# Patient Record
Sex: FEMALE | Race: WHITE
[De-identification: names, ages, dates, MRNs, and addresses within clinical notes are randomized per-mention and may not be internally consistent; named-entity substitution may affect disease eponyms.]

---

## 2019-12-11 ENCOUNTER — HOSPITAL ENCOUNTER (OUTPATIENT)
Age: 74
End: 2019-12-11
Payer: MEDICARE

## 2019-12-11 DIAGNOSIS — R22.42: Primary | ICD-10-CM

## 2019-12-11 PROCEDURE — 76882 US LMTD JT/FCL EVL NVASC XTR: CPT

## 2023-10-31 ENCOUNTER — OFFICE VISIT (OUTPATIENT)
Dept: PRIMARY CARE | Facility: CLINIC | Age: 78
End: 2023-10-31
Payer: MEDICARE

## 2023-10-31 VITALS
WEIGHT: 157.4 LBS | DIASTOLIC BLOOD PRESSURE: 74 MMHG | HEART RATE: 68 BPM | BODY MASS INDEX: 27.89 KG/M2 | SYSTOLIC BLOOD PRESSURE: 140 MMHG | HEIGHT: 63 IN

## 2023-10-31 DIAGNOSIS — E11.9 CONTROLLED TYPE 2 DIABETES MELLITUS WITHOUT COMPLICATION, WITHOUT LONG-TERM CURRENT USE OF INSULIN (MULTI): ICD-10-CM

## 2023-10-31 DIAGNOSIS — C50.911 INFILTRATING DUCTAL CARCINOMA OF RIGHT FEMALE BREAST (MULTI): Primary | ICD-10-CM

## 2023-10-31 DIAGNOSIS — I15.9 SECONDARY HYPERTENSION: ICD-10-CM

## 2023-10-31 DIAGNOSIS — E78.2 MIXED HYPERLIPIDEMIA: ICD-10-CM

## 2023-10-31 PROBLEM — Z90.721 S/P HYSTERECTOMY WITH OOPHORECTOMY: Status: RESOLVED | Noted: 2017-12-29 | Resolved: 2023-10-31

## 2023-10-31 PROBLEM — C50.919 INVASIVE DUCTAL CARCINOMA OF BREAST, FEMALE (MULTI): Status: RESOLVED | Noted: 2017-04-26 | Resolved: 2023-10-31

## 2023-10-31 PROBLEM — Z90.710 S/P HYSTERECTOMY WITH OOPHORECTOMY: Status: RESOLVED | Noted: 2017-12-29 | Resolved: 2023-10-31

## 2023-10-31 PROCEDURE — 1159F MED LIST DOCD IN RCRD: CPT | Performed by: INTERNAL MEDICINE

## 2023-10-31 PROCEDURE — 3077F SYST BP >= 140 MM HG: CPT | Performed by: INTERNAL MEDICINE

## 2023-10-31 PROCEDURE — 3078F DIAST BP <80 MM HG: CPT | Performed by: INTERNAL MEDICINE

## 2023-10-31 PROCEDURE — 99204 OFFICE O/P NEW MOD 45 MIN: CPT | Performed by: INTERNAL MEDICINE

## 2023-10-31 PROCEDURE — 1036F TOBACCO NON-USER: CPT | Performed by: INTERNAL MEDICINE

## 2023-10-31 RX ORDER — LOSARTAN POTASSIUM AND HYDROCHLOROTHIAZIDE 12.5; 1 MG/1; MG/1
1 TABLET ORAL DAILY
COMMUNITY
Start: 2023-08-20 | End: 2024-01-08 | Stop reason: SDUPTHER

## 2023-10-31 RX ORDER — METFORMIN HYDROCHLORIDE 500 MG/1
1000 TABLET, EXTENDED RELEASE ORAL
COMMUNITY
Start: 2023-08-20 | End: 2024-01-08 | Stop reason: SDUPTHER

## 2023-10-31 RX ORDER — OMEPRAZOLE 20 MG/1
20 CAPSULE, DELAYED RELEASE ORAL
COMMUNITY
End: 2024-05-16 | Stop reason: SDUPTHER

## 2023-10-31 RX ORDER — POTASSIUM CHLORIDE 750 MG/1
10 TABLET, EXTENDED RELEASE ORAL DAILY
COMMUNITY
End: 2024-01-08 | Stop reason: SDUPTHER

## 2023-10-31 RX ORDER — AMLODIPINE BESYLATE 5 MG/1
5 TABLET ORAL DAILY
Qty: 30 TABLET | Refills: 5 | Status: SHIPPED | OUTPATIENT
Start: 2023-10-31 | End: 2023-11-14 | Stop reason: ALTCHOICE

## 2023-10-31 RX ORDER — VIT C/E/ZN/COPPR/LUTEIN/ZEAXAN 250MG-90MG
1000 CAPSULE ORAL DAILY
COMMUNITY

## 2023-10-31 RX ORDER — SIMVASTATIN 20 MG/1
20 TABLET, FILM COATED ORAL
COMMUNITY
End: 2024-01-08 | Stop reason: SDUPTHER

## 2023-10-31 ASSESSMENT — ENCOUNTER SYMPTOMS
BACK PAIN: 0
FATIGUE: 0
NAUSEA: 0
BLOOD IN STOOL: 0
ARTHRALGIAS: 0
VOMITING: 0
DIARRHEA: 0
PALPITATIONS: 0
SHORTNESS OF BREATH: 0
COUGH: 1
ABDOMINAL PAIN: 0
WHEEZING: 0

## 2023-10-31 NOTE — ASSESSMENT & PLAN NOTE
-Was diagnosed many years ago and successfully treated and disease-free at this time  -She goes for her screening mammogram this coming Monday

## 2023-10-31 NOTE — PATIENT INSTRUCTIONS
As we discussed I have ordered several labs to be done at your earliest convenience and we would like for you to be fasting.  You can have plenty of water but just nothing with calories for approximately 8 to 12 hours  When you come back we will go over the results  I also added a medication to your medical regimen called amlodipine 5 mg to be taken once daily with your other blood pressure medication.  Please try to check her blood pressure at home with your personal monitor and please sit down and relax for 10 to 20 minutes prior to checking your blood pressure.  Please also bring your monitor with you to your follow-up appointment  When you return we will be completing your annual Medicare wellness visit

## 2023-10-31 NOTE — PROGRESS NOTES
Subjective   Patient ID: Chanel Bradshaw is a 78 y.o. female who presents for [facility].  HPI  She is here today to get established.  Her previous physician of 25 years is moving out of state and she is naturally sad about it.  Otherwise we are glad to meet her today and we did review her past medical history.  She has had a history of both breast cancer and endometrial cancer.  She had surgery and has done remarkably well all this time.  She also had a history of a severe eye infection requiring hospitalization with IV antibiotics about 6 years ago.  Today she is looking great and exudes energy.  She states she is preparing to leave for [place] for the winter months and will leave at the end of December.  She is very physically active and plays [employer].  She does have hearing aids and hears well today.  She is also a type II diabetic and we did review her medications today.  We determined that she is due for laboratory checkup and has agreed to go soon.  We will see her back to go over the results we will also complete her annual Medicare wellness visit.  She also brought in a copy of her living will and power of  for healthcare.  Review of Systems   Constitutional:  Negative for fatigue.   Respiratory:  Positive for cough. Negative for shortness of breath and wheezing.    Cardiovascular:  Negative for chest pain, palpitations and leg swelling.   Gastrointestinal:  Negative for abdominal pain, blood in stool, diarrhea, nausea and vomiting.   Musculoskeletal:  Negative for arthralgias and back pain.     Objective   Physical Exam  Vitals and nursing note reviewed.   Constitutional:       General: She is not in acute distress.     Appearance: Normal appearance.   HENT:      Head: Normocephalic and atraumatic.   Eyes:      Conjunctiva/sclera: Conjunctivae normal.   Cardiovascular:      Rate and Rhythm: Normal rate and regular rhythm.      Heart sounds: Normal heart sounds.   Pulmonary:      Effort: No  respiratory distress.      Breath sounds: No wheezing.   Abdominal:      Palpations: Abdomen is soft.      Tenderness: There is no abdominal tenderness. There is no guarding.   Musculoskeletal:         General: No swelling. Normal range of motion.   Skin:     General: Skin is warm and dry.   Neurological:      General: No focal deficit present.      Mental Status: She is alert and oriented to person, place, and time.   Psychiatric:         Behavior: Behavior normal.       Assessment/Plan   Problem List Items Addressed This Visit             ICD-10-CM    HTN (hypertension) I10     -Her blood pressure was elevated today and she states while checking it at home she typically gets systolic readings in the 140s or higher  -We are adding amlodipine 5 mg daily to her regimen  -She will continue with losartan-hydrochlorothiazide 100-12.51 tablet daily  -She will continue with potassium chloride 10 mEq daily  -She will check her blood pressures at home and bring her monitor with her to her follow-up visit         Relevant Medications    amLODIPine (Norvasc) 5 mg tablet    Infiltrating ductal carcinoma of right female breast (CMS/HCC) - Primary C50.911     -Was diagnosed many years ago and successfully treated and disease-free at this time  -She goes for her screening mammogram this coming Monday         Controlled type 2 diabetes mellitus without complication, without long-term current use of insulin (CMS/HCC) E11.9     -We will be checking a hemoglobin A1c  -She is on metformin 500 mg 2 tablets each evening         Relevant Orders    Comprehensive Metabolic Panel    Hemoglobin A1C    Albumin , Urine Random     Other Visit Diagnoses         Codes    Mixed hyperlipidemia     E78.2    Relevant Orders    Lipid panel               Sarah Haddad DO

## 2023-10-31 NOTE — ASSESSMENT & PLAN NOTE
-Her blood pressure was elevated today and she states while checking it at home she typically gets systolic readings in the 140s or higher  -We are adding amlodipine 5 mg daily to her regimen  -She will continue with losartan-hydrochlorothiazide 100-12.51 tablet daily  -She will continue with potassium chloride 10 mEq daily  -She will check her blood pressures at home and bring her monitor with her to her follow-up visit

## 2023-11-07 ENCOUNTER — LAB (OUTPATIENT)
Dept: LAB | Facility: LAB | Age: 78
End: 2023-11-07
Payer: MEDICARE

## 2023-11-07 DIAGNOSIS — E11.9 CONTROLLED TYPE 2 DIABETES MELLITUS WITHOUT COMPLICATION, WITHOUT LONG-TERM CURRENT USE OF INSULIN (MULTI): ICD-10-CM

## 2023-11-07 DIAGNOSIS — E78.2 MIXED HYPERLIPIDEMIA: ICD-10-CM

## 2023-11-07 LAB
ALBUMIN SERPL BCP-MCNC: 4.4 G/DL (ref 3.4–5)
ALP SERPL-CCNC: 82 U/L (ref 33–136)
ALT SERPL W P-5'-P-CCNC: 13 U/L (ref 7–45)
ANION GAP SERPL CALC-SCNC: 11 MMOL/L (ref 10–20)
AST SERPL W P-5'-P-CCNC: 13 U/L (ref 9–39)
BILIRUB SERPL-MCNC: 0.6 MG/DL (ref 0–1.2)
BUN SERPL-MCNC: 12 MG/DL (ref 6–23)
CALCIUM SERPL-MCNC: 9.6 MG/DL (ref 8.6–10.3)
CHLORIDE SERPL-SCNC: 96 MMOL/L (ref 98–107)
CHOLEST SERPL-MCNC: 182 MG/DL (ref 0–199)
CHOLESTEROL/HDL RATIO: 3
CO2 SERPL-SCNC: 31 MMOL/L (ref 21–32)
CREAT SERPL-MCNC: 0.64 MG/DL (ref 0.5–1.05)
EST. AVERAGE GLUCOSE BLD GHB EST-MCNC: 148 MG/DL
GFR SERPL CREATININE-BSD FRML MDRD: >90 ML/MIN/1.73M*2
GLUCOSE SERPL-MCNC: 193 MG/DL (ref 74–99)
HBA1C MFR BLD: 6.8 %
HDLC SERPL-MCNC: 60 MG/DL
LDLC SERPL CALC-MCNC: 97 MG/DL
NON HDL CHOLESTEROL: 122 MG/DL (ref 0–149)
POTASSIUM SERPL-SCNC: 4.2 MMOL/L (ref 3.5–5.3)
PROT SERPL-MCNC: 6.6 G/DL (ref 6.4–8.2)
SODIUM SERPL-SCNC: 134 MMOL/L (ref 136–145)
TRIGL SERPL-MCNC: 126 MG/DL (ref 0–149)
VLDL: 25 MG/DL (ref 0–40)

## 2023-11-07 PROCEDURE — 80053 COMPREHEN METABOLIC PANEL: CPT

## 2023-11-07 PROCEDURE — 80061 LIPID PANEL: CPT

## 2023-11-07 PROCEDURE — 83036 HEMOGLOBIN GLYCOSYLATED A1C: CPT

## 2023-11-07 PROCEDURE — 36415 COLL VENOUS BLD VENIPUNCTURE: CPT

## 2023-11-14 ENCOUNTER — OFFICE VISIT (OUTPATIENT)
Dept: PRIMARY CARE | Facility: CLINIC | Age: 78
End: 2023-11-14
Payer: MEDICARE

## 2023-11-14 VITALS
BODY MASS INDEX: 27.55 KG/M2 | SYSTOLIC BLOOD PRESSURE: 136 MMHG | HEIGHT: 63 IN | DIASTOLIC BLOOD PRESSURE: 79 MMHG | HEART RATE: 76 BPM | WEIGHT: 155.5 LBS

## 2023-11-14 DIAGNOSIS — Z00.00 MEDICARE ANNUAL WELLNESS VISIT, SUBSEQUENT: ICD-10-CM

## 2023-11-14 DIAGNOSIS — E11.9 CONTROLLED TYPE 2 DIABETES MELLITUS WITHOUT COMPLICATION, WITHOUT LONG-TERM CURRENT USE OF INSULIN (MULTI): ICD-10-CM

## 2023-11-14 DIAGNOSIS — E78.2 MIXED HYPERLIPIDEMIA: ICD-10-CM

## 2023-11-14 DIAGNOSIS — C50.911 INFILTRATING DUCTAL CARCINOMA OF RIGHT FEMALE BREAST (MULTI): ICD-10-CM

## 2023-11-14 DIAGNOSIS — I15.9 SECONDARY HYPERTENSION: Primary | ICD-10-CM

## 2023-11-14 PROCEDURE — 1036F TOBACCO NON-USER: CPT | Performed by: INTERNAL MEDICINE

## 2023-11-14 PROCEDURE — 1159F MED LIST DOCD IN RCRD: CPT | Performed by: INTERNAL MEDICINE

## 2023-11-14 PROCEDURE — 99213 OFFICE O/P EST LOW 20 MIN: CPT | Performed by: INTERNAL MEDICINE

## 2023-11-14 PROCEDURE — 1160F RVW MEDS BY RX/DR IN RCRD: CPT | Performed by: INTERNAL MEDICINE

## 2023-11-14 PROCEDURE — G0439 PPPS, SUBSEQ VISIT: HCPCS | Performed by: INTERNAL MEDICINE

## 2023-11-14 PROCEDURE — 3075F SYST BP GE 130 - 139MM HG: CPT | Performed by: INTERNAL MEDICINE

## 2023-11-14 PROCEDURE — 1170F FXNL STATUS ASSESSED: CPT | Performed by: INTERNAL MEDICINE

## 2023-11-14 PROCEDURE — 3078F DIAST BP <80 MM HG: CPT | Performed by: INTERNAL MEDICINE

## 2023-11-14 RX ORDER — AMLODIPINE BESYLATE 10 MG/1
10 TABLET ORAL DAILY
Qty: 30 TABLET | Refills: 5 | Status: SHIPPED | OUTPATIENT
Start: 2023-11-14 | End: 2023-12-21 | Stop reason: SDUPTHER

## 2023-11-14 ASSESSMENT — ACTIVITIES OF DAILY LIVING (ADL)
BATHING: INDEPENDENT
MANAGING_FINANCES: INDEPENDENT
DOING_HOUSEWORK: INDEPENDENT
GROCERY_SHOPPING: INDEPENDENT
DRESSING: INDEPENDENT
TAKING_MEDICATION: INDEPENDENT

## 2023-11-14 ASSESSMENT — PATIENT HEALTH QUESTIONNAIRE - PHQ9
2. FEELING DOWN, DEPRESSED OR HOPELESS: NOT AT ALL
1. LITTLE INTEREST OR PLEASURE IN DOING THINGS: NOT AT ALL
SUM OF ALL RESPONSES TO PHQ9 QUESTIONS 1 AND 2: 0

## 2023-11-14 NOTE — PATIENT INSTRUCTIONS
As we discussed we completed your annual Medicare wellness visit today  Please remember to try to eat more vegetables and please remember the things we discussed about fall prevention  I was very pleased with your checkup today and your lab work looks wonderful  We will see you back in approximately 6 months for a follow-up and please remember to go to the lab fasting prior to your visit

## 2023-11-14 NOTE — PROGRESS NOTES
Subjective   Reason for Visit: Chanel Bradshaw is an 78 y.o. female here for a Medicare Wellness visit.     Past Medical, Surgical, and Family History reviewed and updated in chart.    Reviewed all medications by prescribing practitioner or clinical pharmacist (such as prescriptions, OTCs, herbal therapies and supplements) and documented in the medical record.    HPI  She is here today for follow-up from her previous visit and we also discovered that she was due for her annual Medicare wellness visit.  We did conduct a full review of systems and we went through the Medicare questionnaire.  She brought in her personal blood pressure monitor which was actually sent to her household by [employer].  She has been checking her blood pressure regularly and although her numbers improved there is still a little bit higher than desirable.  So far she has tolerated the amlodipine well and have decided to increase it to 10 mg.  She will give us an update again on how she is doing and we certainly are happy to adjust medicine as necessary.  She is getting ready to leave [place] for the winter and will be returning in May.  We also reviewed her most recent laboratory test results and for the most part I am very pleased with her numbers.  Her kidney function looks great and her liver enzymes are normal.  Her hemoglobin A1c was just fine at 6.8 although her fasting glucose that morning was a little bit on the generous side.  She does continue to monitor closely and will call if she is trending upward.  Her cholesterol was fantastic and she knows we will check that once a year.  She denies any symptoms of depression and she has had no falls in the last year.  We talked about fall prevention and I have specifically recommended she put grab bars in the bathroom.  Her memory appears to be good and she does wear hearing aids.  Her diet she states could be a little bit better as far as vegetables.  We also discussed the importance of routine  "exercise and she does enjoy playing [employer] ball.  She also has advanced directives and has provided a copy for her chart here.  We decided that we can see her back in approximately 6 months for follow-up.  Patient Care Team:  Sarah Haddad DO as PCP - General (Internal Medicine)     Review of Systems    Objective   Vitals:  /79   Pulse 76   Ht 1.6 m (5' 3\")   Wt 70.5 kg (155 lb 8 oz)   BMI 27.55 kg/m²       Physical Exam  Vitals and nursing note reviewed.   Constitutional:       General: She is not in acute distress.     Appearance: Normal appearance.   HENT:      Head: Normocephalic and atraumatic.   Eyes:      Conjunctiva/sclera: Conjunctivae normal.   Cardiovascular:      Rate and Rhythm: Normal rate and regular rhythm.      Heart sounds: Normal heart sounds.   Pulmonary:      Effort: No respiratory distress.      Breath sounds: No wheezing.   Abdominal:      Palpations: Abdomen is soft.      Tenderness: There is no abdominal tenderness. There is no guarding.   Musculoskeletal:         General: No swelling. Normal range of motion.   Skin:     General: Skin is warm and dry.   Neurological:      General: No focal deficit present.      Mental Status: She is alert and oriented to person, place, and time.   Psychiatric:         Behavior: Behavior normal.       Recent Results (from the past 672 hour(s))   Comprehensive Metabolic Panel    Collection Time: 11/07/23  8:28 AM   Result Value Ref Range    Glucose 193 (H) 74 - 99 mg/dL    Sodium 134 (L) 136 - 145 mmol/L    Potassium 4.2 3.5 - 5.3 mmol/L    Chloride 96 (L) 98 - 107 mmol/L    Bicarbonate 31 21 - 32 mmol/L    Anion Gap 11 10 - 20 mmol/L    Urea Nitrogen 12 6 - 23 mg/dL    Creatinine 0.64 0.50 - 1.05 mg/dL    eGFR >90 >60 mL/min/1.73m*2    Calcium 9.6 8.6 - 10.3 mg/dL    Albumin 4.4 3.4 - 5.0 g/dL    Alkaline Phosphatase 82 33 - 136 U/L    Total Protein 6.6 6.4 - 8.2 g/dL    AST 13 9 - 39 U/L    Bilirubin, Total 0.6 0.0 - 1.2 mg/dL    ALT 13 7 - 45 " U/L   Hemoglobin A1C    Collection Time: 11/07/23  8:28 AM   Result Value Ref Range    Hemoglobin A1C 6.8 (H) see below %    Estimated Average Glucose 148 Not Established mg/dL   Lipid panel    Collection Time: 11/07/23  8:28 AM   Result Value Ref Range    Cholesterol 182 0 - 199 mg/dL    HDL-Cholesterol 60.0 mg/dL    Cholesterol/HDL Ratio 3.0     LDL Calculated 97 <=99 mg/dL    VLDL 25 0 - 40 mg/dL    Triglycerides 126 0 - 149 mg/dL    Non HDL Cholesterol 122 0 - 149 mg/dL       Assessment/Plan   Problem List Items Addressed This Visit       HTN (hypertension) - Primary     -Blood pressure has improved on amlodipine 5 mg daily but because her trend is higher than desirable we will try a 10 mg dose of amlodipine and she will continue to monitor and give me an update on how she is doing  -She will continue with losartan-hydrochlorothiazide 100-12.51 tablet daily  -Potassium chloride CR 10 mEq daily         Relevant Medications    amLODIPine (Norvasc) 10 mg tablet    Other Relevant Orders    Basic Metabolic Panel    Infiltrating ductal carcinoma of right female breast (CMS/HCC)     -She completed her mammogram several weeks ago and the great news is there are no signs of cancer         Controlled type 2 diabetes mellitus without complication, without long-term current use of insulin (CMS/HCC)     -Her hemoglobin A1c is fine at 6.8-she will continue with metformin  mg 2 tablets with her evening meal         Relevant Orders    Hemoglobin A1C    Medicare annual wellness visit, subsequent     -We talked about fall prevention and I have specifically recommended she put grab bars in the bathroom  -We talked about eating more fruits and vegetables and we talked about exercise         Mixed hyperlipidemia     -Her cholesterol profile was excellent this time and we will check it once a year per Medicare protocol  -She will remain on simvastatin 20 mg at bedtime

## 2023-11-14 NOTE — ASSESSMENT & PLAN NOTE
-Blood pressure has improved on amlodipine 5 mg daily but because her trend is higher than desirable we will try a 10 mg dose of amlodipine and she will continue to monitor and give me an update on how she is doing  -She will continue with losartan-hydrochlorothiazide 100-12.51 tablet daily  -Potassium chloride CR 10 mEq daily

## 2023-11-14 NOTE — ASSESSMENT & PLAN NOTE
-Her hemoglobin A1c is fine at 6.8-she will continue with metformin  mg 2 tablets with her evening meal

## 2023-11-14 NOTE — ASSESSMENT & PLAN NOTE
-Her cholesterol profile was excellent this time and we will check it once a year per Medicare protocol  -She will remain on simvastatin 20 mg at bedtime

## 2023-11-14 NOTE — ASSESSMENT & PLAN NOTE
-We talked about fall prevention and I have specifically recommended she put grab bars in the bathroom  -We talked about eating more fruits and vegetables and we talked about exercise

## 2023-12-21 DIAGNOSIS — I15.9 SECONDARY HYPERTENSION: ICD-10-CM

## 2023-12-21 RX ORDER — AMLODIPINE BESYLATE 10 MG/1
10 TABLET ORAL DAILY
Qty: 120 TABLET | Refills: 0 | Status: SHIPPED | OUTPATIENT
Start: 2023-12-21 | End: 2024-05-14 | Stop reason: SDUPTHER

## 2024-01-08 DIAGNOSIS — E78.2 MIXED HYPERLIPIDEMIA: ICD-10-CM

## 2024-01-08 DIAGNOSIS — I15.9 SECONDARY HYPERTENSION: ICD-10-CM

## 2024-01-08 DIAGNOSIS — E11.9 CONTROLLED TYPE 2 DIABETES MELLITUS WITHOUT COMPLICATION, WITHOUT LONG-TERM CURRENT USE OF INSULIN (MULTI): ICD-10-CM

## 2024-01-08 RX ORDER — METFORMIN HYDROCHLORIDE 500 MG/1
TABLET, EXTENDED RELEASE ORAL
Qty: 60 TABLET | Refills: 5 | Status: SHIPPED | OUTPATIENT
Start: 2024-01-08 | End: 2024-05-14 | Stop reason: SDUPTHER

## 2024-01-08 RX ORDER — POTASSIUM CHLORIDE 750 MG/1
10 TABLET, EXTENDED RELEASE ORAL DAILY
Qty: 30 TABLET | Refills: 5 | Status: SHIPPED | OUTPATIENT
Start: 2024-01-08 | End: 2024-05-14 | Stop reason: SDUPTHER

## 2024-01-08 RX ORDER — SIMVASTATIN 20 MG/1
20 TABLET, FILM COATED ORAL
Qty: 30 TABLET | Refills: 5 | Status: SHIPPED | OUTPATIENT
Start: 2024-01-08 | End: 2024-05-14 | Stop reason: SDUPTHER

## 2024-01-08 RX ORDER — LOSARTAN POTASSIUM AND HYDROCHLOROTHIAZIDE 12.5; 1 MG/1; MG/1
1 TABLET ORAL DAILY
Qty: 30 TABLET | Refills: 5 | Status: SHIPPED | OUTPATIENT
Start: 2024-01-08 | End: 2024-05-14 | Stop reason: SDUPTHER

## 2024-05-03 ENCOUNTER — HOSPITAL ENCOUNTER (OUTPATIENT)
Dept: CARDIOLOGY | Facility: HOSPITAL | Age: 79
Discharge: HOME | End: 2024-05-03
Payer: MEDICARE

## 2024-05-03 ENCOUNTER — APPOINTMENT (OUTPATIENT)
Dept: RADIOLOGY | Facility: HOSPITAL | Age: 79
End: 2024-05-03
Payer: MEDICARE

## 2024-05-03 ENCOUNTER — HOSPITAL ENCOUNTER (EMERGENCY)
Facility: HOSPITAL | Age: 79
Discharge: HOME | End: 2024-05-03
Attending: EMERGENCY MEDICINE
Payer: MEDICARE

## 2024-05-03 VITALS
SYSTOLIC BLOOD PRESSURE: 178 MMHG | HEART RATE: 76 BPM | TEMPERATURE: 98.2 F | OXYGEN SATURATION: 94 % | WEIGHT: 154 LBS | RESPIRATION RATE: 18 BRPM | HEIGHT: 64 IN | DIASTOLIC BLOOD PRESSURE: 81 MMHG | BODY MASS INDEX: 26.29 KG/M2

## 2024-05-03 DIAGNOSIS — A49.9 UTI (URINARY TRACT INFECTION), BACTERIAL: Primary | ICD-10-CM

## 2024-05-03 DIAGNOSIS — N39.0 UTI (URINARY TRACT INFECTION), BACTERIAL: Primary | ICD-10-CM

## 2024-05-03 DIAGNOSIS — R41.0 CONFUSION: ICD-10-CM

## 2024-05-03 LAB
ALBUMIN SERPL BCP-MCNC: 4.2 G/DL (ref 3.4–5)
ALP SERPL-CCNC: 79 U/L (ref 33–136)
ALT SERPL W P-5'-P-CCNC: 15 U/L (ref 7–45)
ANION GAP SERPL CALC-SCNC: 11 MMOL/L (ref 10–20)
APPEARANCE UR: ABNORMAL
AST SERPL W P-5'-P-CCNC: 13 U/L (ref 9–39)
BILIRUB SERPL-MCNC: 1 MG/DL (ref 0–1.2)
BILIRUB UR STRIP.AUTO-MCNC: NEGATIVE MG/DL
BUN SERPL-MCNC: 10 MG/DL (ref 6–23)
CALCIUM SERPL-MCNC: 9.9 MG/DL (ref 8.6–10.3)
CARDIAC TROPONIN I PNL SERPL HS: 8 NG/L (ref 0–13)
CHLORIDE SERPL-SCNC: 98 MMOL/L (ref 98–107)
CO2 SERPL-SCNC: 32 MMOL/L (ref 21–32)
COLOR UR: YELLOW
CREAT SERPL-MCNC: 0.6 MG/DL (ref 0.5–1.05)
EGFRCR SERPLBLD CKD-EPI 2021: >90 ML/MIN/1.73M*2
ERYTHROCYTE [DISTWIDTH] IN BLOOD BY AUTOMATED COUNT: 12.4 % (ref 11.5–14.5)
GLUCOSE SERPL-MCNC: 217 MG/DL (ref 74–99)
GLUCOSE UR STRIP.AUTO-MCNC: ABNORMAL MG/DL
HCT VFR BLD AUTO: 43 % (ref 36–46)
HGB BLD-MCNC: 14.4 G/DL (ref 12–16)
HOLD SPECIMEN: NORMAL
KETONES UR STRIP.AUTO-MCNC: NEGATIVE MG/DL
LEUKOCYTE ESTERASE UR QL STRIP.AUTO: ABNORMAL
MCH RBC QN AUTO: 31.1 PG (ref 26–34)
MCHC RBC AUTO-ENTMCNC: 33.5 G/DL (ref 32–36)
MCV RBC AUTO: 93 FL (ref 80–100)
MUCOUS THREADS #/AREA URNS AUTO: ABNORMAL /LPF
NITRITE UR QL STRIP.AUTO: NEGATIVE
NRBC BLD-RTO: 0 /100 WBCS (ref 0–0)
PH UR STRIP.AUTO: 6 [PH]
PLATELET # BLD AUTO: 310 X10*3/UL (ref 150–450)
POTASSIUM SERPL-SCNC: 3.4 MMOL/L (ref 3.5–5.3)
PROT SERPL-MCNC: 7 G/DL (ref 6.4–8.2)
PROT UR STRIP.AUTO-MCNC: ABNORMAL MG/DL
RBC # BLD AUTO: 4.63 X10*6/UL (ref 4–5.2)
RBC # UR STRIP.AUTO: NEGATIVE /UL
RBC #/AREA URNS AUTO: ABNORMAL /HPF
SODIUM SERPL-SCNC: 138 MMOL/L (ref 136–145)
SP GR UR STRIP.AUTO: 1.02
TRANS CELLS #/AREA UR COMP ASSIST: ABNORMAL /HPF
UROBILINOGEN UR STRIP.AUTO-MCNC: <2 MG/DL
WBC # BLD AUTO: 7.2 X10*3/UL (ref 4.4–11.3)
WBC #/AREA URNS AUTO: ABNORMAL /HPF

## 2024-05-03 PROCEDURE — 70450 CT HEAD/BRAIN W/O DYE: CPT | Performed by: RADIOLOGY

## 2024-05-03 PROCEDURE — 93005 ELECTROCARDIOGRAM TRACING: CPT

## 2024-05-03 PROCEDURE — 81001 URINALYSIS AUTO W/SCOPE: CPT | Performed by: PHYSICIAN ASSISTANT

## 2024-05-03 PROCEDURE — 96360 HYDRATION IV INFUSION INIT: CPT

## 2024-05-03 PROCEDURE — 2500000004 HC RX 250 GENERAL PHARMACY W/ HCPCS (ALT 636 FOR OP/ED): Performed by: PHYSICIAN ASSISTANT

## 2024-05-03 PROCEDURE — 71045 X-RAY EXAM CHEST 1 VIEW: CPT | Mod: FOREIGN READ | Performed by: RADIOLOGY

## 2024-05-03 PROCEDURE — 71045 X-RAY EXAM CHEST 1 VIEW: CPT

## 2024-05-03 PROCEDURE — 85027 COMPLETE CBC AUTOMATED: CPT | Performed by: PHYSICIAN ASSISTANT

## 2024-05-03 PROCEDURE — 80053 COMPREHEN METABOLIC PANEL: CPT | Performed by: PHYSICIAN ASSISTANT

## 2024-05-03 PROCEDURE — 84484 ASSAY OF TROPONIN QUANT: CPT | Performed by: PHYSICIAN ASSISTANT

## 2024-05-03 PROCEDURE — 99285 EMERGENCY DEPT VISIT HI MDM: CPT | Mod: 25

## 2024-05-03 PROCEDURE — 87086 URINE CULTURE/COLONY COUNT: CPT | Mod: SAMLAB | Performed by: PHYSICIAN ASSISTANT

## 2024-05-03 PROCEDURE — 36415 COLL VENOUS BLD VENIPUNCTURE: CPT | Performed by: PHYSICIAN ASSISTANT

## 2024-05-03 PROCEDURE — 70450 CT HEAD/BRAIN W/O DYE: CPT

## 2024-05-03 RX ORDER — SODIUM CHLORIDE 9 MG/ML
125 INJECTION, SOLUTION INTRAVENOUS CONTINUOUS
Status: DISCONTINUED | OUTPATIENT
Start: 2024-05-03 | End: 2024-05-03 | Stop reason: HOSPADM

## 2024-05-03 RX ORDER — BISMUTH SUBSALICYLATE 262 MG
1 TABLET,CHEWABLE ORAL DAILY
COMMUNITY

## 2024-05-03 RX ORDER — CIPROFLOXACIN 500 MG/1
500 TABLET ORAL 2 TIMES DAILY
Qty: 14 TABLET | Refills: 0 | Status: SHIPPED | OUTPATIENT
Start: 2024-05-03 | End: 2024-05-14 | Stop reason: ALTCHOICE

## 2024-05-03 RX ADMIN — SODIUM CHLORIDE 125 ML/HR: 9 INJECTION, SOLUTION INTRAVENOUS at 11:46

## 2024-05-03 ASSESSMENT — COLUMBIA-SUICIDE SEVERITY RATING SCALE - C-SSRS
1. IN THE PAST MONTH, HAVE YOU WISHED YOU WERE DEAD OR WISHED YOU COULD GO TO SLEEP AND NOT WAKE UP?: NO
2. HAVE YOU ACTUALLY HAD ANY THOUGHTS OF KILLING YOURSELF?: NO
6. HAVE YOU EVER DONE ANYTHING, STARTED TO DO ANYTHING, OR PREPARED TO DO ANYTHING TO END YOUR LIFE?: NO

## 2024-05-03 ASSESSMENT — PAIN - FUNCTIONAL ASSESSMENT: PAIN_FUNCTIONAL_ASSESSMENT: 0-10

## 2024-05-03 ASSESSMENT — PAIN SCALES - GENERAL: PAINLEVEL_OUTOF10: 0 - NO PAIN

## 2024-05-03 NOTE — ED PROVIDER NOTES
HPI   Chief Complaint   Patient presents with    Altered Mental Status     Pt  reports patient confusion since driving home from [place] on Tuesday. Pt denies complaints.        Patient presents with acute confusion.  For about 4 days now the  has noticed confusion.  States they just traveled back from spending the winter in [place].   states the patient was unusually quiet during transport.  Then when she answered the phone today she told the person on the other line that she was still in [place].  Here in the emergency department she believes it is the month of September but understands its 2024.  She knows where she is at and her birthdate.   states that this is not usual for her.  He has not noticed any concern for hallucinating.  She has not had any falls or injury.  There is no concern for alcohol or drug use.  The  has no safety concerns for her at home.  Patient's appetite remains intact.      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 Assessment: Endometrium cancer ([facility]) [C54.1] PLAN: ROBOTIC LAPAROSCOPIC TOTAL HYSTERECTOMY W/ BSO UTERUS=<250G [13233]            ROBOTIC LAPAROSCOPY SURGICAL W/ RETROPERITONEAL LYMPH NODE SAMPLING SINGLE OR MULTIPLE [60938]            TRANSFUSION BLOOD [5719]    Diabetes mellitus (Multi)     Endometrial cancer (Multi) 2013    Hypermetropia 04/29/2015    Hypertension     Invasive ductal carcinoma of breast, female (Multi) 04/26/2017    Malignant neoplasm of female breast (Multi) 08/18/2010    Formatting of this note might be different from the original. Survivorship Care plan in abstract dated 9/28/15    Orbital cellulitis on right  01/21/2015    Last Assessment & Plan: Formatting of this note might be different from the original. Improving Ct scan unchanged Steroid started by ophthalmology Continue vanco and   iv rocephin and po metronidazole Dr Warren refer no need of iv antibiotics On the id part the patient leukocytosis and symptoms improve while antibiotics still highly suspicious of orbital cellulitis This has been discuss with the pat    Personal history of malignant neoplasm of breast 04/04/2012    Presbyopia 04/29/2015    Regular astigmatism 04/29/2015    S/P hysterectomy with oophorectomy 12/29/2017    Transient cerebral ischemia 01/15/2008    Vulvar cancer (Multi) 1993     Past Surgical History:   Procedure Laterality Date    BREAST LUMPECTOMY Right     She went to the [place] [facility] in [place] in 2013    HAND SURGERY Right 2012    By Dr. Clayton    HYSTERECTOMY  2013    For endometrial cancer     No family history on file.  Social History     Tobacco Use    Smoking status: Never    Smokeless tobacco: Never   Substance Use Topics    Alcohol use: Yes     Comment: social    Drug use: Never       Physical Exam   ED Triage Vitals   Temperature Heart Rate Respirations BP   05/03/24 1121 05/03/24 1121 05/03/24 1121 05/03/24 1121   36.8 °C (98.2 °F) 87 16 178/81      Pulse Ox Temp Source Heart Rate Source Patient Position   05/03/24 1121 05/03/24 1121 05/03/24 1231 --   97 % Temporal Monitor       BP Location FiO2 (%)     -- --             Physical Exam  Vitals and nursing note reviewed.   Constitutional:       General: She is not in acute distress.     Appearance: Normal appearance. She is well-developed, well-groomed and normal weight. She is not ill-appearing, toxic-appearing or diaphoretic.   HENT:      Head: Normocephalic.      Nose: Nose normal.      Mouth/Throat:      Lips: Pink. No lesions.      Mouth: Mucous membranes are moist.   Eyes:      General: No scleral icterus.     Conjunctiva/sclera: Conjunctivae normal.      Pupils:  Pupils are equal.   Cardiovascular:      Rate and Rhythm: Normal rate and regular rhythm.      Pulses:           Dorsalis pedis pulses are 2+ on the right side and 2+ on the left side.        Posterior tibial pulses are 2+ on the right side and 2+ on the left side.      Heart sounds: Normal heart sounds.   Pulmonary:      Effort: Pulmonary effort is normal.      Breath sounds: Normal breath sounds and air entry.   Abdominal:      General: Bowel sounds are normal.      Palpations: Abdomen is soft.      Tenderness: There is no abdominal tenderness. There is no right CVA tenderness, left CVA tenderness or guarding. Negative signs include Martínez's sign and McBurney's sign.   Musculoskeletal:      Right lower leg: No edema.      Left lower leg: No edema.   Skin:     General: Skin is warm.      Capillary Refill: Capillary refill takes less than 2 seconds.   Neurological:      Mental Status: She is alert and oriented to person, place, and time.      Cranial Nerves: No cranial nerve deficit, dysarthria or facial asymmetry.      Motor: No pronator drift.      Coordination: Finger-Nose-Finger Test normal.      Gait: Gait normal.   Psychiatric:         Attention and Perception: Attention and perception normal.         Mood and Affect: Mood and affect normal.         Speech: Speech normal.         Behavior: Behavior normal. Behavior is cooperative.         Thought Content: Thought content normal.         Cognition and Memory: Cognition and memory normal.         Judgment: Judgment normal.         ED Course & MDM   Diagnoses as of 05/03/24 1339   UTI (urinary tract infection), bacterial   Confusion       Medical Decision Making  Patient presents with acute confusion.  For about 4 days now the  has noticed confusion.  States they just traveled back from spending the winter in [place].   states the patient was unusually quiet during transport.  Then when she answered the phone today she told the person on the other  line that she was still in [place].  Here in the emergency department she believes it is the month of September but understands its 2024.  She knows where she is at and her birthdate.   states that this is not usual for her.  He has not noticed any concern for hallucinating.  She has not had any falls or injury.  There is no concern for alcohol or drug use.  The  has no safety concerns for her at home.  Patient's appetite remains intact.    Ddx: CVA, metabolic, medication, early dementia, other    Will obtain basic workup including CT of the brain  No acute findings were noted  There is possibly an early UTI with a small amount of leukocyte Estrace and minimal whites.  Will treat  Attending consulted with patient's PCP for continued outpatient treatment and evaluation.  Patient will be discharged home for continued outpatient evaluation by primary care.  Patient discharged home in improved and stable condition    Amount and/or Complexity of Data Reviewed  Labs: ordered. Decision-making details documented in ED Course.  Radiology: ordered and independent interpretation performed. Decision-making details documented in ED Course.  ECG/medicine tests: ordered and independent interpretation performed. Decision-making details documented in ED Course.     Details: Read by myself showing sinus rhythm at a ventricular rate of 73 bpm.  Normal axis.  No ST segment elevation.  VA interval of 184 with a QT of 392        Procedure  Procedures     Brooklyn Baxter PA-C  05/03/24 2972

## 2024-05-04 LAB
ATRIAL RATE: 73 BPM
BACTERIA UR CULT: NORMAL
P AXIS: 28 DEGREES
P OFFSET: 181 MS
P ONSET: 122 MS
PR INTERVAL: 184 MS
Q ONSET: 214 MS
QRS COUNT: 12 BEATS
QRS DURATION: 92 MS
QT INTERVAL: 392 MS
QTC CALCULATION(BAZETT): 431 MS
QTC FREDERICIA: 418 MS
R AXIS: 3 DEGREES
T AXIS: 50 DEGREES
T OFFSET: 410 MS
VENTRICULAR RATE: 73 BPM

## 2024-05-14 ENCOUNTER — OFFICE VISIT (OUTPATIENT)
Dept: PRIMARY CARE | Facility: CLINIC | Age: 79
End: 2024-05-14
Payer: MEDICARE

## 2024-05-14 ENCOUNTER — LAB (OUTPATIENT)
Dept: LAB | Facility: LAB | Age: 79
End: 2024-05-14
Payer: MEDICARE

## 2024-05-14 VITALS
DIASTOLIC BLOOD PRESSURE: 62 MMHG | HEIGHT: 64 IN | HEART RATE: 58 BPM | OXYGEN SATURATION: 96 % | WEIGHT: 149 LBS | BODY MASS INDEX: 25.44 KG/M2 | SYSTOLIC BLOOD PRESSURE: 124 MMHG

## 2024-05-14 DIAGNOSIS — R41.3 MEMORY LOSS: ICD-10-CM

## 2024-05-14 DIAGNOSIS — E11.9 CONTROLLED TYPE 2 DIABETES MELLITUS WITHOUT COMPLICATION, WITHOUT LONG-TERM CURRENT USE OF INSULIN (MULTI): ICD-10-CM

## 2024-05-14 DIAGNOSIS — I15.9 SECONDARY HYPERTENSION: ICD-10-CM

## 2024-05-14 DIAGNOSIS — F03.A0 MILD DEMENTIA WITHOUT BEHAVIORAL DISTURBANCE, PSYCHOTIC DISTURBANCE, MOOD DISTURBANCE, OR ANXIETY, UNSPECIFIED DEMENTIA TYPE (MULTI): ICD-10-CM

## 2024-05-14 DIAGNOSIS — C50.911 INFILTRATING DUCTAL CARCINOMA OF RIGHT FEMALE BREAST (MULTI): ICD-10-CM

## 2024-05-14 DIAGNOSIS — E78.2 MIXED HYPERLIPIDEMIA: ICD-10-CM

## 2024-05-14 DIAGNOSIS — R41.3 MEMORY LOSS: Primary | ICD-10-CM

## 2024-05-14 PROBLEM — Z00.00 MEDICARE ANNUAL WELLNESS VISIT, SUBSEQUENT: Status: RESOLVED | Noted: 2023-11-14 | Resolved: 2024-05-14

## 2024-05-14 LAB
ANION GAP SERPL CALC-SCNC: 12 MMOL/L (ref 10–20)
BUN SERPL-MCNC: 16 MG/DL (ref 6–23)
CALCIUM SERPL-MCNC: 9.5 MG/DL (ref 8.6–10.3)
CHLORIDE SERPL-SCNC: 99 MMOL/L (ref 98–107)
CO2 SERPL-SCNC: 31 MMOL/L (ref 21–32)
CREAT SERPL-MCNC: 0.78 MG/DL (ref 0.5–1.05)
EGFRCR SERPLBLD CKD-EPI 2021: 78 ML/MIN/1.73M*2
EST. AVERAGE GLUCOSE BLD GHB EST-MCNC: 189 MG/DL
GLUCOSE SERPL-MCNC: 226 MG/DL (ref 74–99)
HBA1C MFR BLD: 8.2 %
POTASSIUM SERPL-SCNC: 3.7 MMOL/L (ref 3.5–5.3)
SODIUM SERPL-SCNC: 138 MMOL/L (ref 136–145)
TSH SERPL-ACNC: 1.32 MIU/L (ref 0.44–3.98)
VIT B12 SERPL-MCNC: 392 PG/ML (ref 211–911)

## 2024-05-14 PROCEDURE — 83036 HEMOGLOBIN GLYCOSYLATED A1C: CPT

## 2024-05-14 PROCEDURE — 36415 COLL VENOUS BLD VENIPUNCTURE: CPT

## 2024-05-14 PROCEDURE — 3078F DIAST BP <80 MM HG: CPT | Performed by: INTERNAL MEDICINE

## 2024-05-14 PROCEDURE — 1159F MED LIST DOCD IN RCRD: CPT | Performed by: INTERNAL MEDICINE

## 2024-05-14 PROCEDURE — 80048 BASIC METABOLIC PNL TOTAL CA: CPT

## 2024-05-14 PROCEDURE — 3074F SYST BP LT 130 MM HG: CPT | Performed by: INTERNAL MEDICINE

## 2024-05-14 PROCEDURE — 82607 VITAMIN B-12: CPT

## 2024-05-14 PROCEDURE — 84443 ASSAY THYROID STIM HORMONE: CPT

## 2024-05-14 PROCEDURE — 1157F ADVNC CARE PLAN IN RCRD: CPT | Performed by: INTERNAL MEDICINE

## 2024-05-14 PROCEDURE — 1036F TOBACCO NON-USER: CPT | Performed by: INTERNAL MEDICINE

## 2024-05-14 PROCEDURE — 99214 OFFICE O/P EST MOD 30 MIN: CPT | Performed by: INTERNAL MEDICINE

## 2024-05-14 RX ORDER — SIMVASTATIN 20 MG/1
20 TABLET, FILM COATED ORAL
Qty: 90 TABLET | Refills: 1 | Status: SHIPPED | OUTPATIENT
Start: 2024-05-14

## 2024-05-14 RX ORDER — DONEPEZIL HYDROCHLORIDE 5 MG/1
5 TABLET, FILM COATED ORAL NIGHTLY
Qty: 30 TABLET | Refills: 5 | Status: SHIPPED | OUTPATIENT
Start: 2024-05-14 | End: 2024-06-05

## 2024-05-14 RX ORDER — POTASSIUM CHLORIDE 750 MG/1
10 TABLET, EXTENDED RELEASE ORAL DAILY
Qty: 90 TABLET | Refills: 1 | Status: SHIPPED | OUTPATIENT
Start: 2024-05-14

## 2024-05-14 RX ORDER — AMLODIPINE BESYLATE 10 MG/1
10 TABLET ORAL DAILY
Qty: 90 TABLET | Refills: 1 | Status: SHIPPED | OUTPATIENT
Start: 2024-05-14 | End: 2024-11-10

## 2024-05-14 RX ORDER — LOSARTAN POTASSIUM AND HYDROCHLOROTHIAZIDE 12.5; 1 MG/1; MG/1
1 TABLET ORAL DAILY
Qty: 90 TABLET | Refills: 1 | Status: SHIPPED | OUTPATIENT
Start: 2024-05-14

## 2024-05-14 RX ORDER — METFORMIN HYDROCHLORIDE 500 MG/1
TABLET, EXTENDED RELEASE ORAL
Qty: 180 TABLET | Refills: 1 | Status: SHIPPED | OUTPATIENT
Start: 2024-05-14

## 2024-05-14 ASSESSMENT — PATIENT HEALTH QUESTIONNAIRE - PHQ9
SUM OF ALL RESPONSES TO PHQ9 QUESTIONS 1 AND 2: 0
1. LITTLE INTEREST OR PLEASURE IN DOING THINGS: NOT AT ALL
2. FEELING DOWN, DEPRESSED OR HOPELESS: NOT AT ALL

## 2024-05-14 NOTE — ASSESSMENT & PLAN NOTE
-She has been in remission for very long time and has no signs of recurrence at this time.  We will continue to monitor

## 2024-05-14 NOTE — ASSESSMENT & PLAN NOTE
-We will have her get a hemoglobin A1c soon and she will be returning to go over the results  -Continue with her current glucose lowering medication and we will continue to monitor closely

## 2024-05-14 NOTE — PROGRESS NOTES
Subjective   Patient ID: Chanel Bradshaw is a 78 y.o. female who presents for Follow-up (6 MO FUV).  HPI  She is here today for evaluation as she was recently seen at [facility] emergency department on the third.  Her  ended up taking her there because she noticed that she seemed to be unusually confused.  He accompanies her today.  He explains that they were returning from [place] and she does seem to be disoriented.  When she went to the emergency department they did several tests including a CAT scan of the brain as well as lab work and urine test.  It looks like she might have had a urinary tract infection so they prescribed Cipro which she did complete recently.  Her urine culture came back showing no signs of a bacterial infection.  They both state that she seems about the same.  We talked about the memory and her  does explain that he has noticed some issues with memory but it just seemed to get worse suddenly.  She states that she is not really concerned about her memory and she seems to be pretty happy and taking things in stride.  I did form a Folstein Mini-Mental status examination and she did have some problems with short-term recall.  She scored a 25 out of 30.  Her  states that sometimes she has the same questions over and over again.  I do suspect that there is some memory issue and I do want to check a vitamin B12 and a thyroid blood test.  I will decided to start her on Aricept or donepezil 5 mg daily and we will see her back at about the third week of taking the medicine.  We can then decide how she is doing and make adjustments from there.  They understand that sometimes 1 can have extensive neuropsychiatric testing but we are limited with what we can do here in [place].  I did explain that sometimes we do offer to see a neurologist for more extensive evaluation but at this time it appears that this is a clear case of some mild dementia.  We are providing refills on all of her  medications today and will also be checking a hemoglobin A1c.  Objective   Physical Exam  Vitals and nursing note reviewed.   Constitutional:       General: She is not in acute distress.     Appearance: Normal appearance.   HENT:      Head: Normocephalic and atraumatic.   Eyes:      Conjunctiva/sclera: Conjunctivae normal.   Cardiovascular:      Rate and Rhythm: Normal rate and regular rhythm.      Heart sounds: Normal heart sounds.   Pulmonary:      Effort: No respiratory distress.      Breath sounds: No wheezing.   Abdominal:      Palpations: Abdomen is soft.      Tenderness: There is no abdominal tenderness. There is no guarding.   Musculoskeletal:         General: No swelling. Normal range of motion.   Skin:     General: Skin is warm and dry.   Neurological:      General: No focal deficit present.      Mental Status: She is alert. She is disoriented.   Psychiatric:         Behavior: Behavior normal.       Recent Results (from the past 672 hour(s))   ECG 12 lead    Collection Time: 05/03/24 11:30 AM   Result Value Ref Range    Ventricular Rate 73 BPM    Atrial Rate 73 BPM    KY Interval 184 ms    QRS Duration 92 ms    QT Interval 392 ms    QTC Calculation(Bazett) 431 ms    P Axis 28 degrees    R Axis 3 degrees    T Axis 50 degrees    QRS Count 12 beats    Q Onset 214 ms    P Onset 122 ms    P Offset 181 ms    T Offset 410 ms    QTC Fredericia 418 ms   Comprehensive metabolic panel    Collection Time: 05/03/24 11:43 AM   Result Value Ref Range    Glucose 217 (H) 74 - 99 mg/dL    Sodium 138 136 - 145 mmol/L    Potassium 3.4 (L) 3.5 - 5.3 mmol/L    Chloride 98 98 - 107 mmol/L    Bicarbonate 32 21 - 32 mmol/L    Anion Gap 11 10 - 20 mmol/L    Urea Nitrogen 10 6 - 23 mg/dL    Creatinine 0.60 0.50 - 1.05 mg/dL    eGFR >90 >60 mL/min/1.73m*2    Calcium 9.9 8.6 - 10.3 mg/dL    Albumin 4.2 3.4 - 5.0 g/dL    Alkaline Phosphatase 79 33 - 136 U/L    Total Protein 7.0 6.4 - 8.2 g/dL    AST 13 9 - 39 U/L    Bilirubin, Total  1.0 0.0 - 1.2 mg/dL    ALT 15 7 - 45 U/L   Troponin I, High Sensitivity    Collection Time: 05/03/24 11:43 AM   Result Value Ref Range    Troponin I, High Sensitivity 8 0 - 13 ng/L   CBC    Collection Time: 05/03/24 11:43 AM   Result Value Ref Range    WBC 7.2 4.4 - 11.3 x10*3/uL    nRBC 0.0 0.0 - 0.0 /100 WBCs    RBC 4.63 4.00 - 5.20 x10*6/uL    Hemoglobin 14.4 12.0 - 16.0 g/dL    Hematocrit 43.0 36.0 - 46.0 %    MCV 93 80 - 100 fL    MCH 31.1 26.0 - 34.0 pg    MCHC 33.5 32.0 - 36.0 g/dL    RDW 12.4 11.5 - 14.5 %    Platelets 310 150 - 450 x10*3/uL   Urinalysis with Reflex Culture and Microscopic    Collection Time: 05/03/24 11:44 AM   Result Value Ref Range    Color, Urine Yellow Straw, Yellow    Appearance, Urine Hazy (N) Clear    Specific Gravity, Urine 1.018 1.005 - 1.035    pH, Urine 6.0 5.0, 5.5, 6.0, 6.5, 7.0, 7.5, 8.0    Protein, Urine 30 (1+) (N) NEGATIVE mg/dL    Glucose, Urine 50 (1+) (A) NEGATIVE mg/dL    Blood, Urine NEGATIVE NEGATIVE    Ketones, Urine NEGATIVE NEGATIVE mg/dL    Bilirubin, Urine NEGATIVE NEGATIVE    Urobilinogen, Urine <2.0 <2.0 mg/dL    Nitrite, Urine NEGATIVE NEGATIVE    Leukocyte Esterase, Urine MODERATE (2+) (A) NEGATIVE   Extra Urine Gray Tube    Collection Time: 05/03/24 11:44 AM   Result Value Ref Range    Extra Tube Hold for add-ons.    Microscopic Only, Urine    Collection Time: 05/03/24 11:44 AM   Result Value Ref Range    WBC, Urine 6-10 (A) 1-5, NONE /HPF    RBC, Urine NONE NONE, 1-2, 3-5 /HPF    Transitional Epithelial Cells, Urine 1-2 (FEW) Reference range not established. /HPF    Mucus, Urine 3+ Reference range not established. /LPF   Urine Culture    Collection Time: 05/03/24 11:44 AM    Specimen: Clean Catch/Voided; Urine   Result Value Ref Range    Urine Culture No significant growth        Assessment/Plan   Problem List Items Addressed This Visit             ICD-10-CM    HTN (hypertension) I10     -Currently well told so we will continue with her current  antihypertensive regimen         Relevant Medications    amLODIPine (Norvasc) 10 mg tablet    losartan-hydrochlorothiazide (Hyzaar) 100-12.5 mg tablet    potassium chloride CR 10 mEq ER tablet    Infiltrating ductal carcinoma of right female breast (Multi) C50.911     -She has been in remission for very long time and has no signs of recurrence at this time.  We will continue to monitor         Controlled type 2 diabetes mellitus without complication, without long-term current use of insulin (Multi) E11.9     -We will have her get a hemoglobin A1c soon and she will be returning to go over the results  -Continue with her current glucose lowering medication and we will continue to monitor closely         Relevant Medications    metFORMIN  mg 24 hr tablet    Other Relevant Orders    Hemoglobin A1C    Mixed hyperlipidemia E78.2    Relevant Medications    simvastatin (Zocor) 20 mg tablet    Memory loss - Primary R41.3     -We will be checking a vitamin B12 level and TSH  -We did perform a Folstein Mini-Mental status examination and she did score 25 out of 30  -Her family has noticed some issues with memory loss         Relevant Medications    donepezil (Aricept) 5 mg tablet    Other Relevant Orders    TSH    Vitamin B12    Mild dementia without behavioral disturbance, psychotic disturbance, mood disturbance, or anxiety, unspecified dementia type (Multi) F03.A0          Sarah Haddad, DO

## 2024-05-14 NOTE — PATIENT INSTRUCTIONS
As we discussed I would like to get some additional lab work which would include checking the sugar, a thyroid blood test, and a vitamin B12 level.  These labs do not require fasting so you can go at any time at your earliest convenience to get them done and we will go over the results when you come back  We did do a memory test called the Folstein Mini-Mental status examination and you scored a 25 out of 30 which means that it does look like you are struggling with your memory somewhat  I sent a prescription to your pharmacy for donepezil which is also known as Aricept.  You are taking a low-dose of just 5 mg daily and sometimes this drug can help with memory.  I would like for you to take this every day unless you are having problems and I will see you back in about 3 weeks to see how you are doing  Please bring all your pills and medications in a bag for medication reconciliation and yes you do need to go back on your potassium because your potassium was low in the emergency room.  I sent a new prescription to your pharmacy

## 2024-05-14 NOTE — ASSESSMENT & PLAN NOTE
-We will be checking a vitamin B12 level and TSH  -We did perform a Folstein Mini-Mental status examination and she did score 25 out of 30  -Her family has noticed some issues with memory loss

## 2024-05-16 DIAGNOSIS — K21.9 GASTROESOPHAGEAL REFLUX DISEASE WITHOUT ESOPHAGITIS: Primary | ICD-10-CM

## 2024-05-16 RX ORDER — OMEPRAZOLE 20 MG/1
20 CAPSULE, DELAYED RELEASE ORAL
Qty: 90 CAPSULE | Refills: 1 | Status: SHIPPED | OUTPATIENT
Start: 2024-05-16 | End: 2024-06-04

## 2024-05-24 ENCOUNTER — APPOINTMENT (OUTPATIENT)
Dept: RADIOLOGY | Facility: HOSPITAL | Age: 79
End: 2024-05-24
Payer: MEDICARE

## 2024-05-24 ENCOUNTER — TELEPHONE (OUTPATIENT)
Dept: PRIMARY CARE | Facility: CLINIC | Age: 79
End: 2024-05-24

## 2024-05-24 ENCOUNTER — HOSPITAL ENCOUNTER (EMERGENCY)
Facility: HOSPITAL | Age: 79
Discharge: HOME | End: 2024-05-24
Attending: EMERGENCY MEDICINE
Payer: MEDICARE

## 2024-05-24 ENCOUNTER — HOSPITAL ENCOUNTER (OUTPATIENT)
Dept: CARDIOLOGY | Facility: HOSPITAL | Age: 79
Discharge: HOME | End: 2024-05-24
Payer: MEDICARE

## 2024-05-24 VITALS
RESPIRATION RATE: 18 BRPM | HEIGHT: 63 IN | SYSTOLIC BLOOD PRESSURE: 132 MMHG | HEART RATE: 77 BPM | TEMPERATURE: 97 F | BODY MASS INDEX: 27.29 KG/M2 | DIASTOLIC BLOOD PRESSURE: 54 MMHG | OXYGEN SATURATION: 94 % | WEIGHT: 154 LBS

## 2024-05-24 DIAGNOSIS — R42 INTERMITTENT LIGHTHEADEDNESS: ICD-10-CM

## 2024-05-24 DIAGNOSIS — R11.2 NAUSEA AND VOMITING, UNSPECIFIED VOMITING TYPE: Primary | ICD-10-CM

## 2024-05-24 LAB
ALBUMIN SERPL BCP-MCNC: 4 G/DL (ref 3.4–5)
ALP SERPL-CCNC: 65 U/L (ref 33–136)
ALT SERPL W P-5'-P-CCNC: 14 U/L (ref 7–45)
ANION GAP SERPL CALC-SCNC: 11 MMOL/L (ref 10–20)
APPEARANCE UR: ABNORMAL
AST SERPL W P-5'-P-CCNC: 23 U/L (ref 9–39)
BACTERIA #/AREA URNS AUTO: ABNORMAL /HPF
BASOPHILS # BLD AUTO: 0.03 X10*3/UL (ref 0–0.1)
BASOPHILS NFR BLD AUTO: 0.2 %
BILIRUB SERPL-MCNC: 0.7 MG/DL (ref 0–1.2)
BILIRUB UR STRIP.AUTO-MCNC: NEGATIVE MG/DL
BUN SERPL-MCNC: 23 MG/DL (ref 6–23)
CALCIUM SERPL-MCNC: 9.1 MG/DL (ref 8.6–10.3)
CARDIAC TROPONIN I PNL SERPL HS: 7 NG/L (ref 0–13)
CHLORIDE SERPL-SCNC: 100 MMOL/L (ref 98–107)
CO2 SERPL-SCNC: 31 MMOL/L (ref 21–32)
COLOR UR: YELLOW
CREAT SERPL-MCNC: 0.84 MG/DL (ref 0.5–1.05)
EGFRCR SERPLBLD CKD-EPI 2021: 72 ML/MIN/1.73M*2
EOSINOPHIL # BLD AUTO: 0.09 X10*3/UL (ref 0–0.4)
EOSINOPHIL NFR BLD AUTO: 0.7 %
ERYTHROCYTE [DISTWIDTH] IN BLOOD BY AUTOMATED COUNT: 12.2 % (ref 11.5–14.5)
GLUCOSE SERPL-MCNC: 288 MG/DL (ref 74–99)
GLUCOSE UR STRIP.AUTO-MCNC: ABNORMAL MG/DL
HCT VFR BLD AUTO: 42.4 % (ref 36–46)
HGB BLD-MCNC: 14.3 G/DL (ref 12–16)
IMM GRANULOCYTES # BLD AUTO: 0.03 X10*3/UL (ref 0–0.5)
IMM GRANULOCYTES NFR BLD AUTO: 0.2 % (ref 0–0.9)
KETONES UR STRIP.AUTO-MCNC: ABNORMAL MG/DL
LEUKOCYTE ESTERASE UR QL STRIP.AUTO: ABNORMAL
LYMPHOCYTES # BLD AUTO: 2.48 X10*3/UL (ref 0.8–3)
LYMPHOCYTES NFR BLD AUTO: 20.2 %
MAGNESIUM SERPL-MCNC: 1.74 MG/DL (ref 1.6–2.4)
MCH RBC QN AUTO: 31.2 PG (ref 26–34)
MCHC RBC AUTO-ENTMCNC: 33.7 G/DL (ref 32–36)
MCV RBC AUTO: 93 FL (ref 80–100)
MONOCYTES # BLD AUTO: 0.64 X10*3/UL (ref 0.05–0.8)
MONOCYTES NFR BLD AUTO: 5.2 %
NEUTROPHILS # BLD AUTO: 8.99 X10*3/UL (ref 1.6–5.5)
NEUTROPHILS NFR BLD AUTO: 73.5 %
NITRITE UR QL STRIP.AUTO: NEGATIVE
NRBC BLD-RTO: 0 /100 WBCS (ref 0–0)
PH UR STRIP.AUTO: 6 [PH]
PLATELET # BLD AUTO: 299 X10*3/UL (ref 150–450)
POTASSIUM SERPL-SCNC: 3.9 MMOL/L (ref 3.5–5.3)
PROT SERPL-MCNC: 6.7 G/DL (ref 6.4–8.2)
PROT UR STRIP.AUTO-MCNC: ABNORMAL MG/DL
RBC # BLD AUTO: 4.58 X10*6/UL (ref 4–5.2)
RBC # UR STRIP.AUTO: NEGATIVE /UL
RBC #/AREA URNS AUTO: ABNORMAL /HPF
SODIUM SERPL-SCNC: 138 MMOL/L (ref 136–145)
SP GR UR STRIP.AUTO: 1.03
SQUAMOUS #/AREA URNS AUTO: ABNORMAL /HPF
UROBILINOGEN UR STRIP.AUTO-MCNC: 0.2 MG/DL
WBC # BLD AUTO: 12.3 X10*3/UL (ref 4.4–11.3)
WBC #/AREA URNS AUTO: ABNORMAL /HPF

## 2024-05-24 PROCEDURE — 84075 ASSAY ALKALINE PHOSPHATASE: CPT | Performed by: NURSE PRACTITIONER

## 2024-05-24 PROCEDURE — 96374 THER/PROPH/DIAG INJ IV PUSH: CPT

## 2024-05-24 PROCEDURE — 2500000004 HC RX 250 GENERAL PHARMACY W/ HCPCS (ALT 636 FOR OP/ED): Performed by: NURSE PRACTITIONER

## 2024-05-24 PROCEDURE — 71046 X-RAY EXAM CHEST 2 VIEWS: CPT | Performed by: RADIOLOGY

## 2024-05-24 PROCEDURE — 36415 COLL VENOUS BLD VENIPUNCTURE: CPT | Performed by: NURSE PRACTITIONER

## 2024-05-24 PROCEDURE — 84484 ASSAY OF TROPONIN QUANT: CPT | Performed by: NURSE PRACTITIONER

## 2024-05-24 PROCEDURE — 81001 URINALYSIS AUTO W/SCOPE: CPT | Performed by: NURSE PRACTITIONER

## 2024-05-24 PROCEDURE — 70450 CT HEAD/BRAIN W/O DYE: CPT | Performed by: RADIOLOGY

## 2024-05-24 PROCEDURE — 99285 EMERGENCY DEPT VISIT HI MDM: CPT | Mod: 25

## 2024-05-24 PROCEDURE — 93005 ELECTROCARDIOGRAM TRACING: CPT

## 2024-05-24 PROCEDURE — 83735 ASSAY OF MAGNESIUM: CPT | Performed by: NURSE PRACTITIONER

## 2024-05-24 PROCEDURE — 71046 X-RAY EXAM CHEST 2 VIEWS: CPT

## 2024-05-24 PROCEDURE — 85025 COMPLETE CBC W/AUTO DIFF WBC: CPT | Performed by: NURSE PRACTITIONER

## 2024-05-24 PROCEDURE — 70450 CT HEAD/BRAIN W/O DYE: CPT

## 2024-05-24 RX ORDER — ONDANSETRON HYDROCHLORIDE 2 MG/ML
4 INJECTION, SOLUTION INTRAVENOUS ONCE
Status: COMPLETED | OUTPATIENT
Start: 2024-05-24 | End: 2024-05-24

## 2024-05-24 RX ADMIN — ONDANSETRON 4 MG: 2 INJECTION INTRAMUSCULAR; INTRAVENOUS at 13:13

## 2024-05-24 RX ADMIN — SODIUM CHLORIDE 1000 ML: 9 INJECTION, SOLUTION INTRAVENOUS at 13:13

## 2024-05-24 ASSESSMENT — COLUMBIA-SUICIDE SEVERITY RATING SCALE - C-SSRS
6. HAVE YOU EVER DONE ANYTHING, STARTED TO DO ANYTHING, OR PREPARED TO DO ANYTHING TO END YOUR LIFE?: NO
1. IN THE PAST MONTH, HAVE YOU WISHED YOU WERE DEAD OR WISHED YOU COULD GO TO SLEEP AND NOT WAKE UP?: NO
2. HAVE YOU ACTUALLY HAD ANY THOUGHTS OF KILLING YOURSELF?: NO

## 2024-05-24 ASSESSMENT — PAIN - FUNCTIONAL ASSESSMENT: PAIN_FUNCTIONAL_ASSESSMENT: 0-10

## 2024-05-24 ASSESSMENT — PAIN SCALES - GENERAL: PAINLEVEL_OUTOF10: 0 - NO PAIN

## 2024-05-24 NOTE — ED PROVIDER NOTES
HPI   Chief Complaint   Patient presents with    Vomiting     Nausea and vomiting since Sunday. Pt denies abdominal pain, pt was syncopal Sunday at [employer], but was not evaluated.  states she was seen recently for confusion and started on Aricept about a week ago. Confusion has continued.       Patient is a healthy nontoxic-appearing 78-year-old female with past medical history of hypertension, infiltrating ductal carcinoma of the right female breast, hyperlipidemia, memory loss, dementia, presents the emergency today for complaint of intermittent nausea and vomiting, syncopal event, and intermittent lightheadedness.  Patient family state 6 days ago patient passed out while in [employer].  Family states patient was standing and then helped to the ground without any fall or injury.  Family states patient woke and has not had any further syncopal events since then.  Patient does complain of intermittent lightheadedness with nausea, generalized weakness and fatigue.  Family states patient was recently getting over a bladder infection and it was suspected this was the cause of intermittent confusion.  [facility] primary care provider disagreed with this and started patient on Aricept 10 days ago.  Patient denies any current lightheadedness, dizziness, headache pain with visual disturbances, numbness or tingling, chest pain, palpitations, shortness of breath difficulty breathing, abdominal pain with nausea, vomit, diarrhea or constipation, fever, shaking, or chills.                          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 Assessment: Endometrium cancer (HCC) [C54.1] PLAN: ROBOTIC LAPAROSCOPIC TOTAL HYSTERECTOMY W/ BSO UTERUS=<250G [09306]            ROBOTIC LAPAROSCOPY  SURGICAL W/ RETROPERITONEAL LYMPH NODE SAMPLING SINGLE OR MULTIPLE [95214]            TRANSFUSION BLOOD [5719]    Diabetes mellitus (Multi)     Endometrial cancer (Multi) 2013    Hypermetropia 04/29/2015    Hypertension     Invasive ductal carcinoma of breast, female (Multi) 04/26/2017    Malignant neoplasm of female breast (Multi) 08/18/2010    Formatting of this note might be different from the original. Survivorship Care plan in abstract dated 9/28/15    Orbital cellulitis on right 01/21/2015    Last Assessment & Plan: Formatting of this note might be different from the original. Improving Ct scan unchanged Steroid started by ophthalmology Continue vanco and   iv rocephin and po metronidazole Dr Warren refer no need of iv antibiotics On the id part the patient leukocytosis and symptoms improve while antibiotics still highly suspicious of orbital cellulitis This has been discuss with the pat    Personal history of malignant neoplasm of breast 04/04/2012    Presbyopia 04/29/2015    Regular astigmatism 04/29/2015    S/P hysterectomy with oophorectomy 12/29/2017    Transient cerebral ischemia 01/15/2008    Vulvar cancer (Multi) 1993     Past Surgical History:   Procedure Laterality Date    BREAST LUMPECTOMY Right     She went to the [facility] in [place] in 2013    HAND SURGERY Right 2012    By Dr. Clayton    HYSTERECTOMY  2013    For endometrial cancer     No family history on file.  Social History     Tobacco Use    Smoking status: Never    Smokeless tobacco: Never   Vaping Use    Vaping status: Never Used   Substance Use Topics    Alcohol use: Yes     Comment: rarely    Drug use: Never       Physical Exam   ED Triage Vitals   Temperature Heart Rate Respirations BP   05/24/24 1256 05/24/24 1256 05/24/24 1256 05/24/24 1256   36.1 °C (97 °F) 87 16 135/57      Pulse Ox Temp Source Heart Rate Source Patient Position   05/24/24 1256 05/24/24 1256 05/24/24 1400 05/24/24 1400   (!) 93 % Oral Monitor Lying      BP  Location FiO2 (%)     05/24/24 1400 --     Left arm        Physical Exam  Vitals and nursing note reviewed. Exam conducted with a chaperone present.   Constitutional:       General: She is not in acute distress.     Appearance: Normal appearance. She is not ill-appearing, toxic-appearing or diaphoretic.      Interventions: She is not intubated.  HENT:      Head: Normocephalic.      Right Ear: Tympanic membrane, ear canal and external ear normal.      Left Ear: Tympanic membrane, ear canal and external ear normal.      Nose: Nose normal.      Mouth/Throat:      Mouth: Mucous membranes are moist.      Pharynx: No oropharyngeal exudate or posterior oropharyngeal erythema.   Eyes:      General:         Right eye: No discharge.         Left eye: No discharge.      Extraocular Movements: Extraocular movements intact.      Pupils: Pupils are equal, round, and reactive to light.   Neck:      Vascular: No carotid bruit.   Cardiovascular:      Rate and Rhythm: Normal rate and regular rhythm.      Pulses: Normal pulses. No decreased pulses.      Heart sounds: Normal heart sounds. Heart sounds not distant. No murmur heard.     No friction rub. No gallop.   Pulmonary:      Effort: Pulmonary effort is normal. No tachypnea, bradypnea, accessory muscle usage, prolonged expiration, respiratory distress or retractions. She is not intubated.      Breath sounds: Normal breath sounds. No stridor, decreased air movement or transmitted upper airway sounds. No decreased breath sounds, wheezing, rhonchi or rales.   Chest:      Chest wall: No tenderness.   Abdominal:      General: Abdomen is flat. Bowel sounds are normal.      Palpations: Abdomen is soft.   Musculoskeletal:         General: Normal range of motion.      Cervical back: Normal range of motion and neck supple. No rigidity or tenderness.   Lymphadenopathy:      Cervical: No cervical adenopathy.   Skin:     General: Skin is warm and dry.      Capillary Refill: Capillary refill  takes less than 2 seconds.   Neurological:      General: No focal deficit present.      Mental Status: She is alert and oriented to person, place, and time.      Cranial Nerves: No cranial nerve deficit.      Sensory: No sensory deficit.      Motor: No weakness.      Coordination: Coordination normal.      Gait: Gait normal.      Deep Tendon Reflexes: Reflexes normal.         ED Course & MDM   ED Course as of 05/24/24 1541   Fri May 24, 2024   1405 CT of the head reveals  IMPRESSION:  No acute intracranial pathologic findings are identified.  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  There is no interval change when compared to the previous examination.   [EC]   1405 Chest x-ray reveals  IMPRESSION:  No acute pathologic findings are identified on this exam.  There is no interval change when compared to the previous examination.   [EC]   1446 EKG interpreted by myself reveals sinus rhythm with PVCs at a rate of 80 bpm with no ST elevation or T wave inversion and is similar in comparison to previous EKG. [EC]      ED Course User Index  [EC] Dusty Pacheco, APRN-CNP         Diagnoses as of 05/24/24 1541   Nausea and vomiting, unspecified vomiting type   Intermittent lightheadedness       Medical Decision Making  Given patient's complaint presentation a thorough exam was performed.  Patient remains neurologically intact with no focal deficits, so no rigidity, remains hemodynamically stable during emergency evaluation, is afebrile, NIH of 0, GCS of 15, pupils equal active round bilaterally, EOMs intact bilaterally no nystagmus diplopia, no ataxia or drift, face is symmetrical, tongue is midline, able to shrug shoulders, no weakness present bilateral upper or lower extremities, no adventitious lung sounds auscultated, speaking complete sentences no respiratory distress, denies any chest pain, denies any current lightheadedness or dizziness, I have a low suspicion for acute intracranial process, meningitis,  mastoiditis, ACS, pulmonary embolism, aortic abdominal aneurysm.  Lab work was ordered including IV fluid, Zofran, EKG, chest x-ray and CT of the head.  Chest x-ray as interpreted by radiologist reveals no acute cardiopulmonary process, CT of the head as interpreted by radiologist reveals no acute intracranial process.  Lab work revealed several irregularities without any critical lab values.  Reevaluation patient reveals no worsening or return of lightheadedness, no further syncopal events, I do not believe any further imaging or lab work is warranted at this time.  I encouraged monitoring symptoms, if they become worse return to emergency room for further evaluation, otherwise follow-up with primary care provider as previously arranged.  Family and patient were agreeable this plan patient was discharged home in stable condition.    RAMONA Jacobo     Portions of this note were generated using digital voice recognition software, and may contain grammatical errors      Procedure  Procedures     RAMONA Jacobo  05/24/24 2752

## 2024-05-24 NOTE — TELEPHONE ENCOUNTER
Patients  stated the patient has had 3 episodes of emesis this week. Sunday she passed out in [facility] and threw up after she came to. He is wondering if there is anything he should be doing for her?

## 2024-05-25 LAB
ATRIAL RATE: 80 BPM
P AXIS: 66 DEGREES
P OFFSET: 197 MS
P ONSET: 130 MS
PR INTERVAL: 198 MS
Q ONSET: 229 MS
QRS COUNT: 13 BEATS
QRS DURATION: 90 MS
QT INTERVAL: 380 MS
QTC CALCULATION(BAZETT): 438 MS
QTC FREDERICIA: 418 MS
R AXIS: 49 DEGREES
T AXIS: 8 DEGREES
T OFFSET: 419 MS
VENTRICULAR RATE: 80 BPM

## 2024-06-04 ENCOUNTER — OFFICE VISIT (OUTPATIENT)
Dept: PRIMARY CARE | Facility: CLINIC | Age: 79
End: 2024-06-04
Payer: MEDICARE

## 2024-06-04 VITALS
HEART RATE: 78 BPM | SYSTOLIC BLOOD PRESSURE: 124 MMHG | BODY MASS INDEX: 26.05 KG/M2 | HEIGHT: 63 IN | DIASTOLIC BLOOD PRESSURE: 82 MMHG | WEIGHT: 147 LBS | OXYGEN SATURATION: 95 %

## 2024-06-04 DIAGNOSIS — R06.6 HICCUP: ICD-10-CM

## 2024-06-04 DIAGNOSIS — F03.A0 MILD DEMENTIA WITHOUT BEHAVIORAL DISTURBANCE, PSYCHOTIC DISTURBANCE, MOOD DISTURBANCE, OR ANXIETY, UNSPECIFIED DEMENTIA TYPE (MULTI): ICD-10-CM

## 2024-06-04 DIAGNOSIS — E11.9 CONTROLLED TYPE 2 DIABETES MELLITUS WITHOUT COMPLICATION, WITHOUT LONG-TERM CURRENT USE OF INSULIN (MULTI): Primary | ICD-10-CM

## 2024-06-04 DIAGNOSIS — K21.9 GASTROESOPHAGEAL REFLUX DISEASE WITHOUT ESOPHAGITIS: ICD-10-CM

## 2024-06-04 PROBLEM — R41.3 MEMORY LOSS: Status: RESOLVED | Noted: 2024-05-14 | Resolved: 2024-06-04

## 2024-06-04 PROCEDURE — 1157F ADVNC CARE PLAN IN RCRD: CPT | Performed by: INTERNAL MEDICINE

## 2024-06-04 PROCEDURE — 1159F MED LIST DOCD IN RCRD: CPT | Performed by: INTERNAL MEDICINE

## 2024-06-04 PROCEDURE — 1160F RVW MEDS BY RX/DR IN RCRD: CPT | Performed by: INTERNAL MEDICINE

## 2024-06-04 PROCEDURE — 3079F DIAST BP 80-89 MM HG: CPT | Performed by: INTERNAL MEDICINE

## 2024-06-04 PROCEDURE — 1036F TOBACCO NON-USER: CPT | Performed by: INTERNAL MEDICINE

## 2024-06-04 PROCEDURE — 99214 OFFICE O/P EST MOD 30 MIN: CPT | Performed by: INTERNAL MEDICINE

## 2024-06-04 PROCEDURE — 3074F SYST BP LT 130 MM HG: CPT | Performed by: INTERNAL MEDICINE

## 2024-06-04 RX ORDER — OMEPRAZOLE 40 MG/1
40 CAPSULE, DELAYED RELEASE ORAL
Qty: 90 CAPSULE | Refills: 1 | Status: SHIPPED | OUTPATIENT
Start: 2024-06-04 | End: 2024-07-04

## 2024-06-04 ASSESSMENT — ENCOUNTER SYMPTOMS
VOMITING: 0
PALPITATIONS: 0
BLOOD IN STOOL: 0
ARTHRALGIAS: 0
DIARRHEA: 0
ABDOMINAL PAIN: 0
SHORTNESS OF BREATH: 0
WHEEZING: 0
BACK PAIN: 0
NAUSEA: 0
FATIGUE: 0
COUGH: 0

## 2024-06-04 NOTE — ASSESSMENT & PLAN NOTE
-We are increasing her omeprazole from 20 mg daily up to 40 mg daily  -They will let me know if nausea and vomiting becomes a recurrent issue

## 2024-06-04 NOTE — PATIENT INSTRUCTIONS
As you are well aware we filled out the form for the [facility] but I am also sending a copy of today's progress note so that they have a complete history with complete information about your health  Please let us know if there are any issues  I am also increasing the omeprazole from 20 mg daily up to 40 mg daily to see if this will eradicate the hiccups.  Please let me know in a couple of weeks how things are going  I did not feel we needed to do additional evaluation for the nausea or vomiting or even when she passed out but please be sure she eats regularly and if she ever passes out again she will clearly need further evaluation  I would like to see you back in approximately 3 months and please remember to get lab work done prior to that visit

## 2024-06-04 NOTE — ASSESSMENT & PLAN NOTE
-I am increasing the omeprazole to treat what I think is acid reflux causing her hiccups  -Her  will let me know if the hiccups do not resolve

## 2024-06-04 NOTE — ASSESSMENT & PLAN NOTE
-Her most recent hemoglobin A1c was a little generous at 8.2  -We will continue with her current medical regimen and I invited her to return in approximately 3 months for reevaluation with hemoglobin A1c

## 2024-06-04 NOTE — ASSESSMENT & PLAN NOTE
-She continues to have a gradual decline and now requires halfway care  -I have filled out paperwork so that she is permitted to go to the [facility] for  especially when her  has to be away  -He has also made arrangements with a young lady who comes to sit with her during the summer months  -We will continue to monitor and provide supportive care  -We did go over the DNR CC arrest form which was explained and he has chosen that category

## 2024-06-05 ENCOUNTER — TELEPHONE (OUTPATIENT)
Dept: PRIMARY CARE | Facility: CLINIC | Age: 79
End: 2024-06-05
Payer: MEDICARE

## 2024-06-05 DIAGNOSIS — F03.A0 MILD DEMENTIA WITHOUT BEHAVIORAL DISTURBANCE, PSYCHOTIC DISTURBANCE, MOOD DISTURBANCE, OR ANXIETY, UNSPECIFIED DEMENTIA TYPE (MULTI): Primary | ICD-10-CM

## 2024-06-05 RX ORDER — DONEPEZIL HYDROCHLORIDE 10 MG/1
10 TABLET, FILM COATED ORAL NIGHTLY
Qty: 30 TABLET | Refills: 5 | Status: SHIPPED | OUTPATIENT
Start: 2024-06-05 | End: 2024-12-02

## 2024-06-05 NOTE — TELEPHONE ENCOUNTER
Patients  called and left . He stated you guys covered a lot at her appt but he did forget to ask what the next step would be in regards to her confusion/memory.

## 2024-06-05 NOTE — TELEPHONE ENCOUNTER
"Please advise that I went ahead and sent a new prescription in for her donepezil also known as Aricept.  She has been on the 5 mg so I am increasing it to 10.  She can use up her 5 mg tablets by taking 2 at the same time.  Please tell him that sometimes this does help but unfortunately in the year 2024 we still do not have great medicines for treating this dreaded disease.  I see that she will be coming back in just a few months and we can discuss her response to the medication.  Please also tell him that if he sees her having a problem with this increase to give me a call right away.  I also would recommend that he purchase a book called \"the 36-hour day \".  It is a great guide for families who are caring for loved ones with dementia.  Thank you  "

## 2024-07-10 ENCOUNTER — TELEPHONE (OUTPATIENT)
Dept: PRIMARY CARE | Facility: CLINIC | Age: 79
End: 2024-07-10
Payer: MEDICARE

## 2024-07-10 NOTE — TELEPHONE ENCOUNTER
Patients  called and left . He said he can't see waiting 2 mos to follow up. He feels she is getting worse. She is not eating, no appetite, losing weight and having incontinent episodes that he has had to clean up several times. He doesn't know where to go next in regards to this.

## 2024-07-10 NOTE — TELEPHONE ENCOUNTER
I tried to call Gaurav at 2:00 this afternoon but unfortunately got a voicemail.  I left a message that I wanted to talk to him about the situation.  When he calls back tell him that we would be happy to see her sooner but if she has had an acute and sudden change in her condition and she is really sick then he needs to take her to the emergency room.  Thank you

## 2024-07-11 NOTE — TELEPHONE ENCOUNTER
I called the house first and Chanel answered.  I asked if he was there and she told me he was out golfing.  I then called his cell phone.  We talked about his concerns.  He does have somebody that sometimes stays with her when he goes away.  He states that her appetite is decreased and she is losing weight.  I told him that probably the best thing to do would be to have a visit here so we can go over things and I think that this visit will take a long time so I would like to have 2 appointment slots for her when she comes in.  I told him it might take a couple of weeks to get RN because of the schedule and he expressed understanding.  I told him that if she got suddenly worse she should go to the emergency room.

## 2024-07-23 DIAGNOSIS — I15.9 SECONDARY HYPERTENSION: ICD-10-CM

## 2024-07-23 RX ORDER — AMLODIPINE BESYLATE 10 MG/1
10 TABLET ORAL DAILY
Qty: 30 TABLET | Refills: 5 | Status: SHIPPED | OUTPATIENT
Start: 2024-07-23 | End: 2025-01-19

## 2024-07-25 ENCOUNTER — APPOINTMENT (OUTPATIENT)
Dept: PRIMARY CARE | Facility: CLINIC | Age: 79
End: 2024-07-25
Payer: MEDICARE

## 2024-07-25 ENCOUNTER — LAB (OUTPATIENT)
Dept: LAB | Facility: LAB | Age: 79
End: 2024-07-25
Payer: MEDICARE

## 2024-07-25 VITALS
WEIGHT: 139.8 LBS | DIASTOLIC BLOOD PRESSURE: 70 MMHG | BODY MASS INDEX: 24.76 KG/M2 | OXYGEN SATURATION: 93 % | SYSTOLIC BLOOD PRESSURE: 112 MMHG | HEART RATE: 75 BPM

## 2024-07-25 DIAGNOSIS — C50.911 INFILTRATING DUCTAL CARCINOMA OF RIGHT FEMALE BREAST (MULTI): ICD-10-CM

## 2024-07-25 DIAGNOSIS — R63.4 WEIGHT LOSS, UNINTENTIONAL: ICD-10-CM

## 2024-07-25 DIAGNOSIS — K21.9 GASTROESOPHAGEAL REFLUX DISEASE WITHOUT ESOPHAGITIS: ICD-10-CM

## 2024-07-25 DIAGNOSIS — R94.31 ABNORMAL EKG: Primary | ICD-10-CM

## 2024-07-25 DIAGNOSIS — R06.6 HICCUP: ICD-10-CM

## 2024-07-25 LAB
ALBUMIN SERPL BCP-MCNC: 4.3 G/DL (ref 3.4–5)
ALP SERPL-CCNC: 66 U/L (ref 33–136)
ALT SERPL W P-5'-P-CCNC: 22 U/L (ref 7–45)
ANION GAP SERPL CALC-SCNC: 14 MMOL/L (ref 10–20)
AST SERPL W P-5'-P-CCNC: 19 U/L (ref 9–39)
BASOPHILS # BLD AUTO: 0.02 X10*3/UL (ref 0–0.1)
BASOPHILS NFR BLD AUTO: 0.2 %
BILIRUB SERPL-MCNC: 0.6 MG/DL (ref 0–1.2)
BUN SERPL-MCNC: 14 MG/DL (ref 6–23)
CALCIUM SERPL-MCNC: 9.8 MG/DL (ref 8.6–10.3)
CHLORIDE SERPL-SCNC: 98 MMOL/L (ref 98–107)
CO2 SERPL-SCNC: 30 MMOL/L (ref 21–32)
CREAT SERPL-MCNC: 0.81 MG/DL (ref 0.5–1.05)
EGFRCR SERPLBLD CKD-EPI 2021: 75 ML/MIN/1.73M*2
EOSINOPHIL # BLD AUTO: 0.14 X10*3/UL (ref 0–0.4)
EOSINOPHIL NFR BLD AUTO: 1.5 %
ERYTHROCYTE [DISTWIDTH] IN BLOOD BY AUTOMATED COUNT: 12.5 % (ref 11.5–14.5)
GLUCOSE SERPL-MCNC: 237 MG/DL (ref 74–99)
HCT VFR BLD AUTO: 44.6 % (ref 36–46)
HGB BLD-MCNC: 14.6 G/DL (ref 12–16)
IMM GRANULOCYTES # BLD AUTO: 0.03 X10*3/UL (ref 0–0.5)
IMM GRANULOCYTES NFR BLD AUTO: 0.3 % (ref 0–0.9)
LIPASE SERPL-CCNC: 19 U/L (ref 9–82)
LYMPHOCYTES # BLD AUTO: 3.61 X10*3/UL (ref 0.8–3)
LYMPHOCYTES NFR BLD AUTO: 38.8 %
MCH RBC QN AUTO: 30.4 PG (ref 26–34)
MCHC RBC AUTO-ENTMCNC: 32.7 G/DL (ref 32–36)
MCV RBC AUTO: 93 FL (ref 80–100)
MONOCYTES # BLD AUTO: 0.55 X10*3/UL (ref 0.05–0.8)
MONOCYTES NFR BLD AUTO: 5.9 %
NEUTROPHILS # BLD AUTO: 4.95 X10*3/UL (ref 1.6–5.5)
NEUTROPHILS NFR BLD AUTO: 53.3 %
NRBC BLD-RTO: 0 /100 WBCS (ref 0–0)
PLATELET # BLD AUTO: 362 X10*3/UL (ref 150–450)
POTASSIUM SERPL-SCNC: 3.4 MMOL/L (ref 3.5–5.3)
PROT SERPL-MCNC: 6.6 G/DL (ref 6.4–8.2)
RBC # BLD AUTO: 4.8 X10*6/UL (ref 4–5.2)
SODIUM SERPL-SCNC: 139 MMOL/L (ref 136–145)
WBC # BLD AUTO: 9.3 X10*3/UL (ref 4.4–11.3)

## 2024-07-25 PROCEDURE — 36415 COLL VENOUS BLD VENIPUNCTURE: CPT

## 2024-07-25 PROCEDURE — 83690 ASSAY OF LIPASE: CPT

## 2024-07-25 PROCEDURE — 3074F SYST BP LT 130 MM HG: CPT | Performed by: INTERNAL MEDICINE

## 2024-07-25 PROCEDURE — 1160F RVW MEDS BY RX/DR IN RCRD: CPT | Performed by: INTERNAL MEDICINE

## 2024-07-25 PROCEDURE — 99214 OFFICE O/P EST MOD 30 MIN: CPT | Performed by: INTERNAL MEDICINE

## 2024-07-25 PROCEDURE — 85025 COMPLETE CBC W/AUTO DIFF WBC: CPT

## 2024-07-25 PROCEDURE — 3078F DIAST BP <80 MM HG: CPT | Performed by: INTERNAL MEDICINE

## 2024-07-25 PROCEDURE — 1157F ADVNC CARE PLAN IN RCRD: CPT | Performed by: INTERNAL MEDICINE

## 2024-07-25 PROCEDURE — 1159F MED LIST DOCD IN RCRD: CPT | Performed by: INTERNAL MEDICINE

## 2024-07-25 PROCEDURE — 80053 COMPREHEN METABOLIC PANEL: CPT

## 2024-07-25 PROCEDURE — 1036F TOBACCO NON-USER: CPT | Performed by: INTERNAL MEDICINE

## 2024-07-25 ASSESSMENT — ENCOUNTER SYMPTOMS
DIARRHEA: 0
SHORTNESS OF BREATH: 0
BACK PAIN: 0
WHEEZING: 0
BLOOD IN STOOL: 0
NAUSEA: 0
COUGH: 0
ABDOMINAL PAIN: 0
PALPITATIONS: 0
UNEXPECTED WEIGHT CHANGE: 1
ARTHRALGIAS: 0
VOMITING: 0
FATIGUE: 0
CHEST TIGHTNESS: 0

## 2024-07-25 NOTE — PATIENT INSTRUCTIONS
As we discussed she had an EKG in the emergency department just a couple of months ago and upon review we see that there appeared to be a possible abnormality.  Because of that I will be ordering an echocardiogram and the staff will be calling to get that scheduled.  Please let Dr. Katz's office know that we are in the process of evaluating her heart and will need to delay surgery to some point in the future  Please encourage eating regular meals and even snacking to see if we can improve the weight  Please watch for any difficulties with swallowing foods or liquids such as choking or complaints of discomfort while swallowing.  If we see this happening then I will refer her to a gastroenterologist to evaluate the esophagus  Because of the weight loss I decided to do some additional lab work and I am also ordering a CT scan of the abdomen and pelvis.  Please keep in mind that we have to get approval from Medicare for the CT scan and once that is accomplished the staff will call to get this scheduled  Please start checking sugars twice a day every day before breakfast and before supper.  Please give me an update and please bring all your sugar readings with you to the next follow-up appointment which will be arranged for 3 weeks from today  We are moving her office location to [facility] so please remember you will need to show up there

## 2024-07-25 NOTE — ASSESSMENT & PLAN NOTE
-She has had a significant amount of weight loss in the recent past  -She appears to have had improvement in her symptoms with the increase of her omeprazole  -Because of the bouts of nausea and vomiting etc. I have decided to order a CT scan of her abdomen and pelvis and of course we will need to get it cleared through her insurance plan.  Hopefully we can do this soon and get it done.  I am also ordering lab work and I will see her back in follow-up

## 2024-07-25 NOTE — ASSESSMENT & PLAN NOTE
-She had an EKG a couple of months ago while in the emergency department and the cardiologist indicated that there was an abnormality suggestive of a possible prior heart attack  -Since she has had no history of cardiac issues I will start by ordering an echocardiogram to assess for wall motion abnormality etc.  We will get this done as soon as possible and I will see them back to go over results

## 2024-07-25 NOTE — PROGRESS NOTES
Subjective   Patient ID: Chanel Bradshaw is a 78 y.o. female who presents for Follow-up.  HPI  She is here today for follow-up and accompanied by her .  When we saw her last time a little over a month ago we had decided to double the dose of her omeprazole because of some gastrointestinal symptoms.  She is here today for follow-up and they both agree that she is no longer experiencing hiccups and she had 1 bout of nausea and vomiting but otherwise that symptom has improved as well.  Her  explains that she normally gets up at 10 AM but 1 morning he had to take her somewhere and woke her up early.  He states they were in a rush and when they got in the car and started driving she threw up.  He has not observed any other vomiting.  Unfortunately we do see a significant amount of weight loss even since her last visit.  He states that for a while she was not eating well although he feels like she is eating more recently.  I did conduct a review of systems and for the most part Chanel denies any symptoms right now.  She is very pleasant and smiles frequently but we are limited somewhat with memory.  Because of this significant weight loss I have decided to repeat some laboratory tests.  I also decided to order a CT scan of her abdomen and pelvis to cover the bases.  We talked about encouraging increased caloric intake and a course when she comes back next time we will reevaluate her progress and weight gain.  She also had an EKG in the emergency room just a few months ago and it did show an abnormality.  I have decided to order an echocardiogram as a follow-up and we will see her back to go over those results as well.  Her  indicates that she would need to have his cataract surgery and I told him we will need to evaluate her heart thoroughly which might take a little bit of time.  He will let Dr. Katz's office know  Review of Systems   Constitutional:  Positive for unexpected weight change. Negative  for fatigue.   Respiratory:  Negative for cough, chest tightness, shortness of breath and wheezing.    Cardiovascular:  Negative for chest pain, palpitations and leg swelling.   Gastrointestinal:  Negative for abdominal pain, blood in stool, diarrhea, nausea and vomiting.   Musculoskeletal:  Negative for arthralgias and back pain.     Objective   Physical Exam  Vitals and nursing note reviewed.   Constitutional:       General: She is not in acute distress.     Appearance: Normal appearance.   HENT:      Head: Normocephalic and atraumatic.   Eyes:      Conjunctiva/sclera: Conjunctivae normal.   Cardiovascular:      Rate and Rhythm: Normal rate and regular rhythm.      Heart sounds: Normal heart sounds.   Pulmonary:      Effort: No respiratory distress.      Breath sounds: No wheezing.   Abdominal:      Palpations: Abdomen is soft.      Tenderness: There is no abdominal tenderness. There is no guarding.   Musculoskeletal:         General: No swelling. Normal range of motion.   Skin:     General: Skin is warm and dry.   Neurological:      General: No focal deficit present.      Mental Status: She is alert and oriented to person, place, and time.   Psychiatric:         Behavior: Behavior normal.         Assessment/Plan   Problem List Items Addressed This Visit             ICD-10-CM    Infiltrating ductal carcinoma of right female breast (Multi) C50.911     -Her last mammogram was in November and she is scheduled to have 1 this year  -She continues to follow with Dr. Jaquelin barber for regular checkups         Hiccup R06.6     -Hiccups seem to have resolved with increasing the omeprazole to 40 mg         Gastroesophageal reflux disease without esophagitis K21.9     -She is doing so much better on her omeprazole 40 mg and her hiccups have resolved  -When it is all set and done she might require additional evaluation of her esophagus and stomach.  Her  states she never has difficulties swallowing foods or liquids but  sometimes will have issues with pills         Abnormal EKG - Primary R94.31     -She had an EKG a couple of months ago while in the emergency department and the cardiologist indicated that there was an abnormality suggestive of a possible prior heart attack  -Since she has had no history of cardiac issues I will start by ordering an echocardiogram to assess for wall motion abnormality etc.  We will get this done as soon as possible and I will see them back to go over results         Relevant Orders    Transthoracic Echo (TTE) Complete    Weight loss, unintentional R63.4     -She has had a significant amount of weight loss in the recent past  -She appears to have had improvement in her symptoms with the increase of her omeprazole  -Because of the bouts of nausea and vomiting etc. I have decided to order a CT scan of her abdomen and pelvis and of course we will need to get it cleared through her insurance plan.  Hopefully we can do this soon and get it done.  I am also ordering lab work and I will see her back in follow-up         Relevant Orders    CBC and Auto Differential    Lipase    Comprehensive Metabolic Panel    CT abdomen pelvis w IV contrast          Sarah Haddad, DO

## 2024-07-25 NOTE — ASSESSMENT & PLAN NOTE
-She is doing so much better on her omeprazole 40 mg and her hiccups have resolved  -When it is all set and done she might require additional evaluation of her esophagus and stomach.  Her  states she never has difficulties swallowing foods or liquids but sometimes will have issues with pills

## 2024-08-13 ENCOUNTER — HOSPITAL ENCOUNTER (OUTPATIENT)
Dept: RADIOLOGY | Facility: HOSPITAL | Age: 79
Discharge: HOME | End: 2024-08-13
Payer: MEDICARE

## 2024-08-13 ENCOUNTER — HOSPITAL ENCOUNTER (OUTPATIENT)
Dept: CARDIOLOGY | Facility: HOSPITAL | Age: 79
Discharge: HOME | End: 2024-08-13
Payer: MEDICARE

## 2024-08-13 VITALS
HEIGHT: 63 IN | RESPIRATION RATE: 18 BRPM | HEART RATE: 99 BPM | DIASTOLIC BLOOD PRESSURE: 84 MMHG | BODY MASS INDEX: 24.63 KG/M2 | WEIGHT: 139 LBS | SYSTOLIC BLOOD PRESSURE: 157 MMHG

## 2024-08-13 DIAGNOSIS — R63.4 WEIGHT LOSS, UNINTENTIONAL: ICD-10-CM

## 2024-08-13 DIAGNOSIS — R94.31 ABNORMAL EKG: ICD-10-CM

## 2024-08-13 LAB
EJECTION FRACTION APICAL 4 CHAMBER: 53.2
EJECTION FRACTION: 58 %
LEFT ATRIUM VOLUME AREA LENGTH INDEX BSA: 15.6 ML/M2
LEFT VENTRICLE INTERNAL DIMENSION DIASTOLE: 4.6 CM (ref 3.5–6)
LEFT VENTRICULAR OUTFLOW TRACT DIAMETER: 1.8 CM
LV EJECTION FRACTION BIPLANE: 51 %
MITRAL VALVE E/A RATIO: 0.62
RIGHT VENTRICLE FREE WALL PEAK S': 17.1 CM/S
TRICUSPID ANNULAR PLANE SYSTOLIC EXCURSION: 1.7 CM

## 2024-08-13 PROCEDURE — A9698 NON-RAD CONTRAST MATERIALNOC: HCPCS | Performed by: INTERNAL MEDICINE

## 2024-08-13 PROCEDURE — 2550000001 HC RX 255 CONTRASTS: Performed by: INTERNAL MEDICINE

## 2024-08-13 PROCEDURE — 74177 CT ABD & PELVIS W/CONTRAST: CPT | Performed by: RADIOLOGY

## 2024-08-13 PROCEDURE — 74177 CT ABD & PELVIS W/CONTRAST: CPT

## 2024-08-13 PROCEDURE — 93306 TTE W/DOPPLER COMPLETE: CPT

## 2024-08-13 PROCEDURE — 93306 TTE W/DOPPLER COMPLETE: CPT | Performed by: STUDENT IN AN ORGANIZED HEALTH CARE EDUCATION/TRAINING PROGRAM

## 2024-08-20 ENCOUNTER — APPOINTMENT (OUTPATIENT)
Age: 79
End: 2024-08-20
Payer: MEDICARE

## 2024-08-20 ENCOUNTER — TELEPHONE (OUTPATIENT)
Age: 79
End: 2024-08-20

## 2024-08-20 VITALS
SYSTOLIC BLOOD PRESSURE: 112 MMHG | DIASTOLIC BLOOD PRESSURE: 64 MMHG | HEIGHT: 63 IN | WEIGHT: 139 LBS | OXYGEN SATURATION: 95 % | HEART RATE: 88 BPM | BODY MASS INDEX: 24.63 KG/M2

## 2024-08-20 DIAGNOSIS — R94.31 ABNORMAL EKG: ICD-10-CM

## 2024-08-20 DIAGNOSIS — E87.6 HYPOKALEMIA: ICD-10-CM

## 2024-08-20 DIAGNOSIS — R63.4 WEIGHT LOSS, UNINTENTIONAL: ICD-10-CM

## 2024-08-20 DIAGNOSIS — K76.9 LIVER LESION: Primary | ICD-10-CM

## 2024-08-20 DIAGNOSIS — E11.9 CONTROLLED TYPE 2 DIABETES MELLITUS WITHOUT COMPLICATION, WITHOUT LONG-TERM CURRENT USE OF INSULIN (MULTI): Primary | ICD-10-CM

## 2024-08-20 DIAGNOSIS — I15.9 SECONDARY HYPERTENSION: ICD-10-CM

## 2024-08-20 PROCEDURE — 3074F SYST BP LT 130 MM HG: CPT | Performed by: INTERNAL MEDICINE

## 2024-08-20 PROCEDURE — 99214 OFFICE O/P EST MOD 30 MIN: CPT | Performed by: INTERNAL MEDICINE

## 2024-08-20 PROCEDURE — 1159F MED LIST DOCD IN RCRD: CPT | Performed by: INTERNAL MEDICINE

## 2024-08-20 PROCEDURE — 1157F ADVNC CARE PLAN IN RCRD: CPT | Performed by: INTERNAL MEDICINE

## 2024-08-20 PROCEDURE — 1036F TOBACCO NON-USER: CPT | Performed by: INTERNAL MEDICINE

## 2024-08-20 PROCEDURE — 3078F DIAST BP <80 MM HG: CPT | Performed by: INTERNAL MEDICINE

## 2024-08-20 RX ORDER — IBUPROFEN 200 MG
100 CAPSULE ORAL 2 TIMES DAILY
Qty: 100 STRIP | Refills: 11 | Status: SHIPPED | OUTPATIENT
Start: 2024-08-20

## 2024-08-20 RX ORDER — POTASSIUM CHLORIDE 750 MG/1
10 TABLET, EXTENDED RELEASE ORAL 2 TIMES DAILY
Qty: 180 TABLET | Refills: 1 | Status: SHIPPED | OUTPATIENT
Start: 2024-08-20

## 2024-08-20 RX ORDER — POTASSIUM CHLORIDE 750 MG/1
10 TABLET, EXTENDED RELEASE ORAL DAILY
Qty: 90 TABLET | Refills: 1 | Status: SHIPPED | OUTPATIENT
Start: 2024-08-20 | End: 2024-08-20

## 2024-08-20 ASSESSMENT — ENCOUNTER SYMPTOMS
CHEST TIGHTNESS: 0
PALPITATIONS: 0
VOMITING: 0
ARTHRALGIAS: 0
BLOOD IN STOOL: 0
BACK PAIN: 0
WHEEZING: 0
ABDOMINAL PAIN: 0
NAUSEA: 0
SHORTNESS OF BREATH: 0
FATIGUE: 0
COUGH: 0
DIARRHEA: 0
UNEXPECTED WEIGHT CHANGE: 0

## 2024-08-20 NOTE — TELEPHONE ENCOUNTER
Patient's  stated I was to send you a message that she is down to her last 40 test strips and they  at the end of this month.

## 2024-08-20 NOTE — ASSESSMENT & PLAN NOTE
-She will increase her potassium intake from 10 mill equivalents once daily to twice daily  -She will have a repeat potassium done just prior to her next follow-up visit in September

## 2024-08-20 NOTE — ASSESSMENT & PLAN NOTE
"-EKG obtained in May showed \"anterior infarct \"  -Echocardiogram is normal  -Has been indicated to Dr. Katz once clearance from cardiology so we will help facilitate a referral  "

## 2024-08-20 NOTE — PROGRESS NOTES
"Subjective   Patient ID: Chanel Bradshaw is a 78 y.o. female who presents for Follow-up.  HPI  She is here today for follow-up and accompanied by her .  When asked how she was feeling she indicated she was feeling fine.  We also see that her weight has stabilized and her  remarks that she is actually eating better in recent times.  When we saw her last time there was a big concern about \"unexplained weight loss \".  So we did some testing including CT scan of the abdomen and pelvis.  I was pleased to inform her that the CT scan did not show any alarming findings although she did have a few lesions in the liver which we will follow-up with an ultrasound.  We discussed continuing with her better eating patterns and monitoring closely.  She also is trying to get cleared for an eye procedure with Dr. Katz and she had an EKG a couple months ago that showed \"anterior infarct \".  We did a follow-up echocardiogram which came back looking relatively normal.  Her  indicates that the office of Dr. Katz now wants a clearance from cardiology so we will help facilitate a referral.  We did conduct a review of systems and she appears to be doing okay at this time.  We will do no additional testing but I will be seeing her back in September for her regularly scheduled appointment.  She also had a low potassium on her recent lab test results so I am increasing her potassium from 10 mill equivalents once a daily to twice daily and we will recheck it again at her next follow-up visit.  Review of Systems   Constitutional:  Negative for fatigue and unexpected weight change.   Respiratory:  Negative for cough, chest tightness, shortness of breath and wheezing.    Cardiovascular:  Negative for chest pain, palpitations and leg swelling.   Gastrointestinal:  Negative for abdominal pain, blood in stool, diarrhea, nausea and vomiting.   Musculoskeletal:  Negative for arthralgias and back pain.     Objective   Physical " Exam  Vitals and nursing note reviewed.   Constitutional:       General: She is not in acute distress.     Appearance: Normal appearance.   HENT:      Head: Normocephalic and atraumatic.   Eyes:      Conjunctiva/sclera: Conjunctivae normal.   Cardiovascular:      Rate and Rhythm: Normal rate and regular rhythm.      Heart sounds: Normal heart sounds.   Pulmonary:      Effort: No respiratory distress.      Breath sounds: No wheezing.   Abdominal:      Palpations: Abdomen is soft.      Tenderness: There is no abdominal tenderness. There is no guarding.   Musculoskeletal:         General: No swelling. Normal range of motion.   Skin:     General: Skin is warm and dry.   Neurological:      General: No focal deficit present.      Mental Status: She is alert and oriented to person, place, and time.   Psychiatric:         Behavior: Behavior normal.       Recent Results (from the past 672 hour(s))   CBC and Auto Differential    Collection Time: 07/25/24 11:28 AM   Result Value Ref Range    WBC 9.3 4.4 - 11.3 x10*3/uL    nRBC 0.0 0.0 - 0.0 /100 WBCs    RBC 4.80 4.00 - 5.20 x10*6/uL    Hemoglobin 14.6 12.0 - 16.0 g/dL    Hematocrit 44.6 36.0 - 46.0 %    MCV 93 80 - 100 fL    MCH 30.4 26.0 - 34.0 pg    MCHC 32.7 32.0 - 36.0 g/dL    RDW 12.5 11.5 - 14.5 %    Platelets 362 150 - 450 x10*3/uL    Neutrophils % 53.3 40.0 - 80.0 %    Immature Granulocytes %, Automated 0.3 0.0 - 0.9 %    Lymphocytes % 38.8 13.0 - 44.0 %    Monocytes % 5.9 2.0 - 10.0 %    Eosinophils % 1.5 0.0 - 6.0 %    Basophils % 0.2 0.0 - 2.0 %    Neutrophils Absolute 4.95 1.60 - 5.50 x10*3/uL    Immature Granulocytes Absolute, Automated 0.03 0.00 - 0.50 x10*3/uL    Lymphocytes Absolute 3.61 (H) 0.80 - 3.00 x10*3/uL    Monocytes Absolute 0.55 0.05 - 0.80 x10*3/uL    Eosinophils Absolute 0.14 0.00 - 0.40 x10*3/uL    Basophils Absolute 0.02 0.00 - 0.10 x10*3/uL   Lipase    Collection Time: 07/25/24 11:28 AM   Result Value Ref Range    Lipase 19 9 - 82 U/L  "  Comprehensive Metabolic Panel    Collection Time: 07/25/24 11:28 AM   Result Value Ref Range    Glucose 237 (H) 74 - 99 mg/dL    Sodium 139 136 - 145 mmol/L    Potassium 3.4 (L) 3.5 - 5.3 mmol/L    Chloride 98 98 - 107 mmol/L    Bicarbonate 30 21 - 32 mmol/L    Anion Gap 14 10 - 20 mmol/L    Urea Nitrogen 14 6 - 23 mg/dL    Creatinine 0.81 0.50 - 1.05 mg/dL    eGFR 75 >60 mL/min/1.73m*2    Calcium 9.8 8.6 - 10.3 mg/dL    Albumin 4.3 3.4 - 5.0 g/dL    Alkaline Phosphatase 66 33 - 136 U/L    Total Protein 6.6 6.4 - 8.2 g/dL    AST 19 9 - 39 U/L    Bilirubin, Total 0.6 0.0 - 1.2 mg/dL    ALT 22 7 - 45 U/L   Transthoracic Echo (TTE) Complete    Collection Time: 08/13/24 11:45 AM   Result Value Ref Range    LVOT diam 1.80 cm    MV E/A ratio 0.62     LV Biplane EF 51 %    Tricuspid annular plane systolic excursion 1.7 cm    LA vol index A/L 15.6 ml/m2    LV EF 58 %    RV free wall pk S' 17.10 cm/s    LVIDd 4.60 cm    LV A4C EF 53.2        Assessment/Plan   Problem List Items Addressed This Visit             ICD-10-CM    HTN (hypertension) I10     -Blood pressures currently well-controlled         Relevant Medications    potassium chloride CR 10 mEq ER tablet    Abnormal EKG R94.31     -EKG obtained in May showed \"anterior infarct \"  -Echocardiogram is normal  -Has been indicated to Dr. Katz once clearance from cardiology so we will help facilitate a referral         Relevant Orders    Referral to Cardiology    Weight loss, unintentional R63.4     -Weight has stabilized and she will focus on keeping her caloric intake at a satisfactory level  -CT scan of the abdomen did not reveal any contributing factors although we are ordering an ultrasound of the liver as a follow-up to her liver lesions and I will call them with the results         Liver lesion - Primary K76.9    Relevant Orders    US abdomen limited liver    Hypokalemia E87.6     -She will increase her potassium intake from 10 mill equivalents once daily to twice " daily  -She will have a repeat potassium done just prior to her next follow-up visit in September               Sarah Haddad, DO

## 2024-08-20 NOTE — ASSESSMENT & PLAN NOTE
-Weight has stabilized and she will focus on keeping her caloric intake at a satisfactory level  -CT scan of the abdomen did not reveal any contributing factors although we are ordering an ultrasound of the liver as a follow-up to her liver lesions and I will call them with the results

## 2024-08-20 NOTE — PATIENT INSTRUCTIONS
As we discussed we are having her increase her potassium intake from 1 tablet daily up to twice daily and when you come back in September please remember to get fasting lab work done just prior to that visit  The staff will be calling you to get an appointment with cardiology for your cardiac clearance  The staff will be helping to schedule an ultrasound of the liver and when the results come back I will call you  We will see you back in September as planned

## 2024-08-26 NOTE — PROGRESS NOTES
CHIEF COMPLAINT  Cardiac risk stratification    HISTORY OF PRESENT ILLNESS  Procedure: cataract removal   Provider: Dr. Katz  Date: TBD    Patient presents for cardiac risk stratification prior to cataract removal. Patient denies any previous or current chest pain/pressure/discomfort, SOB, palpitations, orthopnea/PND, dizziness, or lower leg edema. When reviewing her previous EKGs she has had nonspecific T wave changes as well as reporting prior infarct.       Cardiovascular testing:  Echo (August, 2024)-LVSF is normal with a 55-60% EF; grade I diastolic dysfunction; mild prolapse of the posterior leaflet of mitral valve; mild mitral valve regurgitation        Past Medical, Surgical, and Family History reviewed and updated in chart.     Reviewed all medications by prescribing practitioner or clinical pharmacist (such as prescriptions, OTCs, herbal therapies and supplements) and documented in the medical record.    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 Assessment: Endometrium cancer (HCC) [C54.1] PLAN: ROBOTIC LAPAROSCOPIC TOTAL HYSTERECTOMY W/ BSO UTERUS=<250G [07933]            ROBOTIC LAPAROSCOPY SURGICAL W/ RETROPERITONEAL LYMPH NODE SAMPLING SINGLE OR MULTIPLE [90087]            TRANSFUSION BLOOD [5719]    Diabetes mellitus (Multi)     Endometrial cancer (Multi) 2013    Hypermetropia 04/29/2015    Hypertension     Invasive ductal carcinoma of breast, female (Multi) 04/26/2017    Malignant neoplasm of female breast (Multi) 08/18/2010    Formatting of this note might be different from the original. Survivorship Care plan in abstract dated 9/28/15    Orbital cellulitis on right 01/21/2015    Last Assessment & Plan: Formatting of this note might be different from the original. Improving Ct scan unchanged  Steroid started by ophthalmology Continue vanco and   iv rocephin and po metronidazole Dr Warren refer no need of iv antibiotics On the id part the patient leukocytosis and symptoms improve while antibiotics still highly suspicious of orbital cellulitis This has been discuss with the pat    Personal history of malignant neoplasm of breast 04/04/2012    Presbyopia 04/29/2015    Regular astigmatism 04/29/2015    S/P hysterectomy with oophorectomy 12/29/2017    Transient cerebral ischemia 01/15/2008    Vulvar cancer (Multi) 1993       Social History  Social History     Tobacco Use    Smoking status: Never    Smokeless tobacco: Never   Vaping Use    Vaping status: Never Used   Substance Use Topics    Alcohol use: Yes     Comment: rarely    Drug use: Never       Family History   No family history on file.     Allergies:  Allergies   Allergen Reactions    Zolpidem Hives    Onion Nausea/vomiting    Adhesive Rash     Redness    Neomycin-Bacitracin-Polymyxin Rash    Oxycodone-Acetaminophen Nausea And Vomiting    Sulfamethoxazole-Trimethoprim Nausea And Vomiting    Zolpidem Tartrate Rash        Outpatient Medications:  Current Outpatient Medications   Medication Instructions    amLODIPine (NORVASC) 10 mg, oral, Daily    blood sugar diagnostic (Blood Glucose Test) strip 100 strips, miscellaneous, 2 times daily    cholecalciferol (VITAMIN D-3) 1,000 Units, oral, Daily    donepezil (ARICEPT) 10 mg, oral, Nightly    losartan-hydrochlorothiazide (Hyzaar) 100-12.5 mg tablet 1 tablet, oral, Daily    metFORMIN  mg 24 hr tablet 2 tablets (1,000 mg) once daily in the evening. Take with meals.    multivitamin tablet 1 tablet, oral, Daily    omeprazole (PRILOSEC) 40 mg, oral, Daily before breakfast, Do not crush or chew.    potassium chloride CR 10 mEq ER tablet 10 mEq, oral, 2 times daily    simvastatin (ZOCOR) 20 mg, oral, Daily RT          Labs:   CMP:  Recent Labs     07/25/24  1128 05/24/24  1311 05/14/24  1156 05/03/24  1143  "11/07/23  0828    138 138 138 134*   K 3.4* 3.9 3.7 3.4* 4.2   CL 98 100 99 98 96*   CO2 30 31 31 32 31   ANIONGAP 14 11 12 11 11   BUN 14 23 16 10 12   CREATININE 0.81 0.84 0.78 0.60 0.64   EGFR 75 72 78 >90 >90   MG  --  1.74  --   --   --      Recent Labs     07/25/24  1128 05/24/24  1311 05/03/24  1143 11/07/23  0828   ALBUMIN 4.3 4.0 4.2 4.4   ALKPHOS 66 65 79 82   ALT 22 14 15 13   AST 19 23 13 13   BILITOT 0.6 0.7 1.0 0.6   LIPASE 19  --   --   --      CBC:  Recent Labs     07/25/24  1128 05/24/24  1311 05/03/24  1143   WBC 9.3 12.3* 7.2   HGB 14.6 14.3 14.4   HCT 44.6 42.4 43.0    299 310   MCV 93 93 93     COAG: No results for input(s): \"PTT\", \"INR\", \"HAUF\", \"DDIMERVTE\", \"HAPTOGLOBIN\", \"FIBRINOGEN\" in the last 73098 hours.  ABO: No results for input(s): \"ABO\" in the last 32975 hours.  HEME/ENDO:  Recent Labs     05/14/24  1156 11/07/23  0828   TSH 1.32  --    HGBA1C 8.2* 6.8*      CARDIAC:   Recent Labs     05/24/24  1311 05/03/24  1143   TROPHS 7 8     Recent Labs     11/07/23  0828   CHOL 182   HDL 60.0   TRIG 126     MICRO: No results for input(s): \"ESR\", \"CRP\", \"PROCAL\" in the last 96424 hours.  No results found for the last 90 days.    Notable Studies: imaging personally reviewed   EKG:  Encounter Date: 05/24/24   ECG 12 Lead   Result Value    Ventricular Rate 80    Atrial Rate 80    TX Interval 198    QRS Duration 90    QT Interval 380    QTC Calculation(Bazett) 438    P Axis 66    R Axis 49    T Axis 8    QRS Count 13    Q Onset 229    P Onset 130    P Offset 197    T Offset 419    QTC Fredericia 418    Narrative    Sinus rhythm with frequent Premature ventricular complexes  Cannot rule out Anterior infarct (cited on or before 03-MAY-2024)  Abnormal ECG  When compared with ECG of 03-MAY-2024 11:25,  Criteria for Inferior infarct are no longer Present  Inverted T waves have replaced nonspecific T wave abnormality in Inferior leads  See ED provider note for full interpretation and clinical " correlation  Confirmed by Taylor Ceron ([id number]) on 5/25/2024 5:27:35 PM     Echocardiogram: No results found for this or any previous visit from the past 1825 days.    Stress Testing: No results found for this or any previous visit from the past 1825 days.    Cardiac Catheterization: No results found for this or any previous visit from the past 1825 days.  No results found for this or any previous visit from the past 3650 days.         REVIEW OF SYSTEMS  A 10-point system review was completed and was negative except as noted in the HPI.      VITALS  Vitals:    08/28/24 1335   BP: 104/70   Pulse: 79   SpO2: 96%       PHYSICAL EXAM  General: awake, alert and oriented. No acute distress.   Skin: Skin is warm, dry and intact without rashes or lesions.  HEENT: normocephalic, atraumatic; conjunctivae are clear without exudates or hemorrhage. Sclera is non-icteric. Eyelids are normal in appearance without swelling or lesions. Hearing intact. Nares are patent bilaterally. Moist mucous membranes.   Cardiovascular: heart rate and rhythm are normal. No murmurs, gallops, or rubs are auscultated. S1 and S2 are heard and are of normal intensity. No JVD, no carotid bruits  Respiratory: bilateral lung sounds clear to auscultations without rales, rhonchi, or wheezes. No accessory muscle use or stridor  Musculoskeletal: ROM intact, no deformities  Extremities: pulses palpable bilaterally; no swelling or erythema  Neurological: no focal deficits; gait steady  Psychiatric: appropriate mood and affect; good judgment and insight          ASSESSMENT AND PLAN  Assessment/Plan   Diagnoses and all orders for this visit:  Encounter for pre-operative cardiovascular clearance  Abnormal EKG  Mitral valve prolapse determined by imaging  -Reviewed recent echo results; will monitor for now, no prior echo for comparison  -Patient is asymptomatic, however, I do not like the changes I have seen on the current and previous EKG, therefore, I will  order a nuclear stress test to rule out ischemia. Patient and spouse agreeable and informed, if abnormal a LHC will be recommended      RTC: after testing       Thank you for allowing me to participate in the care of this patient. Please reach me out if you have any questions or if you need any clarifications regarding the patient's care.    [place]ia Tellez DNP, APRN, FNP-C  Division of Cardiovascular Medicine  [facility]  [facility]

## 2024-08-27 ENCOUNTER — HOSPITAL ENCOUNTER (OUTPATIENT)
Dept: RADIOLOGY | Facility: HOSPITAL | Age: 79
Discharge: HOME | End: 2024-08-27
Payer: MEDICARE

## 2024-08-27 DIAGNOSIS — K76.9 LIVER LESION: ICD-10-CM

## 2024-08-27 PROCEDURE — 76705 ECHO EXAM OF ABDOMEN: CPT | Performed by: RADIOLOGY

## 2024-08-27 PROCEDURE — 76705 ECHO EXAM OF ABDOMEN: CPT

## 2024-08-28 ENCOUNTER — APPOINTMENT (OUTPATIENT)
Dept: CARDIOLOGY | Facility: CLINIC | Age: 79
End: 2024-08-28
Payer: MEDICARE

## 2024-08-28 VITALS
DIASTOLIC BLOOD PRESSURE: 70 MMHG | WEIGHT: 139.6 LBS | BODY MASS INDEX: 24.73 KG/M2 | HEIGHT: 63 IN | SYSTOLIC BLOOD PRESSURE: 104 MMHG | HEART RATE: 79 BPM | OXYGEN SATURATION: 96 %

## 2024-08-28 DIAGNOSIS — Z01.810 ENCOUNTER FOR PRE-OPERATIVE CARDIOVASCULAR CLEARANCE: Primary | ICD-10-CM

## 2024-08-28 DIAGNOSIS — R94.31 ABNORMAL EKG: ICD-10-CM

## 2024-08-28 DIAGNOSIS — I34.1 MITRAL VALVE PROLAPSE DETERMINED BY IMAGING: ICD-10-CM

## 2024-08-28 PROCEDURE — 1160F RVW MEDS BY RX/DR IN RCRD: CPT

## 2024-08-28 PROCEDURE — 93000 ELECTROCARDIOGRAM COMPLETE: CPT

## 2024-08-28 PROCEDURE — 99204 OFFICE O/P NEW MOD 45 MIN: CPT

## 2024-08-28 PROCEDURE — 1159F MED LIST DOCD IN RCRD: CPT

## 2024-08-28 PROCEDURE — 1036F TOBACCO NON-USER: CPT

## 2024-08-28 PROCEDURE — 3074F SYST BP LT 130 MM HG: CPT

## 2024-08-28 PROCEDURE — 1157F ADVNC CARE PLAN IN RCRD: CPT

## 2024-08-28 PROCEDURE — 3078F DIAST BP <80 MM HG: CPT

## 2024-08-29 NOTE — RESULT ENCOUNTER NOTE
Please call Chanel and let her know that the ultrasound of her liver shows benign cysts.  This means that it in no way poses a threat and no follow-up is needed.  If she has any questions I would be very happy to speak to her directly and otherwise please wish her a good day.  Thank you!

## 2024-08-30 ENCOUNTER — TELEPHONE (OUTPATIENT)
Age: 79
End: 2024-08-30
Payer: MEDICARE

## 2024-08-30 DIAGNOSIS — E11.9 CONTROLLED TYPE 2 DIABETES MELLITUS WITHOUT COMPLICATION, WITHOUT LONG-TERM CURRENT USE OF INSULIN (MULTI): Primary | ICD-10-CM

## 2024-08-30 RX ORDER — GLIPIZIDE 2.5 MG/1
2.5 TABLET, EXTENDED RELEASE ORAL DAILY
Qty: 30 TABLET | Refills: 5 | Status: SHIPPED | OUTPATIENT
Start: 2024-08-30 | End: 2025-08-30

## 2024-08-30 NOTE — TELEPHONE ENCOUNTER
----- Message from Sarah Haddad sent at 8/30/2024 12:17 PM EDT -----  Regarding: Blood sugars  This is just an FYI   I called the house and spoke to her  about her elevated blood sugars.  He dropped off several readings and they are higher than desirable range.  We are going to add glipizide XL 2.5 mg daily in the morning right before breakfast.  He will give us an update after couple of weeks.

## 2024-09-09 ENCOUNTER — OFFICE VISIT (OUTPATIENT)
Dept: URGENT CARE | Facility: CLINIC | Age: 79
End: 2024-09-09
Payer: MEDICARE

## 2024-09-09 VITALS
TEMPERATURE: 97.3 F | HEIGHT: 64 IN | HEART RATE: 84 BPM | DIASTOLIC BLOOD PRESSURE: 80 MMHG | RESPIRATION RATE: 16 BRPM | BODY MASS INDEX: 23.73 KG/M2 | SYSTOLIC BLOOD PRESSURE: 130 MMHG | WEIGHT: 139 LBS | OXYGEN SATURATION: 100 %

## 2024-09-09 DIAGNOSIS — J06.9 ACUTE UPPER RESPIRATORY INFECTION: ICD-10-CM

## 2024-09-09 DIAGNOSIS — U07.1 COVID: Primary | ICD-10-CM

## 2024-09-09 LAB
POC CORONAVIRUS 2019 BY PCR (COV19): DETECTED
POC FLU A RESULT: NOT DETECTED
POC FLU B RESULT: NOT DETECTED
POC RSV PCR: NOT DETECTED

## 2024-09-09 PROCEDURE — 99213 OFFICE O/P EST LOW 20 MIN: CPT | Performed by: NURSE PRACTITIONER

## 2024-09-09 RX ORDER — METHYLPREDNISOLONE 4 MG/1
TABLET ORAL
Qty: 21 TABLET | Refills: 0 | Status: SHIPPED | OUTPATIENT
Start: 2024-09-09

## 2024-09-09 RX ORDER — ALBUTEROL SULFATE 90 UG/1
2 INHALANT RESPIRATORY (INHALATION) EVERY 6 HOURS PRN
Qty: 18 G | Refills: 0 | Status: SHIPPED | OUTPATIENT
Start: 2024-09-09 | End: 2025-09-09

## 2024-09-09 NOTE — PROGRESS NOTES
78 y.o. female presents with  for evaluation of URI.  Symptoms including cough, congestion, body aches, malaise, and headache have been present for 2 days and refractory to OTC meds.  No fever, chills, nausea, vomiting, abdominal pain, CP, orthopnea or SOB. Recently had ECHO which was normal. No exacerbating factors. No known COVID 19/flu exposure.      Vitals:    09/09/24 1002   BP: 130/80   Pulse: 84   Resp: 16   Temp: 36.3 °C (97.3 °F)   SpO2: 100%       Allergies   Allergen Reactions    Zolpidem Hives    Onion Nausea/vomiting    Adhesive Rash     Redness    Neomycin-Bacitracin-Polymyxin Rash    Oxycodone-Acetaminophen Nausea And Vomiting    Sulfamethoxazole-Trimethoprim Nausea And Vomiting    Zolpidem Tartrate Rash       Medication Documentation Review Audit       Reviewed by Bere Stephen MA (Medical Assistant) on 09/09/24 at 1001      Medication Order Taking? Sig Documenting Provider Last Dose Status   amLODIPine (Norvasc) 10 mg tablet [id number] Yes TAKE 1 TABLET (10 MG) BY MOUTH ONCE DAILY. Sarah Haddad, DO Taking Active   blood sugar diagnostic (Blood Glucose Test) strip [id number] Yes 100 strips 2 times a day. Sarah Haddad, DO Taking Active   cholecalciferol (Vitamin D-3) 25 MCG (1000 UT) capsule [id number] Yes Take 1 capsule (25 mcg) by mouth once daily. Historical Provider, MD Taking Active   donepezil (Aricept) 10 mg tablet [id number] Yes Take 1 tablet (10 mg) by mouth once daily at bedtime. Sarah Haddad, DO Taking Active   glipiZIDE XL (Glucotrol XL) 2.5 mg 24 hr tablet [id number] Yes Take 1 tablet (2.5 mg) by mouth once daily. Do not crush, chew, or split. Sarah Haddad, DO Taking Active   losartan-hydrochlorothiazide (Hyzaar) 100-12.5 mg tablet [id number] Yes Take 1 tablet by mouth once daily. Sarah Haddad, DO Taking Active   metFORMIN  mg 24 hr tablet [id number] Yes 2 tablets (1,000 mg) once daily in the evening. Take with meals. Sarah Haddad, DO Taking Active    multivitamin tablet  Yes Take 1 tablet by mouth once daily. Historical Provider, MD Taking Active   omeprazole (PriLOSEC) 40 mg DR capsule [id number]  Take 1 capsule (40 mg) by mouth once daily in the morning. Take before meals. Do not crush or chew. Sarah Haddad DO   24 2359   potassium chloride CR 10 mEq ER tablet [id number] Yes Take 1 tablet (10 mEq) by mouth 2 times a day. Sarah Haddad DO Taking Active   simvastatin (Zocor) 20 mg tablet  Yes Take 1 tablet (20 mg) by mouth once daily. 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 Assessment: Endometrium cancer (HCC) [C54.1] PLAN: ROBOTIC LAPAROSCOPIC TOTAL HYSTERECTOMY W/ BSO UTERUS=<250G [58158]            ROBOTIC LAPAROSCOPY SURGICAL W/ RETROPERITONEAL LYMPH NODE SAMPLING SINGLE OR MULTIPLE [65591]            TRANSFUSION BLOOD [5719]    Diabetes mellitus (Multi)     Endometrial cancer (Multi)     Hypermetropia 2015    Hypertension     Invasive ductal carcinoma of breast, female (Multi) 2017    Malignant neoplasm of female breast (Multi) 2010    Formatting of this note might be different from the original. Survivorship Care plan in abstract dated 9/28/15    Orbital cellulitis on right 2015    Last Assessment & Plan: Formatting of this note might be different from the original. Improving Ct scan unchanged Steroid started by ophthalmology Continue vanco and   iv rocephin and po metronidazole Dr Warren refer no need of iv antibiotics On the id part the patient leukocytosis and symptoms improve while antibiotics still highly suspicious of orbital cellulitis This has been discuss with the pat    Personal history of malignant neoplasm of breast 2012    Presbyopia  04/29/2015    Regular astigmatism 04/29/2015    S/P hysterectomy with oophorectomy 12/29/2017    Transient cerebral ischemia 01/15/2008    Vulvar cancer (Multi) 1993       Past Surgical History:   Procedure Laterality Date    BREAST LUMPECTOMY Right     She went to the [facility] in [place] in 2013    HAND SURGERY Right 2012    By Dr. Clayton    HYSTERECTOMY  2013    For endometrial cancer       ROS  See HPI    Physical Exam  Vitals and nursing note reviewed.   Constitutional:       General: She is not in acute distress.     Appearance: Normal appearance. She is not ill-appearing or toxic-appearing.   HENT:      Head: Normocephalic and atraumatic.      Right Ear: Tympanic membrane and ear canal normal.      Left Ear: Tympanic membrane and ear canal normal.      Nose: Congestion present.      Mouth/Throat:      Mouth: Mucous membranes are moist.      Pharynx: Oropharynx is clear. Posterior oropharyngeal erythema (mild) present.   Eyes:      Extraocular Movements: Extraocular movements intact.      Conjunctiva/sclera: Conjunctivae normal.      Pupils: Pupils are equal, round, and reactive to light.   Cardiovascular:      Rate and Rhythm: Normal rate and regular rhythm.   Pulmonary:      Effort: Pulmonary effort is normal.      Breath sounds: Normal breath sounds.      Comments: Dry cough  Lymphadenopathy:      Cervical: Cervical adenopathy present.   Skin:     General: Skin is warm and dry.      Capillary Refill: Capillary refill takes less than 2 seconds.   Neurological:      General: No focal deficit present.      Mental Status: She is alert and oriented to person, place, and time.   Psychiatric:         Mood and Affect: Mood normal.         Behavior: Behavior normal.       Recent Results (from the past 1 hour(s))   POCT SARS-COV-2/FLU/RSV PCR SYMPTOMATIC manually resulted    Collection Time: 09/09/24 10:43 AM   Result Value Ref Range    POC Coronavirus 2019, PCR Detected (A) Not Detected    POC Flu A Result  Not Detected Not Detected    POC Flu B Result Not Detected Not Detected    POC RSV PCR Not Detected Not Detected       Assessment/Plan/MDM  Chanel was seen today for cough.  Diagnoses and all orders for this visit:  COVID (Primary)  -     methylPREDNISolone (Medrol Dospak) 4 mg tablets; Take as directed on package.  -     albuterol 90 mcg/actuation inhaler; Inhale 2 puffs every 6 hours if needed for wheezing.  Acute upper respiratory infection  -     POCT SARS-COV-2/FLU/RSV PCR SYMPTOMATIC manually resulted    Discussed quarantine restrictions with COVID-19.  Encouraged pt to continue otc cold remedies PRN, push PO fluids and rest. Patient's clinical presentation is otherwise unremarkable at this time. Patient is discharged with instructions to follow-up with primary care or seek emergency medical attention for worsening symptoms or any new concerns.      I did personally review Chanel's past medical history, surgical history, social history, as well as family history (when relevant).  In this case, I also oversaw the her drug management by reviewing her medication list, allergy list, as well as the medications that I prescribed during the UC course and/or recommended as an out-patient (including possible OTC medications such as acetaminophen, NSAIDs , etc).    After reviewing the items above, I did look at previous medical documentation, such as recent hospitalizations, office visits, and/or recent consultations with PCP/specialist.                          SDOH:   Another factor that I considered in Chanel's care was her Social Determinants of Health (SDOH). During this UC encounter, she did not have social determinants of health. Those SDOH influencing Chanel's care are: none      John Umaña CNP  [facility]  [phone]

## 2024-09-17 ENCOUNTER — APPOINTMENT (OUTPATIENT)
Dept: RADIOLOGY | Facility: HOSPITAL | Age: 79
End: 2024-09-17
Payer: MEDICARE

## 2024-09-17 ENCOUNTER — APPOINTMENT (OUTPATIENT)
Dept: CARDIOLOGY | Facility: HOSPITAL | Age: 79
End: 2024-09-17
Payer: MEDICARE

## 2024-09-18 ENCOUNTER — APPOINTMENT (OUTPATIENT)
Dept: CARDIOLOGY | Facility: CLINIC | Age: 79
End: 2024-09-18
Payer: MEDICARE

## 2024-09-20 ENCOUNTER — LAB (OUTPATIENT)
Facility: LAB | Age: 79
End: 2024-09-20
Payer: MEDICARE

## 2024-09-20 DIAGNOSIS — E11.9 CONTROLLED TYPE 2 DIABETES MELLITUS WITHOUT COMPLICATION, WITHOUT LONG-TERM CURRENT USE OF INSULIN (MULTI): ICD-10-CM

## 2024-09-20 LAB
ANION GAP SERPL CALC-SCNC: 15 MMOL/L (ref 10–20)
BUN SERPL-MCNC: 14 MG/DL (ref 6–23)
CALCIUM SERPL-MCNC: 9.8 MG/DL (ref 8.6–10.3)
CHLORIDE SERPL-SCNC: 99 MMOL/L (ref 98–107)
CO2 SERPL-SCNC: 28 MMOL/L (ref 21–32)
CREAT SERPL-MCNC: 0.81 MG/DL (ref 0.5–1.05)
EGFRCR SERPLBLD CKD-EPI 2021: 75 ML/MIN/1.73M*2
EST. AVERAGE GLUCOSE BLD GHB EST-MCNC: 189 MG/DL
GLUCOSE SERPL-MCNC: 138 MG/DL (ref 74–99)
HBA1C MFR BLD: 8.2 %
POTASSIUM SERPL-SCNC: 4.2 MMOL/L (ref 3.5–5.3)
SODIUM SERPL-SCNC: 138 MMOL/L (ref 136–145)

## 2024-09-20 PROCEDURE — 36415 COLL VENOUS BLD VENIPUNCTURE: CPT

## 2024-09-20 PROCEDURE — 83036 HEMOGLOBIN GLYCOSYLATED A1C: CPT

## 2024-09-20 PROCEDURE — 80048 BASIC METABOLIC PNL TOTAL CA: CPT

## 2024-09-24 ENCOUNTER — APPOINTMENT (OUTPATIENT)
Dept: PRIMARY CARE | Facility: CLINIC | Age: 79
End: 2024-09-24
Payer: MEDICARE

## 2024-09-24 VITALS
WEIGHT: 136 LBS | HEIGHT: 64 IN | OXYGEN SATURATION: 98 % | BODY MASS INDEX: 23.22 KG/M2 | DIASTOLIC BLOOD PRESSURE: 82 MMHG | SYSTOLIC BLOOD PRESSURE: 128 MMHG | HEART RATE: 83 BPM

## 2024-09-24 DIAGNOSIS — E11.9 CONTROLLED TYPE 2 DIABETES MELLITUS WITHOUT COMPLICATION, WITHOUT LONG-TERM CURRENT USE OF INSULIN (MULTI): ICD-10-CM

## 2024-09-24 DIAGNOSIS — K21.9 GASTROESOPHAGEAL REFLUX DISEASE WITHOUT ESOPHAGITIS: ICD-10-CM

## 2024-09-24 DIAGNOSIS — E87.6 HYPOKALEMIA: ICD-10-CM

## 2024-09-24 DIAGNOSIS — F03.A0 MILD DEMENTIA WITHOUT BEHAVIORAL DISTURBANCE, PSYCHOTIC DISTURBANCE, MOOD DISTURBANCE, OR ANXIETY, UNSPECIFIED DEMENTIA TYPE: ICD-10-CM

## 2024-09-24 DIAGNOSIS — I15.9 SECONDARY HYPERTENSION: ICD-10-CM

## 2024-09-24 DIAGNOSIS — R94.31 ABNORMAL EKG: Primary | ICD-10-CM

## 2024-09-24 DIAGNOSIS — K76.89 BENIGN LIVER CYST: ICD-10-CM

## 2024-09-24 DIAGNOSIS — R63.4 WEIGHT LOSS, UNINTENTIONAL: ICD-10-CM

## 2024-09-24 DIAGNOSIS — E78.2 MIXED HYPERLIPIDEMIA: ICD-10-CM

## 2024-09-24 PROBLEM — K76.9 LIVER LESION: Status: RESOLVED | Noted: 2024-08-20 | Resolved: 2024-09-24

## 2024-09-24 PROBLEM — R06.6 HICCUP: Status: RESOLVED | Noted: 2024-06-04 | Resolved: 2024-09-24

## 2024-09-24 PROCEDURE — 1036F TOBACCO NON-USER: CPT | Performed by: INTERNAL MEDICINE

## 2024-09-24 PROCEDURE — 3074F SYST BP LT 130 MM HG: CPT | Performed by: INTERNAL MEDICINE

## 2024-09-24 PROCEDURE — 99214 OFFICE O/P EST MOD 30 MIN: CPT | Performed by: INTERNAL MEDICINE

## 2024-09-24 PROCEDURE — 3079F DIAST BP 80-89 MM HG: CPT | Performed by: INTERNAL MEDICINE

## 2024-09-24 PROCEDURE — 1157F ADVNC CARE PLAN IN RCRD: CPT | Performed by: INTERNAL MEDICINE

## 2024-09-24 PROCEDURE — 1160F RVW MEDS BY RX/DR IN RCRD: CPT | Performed by: INTERNAL MEDICINE

## 2024-09-24 PROCEDURE — 1159F MED LIST DOCD IN RCRD: CPT | Performed by: INTERNAL MEDICINE

## 2024-09-24 RX ORDER — SIMVASTATIN 20 MG/1
20 TABLET, FILM COATED ORAL
Qty: 90 TABLET | Refills: 1 | Status: SHIPPED | OUTPATIENT
Start: 2024-09-24

## 2024-09-24 RX ORDER — LOSARTAN POTASSIUM AND HYDROCHLOROTHIAZIDE 12.5; 1 MG/1; MG/1
1 TABLET ORAL DAILY
Qty: 90 TABLET | Refills: 1 | Status: SHIPPED | OUTPATIENT
Start: 2024-09-24

## 2024-09-24 RX ORDER — GLIPIZIDE 2.5 MG/1
2.5 TABLET, EXTENDED RELEASE ORAL DAILY
Qty: 90 TABLET | Refills: 1 | Status: SHIPPED | OUTPATIENT
Start: 2024-09-24 | End: 2025-09-24

## 2024-09-24 RX ORDER — POTASSIUM CHLORIDE 750 MG/1
10 TABLET, FILM COATED, EXTENDED RELEASE ORAL 2 TIMES DAILY
Qty: 180 TABLET | Refills: 1 | Status: SHIPPED | OUTPATIENT
Start: 2024-09-24

## 2024-09-24 RX ORDER — DONEPEZIL HYDROCHLORIDE 10 MG/1
10 TABLET, FILM COATED ORAL NIGHTLY
Qty: 90 TABLET | Refills: 1 | Status: SHIPPED | OUTPATIENT
Start: 2024-09-24 | End: 2025-03-23

## 2024-09-24 RX ORDER — AMLODIPINE BESYLATE 10 MG/1
10 TABLET ORAL DAILY
Qty: 90 TABLET | Refills: 1 | Status: SHIPPED | OUTPATIENT
Start: 2024-09-24 | End: 2025-03-23

## 2024-09-24 RX ORDER — OMEPRAZOLE 40 MG/1
40 CAPSULE, DELAYED RELEASE ORAL
Qty: 90 CAPSULE | Refills: 1 | Status: SHIPPED | OUTPATIENT
Start: 2024-09-24 | End: 2024-10-24

## 2024-09-24 NOTE — ASSESSMENT & PLAN NOTE
-Her blood sugars fluctuate widely and therefore we will keep her medicine the same and not increase the dose  -Her hemoglobin A1c is 8.2 and we are satisfied at that level-we will check it again when she comes back in 6 months

## 2024-09-24 NOTE — ASSESSMENT & PLAN NOTE
-Her weight was doing very well until recently when she contracted COVID-19 infection  -I do believe that the recent weight change was because of her infection as she was not feeling well  -Her  reports she is eating much better now and he will weigh her at home.  He knows to call if her weight is going down

## 2024-09-24 NOTE — ASSESSMENT & PLAN NOTE
-She is currently being assessed by cardiology because of an abnormal EKG and needing clearance for her eye procedure  -She will have a stress test this coming Thursday and we talked about holding her diabetic medications the morning of

## 2024-09-24 NOTE — PROGRESS NOTES
Subjective   Patient ID: Chanel Bradshaw is a 78 y.o. female who presents for Follow-up.  HPI  She is here today for follow-up and accompanied by her .  Unfortunately she contracted COVID-19 infection and had a pretty severe cough.  She was seen at urgent care and given medications for symptomatic treatment.  Her  states it worked well and she is finally getting over the virus.  We also had sent her for an ultrasound of her liver as a follow-up to an abnormal finding on CT scan.  The good news is it did not show any significant abnormalities and the radiologist reporting that is a cyst.  We also reviewed her most recent hemoglobin A1c which came back at 8.2.  Unfortunately her blood glucose levels fluctuate quite dramatically and therefore we will keep her medicine the same for now.  I explained that as long as we can keep her around 8 that is acceptable for a woman her age.  Also her potassium level is perfect this time and we will have her take 2 of the potassium tablets daily  She also saw the cardiology team for her abnormal EKG.  She was scheduled to have a stress test but had to be canceled because of her COVID infection and now she is scheduled for this coming Thursday.  Her  had questions about the medications and I did explain that she should hold her diabetic medicines until after the test is complete.  He will call the cardiology team regarding her blood pressure medicines just to be sure that they want her to have them in the morning.  We decided that she can safely return in 6 months and sooner if any problems.  Objective   Physical Exam  Vitals and nursing note reviewed.   Constitutional:       General: She is not in acute distress.     Appearance: Normal appearance.   HENT:      Head: Normocephalic and atraumatic.   Eyes:      Conjunctiva/sclera: Conjunctivae normal.   Cardiovascular:      Rate and Rhythm: Normal rate and regular rhythm.      Heart sounds: Normal heart sounds.    Pulmonary:      Effort: No respiratory distress.      Breath sounds: No wheezing.   Abdominal:      Palpations: Abdomen is soft.      Tenderness: There is no abdominal tenderness. There is no guarding.   Musculoskeletal:         General: No swelling. Normal range of motion.   Skin:     General: Skin is warm and dry.   Neurological:      General: No focal deficit present.      Mental Status: She is alert and oriented to person, place, and time.   Psychiatric:         Behavior: Behavior normal.       Recent Results (from the past 672 hour(s))   POCT SARS-COV-2/FLU/RSV PCR SYMPTOMATIC manually resulted    Collection Time: 09/09/24 10:43 AM   Result Value Ref Range    POC Coronavirus 2019, PCR Detected (A) Not Detected    POC Flu A Result Not Detected Not Detected    POC Flu B Result Not Detected Not Detected    POC RSV PCR Not Detected Not Detected   Hemoglobin A1C    Collection Time: 09/20/24  1:06 PM   Result Value Ref Range    Hemoglobin A1C 8.2 (H) See comment %    Estimated Average Glucose 189 Not Established mg/dL   Basic Metabolic Panel    Collection Time: 09/20/24  1:06 PM   Result Value Ref Range    Glucose 138 (H) 74 - 99 mg/dL    Sodium 138 136 - 145 mmol/L    Potassium 4.2 3.5 - 5.3 mmol/L    Chloride 99 98 - 107 mmol/L    Bicarbonate 28 21 - 32 mmol/L    Anion Gap 15 10 - 20 mmol/L    Urea Nitrogen 14 6 - 23 mg/dL    Creatinine 0.81 0.50 - 1.05 mg/dL    eGFR 75 >60 mL/min/1.73m*2    Calcium 9.8 8.6 - 10.3 mg/dL       Assessment/Plan   Problem List Items Addressed This Visit             ICD-10-CM    HTN (hypertension) I10     -Blood pressures currently well-controlled so we will continue with her current antihypertensive regimen         Relevant Medications    amLODIPine (Norvasc) 10 mg tablet    losartan-hydrochlorothiazide (Hyzaar) 100-12.5 mg tablet    potassium chloride CR 10 mEq ER tablet    Controlled type 2 diabetes mellitus without complication, without long-term current use of insulin (Multi)  E11.9     -Her blood sugars fluctuate widely and therefore we will keep her medicine the same and not increase the dose  -Her hemoglobin A1c is 8.2 and we are satisfied at that level-we will check it again when she comes back in 6 months           Relevant Medications    glipiZIDE XL (Glucotrol XL) 2.5 mg 24 hr tablet    Other Relevant Orders    Basic Metabolic Panel    Hemoglobin A1C    Mixed hyperlipidemia E78.2    Relevant Medications    simvastatin (Zocor) 20 mg tablet    Mild dementia without behavioral disturbance, psychotic disturbance, mood disturbance, or anxiety, unspecified dementia type (Multi) F03.A0    Relevant Medications    donepezil (Aricept) 10 mg tablet    Gastroesophageal reflux disease without esophagitis K21.9    Relevant Medications    omeprazole (PriLOSEC) 40 mg DR capsule    Abnormal EKG - Primary R94.31     -She is currently being assessed by cardiology because of an abnormal EKG and needing clearance for her eye procedure  -She will have a stress test this coming Thursday and we talked about holding her diabetic medications the morning of         Weight loss, unintentional R63.4     -Her weight was doing very well until recently when she contracted COVID-19 infection  -I do believe that the recent weight change was because of her infection as she was not feeling well  -Her  reports she is eating much better now and he will weigh her at home.  He knows to call if her weight is going down         Hypokalemia E87.6     -Potassium is corrected after doubling her potassium dose         Benign liver cyst K76.89     -Follow-up ultrasound to the abnormality identified on CT scan shows a simple liver cyst        PT INSTRUCTIONS  As we discussed she will not take her diabetic medications the morning of her stress test since she will not be eating and exercising.  Specifically hold the metformin and the glipizide until after her test and she is eating normally  Please clarify with the  cardiology team about her other medications prior to her exam  Please also weigh once a week and if the weight is still going down please contact me  We have decided that  you can safely return in 6 months.  Please keep checking the sugars periodically and if they get high here than what they are now please call as we would then make changes in the diabetic medications  Please remember to get your blood work done just prior to your next follow-up visit       Sarah Haddad, DO

## 2024-09-24 NOTE — PATIENT INSTRUCTIONS
As we discussed she will not take her diabetic medications the morning of her stress test since she will not be eating and exercising.  Specifically hold the metformin and the glipizide until after her test and she is eating normally  Please clarify with the cardiology team about her other medications prior to her exam  Please also weigh once a week and if the weight is still going down please contact me  We have decided that  you can safely return in 6 months.  Please keep checking the sugars periodically and if they get high here than what they are now please call as we would then make changes in the diabetic medications  Please remember to get your blood work done just prior to your next follow-up visit

## 2024-09-24 NOTE — ASSESSMENT & PLAN NOTE
-Blood pressures currently well-controlled so we will continue with her current antihypertensive regimen

## 2024-09-26 ENCOUNTER — HOSPITAL ENCOUNTER (OUTPATIENT)
Dept: CARDIOLOGY | Facility: HOSPITAL | Age: 79
Discharge: HOME | End: 2024-09-26
Payer: MEDICARE

## 2024-09-26 ENCOUNTER — HOSPITAL ENCOUNTER (OUTPATIENT)
Dept: RADIOLOGY | Facility: HOSPITAL | Age: 79
Discharge: HOME | End: 2024-09-26
Payer: MEDICARE

## 2024-09-26 VITALS — DIASTOLIC BLOOD PRESSURE: 88 MMHG | HEART RATE: 93 BPM | SYSTOLIC BLOOD PRESSURE: 130 MMHG

## 2024-09-26 DIAGNOSIS — R94.31 ABNORMAL EKG: ICD-10-CM

## 2024-09-26 DIAGNOSIS — Z01.818 ENCOUNTER FOR OTHER PREPROCEDURAL EXAMINATION: ICD-10-CM

## 2024-09-26 PROCEDURE — 93018 CV STRESS TEST I&R ONLY: CPT | Performed by: STUDENT IN AN ORGANIZED HEALTH CARE EDUCATION/TRAINING PROGRAM

## 2024-09-26 PROCEDURE — A9502 TC99M TETROFOSMIN: HCPCS

## 2024-09-26 PROCEDURE — 93017 CV STRESS TEST TRACING ONLY: CPT

## 2024-09-26 PROCEDURE — 3430000001 HC RX 343 DIAGNOSTIC RADIOPHARMACEUTICALS

## 2024-09-26 PROCEDURE — 93016 CV STRESS TEST SUPVJ ONLY: CPT | Performed by: STUDENT IN AN ORGANIZED HEALTH CARE EDUCATION/TRAINING PROGRAM

## 2024-09-26 PROCEDURE — 78452 HT MUSCLE IMAGE SPECT MULT: CPT

## 2024-09-26 PROCEDURE — 78452 HT MUSCLE IMAGE SPECT MULT: CPT | Performed by: RADIOLOGY

## 2024-09-26 NOTE — PROGRESS NOTES
CHIEF COMPLAINT  Follow up testing     HISTORY OF PRESENT ILLNESS    Patient previously presented for cardiac risk stratification for cataract removal by Dr. Katz. Patient's previous EKGs showing nonspecific T wave changes as well as reporting prior infarct (patient unaware). A nuclear stress test was ordered for further ischemic evaluation which was negative for any ischemia.     Patient denies any current chest pain/pressure/discomfort or shortness of breath. Patient is planning for cataract removal by Dr. Katz but awaits cardiac risk stratification.    Cardiovascular testin.Echo ()-LVSF is normal with a 55-60% EF; grade I diastolic dysfunction; mild prolapse of the posterior leaflet of mitral valve; mild mitral valve regurgitation         Past Medical, Surgical, and Family History reviewed and updated in chart.     Reviewed all medications by prescribing practitioner or clinical pharmacist (such as prescriptions, OTCs, herbal therapies and supplements) and documented in the medical record.    Past Medical History  Past Medical History:   Diagnosis Date    Breast cancer in female (Multi)     Right side    Carcinoma in situ of vulva 10/15/2009    Last Assessment & Plan: Formatting of this note might be different from the original. Assessment: Endometrium cancer (HCC) [C54.1] PLAN: ROBOTIC LAPAROSCOPIC TOTAL HYSTERECTOMY W/ BSO UTERUS=<250G [73195]            ROBOTIC LAPAROSCOPY SURGICAL W/ RETROPERITONEAL LYMPH NODE SAMPLING SINGLE OR MULTIPLE [13649]            TRANSFUSION BLOOD [5719]    Diabetes mellitus (Multi)     Endometrial cancer (Multi)     Hypermetropia 2015    Hypertension     Invasive ductal carcinoma of breast, female (Multi) 2017    Malignant neoplasm of female breast (Multi) 2010    Formatting of this note might be different from the original. Survivorship Care plan in abstract dated 9/28/15    Orbital cellulitis on right 2015    Last  Assessment & Plan: Formatting of this note might be different from the original. Improving Ct scan unchanged Steroid started by ophthalmology Continue vanco and   iv rocephin and po metronidazole Dr Warren refer no need of iv antibiotics On the id part the patient leukocytosis and symptoms improve while antibiotics still highly suspicious of orbital cellulitis This has been discuss with the pat    Personal history of malignant neoplasm of breast 04/04/2012    Presbyopia 04/29/2015    Regular astigmatism 04/29/2015    S/P hysterectomy with oophorectomy 12/29/2017    Transient cerebral ischemia 01/15/2008    Vulvar cancer (Multi) 1993       Social History  Social History     Tobacco Use    Smoking status: Never    Smokeless tobacco: Never   Vaping Use    Vaping status: Never Used   Substance Use Topics    Alcohol use: Yes     Comment: rarely    Drug use: Never       Family History   No family history on file.     Allergies:  Allergies   Allergen Reactions    Zolpidem Hives    Onion Nausea/vomiting    Adhesive Rash     Redness    Neomycin-Bacitracin-Polymyxin Rash    Oxycodone-Acetaminophen Nausea And Vomiting    Sulfamethoxazole-Trimethoprim Nausea And Vomiting    Zolpidem Tartrate Rash        Outpatient Medications:  Current Outpatient Medications   Medication Instructions    albuterol 90 mcg/actuation inhaler 2 puffs, inhalation, Every 6 hours PRN    amLODIPine (NORVASC) 10 mg, oral, Daily    blood sugar diagnostic (Blood Glucose Test) strip 100 strips, miscellaneous, 2 times daily    cholecalciferol (VITAMIN D-3) 1,000 Units, oral, Daily    donepezil (ARICEPT) 10 mg, oral, Nightly    glipiZIDE XL (GLUCOTROL XL) 2.5 mg, oral, Daily, Do not crush, chew, or split.    lancets (OneTouch Delica Plus Lancet) 30 gauge misc miscellaneous    losartan-hydrochlorothiazide (Hyzaar) 100-12.5 mg tablet 1 tablet, oral, Daily    metFORMIN  mg 24 hr tablet 2 tablets (1,000 mg) once daily in the evening. Take with meals.     "multivitamin tablet 1 tablet, oral, Daily    omeprazole (PRILOSEC) 40 mg, oral, Daily before breakfast, Do not crush or chew.    potassium chloride CR 10 mEq ER tablet 10 mEq, oral, 2 times daily    simvastatin (ZOCOR) 20 mg, oral, Daily RT          Labs:   CMP:  Recent Labs     09/20/24  1306 07/25/24  1128 05/24/24  1311 05/14/24  1156 05/03/24  1143    139 138 138 138   K 4.2 3.4* 3.9 3.7 3.4*   CL 99 98 100 99 98   CO2 28 30 31 31 32   ANIONGAP 15 14 11 12 11   BUN 14 14 23 16 10   CREATININE 0.81 0.81 0.84 0.78 0.60   EGFR 75 75 72 78 >90   MG  --   --  1.74  --   --      Recent Labs     07/25/24  1128 05/24/24  1311 05/03/24  1143 11/07/23  0828   ALBUMIN 4.3 4.0 4.2 4.4   ALKPHOS 66 65 79 82   ALT 22 14 15 13   AST 19 23 13 13   BILITOT 0.6 0.7 1.0 0.6   LIPASE 19  --   --   --      CBC:  Recent Labs     07/25/24  1128 05/24/24  1311 05/03/24  1143   WBC 9.3 12.3* 7.2   HGB 14.6 14.3 14.4   HCT 44.6 42.4 43.0    299 310   MCV 93 93 93     COAG: No results for input(s): \"PTT\", \"INR\", \"HAUF\", \"DDIMERVTE\", \"HAPTOGLOBIN\", \"FIBRINOGEN\" in the last 68624 hours.  ABO: No results for input(s): \"ABO\" in the last 87717 hours.  HEME/ENDO:  Recent Labs     09/20/24  1306 05/14/24  1156 11/07/23  0828   TSH  --  1.32  --    HGBA1C 8.2* 8.2* 6.8*      CARDIAC:   Recent Labs     05/24/24  1311 05/03/24  1143   TROPHS 7 8     Recent Labs     11/07/23  0828   CHOL 182   HDL 60.0   TRIG 126     MICRO: No results for input(s): \"ESR\", \"CRP\", \"PROCAL\" in the last 55285 hours.  No results found for the last 90 days.    Notable Studies: imaging personally reviewed   EKG:  Encounter Date: 05/24/24   ECG 12 Lead   Result Value    Ventricular Rate 80    Atrial Rate 80    ID Interval 198    QRS Duration 90    QT Interval 380    QTC Calculation(Bazett) 438    P Axis 66    R Axis 49    T Axis 8    QRS Count 13    Q Onset 229    P Onset 130    P Offset 197    T Offset 419    QTC Fredericia 418    Narrative    Sinus rhythm " with frequent Premature ventricular complexes  Cannot rule out Anterior infarct (cited on or before 03-MAY-2024)  Abnormal ECG  When compared with ECG of 03-MAY-2024 11:25,  Criteria for Inferior infarct are no longer Present  Inverted T waves have replaced nonspecific T wave abnormality in Inferior leads  See ED provider note for full interpretation and clinical correlation  Confirmed by Taylor Ceron ([id number]) on 5/25/2024 5:27:35 PM     Echocardiogram: No results found for this or any previous visit from the past 1825 days.    Stress Testing: No results found for this or any previous visit from the past 1825 days.    Cardiac Catheterization: No results found for this or any previous visit from the past 1825 days.  No results found for this or any previous visit from the past 3650 days.         REVIEW OF SYSTEMS  A 10-point system review was completed and was negative except as noted in the HPI.      VITALS  Vitals:    09/30/24 1431   BP: 108/64   Pulse: 75   SpO2: 98%       PHYSICAL EXAM  General: awake, alert and oriented. No acute distress.   Skin: Skin is warm, dry and intact without rashes or lesions.  HEENT: normocephalic, atraumatic; conjunctivae are clear without exudates or hemorrhage. Sclera is non-icteric. Eyelids are normal in appearance without swelling or lesions. Hearing intact. Nares are patent bilaterally. Moist mucous membranes.   Cardiovascular: heart rate and rhythm are normal. No murmurs, gallops, or rubs are auscultated. S1 and S2 are heard and are of normal intensity. No JVD, no carotid bruits  Respiratory: bilateral lung sounds clear to auscultations without rales, rhonchi, or wheezes.   Neurological: no focal deficits; gait steady  Psychiatric: appropriate mood and affect; good judgment and insight          ASSESSMENT AND PLAN  Assessment/Plan   Diagnoses and all orders for this visit:  Encounter for pre-operative cardiovascular clearance    Cardiac risk stratification:   -Patient is  at Class I risk of perioperative cardiac events with upcoming procedure.  Okay to go for proceed without further cardiac testing.       RTC: PRN      Thank you for allowing me to participate in the care of this patient. Please reach me out if you have any questions or if you need any clarifications regarding the patient's care.    [place] Tellez DNP, APRN, FNP-C  Division of Cardiovascular Medicine  [facility]  [facility]

## 2024-09-30 ENCOUNTER — APPOINTMENT (OUTPATIENT)
Dept: CARDIOLOGY | Facility: CLINIC | Age: 79
End: 2024-09-30
Payer: MEDICARE

## 2024-09-30 ENCOUNTER — TELEPHONE (OUTPATIENT)
Dept: CARDIOLOGY | Facility: CLINIC | Age: 79
End: 2024-09-30

## 2024-09-30 ENCOUNTER — TELEPHONE (OUTPATIENT)
Age: 79
End: 2024-09-30

## 2024-09-30 VITALS
BODY MASS INDEX: 23.03 KG/M2 | SYSTOLIC BLOOD PRESSURE: 108 MMHG | OXYGEN SATURATION: 98 % | DIASTOLIC BLOOD PRESSURE: 64 MMHG | WEIGHT: 134.9 LBS | HEIGHT: 64 IN | HEART RATE: 75 BPM

## 2024-09-30 DIAGNOSIS — E11.9 CONTROLLED TYPE 2 DIABETES MELLITUS WITHOUT COMPLICATION, WITHOUT LONG-TERM CURRENT USE OF INSULIN (MULTI): Primary | ICD-10-CM

## 2024-09-30 DIAGNOSIS — Z01.810 ENCOUNTER FOR PRE-OPERATIVE CARDIOVASCULAR CLEARANCE: Primary | ICD-10-CM

## 2024-09-30 PROBLEM — R94.31 ABNORMAL EKG: Status: RESOLVED | Noted: 2024-07-25 | Resolved: 2024-09-30

## 2024-09-30 PROCEDURE — 3074F SYST BP LT 130 MM HG: CPT

## 2024-09-30 PROCEDURE — 99214 OFFICE O/P EST MOD 30 MIN: CPT

## 2024-09-30 PROCEDURE — 1160F RVW MEDS BY RX/DR IN RCRD: CPT

## 2024-09-30 PROCEDURE — 1159F MED LIST DOCD IN RCRD: CPT

## 2024-09-30 PROCEDURE — 1036F TOBACCO NON-USER: CPT

## 2024-09-30 PROCEDURE — 1157F ADVNC CARE PLAN IN RCRD: CPT

## 2024-09-30 PROCEDURE — 3078F DIAST BP <80 MM HG: CPT

## 2024-09-30 RX ORDER — BLOOD-GLUCOSE CONTROL, NORMAL
EACH MISCELLANEOUS
COMMUNITY
End: 2024-09-30 | Stop reason: SDUPTHER

## 2024-09-30 NOTE — TELEPHONE ENCOUNTER
Pt spouse notified. Will keep appt later today to get clearance for surgery.    ----- Message from Eboni MCMULLEN sent at 9/30/2024 10:49 AM EDT -----  LMTCB  ----- Message -----  From: Eboni Mccauley CMA  Sent: 9/26/2024   1:49 PM EDT  To: [facility] Clinical Support Staff    LMTCB  ----- Message -----  From: EVELYN Jang-CNP, DNP  Sent: 9/26/2024  11:56 AM EDT  To: [facility] Clinical Support Staff    Please let Chanel know her stress test was normal. Also, happy `belated birthday :)

## 2024-09-30 NOTE — TELEPHONE ENCOUNTER
Patients  called because they had received a call that the patients stress test was negative and they are meeting with [place] Tellez today to go over that and get their clearance from Dr. Katz. He would like to know if you had gotten the results and if you were okay clearing her as well?

## 2024-10-01 RX ORDER — BLOOD-GLUCOSE CONTROL, NORMAL
1 EACH MISCELLANEOUS AS NEEDED
Qty: 100 EACH | Refills: 5 | Status: SHIPPED | OUTPATIENT
Start: 2024-10-01

## 2024-10-01 NOTE — TELEPHONE ENCOUNTER
Please advise that as long as [place] Tellez clears her for surgery I am also fine with clearing her.  Thank you

## 2024-10-21 DIAGNOSIS — F03.A0 MILD DEMENTIA WITHOUT BEHAVIORAL DISTURBANCE, PSYCHOTIC DISTURBANCE, MOOD DISTURBANCE, OR ANXIETY, UNSPECIFIED DEMENTIA TYPE: ICD-10-CM

## 2024-10-21 RX ORDER — DONEPEZIL HYDROCHLORIDE 10 MG/1
10 TABLET, FILM COATED ORAL NIGHTLY
Qty: 30 TABLET | Refills: 5 | Status: SHIPPED | OUTPATIENT
Start: 2024-10-21 | End: 2025-04-19

## 2024-10-24 ENCOUNTER — PREP FOR PROCEDURE (OUTPATIENT)
Dept: OPERATING ROOM | Facility: HOSPITAL | Age: 79
End: 2024-10-24
Payer: MEDICARE

## 2024-10-24 DIAGNOSIS — H25.812 COMBINED FORMS OF AGE-RELATED CATARACT OF LEFT EYE: Primary | ICD-10-CM

## 2024-10-24 RX ORDER — PREDNISOLONE ACETATE 10 MG/ML
1 SUSPENSION/ DROPS OPHTHALMIC ONCE
OUTPATIENT
Start: 2024-10-24 | End: 2024-10-24

## 2024-10-24 RX ORDER — KETOROLAC TROMETHAMINE 5 MG/ML
1 SOLUTION OPHTHALMIC
OUTPATIENT
Start: 2024-10-24 | End: 2024-10-24

## 2024-10-24 RX ORDER — POVIDONE-IODINE 5 %
SOLUTION, NON-ORAL OPHTHALMIC (EYE) ONCE
OUTPATIENT
Start: 2024-10-24 | End: 2024-10-24

## 2024-10-24 RX ORDER — TETRACAINE HYDROCHLORIDE 5 MG/ML
1 SOLUTION OPHTHALMIC ONCE
OUTPATIENT
Start: 2024-10-24 | End: 2024-10-24

## 2024-10-30 ENCOUNTER — ANESTHESIA EVENT (OUTPATIENT)
Dept: OPERATING ROOM | Facility: HOSPITAL | Age: 79
End: 2024-10-30
Payer: MEDICARE

## 2024-10-31 ENCOUNTER — ANESTHESIA (OUTPATIENT)
Dept: OPERATING ROOM | Facility: HOSPITAL | Age: 79
End: 2024-10-31
Payer: MEDICARE

## 2024-10-31 ENCOUNTER — HOSPITAL ENCOUNTER (OUTPATIENT)
Facility: HOSPITAL | Age: 79
Setting detail: OUTPATIENT SURGERY
Discharge: HOME | End: 2024-10-31
Attending: OPHTHALMOLOGY | Admitting: OPHTHALMOLOGY
Payer: MEDICARE

## 2024-10-31 VITALS
OXYGEN SATURATION: 97 % | SYSTOLIC BLOOD PRESSURE: 139 MMHG | WEIGHT: 138.67 LBS | TEMPERATURE: 98.1 F | HEIGHT: 63 IN | BODY MASS INDEX: 24.57 KG/M2 | RESPIRATION RATE: 16 BRPM | DIASTOLIC BLOOD PRESSURE: 88 MMHG | HEART RATE: 85 BPM

## 2024-10-31 LAB — GLUCOSE BLD MANUAL STRIP-MCNC: 156 MG/DL (ref 74–99)

## 2024-10-31 PROCEDURE — 3700000002 HC GENERAL ANESTHESIA TIME - EACH INCREMENTAL 1 MINUTE: Performed by: OPHTHALMOLOGY

## 2024-10-31 PROCEDURE — 7100000009 HC PHASE TWO TIME - INITIAL BASE CHARGE: Performed by: OPHTHALMOLOGY

## 2024-10-31 PROCEDURE — 7100000010 HC PHASE TWO TIME - EACH INCREMENTAL 1 MINUTE: Performed by: OPHTHALMOLOGY

## 2024-10-31 PROCEDURE — 3600000008 HC OR TIME - EACH INCREMENTAL 1 MINUTE - PROCEDURE LEVEL THREE: Performed by: OPHTHALMOLOGY

## 2024-10-31 PROCEDURE — 2720000007 HC OR 272 NO HCPCS: Performed by: OPHTHALMOLOGY

## 2024-10-31 PROCEDURE — 3700000001 HC GENERAL ANESTHESIA TIME - INITIAL BASE CHARGE: Performed by: OPHTHALMOLOGY

## 2024-10-31 PROCEDURE — 3600000003 HC OR TIME - INITIAL BASE CHARGE - PROCEDURE LEVEL THREE: Performed by: OPHTHALMOLOGY

## 2024-10-31 PROCEDURE — 2500000005 HC RX 250 GENERAL PHARMACY W/O HCPCS: Performed by: OPHTHALMOLOGY

## 2024-10-31 PROCEDURE — 82947 ASSAY GLUCOSE BLOOD QUANT: CPT

## 2024-10-31 PROCEDURE — 2500000004 HC RX 250 GENERAL PHARMACY W/ HCPCS (ALT 636 FOR OP/ED): Performed by: ANESTHESIOLOGY

## 2024-10-31 PROCEDURE — 2500000001 HC RX 250 WO HCPCS SELF ADMINISTERED DRUGS (ALT 637 FOR MEDICARE OP): Performed by: OPHTHALMOLOGY

## 2024-10-31 DEVICE — STERILE UV ABSORBING HYDROPHOBIC ACRYLIC FOLDABLE ASPHERIC POSTERIOR CHAMBER INTRAOCULAR LENS WITH THE AUTONOME™ AUTOMATED PRE-LOADED DELIVERY SYSTEM
Type: IMPLANTABLE DEVICE | Site: EYE | Status: FUNCTIONAL
Brand: CLAREON™

## 2024-10-31 RX ORDER — MIDAZOLAM HYDROCHLORIDE 1 MG/ML
INJECTION INTRAMUSCULAR; INTRAVENOUS AS NEEDED
Status: DISCONTINUED | OUTPATIENT
Start: 2024-10-31 | End: 2024-10-31

## 2024-10-31 RX ORDER — POVIDONE-IODINE 5 %
SOLUTION, NON-ORAL OPHTHALMIC (EYE) ONCE
Status: DISCONTINUED | OUTPATIENT
Start: 2024-10-31 | End: 2024-10-31 | Stop reason: HOSPADM

## 2024-10-31 RX ORDER — TETRACAINE HYDROCHLORIDE 5 MG/ML
1 SOLUTION OPHTHALMIC ONCE
Status: COMPLETED | OUTPATIENT
Start: 2024-10-31 | End: 2024-10-31

## 2024-10-31 RX ORDER — DEXAMETHASONE SODIUM PHOSPHATE 10 MG/ML
INJECTION INTRAMUSCULAR; INTRAVENOUS AS NEEDED
Status: DISCONTINUED | OUTPATIENT
Start: 2024-10-31 | End: 2024-10-31

## 2024-10-31 RX ORDER — ONDANSETRON HYDROCHLORIDE 2 MG/ML
4 INJECTION, SOLUTION INTRAVENOUS ONCE
Status: COMPLETED | OUTPATIENT
Start: 2024-10-31 | End: 2024-10-31

## 2024-10-31 RX ORDER — KETOROLAC TROMETHAMINE 5 MG/ML
1 SOLUTION OPHTHALMIC
Status: COMPLETED | OUTPATIENT
Start: 2024-10-31 | End: 2024-10-31

## 2024-10-31 RX ORDER — ONDANSETRON HYDROCHLORIDE 2 MG/ML
4 INJECTION, SOLUTION INTRAVENOUS ONCE AS NEEDED
Status: DISCONTINUED | OUTPATIENT
Start: 2024-10-31 | End: 2024-10-31 | Stop reason: HOSPADM

## 2024-10-31 RX ORDER — FENTANYL CITRATE 50 UG/ML
INJECTION, SOLUTION INTRAMUSCULAR; INTRAVENOUS AS NEEDED
Status: DISCONTINUED | OUTPATIENT
Start: 2024-10-31 | End: 2024-10-31

## 2024-10-31 RX ORDER — PREDNISOLONE ACETATE 10 MG/ML
1 SUSPENSION/ DROPS OPHTHALMIC ONCE
Status: DISCONTINUED | OUTPATIENT
Start: 2024-10-31 | End: 2024-10-31 | Stop reason: HOSPADM

## 2024-10-31 RX ORDER — MIDAZOLAM HYDROCHLORIDE 1 MG/ML
1 INJECTION INTRAMUSCULAR; INTRAVENOUS ONCE
Status: COMPLETED | OUTPATIENT
Start: 2024-10-31 | End: 2024-10-31

## 2024-10-31 RX ADMIN — PHENYLEPHRINE HYDROCHLORIDE 1 DROP: 100 SOLUTION/ DROPS OPHTHALMIC at 08:35

## 2024-10-31 RX ADMIN — KETOROLAC TROMETHAMINE 1 DROP: 5 SOLUTION/ DROPS OPHTHALMIC at 08:38

## 2024-10-31 RX ADMIN — POLYVINYL ALCOHOL, POVIDONE 1 DROP: 14; 6 SOLUTION/ DROPS OPHTHALMIC at 08:35

## 2024-10-31 RX ADMIN — POLYVINYL ALCOHOL, POVIDONE 1 DROP: 14; 6 SOLUTION/ DROPS OPHTHALMIC at 09:08

## 2024-10-31 RX ADMIN — PHENYLEPHRINE HYDROCHLORIDE 1 DROP: 100 SOLUTION/ DROPS OPHTHALMIC at 08:38

## 2024-10-31 RX ADMIN — PHENYLEPHRINE HYDROCHLORIDE 1 DROP: 100 SOLUTION/ DROPS OPHTHALMIC at 09:08

## 2024-10-31 RX ADMIN — KETOROLAC TROMETHAMINE 1 DROP: 5 SOLUTION/ DROPS OPHTHALMIC at 08:35

## 2024-10-31 RX ADMIN — KETOROLAC TROMETHAMINE 1 DROP: 5 SOLUTION/ DROPS OPHTHALMIC at 09:08

## 2024-10-31 RX ADMIN — ONDANSETRON 4 MG: 2 INJECTION INTRAMUSCULAR; INTRAVENOUS at 09:01

## 2024-10-31 RX ADMIN — MIDAZOLAM HYDROCHLORIDE 1 MG: 1 INJECTION, SOLUTION INTRAMUSCULAR; INTRAVENOUS at 09:05

## 2024-10-31 RX ADMIN — POLYVINYL ALCOHOL, POVIDONE 1 DROP: 14; 6 SOLUTION/ DROPS OPHTHALMIC at 08:38

## 2024-10-31 RX ADMIN — POLYVINYL ALCOHOL, POVIDONE 1 DROP: 14; 6 SOLUTION/ DROPS OPHTHALMIC at 09:04

## 2024-10-31 RX ADMIN — TETRACAINE HYDROCHLORIDE 1 DROP: 5 SOLUTION OPHTHALMIC at 08:38

## 2024-10-31 RX ADMIN — PHENYLEPHRINE HYDROCHLORIDE 1 DROP: 100 SOLUTION/ DROPS OPHTHALMIC at 09:04

## 2024-10-31 RX ADMIN — KETOROLAC TROMETHAMINE 1 DROP: 5 SOLUTION/ DROPS OPHTHALMIC at 08:53

## 2024-10-31 SDOH — HEALTH STABILITY: MENTAL HEALTH: CURRENT SMOKER: 0

## 2024-10-31 ASSESSMENT — PAIN - FUNCTIONAL ASSESSMENT
PAIN_FUNCTIONAL_ASSESSMENT: 0-10

## 2024-10-31 ASSESSMENT — PAIN SCALES - GENERAL
PAINLEVEL_OUTOF10: 0 - NO PAIN
PAIN_LEVEL: 0
PAINLEVEL_OUTOF10: 0 - NO PAIN

## 2024-10-31 ASSESSMENT — COLUMBIA-SUICIDE SEVERITY RATING SCALE - C-SSRS
6. HAVE YOU EVER DONE ANYTHING, STARTED TO DO ANYTHING, OR PREPARED TO DO ANYTHING TO END YOUR LIFE?: NO
2. HAVE YOU ACTUALLY HAD ANY THOUGHTS OF KILLING YOURSELF?: NO
1. IN THE PAST MONTH, HAVE YOU WISHED YOU WERE DEAD OR WISHED YOU COULD GO TO SLEEP AND NOT WAKE UP?: NO

## 2024-11-14 ENCOUNTER — PREP FOR PROCEDURE (OUTPATIENT)
Dept: OPERATING ROOM | Facility: HOSPITAL | Age: 79
End: 2024-11-14
Payer: MEDICARE

## 2024-11-14 DIAGNOSIS — H25.811 COMBINED FORMS OF AGE-RELATED CATARACT OF RIGHT EYE: Primary | ICD-10-CM

## 2024-11-14 RX ORDER — KETOROLAC TROMETHAMINE 5 MG/ML
1 SOLUTION OPHTHALMIC
OUTPATIENT
Start: 2024-11-14 | End: 2024-11-14

## 2024-11-14 RX ORDER — TETRACAINE HYDROCHLORIDE 5 MG/ML
1 SOLUTION OPHTHALMIC ONCE
OUTPATIENT
Start: 2024-11-14 | End: 2024-11-14

## 2024-11-14 RX ORDER — POVIDONE-IODINE 5 %
SOLUTION, NON-ORAL OPHTHALMIC (EYE) ONCE
OUTPATIENT
Start: 2024-11-14 | End: 2024-11-14

## 2024-11-14 RX ORDER — PREDNISOLONE ACETATE 10 MG/ML
1 SUSPENSION/ DROPS OPHTHALMIC ONCE
OUTPATIENT
Start: 2024-11-14 | End: 2024-11-14

## 2024-11-19 NOTE — PREPROCEDURE INSTRUCTIONS
No outpatient medications have been marked as taking for the 11/21/24 encounter (Hospital Encounter).                       NPO Instructions:    Do not eat any food after midnight the night before your surgery/procedure.  You may have clear liquids until TWO hours before surgery/procedure. This includes water, black tea/coffee, (no milk or cream) apple juice and electrolyte drinks (Gatorade).    Additional Instructions:     Will need  home, will receive call day before surgery with arrival time

## 2024-11-21 ENCOUNTER — ANESTHESIA (OUTPATIENT)
Dept: OPERATING ROOM | Facility: HOSPITAL | Age: 79
End: 2024-11-21
Payer: MEDICARE

## 2024-11-21 ENCOUNTER — HOSPITAL ENCOUNTER (OUTPATIENT)
Facility: HOSPITAL | Age: 79
Setting detail: OUTPATIENT SURGERY
Discharge: HOME | End: 2024-11-21
Attending: OPHTHALMOLOGY | Admitting: OPHTHALMOLOGY
Payer: MEDICARE

## 2024-11-21 ENCOUNTER — ANESTHESIA EVENT (OUTPATIENT)
Dept: OPERATING ROOM | Facility: HOSPITAL | Age: 79
End: 2024-11-21
Payer: MEDICARE

## 2024-11-21 VITALS
SYSTOLIC BLOOD PRESSURE: 128 MMHG | OXYGEN SATURATION: 97 % | HEART RATE: 81 BPM | WEIGHT: 136.91 LBS | RESPIRATION RATE: 16 BRPM | TEMPERATURE: 97.6 F | DIASTOLIC BLOOD PRESSURE: 67 MMHG | HEIGHT: 63 IN | BODY MASS INDEX: 24.26 KG/M2

## 2024-11-21 PROBLEM — E66.812 CLASS 2 OBESITY IN ADULT: Status: ACTIVE | Noted: 2024-11-21

## 2024-11-21 PROBLEM — C53.1 MALIGNANT NEOPLASM OF EXOCERVIX (MULTI): Status: ACTIVE | Noted: 2024-11-21

## 2024-11-21 LAB — GLUCOSE BLD MANUAL STRIP-MCNC: 167 MG/DL (ref 74–99)

## 2024-11-21 PROCEDURE — 3600000003 HC OR TIME - INITIAL BASE CHARGE - PROCEDURE LEVEL THREE: Performed by: OPHTHALMOLOGY

## 2024-11-21 PROCEDURE — 3700000001 HC GENERAL ANESTHESIA TIME - INITIAL BASE CHARGE: Performed by: OPHTHALMOLOGY

## 2024-11-21 PROCEDURE — 7100000010 HC PHASE TWO TIME - EACH INCREMENTAL 1 MINUTE: Performed by: OPHTHALMOLOGY

## 2024-11-21 PROCEDURE — 7100000009 HC PHASE TWO TIME - INITIAL BASE CHARGE: Performed by: OPHTHALMOLOGY

## 2024-11-21 PROCEDURE — V2632 POST CHMBR INTRAOCULAR LENS: HCPCS | Performed by: OPHTHALMOLOGY

## 2024-11-21 PROCEDURE — 3600000008 HC OR TIME - EACH INCREMENTAL 1 MINUTE - PROCEDURE LEVEL THREE: Performed by: OPHTHALMOLOGY

## 2024-11-21 PROCEDURE — 2500000001 HC RX 250 WO HCPCS SELF ADMINISTERED DRUGS (ALT 637 FOR MEDICARE OP): Performed by: OPHTHALMOLOGY

## 2024-11-21 PROCEDURE — 82947 ASSAY GLUCOSE BLOOD QUANT: CPT

## 2024-11-21 PROCEDURE — 2500000005 HC RX 250 GENERAL PHARMACY W/O HCPCS: Performed by: OPHTHALMOLOGY

## 2024-11-21 PROCEDURE — 2500000004 HC RX 250 GENERAL PHARMACY W/ HCPCS (ALT 636 FOR OP/ED): Performed by: ANESTHESIOLOGY

## 2024-11-21 PROCEDURE — 2760000001 HC OR 276 NO HCPCS: Performed by: OPHTHALMOLOGY

## 2024-11-21 PROCEDURE — 3700000002 HC GENERAL ANESTHESIA TIME - EACH INCREMENTAL 1 MINUTE: Performed by: OPHTHALMOLOGY

## 2024-11-21 PROCEDURE — 2500000004 HC RX 250 GENERAL PHARMACY W/ HCPCS (ALT 636 FOR OP/ED): Performed by: OPHTHALMOLOGY

## 2024-11-21 PROCEDURE — 2720000007 HC OR 272 NO HCPCS: Performed by: OPHTHALMOLOGY

## 2024-11-21 DEVICE — IMPLANTABLE DEVICE: Type: IMPLANTABLE DEVICE | Site: EYE | Status: FUNCTIONAL

## 2024-11-21 RX ORDER — OXYCODONE HYDROCHLORIDE 5 MG/1
5 TABLET ORAL ONCE
Status: DISCONTINUED | OUTPATIENT
Start: 2024-11-21 | End: 2024-11-21 | Stop reason: HOSPADM

## 2024-11-21 RX ORDER — POVIDONE-IODINE 5 %
SOLUTION, NON-ORAL OPHTHALMIC (EYE) ONCE
Status: COMPLETED | OUTPATIENT
Start: 2024-11-21 | End: 2024-11-21

## 2024-11-21 RX ORDER — LIDOCAINE HYDROCHLORIDE 10 MG/ML
INJECTION, SOLUTION EPIDURAL; INFILTRATION; INTRACAUDAL; PERINEURAL AS NEEDED
Status: DISCONTINUED | OUTPATIENT
Start: 2024-11-21 | End: 2024-11-21 | Stop reason: HOSPADM

## 2024-11-21 RX ORDER — PREDNISOLONE ACETATE 10 MG/ML
1 SUSPENSION/ DROPS OPHTHALMIC ONCE
Status: COMPLETED | OUTPATIENT
Start: 2024-11-21 | End: 2024-11-21

## 2024-11-21 RX ORDER — TETRACAINE HYDROCHLORIDE 5 MG/ML
1 SOLUTION OPHTHALMIC ONCE
Status: COMPLETED | OUTPATIENT
Start: 2024-11-21 | End: 2024-11-21

## 2024-11-21 RX ORDER — FENTANYL CITRATE 50 UG/ML
INJECTION, SOLUTION INTRAMUSCULAR; INTRAVENOUS AS NEEDED
Status: DISCONTINUED | OUTPATIENT
Start: 2024-11-21 | End: 2024-11-21

## 2024-11-21 RX ORDER — KETOROLAC TROMETHAMINE 5 MG/ML
1 SOLUTION OPHTHALMIC
Status: COMPLETED | OUTPATIENT
Start: 2024-11-21 | End: 2024-11-21

## 2024-11-21 RX ORDER — MIDAZOLAM HYDROCHLORIDE 1 MG/ML
INJECTION INTRAMUSCULAR; INTRAVENOUS AS NEEDED
Status: DISCONTINUED | OUTPATIENT
Start: 2024-11-21 | End: 2024-11-21

## 2024-11-21 RX ORDER — ONDANSETRON HYDROCHLORIDE 2 MG/ML
4 INJECTION, SOLUTION INTRAVENOUS
Status: DISCONTINUED | OUTPATIENT
Start: 2024-11-21 | End: 2024-11-21 | Stop reason: HOSPADM

## 2024-11-21 SDOH — HEALTH STABILITY: MENTAL HEALTH: CURRENT SMOKER: 0

## 2024-11-21 ASSESSMENT — COLUMBIA-SUICIDE SEVERITY RATING SCALE - C-SSRS
1. IN THE PAST MONTH, HAVE YOU WISHED YOU WERE DEAD OR WISHED YOU COULD GO TO SLEEP AND NOT WAKE UP?: NO
6. HAVE YOU EVER DONE ANYTHING, STARTED TO DO ANYTHING, OR PREPARED TO DO ANYTHING TO END YOUR LIFE?: NO
2. HAVE YOU ACTUALLY HAD ANY THOUGHTS OF KILLING YOURSELF?: NO

## 2024-11-21 ASSESSMENT — PAIN SCALES - GENERAL
PAINLEVEL_OUTOF10: 0 - NO PAIN

## 2024-11-21 ASSESSMENT — PAIN - FUNCTIONAL ASSESSMENT
PAIN_FUNCTIONAL_ASSESSMENT: 0-10
PAIN_FUNCTIONAL_ASSESSMENT: 0-10

## 2024-11-21 NOTE — H&P
H&P Notes  - documented in this encounter   Kevan Katz MD - 11/12/2024 11:26 AM EST  Formatting of this note is different from the original.  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    The patient has the following:  ACTIVE PROBLEM LIST  Postmenopausal Atrophic Vaginitis  Unspecified Transient Cerebral Ischemia  Carcinoma in Situ of Vulva  Malignant Neoplasm of Female Breast (Hcc)  Personal History of Malignant Neoplasm of Breast  Senile Nuclear Sclerosis  Hypermetropia  Regular Astigmatism  Presbyopia  Invasive Ductal Carcinoma of Breast, Female (Hcc)  Ocular Migraine  Controlled Type 2 Diabetes Mellitus Without Complication, Without Long-Term Current Use of Insulin (Hcc)  Postmenopausal Bleeding  S/P Hysterectomy With Oophorectomy    SUBJECTIVE  CHIEF COMPLAINT: Blurred vision for distance and near Right Eye.    PAST MEDICAL HISTORY  Diagnosis Date  Breast cancer (HCC) 2010  invasive ductal carcinoma, completed radiation 3/7/11  Cancer of endometrium (HCC)  Carcinoma in situ, vulva 2006  Cellulitis  of right eye muscle  Diabetes mellitus without mention of complication  Diabetes mellitus, type II (HCC)  Diaphragmatic hernia without mention of obstruction or gangrene  Hiatal hernia  Dyspareunia  Elevated cholesterol  Esophageal reflux  Insomnia, unspecified  Orbital cellulitis  Other corneal disorder  Rt eye corneal erosion .  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  Unspecified essential hypertension    PAST SURGICAL  HISTORY  Procedure Laterality Date  Bunions bilateral feet removed 10/2002  BX/EXC LYMPH NODE OPEN DEEP AXILLARY NODE 2010  RIGHT BREAST LUMP W/ SLN BX  CHOLECYSTECTOMY 2004  Cholecystectomy  COLONOSCOPY FLX DX W/COLLJ SPEC WHEN PFRMD   Colonoscopy  COLPOSCOPY CERVIX UPPER/ADJACENT VAGINA 2009  Colposcopy of the vulva  COLPOSCOPY, VULVA 10/2009  CORRECT BUNION,SIMPLE  Bunion  DILATION & CURETTAGE DX&/THER NONOBSTETRIC 1970's  SAB  HYSTERECTOMY 2017  LAPAROSCOPY SURG CHOLECYSTECTOMY 2004  Cholecystectomy, lap  MAMMO STEREOTACTIC CORE BIOPSY RT 10/10/2013  MASTECTOMY, PARTIAL 2010  RIGHT BREAST  PAST SURGICAL HISTORY OF 2006  partial vulvectomy/sq. cell ca in situ  PAST SURGICAL HISTORY OF   right eye cornea  PAST SURGICAL HISTORY OF   right eye revision  PAST SURGICAL HISTORY OF  squamous cell carcinoma right arm  REMV CATARACT EXTRACAP,INSERT LENS 10/31/2024  CCA0T0 - +19.5D  SALPINGO-OOPHORECTOMY COMPL/PRTL UNI/BI SPX 2017  Toenail Big Toe Left Foot removed 10/2002  TONSILLECTOMY PRIMARY/SECONDARY <AGE 13 1952  Tonsillectomy  UNLISTED PROCEDURE HANDS/FINGERS 2012  CMC Arthoplasty on right hand    FAMILY HISTORY  Problem Relation Age of Onset  Stroke Mother  Diabetes Mother  GI Father   from complications of gb surgery  Arthritis Sister  Systemic Lupus  Cancer Maternal Uncle  LUNG  Cancer Other  cousin with breast cancer/cousin with bladder ca.  Stroke Sister    SOCIAL HISTORY:  Social History    Tobacco Use  Smoking status: Never  Smokeless tobacco: Never  Vaping Use  Vaping status: Never Used  Substance Use Topics  Alcohol use: Yes  Comment: 1-2 weekly  Drug use: No    MEDICATIONS:  Prior to Admission medications as of 24 1120  Medication Sig Last Dose Taking  glipiZIDE (GLUCOTROL XL) 2.5 mg 24 hr tablet Take 2.5 mg by mouth. Yes  fluorometholone (FML LIQUID FILM) 0.1 % ophthalmic suspension Use 1 Drop in the right eye three times a day. Yes  donepezil  (ARICEPT) 10 mg tablet Take 10 mg by mouth. Yes  cholecalciferol (VITAMIN D3) 50 mcg (2,000 unit) tablet Take 2,000 Units by mouth once daily. Yes  losartan-hydroCHLOROthiazide (HYZAAR) 100-12.5 mg per tablet Take 1 tablet by mouth once daily. Yes  omeprazole (PRILOSEC) 20 mg capsule Take 20 mg by mouth once daily. Yes  potassium chloride (KLOR-CON 10) 10 mEq tablet Take 10 mEq by mouth once daily.  Yes  metFORMIN ER (GLUCOPHAGE XR) 500 mg 24 hr tablet Take two tablets by mouth once daily. Yes  simvastatin(ZOCOR 20 MG TAB) Take one(1) tablet daily. Yes  keTORolac (ACULAR) 0.5 % ophthalmic solution Use 1 Drop in the left eye three times a day.  prednisoLONE acetate (PRED FORTE) 1 % ophthalmic suspension Use 1 Drop in the left eye three times a day.  amLODIPine (NORVASC) 10 mg tablet Take 10 mg by mouth.    No medication comments found.    CURRENT ALLERGIES:  ALLERGIES  Allergen Reactions  Adhesive Rash  Redness  Ambien [Zolpidem Ta* Rash  Codeine Mental Status Change  Neosporin [Neomycin* Rash  Percocet [Oxycodone*  Causes Vomitting    REVIEW OF SYSTEMS:  PAIN ASSESSMENT:  General: No weight loss, malaise or fevers.  Neuro: Dementia  Respiratory: No history of current cough or dyspnea, or pneumonia in the past 6 weeks. No history of respiratory/pulmonary symptoms or problems  Cardiovascular: Positive for: Hypertension, PVC  GI: No history of GI symptoms or problems. No history of esophageal varices, recent ascites, or ETOH greater than 2 drinks per day.  : No history of UTI in past 6 weeks. No history of renal failure. Not currently on or requiring dialysis. No history of  symptoms or problems.  GYN: Negative for abnormal vaginal bleeding, abnormal vaginal discharge.  Pregnancy: Denies  Endocrine: Diabetes Mellitus on oral agent  Hematology: No history of bleeding or clotting disorder. Pt is not taking anti-coagulation or platelet medications. No history of hematological symptoms or problems.  Oncology: No  history of CA metastasis, chemo within 30 days, or radiotherapy within 90 days. Has not lost 10% of body wt in 6 months. No history of oncological symptoms or problems.  Psych: No history of psychiatric symptoms or problems.  Musculoskeletal: Negative for joint pain or swelling, back pain or muscle pain.  Skin: Negative for lesions, rash and itching.    PHYSICAL EXAM:  VITALS:  /80  Pulse 88        General: Alert and oriented  Skin: Normal color, no rash, no lesions.  HEENT: EOM, pupils equal, round and reactive.  Cardiovascular: Normal S1 & S2, no rubs, murmurs or gallops. No JVD. Pulse regular.  Lungs: Normal breath sounds, no wheezes or crackles.  Abdomen: Soft, non-tender, no rigidity.  Extremities: No deformity, no edema or tenderness, no joint swelling or clubbing.  Neurological: Normal cognition and motor skills.  Pulses: Carotid and radial pulses normal +2.    Diagnostic tests reviewed for today's visit:  No new labs or tests    ASSESSMENT  Medication and Non-Pharmacologic VTE Prophylaxis/Anticoagulants      VTE Prophylaxis: VTE prophylaxis appropriate    Impression: There is no known pertinent medical condition which may affect rodo-operative course      [unfilled]  Clinical Risk Factors for Possible Cardiac Complications:  None    Patient is scheduled for a low-risk procedure.    FUNCTIONAL STATUS: Walk indoors, such as around the house (1.75 METs)    Functional Class (NYHA): N/A    [employer]: Not obtained    PLAN  CONSULTS:  Patient does not require consults for optimization at this time.    The Following Tests/Procedures Have Been Initiated:  None    Instructions Given to Patient:  Patient given verbal and written preop instructions and voices comprehension and compliance.    SIGNATURE: Kevan Katz MD PATIENT NAME: Chanel Bradshaw  DATE: November 12, 2024 MRN: [id number]  TIME: 11:26 AM PAGER/CONTACT #:    Electronically signed by Kevan Katz MD at 11/12/2024 11:27 AM EST   Source  Comments  - [facility]   In the event this information is protected by the Federal Confidentiality of Alcohol and Drug Abuse Patient Records regulations: This information has been disclosed to you from records protected by Federal confidentiality rules (42 CFR Part 2). The Federal rules prohibit you from making any further disclosure of this information unless further disclosure is expressly permitted by the written consent of the person to whom it pertains or as otherwise permitted by 42 CFR Part 2. A general authorization for the release of medical or other information is NOT sufficient for this purpose. The Federal rules restrict any use of the information to criminally investigate or prosecute any alcohol or drug abuse patient.  Reason for Visit    Reason for Visit -  Reason Comments   Blurred Vision Right Eye     Difficulty Reading Right Eye     Glare Right Eye     Encounter Details    Encounter Details  Date Type Department Care Team (Latest Contact Info) Description   11/12/2024 11:15 AM EST Office Visit [facility] Ophthalmology   [address] [place] [address]   [phone]  Kevan Katz MD   [address] [place] [address]   [phone] (Work)   [phone] (Fax)  Combined forms of age-related cataract of right eye (Primary Dx);  Status post cataract extraction and insertion of intraocular lens of left eye;  Type 2 diabetes mellitus without retinopathy (HCC);  Essential hypertension;  Hypercholesteremia;  Dementia, unspecified dementia severity, unspecified dementia type, unspecified whether behavioral, psychotic, or mood disturbance or anxiety (HCC)     Social History  - documented as of this encounter   Social History  Tobacco Use Types Packs/Day Years Used Date   Smoking Tobacco: Never           Smokeless Tobacco: Never             Social History  Alcohol Use Standard Drinks/Week Comments   Yes 0 (1 standard drink = 0.6 oz pure alcohol) 1-2 weekly     Social History  PHQ-2 Answer Date  Recorded   PHQ-2 score 0 10/31/2019     Social History  Area Deprivation Index Answer Date Recorded   National Score (1-100), lower number is lower risk 79 06/21/2023   State Score (1-10), lower number is lower risk 7 06/21/2023   Data from: https://www.neighborhoodatlas.medicine.[facility]/. Last address used for calculation [address] 06/21/2023     Social History  Sex and Gender Information Value Date Recorded   Sex Assigned at Birth Female 12/29/2017 6:52 AM EST   Gender Identity Female 12/29/2017 6:52 AM EST   Sexual Orientation Straight 12/29/2017 6:52 AM EST     Last Filed Vital Signs  - documented in this encounter   Last Filed Vital Signs  Vital Sign Reading Time Taken Comments   Blood Pressure 125/80 11/12/2024 11:18 AM EST     Pulse 88 11/12/2024 11:18 AM EST     Temperature - -     Respiratory Rate - -     Oxygen Saturation - -     Inhaled Oxygen Concentration - -     Weight - -     Height - -     Body Mass Index - -       Functional Status  - documented as of this encounter   Functional Status  Functional Status Response Date of Assessment   Are you deaf or do you have serious difficulty hearing? No 12/31/2017   Are you blind or do you have serious difficulty seeing, even when wearing glasses? No 12/31/2017   Do you have serious difficulty walking or climbing stairs? No 12/31/2017   Do you have difficulty dressing or bathing? No 12/31/2017   Because of a physical, mental, or emotional condition, do you have difficulty doing errands alone such as visiting a doctor's office or shopping? No 12/31/2017     Functional Status  Cognitive Status Response Date of Assessment   Because of a physical, mental, or emotional condition, do you have serious difficulty concentrating, remembering, or making decisions? No 12/31/2017     Patient Instructions  - documented in this encounter   Patient Instructions  Kevan Katz MD - 11/12/2024 11:23 AM EST   Formatting of this note is different from the original.  Plan  Cataract Surgery with Monofocal Intraocular Lens Implant Right Eye on 11/21/2024 at [facility].    Current Ophthalmic Meds        fluorometholone (FML LIQUID FILM) 0.1 % ophthalmic suspension Use 1 Drop in the right eye three times a day.    Current Ophthalmic Meds        prednisoLONE acetate (PRED FORTE) 1 % ophthalmic suspension Use 1 Drop in the left eye three times daily.  keTORolac (ACULAR) 0.5 % ophthalmic solution Use 1 Drop in the left eye three times daily.    Continue:  Systane Complete solution instill 1 drop 3 times daily Both Eyes.    If you have any questions please contact our office at [phone].  After office hours or on the weekend, please call Dr. Katz on his cell phone at [phone].    Electronically signed by Kevan Katz MD at 11/12/2024 11:23 AM EST     Ordered Prescriptions  - documented in this encounter  Reconcile with Patient's Chart  Ordered Prescriptions  Prescription Sig Dispensed Refills Start Date End Date   prednisoLONE acetate (PRED FORTE) 1 % ophthalmic suspension  Use 1 Drop in the left eye three times a day. 10 mL  2 11/12/2024     keTORolac (ACULAR) 0.5 % ophthalmic solution  Use 1 Drop in the left eye three times a day. 5 mL  2 11/12/2024       Progress Notes  - documented in this encounter   Kevan Katz MD - 11/12/2024 11:20 AM EST  Formatting of this note is different from the original.  ASSESSMENT/PLAN:  1. Combined forms of age-related cataract of right eye - ICD9: 366.19, ICD10: H25.811 (primary diagnosis)    Cataract Presurgical Documentation    Cataract: Right Eye    Current Visual Acuity  Right Eye Distance CC 20/40  Left Eye Distance SC 20/20      Glare Testing:  Right Eye Medium 20/80    Visual Function: Chanel Ellischaparro states that the decline in vision from the cataract impedes her abilities as listed in the HPI, as well as other activities of daily living.    Chanel MANUEL Bradshaw has confirmed that she is no longer  able to function adequately on a day-to-day basis because of her current visual condition.    Further, it is my medical opinion that the cataract is the primary cause, or at least a significantly contributory cause of her visual dysfunction. With uncomplicated cataract surgery and lens implantation, it is my expectation that her visual function and quality of life will improve, significantly.    The risks, benefits, alternatives, personnel and complications of cataract surgery with lens implantation were discussed with Chanel JACKSON Augustine in detail. She appeared to understand and asked that I proceed with plans for surgery.    Upon eye examination, patient was found to have a visually significant cataract Right Eye. Discussed cataract surgery with patient and different intraocular lens implant options with patient: basic monofocal intraocular lens implant, Toric intraocular lens implant, and presbyopia correction intraocular lens implant. In my medical opinion, based on medical history and ocular examination, cataract surgery with intraocular lens implant will correct patient's vision and improve quality of patient's daily living activities. Patient wishes to have traditional cataract surgery with basic intraocular lens Right Eye. Patient wishes to have cataract surgery with the option stated above. Patient understands that an intraocular lens implant does not necessarily replace the need for glasses. Patient understands that it is impossible for the surgeon to inform him/her of every possible complication that may occur. The surgeon has answered all of the patient's questions. Patient understands that if he/she has a mature or dense cataract, pseudoexfoliation cataract, or history of use of Flomax, he/she may require the use of Maluyugin Ring and/or Vision Blue during surgery. Patient understands the risks, benefits, and alternatives to surgery.    Patient would like Right Eye corrected for near, target -1.50. Patient  and patient's  verbalized understanding that patient will need glasses for best corrected distance, intermediate, and near vision and post-operatively Right Eye patient will be unable to see at distance.    Plan Cataract Surgery with Monofocal Intraocular Lens Implant Right Eye on 11/21/2024 at [facility].    Current Ophthalmic Meds        fluorometholone (FML LIQUID FILM) 0.1 % ophthalmic suspension Use 1 Drop in the right eye three times a day.    Continue:  Systane Complete solution instill 1 drop 3 times daily Both Eyes.    PHYSICAL EXAM:    Vital Signs:  Blood pressure 125/80, pulse 88.    Respiratory:  Normal breath sounds, no wheezing.    CARD:  Normal heart sounds 1 & 2, normal sinus rhythm.    2. Status post cataract extraction and insertion of intraocular lens of left eye - ICD9: V45.61, V43.1, ICD10: Z98.42, Z96.1    Current Ophthalmic Meds        prednisoLONE acetate (PRED FORTE) 1 % ophthalmic suspension Use 1 Drop in the left eye three times daily.  keTORolac (ACULAR) 0.5 % ophthalmic solution Use 1 Drop in the left eye three times daily.    Continue:  Systane Complete solution instill 1 drop 3 times daily Both Eyes.    3. Type 2 diabetes mellitus without retinopathy (HCC) - ICD9: 250.00, ICD10: E11.9    Please keep your blood sugar under good control to minimize risk of ocular complications from diabetes.    Continue to monitor with primary care physician.    4. Essential hypertension - ICD9: 401.9, ICD10: I10    Continue to monitor with primary care physician.    5. Hypercholesteremia - ICD9: 272.0, ICD10: E78.00    Continue to monitor with primary care physician.    6. Dementia, unspecified dementia severity, unspecified dementia type, unspecified whether behavioral, psychotic, or mood disturbance or anxiety (HCC) - ICD9: 294.20, ICD10: F03.90    Continue to monitor with primary care physician.    I have confirmed and edited as necessary the relevant HPI,  ophthalmic history, ROS, and the neuro exam findings as obtained by others. I have seen and examined Chanel Bradshaw.  I have discussed the case and the management of this patient's care with the Resident/Fellow, if applicable. I also have reviewed and agree with the assessment and plan as stated above and agree with all of its relevant components.        Electronically signed by Kevan Katz MD at 11/12/2024 11:27 AM EST   Plan of Treatment  - documented as of this encounter   Plan of Treatment - Upcoming Encounters  Upcoming Encounters  Date Type Department Care Team (Latest Contact Info) Description   11/22/2024 2:15 PM EST Office Visit [facility] Ophthalmology   [address] [place] [address]   [phone]  Kevan Katz MD   [address] [place] [address]   [phone] (Work)   [phone] (Fax)  1 DAY PO 2ND RE BASIC   11/12/2025 12:50 PM EST Appointment Mammogram   [address] [place] [address]   [phone]    Encounter for screening mammogram for breast cancer [Z12.31]   11/14/2025 1:20 PM EST Office Visit OB/Gynecology   [address] [place] [address]   [phone]  Syd Amezcua MD   [address] [place] [address]   [phone] (Work)   [phone] (Fax)  Annual   11/19/2025 11:30 AM EST Visit (SP) Office Hematology/Oncology   [address] [place] [address]   [phone]  Mike Hammer DO   [address] [place] [address]   [phone] (Work)   [phone] (Fax)  1 YR OV/MAMM 11/12*     Plan of Treatment - Scheduled Procedures  Scheduled Procedures  Name Priority Associated Diagnoses Date/Time   SURGERY AT NON[facility] FACILITY   Combined forms of age-related cataract of right eye  11/21/2024 10:10 AM EST     Visit Diagnoses  - documented in this encounter   Visit Diagnoses  Diagnosis   Combined forms of age-related cataract of right eye - Primary   Other and combined forms of senile cataract    Status post cataract extraction and insertion of  intraocular lens of left eye    Type 2 diabetes mellitus without retinopathy (HCC)   Type II or unspecified type diabetes mellitus without mention of complication, not stated as uncontrolled    Essential hypertension   Unspecified essential hypertension    Hypercholesteremia   Pure hypercholesterolemia    Dementia, unspecified dementia severity, unspecified dementia type, unspecified whether behavioral, psychotic, or mood disturbance or anxiety (HCC)      Discontinued Medications  - documented as of this encounter   Discontinued Medications  Medication Sig Discontinue Reason Start Date End Date   prednisoLONE acetate (PRED FORTE) 1 % ophthalmic suspension  Use 1 Drop in the left eye four times daily.   11/01/2024 11/12/2024   keTORolac (ACULAR) 0.5 % ophthalmic solution  Use 1 Drop in the left eye four times daily.   11/01/2024 11/12/2024     Eye Exam    Eye Exam - Visual Acuity (Estuardo Cards)  Visual Acuity (Estuardo Cards)    Right eye Left eye   Dist sc   20/20   Dist cc 20/40     Near cc J6       Eye Exam - Tonometry (Applanation, 11:19 AM)  Tonometry (Applanation, 11:19 AM)    Right eye Left eye   Pressure 14 14     Eye Exam - Pupils  Pupils    Pupils Dark Light Shape APD   Right eye PERRL 4 2 Round None   Left eye PERRL 4 2 Round None     Eye Exam - Visual Fields  Visual Fields    Right eye Left eye     Full Full     Eye Exam - Extraocular Movement  Extraocular Movement    Right eye Left eye     Full Full     Eye Exam - Neuro/Psych  Neuro/Psych  Oriented x3: Yes   Mood/Affect: Normal     Eye Exam - Glare Testing  Glare Testing    Medium   Right eye 20/80   Left eye       Eye Exam - External Exam  External Exam    Right eye Left eye   External Normal including orbits and preauricular lymph nodes Normal including orbits and preauricular lymph nodes     Eye Exam - Slit Lamp Exam  Slit Lamp Exam    Right eye Left eye   Lids/Lashes Normal lids, lashes, lacrimal glands, and lacrimal drainage Normal lids, lashes,  lacrimal glands, and lacrimal drainage   Conjunctiva/Sclera White and quiet White and quiet   Cornea Normal epithelium, stroma, endothelium, and tear film Normal epithelium, stroma, endothelium, and tear film   Anterior Chamber Deep and quiet Deep and quiet   Iris Round and reactive Round and reactive   Lens 2+ Nuclear sclerotic cataract, 2+ Posterior subcapsular cataract Posterior chamber intraocular lens   Anterior Vitreous Normal Normal     Eye Exam - Fundus Exam  Fundus Exam    Right eye Left eye   Disc Normal Normal   C/D Ratio 0.3 0.3   Macula Normal Normal   Vessels Normal Normal   Periphery Normal Normal     Care Teams  - documented as of this encounter   Care Teams  Team Member Relationship Specialty Start Date End Date   Sarah Haddad DO   NPI: [id number]   [address] [place] [address]   [phone] (Work)   [phone] (Fax)  PCP - General Internal Medicine 10/25/23     Amandeep Galvez MD   NPI: [id number]   [address] [facility] [place] [address]   [phone] (Work)   [phone] (Fax)  Physician Radiation Oncology 1/26/18

## 2024-11-21 NOTE — ANESTHESIA POSTPROCEDURE EVALUATION
Patient: Chanel Bradshaw    Procedure Summary       Date: 11/21/24 Room / Location: [facility] OR 05 / [facility] OR    Anesthesia Start: 1118 Anesthesia Stop: 1149    Procedure: Phacoemulsification Cataract with Insertion Intraocular Lens (Right: Eye) Diagnosis:       Combined forms of age-related cataract of right eye      (Combined forms of age-related cataract of right eye [H25.811])    Surgeons: Kevan Katz MD Responsible Provider: Victor Hugo Sabillon MD PhD    Anesthesia Type: MAC ASA Status: 3            Anesthesia Type: MAC    Vitals Value Taken Time   /67 11/21/24 1150   Temp 36.4 °C (97.6 °F) 11/21/24 1148   Pulse 81 11/21/24 1150   Resp 16 11/21/24 1150   SpO2 97 % 11/21/24 1150       Anesthesia Post Evaluation    Patient location during evaluation: PACU  Patient participation: complete - patient participated  Level of consciousness: awake and alert  Pain management: satisfactory to patient  Airway patency: patent  Cardiovascular status: hemodynamically stable  Respiratory status: acceptable and spontaneous ventilation  Hydration status: euvolemic  Postoperative Nausea and Vomiting: none        No notable events documented.

## 2024-11-21 NOTE — ANESTHESIA PREPROCEDURE EVALUATION
Patient: Chanel Bradshaw    Procedure Information       Date/Time: 11/21/24 1050    Procedure: Phacoemulsification Cataract with Insertion Intraocular Lens (Right: Eye)    Location: [facility] OR 05 / Virtual [facility] OR    Surgeons: Kevan Katz MD            Relevant Problems   Cardiac   (+) HTN (hypertension)   (+) Mitral valve prolapse determined by imaging   (+) Mixed hyperlipidemia      Neuro   (+) Mild dementia without behavioral disturbance, psychotic disturbance, mood disturbance, or anxiety, unspecified dementia type      GI   (+) Gastroesophageal reflux disease without esophagitis      Liver   (+) Benign liver cyst      Endocrine   (+) Controlled type 2 diabetes mellitus without complication, without long-term current use of insulin (Multi)       Clinical information reviewed:   Tobacco  Allergies  Meds  Problems  Med Hx  Surg Hx   Fam Hx  Soc   Hx        NPO Detail:  NPO/Void Status  Carbohydrate Drink Given Prior to Surgery? : N  Date of Last Liquid: 11/21/24  Time of Last Liquid: 0800  Date of Last Solid: 11/21/24  Time of Last Solid: 1830  Last Intake Type: Clear fluids  Time of Last Void: 1023         Physical Exam    Airway  Mallampati: I  TM distance: >3 FB     Cardiovascular   Rhythm: regular  Rate: normal     Dental - normal exam     Pulmonary - normal exam     Abdominal   (+) obese             Anesthesia Plan    History of general anesthesia?: yes  History of complications of general anesthesia?: no    ASA 3     MAC     The patient is not a current smoker.  Patient did not smoke on day of procedure.    intravenous induction   Anesthetic plan and risks discussed with patient.

## 2024-11-21 NOTE — OP NOTE
Phacoemulsification Cataract with Insertion Intraocular Lens (R) Operative Note     Date: 2024  OR Location: [facility] OR    Name: Chanel Bradshaw, : 1945, Age: 79 y.o., MRN: [id number], Sex: female    Diagnosis  Pre-op Diagnosis      * Combined forms of age-related cataract of right eye [H25.811] Post-op Diagnosis     * Combined forms of age-related cataract of right eye [H25.811]     Procedures  Phacoemulsification Cataract with Insertion Intraocular Lens  25199 - CO XCAPSL CTRC RMVL INSJ IO LENS PROSTH W/O ECP      Surgeons      * Kevan Katz - Primary    Resident/Fellow/Other Assistant:  Surgeons and Role:  * No surgeons found with a matching role *    Staff:   Circulator: Courtney Paulino Person: Nava    Anesthesia Staff: Anesthesiologist: Victor Hugo Sabillon MD PhD    Procedure Summary  Anesthesia: Monitor Anesthesia Care  ASA: III  Estimated Blood Loss: None  Intra-op Medications:   Administrations occurring from 1050 to 1130 on 24:   Medication Name Total Dose   lidocaine 1%-phenylephrine 1.5% intravitreal injection 2 mL 2 mL   moxifloxacin (Vigamox) 1.5 mg/1 mL (0.15%) injection 1.5 mg 1.5 mg   prednisoLONE acetate (Pred-Forte) 1 % ophthalmic suspension 1 drop 1 drop   lidocaine PF (Xylocaine) 10 mg/mL (1 %) injection 2 mL   fentaNYL (Sublimaze) injection 50 mcg/mL 50 mcg   midazolam PF (Versed) injection 1 mg/mL 1 mg     Anesthesia Record          Intraprocedure I/O Totals       None      Specimen: No specimens collected     Drains and/or Catheters: * None in log *    I have reviewed the images and report from the Ophthalmic Biometry 24 to determine the intraocular lens power calculation for the IOL lens implant.  I have interpreted and agree with the calculation of the IOL as listed below.    Implants:  Implants       Type Name Action Serial No.      Lens CLAREON IOL 22.0D Implanted [id number]      Findings: Combined Form Age Related Cataract Right Eye     Indications: Chanel Bradshaw is  an 79 y.o. female who is having surgery for Combined forms of age-related cataract of right eye [H25.811]. Decreased vision right eye.  Difficulty seeing for near and distance, trouble seeing to read and watch TV.  Vision right eye impedes patients ability to drive.     The patient was seen in the preoperative area. The risks, benefits, complications, treatment options, non-operative alternatives, expected recovery and outcomes were discussed with the patient. The possibilities of reaction to medication, pulmonary aspiration, injury to surrounding structures, bleeding, recurrent infection, the need for additional procedures, failure to diagnose a condition, and creating a complication requiring transfusion or operation were discussed with the patient. The patient concurred with the proposed plan, giving informed consent.  The site of surgery was properly noted/marked if necessary per policy. The patient has been actively warmed in preoperative area. Preoperative antibiotics are not indicated. Venous thrombosis prophylaxis are not indicated.    Procedure Details: The patient was correctly identified and the patient's operative eye was marked with a marking pen and verified with the patient in the pre-operative area.   The operative eye was dilated in the preoperative area.  The patient was then taken to the operating room where timeout was performed before starting the procedure. Combined anesthesia  with intravenous sedation and topical tetracaine eyedrops were instilled into the right eye. The operative eye  was prepped and draped in the standard sterile ophthalmic fashion in  preparation for ophthalmic surgery.  A Claudine wire speculum was then inserted between the eyelids of the right eye and the operating microscope was placed over the right eye.  A paracentesis incision was made approximately 30° away from the planned surgical incision site with the help of MVR blade.  1% lidocaine MPF with Phenylephrine 1.5%  PF was injected into the anterior chamber through the paracentesis incision. A near limbal clear corneal incision was fashioned in the temporal quadrant just outside the vascular arcade and Viscoat was injected into anterior chamber to firm the eye. A bent needle cystotome was used and Utrata forceps were utilized to create a continuous curvilinear capsulorrhexis.  BSS was injected beneath the anterior capsule to hydrodissect the nucleus from adjacent cortex and capsule.  The residual cortex was then aspirated with irrigation/aspiration handpiece. The posterior capsule was then polished with the help of soft irrigation-aspiration tip.  Provisc viscoelastic was then injected into the eye to reform the anterior chamber and to open the capsular bag.  The intraocular lens implant was taken from its sterile wrapping, inspected under the surgical microscope and found to be in good condition. The intraocular lens implant +22.0D was injected into the capsule bag. The Provisc was then aspirated from the anterior chamber and from behind the intraocular lens implant.  The anterior chamber was inflated with the help of BSS to moderate tension.  And the edges of the surgical incision were then hydrated with the help of BSS.  Vigamox was then injected into the anterior chamber and into the capsule bag through the paracentesis incision. The surgical wound was then inspected and found to be watertight.  The wire speculum and drapes were then removed.  Pred Forte eyedrops, Acular eyedrops and Betadine 5% sterile ophthalmic solution were instilled in the conjunctival sac.     The patient tolerated the procedure well and was taken to recovery room in stable condition.    Complications:  None; patient tolerated the procedure well.    Disposition:  Home  Condition: stable     Attending Attestation: I performed the procedure.    Kevan Katz  Phone Number: [phone]

## 2024-11-21 NOTE — DISCHARGE INSTRUCTIONS
Please see enclosed instructions from Dr. Katz regarding eye drop schedule, restrictions and use of eye shield.    Please take bag with eye drops that were given to you today as well as ALL eye drops that you are using at home with you to your appointment tomorrow at Dr. Katz's office.

## 2024-12-12 DIAGNOSIS — E11.9 CONTROLLED TYPE 2 DIABETES MELLITUS WITHOUT COMPLICATION, WITHOUT LONG-TERM CURRENT USE OF INSULIN (MULTI): ICD-10-CM

## 2024-12-12 RX ORDER — BLOOD-GLUCOSE CONTROL, NORMAL
1 EACH MISCELLANEOUS 2 TIMES DAILY
Qty: 200 EACH | Refills: 2 | Status: SHIPPED | OUTPATIENT
Start: 2024-12-12

## 2025-01-16 ENCOUNTER — HOSPITAL ENCOUNTER (OUTPATIENT)
Age: 80
Discharge: HOME | End: 2025-01-16
Payer: MEDICARE

## 2025-01-16 DIAGNOSIS — I10: ICD-10-CM

## 2025-01-16 DIAGNOSIS — E11.65: ICD-10-CM

## 2025-01-16 DIAGNOSIS — Z13.89: ICD-10-CM

## 2025-01-16 DIAGNOSIS — G30.9: Primary | ICD-10-CM

## 2025-01-16 DIAGNOSIS — E55.9: ICD-10-CM

## 2025-01-16 LAB
ALANINE AMINOTRANSFER ALT/SGPT: 18 U/L (ref 13–56)
ALBUMIN SERPL-MCNC: 3.8 G/DL (ref 3.2–5)
ALKALINE PHOSPHATASE: 80 U/L (ref 45–117)
ANION GAP: 9 (ref 5–15)
AST(SGOT): 9 U/L (ref 15–37)
BUN SERPL-MCNC: 13 MG/DL (ref 7–18)
BUN/CREAT RATIO: 15.4 RATIO (ref 10–20)
CALCIUM SERPL-MCNC: 9.1 MG/DL (ref 8.5–10.1)
CARBON DIOXIDE: 29 MMOL/L (ref 21–32)
CHLORIDE: 102 MMOL/L (ref 98–107)
DEPRECATED RDW RBC: 42.3 FL (ref 35.1–43.9)
ERYTHROCYTE [DISTWIDTH] IN BLOOD: 12.4 % (ref 11.6–14.6)
EST GLOM FILT RATE - AFR AMER: 84 ML/MIN (ref 60–?)
FOLATES, (FOLIC ACID): 58.6 NG/ML (ref 3.1–55.4)
GLOBULIN: 3.5 G/DL (ref 2.2–4.2)
GLUCOSE: 177 MG/DL (ref 74–106)
HCT VFR BLD AUTO: 40.9 % (ref 37–47)
HEMOGLOBIN: 13.9 G/DL (ref 12–15)
HGB BLD-MCNC: 13.9 G/DL (ref 12–15)
IMMATURE GRANULOCYTES COUNT: 0.03 X10^3/UL (ref 0–0)
MCV RBC: 93.4 FL (ref 81–99)
MEAN CORP HGB CONC: 34 G/DL (ref 32–36)
MEAN PLATELET VOL.: 10.3 FL (ref 6.2–12)
NRBC FLAGGED BY ANALYZER: 0 % (ref 0–5)
PLATELET # BLD: 361 K/MM3 (ref 150–450)
PLATELET COUNT: 361 K/MM3 (ref 150–450)
POTASSIUM: 3.2 MMOL/L (ref 3.5–5.1)
RBC # BLD AUTO: 4.38 M/MM3 (ref 4.2–5.4)
RBC DISTRIBUTION WIDTH CV: 12.4 % (ref 11.6–14.6)
RBC DISTRIBUTION WIDTH SD: 42.3 FL (ref 35.1–43.9)
VITAMIN B12: 766 PG/ML (ref 211–911)
VITAMIN D,25 HYDROXY: 60.2 NG/ML
WBC # BLD AUTO: 10.2 K/MM3 (ref 4.4–11)
WHITE BLOOD COUNT: 10.2 K/MM3 (ref 4.4–11)

## 2025-01-16 PROCEDURE — 82043 UR ALBUMIN QUANTITATIVE: CPT

## 2025-01-16 PROCEDURE — 84443 ASSAY THYROID STIM HORMONE: CPT

## 2025-01-16 PROCEDURE — 82306 VITAMIN D 25 HYDROXY: CPT

## 2025-01-16 PROCEDURE — 82746 ASSAY OF FOLIC ACID SERUM: CPT

## 2025-01-16 PROCEDURE — 80053 COMPREHEN METABOLIC PANEL: CPT

## 2025-01-16 PROCEDURE — 82607 VITAMIN B-12: CPT

## 2025-01-16 PROCEDURE — 85025 COMPLETE CBC W/AUTO DIFF WBC: CPT

## 2025-01-16 PROCEDURE — 86803 HEPATITIS C AB TEST: CPT

## 2025-01-16 PROCEDURE — 86780 TREPONEMA PALLIDUM: CPT

## 2025-01-16 PROCEDURE — 36415 COLL VENOUS BLD VENIPUNCTURE: CPT

## 2025-01-16 NOTE — XMS RPT_ITS
Comprehensive CCD (C-CDA v2.1)  
  
                          Created on: 2025  
  
  
Rosa M Mrs. Marzena NUNEZ  
External Reference #: CDR,PersonID:[id number]  
: 1945  
Sex: Female  
  
Demographics  
  
  
                                        Address             [address] [place]  [address]  
   
                                        Home Phone          [phone]  
   
                                        Mobile Phone        [phone]  
   
                                        Preferred Language  en  
   
                                        Marital Status        
   
                                        Jew Affiliation Unknown  
   
                                        Race                White  
   
                                        Ethnic Group        Not  or Lati  
no  
  
  
Author  
  
  
                                        Organization        [facility]  
  
  
Care Team Providers  
  
  
                                Care Team Member Name Role            Phone  
   
                                GARICA, KULWINDER   Unavailable     Unavailable  
   
                                GARCIA, KULWINDER   Unavailable     Unavailable  
   
                                GARCIA, KULWINDER   Unavailable     Unavailable  
   
                                GARCIA, KULWINDER   Unavailable     Unavailable  
   
                                NICK, DELMA   Unavailable     Unavailable  
   
                                GARCIA, KULWINDER   Unavailable     Unavailable  
   
                                [facility]            Unavailable     Unavailable  
   
                                GARCIA, KULWINDER   Unavailable     Unavailable  
   
                                [facility]            Unavailable     Unavailable  
   
                                [facility]            Unavailable     Unavailable  
   
                                GARCIA, KULWINDER   Unavailable     Unavailable  
   
                                [facility]            Unavailable     Unavailable  
   
                                [facility]            Unavailable     Unavailable  
   
                                GARCIA, KULWINDER   Unavailable     Unavailable  
   
                                [facility]            Unavailable     Unavailable  
   
                                [facility]            Unavailable     Unavailable  
   
                                GARCIA, KULWINDER   Unavailable     Unavailable  
   
                                [facility]            Unavailable     Unavailable  
   
                                [facility]            Unavailable     Unavailable  
   
                                GARCIA, KULWINDER   Unavailable     Unavailable  
   
                                [facility]            Unavailable     Unavailable  
   
                                [facility]            Unavailable     Unavailable  
   
                                GARCIA, KULWINDER   Unavailable     Unavailable  
   
                                [facility]            Unavailable     Unavailable  
   
                                [facility]            Unavailable     Unavailable  
   
                                Jeannette, Constance  Unavailable     Unavailable  
   
                                Pending Provider Unavailable     Unavailable  
   
                                Dell Busch Primary Care Provider [phone]  
   
                                Gene BEGUM MD, Daesung Unavailable     [phone]  
   
                                Dell Busch Primary Care Provider [phone]  
   
                                Gene BEGUM MD, Williamsung Unavailable     [phone]  
   
                                Richmond DO, Sharifa S Primary Care Provider [phone]  
   
                                Richmond DO, Sharifa S Primary Care Provider [phone]  
   
                                Dell Busch MD Primary Care Provider [phone]  
[phone]  
   
                                Royal DO, Sharifa S Unavailable     [phone]  
   
                                Herbie Mills MD Unavailable     [phone]  
   
                                Richmond DO, Sharifa S Primary Care Provider [phone]  
   
                                ROYAL, SHARIFA S Primary Care    Unavailable  
   
                                ROYAL, SHARIFA S Primary Care    Unavailable  
   
                                ROYAL, SHARIFA S Primary Care    Unavailable  
   
                                ROYAL, SHARIFA S Primary Care    Unavailable  
   
                                ROYAL, SHARIFA S Attending       Unavailable  
   
                                ROYAL, SHARIFA S Primary Care    Unavailable  
   
                                ROYAL, SHARIFA S Attending       Unavailable  
   
                                ROYAL, SHARIFA S Referring       Unavailable  
   
                                ROYAL, SHARIFA S Primary Care    Unavailable  
   
                                ROYAL, SHARIFA S Attending       Unavailable  
   
                                ROYAL, SHARIFA S Referring       Unavailable  
   
                                ROYAL, SHARIFA S Primary Care    Unavailable  
   
                                ROYAL, SHARIFA S Attending       Unavailable  
   
                                ROYAL, SHARIFA S Referring       Unavailable  
   
                                ROYAL, SHARIFA S Primary Care    Unavailable  
   
                                ROYAL, SHARIFA S Attending       Unavailable  
   
                                ROYAL, SHARIFA S Primary Care    Unavailable  
   
                                ROYAL, SHARIFA S Attending       Unavailable  
   
                                ROYAL, SHARIFA S Primary Care    Unavailable  
   
                                ROYAL, SHARIFA S Referring       Unavailable  
   
                                BERNICE VITAL  Attending       Unavailable  
   
                                ROYAL, SHARIFA S Referring       Unavailable  
   
                                ROYAL, SHARIFA S Primary Care    Unavailable  
   
                                ROYAL, SHARIFA S Attending       Unavailable  
   
                                ROYAL, SHARIFA S Referring       Unavailable  
   
                                ROYAL, SHARIFA S Primary Care    Unavailable  
   
                                BERNICE VITAL  Attending       Unavailable  
   
                                ROYAL, SHARIFA S Primary Care    Unavailable  
   
                                Royal DO, Sharifa S Unavailable     [phone]  
[phone]  
   
                                Richmond DO, Sharifa S Primary Care Provider [phone]  
   
                                ROYAL, SHARIFA S Primary Care    Unavailable  
   
                                KELSI MURRAY  Attending       Unavailable  
   
                                GRISEL LEÓN   Referring       Unavailable  
   
                                ROYAL, SHARIFA S Primary Care    Unavailable  
   
                                ROYAL, SHARIFA S Primary Care    Unavailable  
   
                                FREDY WATTERS Attending       Unavailable  
   
                                VINOD WELCH  Referring       Unavailable  
   
                                ROYAL, SHARIFA S Primary Care    Unavailable  
   
                                ROYAL, SHARIFA S Referring       Unavailable  
   
                                ROYAL, SHARIFA S Primary Care    Unavailable  
   
                                ROYAL, SHARIFA S Referring       Unavailable  
   
                                ROYAL, SHARIFA S Primary Care    Unavailable  
   
                                ROYAL, SHARIFA S Referring       Unavailable  
   
                                ROYAL, SHARIFA S Primary Care    Unavailable  
   
                                ROYAL, SHARIFA S Primary Care    Unavailable  
   
                                SETH CEBALLOS Attending       Unavailable  
   
                                BERNICE VITAL  Referring       Unavailable  
   
                                ROYAL, SHARIFA S Primary Care    Unavailable  
   
                                RONEL HAYS Admitting       Unavailable  
   
                                HAYSRONEL NAZARIO Attending       Unavailable  
   
                                ROYAL, SHARIFA S Primary Care    Unavailable  
   
                                HAYSRONEL NAZARIO Admitting       Unavailable  
   
                                HAYSRONEL Attending       Unavailable  
   
                                ROYAL, SHARIFA S Primary Care    Unavailable  
   
                                ROYAL, SHARIFA S Primary Care    Unavailable  
   
                                RONEL HAYS Attending       Unavailable  
   
                                RONEL HAYS Referring       Unavailable  
   
                                ROSA MURDOCK Referring       Unavailable  
   
                                ROYAL, SHARIFA S Primary Care    Unavailable  
   
                                COLTEN ABBOTT   Attending       Unavailable  
   
                                ROYAL, SHARIFA S Primary Care    Unavailable  
   
                                JOSE DELGADO   Attending       Unavailable  
   
                                ROYAL, SHARIFA S Primary Care    Unavailable  
   
                                RONEL HAYS Attending       Unavailable  
   
                                RONEL HAYS Referring       Unavailable  
   
                                ROYAL, SHARIFA S Primary Care    Unavailable  
   
                                COLTEN ABBOTT   Referring       Unavailable  
   
                                ROYAL, SHARIFA S Primary Care    Unavailable  
   
                                RONEL HAYS Referring       Unavailable  
   
                                COOPERRIDPERNELL IIAMAYA Attending       Unavailabl  
e  
   
                                ROYAL, SHARIFA S Primary Care    Unavailable  
   
                                RONEL HAYS Attending       Unavailable  
   
                                RONEL HAYS Referring       Unavailable  
   
                                ROYAL, SHARIFA S Primary Care    Unavailable  
   
                                RONEL HAYS Attending       Unavailable  
   
                                RONEL HAYS Referring       Unavailable  
   
                                ROYAL, SHARIFA S Primary Care    Unavailable  
   
                                RONEL HAYS Attending       Unavailable  
   
                                NUHAER IIAMAYA Referring       Unavailabl  
e  
   
                                ROYAL, SHARIFA S Primary Care    Unavailable  
   
                                COOPERRIDER II, AMAYA PENNY Attending       Unavailabl  
e  
   
                                ROYAL, SHARIFA S Primary Care    Unavailable  
   
                                RONEL HAYS Attending       Unavailable  
   
                                RONEL HAYS Referring       Unavailable  
  
  
  
Allergies  
  
  
                                                    Allergy   
Classification                          Reported   
Allergen(s)               Allergy Type              Date of   
Onset                     Reaction(s)               Facility  
   
                                                    Acetaminophen /   
oxyCODONE  
(1 source)                              Acetaminophen /   
oxyCODONE                 Drug Allergy              20  
12                                      Nausea And   
Vomiting                                [facility]  
   
                                                    bacitracin /   
neomycin /   
polymyxin b  
(1 source)                              bacitracin /   
neomycin /   
polymyxin b               Drug Allergy              20  
05                        [facility]  
Work Phone:   
[phone]  
   
                                                    Onion extract  
(1 source)          Onion extract       Drug Allergy        20  
24                                      Nausea/vomitin  
g                                       [facility]  
   
                                                    Sulfamethoxazole /   
Trimethoprim  
(1 source)                              Sulfamethoxazole /   
Trimethoprim              Drug Allergy              20  
15                                      Nausea And   
Vomiting                                [facility]  
Work Phone:   
[phone]  
   
                                                    zolpidem  
(2 sources)         zolpidem            Drug Allergy        20  
11                        [facility], [facility]  
Work Phone:   
[phone]  
[phone]  
   
                                                      
(20 sources)                            Acetaminophen /   
oxyCODONE;   
Translations:   
[OXYCODONE-ACETAMIN  
OPHEN]                    Drug Allergy              20  
05                                      Nausea And   
Vomiting                                [facility]   
Repository  
   
                                                      
(20 sources)                            Adhesive agent;   
Translations:   
[ADHESIVE]                              Propensity to   
adverse   
reactions   
(disorder)                              10-04-20  
10                        [facility]   
Repository  
   
                                                      
(20 sources)                            Codeine;   
Translations:   
[CODEINE]                 Drug Allergy              20  
06                                      Mental Status   
Change                                  [facility]   
Repository  
   
                                                      
(20 sources)                            zolpidem;   
Translations:   
[ZOLPIDEM TARTRATE]       Drug Allergy              20  
11                        Rash                      [facility]   
Repository  
   
                                                      
(1 source)                              ALCOHOL;   
Translations:   
[ALCOHOL]                               Propensity to   
adverse   
reactions   
(disorder)                                                  [facility]   
Repository  
   
                                                      
(20 sources)                            NEOMYCIN-BACITRACIN  
-POLYMYXIN;   
Translations:   
[NEOMYCIN-BACITRACI  
N-POLYMYXIN]                            Propensity to   
adverse   
reactions   
(disorder)                              20  
05                        Rash                      [facility]   
Repository  
   
                                                      
(20 sources)                            Sulfamethoxazole /   
Trimethoprim;   
Translations:   
[SULFAMETHOXAZOLE-T  
RIMETHOPRIM]              Drug Allergy              20  
15                                      Nausea And   
Vomiting                                [facility]  
Work Phone:   
[phone]  
   
                                                      
(20 sources)                            zolpidem;   
Translations:   
[ZOLPIDEM]                Drug Allergy              20  
15                        Hives                     [facility]  
Work Phone:   
[phone]  
   
                                                      
(20 sources)                            Onion extract;   
Translations:   
[ONION]                   Drug Allergy              20  
24                                      Nausea/vomitin  
g                                       [facility]  
  
  
  
Medications  
Current Medications  
  
  
  
                      Medication Drug Class(es) Dates      Sig (Normalized) Sig   
(Original)  
   
                                                    amLODIPine 10 mg   
oral tablet  
(20 sources)                            Dihydropyridine   
Calcium Channel   
Blocker                                 Start:   
2023  
End:   
2025                              take 1 tablet by   
mouth once daily                        amLODIPine   
(NORVASC) 10 mg   
tablet Take 10   
mg by mouth once   
daily.   
2023   
Active  
  
  
  
                                                    Start: 10-  
End: 2024                         take 1 tablet by mouth once   
daily                                   amLODIPine (Norvasc) 5 mg tablet   
Indications: Secondary hypertension   
Take 1 tablet (5 mg) by mouth once   
daily. 30 tablet 5 10/31/2023   
2023 Discontinued (Therapy   
completed)  
  
  
  
                                                    cephalexin 500 mg   
oral capsule  
(2 sources)                             Cephalosporin   
Antibacterial                           Start:   
10-  
End: 2023                         take 1   
capsule by   
mouth twice   
daily                                   cephALEXin (KEFLEX)   
500 mg capsule Take   
1 capsule by mouth   
two times a day for   
10 days. 20 capsule   
0 10/25/2023   
2023 Active  
   
                                        Comment on above:   Take 1 capsule by mo  
uth two times a day for 10 days.   
   
                                                    cholecalciferol 0.05   
mg oral tablet  
(20 sources)        Vitamin D                               take 1   
tablet by   
mouth once   
daily                                   cholecalciferol   
(VITAMIN D3) 50 mcg   
(2,000 unit) tablet   
Take 2,000 Units by   
mouth once daily.   
Active  
  
  
  
                                                            take 1 capsule by mo  
uth once   
daily                                   cholecalciferol (Vitamin D-3) 25 MCG   
(1000 UT) capsule Take 1 capsule (25   
mcg) by mouth once daily. Suspended  
   
                                                                cholecalciferol   
(Vitamin D3) 10 MCG   
(400 UNIT) tablet Take by mouth once   
daily. 0 Active  
   
                                                      
End: 2022                         take 1 tablet by mouth once   
daily                                   cholecalciferol (VITAMIN D-3) 5,000   
unit tab Take 5,000 Units by mouth   
once daily. 0 2022 Discontinued  
  
  
  
                                        Comment on above:   Take 2,000 Units by   
mouth once daily.   
   
                                                            Take 5,000 Units by   
mouth once daily.   
   
                                                    ciprofloxacin 500 mg   
oral tablet  
(3 sources)                             Quinolone   
Antimicrobial                           Start:   
20  
End:   
20                                      take 1 tablet   
by mouth twice   
daily                                   ciprofloxacin (Cipro)   
500 mg tablet   
Indications: UTI   
(urinary tract   
infection), bacterial   
Take 1 tablet (500   
mg) by mouth 2 times   
a day for 7 days. 14   
tablet 2024   
Discontinued (Therapy   
completed)  
   
                                                    donepezil   
hydrochloride 10 mg   
oral tablet  
(20 sources)                                        Start:   
20  
End:   
20                                      take 1 tablet   
by mouth once   
daily                                   donepezil (ARICEPT)   
10 mg tablet Take 10   
mg by mouth once   
daily. 2024   
Active  
  
  
  
                                                    Start: 2024  
End: 11-                         take 1 tablet by mouth once   
daily at bedtime                        donepezil (Aricept) 5 mg tablet   
Indications: Memory loss Take 1   
tablet (5 mg) by mouth once daily at   
bedtime. 30 tablet 5 2024   
11/10/2024 Active  
  
  
  
                                                    glipiZIDE er 2.5   
mg 24 hr extended   
release oral   
tablet  
(20 sources)              Sulfonylurea              Start: 2024  
End: 2025                         take 1 tablet by   
mouth every   
twenty-four hours                       glipiZIDE   
(GLUCOTROL XL) 2.5   
mg 24 hr tablet   
Take 2.5 mg by   
mouth. 2024 Active  
  
  
  
                                                    Start: 2024  
End: 2025                         take 1 tablet by mouth once   
daily                                   glipiZIDE (GLUCOTROL XL) 2.5 mg 24   
hr tablet Take 2.5 mg by mouth once   
daily. 2024 Active  
   
                                                    Start: 07-  
End: 2022                         take 1 tablet by mouth once   
daily                                   glipiZIDE XL (GLUCOTROL XL) 2.5 mg   
24 hr tablet Take 2.5 mg by mouth   
once daily. 0 07/15/2015 2022   
Discontinued  
  
  
  
                                        Comment on above:   Take 2.5 mg by mouth  
 once daily.   
   
                                                    hydroCHLOROthiazide 12.5 mg   
/ losartan potassium 100 mg   
oral tablet  
(20 sources)                            Thiazide Diuretic,   
Angiotensin 2   
Receptor Blocker                        Start:   
  
023  
End:   
  
024                                     take 1 tablet   
by mouth once   
daily                                   losartan-hydrochlor  
othiazide (Hyzaar)   
100-12.5 mg tablet   
Indications:   
Secondary   
hypertension Take 1   
tablet by mouth   
once daily. 90   
tablet 1 2024   
Active  
  
  
  
                                                    Start: 2022  
End: 2022                                     losartan-hydroCHLOROthiazide  
 (HYZAAR)   
100-12.5 mg per tablet  
   
                                                      
End: 2022                         take 1 tablet by mouth   
once daily                              losartan-hydrochlorothiazide (HYZAAR) 10  
0-25   
mg per tablet Take 1 tablet by mouth once   
daily. 0 2022 Discontinued  
  
  
  
                                        Comment on above:   Take 1 tablet by [facility] once daily.   
   
                                                    0.5 ml HYDROmorphone   
hydrochloride 1 mg/ml   
prefilled syringe  
(1 source)                Opioid Agonist            Start:   
  
4                                                   0.5 mg,   
intravenous, Every   
5 min PRN, pain   
severe (7-10),   
first line,   
Starting on Thu   
24 at 1159,   
Recovery (only)  
   
                                                    ketorolac tromethamine   
5 mg/ml ophthalmic   
solution  
(16 sources)                            Nonsteroidal   
Anti-inflammatory Drug,   
Cyclooxygenase   
Inhibitor                               Start:   
  
4                                       take 1   
drop(s) into   
the eye(s)   
four times   
daily                                   keTORolac (ACULAR)   
0.5 % ophthalmic   
solution Use 1   
Drop in the right   
eye four times   
daily. 5 mL 1   
2024 Active  
  
  
  
                                                    Start: 2024  
End: 2024                                     1 drop, Right Eye, Every 5 m  
in, First   
dose on Thu 24 at 1030, For 4   
doses, Preprocedure  
   
                                        Start: 2024   take 1 drop(s) into   
the eye(s)   
three times daily                       keTORolac (ACULAR) 0.5 % ophthalmic   
solution Use 1 Drop in the left eye   
three times a day. 5 mL 2 2024   
Active  
   
                                                    Start: 2024  
End: 2024                         take 1 drop(s) into the eye(s)   
four times daily                        keTORolac (ACULAR) 0.5 % ophthalmic   
solution Use 1 Drop in the left eye   
four times daily. 5 mL 2 2024 Discontinued  
   
                                                    Start: 10-  
End: 10-                                     1 drop, Left Eye, Every 5 mi  
n, First   
dose on Thu 10/31/24 at 0830, For 4   
doses, Preprocedure  
  
  
  
                                                    24 hr metFORMIN   
hydrochloride 500 mg   
extended release oral tablet  
(20 sources)              Biguanide                 Start: 2024  
End: 2024                                     metFORMIN  mg 24 hr   
tablet Indications:   
Controlled type 2 diabetes   
mellitus without   
complication, without   
long-term current use of   
insulin (Multi) 2 tablets   
(1,000 mg) once daily in   
the evening. Take with   
meals. 180 tablet 1   
2024 Active  
  
  
  
                                Start: 2023                 metFORMIN XR 5  
00 mg 24 hr tablet 2   
tablets (1,000 mg) once daily in   
the evening. Take with meals. 0   
2023 Active  
   
                                        Start: 2015   take 2 tablets by mo  
uth once   
daily                                   metFORMIN ER (GLUCOPHAGE XR) 500 mg   
24 hr tablet Take two tablets by   
mouth once daily. 2015 Active  
  
  
  
                                        Comment on above:   Take two tablets by   
mouth once daily.   
   
                                                    omeprazole 40 mg   
delayed release   
oral capsule  
(20 sources)                            Proton Pump   
Inhibitor                               Start:   
20  
End:   
10-24-20  
24                                      take 1 capsule   
by mouth once   
daily before   
mealtime                                omeprazole (PriLOSEC) 40   
mg DR capsule   
Indications:   
Gastroesophageal reflux   
disease without   
esophagitis Take 1   
capsule (40 mg) by mouth   
once daily in the   
morning. Take before   
meals. Do not crush or   
chew. 90 capsule 1   
2024 10/24/2024   
Active  
  
  
  
                                                    Start: 2024  
End: 2025                         take 1 capsule by mouth once   
daily before mealtime                   omeprazole (PriLOSEC) 20 mg DR   
capsule Indications: Gastroesophageal   
reflux disease without esophagitis   
Take 1 capsule (20 mg) by mouth once   
daily in the morning. Take before   
meals. 90 capsule 1 2024 Discontinued (Ineffective)  
   
                                                            take 2 capsules by m  
outh once   
daily                                   omeprazole (PRILOSEC) 20 mg capsule   
Take two capsules by mouth once   
daily. Active  
   
                                                      
End: 2022                         take 1 capsule by mouth once   
daily                                   Omeprazole 40 mg capsule Take 40 mg   
by mouth once daily. 0 2022   
Discontinued  
  
  
  
                                        Comment on above:   Take 20 mg by mouth   
once daily.   
   
                                                            Take 40 mg by mouth   
once daily.   
   
                                                    2 ml ondansetron 2 mg/ml   
injection  
(4 sources)                             Serotonin-3 Receptor   
Antagonist                              Start:   
2024                                          4 mg, intravenous, Every   
15 min PRN,   
nausea/vomiting, first   
line, Starting on 24 at 1159, For 2   
doses, Recovery (only),   
When administering via IV   
Push, administer over 3-5   
minutes.  
  
  
  
                                                    Start: 10-  
End: 10-                                     4 mg, intravenous, Once as n  
eeded, nausea/vomiting, second   
line,   
Starting on Thu 10/31/24 at 1000, For 1 dose, Recovery (only),   
When administering via IV Push, administer over 3-5 minutes.  
   
                                                    Start: 2024  
End: 2024                                     4 mg, intravenous, Once, On   
24 at 1255, For 1 dose,   
When   
administering via IV Push, administer over 3-5 minutes.  
  
  
  
                                                    oxygen (O2) therapy  
(2 sources)                     Start: 2024                 inhalation, Co  
ntinuous - Inhalation, First   
dose on 24 at 1215, Recovery   
(only), Device: Nasal Cannula, Rate in   
liters per minute: 4 LPM  
  
  
  
                                Start: 10-                 inhalation, Co  
ntinuous PRN - O2/gases, other, Starting on Thu  
   
10/31/24 at 1000, Recovery (only), Device: Nasal Cannula, Rate in   
liters per minute: 2 LPM, Keep O2 Sat Above: 92%  
  
  
  
                                                    phenylephrine   
hydrochloride 25 mg/ml   
ophthalmic solution  
(3 sources)                             alpha-1 Adrenergic   
Agonist                                 Start: 2024  
End: 2024                                     PHENYLephrine 2.5 % 1   
Drop (AK-DILATE,   
CALVIN-SYNEPHRINE)  
  
  
  
                                                    Start: 2023  
End: 2023                                     PHENYLephrine 2.5 % 1 Drop (  
AK-DILATE, CALVIN-SYNEPHRINE)  
  
  
  
                                                    prednisoLONE acetate 10   
mg/ml ophthalmic   
suspension  
(14 sources)    Corticosteroid  Start: 2024                 prednisoLONE a  
cetate   
(PRED FORTE) 1 %   
ophthalmic suspension Use   
1 Drop in the right eye   
four times daily. 5 mL 1   
2024 Active  
  
  
  
                                Start: 2024                 prednisoLONE a  
cetate (PRED FORTE) 1 % ophthalmic suspension   
Use 1   
Drop in the left eye three times a day. 10 mL 2 2024 Active  
   
                                                    Start: 2024  
End: 2024                                     prednisoLONE acetate (PRED F  
ORTE) 1 % ophthalmic suspension Use  
 1   
Drop in the left eye four times daily. 10 mL 2 2024 Discontinued  
  
  
  
                                                    promethazine   
(Phenergan) 6.25 mg in   
sodium chloride 0.9% 50   
mL IV  
(1 source)                      Start: 10-                 6.25 mg, intra  
venous,   
Administer over 15   
Minutes, Once as needed,   
Nausea/vomiting first   
line, Starting on Thu   
10/31/24 at 1000, For 1   
dose, Recovery (only)  
   
                                                    proparacaine   
hydrochloride 5 mg/ml   
ophthalmic solution  
(3 sources)               Local Anesthetic          Start: 2024  
End: 2024                                     proparacaine 0.5 % 1 Drop   
(ALCAINE)  
  
  
  
                                                    Start: 2024  
End: 2024                                     proparacaine 0.5 % 1 Drop (A  
LCAINE)  
  
  
  
                                                    simvastatin 20 mg   
oral tablet  
(20 sources)                            HMG-CoA Reductase   
Inhibitor                               Start: 01-  
End: 2024                                     simvastatin(ZOCOR 20 MG   
TAB) Take one(1) tablet   
daily. 30 0 01/10/2008   
Active  
   
                                        Comment on above:   Take one(1) tablet d  
aily.   
   
                                                    tropicamide 10   
mg/ml ophthalmic   
solution  
(5 sources)               Anticholinergic           Start: 2024  
End: 2024                                     tropicamide 1 % 1 Drop   
(MYDRIACYL)  
  
  
  
                                                    Start: 2024  
End: 2024                                     tropicamide 0.5 % 1 Drop (MY  
DRIACYL)  
   
                                                    Start: 2023  
End: 2023                                     tropicamide 1 % 1 Drop (MYDR  
IACYL)  
  
  
  
Completed/Discontinued Medications  
  
  
  
                      Medication Drug Class(es) Dates      Sig (Normalized) Sig   
(Original)  
   
                                                    acetaminophen 325 mg   
/ HYDROcodone   
bitartrate 5 mg oral   
tablet  
(1 source)                Opioid Agonist            Start:   
2017  
End:   
2022                                          HYDROcodone-acetami  
nophen (NORCO)   
5-325 mg per tablet   
0 2017   
Discontinued  
   
                                                    [id number] 200 actuat   
albuterol 0.09   
mg/actuat metered   
dose inhaler  
(10 sources)                            beta2-Adrenergic   
Agonist                                 Start:   
2024  
End:   
2025                              take 2 puff(s) by   
inhalation every   
six hours for   
wheezing                                albuterol 90   
mcg/actuation   
inhaler   
Indications: COVID   
Inhale 2 puffs   
every 6 hours if   
needed for   
wheezing. 18 g   
2024   
Suspended  
   
                                                    biotin 1 mg oral   
capsule  
(1 source)                                            
End:   
2022                                          biotin 1 mg cap   
Take by mouth. 0   
2022   
Discontinued  
   
                                        Comment on above:   Take by mouth.   
   
                                                    chlorthalidone 25 mg   
oral tablet  
(1 source)                              Thiazide-like   
Diuretic                                  
End:   
2022                              take 0.5 tablet   
by mouth once   
daily                                   chlorthalidone   
(HYGROTON) 25 mg   
tablet Take 1/2   
tablet by mouth   
once daily. 0   
2022   
Discontinued  
   
                                        Comment on above:   Take 1/2 tablet by m  
outh once daily.  
   
   
                                                    citalopram 20 mg   
oral tablet  
(1 source)                              Serotonin Reuptake   
Inhibitor                                 
End:   
2022                              take 1 tablet by   
mouth once daily                        citalopram (CELEXA)   
20 mg tablet Take   
20 mg by mouth once   
daily. 0 2022   
Discontinued  
   
                                        Comment on above:   Take 20 mg by mouth   
once daily.   
   
                                                    diphenhydrAMINE   
hydrochloride 25 mg   
oral tablet  
(1 source)                              Histamine-1   
Receptor Antagonist                       
End:   
2022                              take 1 tablet by   
mouth every   
twenty-four hours   
as needed                               diphenhydrAMINE   
(BENADRYL ALLERGY)   
25 mg tablet Take   
25 mg by mouth at   
bedtime as needed.   
0 2022   
Discontinued  
   
                                        Comment on above:   Take 25 mg by mouth   
at bedtime as needed.  
   
   
                                                    fluorometholone 1   
mg/ml ophthalmic   
suspension  
(15 sources)              Corticosteroid            Start:   
2024  
End:   
2024                                          fluorometholone   
(FML LIQUID FILM)   
0.1 % ophthalmic   
suspension Use 1   
Drop in the right   
eye three times a   
day. 5 mL 1   
2024   
Discontinued   
(Course of therapy   
completed)  
   
                                                    FLUoxetine 10 mg   
oral tablet  
(1 source)                              Serotonin Reuptake   
Inhibitor                                 
End:   
2022                              take 1 tablet by   
mouth once daily                        FLUoxetine 10 mg   
tablet Take 10 mg   
by mouth once   
daily. 0 2022   
Discontinued  
   
                                        Comment on above:   Take 10 mg by mouth   
once daily.   
   
                                                    iohexol (OMNIPaque)   
12 mg iodine/mL oral   
contrast 500 mL  
(1 source)                                          Start:   
2024  
End:   
2024                                          500 mL, oral, Once   
in imaging,   
Starting on 24 at 1045,   
For 1 dose,   
Administer over   
20-60 minutes as   
directed by imaging   
protocol and/or   
imaging provider.   
CONTRAST - for   
procedural imaging   
use only.  
   
                                                    iohexol (OMNIPaque)   
350 mg iodine/mL   
solution 70 mL  
(1 source)                                          Start:   
2024  
End:   
2024                                          70 mL, intravenous,   
Once in imaging,   
Starting on 24 at 1045,   
For 1 dose  
   
                                                    lisinopril 20 mg   
oral tablet  
(1 source)                              Angiotensin   
Converting Enzyme   
Inhibitor                               Start:   
10-  
End:   
2022                              take 1 tablet by   
mouth once daily                        lisinopril   
(ZESTRIL, PRINIVIL)   
20 mg tablet Take   
20 mg by mouth once   
daily. 0 10/18/2018   
2022   
Discontinued  
   
                                        Comment on above:   Take 20 mg by mouth   
once daily.  
   
   
                                                    losartan potassium   
100 mg oral tablet  
(2 sources)                             Angiotensin 2   
Receptor Blocker                        Start:   
2019  
End:   
2022                              take 1 tablet by   
mouth once daily                        losartan (COZAAR)   
100 mg tablet Take   
100 mg by mouth   
once daily. 0   
2019   
Discontinued  
  
  
  
                                                    Start: 10-  
End: 2022                         take 1 tablet by mouth once   
daily                                   losartan (COZAAR) 50 mg tablet Take   
50 mg by mouth once daily. 0   
10/24/2018 2022 Discontinued  
  
  
  
                                        Comment on above:   Take 50 mg by mouth   
once daily.  
   
   
                                                            Take 100 mg by mouth  
 once daily.  
   
   
                                                    meloxicam 15 mg oral tablet  
(1 source)                              Nonsteroidal   
Anti-inflammatory Drug                  Start:   
10-22-20  
18  
End:   
20                                                  meloxicam (MOBIC) 15 mg   
tablet  
   
                                                    methylPREDNISolone  
(4 sources)               Corticosteroid            Start:   
20  
End:   
20                                                  methylPREDNISolone (Medrol   
Dospak) 4 mg tablets   
Indications: COVID Take as   
directed on package. 21   
tablet 2024 Discontinued   
(Med List Cleanup)  
  
  
  
                                Start: 2024                 methylPREDNISo  
lone (Medrol Dospak) 4 mg tablets Indications:   
COVID Take as directed on package. 21 tablet 2024 Active  
  
  
  
                                                    24 hr metoprolol   
succinate 100 mg   
extended release   
oral tablet  
(2 sources)                             beta-Adrenergic   
Blocker                                 Start: 2019  
End: 2022                         take 100 mg   
by mouth   
once daily                              metoprolol   
succinate ER   
(TOPROL XL) 100 mg   
Tb24 Take 100 mg   
by mouth once   
daily. 0   
2019   
Discontinued  
  
  
  
                                                    Start: 2005  
End: 2022                                     TOPROL XL 50 MG 24 HR TAB Ta  
ke one(1) tablet daily. 0   
2005 Discontinued  
  
  
  
                                        Comment on above:   Take one(1) tablet d  
aily.   
   
                                                            Take 100 mg by mouth  
 once daily.  
   
   
                                                    5 ml midazolam 1   
mg/ml injection  
(1 source)                Benzodiazepine            Start:   
10-31-20  
24  
End:   
10-31-20  
24                                                  1 mg, intravenous,   
Once, On Thu   
10/31/24 at 0830,   
For 1 dose,   
Preprocedure  
   
                                                    multivitamin tablet  
(20 sources)                                                take 1 tablet   
by mouth once   
daily                                   multivitamin tablet   
Take 1 tablet by   
mouth once daily.   
Suspended  
  
  
  
                                                take 1 tablet by mouth once bettie  
y multivitamin tablet Take 1 tablet by mouth   
once   
daily. Active  
  
  
  
                                                    multivitamin with   
minerals (ONE-A-DAY 50   
PLUS ORAL)  
(1 source)                                            
End: 2022                                     multivitamin with   
minerals (ONE-A-DAY 50   
PLUS ORAL) Take by   
mouth. 0 2022   
Discontinued  
   
                                        Comment on above:   Take by mouth.   
   
                                                    Phenylephrine /   
Tropicamide  
(2 sources)                             Anticholinergic,   
alpha-1 Adrenergic   
Agonist                                 Start: 2024  
End: 2024                                     1 drop, Right Eye,   
Every 5 min, First   
dose on Thu 24   
at 1030, For 4 doses,   
Preprocedure  
  
  
  
                                                    Start: 10-  
End: 10-                                     1 drop, Left Eye, Every 5 mi  
n, First dose on Thu 10/31/24 at   
0830, For 4 doses, Preprocedure  
  
  
  
                                                    polyvinyl alcohol 0.014 ml/m  
l /   
povidone 6 mg/ml ophthalmic   
solution  
(2 sources)                                         Start: 2024  
End: 2024                                     1 drop, Right Eye, Every 5 m  
in,   
First dose on Thu 24 at   
1030, For 4 doses, Preprocedure  
  
  
  
                                                    Start: 10-  
End: 10-                                     1 drop, Left Eye, Every 5 mi  
n, First dose on Thu 10/31/24 at   
0830, For 4 doses, Preprocedure  
  
  
  
                                                    potassium chloride 10   
meq extended release   
oral tablet  
(20 sources)                            Start: 2024   take 1 tablet by   
mouth twice daily                       potassium chloride CR 10   
mEq ER tablet   
Indications: Secondary   
hypertension Take 1   
tablet (10 mEq) by mouth   
2 times a day. 180   
tablet 1 2024   
Suspended  
  
  
  
                                                    Start: 2024  
End: 2024                         take 1 tablet by mouth twice   
daily                                   potassium chloride CR 10 mEq ER   
tablet Indications: Secondary   
hypertension Take 1 tablet (10 mEq)   
by mouth 2 times a day. 180 tablet 1   
2024 Discontinued   
(Reorder)  
   
                                                    Start: 2024  
End: 2024                         take 1 tablet by mouth once   
daily                                   potassium chloride CR 10 mEq ER   
tablet Indications: Secondary   
hypertension Take 1 tablet (10 mEq)   
by mouth once daily. 90 tablet 1   
2024 Discontinued  
   
                                                                potassium chlori  
de (KLOR-CON 10) 10   
mEq tablet Take 10 mEq by mouth once   
daily. Active  
   
                                                            take 1 tablet by kaylan  
 once   
daily                                   potassium chloride CR 10 mEq ER   
tablet Take 1 tablet (10 mEq) by   
mouth once daily. 0 Active  
  
  
  
                                        Comment on above:   Take 10 mEq by mouth  
 once daily.  
   
   
                                                    povidone-iodine 50   
mg/ml ophthalmic   
solution  
(1 source)                Antiseptic                Start:   
2024  
End:   
2024                              take 1 dose   
into the   
eye(s) once                             Right Eye, Once, On   
Thu 24 at 1030,   
For 1 dose,   
Preprocedure  
  
  
  
                                                    Start: 2024  
End: 2024                         take 1 dose into the eye(s)   
once                                    Right Eye, Once, On Thu 24 at   
1030, For 1 dose, Preprocedure  
  
  
  
                                                    sertraline 50 mg   
oral tablet  
(1 source)                              Serotonin   
Reuptake   
Inhibitor                                 
End: 2022                         take 1 tablet   
by mouth once   
daily                                   sertraline (ZOLOFT)   
50 mg tablet Take   
50 mg by mouth once   
daily. 0 2022   
Discontinued  
   
                                        Comment on above:   Take 50 mg by mouth   
once daily.   
   
                                                    1000 ml sodium   
chloride 9 mg/ml   
injection  
(2 sources)                                         Start: 2024  
End: 2024                                     1,000 mL,   
intravenous, at   
2,000 mL/hr,   
Administer over 30   
Minutes, Once, On   
24 at   
1255, For 1 dose  
  
  
  
                                        Start: 2024   take 125 mL intraven  
ously every   
hour                                    125 mL/hr, intravenous,   
Continuous, Starting on Fri   
5/3/24 at 1125  
  
  
  
                                                    Tc-99m tetrofosmin   
(Myoview) injection   
11 millicurie  
(1 source)                                          Start: 2024  
End: 2024                                     11 millicurie,   
intravenous, Once   
in imaging,   
Starting on 24 at 0804,   
For 1 dose,   
Administer 45 to 90   
minutes prior to   
imaging unless   
otherwise   
indicated.  
   
                                                    Tc-99m tetrofosmin   
(Myoview) injection   
32.9 millicurie  
(1 source)                                          Start: 2024  
End: 2024                                     32.9 millicurie,   
intravenous, Once   
in imaging,   
Starting on 24 at 1010,   
For 1 dose,   
Administer 45 to 90   
minutes prior to   
imaging unless   
otherwise   
indicated.  
   
                                                    tetracaine   
hydrochloride 5 mg/ml   
ophthalmic solution  
(2 sources)                             Gi Local   
Anesthetic                              Start: 2024  
End: 2024                         take 1   
drop(s)   
into the   
eye(s) once                             1 drop, Right Eye,   
Once, On Thu   
24 at 1030,   
For 1 dose,   
Preprocedure  
  
  
  
                                                    Start: 10-  
End: 10-                         take 1 drop(s) into the eye(s)   
once                                    1 drop, Left Eye, Once, On Thu   
10/31/24 at 0830, For 1 dose,   
Preprocedure  
  
  
  
                                                    vitamin e 100   
unt oral capsule  
(1 source)                                            
End: 2022                         take 1 capsule   
by mouth once   
daily                                   Vitamin E, dl,   
acetate, (VITAMIN E)   
100 unit capsule Take   
100 Units by mouth   
once daily. 0   
2022   
Discontinued  
   
                                                    Comment on   
above:                                  Take 100 Units by mouth once daily.   
  
  
  
Problems  
Active Problems  
  
  
                                                    Problem   
Classification  Problem         Date            Documented Date Episodic/Chronic  
   
                                                    Cancer of breast  
(20 sources)                            Malignant neoplasm of   
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   
Translations:   
[Malignant neoplasm of   
unspecified site of   
unspecified female   
breast]                                 Onset:   
2010  
Resolved:   
10-                12-                Chronic  
   
                                                    Cancer of cervix  
(2 sources)                             Malignant neoplasm of   
exocervix;   
Translations:   
[Malignant neoplasm of   
exocervix]                              Onset:   
2024                Chronic  
   
                                                    Cancer of other   
female genital   
organs  
(20 sources)                            Carcinoma in situ of   
vulva; Translations:   
[Carcinoma in situ of   
vulva]                                  Onset:   
10-  
Resolved:   
10-                12-                Chronic  
   
                                                    Cataract  
(20 sources)                            Nuclear senile   
cataract;   
Translations:   
[Age-related nuclear   
cataract, unspecified   
eye]                                    Onset:   
2015                Chronic  
   
                                                    Delirium, dementia,   
and amnestic and   
other cognitive   
disorders  
(20 sources)                            Dementia;   
Translations: [Mild   
dementia without   
behavioral   
disturbance, psychotic   
disturbance, mood   
disturbance, or   
anxiety, unspecified   
dementia type (Multi)]                  Onset:   
2024                Chronic  
   
                                                    Diabetes mellitus   
without complication  
(20 sources)                            Type 2 diabetes   
mellitus without   
complication;   
Translations: [Type 2   
diabetes mellitus   
without complications]                  Onset:   
2017                Chronic  
   
                                                    Disorders of lipid   
metabolism  
(20 sources)                            Mixed hyperlipidemia;   
Translations: [Mixed   
hyperlipidemia]                         Onset:   
11-                10-                Chronic  
   
                                                    Esophageal disorders  
(20 sources)                            Gastroesophageal   
reflux disease without   
esophagitis;   
Translations:   
[Gastro-esophageal   
reflux disease without   
esophagitis]                            Onset:   
2024                Chronic  
   
                                                    Essential   
hypertension  
(20 sources)                            Hypertensive disorder;   
Translations:   
[Essential (primary)   
hypertension]                           Onset:   
01-                10-                Chronic  
   
                                                    Headache; including   
migraine  
(20 sources)                            Ophthalmic migraine;   
Translations:   
[Migraine with aura,   
not intractable,   
without status   
migrainosus]                            Onset:   
2017                Chronic  
   
                                                    Heart valve   
disorders  
(11 sources)                            Mitral valve prolapse;   
Translations:   
[Nonrheumatic mitral   
(valve) prolapse]                       Onset:   
2024                Chronic  
   
                                                    Hypertension with   
complications and   
secondary   
hypertension  
(9 sources)                             Secondary   
hypertension;   
Translations:   
[Secondary   
hypertension,   
unspecified]                            Onset:   
10-                10-                Chronic  
   
                                                    Menopausal disorders  
(20 sources)                            Atrophic vaginitis;   
Translations:   
[Postmenopausal   
atrophic vaginitis]                     Onset:   
2006                Chronic  
   
                                                    Other connective   
tissue disease  
(1 source)                              Foot pain;   
Translations: [Right   
foot pain]                                                  Episodic  
   
                                                    Other eye disorders  
(6 sources)                             H/O: L cataract   
extraction;   
Translations:   
[Cataract extraction   
status, left eye]                       2024          Episodic  
   
                                                    Other eye disorders  
(3 sources)                             H/O: R cataract   
extraction;   
Translations:   
[Cataract extraction   
status, right eye]                      2024          Episodic  
   
                                                    Other liver diseases  
(2 sources)                             Other specified   
diseases of liver;   
Translations: [Other   
specified diseases of   
liver]                                  Onset:   
2024                                          Chronic  
   
                                                    Other liver diseases  
(4 sources)                             Liver disease,   
unspecified;   
Translations: [Liver   
disease, unspecified]                   Onset:   
2024                                          Chronic  
   
                                                    Other liver diseases  
(10 sources)                            Liver cyst;   
Translations: [Other   
specified diseases of   
liver]                                  Onset:   
2024                Chronic  
   
                                                    Other nutritional;   
endocrine; and   
metabolic disorders  
(2 sources)                             Obesity; Translations:   
[Class 2 obesity in   
adult]                                  Onset:   
2024                Chronic  
   
                                                    Other screening for   
suspected conditions   
(not mental   
disorders or   
infectious disease)  
(20 sources)                            Patient encounter   
status; Translations:   
[Encounter for   
screening for   
malignant neoplasm of   
vagina]                                 Onset:   
2024  
Resolved:   
2024                                          Episodic  
   
                                                    Other upper   
respiratory   
infections  
(3 sources)                             Acute upper   
respiratory infection,   
unspecified;   
Translations: [Acute   
upper respiratory   
infection]                              Onset:   
2024                                          Episodic  
   
                                                    Residual codes;   
unclassified  
(1 source)                              Confusional state;   
Translations:   
[Disorientation,   
unspecified]                            2024          Episodic  
   
                                                    Transient cerebral   
ischemia  
(20 sources)                            Cerebral ischemia;   
Translations:   
[Transient cerebral   
ischemic attack,   
unspecified]                            Onset:   
01-  
Resolved:   
10-                01-                Chronic  
   
                                                    Unclassified  
(1 source)                Unknown / UNK(Unknown)    Onset:   
2017                                            
   
                                                    Unclassified  
(2 sources)               1 month f/u               Onset:   
2024                                            
   
                                                    Unclassified  
(1 source)                              Unspecified dementia,   
mild, without   
behavioral   
disturbance, psychotic   
disturbance, mood   
disturbance, and   
anxiety; Translations:   
[Unspecified dementia,   
mild, without   
behavioral   
disturbance, psychotic   
disturbance, mood   
disturbance, and   
anxiety]                                Onset:   
2024                                            
   
                                                    Unclassified  
(1 source)                              Unspecified dementia,   
mild, without   
behavioral   
disturbance, psychotic   
disturbance, mood   
disturbance, and   
anxiety (Multi);   
Translations:   
[Unspecified dementia,   
mild, without   
behavioral   
disturbance, psychotic   
disturbance, mood   
disturbance, and   
anxiety (Multi)]                        Onset:   
2024                                            
   
                                                    Viral infection  
(2 sources)                             COVID-19;   
Translations:   
[COVID-19]                              Onset:   
2024                                            
  
  
Past or Other Problems  
  
  
                                                    Problem   
Classification  Problem         Date            Documented Date Episodic/Chronic  
   
                                                    Bacterial infection;   
unspecified site  
(2 sources)                             Bacterial infection,   
unspecified;   
Translations:   
[Bacterial   
infection,   
unspecified]                            Onset:   
2024                                          Episodic  
   
                                                    Blindness and vision   
defects  
(20 sources)                            Hypermetropia;   
Translations:   
[Hypermetropia,   
unspecified eye]                        Onset:   
2015  
Resolved:   
10-                04-                Episodic  
   
                                                    Cancer of breast  
(20 sources)                            History of malignant   
neoplasm of breast;   
Translations:   
[Personal history of   
malignant neoplasm   
of breast]                              Onset:   
2012  
Resolved:   
10-                04-                Episodic  
   
                                                    Conditions associated   
with dizziness or   
vertigo  
(3 sources)                             Lightheadedness;   
Translations:   
[Dizziness and   
giddiness]                              Onset:   
2024                Episodic  
   
                                                    Fluid and electrolyte   
disorders  
(17 sources)                            Hypokalemia;   
Translations:   
[Hypokalemia]                           Onset:   
2024                                          Episodic  
   
                                                    Inflammation;   
infection of eye   
(except that caused   
by tuberculosis or   
sexually   
transmitteddisease)  
(20 sources)                            Hordeolum externum   
of upper eyelid of   
right eye;   
Translations:   
[Hordeolum externum   
right upper eyelid]                     Onset:   
2015  
Resolved:   
10-                10-                Episodic  
   
                                                    Nausea and vomiting  
(3 sources)                             Nausea and vomiting;   
Translations:   
[Nausea with   
vomiting,   
unspecified]                            Onset:   
2024                Episodic  
   
                                                    Other liver diseases  
(15 sources)                            Lesion of liver;   
Translations: [Liver   
disease,   
unspecified]                            Onset:   
2024  
Resolved:   
2024                Chronic  
   
                                                    Other lower   
respiratory disease  
(17 sources)                            Hiccoughs;   
Translations:   
[Hiccough]                              Onset:   
2024  
Resolved:   
2024                Episodic  
   
                                                    Other lower   
respiratory disease  
(2 sources)                             Hiccough;   
Translations:   
[Hiccough]                              Onset:   
2024                                          Episodic  
   
                                                    Other nutritional;   
endocrine; and   
metabolic disorders  
(6 sources)                             Abnormal weight   
loss; Translations:   
[Abnormal weight   
loss]                                   Onset:   
2024                                          Episodic  
   
                                                    Other nutritional;   
endocrine; and   
metabolic disorders  
(18 sources)                            Unintentional weight   
loss; Translations:   
[Abnormal weight   
loss]                                   Onset:   
2024                Episodic  
   
                                                    Residual codes;   
unclassified  
(20 sources)                            Amnesia;   
Translations: [Other   
amnesia]                                Onset:   
2024  
Resolved:   
2024                Episodic  
   
                                                    Residual codes;   
unclassified  
(4 sources)                             Other amnesia;   
Translations: [Other   
amnesia]                                Onset:   
2024                                          Episodic  
   
                                                    Residual codes;   
unclassified  
(2 sources)                             Disorientation,   
unspecified;   
Translations:   
[Disorientation,   
unspecified]                            Onset:   
2024                                          Episodic  
   
                                                    Unclassified  
(20 sources)                                        Onset:   
10-  
Resolved:   
09-                10-                  
   
                                                    Unclassified  
(15 sources)                            APPOINTMENT   
CANCELLED                               Onset:   
2006  
Resolved:   
2013                  
   
                                                    Unclassified  
(1 source)                              Unspecified   
dementia, mild,   
without behavioral   
disturbance,   
psychotic   
disturbance, mood   
disturbance, and   
anxiety;   
Translations:   
[Unspecified   
dementia, mild,   
without behavioral   
disturbance,   
psychotic   
disturbance, mood   
disturbance, and   
anxiety]                                Onset:   
2024                                            
   
                                                    Unclassified  
(1 source)                              Unspecified   
dementia, mild,   
without behavioral   
disturbance,   
psychotic   
disturbance, mood   
disturbance, and   
anxiety (Multi);   
Translations:   
[Unspecified   
dementia, mild,   
without behavioral   
disturbance,   
psychotic   
disturbance, mood   
disturbance, and   
anxiety (Multi)]                        Onset:   
2024                                            
   
                                                    Urinary tract   
infections  
(3 sources)                             Bacterial urinary   
infection;   
Translations:   
[Urinary tract   
infection, site not   
specified]                              Onset:   
2024                Episodic  
   
                                                    Viral infection  
(1 source)                              Disease caused by   
2019-nCoV;   
Translations:   
[COVID-19]                              2024          Episodic  
   
                                                    NEGATED: Highlighted   
row has not   
occurred!Residual   
codes; unclassified  
(6 sources)     Disease                                         Episodic  
  
  
  
Results  
  
  
                          Test Name    Value        Interpretation Reference   
Range                                   Facility  
   
                                                    Glucose Test strip manual (B  
ld) [Mass/Vol]on 2024   
   
                          Glucose [Mass/Vol] 167 mg/dL    High         74 - 99   
mg/dL                                   [facility]  
   
                                                    Interpretation and   
review of laboratory   
results         Abnormal                                        [facility]  
   
                      Glucose [Mass/Vol] 167 mg/dL  High       74-99      [facility]  
   
                                        Comment on above:   Performed By: #### 2  
[id number] ####  
GUILLAUME SEKOU ([id number])  
[facility] ([facility])  
[address] [place] [address]   
   
                                                    CNOVSPon 2024   
   
                                        CNOVSP              Visit (SP) Office   
(HEMAWS)  
-----------------------  
-----------  
MARZENA MEDEROS   
([id number]) 1945 F  
Date Time Provider   
Department  
24 10:10 AM   
COLTEN ABBOTT  
During your visit   
today, we recorded the   
following information   
about you:  
Temperature Pulse Blood   
pressure Weight  
98.1 degrees 82/minute   
111/68 63.7 kg  
Height  
1.6 m  
Cotlen Abbott DO   
2024 10:24 AM   
Signed  
Diagnoses:  
1) T1c (1.5 cm; grade   
II; no AL invasion) N0   
(0/4 SLN) MX ER/NV   
positive, HER2  
non-amplified invasive   
ductal carcinoma of the   
right breast.  
2) pT2 N0 FIGO 1   
endometrial   
adenocarcinoma with   
squamous   
differentiation s/p  
adjuvant brachytherapy.  
HPI: Patient is a 79 yo   
female who had a right   
breast abnormality on   
screening  
mammogram. Biopsy   
invasive ductal   
carcinoma.  
Underwent partial   
mastectomy 2010.  
Pathology:  
1.5 cm invasive ductal   
carcinoma.  
Grade II.  
ER/NV positive.  
HER2 non-amplified by   
FISH.  
4 of 4 LNs negative.  
Oncotype: Recurrence   
score 25 (16%).   
Intermediate.  
Previous therapy:  
1) TC x4. Completed   
12/15/2010.  
2) Adjuvant radiation   
2011 - 3/7/2011.  
3) Anastrozole--changed   
to tamoxifen 2011 due   
to significant symptoms   
of  
vaginal atrophy.   
Stopped 2017.  
She underwent surgery   
in 2017.  
Pathology:  
FINAL DIAGNOSIS:  
A) LEFT PELVIC SENTINEL   
LYMPH NODE #1 -   
NEGATIVE FOR   
MALIGNANCY.  
B) LEFT PELVIC SENTINEL   
LYMPH NODE #2 -   
NEGATIVE FOR   
MALIGNANCY.  
C) RIGHT PELVIC   
SENTINEL LYMPH NODE #1   
- NEGATIVE FOR   
MALIGNANCY.  
D) RIGHT PELVIC   
SENTINEL LYMPH NODE #2   
- NEGATIVE FOR   
MALIGNANCY.  
E) HYSTERECTOMY WITH   
BILATERAL   
SALPINGO-OOPHORECTOMY -   
FOCUS OF  
ENDOMETRIAL   
ADENOCARCINOMA,   
ENDOMETRIOID TYPE WITH   
SQUAMOUS   
DIFFERENTIATION(  
FIGO GRADE 1) WITH   
EXTENSIVE ASSOCIATED   
OBSCURING CRUSH   
ARTIFACT AND  
DISRUPTION.  
TUMOR INVOLVES THE   
ANTERIOR AND POSTERIOR   
LOWER UTERINE SEGMENT   
AND THE  
UPPER ENDOCERVICAL   
CANAL. (SEE COMMENT).  
3.4 CM ENDOMETRIAL   
POLYP.  
MULTIPLE INTRAMURAL,   
SUBMUCOSAL AND   
SUBSEROSAL LEIOMYOMAS.  
RIGHT AND LEFT OVARIES-   
ATROPHIC CHANGES.   
NEGATIVE FOR   
MALIGNANCY.  
RIGHT AND LEFT   
FALLOPIAN TUBES -   
SEROSAL WALTHARD REST   
CYST. NEGATIVE  
FOR MALIGNANCY.  
COMMENT: Adenocarcinoma   
is not identified in   
association with the   
3.4 cm  
endometrial polyp.   
Endometrioid   
adenocarcinoma   
identified only on  
slides E12 and E13   
representing the   
anterior and posterior   
uterine segment and  
upper endocervical   
canal. These foci of   
adenocarcinoma are  
associated with   
extensive obscuring   
crush artifact and   
disruption.  
Adenocarcinoma is   
identified in the upper   
endocervical canal.   
There is  
possible invasion of   
the stroma; however,   
the degree of   
artifactual  
distortion precludes   
accurate evaluation. No   
intact endometrial  
adenocarcinoma is   
actually identified in   
association with   
underlying  
myometrium. However,   
there are detached   
disrupted fragments of  
adenocarcinoma which   
may have been dislodged   
from some other site in  
the endometrial cavity.   
Slides E12 and E13 were   
also reviewed by Dr. Vinod Corbin. The   
previous outside report   
from [facility]  
representing an   
endometrial biopsy was   
obtained for review.   
The biopsy  
was interpreted as a   
FIGO grade 1   
endometrioid   
adenocarcinoma and  
mismatch repair   
proteins were performed   
on this outside case.   
Mismatch  
repair proteins (MLH1,   
PMS2, MSH2, and MSH6)   
were found to be   
expressed  
in the carcinoma   
nuclei.  
Endometrial Cancer Case   
Summary  
Specimen: Uterine   
corpus, cervix, right   
and left ovaries and   
right and  
left fallopian tubes.  
Procedure: Total   
hysterectomy.  
Lymph node sampling:   
Right and left sentinel   
lymph node biopsies  
performed.  
Specimen integrity:   
Intact hysterectomy   
specimen.  
Tumor size: Cannot be   
accurately determined   
(see comment).  
Histologic type:   
Endometrioid   
adenocarcinoma with   
squamous  
differentiation.  
Histologic grade: FIGO   
grade 1.  
Myometrial invasion:   
Cannot be accurately   
determined (see   
comment).  
Involvement of cervix:   
Cannot be accurately   
determined (see   
comment).  
Other organ   
involvement: Not   
identified.  
Lymph-vascular   
invasion: Not   
identified.  
Pelvic lymph nodes:  
Number examined: 4.  
Number involved: 0.  
Pathologic stage: pT1a   
or pT2(see comment)   
pN0(sn).  
Received brachii   
therapy while she was   
in [place].  
Presents for ongoing   
oncologic management.  
Interim history:  
Had about 20 lb weight   
loss this summer.  
Lost appetite.  
PCP ordered thorough   
work up.  
Appetite now recovered.  
Covid 2024--Paxlovid.  
PMH, medications and   
allergies personally   
reviewed by me today.   
Any changes  
documented in   
appropriate section.  
ROS:  
Constitutional: Denies   
episodes night sweats.  
Neuro: Denies HA,   
vertigo, dizziness and   
imbalance. Denies   
symptoms of  
neuropathy.  
HEENT: No recent change   
in voice or vision.  
Resp: Denies shortness   
of breath at rest.   
Denies PEMBERTON.  
CVS: Denies exertional   
chest pain, PND,   
orthopnea and LE edema.  
GI: Denies dysgeusia.   
Denies symptoms of   
stomatitis. (more   
content not   
included)...        Normal                                  [facility]  
   
                                                    CNOVon 2024   
   
                                        CNOV                Office Visit (OBGYWM  
)  
-----------------------  
-----------  
MARZENA MEDEROS   
([id number]) 1945 F  
Date Time Provider   
Department  
24 3:40 PM JOSE DELGADO OBSHIRLENEWSETH  
During your visit   
today, we recorded the   
following information   
about you:  
Blood pressure Weight   
Height  
110/72 62.1 kg 1.581 m  
Jose Delgado MD   
2024 3:59 PM   
Signed  
Marzena is a 79 year old   
 who presents   
for an annual   
gynecologic exam  
without complaints.  
Postmenopausal: Yes  
HRT use: No.  
Last Pap: 2022   
normal  
HPV: 2014 negative  
Last mammogram:    
normal  
History of abnormal   
mammogram: Yes - h/o   
breast cancer  
OB History  
 T0  L1  
SAB1 IAB0 Ectopic0   
Multiple0 Live Births0  
Comment: Plus two step   
daughters  
Gyn History  
LMP: Hysterectomy  
Age at Menarche:  
Age at First Pregnancy:  
Age at Menopause:  
Gyn History Comments:  
Sexual Activity: Not   
Asked; Male; not asked  
Contraception: No   
contraception data on   
record  
PAST MEDICAL HISTORY  
Diagnosis Date  
Breast cancer (HCC)   
  
invasive ductal   
carcinoma, completed   
radiation 3/7/11  
Cancer of endometrium   
(HCC)  
Carcinoma in situ,   
vulva   
Cellulitis  
of right eye muscle  
Diabetes mellitus   
without mention of   
complication  
Diabetes mellitus, type   
II (HCC)  
Diaphragmatic hernia   
without mention of   
obstruction or gangrene  
Hiatal hernia  
Dyspareunia  
Elevated cholesterol  
Esophageal reflux  
Insomnia, unspecified  
Orbital cellulitis  
Other corneal disorder  
Rt eye corneal erosion   
.  
PMH - PAST MEDICAL   
HISTORY OF  
?VESTIBULITIS  
PMH - PAST MEDICAL   
HISTORY OF  
CLIMACTERIC  
PMH - PAST MEDICAL   
HISTORY OF 2006  
Squamous CIS of the   
Vulva 233.3  
Pure   
hypercholesterolemia  
Rib fracture  
6th Rib  
Senile dementia (HCC)  
Stomatitis and   
mucositis (ulcerative)  
Chronic ulcerative   
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   
biopsy  
Unspecified essential   
hypertension  
PAST SURGICAL HISTORY  
Procedure Laterality   
Date  
Bunions bilateral feet   
removed 10/2002  
BX/EXC LYMPH NODE OPEN   
DEEP AXILLARY NODE   
2010  
RIGHT BREAST LUMP W/   
SLN BX  
CHOLECYSTECTOMY 2004  
Cholecystectomy  
COLONOSCOPY FLX DX   
W/COLLJ SPEC WHEN PFRMD   
  
Colonoscopy  
COLPOSCOPY CERVIX   
UPPER/ADJACENT VAGINA   
2009  
Colposcopy of the vulva  
COLPOSCOPY, VULVA   
10/2009  
CORRECT BUNION,SIMPLE  
Bunion  
DILATION AND CURETTAGE   
DXAND/THER NONOBSTETRIC   
's  
SAB  
HYSTERECTOMY 2017  
LAPAROSCOPY SURG   
CHOLECYSTECTOMY 2004  
Cholecystectomy, lap  
MAMMO STEREOTACTIC CORE   
BIOPSY RT 10/10/2013  
MASTECTOMY, PARTIAL   
2010  
RIGHT BREAST  
PAST SURGICAL HISTORY   
OF 2006  
partial vulvectomy/sq.   
cell ca in situ  
PAST SURGICAL HISTORY   
OF   
right eye cornea  
PAST SURGICAL HISTORY   
OF   
right eye revision  
PAST SURGICAL HISTORY   
OF  
squamous cell carcinoma   
right arm  
REMV CATARACT   
EXTRACAP,INSERT LENS   
10/31/2024  
CCA0T0 - +19.5D  
SALPINGO-OOPHORECTOMY   
COMPL/PRTL UNI/BI SPX   
2017  
Toenail Big Toe Left   
Foot removed 10/2002  
TONSILLECTOMY   
PRIMARY/SECONDARY  
Tonsillectomy  
UNLISTED PROCEDURE   
HANDS/FINGERS   
2012  
[facility] Arthoplasty on   
right hand  
FAMILY HISTORY  
Problem Relation Age of   
Onset  
Stroke Mother  
Diabetes Mother  
GI Father  
 from   
complications of gb   
surgery  
Arthritis Sister  
Systemic Lupus  
Cancer Maternal Uncle  
LUNG  
Cancer Other  
cousin with breast   
cancer/cousin with   
bladder ca.  
Stroke Sister  
SOCIAL HISTORY  
Social History  
Tobacco Use  
Smoking status: Never  
Smokeless tobacco:   
Never  
Vaping Use  
Vaping status: Never   
Used  
Substance Use Topics  
Alcohol use: Yes  
Comment: 1-2 weekly  
Drug use: No  
REVIEW OF SYSTEMS  
Abdomen: No abdominal   
pain, nausea, vomiting,   
diarrhea, or   
constipation. No  
bloating, early   
satiety, indigestion,   
or increased   
flatulence.  
Bladder: No dysuria,   
gross hematuria,   
urinary frequency,   
urinary urgency, or  
incontinence  
Breast: No breast   
lumps, nipple d/c,   
overlying skin changes,   
redness or skin  
retraction  
Allergies and current   
medication updated:Yes  
SENSITIVE EXAM: The   
sensitive examination   
was discussed with the   
Patient or  
Patient's Authorized   
Representative. As   
applicable, any other   
physician,  
advance practice   
provider, medical   
student, or other   
health professional  
student that will be   
observing or involved   
in the sensitive   
examination for  
educational or training   
purposes was discussed   
with the Patient or   
Authorized  
Representative. The   
Patient or Authorized   
Representative has   
agreed to proceed  
with the sensitive   
examination. (Sensitive   
examination includes   
inspection  
and/or palpation of the   
breasts, pelvis,   
prostate and anorectal   
regions).  
EXAM: /72   Ht 5'   
2.25  (1.58m)   Wt 137   
lb (62.1kg)   BMI 24.86  
kg/(m2).  
GENERAL: pleasant,   
 female in no   
apparent distress  
BREAST: soft,   
non-tender, no dominant   
mass, normal   
nipple-areolar complex,   
no  
lymphadenopathy, and no   
nipple discharge  
CHEST: Normal   
inspiratory effort  
ABDOMEN: soft,   
non-tender, and no   
masses  
P (more content not   
included)...        Normal                                  [facility]  
   
                                                    Ann 2024   
   
                                        BRITTNEY                Telephone (OPTMAN)  
-----------------------  
-----------  
MARZENA MEDEROS   
([id number]) 1945 F  
Date Time Provider   
Department  
24 AMAYA REIS II  
During your visit   
today, we recorded the   
following information   
about you:  
Zaida Lewis   
2024 10:05 AM   
Addendum  
Mr. Mederos phoned   
asking that you call   
him as he forgot to ask   
you a question  
about what type of lens   
they should do for his   
wife for her cataract   
surgery.  
He said Dr. Hays is   
awaiting their   
decision.  
11/15/2024  
Called Mr. Mederos.   
Discussed Intraocular   
lens options. Decided   
to retain best  
distance vision in each   
eye and will use   
multifocal glasses.  
Allergies As of Date:   
2024 Noted   
Allergy Reaction  
ADHESIVE 10/04/2010 2 -   
Rash  
Comments: Redness  
AMBIEN (ZOLPIDEM   
TARTRATE) 2011 2   
- Rash  
CODEINE 2006 1 -   
Mental Status Change  
NEOSPORIN   
(NEOMYCIN-BACITRACIN-PO  
*2005 2 - Rash  
PERCOCET   
(OXYCODONE-ACETAMINOPHE  
N)2005  
Comments: Causes   
Vomitting  
Date Reviewed:   
2024  
Reviewed by: Jose Delgado MD - Fully   
Assessed  
Reason for Visit:  
Patient Question [2877]  
Prescriptions as of   
11/15/2024  
- keTORolac (ACULAR)   
0.5 % ophthalmic   
solution  
Use 1 Drop in the left   
eye three times a day.  
- prednisoLONE acetate   
(PRED FORTE) 1 %   
ophthalmic suspension  
Use 1 Drop in the left   
eye three times a day.  
- glipiZIDE (GLUCOTROL   
XL) 2.5 mg 24 hr tablet  
Take 2.5 mg by mouth.  
- fluorometholone (FML   
LIQUID FILM) 0.1 %   
ophthalmic suspension  
Use 1 Drop in the right   
eye three times a day.  
- amLODIPine (NORVASC)   
10 mg tablet  
Take 10 mg by mouth.  
- donepezil (ARICEPT)   
10 mg tablet  
Take 10 mg by mouth.  
- cholecalciferol   
(VITAMIN D3) 50 mcg   
(2,000 unit) tablet  
Take 2,000 Units by   
mouth once daily.  
-   
losartan-hydroCHLOROthi  
azide (HYZAAR) 100-12.5   
mg per tablet  
Take 1 tablet by mouth   
once daily.  
- omeprazole (PRILOSEC)   
20 mg capsule  
Take 20 mg by mouth   
once daily.  
- potassium chloride   
(KLOR-CON 10) 10 mEq   
tablet  
Take 10 mEq by mouth   
once daily.  
- metFORMIN ER   
(GLUCOPHAGE XR) 500 mg   
24 hr tablet  
Take two tablets by   
mouth once daily.  
- simvastatin(ZOCOR 20   
MG TAB)  
Take one(1) tablet   
daily.  
Facility-Administered   
Medications as of   
11/15/2024  
- iv contrast   
(radiology procedure)  
Problem List As Of Date   
2024 Noted   
Resolved  
ATROPHIC VAGINITIS   
[N95.2] 2006  
APPOINTMENT CANCELLED   
[XCCC] 2006  
TRANSIENT CEREBRAL   
ISCHEMIA NOS [G45.9]   
01/15/2008  
Carcinoma in situ of   
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   
10/15/2009  
Malignant neoplasm of   
female breast (HCC)   
[C50.*2010  
Personal history of   
malignant neoplasm of   
breas*2012  
Senile nuclear   
sclerosis [H25.10]   
2015  
Hypermetropia [H52.00]   
2015  
Regular astigmatism   
[H52.229] 2015  
Presbyopia [H52.4]   
2015  
Invasive ductal   
carcinoma of breast,   
female (HC*2017  
Ocular migraine   
[G43.109] 2017  
Controlled type 2   
diabetes mellitus   
without com*2017  
Postmenopausal bleeding   
[N95.0] 2017  
S/P hysterectomy with   
oophorectomy [Z90.710,   
Z9*2017  
Encounter Number:   
[id number]  
Encounter Status:Closed   
by AMAYA REIS II   
on 11/15/24         Normal                                  [facility]  
   
                                                    HISTORY PHYSICALon    
   
                                        HISTORY PHYSICAL    HNO ID: [id number]  
Author: RONEL HAYS MD  
Service: ?  
Author Type: Physician  
Type: H&P  
Filed: 2024 11:27  
Note Text:  
HISTORY AND PHYSICAL   
EXAMINATION  
SERVICE DATE:   
2024  
SERVICE TIME: 11:26 AM  
PRIMARY CARE PHYSICIAN:   
Sharifa S Royal, DO  
REASON FOR VISIT:   
Marzena Mederos is a 79   
year old female who is   
being seen for  
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   
Cataract Right Eye.  
The patient has the   
following:  
ACTIVE PROBLEM LIST  
Postmenopausal Atrophic   
Vaginitis  
Unspecified Transient   
Cerebral Ischemia  
Carcinoma in Situ of   
Vulva  
Malignant Neoplasm of   
Female Breast (Hcc)  
Personal History of   
Malignant Neoplasm of   
Breast  
Senile Nuclear   
Sclerosis  
Hypermetropia  
Regular Astigmatism  
Presbyopia  
Invasive Ductal   
Carcinoma of Breast,   
Female (Hcc)  
Ocular Migraine  
Controlled Type 2   
Diabetes Mellitus   
Without Complication,   
Without Long-Term  
Current Use of Insulin   
(Hcc)  
Postmenopausal Bleeding  
S/P Hysterectomy With   
Oophorectomy  
SUBJECTIVE  
CHIEF COMPLAINT:   
Blurred vision for   
distance and near Right   
Eye.  
PAST MEDICAL HISTORY  
Diagnosis Date  
Breast cancer (HCC)   
  
invasive ductal   
carcinoma, completed   
radiation 3/7/11  
Cancer of endometrium   
(HCC)  
Carcinoma in situ,   
vulva   
Cellulitis  
of right eye muscle  
Diabetes mellitus   
without mention of   
complication  
Diabetes mellitus, type   
II (HCC)  
Diaphragmatic hernia   
without mention of   
obstruction or gangrene  
Hiatal hernia  
Dyspareunia  
Elevated cholesterol  
Esophageal reflux  
Insomnia, unspecified  
Orbital cellulitis  
Other corneal disorder  
Rt eye corneal erosion   
.  
PMH - PAST MEDICAL   
HISTORY OF  
?VESTIBULITIS  
PMH - PAST MEDICAL   
HISTORY OF  
CLIMACTERIC  
PMH - PAST MEDICAL   
HISTORY OF 2006  
Squamous CIS of the   
Vulva 233.3  
Pure   
hypercholesterolemia  
Rib fracture  
6th Rib  
Senile dementia (HCC)  
Stomatitis and   
mucositis (ulcerative)  
Chronic ulcerative   
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   
biopsy  
Unspecified essential   
hypertension  
PAST SURGICAL HISTORY  
Procedure Laterality   
Date  
Bunions bilateral feet   
removed 10/2002  
BX/EXC LYMPH NODE OPEN   
DEEP AXILLARY NODE   
2010  
RIGHT BREAST LUMP W/   
SLN BX  
CHOLECYSTECTOMY 2004  
Cholecystectomy  
COLONOSCOPY FLX DX   
W/COLLJ SPEC WHEN PFRMD   
  
Colonoscopy  
COLPOSCOPY CERVIX   
UPPER/ADJACENT VAGINA   
2009  
Colposcopy of the vulva  
COLPOSCOPY, VULVA   
10/2009  
CORRECT BUNION,SIMPLE  
Bunion  
DILATION AND CURETTAGE   
DXAND/THER NONOBSTETRIC   
1970's  
SAB  
HYSTERECTOMY 2017  
LAPAROSCOPY SURG   
CHOLECYSTECTOMY 2004  
Cholecystectomy, lap  
MAMMO STEREOTACTIC CORE   
BIOPSY RT 10/10/2013  
MASTECTOMY, PARTIAL   
2010  
RIGHT BREAST  
PAST SURGICAL HISTORY   
OF 2006  
partial vulvectomy/sq.   
cell ca in situ  
PAST SURGICAL HISTORY   
OF   
right eye cornea  
PAST SURGICAL HISTORY   
OF   
right eye revision  
PAST SURGICAL HISTORY   
OF  
squamous cell carcinoma   
right arm  
REMV CATARACT   
EXTRACAP,INSERT LENS   
10/31/2024  
CCA0T0 - +19.5D  
SALPINGO-OOPHORECTOMY   
COMPL/PRTL UNI/BI SPX   
2017  
Toenail Big Toe Left   
Foot removed 10/2002  
TONSILLECTOMY   
PRIMARY/SECONDARY  
Tonsillectomy  
UNLISTED PROCEDURE   
HANDS/FINGERS   
2012  
[facility] Arthoplasty on   
right hand  
FAMILY HISTORY  
Problem Relation Age of   
Onset  
Stroke Mother  
Diabetes Mother  
GI Father  
 from   
complications of gb   
surgery  
Arthritis Sister  
Systemic Lupus  
Cancer Maternal Uncle  
LUNG  
Cancer Other  
cousin with breast   
cancer/cousin with   
bladder ca.  
Stroke Sister  
SOCIAL HISTORY:  
Social History  
Tobacco Use  
Smoking status: Never  
Smokeless tobacco:   
Never  
Vaping Use  
Vaping status: Never   
Used  
Substance Use Topics  
Alcohol use: Yes  
Comment: 1-2 weekly  
Drug use: No  
MEDICATIONS:  
Prior to Admission   
medications as of   
24 1120  
Medication Sig Last   
Dose Taking  
glipiZIDE (GLUCOTROL   
XL) 2.5 mg 24 hr tablet   
Take 2.5 mg by mouth.   
Yes  
fluorometholone (FML   
LIQUID FILM) 0.1 %   
ophthalmic suspension   
Use 1 Drop in the  
right eye three times a   
day. Yes  
donepezil (ARICEPT) 10   
mg tablet Take 10 mg by   
mouth. Yes  
cholecalciferol   
(VITAMIN D3) 50 mcg   
(2,000 unit) tablet   
Take 2,000 Units by  
mouth once daily. Yes  
losartan-hydroCHLOROthi  
azide (HYZAAR) 100-12.5   
mg per tablet Take 1   
tablet by  
mouth once daily. Yes  
omeprazole (PRILOSEC)   
20 mg capsule Take 20   
mg by mouth once daily.   
Yes  
potassium chloride   
(KLOR-CON 10) 10 mEq   
tablet Take 10 mEq by   
mouth once daily.  
Yes  
metFORMIN ER   
(GLUCOPHAGE XR) 500 mg   
24 hr tablet Take two   
tablets by mouth once  
daily. Yes  
simvastatin(ZOCOR 20 MG   
TAB) Take one(1) tablet   
daily. Yes  
keTORolac (ACULAR) 0.5   
% ophthalmic solution   
Use 1 Drop in the left   
eye three  
times a day.  
prednisoLONE acetate   
(PRED FORTE) 1 %   
ophthalmic suspension   
Use 1 Drop in the  
left eye three times a   
day.  
amLODIPine (NORVASC) 10   
mg tablet Take 10 mg by   
mouth.  
No medication comments   
found.  
CURRENT ALLERGIES:  
ALLERGIES  
Allergen Reactions  
Adhesive Rash  
Redness  
Ambien [Zolpidem Ta*   
Rash  
Codeine Mental Status   
Change  
Neospo (more content   
not included)...    Normal                                  [facility] SCREENING W Soren    
   
                                        JONY SCREENING W HUGO * * *Final Report*   
* *  
DATE OF EXAM: 2024 10:02AM  
WRW [id number] - JONY   
SCREENING W HUGO /   
ACCESSION # [id number]  
PROCEDURE REASON:   
Encounter for screening   
mammogram for breast   
cancer  
  
* * * * Physician   
Interpretation * * * *  
RESULT: [address] [address] [place] [address]  
Phone: [phone]  
HISTORY:  
Patient is 79 years old   
and is seen for   
screening and is   
asymptomatic in  
both breasts. The   
patient has a history   
of right breast cancer.  
COMPARISON STUDIES:  
The present examination   
has been compared to   
prior imaging studies   
dated  
10/28/2019 (mammogram),   
10/29/2020 (mammogram),   
2021 (mammogram),  
2022 (mammogram)   
and 2023   
(mammogram).  
MAMMOGRAM TECHNIQUE:  
The study was acquired   
using full field   
digital technology and  
interpreted from soft   
copy. Digital Breast   
Tomosynthesis (DBT)   
images  
were obtained and used   
to assist in the   
interpretation of this  
examination.   
Computer-aided   
detection was utilized   
by the radiologist in  
the interpretation of   
this examination.  
MAMMOGRAM FINDINGS:  
There are scattered   
areas of fibroglandular   
density.  
There are stable   
post-operative changes   
in the right breast.   
There are no  
significant changes   
from the prior study.  
No suspicious masses,   
calcifications or other   
abnormalities are seen   
in  
either breast.  
IMPRESSION:  
There is no   
mammographic evidence   
of malignancy.  
Routine screening   
mammogram is   
recommended. Annual   
mammogram will be due  
in 1 year.  
BI-RADS Category 2:   
Benign  
Interpreting   
Radiologist: Laurie Handy M.D.  
Electronically signed   
on: 2024  
:   
RAYSHAWN  
Transcribe Date/Time:   
2024 9:28A  
Dictated by: LAURIE HANDY MD  
This examination was   
interpreted and the   
report reviewed and  
electronically signed   
by:  
LAURIE HANDY MD on   
2024 7:53AM EST  
[id number] Normal                                  [facility]  
   
                                                    ASCAN ONLY - DIAGNOSTIC OD (  
RIGHT EYE)on 2024   
   
                                                                  [facility]  
   
                                                    Radiology Study   
observation   
(narrative)                                                     [facility]  
   
                                                    Glucose Test strip manual (B  
ld) [Mass/Vol]on 10-   
   
                          Glucose [Mass/Vol] 156 mg/dL    High         74 - 99   
mg/dL                                   [facility]  
   
                                                    Interpretation and   
review of laboratory   
results         Abnormal                                        [facility]  
   
                      Glucose [Mass/Vol] 156 mg/dL  High       74-99      [facility]  
   
                                        Comment on above:   Performed By: #### 2  
[id number] ####  
GUILLAUME SEKOU ([id number])  
[facility] ([facility])  
[address] [place] [address]   
   
                                                    HISTORY PHYSICALon 10-  
4   
   
                                        HISTORY PHYSICAL    HNO ID: [id number]  
Author: RONEL HAYS MD  
Service: ?  
Author Type: Physician  
Type: H&P  
Filed: 10/08/2024 13:24  
Note Text:  
HISTORY AND PHYSICAL   
EXAMINATION  
SERVICE DATE: 10/8/2024  
SERVICE TIME:  
PRIMARY CARE PHYSICIAN:   
Sharifa Almaguer DO  
REASON FOR VISIT:  
Marzena Mederos is a 79   
year old female who is   
being seen for combined   
age  
related cataract left   
eye  
The patient has the   
following:  
ACTIVE PROBLEM LIST  
Postmenopausal Atrophic   
Vaginitis  
Unspecified Transient   
Cerebral Ischemia  
Carcinoma in Situ of   
Vulva  
Malignant Neoplasm of   
Female Breast (Hcc)  
Personal History of   
Malignant Neoplasm of   
Breast  
Senile Nuclear   
Sclerosis  
Hypermetropia  
Regular Astigmatism  
Presbyopia  
Invasive Ductal   
Carcinoma of Breast,   
Female (Hcc)  
Ocular Migraine  
Controlled Type 2   
Diabetes Mellitus   
Without Complication,   
Without Long-Term  
Current Use of Insulin   
(Hcc)  
Postmenopausal Bleeding  
S/P Hysterectomy With   
Oophorectomy  
SUBJECTIVE  
CHIEF COMPLAINT:   
combined age related   
cataract left eye  
HPI: blurry vision at   
all ranges of vision,   
difficulty with glare   
both eyes  
PAST MEDICAL HISTORY  
Diagnosis Date  
Breast cancer (HCC)   
  
invasive ductal   
carcinoma, completed   
radiation 3/7/11  
Cancer of endometrium   
(HCC)  
Carcinoma in situ,   
vulva   
Cellulitis  
of right eye muscle  
Diabetes mellitus   
without mention of   
complication  
Diabetes mellitus, type   
II (HCC)  
Diaphragmatic hernia   
without mention of   
obstruction or gangrene  
Hiatal hernia  
Dyspareunia  
Elevated cholesterol  
Esophageal reflux  
Insomnia, unspecified  
Orbital cellulitis  
Other corneal disorder  
Rt eye corneal erosion   
.  
PMH - PAST MEDICAL   
HISTORY OF  
?VESTIBULITIS  
PMH - PAST MEDICAL   
HISTORY OF  
CLIMACTERIC  
PMH - PAST MEDICAL   
HISTORY OF 2006  
Squamous CIS of the   
Vulva 233.3  
Pure   
hypercholesterolemia  
Rib fracture  
6th Rib  
Senile dementia (HCC)  
Stomatitis and   
mucositis (ulcerative)  
Chronic ulcerative   
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   
biopsy  
Unspecified essential   
hypertension  
PAST SURGICAL HISTORY  
Procedure Laterality   
Date  
Bunions bilateral feet   
removed 10/2002  
BX/EXC LYMPH NODE OPEN   
DEEP AXILLARY NODE   
8-24-10  
RIGHT BREAST LUMP W/   
SLN BX  
CHOLECYSTECTOMY 2004  
Cholecystectomy  
COLONOSCOPY FLX DX   
W/COLLJ SPEC WHEN PFRMD   
  
Colonoscopy  
COLPOSCOPY CERVIX   
UPPER/ADJACENT VAGINA   
  
Colposcopy of the vulva  
COLPOSCOPY, VULVA 10/09  
CORRECT BUNION,SIMPLE  
Bunion  
DILATION AND CURETTAGE   
DXAND/THER NONOBSTETRIC   
1970's  
SAB  
HYSTERECTOMY 2017  
LAPAROSCOPY SURG   
CHOLECYSTECTOMY 3/2004  
Cholecystectomy, lap  
MAMMO STEREOTACTIC CORE   
BIOPSY RT 10/10/13  
MASTECTOMY, PARTIAL   
8-24-10  
RIGHT BREAST  
PAST SURGICAL HISTORY   
OF 2006  
partial vulvectomy/sq.   
cell ca in situ  
PAST SURGICAL HISTORY   
OF   
right eye cornea  
PAST SURGICAL HISTORY   
OF   
right eye revision  
PAST SURGICAL HISTORY   
OF  
squamous cell carcinoma   
right arm  
SALPINGO-OOPHORECTOMY   
COMPL/PRTL UNI/BI SPX   
2017  
Toenail Big Toe Left   
Foot removed 10/2002  
TONSILLECTOMY   
PRIMARY/SECONDARY  
Tonsillectomy  
UNLISTED PROCEDURE   
HANDS/FINGERS 12  
[facility] Arthoplasty on   
right hand  
FAMILY HISTORY  
Problem Relation Age of   
Onset  
Stroke Mother  
Diabetes Mother  
GI Father  
 from   
complications of gb   
surgery  
Arthritis Sister  
Systemic Lupus  
Cancer Maternal Uncle  
LUNG  
Cancer Other  
cousin with breast   
cancer/cousin with   
bladder ca.  
Stroke Sister  
SOCIAL HISTORY:  
Social History  
Tobacco Use  
Smoking status: Never  
Smokeless tobacco:   
Never  
Vaping Use  
Vaping status: Never   
Used  
Substance Use Topics  
Alcohol use: Yes  
Comment: 1-2 weekly  
Drug use: No  
MEDICATIONS:  
Prior to Admission   
medications as of   
10/8/24 1011  
Medication Sig Last   
Dose Taking  
fluorometholone (FML   
LIQUID FILM) 0.1 %   
ophthalmic suspension   
Use 1 Drop in both  
eyes three times a day.   
Taking Yes  
amLODIPine (NORVASC) 10   
mg tablet Take 10 mg by   
mouth. Taking Yes  
donepezil (ARICEPT) 10   
mg tablet Take 10 mg by   
mouth. Taking Yes  
cholecalciferol   
(VITAMIN D3) 50 mcg   
(2,000 unit) tablet   
Take 2,000 Units by  
mouth once daily.   
Taking Yes  
losartan-hydroCHLOROthi  
azide (HYZAAR) 100-12.5   
mg per tablet Take 1   
tablet by  
mouth once daily.   
Taking Yes  
omeprazole (PRILOSEC)   
20 mg capsule Take 20   
mg by mouth once daily.   
Taking Yes  
potassium chloride   
(KLOR-CON 10) 10 mEq   
tablet Take 10 mEq by   
mouth once daily.  
Taking Yes  
metFORMIN ER   
(GLUCOPHAGE XR) 500 mg   
24 hr tablet Take two   
tablets by mouth once  
daily. Taking Yes  
simvastatin(ZOCOR 20 MG   
TAB) Take one(1) tablet   
daily. Taking Yes  
No medication comments   
found.  
CURRENT ALLERGIES:  
ALLERGIES  
Allergen Reactions  
Adhesive Rash  
Redness  
Ambien [Zolpidem Ta*   
Rash  
Codeine Mental Status   
Change  
Neosporin [Neomycin*   
Rash  
Percocet [Oxycodone*  
Causes Vomitting  
REVIEW OF SYSTEMS:  
PAIN ASSESSMENT:  
General: No weight   
loss, malaise or   
fevers.  
Neuro: Dementia  
Respiratory: No history   
of current cough or   
dyspnea, or pneumonia   
in the (more content   
not included)...    Normal                                  [facility]  
   
                                                    IOL BIOMETRY W/ IOL CALC OU   
(BOTH EYES)on 10-   
   
                                                                  [facility]  
   
                                                    Radiology Study   
observation   
(narrative)                                                     [facility]  
   
                                                    CARDIOLOGY INTERPRETATION OF  
 NUCLEAR STRESSon 2024   
   
                                                    CARDIOLOGY   
INTERPRETATION OF   
NUCLEAR STR[place]                          [place] [address]  
Phone [phone], Fax   
[phone]  
Nuclear Treadmill   
Stress Test  
Patient Name: MARZENA MEDEROS Ordering   
Provider: [id number] BERNICE VITAL  
Study Date: 2024   
Reading Physician:   
[id number] Josafat Bergman MD  
MRN/PID: [id number]   
Supervising Physician:   
[id number] Josafat Bergman MD  
Accession#:   
[id number] Fellow:  
Date of Birth/Age:   
1945 / 79 years   
Fellow:  
Gender: F Nurse: N/A  
Admit Date: 2024   
Technician: Acosta Encinas RRT,  
CVT  
Admission Status:   
Outpatient Sonographer:   
N/A  
Height: 160.02 cm   
Technologist:  
Weight: 63.50 kg   
Additional Staff:  
BSA: 1.66 m2   
Encounter#: [id number]  
BMI: 24.80 kg/m2   
Patient Location: [facility]   
Stress Lab  
Study Type: CARDIOLOGY   
INTERPRETATION OF   
NUCLEAR STRESS  
Diagnosis/ICD: Abnormal   
electrocardiogram [ECG]   
[EKG]-R94.31; Encounter   
for  
other preprocedural   
examination-Z01.818  
Indication: Abnormal   
EKG and Pre-Op   
Evaluation  
CPT Codes: Stress Test   
Interpretation-31029;   
Stress Test   
Supervision-44074  
Falls Risk: Moderate:   
Patient has moderate   
risk for sustaining a   
fall; a falls   
prevention plan has   
been implemented.  
  
Study Details: Correct   
procedure and correct   
patient verified   
verbally and with  
ID Band checked.  
  
Patient History:   
Hypertension, mitral   
valve prolapse, and   
hyperlipidemia.  
Allergies:   
Neomycin-Bacitracin-David  
ymixin, oxycodone,   
zolpidem, sulfonamides.  
Smoker: Never.  
Diabetes: Yes, managed   
with Oral medicine.  
BMI: Normal 18.5 - 25.  
  
Medications: Losartan,   
hydrochlorothiazide,   
simvastatin,   
amlodipine, glucophage   
and glypizide. The   
patient did not take   
medications as   
prescribed.  
  
Patient Performance:   
The patient exercised   
to stage II on a Bill   
protocol for 3 minutes   
and 45 seconds,   
achieving 5.50 METS.   
Patient received a   
total of 32.9 mCi of   
Myoview at 9:39:21 AM.   
The peak heart rate   
achieved was 133 bpm,   
which was 94 % of the   
age predicted target   
heart rate of 142 bpm.   
The resting blood   
pressure was 147/89   
mmHg with a heart rate   
of 81 bpm. The standing   
blood pressure was   
130/88 mmHg with a   
heart rate of 93 bpm.   
The patient developed   
leg fatigue during the   
stress exam. The   
symptoms resolved with   
rest 4 minutes into   
recovery. The blood   
pressure response was   
normal. The test was   
terminated due to: leg   
fatigue and   
musculoskeletal   
weakness.  
  
Baseline ECG: Resting   
ECG showed normal sinus   
rhythm with normal   
tracing.  
  
Stress ECG: Stress ECG   
showed sinus   
tachycardia, with   
frequent premature   
ventricular   
contractions.  
  
Stress Stage Data:  
+-----------------+---+  
------+-------+  
   HR  Sys BP Alejandra BP   
+-----------------+---+  
------+-------+  
 Baseline Resting  81   
 147  89    
+-----------------+---+  
------+-------+  
 Baseline Standing 93   
 130  88    
+-----------------+---+  
------+-------+  
 Stage I  120 184  88    
+-----------------+---+  
------+-------+  
 Stage II  133 178  89   
   
+-----------------+---+  
------+-------+  
  
Recovery ECG: Recovery   
ECG showed sinus   
tachycardia, with   
frequent premature   
ventricular   
contractions. The heart   
rate recovery was   
normal.  
  
+-----------+---+------  
+-------+  
   HR  Sys BP Alejandra BP   
+-----------+---+------  
+-------+  
 Recovery I  122       
+-----------+---+------  
+-------+  
 Recovery  129   
 85    
+-----------+---+------  
+-------+  
 Recovery  127   
 74    
+-----------+---+------  
+-------+  
  
Summary:  
1. Baseline EKG showing   
normal sinus rhythm   
with no resting ST-T   
segment changes.  
2. With regadenoson   
infusion, there are no   
ST-T segment changes   
suggestive of ischemia.   
No sustained   
ventricular arrhythmias   
are seen.  
3. Frequent PVCs were   
seen.  
4. Regadenoson stress   
EKG is negative for   
ischemia.  
5. Adequate level of   
stress achieved.  
6. Nuclear image   
results are reported   
separately.  
[id number] Josafat Bergman MD  
Electronically signed   
on 2024 at   
10:56:10 AM  
  
  
** Final **         Normal                                  [facility]  
   
                                                    NM Heart Perfusion W stress   
and W radionuclide Humberto 2024   
   
                                                            1. No evidence of   
inducible myocardial   
ischemia or prior   
infarction.  
2. The left ventricle   
is normal in size.  
3. Normal LV wall   
motion with a   
post-stress LV EF   
estimated at  
greater than 65%.  
  
I personally reviewed   
the images/study and I   
agree with the findings  
as stated by Resident   
Rosi Montelongo. This study   
was interpreted at  
[facility], [place], [place].  
  
MACRO:  
None  
  
Signed by: Titus Thao   
2024 11:13 AM  
Dictation workstation:   
[id number]                                                [facility]  
   
                                                            Interpreted By: Titus Gill and Awan Komal  
STUDY:  
NUCLEAR STRESS TEST;   
2024 10:40 am  
  
INDICATION:  
Signs/Symptoms:abnormal   
EKG.  
,R94.31 Abnormal   
electrocardiogram (ECG)   
(EKG)  
  
COMPARISON:  
None.  
  
ACCESSION NUMBER(S):  
[id number]  
  
ORDERING CLINICIAN:  
BERNICE VITAL  
  
TECHNIQUE:  
DIVISION OF NUCLEAR   
MEDICINE  
STRESS MYOCARDIAL   
PERFUSION SCAN, ONE DAY   
PROTOCOL  
  
The patient received an   
intravenous dose of 11   
mCi of Tc-99m  
Myoview and resting   
emission tomographic   
(SPECT) images of the  
myocardium were   
acquired. The patient   
then exercised via   
treadmill  
stress to 94 % of MPHR   
and achieved 5.50 METS.   
At peak stress 32.9  
mCi of Tc-99m Myoview   
were administered and   
stress phase SPECT  
images of the   
myocardium were then   
acquired. These   
included ECG-gated  
images to assess and   
quantify ventricular   
function. A low-dose,  
nondiagnostic regional   
CT was utilized for   
attenuation correction  
purposes.  
  
FINDINGS:  
Both stress and rest   
studies demonstrate   
grossly normal   
perfusion  
throughout the left   
ventricle.  
  
The left ventricle is   
normal in size.  
  
Gated images   
demonstrate normal LV   
wall motion with a   
post-stress LV  
EF estimated at greater   
than 65%.  
  
Attenuation correction   
CT images demonstrate   
no gross anatomic  
abnormality.  
  
Note is made of bowel   
artifact greater on   
rest images as compared   
to  
stress .  
                                                            [facility]  
   
                                                            Titus Thao MD   
-   
2024 Formatting   
of this note might be   
different from the   
original.  
Interpreted By: Titus Thao and Awan Komal  
STUDY:  
NUCLEAR STRESS TEST;   
2024 10:40 am  
  
INDICATION:  
Signs/Symptoms:abnormal   
EKG.  
,R94.31 Abnormal   
electrocardiogram (ECG)   
(EKG)  
  
COMPARISON:  
None.  
  
ACCESSION NUMBER(S):  
[id number]  
  
ORDERING CLINICIAN:  
BERNICE VITAL  
  
TECHNIQUE:  
DIVISION OF NUCLEAR   
MEDICINE  
STRESS MYOCARDIAL   
PERFUSION SCAN, ONE DAY   
PROTOCOL  
  
The patient received an   
intravenous dose of 11   
mCi of Tc-99m  
Myoview and resting   
emission tomographic   
(SPECT) images of the  
myocardium were   
acquired. The patient   
then exercised via   
treadmill  
stress to 94 % of MPHR   
and achieved 5.50 METS.   
At peak stress 32.9  
mCi of Tc-99m Myoview   
were administered and   
stress phase SPECT  
images of the   
myocardium were then   
acquired. These   
included ECG-gated  
images to assess and   
quantify ventricular   
function. A low-dose,  
nondiagnostic regional   
CT was utilized for   
attenuation correction  
purposes.  
  
FINDINGS:  
Both stress and rest   
studies demonstrate   
grossly normal   
perfusion  
throughout the left   
ventricle.  
  
The left ventricle is   
normal in size.  
  
Gated images   
demonstrate normal LV   
wall motion with a   
post-stress LV  
EF estimated at greater   
than 65%.  
  
Attenuation correction   
CT images demonstrate   
no gross anatomic  
abnormality.  
  
Note is made of bowel   
artifact greater on   
rest images as compared   
to  
stress .  
  
IMPRESSION:  
1. No evidence of   
inducible myocardial   
ischemia or prior   
infarction.  
2. The left ventricle   
is normal in size.  
3. Normal LV wall   
motion with a   
post-stress LV EF   
estimated at  
greater than 65%.  
  
I personally reviewed   
the images/study and I   
agree with the findings  
as stated by Resident   
Rosi Montelongo. This study   
was interpreted at  
[place], [place].  
  
MACRO:  
None  
  
Signed by: Titus Thao   
2024 11:13 AM  
Dictation workstation:   
[id number]  
                                                            [facility]  
Work Phone:   
[phone]  
   
                                                    Radiology Study   
observation   
(narrative)                                                     [facility]  
Work Phone:   
[phone]  
   
                                                    NM Heart Perfusion W stress   
and W radionuclide IVOrdered By: Titus Thao on   
2024   
   
                                                                  [facility]  
Work Phone:   
[phone]  
   
                                                    NUCLEAR STRESS TESTon 2024   
   
                                        NUCLEAR STRESS TEST Interpreted By: Titus Gill and Awan Komal  
STUDY:  
NUCLEAR STRESS TEST;   
2024 10:40 am  
  
INDICATION:  
Signs/Symptoms:abnormal   
EKG.  
,R94.31 Abnormal   
electrocardiogram (ECG)   
(EKG)  
  
COMPARISON:  
None.  
  
ACCESSION NUMBER(S):  
[id number]  
  
ORDERING CLINICIAN:  
BERNICE VITAL  
  
TECHNIQUE:  
DIVISION OF NUCLEAR   
MEDICINE  
STRESS MYOCARDIAL   
PERFUSION SCAN, ONE DAY   
PROTOCOL  
  
The patient received an   
intravenous dose of 11   
mCi of Tc-99m  
Myoview and resting   
emission tomographic   
(SPECT) images of the  
myocardium were   
acquired. The patient   
then exercised via   
treadmill  
stress to 94 % of MPHR   
and achieved 5.50 METS.   
At peak stress 32.9  
mCi of Tc-99m Myoview   
were administered and   
stress phase SPECT  
images of the   
myocardium were then   
acquired. These   
included ECG-gated  
images to assess and   
quantify ventricular   
function. A low-dose,  
nondiagnostic regional   
CT was utilized for   
attenuation correction  
purposes.  
  
FINDINGS:  
Both stress and rest   
studies demonstrate   
grossly normal   
perfusion  
throughout the left   
ventricle.  
  
The left ventricle is   
normal in size.  
  
Gated images   
demonstrate normal LV   
wall motion with a   
post-stress LV  
EF estimated at greater   
than 65%.  
  
Attenuation correction   
CT images demonstrate   
no gross anatomic  
abnormality.  
  
Note is made of bowel   
artifact greater on   
rest images as compared   
to  
stress .  
  
IMPRESSION:  
1. No evidence of   
inducible myocardial   
ischemia or prior   
infarction.  
2. The left ventricle   
is normal in size.  
3. Normal LV wall   
motion with a   
post-stress LV EF   
estimated at  
greater than 65%.  
  
I personally reviewed   
the images/study and I   
agree with the findings  
as stated by Resident   
Rosi Montelongo. This study   
was interpreted at  
[place], [place].  
  
MACRO:  
None  
  
Signed by: Titus Thao   
2024 11:13 AM  
Dictation workstation:   
[id number]        Normal                                  [facility]  
   
                                                    No Panel Informationon    
   
                                                              
[place] [address]  
Phone [phone], Fax   
[phone]  
  
Nuclear Treadmill   
Stress Test  
  
Patient Name: MARZENA MEDEROS Ordering   
Provider: [id number] BERNICE VITAL  
Study Date: 2024   
Reading Physician:   
[id number] Josafat Bergman MD  
MRN/PID: [id number]   
Supervising Physician:   
[id number] Josafat Bergman MD  
Accession#:   
[id number] Fellow:  
Date of Birth/Age:   
1945 /  years   
Fellow:  
Gender: F Nurse: N/A  
Admit Date: 2024   
Technician: Acosta Encinas RRT,  
CVT  
Admission Status:   
Outpatient Sonographer:   
N/A  
Height: 160.02 cm   
Technologist:  
Weight:  63.50 kg   
Additional Staff:  
BSA: 1.66 m2   
Encounter#: [id number]  
BMI: 24.80 kg/m2   
Patient Location: [facility]   
Stress Lab  
  
Study Type: CARDIOLOGY   
INTERPRETATION OF   
NUCLEAR STRESS  
Diagnosis/ICD: Abnormal   
electrocardiogram [ECG]   
[EKG]-R94.31; Encounter   
for  
other preprocedural   
examination-Z01.818  
Indication: Abnormal   
EKG and Pre-Op   
Evaluation  
CPT Codes: Stress Test   
Interpretation-70921;   
Stress Test   
Supervision-11091  
  
Falls Risk: Moderate:   
Patient has moderate   
risk for sustaining a   
fall; a falls   
prevention plan has   
been implemented.  
  
Study Details: Correct   
procedure and correct   
patient verified   
verbally and with  
ID Band checked.  
  
  
Patient History:   
Hypertension, mitral   
valve prolapse, and   
hyperlipidemia.  
Allergies:   
Neomycin-Bacitracin-David  
ymixin, oxycodone,   
zolpidem, sulfonamides.  
Smoker: Never.  
Diabetes: Yes, managed   
with Oral medicine.  
BMI: Normal 18.5 - 25.  
  
  
Medications: Losartan,   
hydrochlorothiazide,   
simvastatin,   
amlodipine, glucophage   
and glypizide. The   
patient did not take   
medications as   
prescribed.  
  
Patient Performance:   
The patient exercised   
to stage II on a Bill   
protocol for 3 minutes   
and 45 seconds,   
achieving 5.50 METS.   
Patient received a   
total of 32.9 mCi of   
Myoview at 9:39:21 AM.   
The peak heart rate   
achieved was 133 bpm,   
which was 94 % of the   
age predicted target   
heart rate of 142 bpm.   
The resting blood   
pressure was 147/89   
mmHg with a heart rate   
of 81 bpm. The standing   
blood pressure was   
130/88 mmHg with a   
heart rate of 93 bpm.   
The patient developed   
leg fatigue during the   
stress exam. The   
symptoms resolved with   
rest 4 minutes into   
recovery. The blood   
pressure response was   
normal. The test was   
terminated due to: leg   
fatigue and   
musculoskeletal   
weakness.  
  
Baseline ECG: Resting   
ECG showed normal sinus   
rhythm with normal   
tracing.  
  
Stress ECG: Stress ECG   
showed sinus   
tachycardia, with   
frequent premature   
ventricular   
contractions.  
  
Stress Stage Data:  
+-----------------+---+  
------+-------+  
   HR  Sys BP Alejandra BP   
+-----------------+---+  
------+-------+  
 Baseline Resting  81   
 147  89    
+-----------------+---+  
------+-------+  
 Baseline Standing 93   
 130  88    
+-----------------+---+  
------+-------+  
 Stage I  120 184  88    
+-----------------+---+  
------+-------+  
 Stage II  133 178  89   
   
+-----------------+---+  
------+-------+  
  
  
Recovery ECG: Recovery   
ECG showed sinus   
tachycardia, with   
frequent premature   
ventricular   
contractions. The heart   
rate recovery was   
normal.  
  
+-----------+---+------  
+-------+  
   HR  Sys BP Alejandra BP   
+-----------+---+------  
+-------+  
 Recovery I  122       
+-----------+---+------  
+-------+  
 Recovery  129   
 85    
+-----------+---+------  
+-------+  
 Recovery  127   
 74    
+-----------+---+------  
+-------+  
  
  
Summary:  
1. Baseline EKG showing   
normal sinus rhythm   
with no resting ST-T   
segment changes.  
2. With regadenoson   
infusion, there are no   
ST-T segment changes   
suggestive of ischemia.   
No sustained   
ventricular arrhythmias   
are seen.  
3. Frequent PVCs were   
seen.  
4. Regadenoson stress   
EKG is negative for   
ischemia.  
5. Adequate level of   
stress achieved.  
6. Nuclear image   
results are reported   
separately.  
  
[id number] Josafat Bergman MD  
Electronically signed   
on 2024 at   
10:56:10 AM  
  
  
  
  
  
  
  
  
  
** Final **  
                                                            Josafat López MD -   
2024 Formatting   
of this note might be   
different from the   
original.  
  
[place] [address]  
Phone [phone] [phone], Fax   
[phone]  
  
Nuclear Treadmill   
Stress Test  
  
Patient Name: MARZENA MEDEROS Ordering   
Provider: [id number] BERNICE VITAL  
Study Date: 2024   
Reading Physician:   
[id number] Josafat Bergman MD  
MRN/PID: [id number]   
Supervising Physician:   
[id number] Bergman MD  
Accession#:   
[id number] Fellow:  
Date of Birth/Age:   
1945 years   
Fellow:  
Gender: F Nurse: N/A  
Admit Date: 2024   
Technician: Acosta Encinas RRT,  
CVT  
Admission Status:   
Outpatient Sonographer:   
N/A  
Height: 160.02 cm   
Technologist:  
Weight: 63.50 kg   
Additional Staff:  
BSA: 1.66 m2   
Encounter#: [id number]  
BMI: 24.80 kg/m2   
Patient Location: [facility]   
Stress Lab  
  
Study Type: CARDIOLOGY   
INTERPRETATION OF   
NUCLEAR STRESS  
Diagnosis/ICD: Abnormal   
electrocardiogram [ECG]   
[EKG]-R94.31; Encounter   
for  
other preprocedural   
examination-Z01.818  
Indication: Abnormal   
EKG and Pre-Op   
Evaluation  
CPT Codes: Stress Test   
Interpretation-89692;   
Stress Test   
Supervision-56585  
  
Falls Risk: Moderate:   
Patient has moderate   
risk for sustaining a   
fall; a falls   
prevention plan has   
been implemented.  
  
Study Details: Correct   
procedure and correct   
patient verified   
verbally and with  
ID Band checked.  
  
  
Patient History:   
Hypertension, mitral   
valve prolapse, and   
hyperlipidemia.  
Allergies:   
Neomycin-Bacitracin-David  
ymixin, oxycodone,   
zolpidem, sulfonamides.  
Smoker: Never.  
Diabetes: Yes, managed   
with Oral medicine.  
BMI: Normal 18.5 - 25.  
  
  
Medications: Losartan,   
hydrochlorothiazide,   
simvastatin,   
amlodipine, glucophage   
and glypizide. The   
patient did not take   
medications as   
prescribed.  
  
Patient Performance:   
The patient exercised   
to stage II on a Bill   
protocol for 3 minutes   
and 45 seconds,   
achieving 5.50 METS.   
Patient received a   
total of 32.9 mCi of   
Myoview at 9:39:21 AM.   
The peak heart rate   
achieved was 133 bpm,   
which was 94 % of the   
age predicted target   
heart rate of 142 bpm.   
The resting blood   
pressure was 147/89   
mmHg with a heart rate   
of 81 bpm. The standing   
blood pressure was   
130/88 mmHg with a   
heart rate of 93 bpm.   
The patient developed   
leg fatigue during the   
stress exam. The   
symptoms resolved with   
rest 4 minutes into   
recovery. The blood   
pressure response was   
normal. The test was   
terminated due to: leg   
fatigue and   
musculoskeletal   
weakness.  
  
Baseline ECG: Resting   
ECG showed normal sinus   
rhythm with normal   
tracing.  
  
Stress ECG: Stress ECG   
showed sinus   
tachycardia, with   
frequent premature   
ventricular   
contractions.  
  
Stress Stage Data:  
+-----------------+---+  
------+-------+  
   HR  Sys BP Alejandra BP   
+-----------------+---+  
------+-------+  
 Baseline Resting  81   
 147  89    
+-----------------+---+  
------+-------+  
 Baseline Standing 93   
 130  88    
+-----------------+---+  
------+-------+  
 Stage I  120 184  88    
+-----------------+---+  
------+-------+  
 Stage II  133 178  89   
   
+-----------------+---+  
------+-------+  
  
  
Recovery ECG: Recovery   
ECG showed sinus   
tachycardia, with   
frequent premature   
ventricular   
contractions. The heart   
rate recovery was   
normal.  
  
+-----------+---+------  
+-------+  
   HR  Sys BP Alejandra BP   
+-----------+---+------  
+-------+  
 Recovery I  122       
+-----------+---+------  
+-------+  
 Recovery  129   
 85    
+-----------+---+------  
+-------+  
 Recovery  127   
 74    
+-----------+---+------  
+-------+  
  
  
Summary:  
1. Baseline EKG showing   
normal sinus rhythm   
with no resting ST-T   
segment changes.  
2. With regadenoson   
infusion, there are no   
ST-T segment changes   
suggestive of ischemia.   
No sustained   
ventricular arrhythmias   
are seen.  
3. Frequent PVCs were   
seen.  
4. Regadenoson stress   
EKG is negative for   
ischemia.  
5. Adequate level of   
stress achieved.  
6. Nuclear image   
results are reported   
separately.  
  
[id number] Josafat Bergman MD  
Electronically signed   
on 2024 at   
10:56:10 AM  
  
  
  
  
  
  
  
  
  
** Final **  
  
                                                            [facility]  
Work Phone:   
[phone]  
   
                                                    No Panel InformationOrdered   
By: Josafat Bergman on 2024   
   
                                                                  [facility]  
Work Phone:   
[phone]  
   
                                                    Basic metabolic 2000 panelon  
 2024   
   
                      Anion gap [Moles/Vol] 15 mmol/L  Normal     10-20      [facility]  
   
                                        Comment on above:   Performed By: #### 2  
4331-1 ####  
MARKELL SAPP ([id number])  
[facility] ([facility])  
[address] [place] [address]   
   
                      Calcium [Mass/Vol] 9.8 mg/dL  Normal     8.6-10.3   [facility]  
   
                                        Comment on above:   Performed By: #### 2  
4331-1 ####  
MARKELL SAPP ([id number])  
[facility] ([facility])  
[address] [place] [address]   
   
                      Chloride [Moles/Vol] 99 mmol/L  Normal          [facility]  
   
                                        Comment on above:   Performed By: #### 2  
4331-1 ####  
MARKELL SAPP ([id number])  
[facility] ([facility])  
[facility] [place] [address]   
   
                      CO2 [Moles/Vol] 28 mmol/L  Normal     21-32      [facility]  
   
                                        Comment on above:   Performed By: #### 2  
4331-1 ####  
MARKELL SAPP ([id number])  
[facility] ([facility])  
[facility] [place] [address]   
   
                      Creatinine [Mass/Vol] 0.81 mg/dL Normal     0.50-1.05  [facility]  
   
                                        Comment on above:   Performed By: #### 2  
4331-1 ####  
MARKELL SAPP ([id number])  
[facility] ([facility])  
[facility] [place] [address]   
   
                                                    Glomerular filtration   
rate/1.73 sq   
M.predicted     75 mL/min/1.73m*2 Normal          >60             [facility]  
   
                                        Comment on above:   Result Comment: Calc  
ulations of estimated GFR are performed   
using the  CKD-EPI Study Refit equation without the race   
variable for the IDMS-Traceable creatinine methods.  
https://jasn.asnjournals.org/content/early//ASN.354296  
0988   
   
                                                            Performed By: #### 2  
4331-1 ####  
MARKELL SAPP ([id number])  
[facility] ([facility])  
[address] [place] [address]   
   
                      Glucose [Mass/Vol] 138 mg/dL  High       74-99      [facility]  
   
                                        Comment on above:   Performed By: #### 2  
4331-1 ####  
MARKELL SAPP ([id number])  
[facility] ([facility])  
[address] [place] [address]   
   
                      Potassium [Moles/Vol] 4.2 mmol/L Normal     3.5-5.3    [facility]  
   
                                        Comment on above:   Performed By: #### 2  
4331-1 ####  
MARKELL SAPP ([id number])  
[facility] ([facility])  
[facility] [place] [address]   
   
                      Sodium [Moles/Vol] 138 mmol/L Normal     136-145    [facility]  
   
                                        Comment on above:   Performed By: #### 2  
4331-1 ####  
MARKELL SAPP ([id number])  
[facility] ([facility])  
[address] [place] [address]   
   
                                                    Urea nitrogen   
[Mass/Vol]      14 mg/dL        Normal          6-23            [facility]  
   
                                        Comment on above:   Performed By: #### 2  
4331-1 ####  
MARKELL SAPP ([id number])  
[facility] ([facility])  
[address] [place] [address]   
   
                                                    HbA1c (Bld) [Mass fraction]o  
n 2024   
   
                                                    Average glucose   
Estimated from   
glycated hemoglobin   
(Bld) [Mass/Vol]    189 mg/dL           Normal              Not   
Established                             [facility]  
   
                                        Comment on above:   Order Comment: Diagn  
osis of Diabetes-AdultsNon-Diabetic: < or   
=   
5.6%Increased risk for developing diabetes: 5.7-6.4%Diagnostic   
of diabetes: > or = 6.5%   
   
                                                            Performed By: #### 2  
4331-1 ####  
MARKELL SAPP ([id number])  
[facility] ([facility])  
[address] [place], [place] [address]   
   
                                                    Hemoglobin A1c/Hemoglobin.to  
dayna 2024   
   
                                                    HbA1c (Bld) [Mass   
fraction]       8.2 %           High            See comment     [facility]  
   
                                        Comment on above:   Order Comment: Diagn  
osis of Diabetes-AdultsNon-Diabetic: < or   
=   
5.6%Increased risk for developing diabetes: 5.7-6.4%Diagnostic   
of diabetes: > or = 6.5%   
   
                                                            Performed By: #### 2  
[id number] ####  
GUILLAUME SEKOU ([id number])  
[facility] ([facility])  
[address] [place], [place] [id number]   
   
                                                    POCT SARS-COV-2/FLU/RSV PCR   
SYMPTOMATIC manually resultedOrdered By: Riya Kebede   
on 2024   
   
                                                    FLUAV RNA THANIA+probe   
Ql (Resp)       Not detected                    Not Detected    [facility]  
   
                                                    FLUBV RNA THANIA+probe   
Ql (Resp)       Not detected                    Not Detected    [facility]  
   
                                                    Interpretation and   
review of laboratory   
results         Abnormal                                        [facility]  
   
                                                    RSV RNA THANIA+probe Ql   
(Resp)          Not detected                    Not Detected    [facility]  
   
                                                    SARS-CoV-2 (COVID-19)   
RNA THANIA+probe Ql   
(Resp)          Detected        Abnormal        Not Detected    [facility]  
   
                                                    ECG 12 lead (Clinic Performe  
d)on 2024   
   
                                                    Interpretation and   
review of laboratory   
results         Abnormal                                        [facility]  
Work Phone:   
[phone]  
   
                                                                  [facility]  
Work Phone:   
[phone]  
   
                                                    US ABDOMEN LIMITED LIVERon 0  
2024   
   
                                                    US ABDOMEN LIMITED   
LIVER                                   Interpreted By:   
Candy Salter,  
STUDY:  
US ABDOMEN LIMITED   
LIVER; 2024 12:24   
pm  
  
INDICATION:  
Signs/Symptoms:Follow-u  
p to lesions identified   
on liver on CAT scan.  
  
COMPARISON:  
CT abdomen and pelvis   
dated 2024   
demonstrated hypodense   
masses  
in the liver felt to   
represent cysts.  
  
ACCESSION NUMBER(S):  
[id number]  
  
ORDERING CLINICIAN:  
SHARIFA ALMAGUER  
  
TECHNIQUE:  
Multiple images of the   
abdomen were obtained.  
  
FINDINGS:  
LIVER:  
The echogenicity of the   
liver is within normal   
limits.  
There is a 1.7 cm cyst   
in the left lobe with a   
thin septation, benign.  
There is a 1.0 cm   
simple left hepatic   
cyst, benign.  
The sagittal dimension   
of the right lobe of   
the liver is 13.3 cm,   
not  
enlarged.  
  
GALLBLADDER:  
Status post   
cholecystectomy.  
  
BILE DUCTS:  
There is no   
intrahepatic biliary   
dilatation.  
The common bile duct is   
nondilated measuring   
0.7 cm.  
  
PANCREAS:  
The pancreas is   
unremarkable.  
  
RIGHT KIDNEY:  
The right kidney is   
normal in size   
measuring 9.3 cm in   
length.  
  
The echogenicity of the   
cortex is within normal   
limits.  
There is no renal mass.  
There is no intrarenal   
calculus or   
hydronephrosis.  
  
IMPRESSION:  
1. Benign hepatic   
cysts. No follow-up is   
needed.  
2. Status post   
cholecystectomy.  
  
MACRO:  
None  
  
Signed by: Candy Salter 2024 11:55   
AM  
Dictation workstation:   
[id number]        [facility]  
   
                                                    CT ABDOMEN PELVIS W IV CONTR  
Tami 2024   
   
                                                    CT ABDOMEN PELVIS W   
IV CONTRAST                             Interpreted By:   
Mark lFynn,  
STUDY:  
CT ABDOMEN PELVIS W IV   
CONTRAST; 2024   
10:44 am  
  
INDICATION:  
Signs/Symptoms:Unexplai  
juan r weight loss with   
some nausea and   
vomiting.  
  
COMPARISON:  
None.  
  
ACCESSION NUMBER(S):  
[id number]  
  
ORDERING CLINICIAN:  
SHARIFA ALMAGUER  
  
TECHNIQUE:  
CT of the abdomen and   
pelvis was performed.   
Standard contiguous  
axial images were   
obtained at 3 mm slice   
thickness through the  
abdomen and pelvis.   
Coronal and sagittal   
reconstructions at 3 mm  
slice thickness were   
performed.  
  
70 ml of contrast   
Omnipaque 350 were   
administered   
intravenously  
without immediate   
complication. 500 mL of   
positive oral contrast   
was  
also given.  
  
FINDINGS:  
LOWER CHEST:  
Mild left and minimal   
right lower lung   
subsegmental   
atelectasis  
and/or scarring.   
Circumflex coronary   
artery calcification.  
  
ABDOMEN:  
  
LIVER:  
Up to 1.7 cm superior   
subcapsular left lobe   
cyst. Other at most 1   
cm  
hypoattenuating right   
and left lobe lesions   
are probably cysts, but   
1  
of them in the superior   
subcapsular right lobe   
on image 29 series 3  
and coronal image 44   
has an indeterminate   
attenuation of 26 HU   
and  
might be further   
evaluated with   
ultrasound.  
  
BILE DUCTS:  
The extra pancreatic   
common bile duct   
measures up to 8 mm in  
diameter, normal   
following a   
cholecystectomy. No   
intrahepatic biliary  
dilation or calcified   
stone.  
  
GALLBLADDER:  
Cholecystectomy clips.  
  
PANCREAS:  
Unremarkable aside from   
atrophy that is   
greatest in the head   
and  
uncinate process.  
  
SPLEEN:  
Normal size and   
homogeneous. Up to 11   
mm nearby round soft   
tissue  
densities are   
consistent with   
splenules.  
  
ADRENAL GLANDS:  
Within normal limits.  
  
KIDNEYS AND URETERS:  
Normal size kidneys   
without a mass or   
perinephric stranding.   
3.7 cm  
exophytic posterior   
left lower pole   
cortical cyst. A   
subcentimeter  
cortical lesion   
laterally in the mid   
left kidney is probably   
a cyst,  
but is too small to   
definitively   
characterize. No  
hydroureteronephrosis   
or   
nephroureterolithiasis   
is identified, but  
the lack of unenhanced   
images limits   
evaluation for small  
nonobstructing calculi.  
  
PELVIS:  
  
BLADDER:  
Collapsed. No calcified   
stone.  
  
REPRODUCTIVE ORGANS:  
Status post   
hysterectomy. The   
ovaries can not be   
identified.  
  
BOWEL:  
The stomach is   
unremarkable.   
Nonspecific large and   
small intestinal  
air/fluid levels   
without bowel   
distention. Several   
diverticula at the  
junction of the sigmoid   
and descending colon   
without evidence of  
diverticulitis. A   
normal appendix   
contains contrast   
material but no  
gas.  
  
VESSELS:  
The aorta and IVC   
appear normal. Moderate   
vascular calcification  
includes left renal   
artery origin   
calcification  
  
PERITONEUM/RETROPERITON  
EUM/LYMPH NODES:  
No ascites or free air,   
no fluid collection. No   
abdominopelvic  
lymphadenopathy is   
apparent.  
  
BONES AND ABDOMINAL   
WALL:  
Multilevel spinal   
degenerative changes   
are noted. Diastasis   
recti. A  
minute umbilical hernia   
contains only fat.  
  
IMPRESSION:  
1. Hepatic and left   
renal cysts.  
2. Coronary artery   
calcification.  
3. Diverticulosis.  
  
MACRO:  
None  
  
Signed by: Mark Flynn 2024   
4:57 PM  
Dictation workstation:   
[id number]        [facility]  
   
                                                    TRANSTHORACIC ECHO (TTE) COM  
PLETEon 2024   
   
                                                    TRANSTHORACIC ECHO   
(TTE) [place] [address]  
Phone [phone], Fax   
[phone]  
TRANSTHORACIC   
ECHOCARDIOGRAM REPORT  
Patient Name: MARZENA Kidd   
Physician: [id number]   
Santiago Da Silva MD  
Study Date: 2024   
Ordering Provider:   
[id number] ALMAGUER  
MRN/PID: [id number]   
Fellow:  
Accession#:   
[id number] Nurse:   
Ellyn Delarosa RN  
Date of Birth/Age:   
1945 / 78 years   
Sonographer: Maxwell Salvador RDCS  
Gender: F Additional   
Staff:  
Height: 160.02 cm Admit   
Date:  
Weight: 63.05 kg   
Admission Status:   
Outpatient  
BSA / BMI: 1.66 m2 /   
24.62 Department   
Location: [facility]  
kg/m2  
Blood Pressure: 157 /84   
mmHg  
Study Type:   
TRANSTHORACIC ECHO   
(TTE) COMPLETE  
Diagnosis/ICD: Abnormal   
electrocardiogram [ECG]   
[EKG]-R94.31  
CPT Codes: Echo   
Complete w Full   
[id number]  
Study Detail: The   
following Echo studies   
were performed: 2D,   
M-Mode, Doppler and  
color flow. Agitated   
saline used as a   
contrast agent for  
intraseptal flow   
evaluation.  
  
PHYSICIAN   
INTERPRETATION:  
Left Ventricle: Left   
ventricular ejection   
fraction is normal, by   
visual estimate at   
55-60%. There are no   
regional wall motion   
abnormalities. The left   
ventricular cavity size   
is normal. Spectral   
Doppler shows an   
impaired relaxation   
pattern of left   
ventricular diastolic   
filling.  
Left Atrium: The left   
atrium is mildly   
dilated. A bubble study   
using agitated saline   
was performed. Bubble   
study is negative.  
Right Ventricle: The   
right ventricle is   
normal in size. There   
is normal right   
ventricular global   
systolic function.  
Right Atrium: The right   
atrium is normal in   
size.  
Aortic Valve: The   
aortic valve is   
trileaflet. There is no   
evidence of aortic   
valve regurgitation.  
Mitral Valve: The   
mitral valve is   
abnormal. There is mild   
mitral valve   
regurgitation. Mild   
prolapse of the   
posterior leaflet of   
the mitral valve.  
Tricuspid Valve: The   
tricuspid valve is   
structurally normal.   
There is trace   
tricuspid   
regurgitation.  
Pulmonic Valve: The   
pulmonic valve is   
structurally normal.   
There is trace pulmonic   
valve regurgitation.  
Pericardium: There is   
no pericardial effusion   
noted.  
Aorta: The aortic root   
is normal.  
Systemic Veins: The   
inferior vena cava   
appears to be of normal   
size. There is IVC   
inspiratory collapse   
greater than 50%.  
In comparison to the   
previous   
echocardiogram(s):   
There are no prior   
studies on this patient   
for comparison   
purposes.  
  
CONCLUSIONS:  
1. Left ventricular   
ejection fraction is   
normal, by visual   
estimate at 55-60%.  
2. Spectral Doppler   
shows an impaired   
relaxation pattern of   
left ventricular   
diastolic filling.  
3. There is normal   
right ventricular   
global systolic   
function.  
4. Mild prolapse of the   
posterior leaflet of   
the mitral valve.  
5. Mild mitral valve   
regurgitation.  
QUANTITATIVE DATA   
SUMMARY:  
2D MEASUREMENTS:  
Normal Ranges:  
Ao Root d: 3.20 cm   
(2.0-3.7cm)  
LAs: 3.60 cm   
(2.7-4.0cm)  
IVSd: 1.00 cm   
(0.6-1.1cm)  
LVPWd: 0.76 cm   
(0.6-1.1cm)  
LVIDd: 4.60 cm   
(3.9-5.9cm)  
LVIDs: 2.48 cm  
LV Mass Index: 80.6   
g/m2  
LV % FS 46.1 %  
LA VOLUME:  
Normal Ranges:  
LA Vol A4C: 24.7 ml   
(22+/-6mL/m2)  
LA Vol A2C: 23.8 ml  
LA Vol BP: 25.9 ml  
LA Vol Index A4C:   
14.9ml/m2  
LA Vol Index A2C: 14.4   
ml/m2  
LA Vol Index BP: 15.6   
ml/m2  
LA Area A4C: 10.3 cm2  
LA Area A2C: 10.8 cm2  
LA Major Axis A4C: 3.6   
cm  
LA Major Axis A2C: 4.2   
cm  
LA Volume Index: 14.3   
ml/m2  
LA Vol A4C: 23.7 ml  
LA Vol A2C: 23.8 ml  
LA Vol Index BSA: 14.3   
ml/m2  
LV SYSTOLIC FUNCTION BY   
2D PLANIMETRY (MOD):  
Normal Ranges:  
EF-A4C View: 53 %   
(>=55%)  
EF-A2C View: 50 %  
EF-Biplane: 51 %  
EF-Visual: 58 %  
LV EF Reported: 58 %  
LV DIASTOLIC FUNCTION:  
Normal Ranges:  
MV Peak E: 0.67 m/s   
(0.7-1.2 m/s)  
MV Peak A: 1.08 m/s   
(0.42-0.7 m/s)  
E/A Ratio: 0.62   
(1.0-2.2)  
MV e' 0.054 m/s (>8.0)  
MV lateral e' 0.06 m/s  
MV medial e' 0.05 m/s  
E/e' Ratio: 12.42   
(<8.0)  
MITRAL VALVE:  
Normal Ranges:  
MV DT: 162 msec   
(150-240msec)  
AORTIC VALVE:  
Normal Ranges:  
LVOT Diameter: 1.80 cm   
(1.8-2.4cm)  
  
RIGHT VENTRICLE:  
RV Basal 4.02 cm  
RV Mid 3.13 cm  
RV Major 6.6 cm  
TAPSE: 16.6 mm  
RV s' 0.17 m/s  
  
[id number] Santiago Da Silva MD  
Electronically signed   
on 2024 at   
11:54:54 AM  
  
** Final **         Normal                                  [facility]  
   
                                                    US Heart TransthoracicOrdere  
d By: Santiago Da Silva on 2024   
   
                      LA vol index A/L 15.6 ml/m2                       [facility]  
Work Phone:   
[phone]  
   
                      LV A4C EF  53.2                             [facility]  
Work Phone:   
[phone]  
   
                      LV Biplane EF 51 %                             [facility]  
Work Phone:   
[phone]  
   
                      LV EF      58 %                             [facility]  
Work Phone:   
[phone]  
   
                      LVIDd      4.60 cm                          [facility]  
Work Phone:   
[phone]  
   
                      LVOT diam  1.80 cm                          [facility]  
Work Phone:   
[phone]  
   
                      MV E/A ratio 0.62                             [facility]  
Work Phone:   
[phone]  
   
                      RV free wall pk S' 17.10 cm/s                       [facility]  
Work Phone:   
[phone]  
   
                                                    Tricuspid annular   
plane systolic   
excursion       1.7 cm                                          [facility]  
Work Phone:   
[phone]  
   
                                                                  [facility]  
Work Phone:   
[phone]  
   
                                                     [facility]    
   
                                                              
[place] [address]  
Phone [phone], Fax   
[phone]  
  
TRANSTHORACIC   
ECHOCARDIOGRAM REPORT  
  
Patient Name: MARZENA Kidd   
Physician: [id number]   
Santiago Da Silva MD  
Study Date: 2024   
Ordering Provider:   
[id number] ALMAGUER  
MRN/PID: [id number]   
Fellow:  
Accession#:    
[id number] Nurse:   
Ellyn Delarosa RN  
Date of Birth/Age:   
1945 / 78 years   
Sonographer: Maxwell Salvador RDCS  
Gender: F Additional   
Staff:  
Height: 160.02 cm Admit   
Date:  
Weight: 63.05 kg   
Admission Status:   
Outpatient  
BSA / BMI: 1.66 m2 /   
24.62 Department   
Location: [facility] Echo Lab  
kg/m2  
Blood Pressure: 157 /84   
mmHg  
  
Study Type:   
TRANSTHORACIC ECHO   
(TTE) COMPLETE  
Diagnosis/ICD: Abnormal   
electrocardiogram [ECG]   
[EKG]-R94.31  
CPT Codes: Echo   
Complete w Full   
[id number]  
Study Detail: The   
following Echo studies   
were performed: 2D,   
M-Mode, Doppler and  
color flow. Agitated   
saline used as a   
contrast agent for  
intraseptal flow   
evaluation.  
  
  
PHYSICIAN   
INTERPRETATION:  
Left Ventricle: Left   
ventricular ejection   
fraction is normal, by   
visual estimate at   
55-60%. There are no   
regional wall motion   
abnormalities. The left   
ventricular cavity size   
is normal. Spectral   
Doppler shows an   
impaired relaxation   
pattern of left   
ventricular diastolic   
filling.  
Left Atrium: The left   
atrium is mildly   
dilated. A bubble study   
using agitated saline   
was performed. Bubble   
study is negative.  
Right Ventricle: The   
right ventricle is   
normal in size. There   
is normal right   
ventricular global   
systolic function.  
Right Atrium: The right   
atrium is normal in   
size.  
Aortic Valve: The   
aortic valve is   
trileaflet. There is no   
evidence of aortic   
valve regurgitation.  
Mitral Valve: The   
mitral valve is   
abnormal. There is mild   
mitral valve   
regurgitation. Mild   
prolapse of the   
posterior leaflet of   
the mitral valve.  
Tricuspid Valve: The   
tricuspid valve is   
structurally normal.   
There is trace   
tricuspid   
regurgitation.  
Pulmonic Valve: The   
pulmonic valve is   
structurally normal.   
There is trace pulmonic   
valve regurgitation.  
Pericardium: There is   
no pericardial effusion   
noted.  
Aorta: The aortic root   
is normal.  
Systemic Veins: The   
inferior vena cava   
appears to be of normal   
size. There is IVC   
inspiratory collapse   
greater than 50%.  
In comparison to the   
previous   
echocardiogram(s):   
There are no prior   
studies on this patient   
for comparison   
purposes.  
  
  
CONCLUSIONS:  
1. Left ventricular   
ejection fraction is   
normal, by visual   
estimate at 55-60%.  
2. Spectral Doppler   
shows an impaired   
relaxation pattern of   
left ventricular   
diastolic filling.  
3. There is normal   
right ventricular   
global systolic   
function.  
4. Mild prolapse of the   
posterior leaflet of   
the mitral valve.  
5. Mild mitral valve   
regurgitation.  
  
QUANTITATIVE DATA   
SUMMARY:  
2D MEASUREMENTS:  
Normal Ranges:  
Ao Root d: 3.20 cm   
(2.0-3.7cm)  
LAs: 3.60 cm   
(2.7-4.0cm)  
IVSd: 1.00 cm   
(0.6-1.1cm)  
LVPWd: 0.76 cm   
(0.6-1.1cm)  
LVIDd: 4.60 cm   
(3.9-5.9cm)  
LVIDs: 2.48 cm  
LV Mass Index: 80.6   
g/m2  
LV % FS 46.1 %  
  
LA VOLUME:  
Normal Ranges:  
LA Vol A4C: 24.7 ml   
(22+/-6mL/m2)  
LA Vol A2C: 23.8 ml  
LA Vol BP: 25.9 ml  
LA Vol Index A4C:   
14.9ml/m2  
LA Vol Index A2C: 14.4   
ml/m2  
LA Vol Index BP: 15.6   
ml/m2  
LA Area A4C: 10.3 cm2  
LA Area A2C: 10.8 cm2  
LA Major Axis A4C: 3.6   
cm  
LA Major Axis A2C: 4.2   
cm  
LA Volume Index: 14.3   
ml/m2  
LA Vol A4C: 23.7 ml  
LA Vol A2C: 23.8 ml  
LA Vol Index BSA: 14.3   
ml/m2  
  
LV SYSTOLIC FUNCTION BY   
2D PLANIMETRY (MOD):  
Normal Ranges:  
EF-A4C View: 53 %   
(>=55%)  
EF-A2C View: 50 %  
EF-Biplane: 51 %  
EF-Visual: 58 %  
LV EF Reported: 58 %  
  
LV DIASTOLIC FUNCTION:  
Normal Ranges:  
MV Peak E: 0.67 m/s   
(0.7-1.2 m/s)  
MV Peak A: 1.08 m/s   
(0.42-0.7 m/s)  
E/A Ratio: 0.62   
(1.0-2.2)  
MV e' 0.054 m/s (>8.0)  
MV lateral e' 0.06 m/s  
MV medial e' 0.05 m/s  
E/e' Ratio: 12.42   
(<8.0)  
  
MITRAL VALVE:  
Normal Ranges:  
MV DT: 162 msec   
(150-240msec)  
  
AORTIC VALVE:  
Normal Ranges:  
LVOT Diameter: 1.80 cm   
(1.8-2.4cm)  
  
  
RIGHT VENTRICLE:  
RV Basal 4.02 cm  
RV Mid 3.13 cm  
RV Major 6.6 cm  
TAPSE: 16.6 mm  
RV s' 0.17 m/s  
  
  
[id number] Santiago Da Silva MD  
Electronically signed   
on 2024 at   
11:54:54 AM  
  
  
  
** Final **  
                                                            Santiago Denson MD  
 -   
2024 Formatting   
of this note might be   
different from the   
original.  
  
[place] [address]  
Phone [phone], Fax   
[phone]  
  
TRANSTHORACIC   
ECHOCARDIOGRAM REPORT  
  
Patient Name: MARZENA MEDEROS Reading   
Physician: [id number]   
Santiago Da Silva MD  
Study Date: 2024   
Ordering Provider:   
[id number] SHARIFA S  
  
MRN/PID: [id number]   
Fellow:  
Accession#:   
[id number] Nurse:   
Ellyn Delarosa RN  
Date of Birth/Age:   
1945 / 78 years   
Sonographer: Maxwell Salvador RDCS  
Gender: F Additional   
Staff:  
Height: 160.02 cm Admit   
Date:  
Weight: 63.05 kg   
Admission Status:   
Outpatient  
BSA / BMI: 1.66 m2 /   
24.62 Department   
Location: [facility] Lab  
kg/m2  
Blood Pressure: 157 /84   
mmHg  
  
Study Type:   
TRANSTHORACIC ECHO   
(TTE) COMPLETE  
Diagnosis/ICD: Abnormal   
electrocardiogram [ECG]   
[EKG]-R94.31  
CPT Codes: Echo   
Complete w Full   
[id number]  
Study Detail: The   
following Echo studies   
were performed: 2D,   
M-Mode, Doppler and  
color flow. Agitated   
saline used as a   
contrast agent for  
intraseptal flow   
evaluation.  
  
  
PHYSICIAN   
INTERPRETATION:  
Left Ventricle: Left   
ventricular ejection   
fraction is normal, by   
visual estimate at   
55-60%. There are no   
regional wall motion   
abnormalities. The left   
ventricular cavity size   
is normal. Spectral   
Doppler shows an   
impaired relaxation   
pattern of left   
ventricular diastolic   
filling.  
Left Atrium: The left   
atrium is mildly   
dilated. A bubble study   
using agitated saline   
was performed. Bubble   
study is negative.  
Right Ventricle: The   
right ventricle is   
normal in size. There   
is normal right   
ventricular global   
systolic function.  
Right Atrium: The right   
atrium is normal in   
size.  
Aortic Valve: The   
aortic valve is   
trileaflet. There is no   
evidence of aortic   
valve regurgitation.  
Mitral Valve: The   
mitral valve is   
abnormal. There is mild   
mitral valve   
regurgitation. Mild   
prolapse of the   
posterior leaflet of   
the mitral valve.  
Tricuspid Valve: The   
tricuspid valve is   
structurally normal.   
There is trace   
tricuspid   
regurgitation.  
Pulmonic Valve: The   
pulmonic valve is   
structurally normal.   
There is trace pulmonic   
valve regurgitation.  
Pericardium: There is   
no pericardial effusion   
noted.  
Aorta: The aortic root   
is normal.  
Systemic Veins: The   
inferior vena cava   
appears to be of normal   
size. There is IVC   
inspiratory collapse   
greater than 50%.  
In comparison to the   
previous   
echocardiogram(s):   
There are no prior   
studies on this patient   
for comparison   
purposes.  
  
  
CONCLUSIONS:  
1. Left ventricular   
ejection fraction is   
normal, by visual   
estimate at 55-60%.  
2. Spectral Doppler   
shows an impaired   
relaxation pattern of   
left ventricular   
diastolic filling.  
3. There is normal   
right ventricular   
global systolic   
function.  
4. Mild prolapse of the   
posterior leaflet of   
the mitral valve.  
5. Mild mitral valve   
regurgitation.  
  
QUANTITATIVE DATA   
SUMMARY:  
2D MEASUREMENTS:  
Normal Ranges:  
Ao Root d: 3.20 cm   
(2.0-3.7cm)  
LAs: 3.60 cm   
(2.7-4.0cm)  
IVSd: 1.00 cm   
(0.6-1.1cm)  
LVPWd: 0.76 cm   
(0.6-1.1cm)  
LVIDd: 4.60 cm   
(3.9-5.9cm)  
LVIDs: 2.48 cm  
LV Mass Index: 80.6   
g/m2  
LV % FS 46.1 %  
  
LA VOLUME:  
Normal Ranges:  
LA Vol A4C: 24.7 ml   
(22+/-6mL/m2)  
LA Vol A2C: 23.8 ml  
LA Vol BP: 25.9 ml  
LA Vol Index A4C:   
14.9ml/m2  
LA Vol Index A2C: 14.4   
ml/m2  
LA Vol Index BP: 15.6   
ml/m2  
LA Area A4C: 10.3 cm2  
LA Area A2C: 10.8 cm2  
LA Major Axis A4C: 3.6   
cm  
LA Major Axis A2C: 4.2   
cm  
LA Volume Index: 14.3   
ml/m2  
LA Vol A4C: 23.7 ml  
LA Vol A2C: 23.8 ml  
LA Vol Index BSA: 14.3   
ml/m2  
  
LV SYSTOLIC FUNCTION BY   
2D PLANIMETRY (MOD):  
Normal Ranges:  
EF-A4C View: 53 %   
(>=55%)  
EF-A2C View: 50 %  
EF-Biplane: 51 %  
EF-Visual: 58 %  
LV EF Reported: 58 %  
  
LV DIASTOLIC FUNCTION:  
Normal Ranges:  
MV Peak E: 0.67 m/s   
(0.7-1.2 m/s)  
MV Peak A: 1.08 m/s   
(0.42-0.7 m/s)  
E/A Ratio: 0.62   
(1.0-2.2)  
MV e' 0.054 m/s (>8.0)  
MV lateral e' 0.06 m/s  
MV medial e' 0.05 m/s  
E/e' Ratio: 12.42   
(<8.0)  
  
MITRAL VALVE:  
Normal Ranges:  
MV DT: 162 msec   
(150-240msec)  
  
AORTIC VALVE:  
Normal Ranges:  
LVOT Diameter: 1.80 cm   
(1.8-2.4cm)  
  
  
RIGHT VENTRICLE:  
RV Basal 4.02 cm  
RV Mid 3.13 cm  
RV Major 6.6 cm  
TAPSE: 16.6 mm  
RV s' 0.17 m/s  
  
  
[id number] Santiago Da Silva MD  
Electronically signed   
on 2024 at   
11:54:54 AM  
  
  
  
** Final **  
  
                                                            [facility]  
Work Phone:   
[phone]  
   
                                                    CBC W Auto Differential pane  
l (Bld)on 2024   
   
                                                    Basophils (Bld)   
[#/Vol]         0.02 x10*3/uL   Normal          0.00-0.10       [facility]  
   
                                        Comment on above:   Performed By: #### 5  
7021-8 ####  
MARKELL SAPP ([id number])  
[facility] ([facility])  
[address] [place] [address]   
   
                                                    Basophils/100 WBC   
(Bld)           0.2 %           Normal          0.0-2.0         [facility]  
   
                                        Comment on above:   Performed By: #### 5  
7021-8 ####  
MARKELL SAPP ([id number])  
[facility] ([facility])  
[address] [place] [address]   
   
                                                    Eosinophils (Bld)   
[#/Vol]         0.14 x10*3/uL   Normal          0.00-0.40       [facility]  
   
                                        Comment on above:   Performed By: #### 5  
7021-8 ####  
MARKELL SAPP ([id number])  
[facility] ([facility])  
[address] [place] [address]   
   
                                                    Eosinophils/100 WBC   
(Bld)           1.5 %           Normal          0.0-6.0         [facility]  
   
                                        Comment on above:   Performed By: #### 5  
7021-8 ####  
MARKELL SAPP ([id number])  
[facility] ([facility])  
[address] [place] [address]   
   
                                                    Erythrocyte   
distribution width   
(RBC) [Ratio]   12.5 %          Normal          11.5-14.5       [facility]  
   
                                        Comment on above:   Performed By: #### 5  
7021-8 ####  
MARKELL SAPP ([id number])  
[facility] ([facility])  
[address] [place] [address]   
   
                                                    Hematocrit (Bld)   
[Volume fraction] 44.6 %          Normal          36.0-46.0       [facility]  
   
                                        Comment on above:   Performed By: #### 5  
7021-8 ####  
MAREKLL SAPP ([id number])  
[facility] ([facility])  
[address] [place] [address]   
   
                                                    Hemoglobin (Bld)   
[Mass/Vol]      14.6 g/dL       Normal          12.0-16.0       [facility]  
   
                                        Comment on above:   Performed By: #### 5  
7021-8 ####  
MARKELL SAPP ([id number])  
[facility] ([facility])  
[address] [place] [address]   
   
                                                    Immature granulocytes   
(Bld) [#/Vol]   0.03 x10*3/uL   Normal          0.00-0.50       [facility]  
   
                                        Comment on above:   Performed By: #### 5  
7021-8 ####  
MARKELL SAPP ([id number])  
[facility] ([facility])  
[address] [place] [address]   
   
                                                    Immature   
granulocytes/100 WBC   
(Bld)           0.3 %           Normal          0.0-0.9         [facility]  
   
                                        Comment on above:   Result Comment: Krys  
ture Granulocyte Count (IG) includes   
promyelocytes, myelocytes and metamyelocytes but does not   
include bands. Percent differential counts (%) should be   
interpreted in the context of the absolute cell counts   
(cells/UL).   
   
                                                            Performed By: #### 5  
7021-8 ####  
MARKELL SAPP ([id number])  
[facility] ([facility])  
[address] [place] [address]   
   
                                                    Lymphocytes (Bld)   
[#/Vol]         3.61 x10*3/uL   High            0.80-3.00       [facility]  
   
                                        Comment on above:   Performed By: #### 5  
7021-8 ####  
MARKELL SAPP ([id number])  
[facility] ([facility])  
[address] [place] [address]   
   
                                                    Lymphocytes/100 WBC   
(Bld)           38.8 %          Normal          13.0-44.0       [facility]  
   
                                        Comment on above:   Performed By: #### 5  
7021-8 ####  
MARKELL SAPP ([id number])  
[facility] ([facility])  
[address] [place] [address]   
   
                                                    MCH (RBC) [Entitic   
mass]           30.4 pg         Normal          26.0-34.0       [facility]  
   
                                        Comment on above:   Performed By: #### 5  
7021-8 ####  
MARKELL SAPP ([id number])  
[facility] ([facility])  
[address] [place] [address]   
   
                      MCHC (RBC) [Mass/Vol] 32.7 g/dL  Normal     32.0-36.0  [facility]  
   
                                        Comment on above:   Performed By: #### 5  
7021-8 ####  
MARKELL SAPP ([id number])  
[facility] ([facility])  
[address] [place] [address]   
   
                                                    MCV (RBC) [Entitic   
vol]            93 fL           Normal                    [facility]  
   
                                        Comment on above:   Performed By: #### 5  
7021-8 ####  
MARKELL SAPP ([id number])  
[facility] ([facility])  
[address] [place] [address]   
   
                                                    Monocytes (Bld)   
[#/Vol]         0.55 x10*3/uL   Normal          0.05-0.80       [facility]  
   
                                        Comment on above:   Performed By: #### 5  
7021-8 ####  
MARKELL SAPP ([id number])  
[facility] ([facility])  
[address] [place] [address]   
   
                                                    Monocytes/100 WBC   
(Bld)           5.9 %           Normal          2.0-10.0        [facility]  
   
                                        Comment on above:   Performed By: #### 5  
7021-8 ####  
MARKELL SAPP ([id number])  
[facility] ([facility])  
[address] [place] [address]   
   
                                                    Neutrophils (Bld)   
[#/Vol]         4.95 x10*3/uL   Normal          1.60-5.50       [facility]  
   
                                        Comment on above:   Result Comment: Perc  
ent differential counts (%) should be   
interpreted in the context of the absolute cell counts   
(cells/uL).   
   
                                                            Performed By: #### 5  
7021-8 ####  
MARKELL SAPP ([id number])  
[facility] ([facility])  
[address] [place] [address]   
   
                                                    Neutrophils/100 WBC   
(Bld)           53.3 %          Normal          40.0-80.0       [facility]  
   
                                        Comment on above:   Performed By: #### 5  
7021-8 ####  
MARKELL SAPP ([id number])  
[facility] ([facility])  
[address] [place] [address]   
   
                                                    Nucleated RBC/100 WBC   
(Bld) [Ratio]   0.0 /100 WBCs   Normal          0.0-0.0         [facility]  
   
                                        Comment on above:   Performed By: #### 5  
7021-8 ####  
MARKELL SAPP ([id number])  
[facility] ([facility])  
[address] [place] [address]   
   
                                                    Platelets (Bld)   
[#/Vol]         362 x10*3/uL    Normal          150-450         [facility]  
   
                                        Comment on above:   Performed By: #### 5  
7021-8 ####  
MARKELL SAPP ([id number])  
[facility] ([facility])  
[address] [place] [address]   
   
                      RBC (Bld) [#/Vol] 4.80 x10*6/uL Normal     4.00-5.20  [facility]  
   
                                        Comment on above:   Performed By: #### 5  
7021-8 ####  
MARKELL SAPP ([id number])  
[facility] ([facility])  
[address] [place] [address]   
   
                      WBC (Bld) [#/Vol] 9.3 x10*3/uL Normal     4.4-11.3   [facility]  
   
                                        Comment on above:   Performed By: #### 5  
7021-8 ####  
MARKELL SAPP ([id number])  
[facility] ([facility])  
[address] [place] [address]   
   
                                                    Comprehensive metabolic 2000  
 panelon 2024   
   
                                                    Albumin BCP dye   
[Mass/Vol]      4.3 g/dL        Normal          3.4-5.0         [facility]  
   
                                        Comment on above:   Performed By: #### 2  
4323-8 ####  
MARKELL SAPP ([id number])  
[facility] ([facility])  
[address] [place] [address]   
   
                                                    ALP [Catalytic   
activity/Vol]   66 U/L          Normal                    [facility]  
   
                                        Comment on above:   Performed By: #### 2  
4323-8 ####  
MARKELL SAPP ([id number])  
[facility] ([facility])  
[address] [place] [address]   
   
                                                    ALT With P-5'-P   
[Catalytic   
activity/Vol]   22 U/L          Normal          7-45            [facility]  
   
                                        Comment on above:   Result Comment: Sandra  
ents treated with Sulfasalazine may   
generate   
falsely decreased results for ALT.   
   
                                                            Performed By: #### 2  
4323-8 ####  
MARKELL SAPP ([id number])  
[facility] ([facility])  
[address] [place] [address]   
   
                      Anion gap [Moles/Vol] 14 mmol/L  Normal     10-20      [facility]  
   
                                        Comment on above:   Performed By: #### 2  
4323-8 ####  
MARKELL SAPP ([id number])  
[facility] ([facility])  
[address] [place] [address]   
   
                                                    AST With P-5'-P   
[Catalytic   
activity/Vol]   19 U/L          Normal          9-39            [facility]  
   
                                        Comment on above:   Performed By: #### 2  
4323-8 ####  
MARKELL SAPP ([id number])  
[facility] ([facility])  
[address] [place] [address]   
   
                      Bilirubin [Mass/Vol] 0.6 mg/dL  Normal     0.0-1.2    [facility]  
   
                                        Comment on above:   Performed By: #### 2  
4323-8 ####  
MARKELL SAPP ([id number])  
[facility] ([facility])  
[address] [place] [address]   
   
                      Calcium [Mass/Vol] 9.8 mg/dL  Normal     8.6-10.3   [facility]  
   
                                        Comment on above:   Performed By: #### 2  
4323-8 ####  
MARKELL SAPP ([id number])  
[facility] ([facility])  
[address] [place] [address]   
   
                      Chloride [Moles/Vol] 98 mmol/L  Normal          [facility]  
   
                                        Comment on above:   Performed By: #### 2  
4323-8 ####  
MARKELL SAPP ([id number])  
[facility] ([facility])  
[address] [place] [address]   
   
                      CO2 [Moles/Vol] 30 mmol/L  Normal     21-32      [facility]  
   
                                        Comment on above:   Performed By: #### 2  
4323-8 ####  
MARKELL SAPP ([id number])  
[facility] ([facility])  
[address] [place] [address]   
   
                      Creatinine [Mass/Vol] 0.81 mg/dL Normal     0.50-1.05  [facility]  
   
                                        Comment on above:   Performed By: #### 2  
4323-8 ####  
MARKELL SAPP ([id number])  
[facility] ([facility])  
[address] [place] [address]   
   
                                                    Glomerular filtration   
rate/1.73 sq   
M.predicted     75 mL/min/1.73m*2 Normal          >60             [facility]  
   
                                        Comment on above:   Result Comment: Calc  
ulations of estimated GFR are performed   
using the  CKD-EPI Study Refit equation without the race   
variable for the IDMS-Traceable creatinine methods.  
https://jasn.asnjournals.org/content/early//ASN.806587  
0988   
   
                                                            Performed By: #### 2  
4323-8 ####  
MARKELL SAPP ([id number])  
[facility] ([facility])  
[address] [place] [address]   
   
                      Glucose [Mass/Vol] 237 mg/dL  High       74-99      [facility]  
   
                                        Comment on above:   Performed By: #### 2  
4323-8 ####  
MARKELL SAPP ([id number])  
[facility] ([facility])  
[address] [place] [address]   
   
                      Potassium [Moles/Vol] 3.4 mmol/L Low        3.5-5.3    [facility]  
   
                                        Comment on above:   Performed By: #### 2  
4323-8 ####  
MARKELL SAPP ([id number])  
[facility] ([facility])  
[address] [place] [address]   
   
                      Protein [Mass/Vol] 6.6 g/dL   Normal     6.4-8.2    [facility]  
   
                                        Comment on above:   Performed By: #### 2  
4323-8 ####  
MARKELL SAPP ([id number])  
[facility] ([facility])  
[address] [place] [address]   
   
                      Sodium [Moles/Vol] 139 mmol/L Normal     136-145    [facility]  
   
                                        Comment on above:   Performed By: #### 2  
4323-8 ####  
MARKELL SAPP ([id number])  
[facility] ([facility])  
[address] [place] [address]   
   
                                                    Urea nitrogen   
[Mass/Vol]      14 mg/dL        Normal          6-23            [facility]  
   
                                        Comment on above:   Performed By: #### 2  
4323-8 ####  
MARKELL SAPP ([id number])  
[facility] ([facility])  
[address] [place] [address]   
   
                                                    Triacylglycerol lipaseon    
   
                                                    Lipase [Catalytic   
activity/Vol]   19 U/L          Normal          9-82            [facility]  
   
                                        Comment on above:   Order Comment: Venip  
uncture immediately after or during the   
administration of Metamizole may lead to falsely low results.   
Testing should be performed immediately prior to Metamizole   
dosing.   
   
                                                            Performed By: #### 3  
040-3 ####  
MARKELL SAPP ([id number])  
[facility] ([facility])  
[address] [place] [address]   
   
                                                    IOL BIOMETRY W/ IOL CALC OU   
(BOTH EYES)on 2024   
   
                                                                  [facility]  
   
                                                    Radiology Study   
observation   
(narrative)                                                     [facility]  
   
                                                    OCT MACULA CIRRUS OU (BOTH E  
YES)on 2024   
   
                                                                  [facility]  
   
                                                    Radiology Study   
observation   
(narrative)                                                     [facility]  
   
                                                    OCT MACULA CIRRUS OU (BOTH E  
YES)on 2024   
   
                                                                  [facility]  
   
                                                    Radiology Study   
observation   
(narrative)                                                     [facility]  
   
                                                    CBC W Auto Differential pane  
l (Bld)on 2024   
   
                                                    Basophils (Bld)   
[#/Vol]         0.03 10*3/uL                                    [facility]  
   
                                                    Basophils/100 WBC   
(Bld)           0.2 %                           0.0 - 2.0 %     [facility]  
   
                                                    Eosinophils (Bld)   
[#/Vol]         0.09 10*3/uL                                    [facility]  
   
                                                    Eosinophils/100 WBC   
(Bld)           0.7 %                           0.0 - 6.0 %     [facility]  
   
                                                    Erythrocyte   
distribution width   
(RBC) [Ratio]       12.2 %                                  11.5 - 14.5   
%                                       [facility]  
   
                                                    Hematocrit (Bld)   
[Volume fraction]   42.4 %                                  36.0 - 46.0   
%                                       [facility]  
   
                                                    Hemoglobin (Bld)   
[Mass/Vol]          14.3 g/dL                               12.0 - 16.0   
g/dL                                    [facility]  
   
                                                    Immature granulocytes   
(Bld) [#/Vol]   0.03 10*3/uL                                    [facility]  
   
                                                    Immature   
granulocytes/100 WBC   
(Bld)           0.2 %                           0.0 - 0.9 %     [facility]  
   
                                        Comment on above:   Immature Granulocyte  
 Count (IG) includes promyelocytes,   
myelocytes and metamyelocytes but does not include bands.   
Percent differential counts (%) should be interpreted in the   
context of the absolute cell counts (cells/UL).   
   
                                                    Interpretation and   
review of laboratory   
results         Abnormal                                        [facility]  
   
                                                    Lymphocytes (Bld)   
[#/Vol]         2.48 10*3/uL                                    [facility]  
   
                                                    Lymphocytes/100 WBC   
(Bld)               20.2 %                                  13.0 - 44.0   
%                                       [facility]  
   
                                                    MCH (RBC) [Entitic   
mass]               31.2 pg                                 26.0 - 34.0   
pg                                      [facility]  
   
                          MCHC (RBC) [Mass/Vol] 33.7 g/dL                 32.0 -  
 36.0   
g/dL                                    [facility]  
   
                                                    MCV (RBC) [Entitic   
vol]            93 fL                           80 - 100 fL     [facility]  
   
                                                    Monocytes (Bld)   
[#/Vol]         0.64 10*3/uL                                    [facility]  
   
                                                    Monocytes/100 WBC   
(Bld)           5.2 %                           2.0 - 10.0 %    [facility]  
   
                                                    Neutrophils (Bld)   
[#/Vol]         8.99 10*3/uL    High                            [facility]  
   
                                        Comment on above:   Percent differential  
 counts (%) should be interpreted in the   
context of the absolute cell counts (cells/uL).   
   
                                                    Neutrophils/100 WBC   
(Bld)               73.5 %                                  40.0 - 80.0   
%                                       [facility]  
   
                                                    Nucleated RBC/100 WBC   
(Bld) [Ratio]   0.0 %                                           [facility]  
   
                                                    Platelets (Bld)   
[#/Vol]         299 10*3/uL                                     [facility]  
   
                      RBC (Bld) [#/Vol] 4.58 10*6/uL                       [facility]  
[facility]  
   
                      WBC (Bld) [#/Vol] 12.3 10*3/uL High                  [facility]  
   
                                                    Basophils (Bld)   
[#/Vol]         0.03 x10*3/uL   Normal          0.00-0.10       [facility]  
   
                                        Comment on above:   Performed By: #### 5  
7021-8 ####  
MARKELL SAPP ([id number])  
[facility] ([facility])  
[address] [place] [address]   
   
                                                    Basophils/100 WBC   
(Bld)           0.2 %           Normal          0.0-2.0         [facility]  
   
                                        Comment on above:   Performed By: #### 5  
7021-8 ####  
MARKELL SAPP ([id number])  
[facility] ([facility])  
[address] [place] [address]   
   
                                                    Eosinophils (Bld)   
[#/Vol]         0.09 x10*3/uL   Normal          0.00-0.40       [facility]  
   
                                        Comment on above:   Performed By: #### 5  
7021-8 ####  
MARKELL SAPP ([id number])  
[facility] ([facility])  
[address] [place] [address]   
   
                                                    Eosinophils/100 WBC   
(Bld)           0.7 %           Normal          0.0-6.0         [facility]  
   
                                        Comment on above:   Performed By: #### 5  
7021-8 ####  
MARKELL SAPP ([id number])  
[facility] ([facility])  
[address] [place] [address]   
   
                                                    Erythrocyte   
distribution width   
(RBC) [Ratio]   12.2 %          Normal          11.5-14.5       [facility]  
   
                                        Comment on above:   Performed By: #### 5  
7021-8 ####  
MARKELL SAPP ([id number])  
[facility] ([facility])  
[address] [place] [address]   
   
                                                    Hematocrit (Bld)   
[Volume fraction] 42.4 %          Normal          36.0-46.0       [facility]  
   
                                        Comment on above:   Performed By: #### 5  
7021-8 ####  
MARKELL SAPP ([id number])  
[facility] ([facility])  
[address] [place] [address]   
   
                                                    Hemoglobin (Bld)   
[Mass/Vol]      14.3 g/dL       Normal          12.0-16.0       [facility]  
   
                                        Comment on above:   Performed By: #### 5  
7021-8 ####  
MARKELL SAPP ([id number])  
[facility] ([facility])  
[address] [place] [address]   
   
                                                    Immature granulocytes   
(Bld) [#/Vol]   0.03 x10*3/uL   Normal          0.00-0.50       [facility]  
   
                                        Comment on above:   Performed By: #### 5  
7021-8 ####  
MARKELL SAPP ([id number])  
[facility] ([facility])  
[address] [place] [address]   
   
                                                    Immature   
granulocytes/100 WBC   
(Bld)           0.2 %           Normal          0.0-0.9         [facility]  
   
                                        Comment on above:   Result Comment: Krys  
ture Granulocyte Count (IG) includes   
promyelocytes, myelocytes and metamyelocytes but does not   
include bands. Percent differential counts (%) should be   
interpreted in the context of the absolute cell counts   
(cells/UL).   
   
                                                            Performed By: #### 5  
7021-8 ####  
MARKELL SAPP ([id number])  
[facility] ([facility])  
[address] [place] [address]   
   
                                                    Lymphocytes (Bld)   
[#/Vol]         2.48 x10*3/uL   Normal          0.80-3.00       [facility]  
   
                                        Comment on above:   Performed By: #### 5  
7021-8 ####  
MARKELL SAPP ([id number])  
[facility] ([facility])  
[address] [place] [address]   
   
                                                    Lymphocytes/100 WBC   
(Bld)           20.2 %          Normal          13.0-44.0       [facility]  
   
                                        Comment on above:   Performed By: #### 5  
7021-8 ####  
MARKELL SAPP ([id number])  
[facility] ([facility])  
[address] [place] [address]   
   
                                                    MCH (RBC) [Entitic   
mass]           31.2 pg         Normal          26.0-34.0       [facility]  
   
                                        Comment on above:   Performed By: #### 5  
7021-8 ####  
MARKELL SAPP ([id number])  
[facility] ([facility])  
[address] [place] [address]   
   
                      MCHC (RBC) [Mass/Vol] 33.7 g/dL  Normal     32.0-36.0  [facility]  
   
                                        Comment on above:   Performed By: #### 5  
7021-8 ####  
MARKELL SAPP ([id number])  
[facility] ([facility])  
[address] [place] [address]   
   
                                                    MCV (RBC) [Entitic   
vol]            93 fL           Normal                    [facility]  
   
                                        Comment on above:   Performed By: #### 5  
7021-8 ####  
MARKELL SAPP ([id number])  
[facility] ([facility])  
[address] [place] [address]   
   
                                                    Monocytes (Bld)   
[#/Vol]         0.64 x10*3/uL   Normal          0.05-0.80       [facility]  
   
                                        Comment on above:   Performed By: #### 5  
7021-8 ####  
MARKELL SAPP ([id number])  
[facility] ([facility])  
[address] [place] [address]   
   
                                                    Monocytes/100 WBC   
(Bld)           5.2 %           Normal          2.0-10.0        [facility]  
   
                                        Comment on above:   Performed By: #### 5  
7021-8 ####  
MARKELL SAPP ([id number])  
[facility] ([facility])  
[address] [place] [address]   
   
                                                    Neutrophils (Bld)   
[#/Vol]         8.99 x10*3/uL   High            1.60-5.50       [facility]  
   
                                        Comment on above:   Result Comment: Perc  
ent differential counts (%) should be   
interpreted in the context of the absolute cell counts   
(cells/uL).   
   
                                                            Performed By: #### 5  
7021-8 ####  
MARKELL SAPP ([id number])  
[facility] ([facility])  
[address] [place] [address]   
   
                                                    Neutrophils/100 WBC   
(Bld)           73.5 %          Normal          40.0-80.0       [facility]  
   
                                        Comment on above:   Performed By: #### 5  
7021-8 ####  
MARKELL SAPP ([id number])  
[facility] ([facility])  
[address] [place] [address]   
   
                                                    Nucleated RBC/100 WBC   
(Bld) [Ratio]   0.0 /100 WBCs   Normal          0.0-0.0         [facility]  
   
                                        Comment on above:   Performed By: #### 5  
7021-8 ####  
MARKELL SAPP ([id number])  
[facility] ([facility])  
[address] [place] [address]   
   
                                                    Platelets (Bld)   
[#/Vol]         299 x10*3/uL    Normal          150-450         [facility]  
   
                                        Comment on above:   Performed By: #### 5  
7021-8 ####  
MARKELL SAPP ([id number])  
[facility] ([facility])  
[address] [place] [address]   
   
                      RBC (Bld) [#/Vol] 4.58 x10*6/uL Normal     4.00-5.20  [facility]  
   
                                        Comment on above:   Performed By: #### 5  
7021-8 ####  
MARKELL SAPP ([id number])  
[facility] ([facility])  
[address] [place] [address]   
   
                      WBC (Bld) [#/Vol] 12.3 x10*3/uL High       4.4-11.3   [facility]  
   
                                        Comment on above:   Performed By: #### 5  
7021-8 ####  
MARKELL SAPP ([id number])  
[facility] ([facility])  
[address] [place] [address]   
   
                                                    CT HEAD WO IV CONTRASTon    
   
                                                    CT HEAD WO IV   
CONTRAST                                Interpreted By:   
Dell Bhat,  
STUDY:  
CT HEAD WO IV CONTRAST;   
2024 1:39 pm  
  
INDICATION:  
Confusion, syncope 6   
days ago.  
  
COMPARISON:  
2024  
  
ACCESSION NUMBER(S):  
[id number]  
  
ORDERING CLINICIAN:  
VINOD WELCH  
  
TECHNIQUE:  
A helical acquisition   
data was obtained.  
  
One or more of the   
following dose   
reduction techniques   
were used:  
Automated exposure   
control Adjustment of   
the mA and/or kV   
according  
to patient size, and/or   
use of iterative   
reconstruction   
technique.  
  
FINDINGS:  
Intracranial findings:   
There is no evidence   
for intracranial  
hemorrhage or mass   
effect. There is a 2.6   
by 0.7 cm bony   
protuberance  
from the outer table of   
the calvarium at the   
parietal convexity,  
unchanged compared to   
the prior study   
consistent with a   
calvarial  
osteoma.  
  
Paranasal sinuses and   
temporal bone findings:   
The visualized  
portions of the   
paranasal sinuses,   
mastoid air cells, and   
middle ear  
cavities are clear.  
  
Orbital findings: The   
visualized portions of   
the orbits are  
unremarkable.  
  
IMPRESSION:  
No acute intracranial   
pathologic findings are   
identified.  
Benign-appearing   
calvarial osteoma on   
the right.  
There is no interval   
change when compared to   
the previous   
examination.  
  
MACRO:  
none  
  
Signed by: Dell Bhat 2024 1:54   
PM  
Dictation workstation:   
[id number]        [facility]  
   
                                                    CT Head WO contraston 2024   
   
                                                            No acute intracrania  
l   
pathologic findings are   
identified.  
Benign-appearing   
calvarial osteoma on   
the right.  
There is no interval   
change when compared to   
the previous   
examination.  
  
MACRO:  
none  
  
Signed by: Dell Bhat 2024 1:54   
PM  
Dictation workstation:   
[id number]                                                [facility]  
   
                                                            Interpreted By:    
Dell Bhat,  
STUDY:  
CT HEAD WO IV CONTRAST;   
2024 1:39 pm  
  
INDICATION:  
Confusion, syncope 6   
days ago.  
  
COMPARISON:  
2024  
  
ACCESSION NUMBER(S):  
[id number]  
  
ORDERING CLINICIAN:  
VINOD WELCH  
  
TECHNIQUE:  
A helical acquisition   
data was obtained.  
  
One or more of the   
following dose   
reduction techniques   
were used:  
Automated exposure   
control Adjustment of   
the mA and/or kV   
according  
to patient size, and/or   
use of iterative   
reconstruction   
technique.  
  
FINDINGS:  
Intracranial findings:   
There is no evidence   
for intracranial  
hemorrhage or mass   
effect. There is a 2.6   
by 0.7 cm bony   
protuberance  
from the outer table of   
the calvarium at the   
parietal convexity,  
unchanged compared to   
the prior study   
consistent with a   
calvarial  
osteoma.  
  
Paranasal sinuses and   
temporal bone findings:   
The visualized  
portions of the   
paranasal sinuses,   
mastoid air cells, and   
middle ear  
cavities are clear.  
  
Orbital findings: The   
visualized portions of   
the orbits are  
unremarkable.  
                                                            [facility]  
   
                                                            Dlel Bhat MD  
 -   
2024 Formatting   
of this note might be   
different from the   
original.  
Interpreted By:   
Dell Bhat,  
STUDY:  
CT HEAD WO IV CONTRAST;   
2024 1:39 pm  
  
INDICATION:  
Confusion, syncope 6   
days ago.  
  
COMPARISON:  
2024  
  
ACCESSION NUMBER(S):  
[id number]  
  
ORDERING CLINICIAN:  
VINOD WELCH  
  
TECHNIQUE:  
A helical acquisition   
data was obtained.  
  
One or more of the   
following dose   
reduction techniques   
were used:  
Automated exposure   
control Adjustment of   
the mA and/or kV   
according  
to patient size, and/or   
use of iterative   
reconstruction   
technique.  
  
FINDINGS:  
Intracranial findings:   
There is no evidence   
for intracranial  
hemorrhage or mass   
effect. There is a 2.6   
by 0.7 cm bony   
protuberance  
from the outer table of   
the calvarium at the   
parietal convexity,  
unchanged compared to   
the prior study   
consistent with a   
calvarial  
osteoma.  
  
Paranasal sinuses and   
temporal bone findings:   
The visualized  
portions of the   
paranasal sinuses,   
mastoid air cells, and   
middle ear  
cavities are clear.  
  
Orbital findings: The   
visualized portions of   
the orbits are  
unremarkable.  
  
IMPRESSION:  
No acute intracranial   
pathologic findings are   
identified.  
Benign-appearing   
calvarial osteoma on   
the right.  
There is no interval   
change when compared to   
the previous   
examination.  
  
MACRO:  
none  
  
Signed by: Dell Bhat 2024 1:54   
PM  
Dictation workstation:   
[id number]  
                                                            [facility]  
Work Phone:   
[phone]  
   
                                                                  [facility]  
Work Phone:   
[phone]  
   
                                                    Radiology Study   
observation   
(narrative)                                                     [facility]  
Work Phone:   
[phone]  
   
                                                    Comprehensive metabolic 2000  
 panelon 2024   
   
                                                    Albumin BCP dye   
[Mass/Vol]          4.0 g/dL                                3.4 - 5.0   
g/dL                                    [facility]  
   
                                                    ALP [Catalytic   
activity/Vol]   65 U/L                          33 - 136 U/L    [facility]  
   
                                                    ALT With P-5'-P   
[Catalytic   
activity/Vol]   14 U/L                          7 - 45 U/L      [facility]  
   
                                        Comment on above:   Patients treated wit  
h Sulfasalazine may generate falsely   
decreased results for ALT.   
   
                          Anion gap [Moles/Vol] 11 mmol/L                 10 - 2  
0   
mmol/L                                  [facility]  
   
                                                    AST With P-5'-P   
[Catalytic   
activity/Vol]   23 U/L                          9 - 39 U/L      [facility]  
   
                                        Comment on above:   MILD HEMOLYSIS DETEC  
CASEY. The result may be falsely elevated   
due   
to hemolysis or other interferents. Clinical correlation is   
recommended. Repeat testing may be considered.   
   
                          Bilirubin [Mass/Vol] 0.7 mg/dL                 0.0 - 1  
.2   
mg/dL                                   [facility]  
   
                          Calcium [Mass/Vol] 9.1 mg/dL                 8.6 - 10.  
3   
mg/dL                                   [facility]  
   
                          Chloride [Moles/Vol] 100 mmol/L                98 - 10  
7   
mmol/L                                  [facility]  
   
                          CO2 [Moles/Vol] 31 mmol/L                 21 - 32   
mmol/L                                  [facility]  
   
                          Creatinine [Mass/Vol] 0.84 mg/dL                0.50 -  
 1.05   
mg/dL                                   [facility]  
   
                                                    GFR/1.73 sq   
M.predicted among   
non-blacks MDRD   
(S/P/Bld) [Vol   
rate/Area]      72 mL/min/{1.73_m2}                 - PINF          [facility]  
   
                                        Comment on above:   Calculations of del  
mated GFR are performed using the    
CKD-EPI Study Refit equation without the race variable for the   
IDMS-Traceable creatinine methods.  
https://jasn.asnjournals.org/content/early//ASN.177288  
0988  
   
   
                          Glucose [Mass/Vol] 288 mg/dL    High         74 - 99   
mg/dL                                   [facility]  
   
                                                    Interpretation and   
review of laboratory   
results         Abnormal                                        [facility]  
   
                          Potassium [Moles/Vol] 3.9 mmol/L                3.5 -   
5.3   
mmol/L                                  [facility]  
   
                                        Comment on above:   MILD HEMOLYSIS DETEC  
[place]. The result may be falsely elevated   
due   
to hemolysis or other interferents. Clinical correlation is   
recommended. Repeat testing may be considered.   
   
                          Protein [Mass/Vol] 6.7 g/dL                  6.4 - 8.2  
   
g/dL                                    [facility]  
   
                          Sodium [Moles/Vol] 138 mmol/L                136 - 145  
   
mmol/L                                  [facility]  
   
                                                    Urea nitrogen   
[Mass/Vol]      23 mg/dL                        6 - 23 mg/dL    [facility]  
   
                                                    Albumin BCP dye   
[Mass/Vol]      4.0 g/dL        Normal          3.4-5.0         [facility]  
   
                                        Comment on above:   Performed By: #### 2  
4323-8 ####GUILLAUME SEKOU ([id number])[facility] ([facility])[address] [place] [address]   
   
                                                    ALP [Catalytic   
activity/Vol]   65 U/L          Normal                    [facility]  
   
                                        Comment on above:   Performed By: #### 2  
4323-8 ####MARKELL SAPP ([id number])[facility] ([facility])[address] [place] [address]   
   
                                                    ALT With P-5'-P   
[Catalytic   
activity/Vol]   14 U/L          Normal          7-45            [facility]  
   
                                        Comment on above:   Result Comment: Sandra  
ents treated with Sulfasalazine may   
generate   
falsely decreased results for ALT.   
   
                                                            Performed By: #### 2  
4323-8 ####MARKELL SAPP ([id number])[facility] ([facility])[address] [place] [address]   
   
                      Anion gap [Moles/Vol] 11 mmol/L  Normal     10-20      [facility]  
   
                                        Comment on above:   Performed By: #### 2  
4323-8 ####MARKELL SAPP ([id number])[facility] ([facility])[address] [place] [address]   
   
                                                    AST With P-5'-P   
[Catalytic   
activity/Vol]   23 U/L          Normal          9-39            [facility]  
   
                                        Comment on above:   Result Comment: MILD  
 HEMOLYSIS DETECTED. The result may be   
falsely elevated due to hemolysis or other interferents.   
Clinical correlation is recommended. Repeat testing may be   
considered.   
   
                                                            Performed By: #### 2  
4323-8 ####MARKELL SAPP ([id number])[facility] ([facility])[address] [place] [address]   
   
                      Bilirubin [Mass/Vol] 0.7 mg/dL  Normal     0.0-1.2    [facility]  
   
                                        Comment on above:   Performed By: #### 2  
4323-8 ####MARKELL SAPP ([id number])[facility] ([facility])[address] [place] [address]   
   
                      Calcium [Mass/Vol] 9.1 mg/dL  Normal     8.6-10.3   [facility]  
   
                                        Comment on above:   Performed By: #### 2  
4323-8 ####MARKELL SAPP ([id number])[facility] ([facility])[address] [place] [address]   
   
                      Chloride [Moles/Vol] 100 mmol/L Normal          [facility]  
   
                                        Comment on above:   Performed By: #### 2  
4323-8 ####MARKELL SAPP ([id number])[facility] ([facility])[address] [place] [address]   
   
                      CO2 [Moles/Vol] 31 mmol/L  Normal     21-32      [facility]  
   
                                        Comment on above:   Performed By: #### 2  
4323-8 ####MARKELL SAPP ([id number])[facility] ([facility])[address] [place] [address]   
   
                      Creatinine [Mass/Vol] 0.84 mg/dL Normal     0.50-1.05  [facility]  
   
                                        Comment on above:   Performed By: #### 2  
4323-8 ####MARKELL SAPP ([id number])[facility] ([facility])[address] [place] [address]   
   
                                                    Glomerular filtration   
rate/1.73 sq   
M.predicted     72 mL/min/1.73m*2 Normal          >60             [facility]  
   
                                        Comment on above:   Result Comment: Calc  
ulations of estimated GFR are performed   
using the  CKD-EPI Study Refit equation without the race   
variable for the IDMS-Traceable creatinine methods.  
https://jasn.asnjournals.org/content/early//ASN.891412  
0988   
   
                                                            Performed By: #### 2  
4323-8 ####MARKELL SAPP ([id number])[facility] ([facility])[address] [place] [address]   
   
                      Glucose [Mass/Vol] 288 mg/dL  High       74-99      [facility]  
   
                                        Comment on above:   Performed By: #### 2  
4323-8 ####MARKELL SAPP ([id number])[facility] ([facility] [place] [address]   
   
                      Potassium [Moles/Vol] 3.9 mmol/L Normal     3.5-5.3    [facility]  
   
                                        Comment on above:   Result Comment: MILD  
 HEMOLYSIS DETECTED. The result may be   
falsely elevated due to hemolysis or other interferents.   
Clinical correlation is recommended. Repeat testing may be   
considered.   
   
                                                            Performed By: #### 2  
4323-8 ####MARKELL SAPP ([id number])[facility] ([facility])[address] [place] [address]   
   
                      Protein [Mass/Vol] 6.7 g/dL   Normal     6.4-8.2    [facility]  
   
                                        Comment on above:   Performed By: #### 2  
4323-8 ####MARKELL SAPP ([id number])[facility] ([facility])[address] [place] [address]   
   
                      Sodium [Moles/Vol] 138 mmol/L Normal     136-145    [facility]  
   
                                        Comment on above:   Performed By: #### 2  
4323-8 ####MARKELL SAPP ([id number])[facility] ([facility])[address] [place] [address]   
   
                                                    Urea nitrogen   
[Mass/Vol]      23 mg/dL        Normal          6-23            [facility]  
   
                                        Comment on above:   Performed By: #### 2  
4323-8 ####MARKELL SAPP ([id number])[facility] ([facility])[address] [place] [address]   
   
                                                    ECG 12-LEADon 2024   
   
                                        ECG 12-LEAD         Ventricular Rate  
80  
Atrial Rate  
80  
P-R Interval  
198  
QRS Duration  
90  
Q-T Interval  
380  
QTC Calculation(Bazett)  
438  
P Axis  
66  
R Axis  
49  
T Axis  
8  
QRS Count  
13  
Q Onset  
229  
P Onset  
130  
P Offset  
197  
T Offset  
419  
QTC Fredericia  
418  
Diagnosis  
Sinus rhythm with   
frequent Premature   
ventricular complexes  
Cannot rule out   
Anterior infarct (cited   
on or before   
03-MAY-2024)  
Abnormal ECG  
When compared with ECG   
of 03-MAY-2024 11:25,  
Criteria for Inferior   
infarct are no longer   
Present  
Inverted T waves have   
replaced nonspecific T   
wave abnormality in   
Inferior leads  
See ED provider note   
for full interpretation   
and clinical   
correlation  
Confirmed by   
Valery Chino   
([id number]) on 2024   
5:27:35 PM          Normal                                  [facility]  
   
                                                    Magnesiumon 2024   
   
                          Magnesium [Mass/Vol] 1.74 mg/dL                1.60 -   
2.40   
mg/dL                                   [facility]  
   
                                        Comment on above:   MILD HEMOLYSIS DETEC  
CASEY. The result may be falsely elevated   
due   
to hemolysis or other interferents. Clinical correlation is   
recommended. Repeat testing may be considered.   
   
                      Magnesium [Mass/Vol] 1.74 mg/dL Normal     1.60-2.40  [facility]  
   
                                        Comment on above:   Result Comment: MILD  
 HEMOLYSIS DETECTED. The result may be   
falsely elevated due to hemolysis or other interferents.   
Clinical correlation is recommended. Repeat testing may be   
considered.   
   
                                                            Performed By: #### 1  
9123-9 ####  
GUILLAUME SEKOU ([id number])  
[facility] ([facility])  
[address] [place] [address]   
   
                                                    Magnesium [Mass/Vol]on    
   
                                                    Interpretation and   
review of laboratory   
results         Normal                                          [facility]  
   
                                                    No Panel Informationon    
   
                                                                  [facility]  
   
                                                    Tropinin I.cardiac panel Hig  
h sensitivity methodon 2024   
   
                                                    Interpretation and   
review of laboratory   
results         Normal                                          [facility]  
   
                                                            Less than 99th   
percentile of normal   
range cutoff-  
Female and children   
under 18 years old <14   
ng/L; Male <21 ng/L:   
Negative  
Repeat testing should   
be performed if   
clinically indicated.  
  
Female and children   
under 18 years old   
14-50 ng/L; Male 21-50   
ng/L:  
Consistent with   
possible cardiac damage   
and possible increased   
clinical  
risk. Serial   
measurements may help   
to assess extent of   
myocardial damage.  
  
>50 ng/L: Consistent   
with cardiac damage,   
increased clinical risk   
and  
myocardial infarction.   
Serial measurements may   
help assess extent of  
myocardial damage.  
  
NOTE: Children less   
than 1 year old may   
have higher baseline   
troponin  
levels and results   
should be interpreted   
in conjunction with the   
overall  
clinical context.  
  
NOTE: Troponin I   
testing is performed   
using a different  
testing methodology at   
[facility] than at other  
[facility].   
Direct result   
comparisons should only  
be made within the same   
method.                                                     [facility]  
   
                                                    Troponin I, High Sensitivity  
on 2024   
   
                                                    Tropinin I.cardiac   
panel High   
sensitivity method 7 ng/L                          0 - 13 ng/L     [facility]  
   
                                                    Troponin I.cardiac panelon 0  
2024   
   
                                                    Tropinin I.cardiac   
panel High   
sensitivity method 7 ng/L          Normal          0-13            [facility]  
   
                                        Comment on above:   Order Comment: Less   
than 99th percentile of normal range   
cutoff-Female and children under 18 years old <14 ng/L; Male <21   
ng/L: NegativeRepeat testing should be performed if clinically   
indicated.Female and children under 18 years old 14-50 ng/L;   
Male 21-50 ng/L:Consistent with possible cardiac damage and   
possible increased clinicalrisk. Serial measurements may help to   
assess extent of myocardial damage.>50 ng/L: Consistent with   
cardiac damage, increased clinical risk andmyocardial   
infarction. Serial measurements may help assess extent   
ofmyocardial damage.NOTE: Children less than 1 year old may have   
higher baseline troponinlevels and results should be interpreted   
in conjunction with the overallclinical context.NOTE: Troponin I   
testing is performed using a differenttesting methodology at   
[facility] than at [facility]. Direct   
result comparisons should onlybe made within the same method.   
   
                                                            Performed By: #### 8  
9577-1 ####GUILLAUME SEKOU ([id number])[facility] ([facility])[address] [place] [address]   
   
                                                    Urinalysis complete panel (U  
)Ordered By: Olivia Mejia on 2024   
   
                      Appearance (U) Hazy       Abnormal   Clear      [facility]  
   
                                                    Bilirubin (U)   
[Mass/Vol]      Negative                        NEGATIVE        [facility]  
   
                          Color (U)    Yellow                    Straw,   
Yellow                                  [facility]  
   
                                                    Glucose Auto test   
strip (U) [Mass/Vol] 500 (3+)            Abnormal            NEGATIVE   
mg/dL                                   [facility]  
   
                                                    Interpretation and   
review of laboratory   
results         Abnormal                                        [facility]  
   
                                                    Ketones (U)   
[Mass/Vol]          15 (1+)             Abnormal            NEGATIVE   
mg/dL                                   [facility]  
   
                                                    Leukocyte esterase   
Auto test strip Ql   
(U)             SMALL (1+)      Abnormal        NEGATIVE        [facility]  
   
                                                    Nitrite Auto test   
strip Ql (U)    Negative                        NEGATIVE        [facility]  
   
                          pH (U)       6.0 [pH]                  5.0, 5.5,   
6.0, 6.5,   
7.0, 7.5,   
8.0                                     [facility]  
   
                                                    Protein (U)   
[Mass/Vol]          100 (2+)            Abnormal            NEGATIVE,   
TRACE mg/dL                             [facility]  
   
                      RBC (U) [#/Vol] Negative              NEGATIVE   [facility]  
   
                                                    Specific gravity (U)   
[Rel density]       1.030                                   1.005 -   
1.035                                   [facility]  
   
                                                    Urobilinogen (U)   
[Mass/Vol]      0.2 mg/dL                       0.2, 1.0        [facility]  
   
                                                            MICROSCOPIC ON UNSPU  
N   
SPECIMEN, <2 ML   
SPECIMEN RECEIVED.                                          [facility]  
   
                                                    Urinalysis complete panel (U  
)on 2024   
   
                      Appearance (U) Hazy       Normal     Clear      [facility]  
   
                                        Comment on above:   Order Comment: MICRO  
SCOPIC ON UNSPUN SPECIMEN, <2 ML SPECIMEN   
RECEIVED.   
   
                                                            Performed By: #### 2  
4356-8 ####MARKELL SAPP ([id number])[facility] ([facility])[address] [place] [address]   
   
                                                    Bilirubin (U)   
[Mass/Vol]      Negative        Normal          NEGATIVE        [facility]  
   
                                        Comment on above:   Order Comment: MICRO  
SCOPIC ON UNSPUN SPECIMEN, <2 ML SPECIMEN   
RECEIVED.   
   
                                                            Performed By: #### 2  
4356-8 ####MARKELL SAPP ([id number])[facility] ([facility])[address] [place] [address]   
   
                          Color (U)    Yellow       Normal       Straw,   
Yellow                                  [facility]  
   
                                        Comment on above:   Order Comment: MICRO  
SCOPIC ON UNSPUN SPECIMEN, <2 ML SPECIMEN   
RECEIVED.   
   
                                                            Performed By: #### 2  
4356-8 ####MARKELL SAPP ([id number])[facility] ([facility])[address] [place] [address]   
   
                                                    Glucose Auto test   
strip (U) [Mass/Vol] 500 (3+)        Abnormal        NEGATIVE        [facility]  
   
                                        Comment on above:   Order Comment: MICRO  
SCOPIC ON UNSPUN SPECIMEN, <2 ML SPECIMEN   
RECEIVED.   
   
                                                            Performed By: #### 2  
4356-8 ####MARKELL SAPP ([id number])[facility] ([facility])[address] [place] [address]   
   
                                                    Ketones (U)   
[Mass/Vol]      15 (1+)         Abnormal        NEGATIVE        [facility]  
   
                                        Comment on above:   Order Comment: MICRO  
SCOPIC ON UNSPUN SPECIMEN, <2 ML SPECIMEN   
RECEIVED.   
   
                                                            Performed By: #### 2  
4356-8 ####MARKELL SAPP ([id number])[facility] ([facility])[address] [place] [address]   
   
                                                    Leukocyte esterase   
Auto test strip Ql   
(U)             SMALL (1+)      Abnormal        NEGATIVE        [facility]  
   
                                        Comment on above:   Order Comment: MICRO  
SCOPIC ON UNSPUN SPECIMEN, <2 ML SPECIMEN   
RECEIVED.   
   
                                                            Performed By: #### 2  
4356-8 ####MARKELL SAPP ([id number])[facility] ([facility])[address] [place] [address]   
   
                                                    Nitrite Auto test   
strip Ql (U)    Negative        Normal          NEGATIVE        [facility]  
   
                                        Comment on above:   Order Comment: MICRO  
SCOPIC ON UNSPUN SPECIMEN, <2 ML SPECIMEN   
RECEIVED.   
   
                                                            Performed By: #### 2  
4356-8 ####MARKELL SAPP ([id number])[facility] ([facility])[address] [place] [address]   
   
                          pH (U)       6.0 [pH]     Normal       5.0, 5.5,   
6.0, 6.5,   
7.0, 7.5,   
8.0                                     [facility]  
   
                                        Comment on above:   Order Comment: MICRO  
SCOPIC ON UNSPUN SPECIMEN, <2 ML SPECIMEN   
RECEIVED.   
   
                                                            Performed By: #### 2  
4356-8 ####MARKELL SAPP ([id number])[facility] ([facility])[address] [place] [address]   
   
                                                    Protein (U)   
[Mass/Vol]          100 (2+)            Abnormal            NEGATIVE,   
TRACE                                   [facility]  
   
                                        Comment on above:   Order Comment: MICRO  
SCOPIC ON UNSPUN SPECIMEN, <2 ML SPECIMEN   
RECEIVED.   
   
                                                            Performed By: #### 2  
4356-8 ####MARKELL SAPP ([id number])[facility] ([facility])[address] [place] [address]   
   
                      RBC (U) [#/Vol] Negative   Normal     NEGATIVE   [facility]  
   
                                        Comment on above:   Order Comment: MICRO  
SCOPIC ON UNSPUN SPECIMEN, <2 ML SPECIMEN   
RECEIVED.   
   
                                                            Performed By: #### 2  
4356-8 ####MARKELL SAPP ([id number])[facility] ([facility])[address] [place] [address]   
   
                                                    Specific gravity (U)   
[Rel density]   1.030           Normal          1.005-1.035     [facility]  
   
                                        Comment on above:   Order Comment: MICRO  
SCOPIC ON UNSPUN SPECIMEN, <2 ML SPECIMEN   
RECEIVED.   
   
                                                            Performed By: #### 2  
4356-8 ####MARKELL SAPP ([id number])[facility] ([facility])[address] [place] [address]   
   
                                                    Urobilinogen (U)   
[Mass/Vol]      0.2 mg/dL       Normal          0.2, 1.0        [facility]  
   
                                        Comment on above:   Order Comment: MICRO  
SCOPIC ON UNSPUN SPECIMEN, <2 ML SPECIMEN   
RECEIVED.   
   
                                                            Performed By: #### 2  
4356-8 ####MARKELL SAPP ([id number])[facility] ([facility])[address] [place] [address]   
   
                                                    Urinalysis microscopic panel  
 Auto Ql (U)on 2024   
   
                                                    Bacteria Auto (Urine   
sed) [#/Area]       1+                  Abnormal            NONE SEEN   
/HPF                                    [facility]  
Work Phone:   
[phone]  
   
                                                    Epithelial   
cells.squamous Auto   
(Urine sed) [#/Area] 1-9 (SPARSE)                            Reference   
range not   
established.   
/HPF                                    [facility]  
Work Phone:   
[phone]  
   
                                                    Interpretation and   
review of laboratory   
results         Abnormal                                        [facility]  
Work Phone:   
[phone]  
   
                                                    RBC Auto (Urine sed)   
[#/Area]            1-2                                     NONE, 1-2,   
3-5 /HPF                                [facility]  
Work Phone:   
[phone]  
   
                                                    WBC Auto (Urine sed)   
[#/Area]            1-5                                     1-5, NONE   
/HPF                                    [facility]  
Work Phone:   
[phone]  
   
                                                                  [facility]  
Work Phone:   
[phone]  
   
                                                    Bacteria Auto (Urine   
sed) [#/Area]   1+ /HPF         Abnormal        NONE SEEN       [facility]  
   
                                        Comment on above:   Performed By: #### 5  
0145-8 ####MARKELL SAPP ([id number])[facility] ([facility])[address] [place] [address]   
   
                                                    Epithelial   
cells.squamous Auto   
(Urine sed) [#/Area] 1-9 (SPARSE)        Normal              Reference   
range not   
established.                            [facility]  
   
                                        Comment on above:   Performed By: #### 5  
3315-8 ####MARKELL SAPP ([id number])[facility] ([facility])[address] [place] [address]   
   
                                                    RBC Auto (Urine sed)   
[#/Area]            1-2                 Normal              NONE, 1-2,   
3-5                                     [facility]  
   
                                        Comment on above:   Performed By: #### 5  
3315-8 ####MARKELL ANAYALI ([id number])[facility] ([facility])[address] [place] [address]   
   
                                                    WBC Auto (Urine sed)   
[#/Area]        1-5             Normal          1-5, NONE       [facility]  
   
                                        Comment on above:   Performed By: #### 5  
3315-8 ####GUILLAUME CARAHRLI ([id number])[facility] ([facility])[address] [place] [address]   
   
                                                    XR CHEST 2 VIEWSon   
4   
   
                                        XR CHEST 2 VIEWS    Interpreted By:   
Dell Bhat,  
STUDY:  
XR CHEST 2 VIEWS;   
2024 1:27 pm  
  
INDICATION:  
Lightheadedness.   
Vomiting.  
  
COMPARISON:  
2024  
  
ACCESSION NUMBER(S):  
[id number]  
  
ORDERING CLINICIAN:  
VINOD WELCH  
  
TECHNIQUE:  
PA and lateral views of   
the chest were   
obtained.  
  
FINDINGS:  
The cardiac silhouette   
is normal in   
appearance. The aorta   
is  
tortuous. Incidental   
note is made of the   
presence of surgical   
clips  
in the right upper   
quadrant consistent   
with a previous  
cholecystectomy.   
Spurring is noted   
throughout the spine.   
No  
infiltrates or   
effusions are   
identified.  
  
IMPRESSION:  
No acute pathologic   
findings are identified   
on this exam.  
There is no interval   
change when compared to   
the previous   
examination.  
  
MACRO:  
none  
  
Signed by: Dell Bhat 2024 1:49   
PM  
Dictation workstation:   
[id number]        Normal                                  [facility]  
   
                                                    XR Chest 2 Viewson   
4   
   
                                                            No acute pathologic   
findings are identified   
on this exam.  
There is no interval   
change when compared to   
the previous   
examination.  
  
MACRO:  
none  
  
Signed by: Dell Bhat 2024 1:49   
PM  
Dictation workstation:   
[id number]                                                 [facility]  
   
                                                            Interpreted By:   
Dell Bhat,  
STUDY:  
XR CHEST 2 VIEWS;   
2024 1:27 pm  
  
INDICATION:  
Lightheadedness.   
Vomiting.  
  
COMPARISON:  
2024  
  
ACCESSION NUMBER(S):  
[id number]  
  
ORDERING CLINICIAN:  
VINOD WELCH  
  
TECHNIQUE:  
PA and lateral views of   
the chest were   
obtained.  
  
FINDINGS:  
The cardiac silhouette   
is normal in   
appearance. The aorta   
is  
tortuous. Incidental   
note is made of the   
presence of surgical   
clips  
in the right upper   
quadrant consistent   
with a previous  
cholecystectomy.   
Spurring is noted   
throughout the spine.   
No  
infiltrates or   
effusions are   
identified.  
                                                            [facility]  
   
                                                            Dell Bhat MD  
 -   
2024 Formatting   
of this note might be   
different from the   
original.  
Interpreted By:   
Dell Bhat,  
STUDY:  
XR CHEST 2 VIEWS;   
2024 1:27 pm  
  
INDICATION:  
Lightheadedness.   
Vomiting.  
  
COMPARISON:  
2024  
  
ACCESSION NUMBER(S):  
[id number]  
  
ORDERING CLINICIAN:  
VINOD WELCH  
  
TECHNIQUE:  
PA and lateral views of   
the chest were   
obtained.  
  
FINDINGS:  
The cardiac silhouette   
is normal in   
appearance. The aorta   
is  
tortuous. Incidental   
note is made of the   
presence of surgical   
clips  
in the right upper   
quadrant consistent   
with a previous  
cholecystectomy.   
Spurring is noted   
throughout the spine.   
No  
infiltrates or   
effusions are   
identified.  
  
IMPRESSION:  
No acute pathologic   
findings are identified   
on this exam.  
There is no interval   
change when compared to   
the previous   
examination.  
  
MACRO:  
none  
  
Signed by: Dell Bhat 2024 1:49   
PM  
Dictation workstation:   
[id number]  
                                                            [facility]  
Work Phone:   
[phone]  
   
                                                    Radiology Study   
observation   
(narrative)                                                     [facility]  
Work Phone:   
[phone]  
   
                                                    XR Chest 2 ViewsOrdered By:   
Dell Bhat on 2024   
   
                                                                  [facility]  
Work Phone:   
[phone]  
   
                                                    Basic metabolic 2000 panelon  
 2024   
   
                      Anion gap [Moles/Vol] 12 mmol/L  Normal     10-20      [facility]  
   
                                        Comment on above:   Performed By: #### 2  
4321-2 ####  
MARKELL SAPP ([id number])  
[facility] ([facility])  
[address] [place] [address]   
   
                      Calcium [Mass/Vol] 9.5 mg/dL  Normal     8.6-10.3   [facility]  
   
                                        Comment on above:   Performed By: #### 2  
4321-2 ####  
MARKELL SAPP ([id number])  
[facility] ([facility])  
[address] [place] [address]   
   
                      Chloride [Moles/Vol] 99 mmol/L  Normal          [facility]  
   
                                        Comment on above:   Performed By: #### 2  
4321-2 ####  
MARKELL SAPP ([id number])  
[facility] ([facility])  
[address] [place] [address]   
   
                      CO2 [Moles/Vol] 31 mmol/L  Normal     21-32      [facility]  
   
                                        Comment on above:   Performed By: #### 2  
4321-2 ####  
MARKELL SAPP ([id number])  
[facility] ([facility])  
[address] [place] [address]   
   
                      Creatinine [Mass/Vol] 0.78 mg/dL Normal     0.50-1.05  [facility]  
   
                                        Comment on above:   Performed By: #### 2  
-2 ####  
MARKELL SAPP ([id number])  
[facility] ([facility])  
[address] [place] [address]   
   
                                                    Glomerular filtration   
rate/1.73 sq   
M.predicted     78 mL/min/1.73m*2 Normal          >60             [facility]  
   
                                        Comment on above:   Result Comment: Calc  
ulations of estimated GFR are performed   
using the  CKD-EPI Study Refit equation without the race   
variable for the IDMS-Traceable creatinine methods.  
https://jasn.asnjournals.org/content/early//ASN.960274  
0988   
   
                                                            Performed By: #### 2  
432-2 ####  
MARKELL SAPP ([id number])  
[facility] ([facility])  
[address] [place] [address]   
   
                      Glucose [Mass/Vol] 226 mg/dL  High       74-99      [facility]  
   
                                        Comment on above:   Performed By: #### 2  
4321-2 ####  
MARKELL SAPP ([id number])  
[facility] ([facility])  
[facility] [place] [address]   
   
                      Potassium [Moles/Vol] 3.7 mmol/L Normal     3.5-5.3    [facility]  
   
                                        Comment on above:   Performed By: #### 2  
4321-2 ####  
MARKELL SAPP ([id number])  
[facility] ([facility])  
[facility] [place] [address]   
   
                      Sodium [Moles/Vol] 138 mmol/L Normal     136-145    [facility]  
   
                                        Comment on above:   Performed By: #### 2  
4321-2 ####  
MARKELL SAPP ([id number])  
[facility] ([facility])  
[address] [place] [address]   
   
                                                    Urea nitrogen   
[Mass/Vol]      16 mg/dL        Normal          6-23            [facility]  
   
                                        Comment on above:   Performed By: #### 2  
4321-2 ####  
MARKELL SAPP ([id number])  
[facility] ([facility])  
[address] [place] [address]   
   
                                                    Cobalaminson 2024   
   
                                                    Cobalamin (Vitamin   
B12) [Mass/Vol] 392 pg/mL       Normal          211-911         [facility]  
   
                                        Comment on above:   Performed By: #### 2  
132-9 ####  
MARKELL SAPP ([id number])  
[facility] ([facility])  
[address] [place] [address]   
   
                                                    HbA1c (Bld) [Mass fraction]o  
n 2024   
   
                                                    Average glucose   
Estimated from   
glycated hemoglobin   
(Bld) [Mass/Vol]    189 mg/dL           Normal              Not   
Established                             [facility]  
   
                                        Comment on above:   Order Comment: Diagn  
osis of Diabetes-Adults  
Non-Diabetic: < or = 5.6%  
Increased risk for developing diabetes: 5.7-6.4%  
Diagnostic of diabetes: > or = 6.5%  
Monitoring of Diabetes  
Age (y)....................... Therapeutic Goal (%)  
Adults: >18.........................<7.0  
Pediatrics: 13-18...................<7.5  
Pediatrics: 7-12....................<8.0  
Pediatrics: 0-6..................... 7.5-8.5  
American Diabetes Association. Diabetes Care 33(S1), 2010   
   
                                                            Performed By: #### 4  
548-4 ####  
MARKELL SAPP ([id number])  
[facility] ([facility])  
[address] [place] [address]   
   
                                                    Hemoglobin A1c/Hemoglobin.to  
dayna 2024   
   
                                                    HbA1c (Bld) [Mass   
fraction]       8.2 %           High            see below       [facility]  
   
                                        Comment on above:   Order Comment: Diagn  
osis of Diabetes-Adults  
Non-Diabetic: < or = 5.6%  
Increased risk for developing diabetes: 5.7-6.4%  
Diagnostic of diabetes: > or = 6.5%  
Monitoring of Diabetes  
Age (y)....................... Therapeutic Goal (%)  
Adults: >18.........................<7.0  
Pediatrics: 13-18...................<7.5  
Pediatrics: 7-12....................<8.0  
Pediatrics: 0-6..................... 7.5-8.5  
American Diabetes Association. Diabetes Care 33(S1), 2010   
   
                                                            Performed By: #### 4  
548-4 ####  
MARKELL SAPP ([id number])  
[facility] ([facility])  
[address] [place], [place] [address]   
   
                                                    Thyrotropinon 2024   
   
                      TSH Qn     1.32 m[IU]/L Normal     0.44-3.98  [facility]  
   
                                        Comment on above:   Order Comment: TSH t  
esting is performed using different   
testing   
methodology at [facility] than at other [facility]. Direct result comparisons should only be made within   
the same method.   
   
                                                            Performed By: #### 3  
016-3 ####  
MARKELL SAPP ([id number])  
[facility] ([facility])  
[address] [place] [address]   
   
                                                    Bacteria identifiedon 05-03-  
2024   
   
                                                    Bacteria identified   
Cx Nom (U)                              Test: Urine Culture  
Specimen Source: Clean   
Catch/Voided  
Specimen Type: Urine  
Specimen Date: 5/3/2024   
1144  
Result Date: 2024  
Result Status: Final   
result  
Abnormal: No  
Resulting Lab: [facility]   
LAB  
[id number] [place] [address]  
Tel: [phone]  
  
CULTURE  
------------------  
No significant growth Normal                                  [facility]  
   
                                        Comment on above:   Performed By: #### 6  
30-4 ####  
MARIBEL LEIGH ([id number])  
[facility] ([facility])  
[address] [place] [address]   
   
                                                    CBC panel Auto (Bld)on    
   
                                                    Erythrocyte   
distribution width   
(RBC) [Ratio]       12.4 %                                  11.5 - 14.5   
%                                       [facility]  
   
                                                    Hematocrit (Bld)   
[Volume fraction]   43.0 %                                  36.0 - 46.0   
%                                       [facility]  
   
                                                    Hemoglobin (Bld)   
[Mass/Vol]          14.4 g/dL                               12.0 - 16.0   
g/dL                                    [facility]  
   
                                                    Interpretation and   
review of laboratory   
results         Normal                                          [facility]  
   
                                                    MCH (RBC) [Entitic   
mass]               31.1 pg                                 26.0 - 34.0   
pg                                      [facility]  
   
                          MCHC (RBC) [Mass/Vol] 33.5 g/dL                 32.0 -  
 36.0   
g/dL                                    [facility]  
   
                                                    MCV (RBC) [Entitic   
vol]            93 fL                           80 - 100 fL     [facility]  
   
                                                    Nucleated RBC/100 WBC   
(Bld) [Ratio]   0.0 %                                           [facility]  
   
                                                    Platelets (Bld)   
[#/Vol]         310 10*3/uL                                     [facility]  
   
                      RBC (Bld) [#/Vol] 4.63 10*6/uL                       [facility]  
   
                      WBC (Bld) [#/Vol] 7.2 10*3/uL                       [facility]  
   
                                                    Erythrocyte   
distribution width   
(RBC) [Ratio]   12.4 %          Normal          11.5-14.5       [facility]  
   
                                        Comment on above:   Performed By: #### 5  
8410-2 ####  
MARKELL SAPP ([id number])  
[facility] ([facility])  
[address] [place] [address]   
   
                                                    Hematocrit (Bld)   
[Volume fraction] 43.0 %          Normal          36.0-46.0       [facility]  
   
                                        Comment on above:   Performed By: #### 5  
8410-2 ####  
MARKELL SAPP ([id number])  
[facility] ([facility])  
[address] [place] [address]   
   
                                                    Hemoglobin (Bld)   
[Mass/Vol]      14.4 g/dL       Normal          12.0-16.0       [facility]  
   
                                        Comment on above:   Performed By: #### 5  
8410-2 ####  
MARKELL SAPP ([id number])  
[facility] ([facility])  
[address] [place] [address]   
   
                                                    MCH (RBC) [Entitic   
mass]           31.1 pg         Normal          26.0-34.0       [facility]  
   
                                        Comment on above:   Performed By: #### 5  
8410-2 ####  
MARKELL SAPP ([id number])  
[facility] ([facility])  
[address] [place] [address]   
   
                      MCHC (RBC) [Mass/Vol] 33.5 g/dL  Normal     32.0-36.0  [facility]  
   
                                        Comment on above:   Performed By: #### 5  
8410-2 ####  
MARKELL SAPP ([id number])  
[facility] ([facility])  
[address] [place] [address]   
   
                                                    MCV (RBC) [Entitic   
vol]            93 fL           Normal                    [facility]  
   
                                        Comment on above:   Performed By: #### 5  
8410-2 ####  
MARKELL SAPP ([id number])  
[facility] ([facility])  
[address] [place] [address]   
   
                                                    Nucleated RBC/100 WBC   
(Bld) [Ratio]   0.0 /100 WBCs   Normal          0.0-0.0         [facility]  
   
                                        Comment on above:   Performed By: #### 5  
8410-2 ####  
MARKELL SAPP ([id number])  
[facility] ([facility])  
[address] [place] [address]   
   
                                                    Platelets (Bld)   
[#/Vol]         310 x10*3/uL    Normal          150-450         [facility]  
   
                                        Comment on above:   Performed By: #### 5  
8410-2 ####  
MARKELL CARACUCO ([id number])  
[facility] ([facility])  
[address] [place] [address]   
   
                      RBC (Bld) [#/Vol] 4.63 x10*6/uL Normal     4.00-5.20  [facility]  
   
                                        Comment on above:   Performed By: #### 5  
8410-2 ####  
MARKELL SAPP ([id number])  
[facility] ([facility])  
[facility] [place] [address]   
   
                      WBC (Bld) [#/Vol] 7.2 x10*3/uL Normal     4.4-11.3   [facility]  
   
                                        Comment on above:   Performed By: #### 5  
8410-2 ####  
MARKELL CARACUCO ([id number])  
[facility] ([facility])  
[address] [place] [address]   
   
                                                    CT HEAD WO IV CONTRASTon    
   
                                                    CT HEAD WO IV   
CONTRAST                                Interpreted By:   
Gene Alejandro,  
STUDY:  
CT HEAD WO IV CONTRAST;   
5/3/2024 12:02 pm  
  
INDICATION:  
Signs/Symptoms:confusio  
n.  
  
COMPARISON:  
None.  
  
ACCESSION NUMBER(S):  
[id number]  
  
ORDERING CLINICIAN:  
GRISEL LEÓN  
  
TECHNIQUE:  
Contiguous axial CT   
images were obtained   
through the head at 3   
mm  
slice thickness without   
contrast   
administration.  
  
FINDINGS:  
INTRACRANIAL:  
The ventricles, sulci   
and basal cisterns are   
within normal limits   
for  
size and configuration.   
The grey-white   
differentiation is   
intact.  
There is no mass effect   
or midline shift. There   
is no extraaxial  
fluid collection. There   
is no intracranial   
hemorrhage. The   
calvarium  
is unremarkable.  
  
EXTRACRANIAL:  
Visualized paranasal   
sinuses and mastoids   
are clear.  
  
IMPRESSION:  
No evidence of acute   
cortical infarct or   
intracranial   
hemorrhage.  
  
MACRO:  
None  
  
  
Signed by: Gene Alejandro   
5/3/2024 12:28 PM  
Dictation workstation:   
[id number]        [facility]  
   
                                                    CT Head WO contraston 2024   
   
                                                            No evidence of acute  
   
cortical infarct or   
intracranial   
hemorrhage.  
  
MACRO:  
None  
  
  
Signed by: Gene Alejandro   
5/3/2024 12:28 PM  
Dictation workstation:   
[id number]                                                 [facility]  
   
                                                            Interpreted By:   
Gene Alejandro,  
STUDY:  
CT HEAD WO IV CONTRAST;   
5/3/2024 12:02 pm  
  
INDICATION:  
Signs/Symptoms:confusio  
n.  
  
COMPARISON:  
None.  
  
ACCESSION NUMBER(S):  
[id number]  
  
ORDERING CLINICIAN:  
GRISEL LEÓN  
  
TECHNIQUE:  
Contiguous axial CT   
images were obtained   
through the head at 3   
mm  
slice thickness without   
contrast   
administration.  
  
FINDINGS:  
INTRACRANIAL:  
The ventricles, sulci   
and basal cisterns are   
within normal limits   
for  
size and configuration.   
The grey-white   
differentiation is   
intact.  
There is no mass effect   
or midline shift. There   
is no extraaxial  
fluid collection. There   
is no intracranial   
hemorrhage. The   
calvarium  
is unremarkable.  
  
EXTRACRANIAL:  
Visualized paranasal   
sinuses and mastoids   
are clear.  
                                                             [facility]  
   
                                                            Gene Alejandro MD   
-   
2024 Formatting   
of this note might be   
different from the   
original.  
Interpreted By:   
Gene Alejandro,  
STUDY:  
CT HEAD WO IV CONTRAST;   
5/3/2024 12:02 pm  
  
INDICATION:  
Signs/Symptoms:confusio  
n.  
  
COMPARISON:  
None.  
  
ACCESSION NUMBER(S):  
[id number]  
  
ORDERING CLINICIAN:  
GRISEL LEÓN  
  
TECHNIQUE:  
Contiguous axial CT   
images were obtained   
through the head at 3   
mm  
slice thickness without   
contrast   
administration.  
  
FINDINGS:  
INTRACRANIAL:  
The ventricles, sulci   
and basal cisterns are   
within normal limits   
for  
size and configuration.   
The grey-white   
differentiation is   
intact.  
There is no mass effect   
or midline shift. There   
is no extraaxial  
fluid collection. There   
is no intracranial   
hemorrhage. The   
calvarium  
is unremarkable.  
  
EXTRACRANIAL:  
Visualized paranasal   
sinuses and mastoids   
are clear.  
  
IMPRESSION:  
No evidence of acute   
cortical infarct or   
intracranial   
hemorrhage.  
  
MACRO:  
None  
  
  
Signed by: Gene Alejandro   
5/3/2024 12:28 PM  
Dictation workstation:   
[id number]  
                                                            [facility]  
Work Phone:   
[phone]  
   
                                                    Radiology Study   
observation   
(narrative)                                                     [facility]  
Work Phone:   
[phone]  
   
                                                    CT Head WO contrastOrdered B  
y: Gene Alejandro on 2024   
   
                                                                  [facility]  
Work Phone:   
[phone]  
   
                                                    Comprehensive metabolic 2000  
 panelon 2024   
   
                                                    Albumin BCP dye   
[Mass/Vol]          4.2 g/dL                                3.4 - 5.0   
g/dL                                    [facility]  
   
                                                    ALP [Catalytic   
activity/Vol]   79 U/L                          33 - 136 U/L    [facility]  
   
                                                    ALT With P-5'-P   
[Catalytic   
activity/Vol]   15 U/L                          7 - 45 U/L      [facility]  
   
                                        Comment on above:   Patients treated wit  
h Sulfasalazine may generate falsely   
decreased results for ALT.   
   
                          Anion gap [Moles/Vol] 11 mmol/L                 10 - 2  
0   
mmol/L                                  [facility]  
   
                                                    AST With P-5'-P   
[Catalytic   
activity/Vol]   13 U/L                          9 - 39 U/L      [facility]  
   
                          Bilirubin [Mass/Vol] 1.0 mg/dL                 0.0 - 1  
.2   
mg/dL                                   [facility]  
   
                          Calcium [Mass/Vol] 9.9 mg/dL                 8.6 - 10.  
3   
mg/dL                                   [facility]  
   
                          Chloride [Moles/Vol] 98 mmol/L                 98 - 10  
7   
mmol/L                                  [facility]  
   
                          CO2 [Moles/Vol] 32 mmol/L                 21 - 32   
mmol/L                                  [facility]  
   
                          Creatinine [Mass/Vol] 0.60 mg/dL                0.50 -  
 1.05   
mg/dL                                   [facility]  
   
                      eGFR                             - PINF     [facility]  
   
                                        Comment on above:   Calculations of del  
mated GFR are performed using the    
CKD-EPI Study Refit equation without the race variable for the   
IDMS-Traceable creatinine methods.  
https://jasn.asnjournals.org/content/early//ASN.452172  
0988  
   
   
                          Glucose [Mass/Vol] 217 mg/dL    High         74 - 99   
mg/dL                                   [facility]  
   
                                                    Interpretation and   
review of laboratory   
results         Abnormal                                        [facility]  
   
                          Potassium [Moles/Vol] 3.4 mmol/L   Low          3.5 -   
5.3   
mmol/L                                  [facility]  
   
                          Protein [Mass/Vol] 7.0 g/dL                  6.4 - 8.2  
   
g/dL                                    [facility]  
   
                          Sodium [Moles/Vol] 138 mmol/L                136 - 145  
   
mmol/L                                  [facility]  
   
                                                    Urea nitrogen   
[Mass/Vol]      10 mg/dL                        6 - 23 mg/dL    [facility]  
   
                                                    Albumin BCP dye   
[Mass/Vol]      4.2 g/dL        Normal          3.4-5.0         [facility]  
   
                                        Comment on above:   Performed By: #### 2  
4323-8 ####  
MARKELL SAPP ([id number])  
[facility] ([facility])  
[address] [place] [address]   
   
                                                    ALP [Catalytic   
activity/Vol]   79 U/L          Normal                    [facility]  
   
                                        Comment on above:   Performed By: #### 2  
4323-8 ####  
MARKELL SAPP ([id number])  
[facility] ([facility])  
[address] [place] [address]   
   
                                                    ALT With P-5'-P   
[Catalytic   
activity/Vol]   15 U/L          Normal          7-45            [facility]  
   
                                        Comment on above:   Result Comment: Sandra  
ents treated with Sulfasalazine may   
generate   
falsely decreased results for ALT.   
   
                                                            Performed By: #### 2  
-8 ####  
MARKELL SAPP ([id number])  
[facility] ([facility])  
[facility] [place] [address]   
   
                      Anion gap [Moles/Vol] 11 mmol/L  Normal     10-20      [facility]  
   
                                        Comment on above:   Performed By: #### 2  
-8 ####  
MARKELL SAPP ([id number])  
[facility] ([facility])  
[address] [place] [address]   
   
                                                    AST With P-5'-P   
[Catalytic   
activity/Vol]   13 U/L          Normal          9-39            [facility]  
   
                                        Comment on above:   Performed By: #### 2  
-8 ####  
MARKELL SAPP ([id number])  
[facility] ([facility])  
[address] [place] [address]   
   
                      Bilirubin [Mass/Vol] 1.0 mg/dL  Normal     0.0-1.2    [facility]  
   
                                        Comment on above:   Performed By: #### 2  
4323-8 ####  
MARKELL SAPP ([id number])  
[facility] ([facility])  
[address] [place] [address]   
   
                      Calcium [Mass/Vol] 9.9 mg/dL  Normal     8.6-10.3   [facility]  
   
                                        Comment on above:   Performed By: #### 2  
-8 ####  
MARKELL SAPP ([id number])  
[facility] ([facility])  
[address] [place] [address]   
   
                      Chloride [Moles/Vol] 98 mmol/L  Normal          [facility]  
   
                                        Comment on above:   Performed By: #### 2  
4323-8 ####  
MARKELL SAPP ([id number])  
[facility] [facility]  
[address] [place] [address]   
   
                      CO2 [Moles/Vol] 32 mmol/L  Normal     21-32      [facility]  
   
                                        Comment on above:   Performed By: #### 2  
4323-8 ####  
MARKELL SAPP ([id number])  
[facility] ([facility])  
[address] [place] [address]   
   
                      Creatinine [Mass/Vol] 0.60 mg/dL Normal     0.50-1.05  [facility]  
   
                                        Comment on above:   Performed By: #### 2  
4323-8 ####  
MARKELL SAPP ([id number])  
[facility] ([facility])  
[address] [place] [address]   
   
                                                    GFR/1.73 sq   
M.predicted MDRD   
(S/P/Bld) [Vol   
rate/Area]      mL/min/{1.73_m2} Normal          >60             [facility]  
   
                                        Comment on above:   Result Comment: Calc  
ulations of estimated GFR are performed   
using the  CKD-EPI Study Refit equation without the race   
variable for the IDMS-Traceable creatinine methods.  
https://jasn.asnjournals.org/content/early//ASN.681768  
0988   
   
                                                            Performed By: #### 2  
4323-8 ####  
MARKELL SAPP ([id number])  
[facility] ([facility])  
[facility] [place] [address]   
   
                      Glucose [Mass/Vol] 217 mg/dL  High       74-99      [facility]  
   
                                        Comment on above:   Performed By: #### 2  
4323-8 ####  
MARKELL SAPP ([id number])  
[facility] ([facility])  
[facility] [place] [address]   
   
                      Potassium [Moles/Vol] 3.4 mmol/L Low        3.5-5.3    [facility]  
   
                                        Comment on above:   Performed By: #### 2  
4323-8 ####  
MARKELL SAPP ([id number])  
[facility] ([facility])  
[address] [place] [address]   
   
                      Protein [Mass/Vol] 7.0 g/dL   Normal     6.4-8.2    [facility]  
   
                                        Comment on above:   Performed By: #### 2  
4323-8 ####  
MARKELL SAPP ([id number])  
[facility] ([facility])  
[address] [place] [address]   
   
                      Sodium [Moles/Vol] 138 mmol/L Normal     136-145    [facility]  
   
                                        Comment on above:   Performed By: #### 2  
4323-8 ####  
MARKELL SAPP ([id number])  
[facility] ([facility])  
[address] [place] [address]   
   
                                                    Urea nitrogen   
[Mass/Vol]      10 mg/dL        Normal          6-23            [facility]  
   
                                        Comment on above:   Performed By: #### 2  
4323-8 ####  
MARKELL SAPP ([id number])  
[facility] ([facility])  
[facility] [place] [address]   
   
                                                    ECG 12-LEADon 2024   
   
                                        ECG 12-LEAD         Ventricular Rate  
73  
Atrial Rate  
73  
P-R Interval  
184  
QRS Duration  
92  
Q-T Interval  
392  
QTC Calculation(Bazett)  
431  
P Axis  
28  
R Axis  
3  
T Axis  
50  
QRS Count  
12  
Q Onset  
214  
P Onset  
122  
P Offset  
181  
T Offset  
410  
QTC Fredericia  
418  
Diagnosis  
Sinus rhythm with   
occasional Premature   
ventricular complexes  
Inferior infarct , age   
undetermined  
Cannot rule out   
Anterior infarct , age   
undetermined  
Abnormal ECG  
No previous ECGs   
available  
See ED provider note   
for full interpretation   
and clinical   
correlation  
Confirmed by Grisel León ([id number]) on   
2024 3:53:11 PM Normal                                  [facility]  
   
                                                    No Panel Informationon    
   
                                                    Interpretation and   
review of laboratory   
results         Abnormal                                        [facility]  
   
                                                    Tropinin I.cardiac panel Hig  
h sensitivity methodon 2024   
   
                                                    Interpretation and   
review of laboratory   
results         Normal                                          [facility]  
   
                                                            Less than 99th   
percentile of normal   
range cutoff-  
Female and children   
under 18 years old <14   
ng/L; Male <21 ng/L:   
Negative  
Repeat testing should   
be performed if   
clinically indicated.  
  
Female and children   
under 18 years old   
14-50 ng/L; Male 21-50   
ng/L:  
Consistent with   
possible cardiac damage   
and possible increased   
clinical  
risk. Serial   
measurements may help   
to assess extent of   
myocardial damage.  
  
>50 ng/L: Consistent   
with cardiac damage,   
increased clinical risk   
and  
myocardial infarction.   
Serial measurements may   
help assess extent of  
myocardial damage.  
  
NOTE: Children less   
than 1 year old may   
have higher baseline   
troponin  
levels and results   
should be interpreted   
in conjunction with the   
overall  
clinical context.  
  
NOTE: Troponin I   
testing is performed   
using a different  
testing methodology at   
[facility] than at other  
[facility].   
Direct result   
comparisons should only  
be made within the same   
method.                                                     [facility]  
   
                                                    Troponin I, High Sensitivity  
on 2024   
   
                                                    Tropinin I.cardiac   
panel High   
sensitivity method 8 ng/L                          0 - 13 ng/L     [facility]  
   
                                                    Troponin I.cardiac panelon 0  
2024   
   
                                                    Tropinin I.cardiac   
panel High   
sensitivity method 8 ng/L          Normal          0-13            [facility]  
   
                                        Comment on above:   Order Comment: Less   
than 99th percentile of normal range   
cutoff-  
Female and children under 18 years old <14 ng/L; Male <21 ng/L:   
Negative  
Repeat testing should be performed if clinically indicated.  
Female and children under 18 years old 14-50 ng/L; Male 21-50   
ng/L:  
Consistent with possible cardiac damage and possible increased   
clinical  
risk. Serial measurements may help to assess extent of   
myocardial damage.  
>50 ng/L: Consistent with cardiac damage, increased clinical   
risk and  
myocardial infarction. Serial measurements may help assess   
extent of  
myocardial damage.  
NOTE: Children less than 1 year old may have higher baseline   
troponin  
levels and results should be interpreted in conjunction with the   
overall  
clinical context.  
NOTE: Troponin I testing is performed using a different  
testing methodology at [facility] than at other  
[facility]. Direct result comparisons should only  
be made within the same method.   
   
                                                            Performed By: #### 8  
9577-1 ####  
GUILLAUME SEKOU ([id number])  
[facility] ([facility])  
[address] [place] [address]   
   
                                                    Urinalysis complete W Reflex  
 Culture panel (U)on 2024   
   
                      Appearance (U) Hazy       Abnormal   Clear      [facility]  
   
                                                    Bilirubin (U)   
[Mass/Vol]      Negative                        NEGATIVE        [facility]  
   
                          Color (U)    Yellow                    Straw,   
Yellow                                  [facility]  
   
                                                    Glucose Auto test   
strip (U) [Mass/Vol] 50 (1+)             Abnormal            NEGATIVE   
mg/dL                                   [facility]  
   
                                                    Ketones (U)   
[Mass/Vol]          Negative                                NEGATIVE   
mg/dL                                   [facility]  
   
                                                    Leukocyte esterase   
Auto test strip Ql   
(U)             MODERATE (2+)   Abnormal        NEGATIVE        [facility]  
   
                                                    Nitrite Auto test   
strip Ql (U)    Negative                        NEGATIVE        [facility]  
   
                          pH (U)       6.0 [pH]                  5.0, 5.5,   
6.0, 6.5,   
7.0, 7.5,   
8.0                                     [facility]  
   
                                                    Protein (U)   
[Mass/Vol]          30 (1+)             Abnormal            NEGATIVE   
mg/dL                                   [facility]  
   
                      RBC (U) [#/Vol] Negative              NEGATIVE   [facility]  
   
                                                    Specific gravity (U)   
[Rel density]       1.018                                   1.005 -   
1.035                                   [facility]  
   
                                                    Urobilinogen (U)   
[Mass/Vol]          mg/dL                                   NINF - 2.0   
mg/dL                                   [facility]  
   
                      Appearance (U) Hazy       Normal     Clear      [facility]  
   
                                        Comment on above:   Performed By: #### 5  
8077-9 ####  
MARKELL SAPP ([id number])  
[facility] ([facility])  
[address] [place] [address]   
   
                                                    Bilirubin (U)   
[Mass/Vol]      Negative        Normal          NEGATIVE        [facility]  
   
                                        Comment on above:   Performed By: #### 5  
8077-9 ####  
MARKELL SAPP ([id number])  
[facility] ([facility])  
[address] [place] [address]   
   
                          Color (U)    Yellow       Normal       Straw,   
Yellow                                  [facility]  
   
                                        Comment on above:   Performed By: #### 5  
8077-9 ####  
MARKELL SAPP ([id number])  
[facility] ([facility])  
[address] [place] [address]   
   
                                                    Glucose Auto test   
strip (U) [Mass/Vol] 50 (1+)         Abnormal        NEGATIVE        [facility]  
   
                                        Comment on above:   Performed By: #### 5  
8077-9 ####  
MARKELL SAPP ([id number])  
[facility] ([facility])  
[address] [place] [address]   
   
                                                    Ketones (U)   
[Mass/Vol]      Negative        Normal          NEGATIVE        [facility]  
   
                                        Comment on above:   Performed By: #### 5  
8077-9 ####  
MARKELL SAPP ([id number])  
[facility] ([facility])  
[address] [place] [address]   
   
                                                    Leukocyte esterase   
Auto test strip Ql   
(U)             MODERATE (2+)   Abnormal        NEGATIVE        [facility]  
   
                                        Comment on above:   Performed By: #### 5  
8077-9 ####  
MARKELL SAPP ([id number])  
[facility] ([facility])  
[address] [place] [address]   
   
                                                    Nitrite Auto test   
strip Ql (U)    Negative        Normal          NEGATIVE        [facility]  
   
                                        Comment on above:   Performed By: #### 5  
8077-9 ####  
MARKELL SAPP ([id number])  
[facility] ([facility])  
[address] [place] [address]   
   
                          pH (U)       6.0 [pH]     Normal       5.0, 5.5,   
6.0, 6.5,   
7.0, 7.5,   
8.0                                     [facility]  
   
                                        Comment on above:   Performed By: #### 5  
8077-9 ####  
MARKELL SAPP ([id number])  
[facility] ([facility])  
[address] [place] [address]   
   
                                                    Protein (U)   
[Mass/Vol]      30 (1+)         Normal          NEGATIVE        [facility]  
   
                                        Comment on above:   Performed By: #### 5  
8077-9 ####  
MARKELL SAPP ([id number])  
[facility] ([facility])  
[address] [place] [address]   
   
                      RBC (U) [#/Vol] Negative   Normal     NEGATIVE   [facility]  
   
                                        Comment on above:   Performed By: #### 5  
8077-9 ####  
MARKELL SAPP ([id number])  
[facility] ([facility])  
[address] [place] [address]   
   
                                                    Specific gravity (U)   
[Rel density]   1.018           Normal          1.005-1.035     [facility]  
   
                                        Comment on above:   Performed By: #### 5  
8077-9 ####  
MARKELL SAPP ([id number])  
[facility] ([facility])  
[address] [place] [address]   
   
                                                    Urobilinogen (U)   
[Mass/Vol]      mg/dL           Normal          <2.0            [facility]  
   
                                        Comment on above:   Performed By: #### 5  
8077-9 ####  
MARKELL SAPP ([id number])  
[facility] ([facility])  
[address] [place] [address]   
   
                                                    Urinalysis microscopic panel  
 Auto Ql (U)on 2024   
   
                                                    Mucus Auto (Urine   
sed) [#/Area]       3+                                      Reference   
range not   
established.   
/LPF                                    [facility]  
   
                                                    RBC Auto (Urine sed)   
[#/Area]            NONE                                    NONE, 1-2,   
3-5 /HPF                                [facility]  
   
                                                    Transitional cells   
Computer assisted (U)   
[#/Area]            1-2 (FEW)                               Reference   
range not   
established.   
/HPF                                    [facility]  
   
                                                    WBC Auto (Urine sed)   
[#/Area]            6-10                Abnormal            1-5, NONE   
/HPF                                    [facility]  
   
                                                    Mucus Auto (Urine   
sed) [#/Area]       3+ /LPF             Normal              Reference   
range not   
established.                            [facility]  
   
                                        Comment on above:   Performed By: #### 5  
3315-8 ####  
MARKELL SAPP ([id number])  
[facility] ([facility])  
[address] [place] [address]   
   
                                                    RBC Auto (Urine sed)   
[#/Area]            NONE                Normal              NONE, 1-2,   
3-5                                     [facility]  
   
                                        Comment on above:   Performed By: #### 5  
3315-8 ####  
MARKELL SAPP ([id number])  
[facility] ([facility])  
[address] [place] [address]   
   
                                                    Transitional cells   
Computer assisted (U)   
[#/Area]            1-2 (FEW)           Normal              Reference   
range not   
established.                            [facility]  
   
                                        Comment on above:   Performed By: #### 5  
3315-8 ####  
MARKELL SAPP ([id number])  
[facility] ([facility])  
[address] [place] [address]   
   
                                                    WBC Auto (Urine sed)   
[#/Area]        6-10            Abnormal        1-5, NONE       [facility]  
   
                                        Comment on above:   Performed By: #### 5  
3315-8 ####  
MARKELL SAPP ([id number])  
[facility] ([facility])  
[address] [place] [address]   
   
                                                    XR CHEST 1 VIEWon 2024  
   
   
                                        XR CHEST 1 VIEW     STUDY:  
Chest Radiograph;   
5/3/2024 11:32 AM  
INDICATION:  
Confusion.  
COMPARISON:  
2021 XR chest.  
ACCESSION NUMBER(S):  
[id number]  
ORDERING CLINICIAN:  
GRISEL LEÓN  
TECHNIQUE: Frontal   
chest was obtained at   
11:31 hours.  
FINDINGS:  
No acute opacities or   
effusions are   
visualized. Heart size   
is top  
normal. There is no   
evidence of a   
pneumothorax.  
IMPRESSION:  
1. No acute findings on   
single AP view of the   
chest.  
Signed by Jason Morton MD                  [facility]  
   
                                                    XR Chest Single viewon    
   
                                                            1. No acute findings  
 on   
single AP view of the   
chest.  
Signed by Jason Morton MD                                                             
TELERADIOLOGY  
   
                                                            STUDY:  
Chest Radiograph;   
5/3/2024 11:32 AM  
INDICATION:  
Confusion.  
COMPARISON:  
2021 XR chest.  
ACCESSION NUMBER(S):  
[id number]  
ORDERING CLINICIAN:  
GRISEL LEÓN  
TECHNIQUE: Frontal   
chest was obtained at   
11:31 hours.  
FINDINGS:  
No acute opacities or   
effusions are   
visualized. Heart size   
is top  
normal. There is no   
evidence of a   
pneumothorax.                                                  
TELERADIOLOGY  
   
                                                            MortonJason maki MD   
-   
2024 Formatting   
of this note might be   
different from the   
original.  
STUDY:  
Chest Radiograph;   
5/3/2024 11:32 AM  
INDICATION:  
Confusion.  
COMPARISON:  
2021 XR chest.  
ACCESSION NUMBER(S):  
[id number]  
ORDERING CLINICIAN:  
GRISEL LEÓN  
TECHNIQUE: Frontal   
chest was obtained at   
11:31 hours.  
FINDINGS:  
No acute opacities or   
effusions are   
visualized. Heart size   
is top  
normal. There is no   
evidence of a   
pneumothorax.  
IMPRESSION:  
1. No acute findings on   
single AP view of the   
chest.  
Signed by Jason Morton MD  
                                                            [facility]  
Work Phone:   
[phone]  
   
                                                    Radiology Study   
observation   
(narrative)                                                     [facility]  
Work Phone:   
[phone]  
   
                                                    XR Chest Single viewOrdered   
By: Jason Morton on 2024   
   
                                                                  [facility]  
Work Phone:   
[phone]  
   
                                                    Comprehensive metabolic 2000  
 panelon 2023   
   
                                                    Albumin BCP dye   
[Mass/Vol]      4.4 g/dL        Normal          3.4-5.0         [facility]  
   
                                        Comment on above:   Performed By: #### 2  
4323-8 ####  
MARKELL SAPP ([id number])  
[facility] ([facility])  
[address] [place] [address]   
   
                                                    ALP [Catalytic   
activity/Vol]   82 U/L          Normal                    [facility]  
   
                                        Comment on above:   Performed By: #### 2  
4323-8 ####  
MARKELL SAPP ([id number])  
[facility] ([facility])  
[address] [place] [address]   
   
                                                    ALT With P-5'-P   
[Catalytic   
activity/Vol]   13 U/L          Normal          7-45            [facility]  
   
                                        Comment on above:   Result Comment: Sandra  
ents treated with Sulfasalazine may   
generate   
falsely decreased results for ALT.   
   
                                                            Performed By: #### 2  
4323-8 ####  
MARKELL SAPP ([id number])  
[facility] ([facility])  
[address] [place] [address]   
   
                      Anion gap [Moles/Vol] 11 mmol/L  Normal     10-20      [facility]  
   
                                        Comment on above:   Performed By: #### 2  
4323-8 ####  
MARKELL SAPP ([id number])  
[facility] ([facility])  
[address] [place] [address]   
   
                                                    AST With P-5'-P   
[Catalytic   
activity/Vol]   13 U/L          Normal          9-39            [facility]  
   
                                        Comment on above:   Performed By: #### 2  
4323-8 ####  
MARKELL SAPP ([id number])  
[facility] ([facility])  
[address] [place] [address]   
   
                      Bilirubin [Mass/Vol] 0.6 mg/dL  Normal     0.0-1.2    [facility]  
   
                                        Comment on above:   Performed By: #### 2  
4323-8 ####  
MARKELL SAPP ([id number])  
[facility] ([facility])  
[address] [place] [address]   
   
                      Calcium [Mass/Vol] 9.6 mg/dL  Normal     8.6-10.3   [facility]  
   
                                        Comment on above:   Performed By: #### 2  
4323-8 ####  
MARKELL SAPP ([id number])  
[facility] ([facility])  
[facility] [place] [address]   
   
                      Chloride [Moles/Vol] 96 mmol/L  Low             [facility]  
   
                                        Comment on above:   Performed By: #### 2  
4323-8 ####  
MARKELL SAPP ([id number])  
[facility] ([facility])  
[address] [place] [address]   
   
                      CO2 [Moles/Vol] 31 mmol/L  Normal     21-32      [facility]  
   
                                        Comment on above:   Performed By: #### 2  
4323-8 ####  
MARKELL SAPP ([id number])  
[facility] ([facility])  
[address] [place] [address]   
   
                      Creatinine [Mass/Vol] 0.64 mg/dL Normal     0.50-1.05  [facility]  
   
                                        Comment on above:   Performed By: #### 2  
4323-8 ####  
MARKELL SAPP ([id number])  
[facility] ([facility])  
[address] [place] [address]   
   
                                                    GFR/1.73 sq   
M.predicted MDRD   
(S/P/Bld) [Vol   
rate/Area]      mL/min/{1.73_m2} Normal          >60             [facility]  
   
                                        Comment on above:   Result Comment: Calc  
ulations of estimated GFR are performed   
using the  CKD-EPI Study Refit equation without the race   
variable for the IDMS-Traceable creatinine methods.  
https://jasn.asnjournals.org/content/early//ASN.389760  
0988   
   
                                                            Performed By: #### 2  
4323-8 ####  
MARKELL SAPP ([id number])  
[facility] ([facility])  
[address] [place] [address]   
   
                      Glucose [Mass/Vol] 193 mg/dL  High       74-99      [facility]  
   
                                        Comment on above:   Performed By: #### 2  
4323-8 ####  
MARKELL SAPP ([id number])  
[facility] ([facility])  
[address] [place] [address]   
   
                      Potassium [Moles/Vol] 4.2 mmol/L Normal     3.5-5.3    [facility]  
   
                                        Comment on above:   Performed By: #### 2  
4323-8 ####  
MARKELL SAPP ([id number])  
[facility] ([facility])  
[address] [place] [address]   
   
                      Protein [Mass/Vol] 6.6 g/dL   Normal     6.4-8.2    [facility]  
   
                                        Comment on above:   Performed By: #### 2  
4323-8 ####  
MARKELL SAPP ([id number])  
[facility] ([facility])  
[address] [place] [address]   
   
                      Sodium [Moles/Vol] 134 mmol/L Low        136-145    [facility]  
   
                                        Comment on above:   Performed By: #### 2  
4323-8 ####  
MARKELL SAPP ([id number])  
[facility] ([facility])  
[address] [place] [address]   
   
                                                    Urea nitrogen   
[Mass/Vol]      12 mg/dL        Normal          6-23            [facility]  
   
                                        Comment on above:   Performed By: #### 2  
4323-8 ####  
MARKELL SAPP ([id number])  
[facility] ([facility])  
[address] [place] [address]   
   
                                                    HbA1c (Bld) [Mass fraction]o  
n 2023   
   
                                                    Average glucose   
Estimated from   
glycated hemoglobin   
(Bld) [Mass/Vol]    148 mg/dL           Normal              Not   
Established                             [facility]  
   
                                        Comment on above:   Order Comment: Diagn  
osis of Diabetes-Adults  
Non-Diabetic: < or = 5.6%  
Increased risk for developing diabetes: 5.7-6.4%  
Diagnostic of diabetes: > or = 6.5%  
Monitoring of Diabetes  
Age (y)....................... Therapeutic Goal (%)  
Adults: >18.........................<7.0  
Pediatrics: 13-18...................<7.5  
Pediatrics: 7-12....................<8.0  
Pediatrics: 0-6..................... 7.5-8.5  
American Diabetes Association. Diabetes Care 33(S1), 2010   
   
                                                            Performed By: #### 4  
548-4 ####  
MARKELL SAPP ([id number])  
[facility] ([facility])  
[facility] [place] [address]   
   
                                                    Hemoglobin A1c/Hemoglobin.to  
dayna 2023   
   
                                                    HbA1c (Bld) [Mass   
fraction]       6.8 %           High            see below       [facility]  
   
                                        Comment on above:   Order Comment: Diagn  
osis of Diabetes-Adults  
Non-Diabetic: < or = 5.6%  
Increased risk for developing diabetes: 5.7-6.4%  
Diagnostic of diabetes: > or = 6.5%  
Monitoring of Diabetes  
Age (y)....................... Therapeutic Goal (%)  
Adults: >18.........................<7.0  
Pediatrics: 13-18...................<7.5  
Pediatrics: 7-12....................<8.0  
Pediatrics: 0-6..................... 7.5-8.5  
American Diabetes Association. Diabetes Care 33(S1), 2010   
   
                                                            Performed By: #### 4  
548-4 ####  
MARKELL SAPP ([id number])  
[facility] ([facility])  
[facility] [place] [address]   
   
                                                    Lipid 1996 panelon   
3   
   
                                                    Cholesterol   
[Mass/Vol]      182 mg/dL       Normal          0-199           [facility]  
   
                                        Comment on above:   Result Comment:  
Age Desirable Borderline High High  
0-19 Y 0 - 169 170 - 199 >/= 200  
20-24 Y 0 - 189 190 - 224 >/= 225  
>24 Y 0 - 199 200 - 239 >/= 240  
**All ranges are based on fasting samples. Specific  
therapeutic targets will vary based on patient-specific  
cardiac risk.  
Pediatric guidelines reference:Pediatrics 2011, 128(S5).Adult   
guidelines reference: NCEP ATPIII Guidelines,NAOMI 2001,   
258:2486-97  
Venipuncture immediately after or during the administration of   
Metamizole may lead to falsely low results. Testing should be   
performed immediately prior to Metamizole dosing.   
   
                                                            Performed By: #### 2  
4331-1 ####  
MARKELL SAPP ([id number])  
[facility] ([facility])  
[address] [place] [address]   
   
                                                    Cholesterol in HDL   
[Mass/Vol]      60.0 mg/dL      Normal                          [facility]  
   
                                        Comment on above:   Result Comment:  
Age Very Low Low Normal High  
0-19 Y < 35 < 40 40-45 ----  
20-24 Y ---- < 40 >45 ----  
>24 Y ---- < 40 40-60 >60   
   
                                                            Performed By: #### 2  
4331-1 ####  
MARKELL SAPP ([id number])  
[facility] ([facility])  
[address] [place] [address]   
   
                                                    Cholesterol in LDL   
[Mass/Vol]      97 mg/dL        Normal          <=99            [facility]  
   
                                        Comment on above:   Result Comment:  
Near Borderline  
AGE Desirable Optimal High High Very High  
0-19 Y 0 - 109 --- 110-129 >/= 130 ----  
20-24 Y 0 - 119 --- 120-159 >/= 160 ----  
>24 Y 0 - 99 100-129 130-159 160-189 >/=190   
   
                                                            Performed By: #### 2  
4331-1 ####  
MARKELL SAPP ([id number])  
[facility] ([facility])  
[address] [place] [address]   
   
                                                    Cholesterol in VLDL   
[Mass/Vol]      25 mg/dL        Normal          0-40            [facility]  
   
                                        Comment on above:   Performed By: #### 2  
4331-1 ####  
MARKELL SAPP ([id number])  
[facility] ([facility])  
[address] [place] [address]   
   
                      CHOLESTEROL/HDL RATIO 3.0        Normal                Uni  
[facility]  
   
                                        Comment on above:   Result Comment:  
Ref Values  
Desirable < 3.4  
High Risk > 5.0   
   
                                                            Performed By: #### 2  
4331-1 ####  
MARKELL SAPP ([id number])  
[facility] ([facility])  
[facility] [place] [address]   
   
                      NON HDL CHOLESTEROL 122 mg/dL  Normal     0-149      [facility]  
   
                                        Comment on above:   Result Comment:  
Age Desirable Borderline High High Very High  
0-19 Y 0 - 119 120 - 144 >/= 145 >/= 160  
20-24 Y 0 - 149 150 - 189 >/= 190 ----  
>24 Y 30 mg/dL above LDL Cholesterol goal   
   
                                                            Performed By: #### 2  
4331-1 ####  
MARKELL SAPP ([id number])  
[facility] ([facility])  
[facility] [place] [address]   
   
                                                    Triglyceride   
[Mass/Vol]      126 mg/dL       Normal          0-149           [facility]  
   
                                        Comment on above:   Result Comment:  
Age Desirable Borderline High High Very High  
0 D-90 D 19 - 174 ---- ---- ----  
91 D- 9 Y 0 - 74 75 - 99 >/= 100 ----  
10-19 Y 0 - 89 90 - 129 >/= 130 ----  
20-24 Y 0 - 114 115 - 149 >/= 150 ----  
>24 Y 0 - 149 150 - 199 200- 499 >/= 500  
Venipuncture immediately after or during the administration of   
Metamizole may lead to falsely low results. Testing should be   
performed immediately prior to Metamizole dosing.   
   
                                                            Performed By: #### 2  
4331-1 ####  
MARKELL SAPP ([id number])  
[facility] ([facility])  
[address] [place] [address]   
   
                                                    JONY SCREENING [facility]    
   
                                                                  [facility]  
   
                                                    JONY SCREENING W TOMOon    
   
                                                                  [facility]  
   
                                                    CHEST 2 VIEW PA AND LATon    
   
                                                    CHEST 2 VIEW PA AND   
LAT                                     MRN: [id number]  
Patient Name: MARZENA MEDEROS  
  
STUDY:  
TH CHEST 2 VIEW PA AND   
LAT;  
  
INDICATION:  
Cough  
  
COMPARISON:  
10/23/2012  
  
ACCESSION NUMBER(S):  
[id number]  
  
ORDERING CLINICIAN:  
DELL BUSCH  
  
FINDINGS:  
The cardiac silhouette   
size is within normal   
limits.  
There is no focal   
consolidation, edema or   
pneumothorax.  
No sizeable pleural   
effusion.  
No acute osseous   
abnormality.  
  
IMPRESSION:  
No acute   
cardiopulmonary   
process.  
Electronically signed   
by: YAHAIRA SINGH MD        [facility]  
   
                                                    PT Progress Noteon 10-  
0   
   
                                        PT Progress Note    Therapy Diagnosis  
Assessed  
Right foot pain (729.5)   
(M79.671)  
Plan  
Goals: Goals set and   
discussed today.  
1. Independent HEP to   
allow for 50% reduction   
in max ADL C/C sx   
(9/10) 2-3wks  
2. Survey score   
improvement from 46/80   
to 60/80 (LEFS) 3-4wks  
3. ROM increase to   
allow for improved ADL   
descending stairs (from   
3 DF to 7) 3-4wks  
, goal met  
Planned interventions   
include: cryotherapy,   
dry needling,   
education/instruction,   
gait training, home   
program, hot pack,   
kinesiotaping, manual   
therapy, self care/home   
management and   
therapeutic exercises.  
Frequency and duration:   
No further visits   
planned.  
Potential to achieve   
rehab goals is good  
Pt being placed on hold   
for 30 days at this   
time and is going to   
attempt independence   
with HEP. If pt does   
not elect to resume PT   
within 30 days, this   
will serve as his D/C.   
Refer back in future if   
necessary.  
  
Monitor home program.  
Patient instructed to   
call if problems.  
  
Assessment  
Pt confirmed via    
and Full Name.  
Pt has responded well   
to skilled PT with   
improvements noted in   
objective and   
subjective outcomes. Pt   
demonstrates improved   
ankle DF, strength, and   
overall balance, as   
well as decreased   
myofascial restriction   
throughout Right lower   
leg. She understands   
all HEP and symptom   
management and is   
appropriate to attempt   
independence at this   
time.  
Response to treatment:   
no change in pain.  
Patient was able to   
complete today's   
treatment with some   
difficulty.  
  
Adult Risk Screening  
Initial Fall Risk   
Screening:  
MARZENA has fallen in   
the last 6 months. She   
has fallen due to   
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   
in flower beds. Her   
fall did not result in   
injury. MARZENA does not   
have a fear of falling.   
She does not need   
assistance with   
sitting, standing or   
walking. Does not need   
assistance walking in   
her home. She does not   
need assistance in an   
unfamiliar setting.  
Pain Scale: On a scale   
of 0 to 10, the patient   
rates the pain at 1.  
Please identify   
location of pain: R   
heel.  
Pain Quality: aching,   
dull and tightness.  
The pain makes it hard   
for the patient to do   
these things: walking.  
  
Insurance  
Insurance reviewed  
Visit number: 14  
  
Evaluating therapist   
Christine Sanchez PT, DPT  
M83.694; latex allergy  
  
Subjective  
Patient reports:.  
Pt notes her foot has   
been feeling much   
better recently and   
notes only minimal   
pain. Pt notes she   
continues with HEP and   
performing frozen water   
bottle to roll out her   
foot.  
Patient identified by   
name and date of birth.  
  
Home program performing   
as directed: Yes.  
Precautions: Fall Risk:   
none  
Pertinent medical HX   
includes: Db; HBP   
(managed);  female    
CA-hysterectomy   
done-resolved Latex   
allergy; B bunion surg   
12 yrs ago.  
  
Objective  
Ortho  
AROM:  
R Ankle DF:   
3-->4-->4+/5  
L Ankle DF:   
3-->4-->4+/5  
R Ankle PF: 5/5 (25   
single limb calf   
raises)  
Ankle ROM:  
DF:  
RLE: 8  
LLE: 12  
SLS:  
RLE: 30 sec single.  
Gait / Mobility  
Gait: reduced R   
push-off.  
Strength  
Knee:  
(Key:   P!   Denotes   
Pain with Movement)  
Knee extension was 5/5   
on right, 5/5 on left.  
Knee flexion was 5/5 on   
right, 5/5 on left.  
Foot and Ankle:  
(Key:   P!   Denotes   
Pain with Movement)  
Foot dorsiflexion was   
5/5 on right, 5/5 on   
left.  
Foot plantar flexion   
was 5/5 on right, 5/5   
on left.  
Ankle eversion was 5/5   
on right, 5/5 on left.  
Ankle inversion was 5/5   
on right, 5/5 on left.  
Additional Findings:  
B toes 1-5 gr5 flex/ext   
with no sx.  
  
Treatment  
Time in clinic started   
at 1135 am  
Time in clinic ended at   
1205 am  
Total time in clinic is   
30 minutes.  
Total timed code time   
is 28 minutes.  
Therapeutic exercise   
(13071): timed minutes   
15, units 1 . Bike 5?   
level 2  
Pt re-assessed for   
updated POC,   
updated/reviewed HEP,   
and discussed continued   
symptom management x   
10'  
**LATEX ALLERGY**  
Slant board 2 x 60?  
SLS 3 x 20?  
Standing heel raises 2   
x 10 (P reps)  
Antonia step overs large   
antonia 2 x 10 B  
Standing hip ABD 2 x 10  
Step ups 6  2 x 10 fwd   
(N lat)  
Baps Board (A)  
Bridge 2 x 10  
BOSU mini lunges 2 x 10   
B (N)  
gastroc strap stretch   
10x10? hold X  
towel crunch 2x20 (D/C   
to HEP)  
Tband PF,DF,IR/ER 2x10   
purple non latex (D/C   
to HEP)  
Great toe stretch with   
strap 10 x 10? (D/C to   
HEP)  
ABC trace 1 cycle (D/C   
to HEP)  
[place] 2x10 CW/CCW ea   
(D/C to HEP).  
Manual Therapy (11051):   
timed minutes 13, units   
1 . Cupping with   
mobilization then   
stationary holds to   
plantar fascia (X)  
STW to plantar fascia   
and posterior tib x 13'  
IASTM (X)  
DTW R prox plantar   
fascia 3'x (X)  
manual plantar fascial   
stretch 30?(X)  
.  
  
'Scores and Scales'  
  
Signatures  
Electronically signed   
by : Christine Sanchez,   
PT; Oct 12 2020 12:35PM   
EST (Author)        Normal                                   [facility]  
   
                                                    Therapy Re-eval Noteon 10-12  
-2020   
   
                                        Therapy Re-eval Note Therapy Diagnosis  
Assessed  
1. Right foot pain   
(729.5) (M79.671)  
Plan  
Goals: Goals set and   
discussed today.  
1. Independent HEP to   
allow for 50% reduction   
in max ADL C/C sx   
(/10) 2-3wks  
2. Survey score   
improvement from 46/80   
to 60/80 (LEFS) 3-4wks  
3. ROM increase to   
allow for improved ADL   
descending stairs (from   
3 DF to 7) 3-4wks  
, goal met  
Planned interventions   
include: cryotherapy,   
dry needling,   
education/instruction,   
gait training, home   
program, hot pack,   
kinesiotaping, manual   
therapy, self care/home   
management and   
therapeutic exercises.  
Frequency and duration:   
No further visits   
planned.  
Potential to achieve   
rehab goals is good  
Pt being placed on hold   
for 30 days at this   
time and is going to   
attempt independence   
with HEP. If pt does   
not elect to resume PT   
within 30 days, this   
will serve as his D/C.   
Refer back in future if   
necessary.  
  
Monitor home program.  
Patient instructed to   
call if problems.  
  
Assessment  
Pt confirmed via    
and Full Name.  
Pt has responded well   
to skilled PT with   
improvements noted in   
objective and   
subjective outcomes. Pt   
demonstrates improved   
ankle DF, strength, and   
overall balance, as   
well as decreased   
myofascial restriction   
throughout Right lower   
leg. She understands   
all HEP and symptom   
management and is   
appropriate to attempt   
independence at this   
time.  
Response to treatment:   
no change in pain.  
Patient was able to   
complete today's   
treatment with some   
difficulty.  
  
Adult Risk Screening  
Initial Fall Risk   
Screening:  
MARZENA has fallen in   
the last 6 months. She   
has fallen due to   
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   
in flower beds. Her   
fall did not result in   
injury. MARZENA does not   
have a fear of falling.   
She does not need   
assistance with   
sitting, standing or   
walking. Does not need   
assistance walking in   
her home. She does not   
need assistance in an   
unfamiliar setting.  
Pain Scale: On a scale   
of 0 to 10, the patient   
rates the pain at 1.  
Please identify   
location of pain: R   
heel.  
Pain Quality: aching,   
dull and tightness.  
The pain makes it hard   
for the patient to do   
these things: walking.  
  
Insurance  
Insurance reviewed  
Visit number: 14  
  
Evaluating therapist   
Christine Sanchez PT, DPT  
M79.671; latex allergy  
  
Subjective  
Patient reports:.  
Pt notes her foot has   
been feeling much   
better recently and   
notes only minimal   
pain. Pt notes she   
continues with HEP and   
performing frozen water   
bottle to roll out her   
foot.  
Patient identified by   
name and date of birth.  
  
Home program performing   
as directed: Yes.  
Precautions: Fall Risk:   
none  
Pertinent medical HX   
includes: Db; HBP   
(managed);  female    
CA-hysterectomy   
done-resolved Latex   
allergy; B bunion surg   
12 yrs ago.  
  
Objective  
Ortho  
AROM:  
R Ankle DF:   
3-->4-->4+/5  
L Ankle DF:   
3-->4-->4+/5  
R Ankle PF: 5/5 (25   
single limb calf   
raises)  
Ankle ROM:  
DF:  
RLE: 8  
LLE: 12  
SLS:  
RLE: 30 sec single.  
Gait / Mobility  
Gait: reduced R   
push-off.  
Strength  
Knee:  
(Key:   P!   Denotes   
Pain with Movement)  
Knee extension was 5/5   
on right, 5/5 on left.  
Knee flexion was 5/5 on   
right, 5/5 on left.  
Foot and Ankle:  
(Key:   P!   Denotes   
Pain with Movement)  
Foot dorsiflexion was   
5/5 on right, 5/5 on   
left.  
Foot plantar flexion   
was 5/5 on right, 5/5   
on left.  
Ankle eversion was 5/5   
on right, 5/5 on left.  
Ankle inversion was 5/5   
on right, 5/5 on left.  
Additional Findings:  
B toes 1-5 gr5 flex/ext   
with no sx.  
  
Treatment  
Time in clinic started   
at 1135 am  
Time in clinic ended at   
1205 am  
Total time in clinic is   
30 minutes.  
Total timed code time   
is 28 minutes.  
Therapeutic exercise   
(46948): timed minutes   
15, units 1 . Bike 5?   
level 2  
Pt re-assessed for   
updated POC,   
updated/reviewed HEP,   
and discussed continued   
symptom management x   
10'  
**LATEX ALLERGY**  
Slant board 2 x 60?  
SLS 3 x 20?  
Standing heel raises 2   
x 10 (P reps)  
Antonia step overs large   
antonia 2 x 10 B  
Standing hip ABD 2 x 10  
Step ups 6  2 x 10 fwd   
(N lat)  
Baps Board (A)  
Bridge 2 x 10  
BOSU mini lunges 2 x 10   
B (N)  
gastroc strap stretch   
10x10? hold X  
towel crunch 2x20 (D/C   
to HEP)  
Tband PF,DF,IR/ER 2x10   
purple non latex (D/C   
to HEP)  
Great toe stretch with   
strap 10 x 10? (D/C to   
HEP)  
ABC trace 1 cycle (D/C   
to HEP)  
[place] 2x10 CW/CCW ea   
(D/C to HEP).  
Manual Therapy (34239):   
timed minutes 13, units   
1 . Cupping with   
mobilization then   
stationary holds to   
plantar fascia (X)  
STW to plantar fascia   
and posterior tib x 13'  
IASTM (X)  
DTW R prox plantar   
fascia 3'x (X)  
manual plantar fascial   
stretch 30?(X)  
.  
  
'Scores and Scales'  
  
Signatures  
Electronically signed   
by : Christine Sanchez,   
PT; Oct 12 2020 12:35PM   
EST (Author)        Normal                                   [facility]  
   
                                                    PT Progress Noteon 10-  
0   
   
                                        PT Progress Note    Therapy Diagnosis  
Assessed  
Right foot pain (729.5)   
(M79.671)  
Plan  
Goals: Goals set and   
discussed today.  
1. Independent HEP to   
allow for 50% reduction   
in max ADL C/C sx   
(9/10) 2-3wks  
2. Survey score   
improvement from 46/80   
to 60/80 (LEFS) 3-4wks  
3. ROM increase to   
allow for improved ADL   
descending stairs (from   
3 DF to 7) 3-4wks  
  
Planned interventions   
include: cryotherapy,   
dry needling,   
education/instruction,   
gait training, home   
program, hot pack,   
kinesiotaping, manual   
therapy, self care/home   
management and   
therapeutic exercises.  
Frequency and duration:   
2 time(s) a week, for 3   
weeks, for 6 visits .   
total POC of 15 visits.  
Potential to achieve   
rehab goals is good  
Plan to continue with   
strength progression   
and STW for continued   
ease with daily   
function.  
Progress with POC, as   
tolerated.  
  
Assessment  
Improved ability to   
complete antonia step   
overs without   
circumduction this   
date. Improved outcome   
with STW this date with   
improved tissue   
pliability.  
Response to treatment:   
no change in pain.  
Patient was able to   
complete today's   
treatment with some   
difficulty.  
  
Adult Risk Screening  
Initial Fall Risk   
Screening:  
MARZENA has fallen in   
the last 6 months. She   
has fallen due to   
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   
in flower beds. Her   
fall did not result in   
injury. MARZENA does not   
have a fear of falling.   
She does not need   
assistance with   
sitting, standing or   
walking. Does not need   
assistance walking in   
her home. She does not   
need assistance in an   
unfamiliar setting.  
Pain Scale: On a scale   
of 0 to 10, the patient   
rates the pain at 1.  
Please identify   
location of pain: R   
heel.  
Pain Quality: aching,   
dull and tightness.  
The pain makes it hard   
for the patient to do   
these things: walking.  
  
Insurance  
Insurance reviewed  
Visit number: 13  
  
Evaluating therapist   
Christine Sanchez PT, DPT  
M79.671; latex allergy  
  
Subjective  
Patient reports:.  
Patient reports that   
she is having decreased   
pain.  
Patient identified by   
name and date of birth.  
  
Home program performing   
as directed: Yes.  
Precautions: Fall Risk:   
none  
Pertinent medical HX   
includes: Db; HBP   
(managed);  female    
CA-hysterectomy   
done-resolved Latex   
allergy; B bunion surg   
12 yrs ago.  
  
Objective  
Ortho  
AROM:  
R Ankle DF: 3-->4  
L Ankle DF: 3-->4  
Palpation: restriction   
throughout posterior   
tib, plantar fascia,   
and gastroc/soleus.  
Gait / Mobility  
Gait: reduced R   
push-off.  
Strength  
Knee:  
(Key:   P!   Denotes   
Pain with Movement)  
Knee extension was 5/5   
on right, 5/5 on left.  
Knee flexion was 5/5 on   
right, 5/5 on left.  
Foot and Ankle:  
(Key:   P!   Denotes   
Pain with Movement)  
Foot dorsiflexion was   
5/5 on right, 5/5 on   
left.  
Foot plantar flexion   
was 5/5 on right, 5/5   
on left.  
Ankle eversion was 5/5   
on right, 5/5 on left.  
Ankle inversion was 5/5   
on right, 5/5 on left.  
Additional Findings:  
B toes 1-5 gr5 flex/ext   
with no sx.  
  
Treatment  
Time in clinic started   
at 10:45 am  
Time in clinic ended at   
11:30 am  
Total time in clinic is   
45 minutes.  
Total timed code time   
is 44 minutes.  
Therapeutic exercise   
(60104): timed minutes   
30, units 2 . **LATEX   
ALLERGY**  
Bike 5? level 2  
Slant board 2 x 60?  
SLS 3 x 20?  
Standing heel raises 2   
x 10 (P reps)  
Antonia step overs large   
antonia 2 x 10 B  
Standing hip ABD 2 x 10  
Step ups 6  2 x 10 fwd   
(N lat)  
Baps Board (A)  
Bridge 2 x 10  
BOSU mini lunges 2 x 10   
B (N)  
gastroc strap stretch   
10x10? hold X  
towel crunch 2x20 (D/C   
to HEP)  
Tband PF,DF,IR/ER 2x10   
purple non latex (D/C   
to HEP)  
Great toe stretch with   
strap 10 x 10? (D/C to   
HEP)  
ABC trace 1 cycle (D/C   
to HEP)  
[place] 2x10 CW/CCW ea   
(D/C to HEP).  
Manual Therapy (41366):   
timed minutes 14, units   
1 . Cupping with   
mobilization then   
stationary holds to   
plantar fascia x 6'  
STW to plantar fascia   
and posterior tib x 6'  
IASTM (X)  
DTW R prox plantar   
fascia 3'x (X)  
manual plantar fascial   
stretch 30?(X)  
.  
  
'Scores and Scales'  
  
Signatures  
Electronically signed   
by : Constance Machado   
PTA; Oct 7 2020 12:30PM   
EST (Author)  
Electronically signed   
by : Christine Sanchez   
PT; Oct 13 2020 12:02AM   
EST                 Normal                                   [place]  
   
                                                    PT Progress Noteon 10-  
0   
   
                                        PT Progress Note    Therapy Diagnosis  
Assessed  
Right foot pain (729.5)   
(M79.671)  
Plan  
Goals: Goals set and   
discussed today.  
1. Independent HEP to   
allow for 50% reduction   
in max ADL C/C sx   
(9/10) 2-3wks  
2. Survey score   
improvement from 46/80   
to 60/80 (LEFS) 3-4wks  
3. ROM increase to   
allow for improved ADL   
descending stairs (from   
3 DF to 7) 3-4wks  
  
Planned interventions   
include: cryotherapy,   
dry needling,   
education/instruction,   
gait training, home   
program, hot pack,   
kinesiotaping, manual   
therapy, self care/home   
management and   
therapeutic exercises.  
Frequency and duration:   
2 time(s) a week, for 3   
weeks, for 6 visits .   
total POC of 15 visits.  
Potential to achieve   
rehab goals is good  
Plan to continue with   
strength progression   
and STW for continued   
ease with daily   
function.  
Progress with POC, as   
tolerated.  
  
Assessment  
Decreased antalgic gait   
observed this date in   
the clinic. Able to get   
the cups to adhere   
better today along the   
heel. Added lateral   
step ups this date in   
the clinic but fatigues   
quickly.  
Response to treatment:   
no change in pain.  
Patient was able to   
complete today's   
treatment with some   
difficulty.  
  
Adult Risk Screening  
Initial Fall Risk   
Screening:  
MARZENA has fallen in   
the last 6 months. She   
has fallen due to   
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   
in flower beds. Her   
fall did not result in   
injury. MARZENA does not   
have a fear of falling.   
She does not need   
assistance with   
sitting, standing or   
walking. Does not need   
assistance walking in   
her home. She does not   
need assistance in an   
unfamiliar setting.  
Pain Scale: On a scale   
of 0 to 10, the patient   
rates the pain at 2.  
Please identify   
location of pain: R   
heel.  
Pain Quality: aching,   
dull and tightness.  
The pain makes it hard   
for the patient to do   
these things: walking.  
  
Insurance  
Insurance reviewed  
Visit number: 12  
  
Evaluating therapist   
Christine Sanchez PT, DPT  
M79.671; latex allergy  
  
Subjective  
Patient reports:.  
Patient reports that   
she was sore after   
therapy but pain   
decreased as the day   
progressed. Notes that   
she did not have any   
pain the following day.   
Notes that pain is   
primarily along the   
back side of the heel   
now.  
Patient identified by   
name and date of birth.  
  
Home program performing   
as directed: Yes.  
Precautions: Fall Risk:   
none  
Pertinent medical HX   
includes: Db; HBP   
(managed);  female    
CA-hysterectomy   
done-resolved Latex   
allergy; B bunion surg   
12 yrs ago.  
  
Objective  
Ortho  
AROM:  
R Ankle DF: 3-->4  
L Ankle DF: 3-->4  
Palpation: restriction   
throughout posterior   
tib, plantar fascia,   
and gastroc/soleus.  
Gait / Mobility  
Gait: reduced R   
push-off.  
Strength  
Knee:  
(Key:   P!   Denotes   
Pain with Movement)  
Knee extension was 5/5   
on right, 5/5 on left.  
Knee flexion was 5/5 on   
right, 5/5 on left.  
Foot and Ankle:  
(Key:   P!   Denotes   
Pain with Movement)  
Foot dorsiflexion was   
5/5 on right, 5/5 on   
left.  
Foot plantar flexion   
was 5/5 on right, 5/5   
on left.  
Ankle eversion was 5/5   
on right, 5/5 on left.  
Ankle inversion was 5/5   
on right, 5/5 on left.  
Additional Findings:  
B toes 1-5 gr5 flex/ext   
with no sx.  
  
Treatment  
Time in clinic started   
at 10:45 am  
Time in clinic ended at   
11:31 am  
Total time in clinic is   
46 minutes.  
Total timed code time   
is 44 minutes.  
Therapeutic exercise   
(60375): timed minutes   
30, units 2 . **LATEX   
ALLERGY**  
Bike 5? level 2  
Slant board 2 x 60?  
SLS 3 x 20?  
Standing heel raises 2   
x 10  
Antonia step overs large   
hurde 2 x 10 B (N)  
Standing hip ABD x 10  
Step ups 6  2 x 10 fwd   
(N lat)  
Baps Board (A)  
Bridge 2 x 10  
BOSU mini lunges 2 x 10   
B (N)  
gastroc strap stretch   
10x10? hold X  
towel crunch 2x20 (D/C   
to HEP)  
Tband PF,DF,IR/ER 2x10   
purple non latex (D/C   
to HEP)  
Great toe stretch with   
strap 10 x 10? (D/C to   
HEP)  
ABC trace 1 cycle (D/C   
to HEP)  
[place] 2x10 CW/CCW ea   
(D/C to HEP).  
Manual Therapy (13708):   
timed minutes 14, units   
1 . Cupping with   
mobilization then   
stationary holds to   
plantar fascia x 6'  
STW to plantar fascia   
and posterior tib x 6'  
IASTM (X)  
DTW R prox plantar   
fascia 3'x (X)  
manual plantar fascial   
stretch 30?(X)  
.  
  
Contacts for Physician   
Signature  
First attempt date: .  
  
Referring Provider   
Signature: I am in   
agreement with the   
above plan of care.  
Referring Provider   
Signature______________  
_______________________  
__,   
Date/Time____________  
  
'Scores and Scales'  
  
Signatures  
Electronically signed   
by : Constance Machado PTA; Oct 2 2020 11:47AM   
EST (Author)  
Electronically signed   
by : Christine Sanchez   
PT; Oct 6 2020 7:37AM   
EST (Author)        Normal                                   [place]  
   
                                                    PT Progress Noteon   
0   
   
                                        PT Progress Note    Therapy Diagnosis  
Assessed  
Right foot pain (729.5)   
(M39.051)  
Plan  
Goals: Goals set and   
discussed today.  
1. Independent HEP to   
allow for 50% reduction   
in max ADL C/C sx   
(10) 2-3wks  
2. Survey score   
improvement from 46/80   
to 60/80 (LEFS) 3-4wks  
3. ROM increase to   
allow for improved ADL   
descending stairs (from   
3 DF to 7) 3-4wks  
  
Planned interventions   
include: cryotherapy,   
dry needling,   
education/instruction,   
gait training, home   
program, hot pack,   
kinesiotaping, manual   
therapy, self care/home   
management and   
therapeutic exercises.  
Frequency and duration:   
2 time(s) a week, for 3   
weeks, for 6 visits .   
total POC of 15 visits.  
Potential to achieve   
rehab goals is good  
Plan to continue with   
strength progression   
and STW for continued   
ease with daily   
function.  
Progress with POC, as   
tolerated.  
  
Assessment  
Able to get cups to   
adhere better this   
date. Mild crepitus   
palpated in the medial   
arch of foot during   
STW. Mildly antalgic   
gait observed in the   
clinic. Observed   
patient circumducting   
the L LE over the   
antonia but improved   
technique after cues   
were given.  
Response to treatment:   
no change in pain.  
Patient was able to   
complete today's   
treatment with some   
difficulty.  
  
Adult Risk Screening  
Initial Fall Risk   
Screening:  
MARZENA has fallen in   
the last 6 months. She   
has fallen due to   
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   
in flower beds. Her   
fall did not result in   
injury. MARZENA does not   
have a fear of falling.   
She does not need   
assistance with   
sitting, standing or   
walking. Does not need   
assistance walking in   
her home. She does not   
need assistance in an   
unfamiliar setting.  
Pain Scale: On a scale   
of 0 to 10, the patient   
rates the pain at 3.  
Please identify   
location of pain: R   
heel.  
Pain Quality: aching,   
dull and tightness.  
The pain makes it hard   
for the patient to do   
these things: walking.  
  
Insurance  
Insurance reviewed  
Visit number: 11  
  
Evaluating therapist   
Christine Sanchez PT, DPT  
M94.676; latex allergy  
  
Subjective  
Patient reports:.  
Patient reports that   
she is still having   
pain with walking.  
Patient identified by   
name and date of birth.  
  
Home program performing   
as directed: Yes.  
Precautions: Fall Risk:   
none  
Pertinent medical HX   
includes: Db; HBP   
(managed);  female    
CA-hysterectomy   
done-resolved Latex   
allergy; B bunion surg   
12 yrs ago.  
  
Objective  
Ortho  
AROM:  
R Ankle DF: 3-->4  
L Ankle DF: 3-->4  
Palpation: restriction   
throughout posterior   
tib, plantar fascia,   
and gastroc/soleus.  
Gait / Mobility  
Gait: reduced R   
push-off.  
Strength  
Knee:  
(Key:   P!   Denotes   
Pain with Movement)  
Knee extension was 5/5   
on right, 5/5 on left.  
Knee flexion was 5/5 on   
right, 5/5 on left.  
Foot and Ankle:  
(Key:   P!   Denotes   
Pain with Movement)  
Foot dorsiflexion was   
5/5 on right, 5/5 on   
left.  
Foot plantar flexion   
was 5/5 on right, 5/5   
on left.  
Ankle eversion was 5/5   
on right, 5/5 on left.  
Ankle inversion was 5/5   
on right, 5/5 on left.  
Additional Findings:  
B toes 1-5 gr5 flex/ext   
with no sx.  
  
Treatment  
Time in clinic started   
at 11:00 am  
Time in clinic ended at   
11:44 am  
Total time in clinic is   
44 minutes.  
Total timed code time   
is 42 minutes.  
Therapeutic exercise   
(15215): timed minutes   
30, units 2 . **LATEX   
ALLERGY**  
Bike 5? level 2  
Slant board 2 x 60?  
SLS 3 x 20?  
Standing heel raises 2   
x 10  
Antonia step overs large   
hurde 2 x 10 B (N)  
Standing hip ABD x 10  
Step ups 6  2 x 10  
Baps Board (A)  
Bridge 2 x 10  
gastroc strap stretch   
10x10? hold X  
towel crunch 2x20 (D/C   
to HEP)  
Tband PF,DF,IR/ER 2x10   
purple non latex (D/C   
to HEP)  
Great toe stretch with   
strap 10 x 10? (D/C to   
HEP)  
ABC trace 1 cycle (D/C   
to HEP)  
[place] 2x10 CW/CCW ea   
(D/C to HEP).  
Manual Therapy (11510):   
timed minutes 12, units   
1 . Cupping with   
mobilization then   
stationary holds to   
plantar fascia x 6'  
STW to plantar fascia   
and posterior tib x 6'  
IASTM (X)  
DTW R prox plantar   
fascia 3'x (X)  
manual plantar fascial   
stretch 30?(X)  
.  
  
Contacts for Physician   
Signature  
First attempt date: .  
  
Referring Provider   
Signature: I am in   
agreement with the   
above plan of care.  
Referring Provider   
Signature______________  
_______________________  
__,   
Date/Time____________  
  
'Scores and Scales'  
  
Signatures  
Electronically signed   
by : Constance Machado   
PTA; Sep 30 2020 1:16PM   
EST (Author)  
Electronically signed   
by : Christine Sanchez,   
PT; Oct 1 2020 5:50PM   
EST                 Normal                                   [place]  
   
                                                    PT Progress Noteon   
0   
   
                                        PT Progress Note    Therapy Diagnosis  
Assessed  
Right foot pain (729.5)   
(M79.671)  
Plan  
Goals: Goals set and   
discussed today.  
1. Independent HEP to   
allow for 50% reduction   
in max ADL C/C sx   
(10) 2-3wks  
2. Survey score   
improvement from 46/80   
to 60/80 (LEFS) 3-4wks  
3. ROM increase to   
allow for improved ADL   
descending stairs (from   
3 DF to 7) 3-4wks  
  
Planned interventions   
include: cryotherapy,   
dry needling,   
education/instruction,   
gait training, home   
program, hot pack,   
kinesiotaping, manual   
therapy, self care/home   
management and   
therapeutic exercises.  
Frequency and duration:   
2 time(s) a week, for 3   
weeks, for 6 visits .   
total POC of 15 visits.  
Potential to achieve   
rehab goals is good  
Plan to continue with   
strength progression   
and STW for continued   
ease with daily   
function.  
Progress with POC, as   
tolerated.  
  
Assessment  
Patient got dizzy   
during session and had   
to eat some chocolate   
during session. Patient   
felt better after   
eating the chocolate   
and sitting for a   
minute. Mildly antalgic   
gait observed in the   
clinic this date.   
Patient had 2 episodes   
of knocking antonia down   
when stepping over with   
the L. Continued with   
STW and cupping this   
date. Had difficulty   
getting cups to stay in   
place after trying 2   
different sizes.  
Response to treatment:   
no change in pain.  
Patient was able to   
complete today's   
treatment with some   
difficulty.  
  
Adult Risk Screening  
Initial Fall Risk   
Screening:  
MARZENA has fallen in   
the last 6 months. She   
has fallen due to   
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   
in flower beds. Her   
fall did not result in   
injury. MARZENA does not   
have a fear of falling.   
She does not need   
assistance with   
sitting, standing or   
walking. Does not need   
assistance walking in   
her home. She does not   
need assistance in an   
unfamiliar setting.  
Pain Scale: On a scale   
of 0 to 10, the patient   
rates the pain at 3.  
Please identify   
location of pain: R   
heel.  
Pain Quality: aching,   
dull and tightness.  
The pain makes it hard   
for the patient to do   
these things: walking.  
  
Insurance  
Insurance reviewed  
Visit number: 10  
  
Evaluating therapist   
Christine Sanchez PT, DPT  
M79.670; latex allergy  
  
Subjective  
Patient reports:.  
Denies any new falls   
recently. States that   
she has pain when   
standing on the R foot   
in her heel.  
Patient identified by   
name and date of birth.  
  
Home program performing   
as directed: Yes.  
Precautions: Fall Risk:   
none  
Pertinent medical HX   
includes: Db; HBP   
(managed);  female    
CA-hysterectomy   
done-resolved Latex   
allergy; B bunion surg   
12 yrs ago.  
  
Objective  
Ortho  
AROM:  
R Ankle DF: 3-->4  
L Ankle DF: 3-->4  
Palpation: restriction   
throughout posterior   
tib, plantar fascia,   
and gastroc/soleus.  
ROM / Joint Mobility  
Gait / Mobility  
Gait: reduced R   
push-off.  
Strength  
Knee:  
(Key:   P!   Denotes   
Pain with Movement)  
Knee extension was 5/5   
on right, 5/5 on left.  
Knee flexion was 5/5 on   
right, 5/5 on left.  
Foot and Ankle:  
(Key:   P!   Denotes   
Pain with Movement)  
Foot dorsiflexion was   
5/5 on right, 5/5 on   
left.  
Foot plantar flexion   
was 5/5 on right, 5/5   
on left.  
Ankle eversion was 5/5   
on right, 5/5 on left.  
Ankle inversion was 5/5   
on right, 5/5 on left.  
Additional Findings:  
B toes 1-5 gr5 flex/ext   
with no sx.  
  
Treatment  
Time in clinic started   
at 0845 am  
Time in clinic ended at   
0931 am  
Total time in clinic is   
46 minutes.  
Total timed code time   
is 45 minutes.  
Therapeutic exercise   
(84541): timed minutes   
30, units 2 . **LATEX   
ALLERGY**  
Bike 5? level 2  
Slant board 2 x 60?  
SLS 3 x 20?  
Standing heel raises 2   
x 10  
Antonia step overs large   
hurde 2 x 10 B (N)  
Standing hip ABD x 10  
Step ups 6  2 x 10  
Baps Board (A)  
Bridge 2 x 10 (N)  
gastroc strap stretch   
10x10? hold X  
towel crunch 2x20 (D/C   
to HEP)  
Tband PF,DF,IR/ER 2x10   
purple non latex (D/C   
to HEP)  
Great toe stretch with   
strap 10 x 10? (D/C to   
HEP)  
ABC trace 1 cycle (D/C   
to HEP)  
[place] 2x10 CW/CCW ea   
(D/C to HEP).  
Manual Therapy (07996):   
timed minutes 15, units   
1 . Cupping with   
mobilization then   
stationary holds to   
plantar fascia x 5'  
STW to plantar fascia   
and posterior tib x 10'  
IASTM (X)  
DTW R prox plantar   
fascia 3'x (X)  
manual plantar fascial   
stretch 30?(X)  
.  
  
Contacts for Physician   
Signature  
First attempt date: .  
  
Referring Provider   
Signature: I am in   
agreement with the   
above plan of care.  
Referring Provider   
Signature______________  
_______________________  
__,   
Date/Time____________  
  
'Scores and Scales'  
  
Signatures  
Electronically signed   
by : Constance Machado   
PTA; Sep 25 2020   
10:43AM EST (Author)  
Electronically signed   
by : Christine Sanchez   
PT; Oct 1 2020 5:50PM   
EST                 Normal                                   [place]  
   
                                                    PT Progress Noteon   
0   
   
                                        PT Progress Note    Therapy Diagnosis  
Assessed  
Right foot pain (729.5)   
(M79.671)  
Plan  
Goals: Goals set and   
discussed today.  
1. Independent HEP to   
allow for 50% reduction   
in max ADL C/C sx   
(9/10) 2-3wks  
2. Survey score   
improvement from 46/80   
to 60/80 (LEFS) 3-4wks  
3. ROM increase to   
allow for improved ADL   
descending stairs (from   
3 DF to 7) 3-4wks  
  
Planned interventions   
include: cryotherapy,   
dry needling,   
education/instruction,   
gait training, home   
program, hot pack,   
kinesiotaping, manual   
therapy, self care/home   
management and   
therapeutic exercises.  
Frequency and duration:   
2 time(s) a week, for 3   
weeks, for 6 visits .   
total POC of 15 visits.  
Potential to achieve   
rehab goals is good  
Plan to continue with   
manual therapy   
techniques and progress   
core stabilization and   
glute retraining   
exercises as well as   
progress ROM exercises   
to improved ankle DF   
for improved gait   
mechanics and   
ambulation.  
Progress with POC, as   
tolerated.  
  
Assessment  
Pt confirmed via    
and Full Name.  
Pt demonstrates   
improved ankle ROM into   
DF by 1 degree and does   
still have myofascial   
and joint restriction   
throughout posterior   
tib, gastroc/soleus,   
and plantar fascia   
which continues to   
respond well to manual   
therapy techniques. Pt   
appears to have   
proximal   
instability/weakness at   
core and pelvis and   
would benefit from   
continued skilled   
Physical Therapy with   
focus on manual therapy   
and progression of   
ankle and hip   
stabilization exercises   
to decrease overuse of   
plantar fascia and   
posterior tib.  
Response to treatment:   
no change in pain.  
Patient was able to   
complete today's   
treatment with some   
difficulty.  
  
Adult Risk Screening  
Initial Fall Risk   
Screening:  
MARZENA has fallen in   
the last 6 months. She   
has fallen due to   
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   
in flower beds. Her   
fall did not result in   
injury. MARZENA does not   
have a fear of falling.   
She does not need   
assistance with   
sitting, standing or   
walking. Does not need   
assistance walking in   
her home. She does not   
need assistance in an   
unfamiliar setting.  
Pain Scale: On a scale   
of 0 to 10, the patient   
rates the pain at 3.  
Please identify   
location of pain: R   
heel.  
Pain Quality: aching,   
dull and tightness.  
The pain makes it hard   
for the patient to do   
these things: walking.  
  
Insurance  
Insurance reviewed  
Visit number: 9  
  
Evaluating therapist   
Christine Sanchez PT, DPT  
M79.671; latex allergy  
  
Subjective  
Patient reports:.  
Pt notes she feels like   
she could use some more   
therapy as she still   
has some soreness   
lingering in her foot.   
Pt notes her pain at   
worse has been about a   
4-5/10. Pt notes she   
feels like her balance   
isn't as good as it   
used to be. Pt notes   
she is trying the   
stretches and to stay   
compliant with HEP but   
notes she does tend to   
go barefoot around the   
house.  
Patient identified by   
name and date of birth.  
  
Home program performing   
as directed: Yes.  
Precautions: Fall Risk:   
none  
Pertinent medical HX   
includes: Db; HBP   
(managed);  female    
CA-hysterectomy   
done-resolved Latex   
allergy; B bunion surg   
12 yrs ago.  
  
Objective  
Ortho  
AROM:  
R Ankle DF: 3-->4  
L Ankle DF: 3-->4  
Palpation: restriction   
throughout posterior   
tib, plantar fascia,   
and gastroc/soleus.  
ROM / Joint Mobility  
(Range of Motion in   
degrees).  
Gait / Mobility  
Gait: reduced R   
push-off.  
Strength  
Knee:  
(Key:   P!   Denotes   
Pain with Movement)  
Knee extension was 5/5   
on right, 5/5 on left.  
Knee flexion was 5/5 on   
right, 5/5 on left.  
Foot and Ankle:  
(Key:   P!   Denotes   
Pain with Movement)  
Foot dorsiflexion was   
5/5 on right, 5/5 on   
left.  
Foot plantar flexion   
was 5/5 on right, 5/5   
on left.  
Ankle eversion was 5/5   
on right, 5/5 on left.  
Ankle inversion was 5/5   
on right, 5/5 on left.  
Additional Findings:  
B toes 1-5 gr5 flex/ext   
with no sx.  
  
Treatment  
Time in clinic started   
at 0858 am  
Time in clinic ended at   
0933 am  
Total time in clinic is   
35 minutes.  
Total timed code time   
is 33 minutes.  
Therapeutic exercise   
(31997): timed minutes   
15, units 1 . **LATEX   
Allergy**  
Bike 5? level 2 (P)  
Pt re-assessed for   
updated POC,   
updated/reviewed HEP,   
and discussed continued   
symptom management x   
10'  
Not 2020  
Slant board 2 x 60?  
SLS 3 x 20?  
Standing heel raises 2   
x 10  
Standing hip ABD x 10  
Step ups 6  2 x 10  
Baps Board (A)  
Bridge (A)  
gastroc strap stretch   
10x10? hold X  
towel crunch 2x20 (D/C   
to HEP)  
Tband PF,DF,IR/ER 2x10   
purple non latex (D/C   
to HEP)  
Great toe stretch with   
strap 10 x 10? (D/C to   
HEP)  
ABC trace 1 cycle (D/C   
to HEP)  
[place] 2x10 CW/CCW ea   
(D/C to HEP).  
Manual Therapy (18501):   
timed minutes 18, units   
1 . Cupping with   
mobilization then   
stationary holds to   
plantar fascia x 8' (N)  
STW to plantar fascia   
and posterior tib x 10'  
IASTM (X)  
DTW R prox plantar   
fascia 3'x (X)  
manual plantar fascial   
stretch 30?(X)  
.  
  
Contacts for Physician   
Signature  
First attempt date: .  
Referring Provider   
Signature: I am in   
agreement with the   
above plan of care.  
Referring Provider   
Signature______________  
_______________________  
__,   
Date/Time____________  
  
'Scores and Scales'  
  
Signatures  
Electronically signed   
by : Christine Sanchez,   
PT; Sep 14 2020 11:20AM   
EST (Author)        Normal                                   [facility]  
   
                                                    Therapy Re-eval Noteon    
   
                                        Therapy Re-eval Note Therapy Diagnosis  
Assessed  
1. Right foot pain   
(729.5) (M79.671)  
Plan  
Goals: Goals set and   
discussed today.  
1. Independent HEP to   
allow for 50% reduction   
in max ADL C/C sx   
(9/10) 2-3wks  
2. Survey score   
improvement from 46/80   
to 60/80 (LEFS) 3-4wks  
3. ROM increase to   
allow for improved ADL   
descending stairs (from   
3 DF to 7) 3-4wks  
  
Planned interventions   
include: cryotherapy,   
dry needling,   
education/instruction,   
gait training, home   
program, hot pack,   
kinesiotaping, manual   
therapy, self care/home   
management and   
therapeutic exercises.  
Frequency and duration:   
2 time(s) a week, for 3   
weeks, for 6 visits .   
total POC of 15 visits.  
Potential to achieve   
rehab goals is good  
Plan to continue with   
manual therapy   
techniques and progress   
core stabilization and   
glute retraining   
exercises as well as   
progress ROM exercises   
to improved ankle DF   
for improved gait   
mechanics and   
ambulation.  
Progress with POC, as   
tolerated.  
  
Assessment  
Pt confirmed via    
and Full Name.  
Pt demonstrates   
improved ankle ROM into   
DF by 1 degree and does   
still have myofascial   
and joint restriction   
throughout posterior   
tib, gastroc/soleus,   
and plantar fascia   
which continues to   
respond well to manual   
therapy techniques. Pt   
appears to have   
proximal   
instability/weakness at   
core and pelvis and   
would benefit from   
continued skilled   
Physical Therapy with   
focus on manual therapy   
and progression of   
ankle and hip   
stabilization exercises   
to decrease overuse of   
plantar fascia and   
posterior tib.  
Response to treatment:   
no change in pain.  
Patient was able to   
complete today's   
treatment with some   
difficulty.  
  
Adult Risk Screening  
Initial Fall Risk   
Screening:  
MARZENA has fallen in   
the last 6 months. She   
has fallen due to   
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   
in flower beds. Her   
fall did not result in   
injury. MARZENA does not   
have a fear of falling.   
She does not need   
assistance with   
sitting, standing or   
walking. Does not need   
assistance walking in   
her home. She does not   
need assistance in an   
unfamiliar setting.  
Pain Scale: On a scale   
of 0 to 10, the patient   
rates the pain at 3.  
Please identify   
location of pain: R   
heel.  
Pain Quality: aching,   
dull and tightness.  
The pain makes it hard   
for the patient to do   
these things: walking.  
  
Insurance  
Insurance reviewed  
Visit number: 9  
  
Evaluating therapist   
Christine Sanchez PT, DPT  
M79.671; latex allergy  
  
Subjective  
Patient reports:.  
Pt notes she feels like   
she could use some more   
therapy as she still   
has some soreness   
lingering in her foot.   
Pt notes her pain at   
worse has been about a   
4-5/10. Pt notes she   
feels like her balance   
isn't as good as it   
used to be. Pt notes   
she is trying the   
stretches and to stay   
compliant with HEP but   
notes she does tend to   
go barefoot around the   
house.  
Patient identified by   
name and date of birth.  
  
Home program performing   
as directed: Yes.  
Precautions: Fall Risk:   
none  
Pertinent medical HX   
includes: Db; HBP   
(managed);  female    
CA-hysterectomy   
done-resolved Latex   
allergy; B bunion surg   
12 yrs ago.  
  
Objective  
Ortho  
AROM:  
R Ankle DF: 3-->4  
L Ankle DF: 3-->4  
Palpation: restriction   
throughout posterior   
tib, plantar fascia,   
and gastroc/soleus.  
ROM / Joint Mobility  
(Range of Motion in   
degrees).  
Gait / Mobility  
Gait: reduced R   
push-off.  
Strength  
Knee:  
(Key:   P!   Denotes   
Pain with Movement)  
Knee extension was 5/5   
on right, 5/5 on left.  
Knee flexion was 5/5 on   
right, 5/5 on left.  
Foot and Ankle:  
(Key:   P!   Denotes   
Pain with Movement)  
Foot dorsiflexion was   
5/5 on right, 5/5 on   
left.  
Foot plantar flexion   
was 5/5 on right, 5/5   
on left.  
Ankle eversion was 5/5   
on right, 5/5 on left.  
Ankle inversion was 5/5   
on right, 5/5 on left.  
Additional Findings:  
B toes 1-5 gr5 flex/ext   
with no sx.  
  
Treatment  
Time in clinic started   
at 0858 am  
Time in clinic ended at   
0933 am  
Total time in clinic is   
35 minutes.  
Total timed code time   
is 33 minutes.  
Therapeutic exercise   
(59517): timed minutes   
15, units 1 . **LATEX   
Allergy**  
Bike 5? level 2 (P)  
Pt re-assessed for   
updated POC,   
updated/reviewed HEP,   
and discussed continued   
symptom management x   
10'  
Not 2020  
Slant board 2 x 60?  
SLS 3 x 20?  
Standing heel raises 2   
x 10  
Standing hip ABD x 10  
Step ups 6  2 x 10  
Baps Board (A)  
Bridge (A)  
gastroc strap stretch   
10x10? hold X  
towel crunch 2x20 (D/C   
to HEP)  
Tband PF,DF,IR/ER 2x10   
purple non latex (D/C   
to HEP)  
Great toe stretch with   
strap 10 x 10? (D/C to   
HEP)  
ABC trace 1 cycle (D/C   
to HEP)  
[place] 2x10 CW/CCW ea   
(D/C to HEP).  
Manual Therapy (60743):   
timed minutes 18, units   
1 . Cupping with   
mobilization then   
stationary holds to   
plantar fascia x 8' (N)  
STW to plantar fascia   
and posterior tib x 10'  
IASTM (X)  
DTW R prox plantar   
fascia 3'x (X)  
manual plantar fascial   
stretch 30?(X)  
.  
  
Contacts for Physician   
Signature  
First attempt date: .  
Referring Provider   
Signature: I am in   
agreement with the   
above plan of care.  
Referring Provider   
Signature______________  
_______________________  
__,   
Date/Time____________  
  
'Scores and Scales'  
  
Signatures  
Electronically signed   
by : Christine Sanchez,   
PT; Sep 14 2020 11:20AM   
EST (Author)        Normal                                  [facility]  
   
                                                    PT Progress Noteon   
0   
   
                                        PT Progress Note    Therapy Diagnosis  
Assessed  
Right foot pain (729.5)   
(M79.671)  
Plan  
Goals: Goals set and   
discussed today.  
1. Independent HEP to   
allow for 50% reduction   
in max ADL C/C sx   
(9/10) 2-3wks  
2. Survey score   
improvement from 46/80   
to 60/80 (LEFS) 3-4wks  
3. ROM increase to   
allow for improved ADL   
descending stairs (from   
3 DF to 7) 3-4wks  
  
Planned interventions   
include: cryotherapy,   
dry needling,   
education/instruction,   
gait training, home   
program, hot pack,   
kinesiotaping, manual   
therapy, self care/home   
management and   
therapeutic exercises.  
Frequency and duration:   
2 time(s) a week, for 4   
weeks, for 8 visits .   
patient plans to finish   
treatments at [facility].  
Potential to achieve   
rehab goals is good  
Continue with STW for   
continued release of   
plantar fascia   
adhesion.  
Progress with POC, as   
tolerated.  
  
Assessment  
Tenderness along center   
of the R heel along   
plantar surface during   
IASTM. Mildly antalgic   
gait observed this date   
after slant board   
stretch.  
Response to treatment:   
no change in pain.  
Patient was able to   
complete today's   
treatment with some   
difficulty.  
  
Adult Risk Screening  
Initial Fall Risk   
Screening:  
MARZENA has fallen in   
the last 6 months. She   
has fallen due to   
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   
in flower beds. Her   
fall did not result in   
injury. MARZENA does not   
have a fear of falling.   
She does not need   
assistance with   
sitting, standing or   
walking. Does not need   
assistance walking in   
her home. She does not   
need assistance in an   
unfamiliar setting.  
Pain Scale: On a scale   
of 0 to 10, the patient   
rates the pain at 3.  
Please identify   
location of pain: R   
heel.  
Pain Quality: aching,   
dull and tightness.  
The pain makes it hard   
for the patient to do   
these things: walking.  
  
Insurance  
Insurance reviewed  
Visit number: 8  
  
Evaluating therapist   
Richard Saavedra PT.  
M79.671; latex allergy  
  
Subjective  
Patient reports:.  
Patient reports that   
she has pain in her   
heel when walking.  
Patient identified by   
name and date of birth.  
  
Home program performing   
as directed: Yes.  
Precautions: Fall Risk:   
none  
Pertinent medical HX   
includes: Db; HBP   
(managed);  female    
CA-hysterectomy   
done-resolved Latex   
allergy; B bunion surg   
12 yrs ago.  
  
Treatment  
Time in clinic started   
at 10:15 am  
Time in clinic ended at   
11:00 am  
Total time in clinic is   
45 minutes.  
Total timed code time   
is 44 minutes.  
Therapeutic exercise   
(73638): timed minutes   
29 , units 2 . **LATEX   
Allergy**  
Bike 5? level 1  
Slant board 2 x 60?  
towel crunch 2x20  
Tband PF,DF,IR/ER 2x10   
purple non latex  
Great toe stretch with   
strap 10 x 10?  
ABC trace 1 cycle  
[place] 2x10 CW/CCW ea  
SLS 3 x 20?  
Standing heel raises 2   
x 10  
Standing hip ABD x 10  
Step ups 6  2 x 10  
Baps Board (A)  
gastroc strap stretch   
10x10? hold X  
.  
Manual Therapy (30146):   
timed minutes 15 ,   
units 1 . IASTM 15'  
DTW R prox plantar   
fascia 3'x (X)  
manual plantar fascial   
stretch 30?(X)  
STW to plantar fascia,   
and calf. (X).  
  
'Scores and Scales'  
  
Signatures  
Electronically signed   
by : Constance Machado,   
PTA; Sep 11 2020   
11:21AM EST (Author)  
Electronically signed   
by : Christine Sanchez,   
PT; Oct 1 2020 5:50PM   
EST                 Normal                                   [facility]  
   
                                                    PT Progress Noteon   
0   
   
                                        PT Progress Note    Therapy Diagnosis  
Assessed  
Right foot pain (729.5)   
(M79.671)  
Plan  
Goals: Goals set and   
discussed today.  
1. Independent HEP to   
allow for 50% reduction   
in max ADL C/C sx   
(9/10) 2-3wks  
2. Survey score   
improvement from 46/80   
to 60/80 (LEFS) 3-4wks  
3. ROM increase to   
allow for improved ADL   
descending stairs (from   
3 DF to 7) 3-4wks  
  
Planned interventions   
include: cryotherapy,   
dry needling,   
education/instruction,   
gait training, home   
program, hot pack,   
kinesiotaping, manual   
therapy, self care/home   
management and   
therapeutic exercises.  
Frequency and duration:   
2 time(s) a week, for 4   
weeks, for 8 visits .   
patient plans to finish   
treatments at [facility].  
Potential to achieve   
rehab goals is good  
Continue with strength   
and ROM progression as   
able.  
Progress with POC, as   
tolerated.  
  
Assessment  
Tenderness along the   
medial calcaneal   
tubercle during STW.   
Mildly antalgic gait   
observed this date in   
the clinic. VC's to   
complete technique   
correctly with ankle 4   
way.  
Response to treatment:   
decreased pain.  
Patient was able to   
complete today's   
treatment with some   
difficulty.  
  
Adult Risk Screening  
Initial Fall Risk   
Screening:  
MARZENA has fallen in   
the last 6 months. She   
has fallen due to   
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   
in flower beds. Her   
fall did not result in   
injury. MARZENA does not   
have a fear of falling.   
She does not need   
assistance with   
sitting, standing or   
walking. Does not need   
assistance walking in   
her home. She does not   
need assistance in an   
unfamiliar setting.  
Pain Scale: On a scale   
of 0 to 10, the patient   
rates the pain at 4.  
Please identify   
location of pain: R   
heel.  
Pain Quality: aching,   
dull and tightness.  
The pain makes it hard   
for the patient to do   
these things: walking.  
  
Insurance  
Insurance reviewed  
Visit number: 7  
  
Evaluating therapist   
Richard Saavedra PT.  
M79.671; latex allergy  
  
Subjective  
Patient reports:.  
Patient report that she   
has been having   
decreased pain since   
last visit.  
Patient identified by   
name and date of birth.  
  
Home program performing   
as directed: Yes.  
Precautions: Fall Risk:   
none  
Pertinent medical HX   
includes: Db; HBP   
(managed);  female    
CA-hysterectomy   
done-resolved Latex   
allergy; B bunion surg   
12 yrs ago.  
  
Treatment  
Time in clinic started   
at 10:15 am  
Time in clinic ended at   
11:00 am  
Total time in clinic is   
45 minutes.  
Total timed code time   
is 41 minutes.  
Therapeutic exercise   
(01305): timed minutes   
26, units 2 . **LATEX   
Allergy**  
Bike 5? level 1  
Slant board 2 x 60?  
towel crunch 2x20  
Tband PF,DF,IR/ER 2x10   
purple non latex  
Great toe stretch with   
strap 10 x 10?  
ABC trace 1 cycle  
[place] 2x10 CW/CCW ea  
SLS 3 x 20?  
Standing heel raises 2   
x 10 (N)  
Standing hip ABD x 10   
(N)  
Step ups 6  2 x 10  
Baps Board (A)  
gastroc strap stretch   
10x10? hold X  
.  
Manual Therapy (18810):   
timed minutes 15 ,   
units 1 . IASTM 15' (N)  
DTW R prox plantar   
fascia 3'x (X)  
manual plantar fascial   
stretch 30?(X)  
STW to plantar fascia,   
and calf. (X).  
  
'Scores and Scales'  
  
Signatures  
Electronically signed   
by : Constance Machado   
PTA; Sep 9 2020 12:51PM   
EST (Author)  
Electronically signed   
by : Christine Sanchez   
PT; Oct 1 2020 5:50PM   
EST                 Normal                                   [facility]  
   
                                                    PT Progress Noteon   
0   
   
                                        PT Progress Note    Therapy Diagnosis  
Assessed  
Right foot pain (729.5)   
(M79.671)  
Plan  
Goals: Goals set and   
discussed today.  
1. Independent HEP to   
allow for 50% reduction   
in max ADL C/C sx   
(9/10) 2-3wks  
2. Survey score   
improvement from 46/80   
to 60/80 (LEFS) 3-4wks  
3. ROM increase to   
allow for improved ADL   
descending stairs (from   
3 DF to 7) 3-4wks  
  
Planned interventions   
include: cryotherapy,   
dry needling,   
education/instruction,   
gait training, home   
program, hot pack,   
kinesiotaping, manual   
therapy, self care/home   
management and   
therapeutic exercises.  
Frequency and duration:   
2 time(s) a week, for 4   
weeks, for 8 visits .   
patient plans to finish   
treatments at [facility].  
Potential to achieve   
rehab goals is good  
Continue with strength   
and ROM progression as   
able.  
Progress with POC, as   
tolerated.  
  
Assessment  
Tenderness along medial   
arch of the foot and   
tenderness along the   
1st met head. Mildly   
antalgic gait observed   
this date in the clinic   
Added step ups d/t pain   
with stair ambulation.  
Response to treatment:   
increased pain.  
Patient was able to   
complete today's   
treatment with some   
difficulty.  
  
Adult Risk Screening  
Initial Fall Risk   
Screening:  
MARZENA has fallen in   
the last 6 months. She   
has fallen due to   
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   
in flower beds. Her   
fall did not result in   
injury. MARZENA does not   
have a fear of falling.   
She does not need   
assistance with   
sitting, standing or   
walking. Does not need   
assistance walking in   
her home. She does not   
need assistance in an   
unfamiliar setting.  
Pain Scale: On a scale   
of 0 to 10, the patient   
rates the pain at 5.  
Please identify   
location of pain: R   
heel.  
Pain Quality: sharp.  
  
Insurance  
Insurance reviewed  
Visit number: 5  
  
Evaluating therapist   
Richard Saavedra PT.  
M79.671; latex allergy  
  
Subjective  
Patient reports:.  
Patient reports that   
she is having pain with   
walking but is better   
than the other day.   
Pain increase with   
descending stairs.  
Patient identified by   
name and date of birth.  
  
Home program performing   
as directed: Yes.  
Precautions: Fall Risk:   
none  
Pertinent medical HX   
includes: Db; HBP   
(managed);  female    
CA-hysterectomy   
done-resolved Latex   
allergy; B bunion surg   
12 yrs ago.  
  
Treatment  
Time in clinic started   
at 2:30 pm  
Time in clinic ended at   
3:08 am  
Total time in clinic is   
42 minutes.  
Total timed code time   
is 38 minutes.  
Therapeutic exercise   
(21324): timed minutes   
23, units 2 . **LATEX   
Allergy**  
Bike 5? level 1  
Slant board 2 x 60?  
towel crunch 2x20  
Tband PF,DF,IR/ER 2x10   
purple non latex  
Great toe stretch with   
strap 10 x 10?  
ABC trace 1 cycle  
[place] 2x10 CW/CCW ea  
SLS 3 x 20?  
Step ups 6  2 x 10 (N)  
Baps Board (A)  
gastroc strap stretch   
10x10? hold X  
.  
Manual Therapy (16979):   
timed minutes 15 ,   
units 1 . DTW R prox   
plantar fascia 3'x  
manual plantar fascial   
stretch 30?  
STW to plantar fascia,   
and calf.  
  
'Scores and Scales'  
  
Signatures  
Electronically signed   
by : Constance Machado,   
PTA; Sep 2 2020 3:18PM   
EST (Author)  
Electronically signed   
by : Christine Sanchez,   
PT; Sep 2 2020 11:22PM   
EST                 Normal                                  [facility]  
   
                                                    PT Progress Noteon   
0   
   
                                        PT Progress Note    Therapy Diagnosis  
Assessed  
Right foot pain (729.5)   
(M79.67)  
Plan  
Goals: Goals set and   
discussed today.  
1. Independent HEP to   
allow for 50% reduction   
in max ADL C/C sx   
(9/10) 2-3wks  
2. Survey score   
improvement from 46/80   
to 60/80 (LEFS) 3-4wks  
3. ROM increase to   
allow for improved ADL   
descending stairs (from   
3 DF to 7) 3-4wks  
  
Planned interventions   
include: cryotherapy,   
dry needling,   
education/instruction,   
gait training, home   
program, hot pack,   
kinesiotaping, manual   
therapy, self care/home   
management and   
therapeutic exercises.  
Frequency and duration:   
2 time(s) a week, for 4   
weeks, for 8 visits .   
patient plans to finish   
treatments at [facility].  
Potential to achieve   
rehab goals is good  
Continue with strength   
and ROM progression as   
able.  
Progress with POC, as   
tolerated.  
  
Assessment  
Tenderness along medial   
arch of the foot this   
date. Unable to   
complete SLS w/out   
using 1 finger to   
assist with balance.   
Slow guarded gait   
observed this date in   
the clinic with   
decreased stance during   
gait.  
  
Adult Risk Screening  
Initial Fall Risk   
Screening:  
MARZENA has fallen in   
the last 6 months. She   
has fallen due to   
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   
in flower beds. Her   
fall did not result in   
injury. MARZENA does not   
have a fear of falling.   
She does not need   
assistance with   
sitting, standing or   
walking. Does not need   
assistance walking in   
her home. She does not   
need assistance in an   
unfamiliar setting.  
Pain Scale: On a scale   
of 0 to 10, the patient   
rates the pain at 7.  
Please identify   
location of pain: R   
heel.  
Pain Quality: sharp.  
  
Insurance  
Insurance reviewed  
Visit number: 5  
  
Evaluating therapist   
Richard Saavedra PT.  
M79.671; latex allergy  
  
Subjective  
Patient reports:.  
Patient reports that   
she has trouble with   
the standing on one leg   
exercise. States that   
she has been doing her   
HEP.  
Home program performing   
as directed: Yes.  
Precautions: Fall Risk:   
none  
Pertinent medical HX   
includes: Db; HBP   
(managed);  female    
CA-hysterectomy   
done-resolved Latex   
allergy; B bunion surg   
12 yrs ago.  
  
Treatment  
Time in clinic started   
at 10:15 am  
Time in clinic ended at   
11:00 am  
Total time in clinic is   
45 minutes.  
Total timed code time   
is 41 minutes.  
Therapeutic exercise   
(81989): timed minutes   
18, units 1 . **LATEX   
Allergy**  
Bike 5? level 1  
Slant board 2 x 60? (P)  
towel crunch 2x20  
Tband PF,DF,IR/ER 2x10   
purple non latex (P)  
Great toe stretch with   
strap 10 x 10?  
ABC trace 1 cycle  
[place] 2x10 CW/CCW ea   
(P)  
SLS 3 x 20?  
Baps Board (A)  
gastroc strap stretch   
10x10? hold X  
.  
Manual Therapy (40301):   
timed minutes 23, units   
2 . DTW R prox plantar   
fascia 3'x  
manual plantar fascial   
stretch 30?  
STW to plantar fascia,   
and calf.  
  
'Scores and Scales'  
  
Signatures  
Electronically signed   
by : Constance Machado,   
PTA; Aug 31 2020   
11:00AM EST (Author)  
Electronically signed   
by : Crhistine Sanchez,   
PT; Sep 2 2020 11:22PM   
EST                 Normal                                   [facility]  
   
                                                    PT Progress Noteon   
0   
   
                                        PT Progress Note    Therapy Diagnosis  
Assessed  
Right foot pain (729.5)   
(M79.671)  
Plan  
Goals: Goals set and   
discussed today.  
1. Independent HEP to   
allow for 50% reduction   
in max ADL C/C sx   
(9/10) 2-3wks  
2. Survey score   
improvement from 46/80   
to 60/80 (LEFS) 3-4wks  
3. ROM increase to   
allow for improved ADL   
descending stairs (from   
3 DF to 7) 3-4wks  
  
Planned interventions   
include: cryotherapy,   
dry needling,   
education/instruction,   
gait training, home   
program, hot pack,   
kinesiotaping, manual   
therapy, self care/home   
management and   
therapeutic exercises.  
Frequency and duration:   
2 time(s) a week, for 4   
weeks, for 8 visits .   
patient plans to finish   
treatments at [facility]   
[facility].  
Potential to achieve   
rehab goals is good  
Progress with STW, ROM   
and strengthening for   
increased ambulation   
tolerance.  
Progress with POC, as   
tolerated.  
  
Assessment  
Patient identified by   
name and date of birth.   
Patient was able to   
tolerate and   
demonstrate SLS with   
decreased LOB and UE   
support this date. She   
presented with palpable   
tension and adhesions   
in plantar of foot that   
responded well to STW.  
  
Adult Risk Screening  
Initial Fall Risk   
Screening:  
MARZENA has fallen in   
the last 6 months. She   
has fallen due to   
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   
in flower beds. Her   
fall did not result in   
injury. MARZENA does not   
have a fear of falling.   
She does not need   
assistance with   
sitting, standing or   
walking. Does not need   
assistance walking in   
her home. She does not   
need assistance in an   
unfamiliar setting.  
Pain Scale: On a scale   
of 0 to 10, the patient   
rates the pain at 7.  
Please identify   
location of pain: R   
heel.  
Pain Quality: sharp.  
  
Insurance  
Insurance reviewed  
Visit number: 4  
  
Evaluating therapist   
Richard Saavedra PT.  
M79.671; latex allergy  
  
Subjective  
Patient reports:.  
Patient reported 6/10   
pain after treatment   
and stated  it feels   
really good .  
Home program performing   
as directed: Yes.  
Precautions: Fall Risk:   
none  
Pertinent medical HX   
includes: Db; HBP   
(managed);  female    
CA-hysterectomy   
done-resolved Latex   
allergy; B bunion surg   
12 yrs ago.  
  
Treatment  
Time in clinic started   
at 10:00  
Time in clinic ended at   
10:40  
Total time in clinic is   
40 minutes.  
Total timed code time   
is 39 minutes.  
Therapeutic exercise   
(52518): timed minutes   
16, units 1 . Bike 5?   
level 1  
Slant board 2 x 60? (P)  
towel crunch 2x20  
Tband PF,DF,IR/ER 2x10   
purple non latex (P)  
Great toe stretch with   
strap 10 x 10?  
ABC trace 1 cycle  
[place] 2x10 CW/CCW ea   
(P)  
SLS 3 x 20?  
Baps Board (A)  
gastroc strap stretch   
10x10? hold X  
.  
Manual Therapy (77731):   
timed minutes 23, units   
2 . DTW R prox plantar   
fascia 3'x  
manual plantar fascial   
stretch 30?  
STW to plantar fascia,   
and calf.  
  
'Scores and Scales'  
  
Signatures  
Electronically signed   
by : Mervat Connor   
PTA; Aug 28 2020   
10:57AM EST (Author)  
Electronically signed   
by : Christine Sanchez   
PT; Sep 2 2020 11:22PM   
EST                 Normal                                   [facility]  
   
                                                    PT Progress Noteon   
0   
   
                                        PT Progress Note    Therapy Diagnosis  
Assessed  
Right foot pain (729.5)   
(M79.117)  
Plan  
Goals: Goals set and   
discussed today.  
1. Independent HEP to   
allow for 50% reduction   
in max ADL C/C sx   
(9/10) 2-3wks  
2. Survey score   
improvement from 46/80   
to 60/80 (LEFS) 3-4wks  
3. ROM increase to   
allow for improved ADL   
descending stairs (from   
3 DF to 7) 3-4wks  
  
Planned interventions   
include: cryotherapy,   
dry needling,   
education/instruction,   
gait training, home   
program, hot pack,   
kinesiotaping, manual   
therapy, self care/home   
management and   
therapeutic exercises.  
Frequency and duration:   
2 time(s) a week, for 4   
weeks, for 8 visits .   
patient plans to finish   
treatments at [facility].  
Potential to achieve   
rehab goals is good  
Progress with STW, ROM   
and strengthening for   
increased ambulation   
tolerance.  
Progress with POC, as   
tolerated.  
  
Assessment  
Patient identified by   
name and date of birth.   
Patient tolerated   
progression of ROM and   
strengthening well with   
no increased Sx. She   
presented with palpable   
adhesions on R and   
muscle tightness in   
calf.  
  
Adult Risk Screening  
Initial Fall Risk   
Screening:  
MARZENA has fallen in   
the last 6 months. She   
has fallen due to   
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   
in flower beds. Her   
fall did not result in   
injury. MARZENA does not   
have a fear of falling.   
She does not need   
assistance with   
sitting, standing or   
walking. Does not need   
assistance walking in   
her home. She does not   
need assistance in an   
unfamiliar setting.  
Pain Scale: On a scale   
of 0 to 10, the patient   
rates the pain at 6.  
Please identify   
location of pain: R   
heel.  
Pain Quality: sharp.  
  
Insurance  
Insurance reviewed  
Visit number: 3  
  
Evaluating therapist   
Richard Saavedra PT.  
M79.671; latex allergy  
  
Subjective  
Patient reports:.  
Patient reported Sx   
with ambulation. She   
reported 6/10 pain   
after treatment with   
ambulation.  
Home program performing   
as directed: Yes.  
Precautions: Fall Risk:   
none  
Pertinent medical HX   
includes: Db; HBP   
(managed);  female    
CA-hysterectomy   
done-resolved Latex   
allergy; B bunion surg   
12 yrs ago.  
  
Treatment  
Time in clinic started   
at 09:15  
Time in clinic ended at   
09:55  
Total time in clinic is   
40 minutes.  
Total timed code time   
is 38 minutes.  
Therapeutic exercise   
(75831): timed minutes   
15, units 1 . Bike 5?   
level 1  
Slant board 2 x 30? (N)  
towel crunch 2x20  
Tband PF,DF,IR/ER 2x10   
purple non latex (P)  
Great toe stretch with   
strap 10 x 10?  
gastroc strap stretch   
10x10? hold X  
ABC trace 1 cycle  
[place] x10 CW/CCW ea(N)  
SLS 2 x 20?  
.  
Manual Therapy (99241):   
timed minutes 23, units   
2 . DTW R prox plantar   
fascia 3'x  
manual plantar fascial   
stretch 30?  
STW to plantar fascia,   
and calf.  
  
'Scores and Scales'  
  
Signatures  
Electronically signed   
by : Mervat Connor,   
PTA; Aug 26 2020   
10:12AM EST (Author)  
Electronically signed   
by : Christine Sanchez,   
PT; Sep 2 2020 11:22PM   
EST                 Normal                                   [place]  
   
                                                    PT Progress Noteon   
0   
   
                                        PT Progress Note    Therapy Diagnosis  
Assessed  
Right foot pain (729.5)   
(M79.671)  
Plan  
Goals: Goals set and   
discussed today.  
1. Independent HEP to   
allow for 50% reduction   
in max ADL C/C sx   
(9/10) 2-3wks  
2. Survey score   
improvement from 46/80   
to 60/80 (LEFS) 3-4wks  
3. ROM increase to   
allow for improved ADL   
descending stairs (from   
3 DF to 7) 3-4wks  
  
Planned interventions   
include: cryotherapy,   
dry needling,   
education/instruction,   
gait training, home   
program, hot pack,   
kinesiotaping, manual   
therapy, self care/home   
management and   
therapeutic exercises.  
Frequency and duration:   
2 time(s) a week, for 4   
weeks, for 8 visits .   
patient plans to finish   
treatments at [facility].  
Potential to achieve   
rehab goals is good  
Progress with STW, ROM   
and strengthening for   
increased ambulation   
tolerance.  
Progress with POC, as   
tolerated.  
  
Assessment  
Patient identified by   
name and date of birth.   
Patient presented with   
palpable adhesion in   
plantar of foot,   
Reviewed HEP and   
instructed to add   
frozen water bottle on   
plantar.  
  
Adult Risk Screening  
Initial Fall Risk   
Screening:  
MARZENA has fallen in   
the last 6 months. She   
has fallen due to   
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   
in flower beds. Her   
fall did not result in   
injury. MARZENA does not   
have a fear of falling.   
She does not need   
assistance with   
sitting, standing or   
walking. Does not need   
assistance walking in   
her home. She does not   
need assistance in an   
unfamiliar setting.  
Pain Scale: On a scale   
of 0 to 10, the patient   
rates the pain at 8.  
Please identify   
location of pain: R   
heel.  
Pain Quality: sharp.  
  
Insurance  
Insurance reviewed  
Visit number: 2  
  
Evaluating therapist   
Richard Saavedra PT.  
M79.671; latex allergy  
  
Subjective  
Patient reports:.  
Patient reported 8/10   
pain/soreness after   
treatment.  
Home program performing   
as directed: Yes.  
Precautions: Fall Risk:   
none  
Pertinent medical HX   
includes: Db; HBP   
(managed);  female    
CA-hysterectomy   
done-resolved Latex   
allergy; B bunion surg   
12 yrs ago.  
  
Treatment  
Time in clinic started   
at 10:00  
Time in clinic ended at   
10:42  
Total time in clinic is   
42 minutes.  
Total timed code time   
is 39 minutes.  
Therapeutic exercise   
(83927): timed minutes   
15, units 1 . Bike 5?   
level 1 (N)  
towel crunch x20  
Tband PF,DF,IR/ER 2x10   
purple non latex (P)  
gastroc strap stretch   
10x10? hold  
ABC trace 1 cycle (N)  
.  
Manual Therapy (53397):   
timed minutes 24, units   
2 . DTW R prox plantar   
fascia 3'x  
manual plantar fascial   
stretch 30?  
STW to plantar fascia,   
and calf.  
  
'Scores and Scales'  
  
Signatures  
Electronically signed   
by : Mervat Connor,   
PTA; Aug 21 2020   
11:39AM EST (Author)  
Electronically signed   
by : Christine Sanchez   
PT; Sep 2 2020 11:22PM   
EST                 Normal                                   [place]  
   
                                                    PT Initial Evaluationon    
   
                                        PT Initial Evaluation Therapy Diagnosis  
Assessed  
Right foot pain (729.5)   
(M79.769)  
Plan of Care  
Goals: Goals set and   
discussed today.  
1. Independent HEP to   
allow for 50% reduction   
in max ADL C/C sx   
(/10) 2-3wks  
2. Survey score   
improvement from 46/80   
to 60/80 (LEFS) 3-4wks  
3. ROM increase to   
allow for improved ADL   
descending stairs (from   
3 DF to 7) 3-4wks  
  
Planned interventions   
include: cryotherapy,   
dry needling,   
education/instruction,   
gait training, home   
program, hot pack,   
kinesiotaping, manual   
therapy, self care/home   
management and   
therapeutic exercises.  
Frequency and duration:   
2 time(s) a week, for 4   
weeks, for 8 visits .   
patient plans to finish   
treatments at [facility]   
PT.  
Potential to achieve   
rehab goals is good  
Plan of care was   
developed with input   
and agreement by the   
patient.  
  
Assessment  
Recent onset of plantar   
heel pain 5mths ago. AM   
>> PM sx. No films   
taken. Proper shoe wear   
has been reviewed with   
the patient and she is   
complying and states   
that it helps. ++   
tender at the distal   
plantar heel R. Will   
continue with local   
STW, flexibility and   
ankle/foot PRE as keaton.  
Clinical Presentation:   
Stable and/or   
uncomplicated   
characteristics.  
Level of Complexity:   
low  
Problem List: activity   
limitations,   
ADLs/IADLs/self care   
skills, decreased   
functional level,   
decreased knowledge of   
HEP, gait/locomotion,   
pain, range of   
motion/joint mobility,   
strength and transfers.  
  
Reason For Visit  
Initial Evaluation .   
plantar fasciitis R.  
Referred by: Narayan  
  
Adult Risk Screening  
There are no   
spiritual/cultural   
practices/values/needs   
that are important to   
know  
Initial Fall Risk   
Screening:  
MARZENA has not fallen   
in the last 6 months.   
Her fall did not result   
in injury. MARZENA does   
not have a fear of   
falling. She does not   
need assistance with   
sitting, standing or   
walking. Does not need   
assistance walking in   
her home. She does not   
need assistance in an   
unfamiliar setting. The   
patient is not using an   
assistive device.  
Pain Scale: On a scale   
of 0 to 10, the patient   
rates the pain at 9.  
Please identify   
location of pain:   
plantar heel.  
  
Insurance  
Insurance reviewed  
Visit number: 1  
  
Evaluating therapist   
Richard Saavedra PT.  
M79.671; latex allergy  
  
Subjective  
Current Episode of   
Functional Impairment   
and/or Pain  
Date of onset: 3/12/20  
Mechanism of Injury:.   
insidious.  
Medical Screening:  
Reviewed medical   
history form with   
patient and medical   
screening assessed.  
Current Medical   
Management:. no films   
taken (the  gave   
exercises).  
Precautions: Fall Risk:   
none  
Pertinent medical HX   
includes: Db; HBP   
(managed);  female    
CA-hysterectomy   
done-resolved Latex   
allergy; B bunion surg   
12 yrs ago.  
Functional Assessment  
Prior level of   
function:  
PAINFUL, DIFFICULT, OR   
ALTERED ADL (marked   
with an  xx )  
sleep--  
sitting--  
sit to standing   
transfers--  
car transfers--XX  
standing--XX  
walking--XX  
carrying--  
stairs--XX  
dressing lowers--  
driving--  
dressing uppers--  
reaching----  
handling objects--  
other--  
.  
Patient stated goal(s)   
for treatment include:   
relieving pain ,   
increasing mobility ,   
walking with a normal   
gait , reducing   
symptoms ,   
reducing/preventing   
future occurrences and   
learning preventative   
care measures .  
Work Status: retired.  
Current Status:   
unchanged.  
Patient Awareness:   
Patient is aware of her   
diagnosis and   
prognosis.  
Personal Factors That   
May Impact Care:. ID   
confirmed with B-day;   
speaks english  
No obtrusive barriers   
to learning   
identified/observed.  
  
Objective  
Ortho  
tender at the distal   
plantar heel R.  
ROM / Joint Mobility  
(Range of Motion in   
degrees)  
Foot and Ankle:  
(Key =   P!   Denotes   
Pain with Movement)  
Dorsiflexion: R Active   
3, L Active 3.  
Gait / Mobility  
Gait: reduced R   
push-off.  
Strength  
Knee:  
(Key:   P!   Denotes   
Pain with Movement)  
Knee extension was 5/5   
on right, 5/5 on left.  
Knee flexion was 5/5 on   
right, 5/5 on left.  
Foot and Ankle:  
(Key:   P!   Denotes   
Pain with Movement)  
Foot dorsiflexion was   
5/5 on right, 5/5 on   
left.  
Foot plantar flexion   
was 5/5 on right, 5/5   
on left.  
Ankle eversion was 5/5   
on right, 5/5 on left.  
Ankle inversion was 5/5   
on right, 5/5 on left.  
Additional Findings:  
B toes 1-5 gr5 flex/ext   
with no sx.  
  
Treatment  
Time in clinic started   
at 4:22 pm  
Time in clinic ended at   
5:10 pm  
Total time in clinic is   
48 minutes.  
Total timed code time   
is 12 minutes.  
Treatment Performed   
Today:. DTW R prox   
plantar fascia 3'  
towel crunch x20  
Tband PF 1x10 purple   
non latex  
gastroc strap stretch   
5x10 counts  
manual plantar fascial   
stretch 30 .  
Evaluation Code: 67357   
PT Eval: Low   
Complexity, 35 min(s).  
Timed: 90886   
Therapeutic Exercises,   
12 min(s), 1 unit(s).  
Resources provided   
today:  
HEP handout; Tband.  
  
Contacts for Physician   
Signature  
First attempt date:   
20.  
  
Referring Provider   
Signature: I am in   
agreement with the   
above plan of care.  
Referring Provider   
Signature______________  
_______________________  
__,   
Date/Time____________  
  
Signatures  
Electronically signed   
by : Mark Saavedra   
PT; Aug 12 2020 5:34PM   
EST (Author)        Normal                                  [facility]  
   
                                                    CNPNon 2019   
   
                                        [place]                Telephone (AGGYNONPO  
EVAN)  
-----------------------  
-----------  
MARZENA MEDEROS   
([id number])   
1945 F  
Date Time Provider   
Department  
19 KUL[address] AGRCIA  
During your visit   
today, we recorded the   
following information   
about you:  
Ailyn Christian   
2019 11:06 AM   
Signed  
Sharon called the   
office to return my   
call and she stated   
that she did not need  
to come back to see Dr. Garcia because she was   
doing very well. She   
stated she  
had cancelled the   
previous appointment   
earlier this year   
because she is fine.  
She will return if any   
problems come up. She   
wanted to let us know   
Dr. Garcia  
is great and she   
appreciates everything!  
Ailyn Christian  
2019 11:06:36  
DAMON Calle.CNP, CNP   
2019 1:21 PM   
Signed  
Message left for   
patient to discuss   
follow up.  
DAMON Calle.CNP  
Allergies As of Date:   
2019 Noted   
Allergy Reaction  
ADHESIVE 10/04/2010 2 -   
Rash  
Comments: Redness  
AMBIEN (ZOLPIDEM   
TARTRATE) 2011 2   
- Rash  
CODEINE 2006 1 -   
Mental Status Change  
NEOSPORIN   
(NEOMYCIN-BACITRACIN-PO  
*2005 2 - Rash  
PERCOCET   
(OXYCODONE-ACETAMINOPHE  
N)2005  
Comments: Causes   
Vomitting  
Date Reviewed:   
10/31/2019  
Reviewed by: Yenny Charles - Fully   
Assessed  
Reason for Visit:  
returned call [Other]  
Prescriptions as of   
2019 Sig:  
METOPROLOL SUCCINATE ER   
100 M* Take 100 mg by   
mouth once nisreen*  
LOSARTAN 100 MG TABLET   
Take 100 mg by mouth   
once nisreen*  
OMEPRAZOLE 40 MG   
CAPSULE,COOPER* Take 40   
mg by mouth once bettie*  
CITALOPRAM 20 MG TABLET   
Take 20 mg by mouth   
once bettie*  
CHLORTHALIDONE 25 MG   
TABLET Take 1/2 tablet   
by mouth once*  
SERTRALINE 50 MG TABLET   
Take 50 mg by mouth   
once bettie*  
LISINOPRIL 20 MG TABLET   
Take 20 mg by mouth   
once bettie*  
MELOXICAM 15 MG TABLET  
LOSARTAN 50 MG TABLET   
Take 50 mg by mouth   
once bettie*  
HYDROCODONE 5   
MG-ACETAMINOPHE*  
DIPHENHYDRAMINE 25 MG   
TABLET Take 25 mg by   
mouth once bettie*  
ONETOUCH ULTRA TEST   
STRIPS  
ONETOUCH DELICA LANCETS   
33 GA*  
GLIPIZIDE ER 2.5 MG   
TABLET, E* Take 2.5 mg   
by mouth once nisreen*  
METFORMIN  MG   
TABLET,EX* Take two   
tablets by mouth onc*  
* ZOCOR 20 MG TABLET   
Take one(1) tablet   
daily.  
* TOPROL XL 50 MG   
TABLET,EXTEND* Take   
one(1) tablet daily.  
Problem List As Of Date   
2019 Noted   
Resolved  
ATROPHIC VAGINITIS   
[N95.2] 2006  
APPOINTMENT CANCELLED   
[XCCC] 2006  
TRANSIENT CEREBRAL   
ISCHEMIA NOS [G45.9]   
01/15/2008  
Carcinoma in situ of   
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   
10/15/2009  
More...  
Malignant neoplasm of   
female breast (HCC)   
[C50.*2010  
More...  
Personal history of   
malignant neoplasm of   
breas*2012  
Senile nuclear   
sclerosis [H25.10]   
2015  
Hypermetropia [H52.00]   
2015  
Regular astigmatism   
[H52.229] 2015  
Presbyopia [H52.4]   
2015  
Invasive ductal   
carcinoma of breast,   
female (HC*2017  
Ocular migraine   
[G43.109] 2017  
Controlled type 2   
diabetes mellitus   
without com*2017  
Postmenopausal bleeding   
[N95.0] 2017  
S/P hysterectomy with   
oophorectomy [Z90.710,   
Z9*2017  
Encounter Number:   
[id number]  
Encounter Status:Closed   
by AILYN CHRISTIAN on   
19            Normal                                  [facility]  
   
                                                    Glucose Meteron 2017   
   
                      Glucose mass conc 155 mg/dL  High       70-99      [facility]  
   
                                        Comment on above:   Result Comment: RN N  
OTIFIED   
   
                                                            Performed By: #### P  
T ####[facility] [place], [place] [address]   
   
                      Glucose mass conc 161 mg/dL  High       70-99      [facility]  
   
                                        Comment on above:   Result Comment: RN N  
OTIFIED   
   
                                                            Performed By: #### P  
T ####[facility] [place], [place] [address]   
   
                      Glucose mass conc 148 mg/dL  High       70-99      [facility]  
   
                                        Comment on above:   Result Comment: RN N  
OTIFIED   
   
                                                            Performed By: #### P  
T ####[facility] [place], [place] [address]   
   
                                                    Basic Panelon 2017   
   
                      Creatinine mass conc 0.55 mg/dL Normal     0.51-0.95  [facility]  
   
                                        Comment on above:   Performed By: #### P  
8 ####[address] [address]   
   
                                                    Anion gap 3 molar   
conc            7 mmol/L        Low             8-16            [facility]  
   
                                        Comment on above:   Performed By: #### P  
8 ####[address] [address]   
   
                      CO2 molar conc 30 mmol/L  Normal     21-32      [facility]  
   
                                        Comment on above:   Performed By: #### P  
8 ####[address] [address]   
   
                      Glucose mass conc 146 mg/dL  High       70-99      [facility]  
   
                                        Comment on above:   Performed By: #### P  
8 ####[address] [address]   
   
                                                    Urea nitrogen mass   
conc            7 mg/dL         Normal          7-18            [facility]  
   
                                        Comment on above:   Performed By: #### P  
8 ####[address] [address]   
   
                      Calcium mass conc 7.6 mg/dL  Low        8.5-10.1   [facility]  
   
                                        Comment on above:   Performed By: #### P  
8 ####[address] [address]   
   
                      Chloride molar conc 105 mmol/L Normal          [facility]  
   
                                        Comment on above:   Performed By: #### P  
8 ####[address], [place] [address]   
   
                      Potassium molar conc 3.1 mmol/L Low        3.5-5.1    [facility]  
   
                                        Comment on above:   Performed By: #### P  
8 ####[address] [address]   
   
                      Sodium molar conc 139 mmol/L Normal     136-145    [facility]  
   
                                        Comment on above:   Performed By: #### P  
8 ####[address] [address]   
   
                                                    Glucose Meteron 2017   
   
                      Glucose mass conc 162 mg/dL  High       70-99      [facility]  
   
                                        Comment on above:   Result Comment: RN N  
OTIFIED   
   
                                                            Performed By: #### P  
T ####[address] [address]   
   
                      Glucose mass conc 202 mg/dL  High       70-99      [facility]  
   
                                        Comment on above:   Performed By: #### P  
T ####[address] [address]   
   
                      Glucose mass conc 156 mg/dL  High       70-99      [facility]  
   
                                        Comment on above:   Performed By: #### P  
T ####[address] [address]   
   
                      Glucose mass conc 168 mg/dL  High       70-99      [facility]  
   
                                        Comment on above:   Result Comment: RN N  
OTIFIED   
   
                                                            Performed By: #### G  
LMET ####[address] [address]   
   
                                                    Hemogramon 2017   
   
                                                    Erythrocyte   
distribution width   
Auto Ratio (RBC) 12.7 %          Normal          11.7-14.4       [facility]  
   
                                        Comment on above:   Performed By: #### C  
BC1 ####[address] [address]   
   
                                                    Hematocrit Auto   
Volume Fraction (Bld) 32.8 %          Low             34.1-44.9       [facility]  
   
                                        Comment on above:   Performed By: #### C  
BC1 ####[address] [address]   
   
                                                    Hemoglobin mass conc   
(Bld)           11.0 g/dL       Low             11.2-15.7       [facility]  
   
                                        Comment on above:   Performed By: #### C  
BC1 ####[place] [address]   
   
                                                    MCH Auto Entitic mass   
(RBC)           31.6 pg         Normal          25.6-32.2       [facility]  
   
                                        Comment on above:   Performed By: #### C  
BC1 ####[place] [address]   
   
                                                    MCHC Auto mass conc   
(RBC)           33.5 %          Normal          31.6-34.8       [facility]  
   
                                        Comment on above:   Performed By: #### C  
BC1 ####[place] [address]   
   
                                                    MCV Auto Entitic   
volume (RBC)    94.3 fL         Normal          79.4-94.8       [facility]  
   
                                        Comment on above:   Performed By: #### C  
BC1 ####[place] [address]   
   
                                                    Platelet mean volume   
Auto Entitic volume   
(Bld)           10.0 fL         Normal          9.4-12.3        [facility]  
   
                                        Comment on above:   Performed By: #### C  
BC1 ####[place] [address]   
   
                                                    Platelets Auto #/vol   
(Bld)           200 thou/cmm    Normal          182-369         [facility]  
   
                                        Comment on above:   Performed By: #### C  
BC1 ####[place] [address]   
   
                      RBC Auto #/vol (Bld) 3.48 mil/cmm Low        3.93-5.22  [facility]  
   
                                        Comment on above:   Performed By: #### C  
BC1 ####[place] [address]   
   
                      RDW SD     43.5 fl    Normal     36.4-46.3  [facility]  
   
                                        Comment on above:   Performed By: #### C  
BC1 ####[place] [address]   
   
                      WBC Auto #/vol (Bld) 11.90 thou/cmm High       3.98-10.04   
[facility]  
   
                                        Comment on above:   Performed By: #### C  
BC1 ####[place] [address]   
   
                                                    MDRD GFRon 2017   
   
                                                    GFR/1.73 sq M   
predicted among   
non-blacks MDRD vol   
rate/area (S/P/Bld) mL/min/{1.73_m2}    Normal              >60mL/min/1.  
73m2                                    [facility]  
   
                                        Comment on above:   Result Comment: If t  
he patient is , multiply   
the   
result by 1.210.   
   
                                                            Performed By: #### P  
T ####[place] [address]   
   
                                                    Magnesium Bloodon 2017  
   
   
                      Magnesium mass conc 1.8 mg/dL  Normal     1.6-2.6    [facility]  
   
                                        Comment on above:   Performed By: #### M  
AG ####[place] [address]   
   
                                                    Phosphorus Bloodon   
7   
   
                      Phosphate mass conc 3.4 mg/dL  Normal     2.5-4.9    [facility]  
   
                                        Comment on above:   Performed By: #### P  
T ####[place] [address]   
   
                                                    Cytology, Medicalon 20  
17   
   
                                        Cytology, Medical   Test performed at [place] [address]NAME: ROSA M   
MARZENA ACCESSION:   
[id number]:   
[id number] REQUESTING:   
KULWINDER GARCIA   
MDDIAGNOSISPELVIC   
WASHING - NEGATIVE FOR   
MALIGNANT   
CELLSNARRATIVERAECTIVE   
MESOTHELIAL CELLS,   
HISTIOCYTES SPECIMEN:   
A) PEL, PELVIC WASHING   
Description: Materials   
Prepared & Examined:   
Volume: ......... 55 #   
of Monolayers:   
.......... 1   
Consistency: ......   
Cloudy # of Smear   
slides: ......... 1   
Other: .............   
light # of Slides:   
................. pink   
2Electronically Signed:   
2018Screened by:   
HÉCTOR EMERY(ASCP)Signed Out by:   
RAUDEL RICKS M.D.,   
PATHOLOGISTPrinted on:   
2018 Page 1   
of 1                Normal                                  [facility]  
   
                                        Comment on above:   Performed By: #### P  
T ####[facility] [place], [place] [address]   
   
                                                    Glucose Meteron 2017   
   
                      Glucose mass conc 154 mg/dL  High       70-99      [facility]  
   
                                        Comment on above:   Performed By: #### G  
LMET ####[address] [address]   
   
                                                    Surgical Tissue Examon    
   
                                        Surgical Tissue Exam Test performed at A  
[address] [address]AFDJ: MARZENA MEDEROS ACCESSION:   
[id number]: [id number]   
REQUESTING: KVNG DAVIS TO:   
TUMOR REGISTRY; DATA   
MANAGERFINAL   
DIAGNOSIS:A) LEFT   
PELVIC SENTINEL LYMPH   
NODE #1 - NEGATIVE FOR   
MALIGNANCY.B) LEFT   
PELVIC SENTINEL LYMPH   
NODE #2 - NEGATIVE FOR   
MALIGNANCY.C) RIGHT   
PELVIC SENTINEL LYMPH   
NODE #1 - NEGATIVE FOR   
MALIGNANCY.D) RIGHT   
PELVIC SENTINEL LYMPH   
NODE #2 - NEGATIVE FOR   
MALIGNANCY.E)   
HYSTERECTOMY WITH   
BILATERAL   
SALPINGO-OOPHORECTOMY -   
FOCUS OFENDOMETRIAL   
ADENOCARCINOMA,   
ENDOMETRIOID TYPE WITH   
SQUAMOUSDIFFERENTIATION  
( FIGO GRADE 1) WITH   
EXTENSIVE ASSOCIATED   
OBSCURINGCRUSH ARTIFACT   
AND DISRUPTION.TUMOR   
INVOLVES THE ANTERIOR   
AND POSTERIOR LOWER   
UTERINE SEGMENT AND   
THEUPPER ENDOCERVICAL   
CANAL. (SEE   
COMMENT).3.4 CM   
ENDOMETRIAL   
POLYP.MULTIPLE   
INTRAMURAL, SUBMUCOSAL   
AND SUBSEROSAL   
LEIOMYOMAS.RIGHT AND   
LEFT OVARIES- ATROPHIC   
CHANGES. NEGATIVE FOR   
MALIGNANCY.RIGHT AND   
LEFT FALLOPIAN TUBES -   
SEROSAL WALTHARD REST   
CYST. NEGATIVEFOR   
MALIGNANCY.COMMENT:   
Adenocarcinoma is not   
identified in   
association with the   
3.4cm endometrial   
polyp. Endometrioid   
adenocarcinoma   
identified only   
onslides E12 and E13   
representing the   
anterior and posterior   
uterinesegment and   
upper endocervical   
canal. These foci of   
adenocarcinoma   
areassociated with   
extensive obscuring   
crush artifact and   
disruption.Adenocarcino  
ma is identified in the   
upper endocervical   
canal. There ispossible   
invasion of the stroma;   
however, the degree of   
artifactualdistortion   
precludes accurate   
evaluation. No intact   
endometrialadenocarcino  
ma is actually   
identified in   
association with   
underlyingmyometrium.   
However, there are   
detached disrupted   
fragments   
ofadenocarcinoma which   
may have been dislodged   
from some other site   
inthe endometrial   
cavity. Slides E12 and   
E13 were also reviewed   
by Dr.Eric Corbin.   
[facility] was   
obtained for review.   
The biopsywas   
interpreted as a FIGO   
grade 1 endometrioid   
adenocarcinoma   
andmismatch repair   
proteins were performed   
on this outside case.   
Mismatchrepair proteins   
(MLH1, PMS2, MSH2, and   
MSH6) were found to be   
expressedin the   
carcinoma   
nuclei.Endometrial   
Cancer Case   
SummarySpecimen:   
Uterine corpus, cervix,   
right and left ovaries   
and right and left   
fallopian   
tubes.Procedure: Total   
hysterectomy.Lymph node   
sampling: Right and   
left sentinel lymph   
node   
biopsiesperformed.Speci  
men integrity: Intact   
hysterectomy   
specimen.Tumor size:   
Cannot be accurately   
determined (see   
comment).Histologic   
type: Endometrioid   
adenocarcinoma with   
squamous   
differentiation.Histolo  
gic grade: FIGO grade   
1.Myometrial invasion:   
Cannot be accurately   
determined (see   
comment).Involvement of   
cervix: Cannot be   
accurately determined   
(see comment).Other   
organ involvement: Not   
identified.Lymph-vascul  
ar invasion: Not   
identified.Pelvic lymph   
nodes: Number examined:   
4. Number involved:   
0.Pathologic stage:   
pT1a or pT2(see   
comment)   
pN0(sn).OPERATIVE   
PROCEDURE:Robotic   
laparoscopic total   
hysterectomy w/ BSO   
uterus = <250G,   
roboticlaparoscopy   
surgical w/   
retroperitoneal lymph   
node sampling single   
ormultipleCLINICAL   
INFORMATION:Endometrium   
cancer (HCC)   
[C54.1]GROSS   
DESCRIPTION:A) Left   
sentinel pelvic lymph   
node #1Received in   
formalin labeled left   
sentinel pelvic lymph   
node #1 is atan-yellow   
soft potential lymph   
node measuring 2.5 x   
0.5 x 0.4 cm. Itis   
serially sectioned and   
entirely submitted in   
cassette A.B) Left   
sentinel pelvic lymph   
node #2Received in   
formalin labeled left   
pelvic sentinel lymph   
node #2 is atan soft   
potential lymph node   
measuring 2 x 0.4 x 0.2   
cm. It isserially   
sectioned and entirely   
submitted in cassette   
B.C) Right sentinel   
pelvic lymph node   
#1Received in formalin   
labeled right sentinel   
pelvic lymph node #1 is   
asegment of adipose   
tissue measuring 2.5 x   
2 x 0.3 cm. It is   
dissectedto reveal a   
pink soft potential   
lymph node measuring   
0.7 x 0.4 x 0.2cm. The   
lymph node is submitted   
whole with a   
surrounding soft   
tissuewithin cassette   
C. The specimen is   
totally submitted in   
cassette C.D) Right   
sentinel pelvic lymph   
node #2Received in   
formalin labeled right   
sentinel pelvic lymph   
node #2 is atan-pink,   
soft potential lymph   
node measuring 0.6 x   
0.4 x 0.3 cm. Itis   
bisected and totally   
submitted in cassette   
D.E) Uterus, bilateral   
fallopian tubes and   
ovaries and   
cervixReceived in   
formalin labeled   
uterus, bilateral   
tubes, ovaries   
andcervix is a uterus   
with attached cervix   
and attached   
bilateralfimbriated   
fallopian tubes, and   
ovaries. The uterus and   
cervix [id number] gm and   
measure 10 cm from   
fundus to cervix, 10 cm   
rmfvenezz-sz-mldue and   
5.5 cm from anterior to   
posterior. The serosa   
istan, smooth and   
glistening with   
multiple areas of   
bulging consistentwith   
subserosal nodules. The   
cervix is gray-tan,   
smooth andglistening.   
Opening reveals an   
endocervical canal   
measuring 3.5 cm   
inlength x 0.5 cm in   
diameter. The   
endometrial cavity is   
distorted bywhite,   
whorled nodules and   
measures 4 cm in length   
x 2.5 cm indiameter.   
The endometrium   
displays focally   
thickened, red   
andhemorrhagic   
endometrium and a   
tan-pink soft polypoid   
mass on theposterior   
aspect which measures   
3.4 x 3 x 0.4 cm. This   
is locatedwithin the   
endometrial cavity and   
its attachment is 3 cm   
from the loweruterine   
segment and 2 cm from   
the nearest right   
isthmus   
region.Sectioning of   
the polypoid mass   
reveals tan-pink, soft,   
glistening cutsurfaces;   
the mass extends 0.6 cm   
deep. The wall   
thickness within   
thisregion is up to 1.9   
cm in thickness, and   
the wall distal to this   
regionmeasures up to   
2.5 cm in thickness   
(myometrium). The   
remainingendometrium is   
up to 2.0 cm in   
thickness throughout   
the remainder ofthe   
specimen. Also   
identified on cross   
sectioning are   
multiple, white,whorled   
nodules which are   
intramural, submucosal   
and subserosal innature   
which range in size   
from 1 to 6 cm in   
greatest   
dimension.Sectioning of   
the nodules do not   
reveal any discrete   
areas ofhemorrhage,   
necrosis or   
calcification. The   
right ovary weighs 2 gm   
andmeasures 3 x 0.7 x   
0.5 cm. The outer   
surface is tan-white,   
smooth andglistening.   
It is sectioned to   
reveal a tan-white firm   
stroma.   
Thecorresponding   
fimbriated fallopian   
tube measures 3 cm in   
length x up to0.4 cm in   
greatest diameter. It   
is sectioned to reveal   
a stellatelumen. The   
left ovary weighs 2 gm   
and measures 3 x 1.5 x   
0.7 cm. Theouter   
surface is tan, smooth   
and glistening. The   
left ovary isserially   
sectioned to reveal cut   
surfaces similar to   
that of the rightovary.   
A corresponding   
fimbriated fallopian   
tube is 4 cm in length   
x0.3 cm in diameter. It   
has a stellate lumen.   
Representative   
sectionsare submitted   
as follows: E1 -   
anterior cervix; E2 -   
posterior cervix;E3 -   
polypoid mass at   
deepest extent; E4-E5 -   
additional sections   
ofpolypoid mass; E6-E7   
- anterior   
corresponding uterine   
wall; E8-E10   
-additional sections of   
polypoid mass; E11 -   
additional endometrium   
fromposterior aspect;   
E12 - anterior lower   
uterine segment; E13 -   
posteriorlower uterine   
segment; E14   
-representative   
myometrial nodules; E15   
-right ovary; E16 -   
right fallopian tube   
and entire fimbria; E17   
- leftovary; E18 - left   
fallopian tube and   
entire fimbria. BMP:elizabeth BRISENO M.D.(Electronic   
signature on   
file)Signed out:   
2018   
14:25PRINTED:   
2018 Page 1 of 1 Normal                                  [facility]  
   
                                        Comment on above:   Performed By: #### P  
T ####[place] [address]   
   
                                                    Activated PTTon 2017   
   
                      aPTT Coag time (Bld) 22.8 s     Normal     22.0-34.0  [facility]  
   
                                        Comment on above:   Performed By: #### A  
PTT ####[place] [address]   
   
                                                    Glucose Bloodon 2017   
   
                      Glucose mass conc 92 mg/dL   Normal     70-99      [facility]  
   
                                        Comment on above:   Performed By: #### G  
LEIDA ####[place] [address]   
   
                                                    Hemogramon 2017   
   
                                                    Erythrocyte   
distribution width   
Auto Ratio (RBC) 12.4 %          Normal          11.7-14.4       [facility]  
   
                                        Comment on above:   Performed By: #### C  
BC1 ####[address], [place] [address]   
   
                                                    Hematocrit Auto   
Volume Fraction (Bld) 43.9 %          Normal          34.1-44.9       [facility]  
   
                                        Comment on above:   Performed By: #### C  
BC1 ####[place] [address]   
   
                                                    Hemoglobin mass conc   
(Bld)           15.1 g/dL       Normal          11.2-15.7       [facility]  
   
                                        Comment on above:   Performed By: #### C  
BC1 ####[place] [address]   
   
                                                    MCH Auto Entitic mass   
(RBC)           31.7 pg         Normal          25.6-32.2       [facility]  
   
                                        Comment on above:   Performed By: #### C  
BC1 ####[place] [address]   
   
                                                    MCHC Auto mass conc   
(RBC)           34.4 %          Normal          31.6-34.8       [facility]  
   
                                        Comment on above:   Performed By: #### C  
BC1 ####[place] [address]   
   
                                                    MCV Auto Entitic   
volume (RBC)    92.0 fL         Normal          79.4-94.8       [facility]  
   
                                        Comment on above:   Performed By: #### C  
BC1 ####[place] [address]   
   
                                                    Platelet mean volume   
Auto Entitic volume   
(Bld)           10.3 fL         Normal          9.4-12.3        [facility]  
   
                                        Comment on above:   Performed By: #### C  
BC1 ####[place] [address]   
   
                                                    Platelets Auto #/vol   
(Bld)           251 thou/cmm    Normal          182-369         [facility]  
   
                                        Comment on above:   Performed By: #### C  
BC1 ####[place] [address]   
   
                      RBC Auto #/vol (Bld) 4.77 mil/cmm Normal     3.93-5.22  [facility]  
   
                                        Comment on above:   Performed By: #### C  
BC1 ####[place] [address]   
   
                      RDW SD     42.2 fl    Normal     36.4-46.3  [facility]  
   
                                        Comment on above:   Performed By: #### C  
BC1 ####[place] [address]   
   
                      WBC Auto #/vol (Bld) 10.41 thou/cmm High       3.98-10.04   
[facility]  
   
                                        Comment on above:   Performed By: #### C  
BC1 ####[place] [address]   
   
                                                    Protimeon 2017   
   
                                                    INR Coag RelTime   
(PPP)           0.95 {INR}      Normal                          [facility]  
   
                                        Comment on above:   Result Comment: Christian  
dard Therapy 2.0-3.0High Dose 2.5-3.5   
   
                                                            Performed By: #### P  
T ####[place] [address]   
   
                                                    Prothrombin time (PT)   
Coag time (PPP) 10.4 s          Normal          9.3-11.9        [facility]  
   
                                        Comment on above:   Performed By: #### P  
T ####[place] [address]   
   
                                                    Type and Screenon 2017  
   
   
                      ABO group Nom (Bld) O          Normal                [facility]  
   
                                        Comment on above:   Performed By: #### T  
&S ####[place] [address]   
   
                      Comment    PAT specimen Normal                [facility]  
   
                                        Comment on above:   Performed By: #### T  
&S ####[place] [address]   
   
                      RH Type    Positive   Normal                [facility]  
   
                                        Comment on above:   Performed By: #### T  
&S ####[place] [address]   
  
  
  
Vital Signs  
  
  
                          Date Time    Vital Sign   Value        Performing   
Clinician                               Facility  
   
                                                    2024   
11:                              Diastolic blood   
pressure                  67 mm[Hg]                 Ronel Hays MD  
Work Phone:   
[phone]                          [facility]  
   
                                                    2024   
11:          Heart rate          81 /min             Ronel Hays MD  
Work Phone:   
[phone]                          [facility]  
   
                                                    2024   
11:          Respiratory rate    16 /min             Ronel Hays MD  
Work Phone:   
[phone]                          [facility]  
   
                                                    2024   
11:                              SaO2% (BldA) [Mass   
fraction]                 97 %                      Ronel Hays MD  
Work Phone:   
[phone]                          [facility]  
   
                                                    2024   
11:                              Systolic blood   
pressure                  128 mm[Hg]                Ronel Hays MD  
Work Phone:   
[phone]                          [facility]  
   
                                                    2024   
11:          Body temperature    97.59 [degF]        Ronel Hays MD  
Work Phone:   
[phone]                          [facility]  
   
                                                    2024   
10:          Body height         160 cm              Ronel Hays MD  
Work Phone:   
[phone]                          [facility]  
   
                                                    2024   
10:                              Body mass index   
(BMI) [Ratio]             24.26 kg/m2               Ronel Hays MD  
Work Phone:   
[phone]                          [facility]  
   
                                                    2024   
10:          Body weight         62.1 kg             Ronel Hays MD  
Work Phone:   
[phone]                          [facility]  
   
                                                    2024   
09:          Body height         160 cm              Colten Abbott DO  
Work Phone:   
[phone]                          [facility]  
   
                                                    2024   
09:                              Body mass index   
(BMI) [Ratio]             24.89 kg/m2               Colten Abbott DO  
Work Phone:   
[phone]                          [facility]  
   
                                                    2024   
09:          Body temperature    98.1 [degF]         Colten Abbott DO  
Work Phone:   
[phone]                          [facility]  
   
                                                    2024   
09:          Body weight         63.73 kg            Colten Abbott DO  
Work Phone:   
[phone]                          [facility]  
   
                                                    2024   
09:                              Diastolic blood   
pressure                  68 mm[Hg]                 Colten Abbott DO  
Work Phone:   
[phone]                          [facility]  
   
                                                    2024   
09:          Heart rate          82 /min             Colten Abbott DO  
Work Phone:   
[phone]                          [facility]  
   
                                                    2024   
09:                              SaO2% (BldA) [Mass   
fraction]                 97 %                      Colten Thayeri DO  
Work Phone:   
[phone]                          [facility]  
   
                                                    2024   
09:                              Systolic blood   
pressure                  111 mm[Hg]                Colten Abbott DO  
Work Phone:   
[phone]                          [facility]  
   
                                                    2024   
15:          Body height         158.1 cm            Jose Delgado MD  
Work Phone:   
[phone]                          [facility]  
   
                                                    2024   
15:                              Body mass index   
(BMI) [Ratio]             24.86 kg/m2               Jose Delgado MD  
Work Phone:   
[phone]                          [facility]  
   
                                                    2024   
15:          Body weight         62.14 kg            Jose Delgado MD  
Work Phone:   
[phone]                          [facility]  
   
                                                    2024   
15:                              Diastolic blood   
pressure                  72 mm[Hg]                 Jose Delgado MD  
Work Phone:   
[phone]                          [facility]  
   
                                                    2024   
15:                              Systolic blood   
pressure                  110 mm[Hg]                Jose Delgado MD  
Work Phone:   
[phone]                          [facility]  
   
                                                    2024   
11:                              Diastolic blood   
pressure                  80 mm[Hg]                 Ronel Hays MD  
Work Phone:   
[phone]                          [facility]  
   
                                                    2024   
11:          Heart rate          88 /min             Ronel Hays MD  
Work Phone:   
[phone]                          [facility]  
   
                                                    2024   
11:                              Systolic blood   
pressure                  125 mm[Hg]                Ronel Hays MD  
Work Phone:   
[phone]                          [facility]  
   
                                                    10-   
10:                              Diastolic blood   
pressure                  88 mm[Hg]                 Ronel Hays MD  
Work Phone:   
[phone]                          [facility]  
   
                                                    10-   
10:          Heart rate          85 /min             Ronel Hays MD  
Work Phone:   
[phone]                          [facility]  
   
                                                    10-   
10:          Respiratory rate    16 /min             Ronel Hays MD  
Work Phone:   
[phone]                          [facility]  
   
                                                    10-   
10:                              SaO2% (BldA) [Mass   
fraction]                 97 %                      Ronel Hays MD  
Work Phone:   
[phone]                          [facility]  
   
                                                    10-   
10:                              Systolic blood   
pressure                  139 mm[Hg]                Ronel Hays MD  
Work Phone:   
[phone]                          [facility]  
   
                                                    10-   
09:          Body temperature    98.1 [degF]         Ronel Hays MD  
Work Phone:   
[phone]                          [facility]  
   
                                                    10-   
08:          Body height         160 cm              Ronel Hays MD  
Work Phone:   
[phone]                          [facility]  
   
                                                    10-   
08:                              Body mass index   
(BMI) [Ratio]             24.57 kg/m2               Ronel Hays MD  
Work Phone:   
[phone]                          [facility]  
   
                                                    10-   
08:          Body weight         62.9 kg             Ronel Hays MD  
Work Phone:   
[phone]                          [facility]  
   
                                                    10-   
10:                              Diastolic blood   
pressure                  80 mm[Hg]                 Ronel Hays MD  
Work Phone:   
[phone]                          [facility]  
   
                                                    10-   
10:          Heart rate          88 /min             Ronel Hays MD  
Work Phone:   
[phone]                          [facility]  
   
                                                    10-   
10:                              Systolic blood   
pressure                  125 mm[Hg]                Ronel Hays MD  
Work Phone:   
[phone]                          [facility]  
   
                                                    2024   
14:          Body height         162.6 cm            Bernice GIBBS DNP  
Work Phone:   
[phone]                          [facility]  
   
                                                    2024   
14:                              Body mass index   
(BMI) [Ratio]             23.16 kg/m2               Bernice GIBBS, DNP  
Work Phone:   
[phone]                          [facility]  
   
                                                    2024   
14:          Body weight         61.19 kg            Bernice GIBBS, DNP  
Work Phone:   
[phone]                          [facility]  
   
                                                    2024   
14:                              Diastolic blood   
pressure                  64 mm[Hg]                 Berniceonur Vital   
APRN-CNP, DNP  
Work Phone:   
[phone]                          [facility]  
   
                                                    2024   
14:          Heart rate          75 /min             Berniceonur Vital APRN-CNP, DNP  
Work Phone:   
[phone]                          [facility]  
   
                                                    2024   
14:                              SaO2% (BldA) [Mass   
fraction]                 98 %                      Berniceonur JOSE-CNP, DNP  
Work Phone:   
[phone]                          [facility]  
   
                                                    2024   
14:                              Systolic blood   
pressure                  108 mm[Hg]                Bernice JOSE-CNP, DNP  
Work Phone:   
[phone]                          [facility]  
   
                                                    2024   
09:                              Diastolic blood   
pressure            88 mm[Hg]           [facility]               [facility]  
   
                                                    2024   
09:      Heart rate      93 /min         [facility]           [facility]  
   
                                                    2024   
09:                              Systolic blood   
pressure            130 mm[Hg]          [facility]               [facility]  
   
                                                    2024   
10:          Body height         162.6 cm            Sharifa Royal DO  
Work Phone:   
[phone]                          [facility]  
   
                                                    2024   
10:                              Body mass index   
(BMI) [Ratio]             23.34 kg/m2               Sharifa Royal DO  
Work Phone:   
[phone]                          [facility]  
   
                                                    2024   
10:          Body weight         61.69 kg            Sharifa Royal DO  
Work Phone:   
[phone]                          [facility]  
   
                                                    2024   
10:                              Diastolic blood   
pressure                  82 mm[Hg]                 Sharifa Royal DO  
Work Phone:   
[phone]                          [facility]  
   
                                                    2024   
10:          Heart rate          83 /min             Sharifa Royal DO  
Work Phone:   
[phone]                          [facility]  
   
                                                    2024   
10:                              SaO2% (BldA) [Mass   
fraction]                 98 %                      Sharifa Royal DO  
Work Phone:   
[phone]                          [facility]  
   
                                                    2024   
10:                              Systolic blood   
pressure                  128 mm[Hg]                Sharifa Royal DO  
Work Phone:   
[phone]                          [facility]  
   
                                                    2024   
10:          Body height         162.6 cm            Seth Ceballos   
APRN-CNP  
Work Phone:   
[phone]                          [facility]  
   
                                                    2024   
10:                              Body mass index   
(BMI) [Ratio]             23.86 kg/m2               Seth Ceballos   
APRN-CNP  
Work Phone:   
[phone]                          [facility]  
   
                                                    2024   
10:          Body temperature    97.3 [degF]         Seth Ceballos APRN-CNP  
Work Phone:   
[phone]                          [facility]  
   
                                                    2024   
10:          Body weight         63.05 kg            Seth Ceballos   
APRN-CNP  
Work Phone:   
[phone]                          [facility]  
   
                                                    2024   
10:                              Diastolic blood   
pressure                  80 mm[Hg]                 Seth Ceballos   
APRN-CNP  
Work Phone:   
[phone]                          [facility]  
   
                                                    2024   
10:          Heart rate          84 /min             Seth Ceballos APRN-CNP  
Work Phone:   
[phone]                          [facility]  
   
                                                    2024   
10:          Respiratory rate    16 /min             Seth Ceballos   
APRN-CNP  
Work Phone:   
[phone]                          [facility]  
   
                                                    2024   
10:                              SaO2% (BldA) [Mass   
fraction]                 100 %                     Seth Ceballos APRN-CNP  
Work Phone:   
[phone]                          [facility]  
   
                                                    2024   
10:                              Systolic blood   
pressure                  130 mm[Hg]                Seth Ceballos APRN-CNP  
Work Phone:   
[phone]                          [facility]  
   
                                                    2024   
13:          Body height         160 cm              Bernice GIBBS, DNP  
Work Phone:   
[phone]                          [facility]  
   
                                                    2024   
13:                              Body mass index   
(BMI) [Ratio]             24.73 kg/m2               Berniceonur GIBBS, DNP  
Work Phone:   
[phone]                          [facility]  
   
                                                    2024   
13:          Body weight         63.32 kg            Berniceonur GIBBS, DNP  
Work Phone:   
[phone]                          [facility]  
   
                                                    2024   
13:                              Diastolic blood   
pressure                  70 mm[Hg]                 Bernice GIBBS, DNP  
Work Phone:   
[phone]                          [facility]  
   
                                                    2024   
13:          Heart rate          79 /min             Bernice GIBBS, DNP  
Work Phone:   
[phone]                          [facility]  
   
                                                    2024   
13:                              SaO2% (BldA) [Mass   
fraction]                 96 %                      Bernice GIBBS, DNP  
Work Phone:   
[phone]                          [facility]  
   
                                                    2024   
13:                              Systolic blood   
pressure                  104 mm[Hg]                Berniceonur GIBBS, DNP  
Work Phone:   
[phone]                          [facility]  
   
                                                    2024   
11:          Body height         160 cm              Sharifa Royal DO  
Work Phone:   
[phone]                          [facility]  
   
                                                    2024   
11:                              Body mass index   
(BMI) [Ratio]             24.62 kg/m2               Sharifa Royal DO  
Work Phone:   
[phone]                          [facility]  
   
                                                    2024   
11:          Body weight         63.05 kg            Sharifa Royal DO  
Work Phone:   
[phone]                          [facility]  
   
                                                    2024   
11:                              Diastolic blood   
pressure                  64 mm[Hg]                 Sharifa Royal DO  
Work Phone:   
[phone]                          [facility]  
   
                                                    2024   
11:          Heart rate          88 /min             Sharifa Royal DO  
Work Phone:   
[phone]                          [facility]  
   
                                                    2024   
11:                              SaO2% (BldA) [Mass   
fraction]                 95 %                      Sharifa Royal DO  
Work Phone:   
[phone]                          [facility]  
   
                                                    2024   
11:                              Systolic blood   
pressure                  112 mm[Hg]                Sharifa Royal DO  
Work Phone:   
[phone]                          [facility]  
   
                                                    2024   
14:                              Diastolic blood   
pressure                  80 mm[Hg]                 Ronel Hays MD  
Work Phone:   
[phone]                          [facility]  
   
                                                    2024   
14:          Heart rate          88 /min             Ronel Hays MD  
Work Phone:   
[phone]                          [facility]  
   
                                                    2024   
14:                              Systolic blood   
pressure                  125 mm[Hg]                Ronel Hays MD  
Work Phone:   
[phone]                          [facility]  
   
                                                    2024   
11:          Body height         160 cm              Sharifa Royal DO  
Work Phone:   
[phone]                          [facility]  
   
                                                    2024   
11:                              Body mass index   
(BMI) [Ratio]             26.04 kg/m2               Sharifa Royal DO  
Work Phone:   
[phone]                          [facility]  
   
                                                    2024   
11:          Body weight         66.68 kg            Sharifa Royal DO  
Work Phone:   
[phone]                          [facility]  
   
                                                    2024   
11:                              Diastolic blood   
pressure                  82 mm[Hg]                 Sharifa Royal DO  
Work Phone:   
[phone]                          [facility]  
   
                                                    2024   
11:          Heart rate          78 /min             Sharifa Royal DO  
Work Phone:   
[phone]                          [facility]  
   
                                                    2024   
11:                              SaO2% (BldA) [Mass   
fraction]                 95 %                      Sharifa Royal DO  
Work Phone:   
[phone]                          [facility]  
   
                                                    2024   
11:                              Systolic blood   
pressure                  124 mm[Hg]                Sharifa Royal DO  
Work Phone:   
[phone]                          [facility]  
   
                                                    2024   
15:          Heart rate          77 /min             Fredy Watters DO  
Work Phone:   
[phone]                          [facility]  
   
                                                    2024   
15:          Respiratory rate    18 /min             Fredy Watters DO  
Work Phone:   
[phone]                          [facility]  
   
                                                    2024   
15:                              SaO2% (BldA) [Mass   
fraction]                 94 %                      Fredy Watters DO  
Work Phone:   
[phone]                          [facility]  
   
                                                    2024   
14:                              Diastolic blood   
pressure                  54 mm[Hg]                 Fredy Watters DO  
Work Phone:   
[phone]                          [facility]  
   
                                                    2024   
14:                              Systolic blood   
pressure                  132 mm[Hg]                Fredy Watters DO  
Work Phone:   
[phone]                          [facility]  
   
                                                    2024   
12:          Body height         160 cm              Fredy Watters DO  
Work Phone:   
[phone]                          [facility]  
   
                                                    2024   
12:                              Body mass index   
(BMI) [Ratio]             27.28 kg/m2               Fredy Watters DO  
Work Phone:   
[phone]                          [facility]  
   
                                                    2024   
12:          Body temperature    97 [degF]           Fredy Watters DO  
Work Phone:   
[phone]                          [facility]  
   
                                                    2024   
12:          Body weight         69.85 kg            Fredy Watters DO  
Work Phone:   
[phone]                          [facility]  
   
                                                    2024   
11:          Body height         162.6 cm            Sharifa Royal DO  
Work Phone:   
[phone]                          [facility]  
   
                                                    2024   
11:                              Body mass index   
(BMI) [Ratio]             25.58 kg/m2               Sharifa Royal DO  
Work Phone:   
[phone]                          [facility]  
   
                                                    2024   
11:          Body weight         67.59 kg            Sharifa Royal DO  
Work Phone:   
[phone]                          [facility]  
   
                                                    2024   
11:                              Diastolic blood   
pressure                  62 mm[Hg]                 Sharifa Royal DO  
Work Phone:   
[phone]                          [facility]  
   
                                                    2024   
11:          Heart rate          58 /min             Sharifa Royal DO  
Work Phone:   
[phone]                          [facility]  
   
                                                    2024   
11:                              SaO2% (BldA) [Mass   
fraction]                 96 %                      Sharifa Royal DO  
Work Phone:   
[phone]                          [facility]  
   
                                                    2024   
11:                              Systolic blood   
pressure                  124 mm[Hg]                Sharifa Royal DO  
Work Phone:   
[phone]                          [facility]  
   
                                                    2024   
12:          Heart rate          76 /min             Kelsi Murray MD  
Work Phone:   
[phone]                          [facility]  
   
                                                    2024   
12:          Respiratory rate    18 /min             Kelsi Murray MD  
Work Phone:   
[phone]                          [facility]  
   
                                                    2024   
12:                              SaO2% (BldA) [Mass   
fraction]                 94 %                      Kelsi Murray MD  
Work Phone:   
[phone]                          [facility]  
   
                                                    2024   
11:          Body height         162.6 cm            Kelsi Murray MD  
Work Phone:   
[phone]                          [facility]  
   
                                                    2024   
11:                              Body mass index   
(BMI) [Ratio]             26.43 kg/m2               Kelsi Murray MD  
Work Phone:   
[phone]                          [facility]  
   
                                                    2024   
11:          Body temperature    98.2 [degF]         Kelsi Murray MD  
Work Phone:   
[phone]                          [facility]  
   
                                                    2024   
11:          Body weight         69.85 kg            Kelsi Murray MD  
Work Phone:   
[phone]                          [facility]  
   
                                                    2024   
11:                              Diastolic blood   
pressure                  81 mm[Hg]                 Kelsi Murray MD  
Work Phone:   
[phone]                          [facility]  
   
                                                    2024   
11:                              Systolic blood   
pressure                  178 mm[Hg]                Kelsi Murray MD  
Work Phone:   
[phone]                          [facility]  
   
                                                    2023   
11:                              Diastolic blood   
pressure                  79 mm[Hg]                 Sharifa Royal DO  
Work Phone:   
[phone]                          [facility]  
   
                                                    2023   
11:                              Systolic blood   
pressure                  136 mm[Hg]                Sharifa Royal DO  
Work Phone:   
[phone]                          [facility]  
   
                                                    2023   
10:          Body height         160 cm              Sharifa Royal DO  
Work Phone:   
[phone]                          [facility]  
   
                                                    2023   
10:                              Body mass index   
(BMI) [Ratio]             27.55 kg/m2               Sharifa Royal DO  
Work Phone:   
[phone]                          [facility]  
   
                                                    2023   
10:          Body weight         70.53 kg            Sharifa Royal DO  
Work Phone:   
[phone]                          [facility]  
   
                                                    2023   
10:          Heart rate          76 /min             Sharifa Royal DO  
Work Phone:   
[phone]                          [facility]  
   
                                                    10-   
15:          Body height         160 cm              Sharifa Royal DO  
Work Phone:   
[phone]                          [facility]  
   
                                                    10-   
15:                              Body mass index   
(BMI) [Ratio]             27.88 kg/m2               Sharifa Royal DO  
Work Phone:   
[phone]                          [facility]  
   
                                                    10-   
15:          Body weight         71.4 kg             Sharifa Royal DO  
Work Phone:   
[phone]                          [facility]  
   
                                                    10-   
15:                              Diastolic blood   
pressure                  74 mm[Hg]                 Sharifa Royal DO  
Work Phone:   
[phone]                          [facility]  
   
                                                    10-   
15:          Heart rate          68 /min             Sharifa Royal DO  
Work Phone:   
[phone]                          [facility]  
   
                                                    10-   
15:                              Systolic blood   
pressure                  140 mm[Hg]                Sharifa Royal DO  
Work Phone:   
[phone]                          [facility]  
   
                                                    2022   
10:          Body height         159.5 cm            Amanda [place]   
APRN.CNP  
Work Phone:   
[phone]                          [facility]  
   
                                                    2022   
10:          Body weight         73.94 kg            Amanda Sandro   
APRN.CNP  
Work Phone:   
[phone]                          [facility]  
   
                                                    2022   
10:                              Diastolic blood   
pressure                  92 mm[Hg]                 Amanda [place]   
APRN.CNP  
Work Phone:   
[phone]                          [facility]  
   
                                                    2022   
10:                              Systolic blood   
pressure                  148 mm[Hg]                Amanda Sandro   
APRN.CNP  
Work Phone:   
[phone]                          [facility]  
  
  
  
Encounters  
  
  
                          Encounter Date Encounter Type Care Provider Facility  
   
                                                    Start: 01-  
End: 01-                         Patient encounter   
procedure                               Amaya Reis OD  
Work Phone:   
[phone]                          [facility]  
   
                                        Comment on above:   Status post cataract  
 extraction and insertion of intraocular   
lens   
of right eye (Primary Dx);  
Status post cataract extraction and insertion of intraocular lens   
of left eye   
   
                                                    Start: 2024  
End: 2024     [facility]  
   
                                                    Start: 2024  
End: 2024                         Patient encounter   
procedure                               Ronel Hays MD  
Work Phone:   
[phone]                          Ophthalmology  
   
                                        Comment on above:   Status post cataract  
 extraction and insertion of intraocular   
lens   
of right eye (Primary Dx);  
Status post cataract extraction and insertion of intraocular lens   
of left eye   
   
                                                    Start: 2024  
End: 2024     ambulatory          [facility]  
   
                                                    Start: 2024  
End: 2024                         Patient encounter   
procedure                               Ronel Hays MD  
Work Phone:   
[phone]                          Ophthalmology  
   
                                        Comment on above:   Status post cataract  
 extraction and insertion of intraocular   
lens   
of right eye (Primary Dx);  
Status post cataract extraction and insertion of intraocular lens   
of left eye   
   
                                                    Start: 2024  
End: 2024                         Subsequent hospital visit   
by physician                            Ronel Hays MD  
Work Phone:   
[phone]                          [facility] OR  
   
                          Start: 2024 ambulatory   [facility]  
   
                                                    Start: 2024  
End: 2024           ambulatory                Colten Abbott DO  
Work Phone:   
[phone]                          Hematology/Oncology  
   
                                        Comment on above:   History of breast ca  
ncer (Primary Dx);  
Encounter for screening mammogram for breast cancer   
   
                                                    Start: 2024  
End: 2024                         Patient encounter   
procedure                               Colten Abbott DO  
Work Phone:   
[phone]                          Hematology/Oncology  
   
                                                    Start: 2024  
End: 2024     [facility]  
   
                                                    Start: 2024  
End: 2024                         Patient encounter   
procedure                               Jose Delgado MD  
Work Phone:   
[phone]                          OB/Gynecology  
   
                                        Comment on above:   Encounter for gyneco  
logical examination (general) (routine)   
without   
abnormal findings (Primary Dx);  
Encounter for screening mammogram for breast cancer   
   
                                                    Start: 2024  
End: 2024           Patient encounter status  Jose Delgado MD  
Work Phone:   
[phone]                          [facility]  
   
                                                    Start: 2024  
End: 11-           Telephone encounter       Amaya Reis OD  
Work Phone:   
[phone]                          Optometry  
   
                                        Comment on above:   Patient Question   
   
                                                    Start: 2024  
End: 2024     [facility]  
   
                                                    Start: 2024  
End: 2024                         Patient encounter   
procedure                               Ronel Hays MD  
Work Phone:   
[phone]                          Ophthalmology  
   
                                        Comment on above:   Combined forms of ag  
e-related cataract of right eye (Primary   
Dx);  
Status post cataract extraction and insertion of intraocular lens   
of left eye;  
Type 2 diabetes mellitus without retinopathy (HCC);  
Essential hypertension;  
Hypercholesteremia;  
Dementia, unspecified dementia severity, unspecified dementia type,   
unspecified whether behavioral, psychotic, or mood disturbance or   
anxiety (HCC)   
   
                                                    Start: 2024  
End: 2024     [facility]  
   
                                                    Start: 2024  
End: 2024                         Subsequent hospital visit   
by physician                            Screen Mammo [facility]   
W[facility]                                    Mammogram  
   
                                        Comment on above:   Encounter for screen  
ing mammogram for breast cancer [Z12.31]   
   
                                                    Start: 2024  
End: 2024     [facility]  
   
                                                    Start: 2024  
End: 2024                         Patient encounter   
procedure                               Amaya Reis OD  
Work Phone:   
[phone]                          Optometry  
   
                                        Comment on above:   Status post cataract  
 extraction and insertion of intraocular   
lens   
of left eye (Primary Dx)   
   
                                                    Start: 2024  
End: 2024     [facility]  
   
                                                    Start: 2024  
End: 2024                         Patient encounter   
procedure                               Ronel Hays MD  
Work Phone:   
[phone]                          Ophthalmology  
   
                                        Comment on above:   Status post cataract  
 extraction and insertion of intraocular   
lens   
of left eye (Primary Dx);  
Combined forms of age-related cataract of right eye;  
Type 2 diabetes mellitus without retinopathy (HCC);  
Essential hypertension;  
Hypercholesteremia;  
Dementia, unspecified dementia severity, unspecified dementia type,   
unspecified whether behavioral, psychotic, or mood disturbance or   
anxiety (HCC)   
   
                                                    Start: 10-  
End: 10-                         Subsequent hospital visit   
by physician                            Ronel Hays MD  
Work Phone:   
[phone]                          [facility] OR  
   
                          Start: 10- ambulatory   [facility]  
   
                                                    Start: 10-  
End: 10-           Refill                    Ronel Hays MD  
Work Phone:   
[phone]                          Ophthalmology  
   
                                        Comment on above:   Refill Request   
   
                                                    Start: 10-  
End: 10-     [facility]  
   
                                                    Start: 10-  
End: 10-                         Patient encounter   
procedure                               Ronel Hays MD  
Work Phone:   
[phone]                          Ophthalmology  
   
                                        Comment on above:   Combined forms of ag  
e-related cataract of left eye (Primary   
Dx);  
Combined forms of age-related cataract of right eye;  
Type 2 diabetes mellitus without retinopathy (HCC);  
Essential hypertension;  
Hypercholesteremia;  
Dementia, unspecified dementia severity, unspecified dementia type,   
unspecified whether behavioral, psychotic, or mood disturbance or   
anxiety (HCC)   
   
                                                    Start: 10-  
End: 10-           Refill                    Ronel Hays MD  
Work Phone:   
[phone]                          Ophthalmology  
   
                                        Comment on above:   Refill Request   
   
                                                    Start: 2024  
End: 2024                         Office outpatient visit   
25 minutes                              Bernice JOSE-CNP, DNP  
Work Phone:   
[phone]                          [facility]   
Office Building  
   
                                        Comment on above:   Encounter for pre-op  
erative cardiovascular clearance (Primary   
Dx)   
   
                                                    Start: 2024  
End: 2024           Preoperative state        Bernice Vital   
APRN-CNP, DNP  
Work Phone:   
[phone]                          [facility]  
Work Phone:   
[phone]  
   
                                                    Start: 2024  
End: 2024     ambulatory          BERNICE VITAL       [facility]  
   
Ambulatory  
   
                                                    Start: 2024  
End: 2024     Patient encounter status [address]  
   
                                                    Start: 2024  
End: 2024                         Subsequent hospital visit   
by physician Brenden Wade Admin Room 1       [facility]  
   
                                        Comment on above:   Arrived   
   
                                                            Abnormal EKG   
   
                                                            Abnormal EKG;  
Encounter for other preprocedural examination   
   
                                                    Start: 2024  
End: 2024     ambulatory          BERNICE VITAL       [facility]  
   
                                                    Start: 2024  
End: 2024                         Encounter for other   
preprocedural examination BERNICE VITAL             [facility]  
   
                                                    Start: 2024  
End: 2024                         Office outpatient visit   
25 minutes                              Sharifa S Royal DO  
Work Phone:   
[phone]                          [facility]  
   
                                        Comment on above:   Abnormal EKG (Primar  
y Dx);  
Weight loss, unintentional;  
Controlled type 2 diabetes mellitus without complication, without   
long-term current use of insulin (Multi);  
Benign liver cyst;  
Secondary hypertension;  
Mild dementia without behavioral disturbance, psychotic   
disturbance, mood disturbance, or anxiety, unspecified dementia   
type (Multi);  
Gastroesophageal reflux disease without esophagitis;  
Mixed hyperlipidemia;  
Hypokalemia   
   
                                                    Start: 2024  
End: 2024     ambulatory          [facility]  
   
Ambulatory  
   
                                                    Start: 2024  
End: 2024     ambulatory          [facility]  
   
                                                    Start: 2024  
End: 2024                         Patient encounter   
procedure                               Seth Ceballos   
APRN-CNP  
Work Phone:   
[phone]                          [facility]   
[facility]gent [facility]  
   
                                        Comment on above:   COVID (Primary Dx);  
Acute upper respiratory infection   
   
                                                    Start: 2024  
End: 2024     ambulatory          [facility]  
   
                                                    Start: 2024  
End: 2024                         Office outpatient new 45   
minutes                                 Bernice JOSE-CNP, DNP  
Work Phone:   
[phone]                          [facility]   
Office Building  
   
                                        Comment on above:   Encounter for pre-op  
erative cardiovascular clearance (Primary   
Dx);  
Abnormal EKG;  
Mitral valve prolapse determined by imaging   
   
                                                    Start: 2024  
End: 2024           Patient encounter status  Bernice Vital   
APRN-CNP, DNP  
Work Phone:   
[phone]                          [facility]  
Work Phone:   
[phone]  
   
                                                    Start: 2024  
End: 2024     ambulatory          BERNICE VITAL       [facility]  
   
Ambulatory  
   
                                                    Start: 2024  
End: 2024                         Subsequent hospital visit   
by physician              Brenden Brooke 1          [facility]  
   
                                        Comment on above:   Liver lesion   
   
                                                    Start: 2024  
End: 2024     ambulatory          [facility]  
   
                                                    Start: 2024  
End: 2024                         Office outpatient visit   
25 minutes                              [facility]  
Work Phone:   
[phone]                          [facility]  
   
                                        Comment on above:   Liver lesion (Primar  
y Dx);  
Abnormal EKG;  
Weight loss, unintentional;  
Hypokalemia;  
Secondary hypertension   
   
                                                    Start: 2024  
End: 2024     ambulatory          [facility]  
   
Ambulatory  
   
                                                    Start: 2024  
End: 2024                         Subsequent hospital visit   
by physician              Brenden Colon 1                  [facility]  
   
                                        Comment on above:   Weight loss, uninten  
tional   
   
                                                            Abnormal EKG   
   
                                                    Start: 2024  
End: 2024     ambulatory          [facility]  
   
                                                    Start: 2024  
End: 2024     ambulatory          [facility]  
   
                                                    Start: 2024  
End: 2024     ambulatory          [facility]  
   
                                                    Start: 2024  
End: 2024                         Patient encounter   
procedure                               Ronel Hays MD  
Work Phone:   
[phone]                          Ophthalmology  
   
                                        Comment on above:   Combined forms of ag  
e-related cataract of left eye (Primary   
Dx);  
Combined forms of age-related cataract of right eye;  
Type 2 diabetes mellitus without retinopathy (HCC);  
Essential hypertension;  
Hypercholesteremia;  
Dementia, unspecified dementia severity, unspecified dementia type,   
unspecified whether behavioral, psychotic, or mood disturbance or   
anxiety (HCC)   
   
                                                    Start: 2024  
End: 2024     ambulatory          [facility]  
   
                                                    Start: 2024  
End: 2024                         Patient encounter   
procedure                               Amaya Reis OD  
Work Phone:   
[phone]                          Optometry  
   
                                        Comment on above:   Combined form of sen  
ile cataract of both eyes (Primary Dx);  
Type 2 diabetes mellitus without retinopathy (HCC);  
Hyperopia of both eyes;  
Regular astigmatism of both eyes;  
Presbyopia   
   
                                                    Start: 2024  
End: 2024                         Office outpatient visit   
25 minutes                              [facility] DO  
Work Phone:   
[phone]                          [facility]  
   
                                        Comment on above:   Controlled type 2 di  
abetes mellitus without complication,   
without   
long-term current use of insulin (Multi) (Primary Dx);  
Hiccup;  
Mild dementia without behavioral disturbance, psychotic   
disturbance, mood disturbance, or anxiety, unspecified dementia   
type (Multi);  
Gastroesophageal reflux disease without esophagitis   
   
                                                    Start: 2024  
End: 2024     ambulatory          [facility]  
   
Ambulatory  
   
                                                    Start: 2024  
End: 2024     ambulatory          [facility]  
   
                                                    Start: 2024  
End: 2024                         Subsequent hospital visit   
by physician                            Brenden Hartmanv1 Ecg   
Resource                                [facility]  
   
                                        Comment on above:   Arrived   
   
                                                    Start: 2024  
End: 2024                         Emergency department   
patient visit                           Fredy Watters DO  
Work Phone:   
[phone]                          [facility] Emergency   
Medicine  
   
                                        Comment on above:   Nausea and vomiting,  
 unspecified vomiting type (Primary Dx);  
Intermittent lightheadedness   
   
                                                    Start: 2024  
End: 2024     ambulatory          [facility]  
   
                                                    Start: 2024  
End: 2024                         Office outpatient visit   
25 minutes                              Sharifa S Royal DO  
Work Phone:   
[phone]                          [facility]  
   
                                        Comment on above:   Memory loss (Primary  
 Dx);  
Infiltrating ductal carcinoma of right female breast (Multi);  
Controlled type 2 diabetes mellitus without complication, without   
long-term current use of insulin (Multi);  
Secondary hypertension;  
Mixed hyperlipidemia;  
Mild dementia without behavioral disturbance, psychotic   
disturbance, mood disturbance, or anxiety, unspecified dementia   
type (Multi)   
   
                                                    Start: 2024  
End: 2024     ambulatory          [facility]  
   
Ambulatory  
   
                                                    Start: 2024  
End: 2024     ambulatory          [facility]  
   
                                                    Start: 2024  
End: 2024                         Subsequent hospital visit   
by physician                            Brenedn Hartmanv1 Ecg   
Resource                                [facility]  
   
                                        Comment on above:   Arrived   
   
                                                    Start: 2024  
End: 2024                         Emergency department   
patient visit                           Kelsi Murray MD  
Work Phone:   
[phone]                          [facility] Emergency   
Medicine  
   
                                        Comment on above:   UTI (urinary tract i  
nfection), bacterial (Primary Dx);  
Confusion   
   
                                                    Start: 2023  
End: 2023                         Office outpatient visit   
15 minutes                              [employer]  
Work Phone:   
[phone]                          [facility]  
   
                                        Comment on above:   Secondary hypertensi  
on (Primary Dx);  
Medicare annual wellness visit, subsequent;  
Controlled type 2 diabetes mellitus without complication, without   
long-term current use of insulin (CMS/[facility]);  
Mixed hyperlipidemia;  
Infiltrating ductal carcinoma of right female breast (CMS/HCC)   
   
                                                    Start: 2023  
End: 2024                         Patient encounter   
procedure                               Sharifa S Royal DO  
Work Phone:   
[phone]                          [facility]  
Work Phone:   
[phone]  
   
                                                    Start: 2023  
End: 2023     ambulatory          [facility]  
   
Ambulatory  
   
                                                    Start: 2023  
End: 2023                         Encounter for general   
adult medical examination   
without abnormal findings [facility]   
Ambulatory  
   
                                                    Start: 2023  
End: 2023     ambulatory          [facility]  
   
                                Start: 2023 Documentation procedure Mammog  
saran   
Coordinator                             [facility]   
MAIN  
   
                                Start: 2023 Letter encounter Mammography   
Coordinator                             [facility]   
Department  
   
                                                    Start: 2023  
End: 2023                         Subsequent hospital visit   
by physician                            Screen Mammo [facility]   
Wstr                                    Mammogram  
   
                                        Comment on above:   Malignant neoplasm o  
f right breast in female, estrogen   
receptor   
positive, unspecified site of breast (HCC) [C50.911, Z17.0]   
   
                                                    Start: 10-  
End: 10-                         Office outpatient new 45   
minutes                                 [facility]  
Work Phone:   
[phone]                          [facility]  
   
                                        Comment on above:   Infiltrating ductal   
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  
  
(Primary Dx);  
Controlled type 2 diabetes mellitus without complication, without   
long-term current use of insulin (CMS/HCC);  
Mixed hyperlipidemia;  
Secondary hypertension   
   
                                                    Start: 10-  
End: 10-     ambulatory          [facility]  
   
Ambulatory  
   
                                                    Start: 10-  
End: 10-                         Patient encounter   
procedure                               Amaya Reis OD  
Work Phone:   
[phone]                          Optometry  
   
                                        Comment on above:   Hordeolum externum o  
f right upper eyelid (Primary Dx)   
   
                                                    Start: 2023  
End: 2023                         Patient encounter   
procedure                               Amaya Reis OD  
Work Phone:   
[phone]                          Optometry  
   
                                        Comment on above:   Type 2 diabetes jett  
itus without retinopathy (HCC) (Primary   
Dx);  
Combined form of senile cataract of both eyes;  
Hyperopia of both eyes;  
Regular astigmatism of left eye;  
Presbyopia   
   
                                                    Start: 2022  
End: 2022                         Patient encounter   
procedure                               Amanda SHOEMAKERCNP  
Work Phone:   
[phone]                          OB/Gynecology  
   
                                        Comment on above:   Encounter for gyneco  
logical examination (general) (routine)   
without   
abnormal findings (Primary Dx);  
Encounter for screening for malignant neoplasm of vagina   
   
                                                    Start: 2022  
End: 2022           Patient encounter status  Amanda Jo   
APRN.CNP  
Work Phone:   
[phone]                          OB/Gynecology  
   
                                Start: 2022 Documentation procedure Mammog  
saran   
Coordinator                             [facility]   
MAIN  
   
                                Start: 2022 Letter encounter Mammography   
Coordinator                             [facility]   
Department  
   
                                                    Start: 2022  
End: 2022                         Subsequent hospital visit   
by physician                            Screen Mammo [facility]   
Wstr                                    Mammogram  
   
                                        Comment on above:   Encounter for screen  
ing mammogram for malignant neoplasm of   
breast   
[Z12.31]   
   
                                        Start: 2020   Patient encounter   
procedure                 Constance Machado              [facility]   
[place]  
Work Phone:   
[phone]  
   
                                        Start: 2020   Patient encounter   
procedure                 Constance Machado              [facility]   
[place]  
Work Phone:   
[phone]  
   
                                        Start: 2020   Patient encounter   
procedure                 Constance Machado              [facility]   
[place]  
Work Phone:   
[phone]  
   
                                        Start: 2020   Patient encounter   
procedure                 Constance Machado              [facility]   
[place]  
Work Phone:   
[phone]  
   
                                        Start: 2020   Patient encounter   
procedure                 Constance Machado              [facility]   
[place]  
Work Phone:   
[phone]  
   
                                        Start: 2020   Patient encounter   
procedure                 Constance Machado              [facility]   
[place]  
Work Phone:   
[phone]  
   
                                        Start: 2019   Patient encounter   
procedure                 KULWINDER GARCIA              Facility:[facility]  
   
                                        Start: 2018   Patient encounter   
procedure                 KULWINDER GARCIA              Facility:[facility]  
   
                                                    Start: 2018  
End: 2018                         Patient encounter   
procedure                 KULWINDER GARCIA              Facility:[facility]  
   
                                                    Start: 2018  
End: 2018                         Patient encounter   
procedure                 KULWINDER GARCIA              Facility:[facility]  
   
                                                    Start: 2018  
End: 2018                         Patient encounter   
procedure                 KULWINDER GARCIA              Facility:[facility]  
   
                                        Start: 2018   Patient encounter   
procedure                 KULWINDER GARCIA              Facility:[facility]  
   
                                                    Start: 2018  
End: 2018                         Patient encounter   
procedure                 KULWINDER GARCIA              Facility:[facility]  
   
                                                    Start: 2017  
End: 2017                         Evaluation and management   
of inpatient              KULWINDER GARCIA              Facility:[facility]  
   
                                                    Start: 2017  
End: 2017                         Patient encounter   
procedure                 DELMA ROMERO              Facility:[facility]  
  
  
  
Procedures  
  
  
                          Date         Procedure    Procedure Detail Performing   
Clinician  
   
                                        Start: 2024   Glucose quantitative  
   
blood xcpt reagent strip                            Ronel Hays MD  
Work Phone:   
[phone]  
   
                                        Start: 2024   ASCAN ONLY - DIAGNOS  
TIC   
OD (RIGHT EYE)                                      Ronel Hays MD  
Work Phone:   
[phone]  
   
                                        Start: 10-   PULSE OXIMETRY,   
CONTINUOUS                                          Dell Williamson DO  
Work Phone:   
[phone]  
   
                                        Start: 10-   Glucose quantitative  
   
blood xcpt reagent strip                            Ronel Hays MD  
Work Phone:   
[phone]  
   
                                        Start: 10-   IOL BIOMETRY W/ IOL   
CALC   
OU (BOTH EYES)                                      Ronel Hays MD  
Work Phone:   
[phone]  
   
                                        Start: 2024   Cv strs tst xers&/or  
 rx   
cont ecg w/o i&r                                    Bernice GIBBS, [facility]  
Work Phone:   
[phone]  
   
                                        Start: 2024   Myocardial spect   
multiple studies                                    Bernice GIBBS, [facility]  
Work Phone:   
[phone]  
   
                                        Start: 2024   POCT SARS-COV-2/FLU/  
RSV   
PCR SYMPTOMATIC                                     Seth JOSE-CNP  
Work Phone:   
[phone]  
   
                                        Start: 2024   Ecg routine ecg w/le  
ast   
12 lds w/i&r                                        Bernice GIBBS, [facility]  
Work Phone:   
[phone]  
   
                                        Start: 2024   Echo tthrc r-t 2d   
w/wom-mode compl   
spec&colr d                                         Sharifavazquez Almaguer DO  
Work Phone:   
[phone]  
   
                                        Start: 2024   Computerized ophthal  
chelita   
imaging retina                                      Ronel Hays MD  
Work Phone:   
[phone]  
   
                                        Start: 2024   IOL BIOMETRY W/ IOL   
CALC   
OU (BOTH EYES)                                      Ronel Hays MD  
Work Phone:   
[phone]  
   
                                        Start: 2024   Computerized ophthal  
chelita   
imaging retina                                      Amaya Reis OD  
Work Phone:   
[phone]  
   
                                        Start: 2024   Ecg routine ecg w/le  
ast   
12 lds trcg only w/o i&r                            Vinod Welch   
APRN-CNP  
Work Phone:   
[phone]  
   
                                        Start: 2024   Ct head/brain w/o   
contrast material                                   Vinod Welch   
APRN-CNP  
Work Phone:   
[phone]  
   
                                        Start: 2024   Radiologic exam ches  
t 2   
views                                               Vinod Welch   
APRN-CNP  
Work Phone:   
[phone]  
   
                                        Start: 2024   Comprehensive metabo  
lic   
panel                                               Vinod ANN Ellisal   
APRN-CNP  
Work Phone:   
[phone]  
   
                          Start: 2024 Urinalysis                Vinod ANN Yanely bowles   
APRN-CNP  
Work Phone:   
[phone]  
   
                                        Start: 2024   Urnls dip stick/tabl  
et   
reagent auto microscopy                             Vinod ANN Ellisal   
APRN-CNP  
Work Phone:   
[phone]  
   
                                        Start: 2024   Basic metabolic 2000  
   
panel - Serum or Plasma                             [facility]  
   
                                        Start: 2024   Cyanocobalamin vitam  
in   
b-12                                                [facility]  
   
                                        Start: 2024   Hemoglobin   
A1c/Hemoglobin.total in   
Blood                                               [facility]  
   
                                        Start: 2024   Thyrotropin   
[Units/volume] in Serum   
or Plasma                                           [facility]  
   
                                        Start: 2024   FOLLOW UP IN FAMILY   
MEDICINE                                            [facility]  
   
                          Start: 2024 DISCHARGE PATIENT              MELLISSA MAE ROYAL  
   
                          Start: 2024 CT HEAD WO IV CONTRAST                
[facility]  
   
                                        Start: 2024   Bacteria identified   
in   
Urine by Culture                                    [facility]  
   
                          Start: 2024 EXTRA URINE GRAY TUBE              [facility]  
[facility]  
   
                          Start: 2024 MICROSCOPIC ONLY, URINE               
 [facility]  
   
                                        Start: 2024   URINALYSIS WITH REFL  
EX   
CULTURE AND MICROSCOPIC                             SHARIFA ROYAL  
   
                                        Start: 2024   CBC panel - Blood by  
   
Automated count                                     SHARIFA ALMAGUER  
   
                                        Start: 2024   Comprehensive metabo  
lic   
2000 panel - Serum or   
Plasma                                              SHARIFA ALMAGUER  
   
                                        Start: 2024   TROPONIN I, HIGH   
SENSITIVITY                                         SHARIFA ALMAGUER  
   
                          Start: 2024 XR CHEST 1 VIEW              LEAH ALMAGUER  
   
                          Start: 2024 ECG 12-LEAD               SHARIFA R  
OYAL  
   
                                        Start: 2024   Ct head/brain w/o   
contrast material                                   Grisel León PA-C  
Work Phone:   
[phone]  
   
                                        Start: 2024   Urinalysis microscop  
ic   
panel - Urine   
Qualitative by Automated                            Grisel León PA-C  
Work Phone:   
[phone]  
   
                                        Start: 2024   Urnls dip stick/tabl  
et   
reagent auto microscopy                             Grisel León PA-C  
Work Phone:   
[phone]  
   
                                        Start: 2024   Comprehensive metabo  
lic   
panel                                               Grisel León PA-C  
Work Phone:   
[phone]  
   
                                        Start: 2024   Radiologic exam ches  
t   
single view                                         Grisel León PA-C  
Work Phone:   
[phone]  
   
                                        Start: 2024   Ecg routine ecg w/le  
ast   
12 lds trcg only w/o i&r                            Grisel León PA-C  
Work Phone:   
[phone]  
   
                                        Start: 2023   FOLLOW UP IN FAMILY   
MEDICINE                                            SHARIFA ALMAGUER  
   
                                        Start: 2023   Comprehensive metabo  
lic   
2000 panel - Serum or   
Plasma                                              SHARIFA ALMAGUER  
   
                                        Start: 2023   Hemoglobin   
A1c/Hemoglobin.total in   
Blood                                               SHARIFA ALMAGUER  
   
                          Start: 2023 Lipid panel               SHARIFA R  
[facility]  
   
                                        Start: 2023   Lipid 1996 panel - S  
robbie   
or Plasma                                           Sharifa Almaguer DO  
Work Phone:   
[phone]  
   
                                        Start: 2023   Screening digital br  
east   
tomosynthesis bi                                    Rosa Songenter   
APRN.CNP  
Work Phone:   
[phone]  
   
                          Start: 2022 JONY SCREENING W HUGO              Pa  
ul A Masci DO  
Work Phone:   
[phone]  
   
                                                    Start: 2017  
End: 10-           H/O: surgery              S/P hysterectomy with   
oophorectomy                            Mammography   
Coordinator  
   
                          Start: 2017 Antibody screen              KULWINDER TO  
   
                                        Comment on above:   Performed By: #### T  
&S ####[place] [address]   
  
  
  
Plan of Treatment  
  
  
                          Date         Care Activity Detail       Author  
   
                                                    Start: 2025  
End: 2025           ambulatory                2025 11:30 AM EST   
Visit (SP) Office   
Hematology/Oncology [address] [place], [place]   
[address] [phone] Colten Abbott  [facility] [place], [place] [id number] [phone] (Work)   
[phone] (Fax) 1 YR   
OV/MAMM *                          Hematology/Oncology  
   
                                        Comment on above:   1 YR OV/MAMM *   
   
                                                    Start: 2025  
End: 2025                         Patient encounter   
procedure                               2025 1:20 PM EST   
Office Visit OB/Gynecology   
[address] [place], [place] [id number] [phone]   
Jose Delgado  [address] [place], [place]   
[id number] [phone] (Work)   
[phone] (Fax) Annual               OB/Gynecology  
   
                                        Comment on above:   Annual   
   
                                                    Start: 2025  
End: 2025                         Patient encounter   
procedure                               2025 12:50 PM EST   
Appointment Mammogram [address] [facility] [place], [place]   
[id number] [phone]   
Encounter for screening   
mammogram for breast   
cancer [Z12.31]                         Mammogram  
   
                                        Comment on above:   Encounter for screen  
ing mammogram for breast cancer [Z12.31]   
   
                                                    Start: 2025  
End: 2025                         Patient encounter   
procedure                               2025 1:00 PM EDT   
Office Visit OPHT   
Optometry [address] [place] [address]   
[phone] Amaya Reis II,  [place] [address]   
[phone] (Work)   
[phone] (Fax) 6 month   
post op                                 Optometry  
   
                                        Comment on above:   6 month post op   
   
                          Start: 2025 Glaucoma screening Dilated Retinal E  
xam [facility]  
   
                          Start: 2025 Glaucoma screening Dilated Retinal E  
xam [facility]  
   
                                                    Start: 2025  
End: 2025                         Patient encounter   
procedure                               2025 11:00 AM EDT   
Office Visit [place] [address] [address]   
[address] [phone]   
Sharifa Almaguer    
[address] [address] [phone]   
(Work) [phone] (Fax)               [facility]  
   
                                                    Start: 2025  
End: 2025                         Basic metabolic 2000   
panel - Serum or   
Plasma                                  Basic Metabolic Panel Lab   
Routine Controlled type 2   
diabetes mellitus without   
complication, without   
long-term current use of   
insulin (Multi) Expected:   
2025 (Approximate),   
Expires: 2025                     [facility]  
Work Phone:   
[phone]  
   
                                        Comment on above:   Expected: 2025  
 (Approximate), Expires: 2025   
   
                                                    Start: 2025  
End: 2025                         Hemoglobin   
A1c/Hemoglobin.total   
in Blood                                Hemoglobin A1C Lab Routine   
Controlled type 2 diabetes   
mellitus without   
complication, without   
long-term current use of   
insulin (Multi) Expected:   
2025 (Approximate),   
Expires: 2025                     [facility]  
Work Phone:   
[phone]  
   
                                        Comment on above:   Expected: 2025  
 (Approximate), Expires: 2025   
   
                                        Start: 2025   Hemoglobin A1c   
measurement               HbA1C                     [facility]  
   
                                                    Start: 01-  
End: 01-                         Patient encounter   
procedure                               01/15/2025 1:30 PM EST   
Office Visit [facility]   
Optometry [address] [place] [address]   
[phone] Amaya Reis II,  [place] [address]   
[phone] (Work)   
[phone] (Fax) 1 Month   
Post OP                                 Optometry  
   
                                        Comment on above:   1 Month Post OP   
   
                                        Start: 2025   Advance Directive   
Discussion                              Advance Directive   
Discussion                              [facility]  
   
                                        Start: 2024   Hemoglobin A1c   
measurement               Diabetes: Hemoglobin A1C  [facility]  
   
                                                    Start: 2024  
End: 2024                         Patient encounter   
procedure                               2024 4:00 PM EST   
Office Visit OP[facility]   
Ophthalmology [address] [place] [address]   
[phone] Ronel Hays MD [address] [place] [address]   
[address] [phone] (Work)   
[phone] (Fax) post op              Ophthalmology  
   
                                        Comment on above:   post op   
   
                                                    Start: 2024  
End: 2024                         Patient encounter   
procedure                               2024 2:15 PM EST   
Office Visit OP[facility]   
Ophthalmology [address] [place] [address]   
[phone] Ronel Hays MD [address] [place] [address]   
[address] [phone] (Work)   
[phone] (Fax) 1 DAY   
PO 2ND RE BASIC                         Ophthalmology  
   
                                        Comment on above:   1 DAY PO 2ND RE BASI  
C   
   
                                                    Start: 2024  
End: 2024                         Xcapsl ctrc rmvl   
insj io lens prosth   
w/o ecp                                 Phacoemulsification   
Cataract with Insertion   
Intraocular Lens Combined   
forms of age-related   
cataract of right eye   
2024 11:15 AM EST                 [facility]  
   
                                                    Start: 2024  
End: 2024                         Admission to same   
day surgery center                      2024 10:10 AM EST -   
2024 10:25 AM EST   
Surgery [address] [address] Ronel Hays MD [address] [place] [address]   
[address] [phone] (Work)   
[phone] (Fax) SURGERY   
AT NON-[facility] FACILITY                    [facility]  
   
                                        Comment on above:   SURGERY AT NON-[facility]   
FACILITY   
   
                                        Start: 2024   Subsequent hospital   
visit by physician                      2024 10:10 AM EST   
Hospital Encounter Ronel Hays MD [address] [place] [address]   
[address] [phone] (Work)   
[phone] (Fax)   
Combined forms of   
age-related cataract of   
right eye [H25.811]                     [facility]  
   
                                        Comment on above:   Combined forms of ag  
e-related cataract of right eye [H25.811]   
   
                                                    Start: 2024  
End: 2024                         SURGERY AT NON-[facility]   
FACILITY                                SURGERY AT NON-[facility]   
FACILITY Combined forms of   
age-related cataract of   
right eye 2024 10:10   
AM EST                                  [facility]  
   
                                                    Start: 2024  
End: 2024           ambulatory                2024 10:10 AM EST   
Visit (SP) Office   
Hematology/Oncology [address]   
[place]   
[address] [phone] Colten Abbott  [place] [address]   
[phone] (Work)   
[phone] (Fax) 1 YR   
OV/ JONY *                          Hematology/Oncology  
   
                                        Comment on above:   1 YR OV/ JONY *   
   
                                        Start: 11-   Medicare Annual   
Wellness Visit                          Medicare Annual Wellness   
Visit (AWV)                             [facility]  
   
                                                    Start: 2024  
End: 2024                         Patient encounter   
procedure                               2024 3:40 PM EST   
Office Visit OB/Gynecology   
[address] [place]   
[place] [address] [phone]   
Jose Delgado  [address]   
[place]   
[address] [phone] (Work)   
[phone] (Fax) Annual               OB/Gynecology  
   
                                        Comment on above:   Annual   
   
                                        Start: 2024   Hemoglobin A1c   
measurement               HbA1C                     [facility]  
   
                                                    Start: 2024  
End: 2024                         Patient encounter   
procedure                               2024 11:15 AM EST   
Office Visit [facility]   
[place] [address]   
[phone] [place]   
[address] [phone] (Work)   
[phone] (Fax) sign   
consents 2nd re basic                   Ophthalmology  
   
                                        Comment on above:   sign consents 2nd re  
 basic   
   
                                                    Start: 2024  
End: 2024                         Patient encounter   
procedure                               2024 9:30 AM EST   
Appointment Mammogram [address]   
[place]   
[address] [phone]   
Encounter for screening   
mammogram for breast   
cancer [Z12.31]                         Mammogram  
   
                                        Comment on above:   Encounter for screen  
ing mammogram for breast cancer [Z12.31]   
   
                                                    Start: 2024  
End: 2024                         Patient encounter   
procedure                               2024 2:00 PM EST   
Office Visit [facility]   
[facility] [address] [facility] [place] [address]   
[phone] Amaya Reis II,  [place] [address]   
[phone] (Work)   
[phone] (Fax) post op   
1 week (Cat surgery - left   
eye 10/31/24)                           Optometry  
   
                                        Comment on above:   post op 1 week (Cat   
surgery - left eye 10/31/24)   
   
                                        Start: 2024   Hepatitis B surface   
antibody level            LDL Cholesterol           [facility]  
   
                          Start: 2024 Lipid panel  Lipid Panel  [facility]  
   
                                                    Start: 2024  
End: 2024                         Patient encounter   
procedure                               2024 9:45 AM EDT   
Office Visit OPHT   
Ophthalmology [address] [place] [address]   
[phone] Ronel Hays MD [address] [place] [address]   
[address] [phone] (Work)   
[phone] (Fax) 1 day   
po 1st le basic                         Ophthalmology  
   
                                        Comment on above:   1 day po 1st le basi  
c   
   
                                                    Start: 10-  
End: 10-                         Xcapsl ctrc rmvl   
insj io lens prosth   
w/o ecp                                 Phacoemulsification   
Cataract with Insertion   
Intraocular Lens Combined   
forms of age-related   
cataract of left eye   
10/31/2024 9:31 AM EDT                  [facility] OR  
   
                                                    Start: 10-  
End: 10-                         Admission to same   
day surgery center                      10/31/2024 9:25 AM EDT -   
10/31/2024 9:40 AM EDT   
Surgery [address] [address] Ronel Hays MD [address] [place] [address]   
[address] [phone] (Work)   
[phone] (Fax) SURGERY   
AT NON-[facility] FACILITY                    [facility]  
   
                                        Comment on above:   [facility]   
FACILITY   
   
                                        Start: 10-   Subsequent hospital   
visit by physician                      10/31/2024 9:25 AM EDT   
Hospital Encounter Ronel Hays MD [address]   
[place]   
[address] [phone] (Work)   
[phone] (Fax)   
Combined forms of   
age-related cataract of   
left eye [H25.812]                      [facility]  
   
                                        Comment on above:   Combined forms of ag  
e-related cataract of left eye [H25.812]   
   
                                                    Start: 10-  
End: 10-                         [facility]   
FACILITY                                SURGERY AT NON-[facility]   
FACILITY Combined forms of   
age-related cataract of   
left eye 10/31/2024 9:25   
AM EDT                                  [facility]  
   
                                                    Start: 10-  
End: 10-                         Patient encounter   
procedure                               10/08/2024 10:15 AM EDT   
Office Visit OPHT   
Ophthalmology [address] [place] [address]   
[phone] Ronel Hays MD [address]   
[place]   
[address] [phone] (Work)   
[phone] (Fax)   
Ascan/Sign Consents                     Ophthalmology  
   
                                        Comment on above:   Ascan/Sign Consents   
   
                                                    Start: 2024  
End: 2024                         Patient encounter   
procedure                               2024 2:30 PM EDT   
Office Visit [facility] Office [facility]   
[address]   
[place] [address]   
[phone] Bernice Vital, APRN-CNP,    
[address],   
[facility] [address] [place] [address]   
[phone] (Work)   
[phone] (Fax)                      [facility]   
Office [facility]  
   
                                                    Start: 2024  
End: 2024                         Patient encounter   
procedure                                           [facility]  
   
                                                    Start: 2024  
End: 2024                         Patient encounter   
procedure                                           [facility]  
   
                                                    Start: 2024  
End: 2024                         Patient encounter   
procedure                               2024 10:00 AM EDT   
Office Visit [facility]   
Medical Office Building   
[address] [place]   
[place] [id number] [phone] Bernice Vital, APRN-CNP,    
[address],   
[address] [place], [place] [id number] [phone] (Work)   
[phone] (Fax)                      [facility]   
Office Building  
   
                                                    Start: 2024  
End: 2024                         Patient encounter   
procedure                                           [facility]  
   
                                                    Start: 2024  
End: 2025                         Basic metabolic 2000   
panel - Serum or   
Plasma                                  Basic Metabolic Panel Lab   
Routine Controlled type 2   
diabetes mellitus without   
complication, without   
long-term current use of   
insulin (Multi) Expected:   
2024 (Approximate),   
Expires: 2025                     [facility]  
Work Phone:   
[phone]  
   
                                        Comment on above:   Expected: 2024  
 (Approximate), Expires: 2025   
   
                                                    Start: 2024  
End: 2025                         Hemoglobin   
A1c/Hemoglobin.total   
in Blood                                Hemoglobin A1C Lab Routine   
Controlled type 2 diabetes   
mellitus without   
complication, without   
long-term current use of   
insulin (Multi) Expected:   
2024 (Approximate),   
Expires: 2025                     [facility]  
Work Phone:   
[phone]  
   
                                        Comment on above:   Expected: 2024  
 (Approximate), Expires: 2025   
   
                                        Start: 2024   COVID-19 Vaccine ( season)                       COVID-19 Vaccine ( season)                         [facility]  
   
                                        Start: 2024   Covid-19 Vaccine ( season)                       Covid-19 Vaccine ( season)                         [facility]  
   
                                        Start: 2024   Influenza   
vaccination               Influenza Vaccine (#1)    [facility]  
   
                                                    Start: 2024  
End: 2025                         NM Heart Perfusion W   
stress and W   
radionuclide IV                         Nuclear Stress Test   
Cardiac Nuclear Medicine   
Routine Abnormal EKG   
Expected: 2024   
(Approximate), Expires:   
2025                              [facility]  
Work Phone:   
[phone]  
   
                                        Comment on above:   Expected: 2024  
 (Approximate), Expires: 2025   
   
                                                    Start: 2024  
End: 2024                         Patient encounter   
procedure                               2024 1:45 PM EDT   
Office Visit [facility]   
Medical Office Building   
[address] [place] [id number] [phone] Bernice Vital, APRN-CNP,    
[address],   
[address] [place] [id number]   
[phone] (Work)   
[phone] (Fax)                      [facility]   
Office [facility]  
   
                                                    Start: 2024  
End: 2025           US Liver limited          US abdomen limited liver   
Imaging Routine Liver   
lesion Expected:   
2024 (Approximate),   
Expires: 2025                     [facility]  
Work Phone:   
[phone]  
   
                                        Comment on above:   Expected: 2024  
 (Approximate), Expires: 2025   
   
                                                    Start: 2024  
End: 2024                         Patient encounter   
procedure                               2024 11:00 AM EDT   
Office Visit [facility] [address] [facility] [address] [place]   
[id number] [phone]   
Sharifa Almaguer, DO [address]   
[facility]   
[place] [address]   
[phone] (Work)   
[phone] (Fax)                      [facility]  
   
                                        Start: 2024   Hemoglobin A1c   
measurement               Diabetes: Hemoglobin A1C  [facility]  
   
                                                    Start: 2024  
End: 2024                         Patient encounter   
procedure                               2024 10:00 AM EDT   
Office Visit OPHT   
Ophthalmology [address] [place] [address]   
[phone] Ronel Hays MD [address]   
[place]   
[address] [phone] (Work)   
[phone] (Fax)   
ascan/consents 1st eye                  Ophthalmology  
   
                                        Comment on above:   ascan/consents 1st e  
ye   
   
                                                    Start: 2024  
End: 2024                         Patient encounter   
procedure                               2024 2:00 PM EDT   
Office Visit OPHT   
Ophthalmology [address] [place] [id number]   
[phone] Ronel Hays MD [address]   
[place]   
[address] [phone] (Work)   
[phone] (Fax) CAT   
EVAL Both eyes/Dr. KIT Reis Ref. (Req PM   
appt)                                   Ophthalmology  
   
                                        Comment on above:   CAT EVAL Both eyes/CHAKA Reis Ref. (Req PM appt)   
   
                                Start: 2024 Glaucoma screening Diabetes: R  
etinopathy   
Screening                               [facility]  
   
                                        Start: 2024   Hepatitis C   
antibody,   
confirmatory test         DILATED RETINAL EXAM      [facility]  
   
                                                    Start: 2024  
End: 2024                         Patient encounter   
procedure                               2024 11:00 AM EDT   
Office Visit [facility] [address]   
[place]   
[id number] [phone]   
Sharifa Almaguer DO    
[facility]   
[place] [address]   
[phone] (Work)   
[phone] (Fax)                      [facility]  
   
                                                    Start: 2024  
End: 2025                         Cobalamin (Vitamin   
B12) [Mass/volume]   
in Serum or Plasma                                  [facility]  
Work Phone:   
[phone]  
   
                                        Comment on above:   Expected: 2024  
 (Approximate), Expires: 2025   
   
                                                    Start: 2024  
End: 2024                         Hemoglobin   
A1c/Hemoglobin.total   
in Blood                                Hemoglobin A1C Lab Routine   
Controlled type 2 diabetes   
mellitus without   
complication, without   
long-term current use of   
insulin (CMS/[facility])   
Expected: 2024   
(Approximate), Expires:   
2024                              [facility]  
Work Phone:   
[phone]  
   
                                        Comment on above:   Expected: 2024  
 (Approximate), Expires: 2024   
   
                                                    Start: 2024  
End: 2025                         Thyrotropin   
[Units/volume] in   
Serum or Plasma                                     [facility]  
Work Phone:   
[phone]  
   
                                        Comment on above:   Expected: 2024  
 (Approximate), Expires: 2025   
   
                                                    Start: 2024  
End: 2024                         Patient encounter   
procedure                               2024 11:00 AM EDT   
Office Visit [facility]    
[place]   
[id number] [phone]   
Sharifa Almaguer DO    
[facility]   
[place] [address]   
[phone] (Work)   
[phone] (Fax)                      [facility]  
   
                                        Start: 2024   Hemoglobin   
A1c/Hemoglobin.total   
in Blood                  HbA1C                     [facility]  
   
                                        Start: 2024   COVID-19 Vaccine (6   
- 2023-24 season)                       COVID-19 Vaccine (6 -   
2023-24 season)                         [facility]  
   
                                        Start: 2024   Covid-19 Vaccine (7   
- 2023-24 season)                       Covid-19 Vaccine (7 -   
2023-24 season)                         [facility]  
   
                                                    Start: 2024  
End: 2024                         Basic metabolic 2000   
panel - Serum or   
Plasma                                  Basic Metabolic Panel Lab   
Routine Secondary   
hypertension Expected:   
2024 (Approximate),   
Expires: 2024                     [facility]  
Work Phone:   
[phone]  
   
                                        Comment on above:   Expected: 2024  
 (Approximate), Expires: 2024   
   
                                        Start: 2024   Hemoglobin A1c   
measurement               Diabetes: Hemoglobin A1C  [facility]  
   
                                        Start: 2024   Advance Directive   
Discussion                              Advance Directive   
Discussion                              [facility]  
   
                                        Start: 2024   Behavioral Health   
Screening                               Behavioral Health   
Screening                               [facility]  
   
                                        Start: 2023   COVID-19 Vaccine (4   
- Booster for   
Lamont series)                         COVID-19 Vaccine (4 -   
Booster for Lamont   
series)                                 [facility]  
   
                                                    Start: 10-  
End: 10-                         Comprehensive   
metabolic 2000 panel   
- Serum or Plasma                       Comprehensive Metabolic   
Panel Lab Routine   
Controlled type 2 diabetes   
mellitus without   
complication, without   
long-term current use of   
insulin (CMS/[facility])   
Expected: 10/31/2023   
(Approximate), Expires:   
10/31/2024                              [facility]  
Work Phone:   
[phone]  
   
                                        Comment on above:   Expected: 10/31/2023  
 (Approximate), Expires: 10/31/2024   
   
                                                    Start: 10-  
End: 10-                         Hemoglobin   
A1c/Hemoglobin.total   
in Blood                                Hemoglobin A1C Lab Routine   
Controlled type 2 diabetes   
mellitus without   
complication, without   
long-term current use of   
insulin (CMS/HCC)   
Expected: 10/31/2023   
(Approximate), Expires:   
10/31/2024                              [facility]  
Work Phone:   
[phone]  
   
                                        Comment on above:   Expected: 10/31/2023  
 (Approximate), Expires: 10/31/2024   
   
                                                    Start: 10-  
End: 10-                         Lipid 1996 panel -   
Serum or Plasma                         Lipid panel Lab Routine   
Mixed hyperlipidemia   
Expected: 10/31/2023   
(Approximate), Expires:   
10/31/2024                              [facility]  
Work Phone:   
[phone]  
   
                                        Comment on above:   Expected: 10/31/2023  
 (Approximate), Expires: 10/31/2024   
   
                                                    Start: 10-  
End: 10-                         Microalbumin/Creatin  
ine [Mass Ratio] in   
Urine                                   Albumin , Urine Random Lab   
Routine Controlled type 2   
diabetes mellitus without   
complication, without   
long-term current use of   
insulin (CMS/HCC)   
Expected: 10/31/2023   
(Approximate), Expires:   
10/31/2024                              [facility]  
Work Phone:   
[phone]  
   
                                        Comment on above:   Expected: 10/31/2023  
 (Approximate), Expires: 10/31/2024   
   
                                        Start: 2023   Covid-19 Vaccine (5   
- 2023-24 season)                       Covid-19 Vaccine (5 -   
2023-24 season)                         [facility]  
   
                                        Start: 2023   Hepatitis C   
antibody,   
confirmatory test         DILATED RETINAL EXAM      [facility]  
   
                                        Start: 2023   ADVANCE DIRECTIVE   
DISCUSSION                              ADVANCE DIRECTIVE   
DISCUSSION                              [facility]  
   
                                        Start: 2023   DEPRESSION   
ASSESSMENT                DEPRESSION ASSESSMENT     [facility]  
   
                                        Start: 2022   ADVANCE DIRECTIVE   
DISCUSSION                              ADVANCE DIRECTIVE   
DISCUSSION                              [facility]  
   
                                        Start: 2022   DEPRESSION   
ASSESSMENT                DEPRESSION ASSESSMENT     [facility]  
   
                                        Start: 10-   PNEUMOCOCCAL: 65+ (2  
   
- PCV)                                  PNEUMOCOCCAL: 65+ (2 -   
PCV)                                    [facility]  
   
                                        Start: 2006   Hepatitis B Vaccine   
(1 of 3 - Risk   
3-dose series)                          Hepatitis B Vaccine (1 of   
3 - Risk 3-dose series)                 [facility]  
   
                                        Start: 2006   Hepatitis B Vaccines  
   
(1 of 3 - Risk   
3-dose series)                          Hepatitis B Vaccines (1 of   
3 - Risk 3-dose series)                 [facility]  
   
                                        Start: 2006   RSV Vaccine (1 -   
1-dose 60+ series)                      RSV Vaccine (1 - 1-dose   
60+ series)                             [facility]  
   
                                        Start: 1996   SHINGRIX VACCINE (1   
of 2)                     SHINGRIX VACCINE (1 of 2) [facility]  
   
                                        Start: 1968   DTaP/Tdap/Td   
Vaccines (1 - Tdap)                     DTaP/Tdap/Td Vaccines (1 -   
Tdap)                                   [facility]  
   
                                        Start: 1965   Hepatitis A Vaccines  
   
(1 of 2 - Risk   
2-dose series)                          Hepatitis A Vaccines (1 of   
2 - Risk 2-dose series)                 [facility]  
   
                                        Start: 1965   Urine microalbumin   
profile                                             [facility]  
   
                                        Start: 1965   Urine screening for   
protein                                 Diabetes: Urine Protein   
Screening                               [facility]  
   
                                        Start: 1964   ANNUAL PCP TEAM   
CHRONIC DISEASE   
VISIT                                   ANNUAL PCP TEAM CHRONIC   
DISEASE VISIT                           [facility]  
   
                          Start: 1964 Anxiety Screening Anxiety Screening   
[facility]  
   
                          Start: 1964 Depression Screening Depression Scre  
ening [facility]  
   
                                        Start: 1964   Hepatitis B surface   
antibody level            LDL CHOLESTEROL           [facility]  
   
                                        Start: 1964   Hepatitis C   
screening                 Hepatitis C Screening     [facility]  
   
                                        Start: 1956   3 comp foot exam   
completed                 DIABETIC FOOT EXAM        [facility]  
   
                                        Start: 1956   Diabetic foot   
examination                                         [facility]  
   
                                Start: 1956 Glaucoma screening Diabetes: R  
etinopathy   
Screening                               [facility]  
   
                                        Start: 1956   Hepatitis B   
screening                               URINE ALBUMIN:CREATININE   
RATIO                                   [facility]  
   
                                        Start: 1951   Hemoglobin   
A1c/Hemoglobin.total   
in Blood                  HBA1C                     [facility]  
   
                                        Start: 1946   Annual wellness   
visit                                   Medicare Initial Physical   
(IPPE)                                  [facility]  
   
                                        Start: 1946   Hemoglobin A1c   
measurement               Diabetes: Hemoglobin A1C  [facility]  
   
                          Start: 1946 Lipid panel  Lipid Panel  [facility]  
   
                                        Start: 1946   Medicare Annual   
Wellness Visit                          Medicare Annual Wellness   
Visit (AWV)                             [facility]  
   
                                        Start: 1946   Screening for   
osteoporosis              Bone Density Scan         [facility]  
   
                                                      
End: 2024                         Bacteria identified   
in Urine by Culture                                 [facility]  
Work Phone:   
[phone]  
   
                                        Comment on above:   Once (Lab) for 1 Occ  
urrences starting 2024 until   
2024   
   
                                                      
End: 2024                         CT Abdomen and   
Pelvis W contrast IV                                [facility]  
Work Phone:   
[phone]  
   
                                        Comment on above:   Once for 1 Occurrenc  
es starting 2024 until 2024   
   
                                                            DBT Breast -   
bilateral screening                     JONY SCREENING W HUGO   
Radiology Routine   
Encounter for screening   
mammogram for breast   
cancer 2024 10:03 AM   
EST                                     [facility]  
Work Phone:   
[phone]  
   
                                                      
End: 2025                         DBT Breast -   
bilateral screening                     JONY SCREENING W HUGO   
Radiology Routine   
Encounter for   
gynecological examination   
(general) (routine)   
without abnormal findings   
Encounter for screening   
mammogram for breast   
cancer 1 Occurrences   
starting 2024 until   
2025                              [facility]  
Work Phone:   
[phone]  
   
                                        Comment on above:   1 Occurrences starti  
ng 2024 until 2025   
   
                                                      
End: 2025                         DBT Breast -   
bilateral screening                     JONY SCREENING W HUGO   
Radiology Routine   
Encounter for screening   
mammogram for breast   
cancer 1 Occurrences   
starting 2024 until   
2025                              [facility]  
Work Phone:   
[phone]  
   
                                        Comment on above:   1 Occurrences starti  
ng 2024 until 2025   
   
                                                      
End: 2024     ECG 12 lead                             [facility]  
Work Phone:   
[phone]  
   
                                        Comment on above:   Once for 1 Occurrenc  
es starting 2024 until 2024   
   
                                                      
End: 2024     ECG 12 Lead                             [facility]  
Work Phone:   
[phone]  
   
                                        Comment on above:   Once for 1 Occurrenc  
es starting 2024 until 2024   
   
                                                      
End: 2024                         Extra Urine Gray   
Tube                                                [facility]  
Work Phone:   
[phone]  
   
                                        Comment on above:   Once for 1 Occurrenc  
es starting 2024 until 2024   
   
                                                      
End: 10-                         Glucose   
[Mass/volume] in   
Serum or Plasma                         POCT Glucose Point of Care   
Testing - Docked Device   
Routine Once (Lab) for 1   
Occurrences starting   
10/31/2024 until   
10/31/2024                              [facility]  
Work Phone:   
[phone]  
   
                                        Comment on above:   Once (Lab) for 1 Occ  
urrences starting 10/31/2024 until   
10/31/2024   
   
                                                      
End: 2024                         Glucose   
[Mass/volume] in   
Serum or Plasma                         POCT Glucose Point of Care   
Testing - Docked Device   
Routine Once (Lab) for 1   
Occurrences starting   
2024 until   
2024                              [facility]  
Work Phone:   
[phone]  
   
                                        Comment on above:   Once (Lab) for 1 Occ  
urrences starting 2024 until   
2024   
   
                                                            PAP FLUID VAGINAL   
VAULT SCREENING                         PAP FLUID VAGINAL VAULT   
SCREENING Lab Routine   
Encounter for screening   
for malignant neoplasm of   
vagina 2022 11:34 AM   
EST                                     [facility]  
Work Phone:   
[phone]  
   
                                                      
End: 2024                         Urinalysis complete   
W Reflex Culture   
panel - Urine                                       [facility]  
Work Phone:   
[phone]  
   
                                        Comment on above:   Once (Lab) for 1 Occ  
urrences starting 2024 until   
2024   
   
                                                      
End: 2024     US Liver limited                        [facility]  
Work Phone:   
[phone]  
   
                                        Comment on above:   Once for 1 Occurrenc  
es starting 2024 until 2024   
   
                                                                  [facility]   
[facility]  
Work Phone:   
[phone]  
   
                                                                 [facility]  
c  
   
                                                                 [facility]  
c  
   
                                                    NEGATED:   
Highlighted row has   
been ruled out!                                     Planned Goals not   
documented                               Rehab   
[facility]  
Work Phone:   
[phone]  
  
  
  
Immunizations  
  
  
                      Immunization Date Immunization Notes      Care Provider Araseli card  
   
                                        10-          influenza virus   
vaccine, unspecified   
formulation                                         Ronel Hays MD  
Work Phone:   
[phone]                          [facility]  
   
                                        10-          influenza virus   
vaccine, unspecified   
formulation                                         Mammography   
Coordinator                             [facility]  
Work Phone:   
[phone]  
   
                                        10-          pneumococcal   
polysaccharide vaccine,   
23 valent                                           Mammography   
Coordinator                             [facility]  
Work Phone:   
[phone]  
  
  
  
Payers  
  
  
                          Date         Payer Category Payer        Policy ID  
   
                                2024      Medicare (Managed Care) [employer]  
[employer]   
Member Subscriber Plan /   
Payer (Effective   
2024-Present) Name:   
Marzena Mederos Member   
ID: [id number] Relation   
to Subscriber: Self Name:   
Marzena Mederos   
Subscriber ID:   
[id number] Payer ID: [id number]   
(NAIC) Group ID: [id number]   
Type: Not on file Address:   
[address] [place] [address]                                   [id number]  
[id number]  
   
                          2024   Medicare                  [id number]  
   
                          10-   Medicare                  [id number]  
   
                          2015   Unknown                   [id number].  
[id number]  
[id number]  
   
                          2014   Medicare                  [id number]  
   
                          2011   Medicare                  [id number].  
[id number]  
[id number]  
   
                          1946   Unknown                   [id number]   
[id number].  
2.278  
   
                          1945   Unknown                   [id number]   
[id number].  
2.278  
   
                          1945   Unknown                   [id number]   
[id number].  
2.278  
   
                          1945   Unknown                   [id number]   
[id number].  
2.278  
   
                          1945   Unknown                   [id number]   
[id number].  
2.278  
   
                          1945   Unknown                   [id number]   
[id number].  
2.278  
   
                          1945   Unknown                   [id number]   
[id number].  
2.278  
   
                          1945   Unknown                   [id number]   
[id number].  
2.278  
   
                          1945   Unknown                   [id number]   
[id number].  
2.278  
   
                          1945   Unknown                   [id number]   
[id number].  
2.278  
   
                          1945   Unknown                   [id number]   
[id number].  
2.1946   Unknown                   [id number]   
[id number].  
2.1946   Unknown                   [id number]   
2.16840.1.268464.3.579.  
2.1946   Unknown                   [id number]   
2.840.1.574153.3.579.  
2.1946   Unknown                   [id number]   
2.16840.1.883686.3.579.  
2.1946   Unknown                   [id number]   
2.840.1.359367.3.579.  
2.1946   Unknown                   [id number]   
2.840.1.231166.3.579.  
2.1946   Unknown                   [id number]   
2.16840.1.602150.3.579.  
2.1946   Unknown                   [id number]   
2.16840.1.347681.3.579.  
2.1946   Unknown                   [id number]   
2.16840.1.181929.3.579.  
2.1946   Unknown                   [id number]   
[id number].  
2.1946   Unknown                   [id number]   
[id number].  
2.4  
   
                          1945   Unknown                   [id number]   
[id number].  
2.1946   Unknown                   [id number]   
[id number].  
2.1946   Unknown                   [id number]   
2.16.840.1.477575.3.579.  
2.1946   Unknown                   [id number]   
2.16.840.1.996390.3.579.  
2.1946   Unknown                   [id number]   
2.16.840.1.065033.3.579.  
2.1946   Unknown                   [id number]   
2.16.840.1.880116.3.579.  
2.1946   Unknown                   [id number]   
2.16.840.1.977238.3.579.  
2.1946   Unknown                   [id number]   
2.16.840.1.741655.3.579.  
2.1946   Unknown                   [id number]   
2.16.840.1.242104.3.579.  
2.1946   Unknown                   [id number]   
2.16.840.1.358907.3.579.  
2.1946   Unknown                   [id number]   
2.16.840.1.713042.3.579.  
2.1946   Unknown                   [id number]   
2.16.840.1.659975.3.579.  
2.1946   Unknown                   [id number]   
2.16.840.1.578557.3.579.  
2.1946   Unknown                   [id number]   
2.16.840.1.246374.3.579.  
2.1946   Unknown                   [id number]   
[id number].  
2.1946   Unknown                   [id number]   
[id number].  
2.1243  
   
                                       Medicare                  [id number]  
  
  
  
Social History  
  
  
                          Date         Type         Detail       Facility  
   
                                                Assertion       Tobacco smoking   
consumption unknown   
(finding)                                [facility]   
[facility]  
Work Phone:   
[phone]  
   
                                                    Start:   
2011  
End: 2022                         Tobacco smoking status   
NHIS                      Never smoked tobacco      [facility]  
Work Phone:   
[phone]  
   
                                                    Start:   
2011  
End: 2022                         Tobacco use and   
exposure                                Smokeless tobacco   
non-user                                [facility]  
Work Phone:   
[phone]  
   
                                                    Start:   
2022  
End: 01-           Alcohol intake            Current drinker of   
alcohol (finding)                       [facility]  
   
                                                    Start:   
2010          Alcohol Comment     1-2 weekly          [facility]  
   
                                                    Start:   
1946          Sex Assigned At Birth Female              [facility]  
   
                                                    Start:   
2022  
End: 2024                         Exposure to SARS-CoV-2   
(event)                   Not sure                  [facility]  
Work Phone:   
[phone]  
   
                                                    Start:   
2023  
End: 10-                         History of Social   
function                                            [facility]  
   
                                                    Start:   
2023  
End: 10-     Tobacco use panel                       [facility]  
   
                                                            Adult Depression   
Screening Assessment      0                         [facility]  
   
                                                    Start:   
2017                Gender identity           Identifies as female   
gender (finding)                        [facility]  
   
                                                    Start:   
2017          Sexual orientation  Heterosexual (finding) [facility]  
   
                                                    Start:   
10-          Alcohol Comment     [facility]  
Work Phone:   
[phone]  
   
                                                    Start:   
1945          Sex Assigned At Birth Not on file         [facility]  
Work Phone:   
[phone]  
   
                                                    Start:   
2024          Alcohol Comment     [facility]  
Work Phone:   
[phone]  
  
  
  
Medical Equipment  
  
  
                                Procedure Code  Equipment Code  Equipment Origin  
al   
Text                                    Equipment   
Identifier                              Dates  
   
                                                                One-Of-A-Kind   
Implant -   
[id number]                 [id number]                Start:   
10-  
   
                                        Comment on above:   Description: MICRO M  
ARK II TISSUE MARKER   
   
                                                                 [id number],   
[id number]                               Start:   
2017  
End:   
2022  
   
                                                    Clareon Iol  ()[phone]  
[phone],   
197948_[facility] FDA                          Start:   
10-  
   
                                            Clareon Iol 22.0d [id number] Start:   
2024  
  
  
  
Functional Status  
  
  
                          Date         Assessment   Result       Facility  
   
                                                    NEGATED: Highlighted   
row                       Functional performance    Functional status   
health issues are   
not documented   
Disease                                  Rehab   
[facility]  
Work Phone:   
[phone]  
  
  
  
Mental Status  
  
  
                          Date         Assessment   Result       Facility  
   
                                                    NEGATED: Highlighted   
row                                     Cognitive function   
[Interpretation]                        Cognitive status   
health issues are not   
documented Disease                       Rehab   
[facility]  
Work Phone:   
[phone]  
  
  
  
Clinical Notes 2006 to 01-  
  Patient InstructionsCoAmaya lai II, OD - 01/15/2025 2:16 PM Ronel Castaneda MD - 2024 4:17 PM ESTPatient InstructionsRonel Hays MD
 - 2024 2:07 PM ESTAttachments  
  
                                Note Date & Type Note            Facility  
   
                                        01- Instructions   
  
  
Amaya Reis II, OD -   
01/15/2025 2:17 PM ESTFormatting   
of this note might be different   
from the original.  
Assessment and Plan  
  
Z98.41, Z96.1 Status post cataract   
extraction and insertion of   
intraocular lens of right eye   
(primary encounter diagnosis)  
Z98.42, Z96.1 Status post cataract   
extraction and insertion of   
intraocular lens of left eye  
Comment: Healing well both eyes.   
Posterior chamber intraocular   
lenses are well positioned and   
clear. Patient happy using OTC   
readers as needed with no   
correction for distance.  
  
I have confirmed and edited as   
necessary the relevant HPI,   
ophthalmic history, ROS, and the   
neuro exam findings as obtained by   
others. I have seen and examined   
Marzena Mederos.  
I have discussed the case and the   
management of this patient's care   
with the Resident/Fellow, if   
applicable. I also have reviewed   
and agree with the assessment and   
plan as stated above and agree   
with all of its relevant   
components.  
  
  
  
  
  
Electronically signed by   
Amaya Reis II, OD at   
01/15/2025 2:17 PM EST  
  
documented in this encounter            [facility]  
   
                                                    01- History of   
Present illness Narrative               Formatting of this note might be   
different from the original.  
Assessment and Plan  
  
Z98.41, Z96.1 Status post cataract   
extraction and insertion of   
intraocular lens of right eye   
(primary encounter diagnosis)  
Z98.42, Z96.1 Status post cataract   
extraction and insertion of   
intraocular lens of left eye  
Comment: Healing well both eyes.   
Posterior chamber intraocular   
lenses are well positioned and   
clear. Patient happy using OTC   
readers as needed with no   
correction for distance.  
  
I have confirmed and edited as   
necessary the relevant HPI,   
ophthalmic history, ROS, and the   
neuro exam findings as obtained by   
others. I have seen and examined   
Marzena Mederos.  
I have discussed the case and the   
management of this patient's care   
with the Resident/Fellow, if   
applicable. I also have reviewed   
and agree with the assessment and   
plan as stated above and agree   
with all of its relevant   
components.  
  
  
  
Electronically signed by   
Amaya Reis II, OD at   
01/15/2025 2:17 PM EST  
documented in this encounter            [facility]  
   
                                        2024 Note     HNO ID: [id number]  
Author: RONEL HAYS MD  
Service: ?  
Author Type: Physician  
Type: Progress Notes  
Filed: 2024 16:21  
Note Text:  
ASSESSMENT/PLAN:  
1. Status post cataract extraction   
and insertion of intraocular lens   
of right  
eye - ICD9: V45.61, V43.1, ICD10:   
Z98.41, Z96.1 (primary diagnosis)  
2. Status post cataract extraction   
and insertion of intraocular lens   
of left eye  
- ICD9: V45.61, V43.1, ICD10:   
Z98.42, Z96.1  
S/p cataract surgery right eye   
2024  
S/p cataract surgery left eye   
10/31/2024  
- Use medication as directed  
Current Ophthalmic Meds  
keTORolac (ACULAR) 0.5 %   
ophthalmic solution  
Use 1 Drop in the left eye three   
times a day until 24 then   
stop  
prednisoLONE acetate (PRED FORTE)   
1 % ophthalmic suspension  
Use 1 Drop in the left eye three   
times a day until 24 then   
stop  
keTORolac (ACULAR) 0.5 %   
ophthalmic solution  
Use 1 Drop in the right eye four   
times daily for 7 days, then three   
times daily  
until 2024  
prednisoLONE acetate (PRED FORTE)   
1 % ophthalmic suspension Use 1   
Drop in the  
right eye four times daily for 7   
days, then three times daily until   
2024  
Systane Complete Artificial Tears   
- Use 1 Drop into both eyes three   
times a day.  
Follow up with Dr. Reis in   
5 to 6 weeks  
I have confirmed and edited as   
necessary the relevant HPI,   
ophthalmic history,  
ROS, and the neuro exam findings   
as obtained by others. I have seen   
and  
examined Marzena Mederos.  
I have discussed the case and the   
management of this patient's care   
with the  
Resident/Fellow, if applicable. I   
also have reviewed and agree with   
the  
assessment and plan as stated   
above and agree with all of its   
relevant  
components.                             [facility]  
   
                                                    2024 History of   
Present illness Narrative               Formatting of this note is   
different from the original.  
ASSESSMENT/PLAN:  
1. Status post cataract extraction   
and insertion of intraocular lens   
of right eye - ICD9: V45.61,   
V43.1, ICD10: Z98.41, Z96.1   
(primary diagnosis)  
2. Status post cataract extraction   
and insertion of intraocular lens   
of left eye - ICD9: V45.61, V43.1,   
ICD10: Z98.42, Z96.1  
  
S/p cataract surgery right eye   
2024  
S/p cataract surgery left eye   
10/31/2024  
  
- Use medication as directed  
Current Ophthalmic Meds  
  
  
  
keTORolac (ACULAR) 0.5 %   
ophthalmic solution  
Use 1 Drop in the left eye three   
times a day until 24 then   
stop  
prednisoLONE acetate (PRED FORTE)   
1 % ophthalmic suspension  
Use 1 Drop in the left eye three   
times a day until 24 then   
stop  
keTORolac (ACULAR) 0.5 %   
ophthalmic solution  
Use 1 Drop in the right eye four   
times daily for 7 days, then three   
times daily until 2024  
prednisoLONE acetate (PRED FORTE)   
1 % ophthalmic suspension Use 1   
Drop in the right eye four times   
daily for 7 days, then three times   
daily until 2024  
  
Systane Complete Artificial Tears   
- Use 1 Drop into both eyes three   
times a day.  
  
Follow up with Dr. Reis in   
5 to 6 weeks  
  
I have confirmed and edited as   
necessary the relevant HPI,   
ophthalmic history, ROS, and the   
neuro exam findings as obtained by   
others. I have seen and examined   
Marzena Mederos.  
I have discussed the case and the   
management of this patient's care   
with the Resident/Fellow, if   
applicable. I also have reviewed   
and agree with the assessment and   
plan as stated above and agree   
with all of its relevant   
components.  
  
  
  
Electronically signed by Ronel Hays MD at 2024 4:21   
PM EST  
documented in this encounter            [facility]  
   
                                        2024 Instructions   
  
  
Ronel Hays MD - 2024   
2:09 PM ESTFormatting of this note   
is different from the original.  
- Use medication as directed  
Current Ophthalmic Meds  
  
  
  
keTORolac (ACULAR) 0.5 %   
ophthalmic solution  
Use 1 Drop in the left eye three   
times a day until 24 then   
stop  
prednisoLONE acetate (PRED FORTE)   
1 % ophthalmic suspension  
Use 1 Drop in the left eye three   
times a day until 24 then   
stop  
keTORolac (ACULAR) 0.5 %   
ophthalmic solution  
Use 1 Drop in the right eye four   
times daily.  
prednisoLONE acetate (PRED FORTE)   
1 % ophthalmic suspension Use 1   
Drop in the right eye four times   
daily.  
  
Systane Complete Artificial Tears   
- Use 1 Drop into both eyes three   
times a day.  
  
If you have any questions please   
contact our office at   
[phone].  
After office hours or on the   
weekend, please call Dr. Hays on   
his cell phone at [phone].  
  
Electronically signed by Ronel Hays MD at 2024 2:09   
PM EST  
  
documented in this encounter            [facility]  
   
                                        2024 Note     HNO ID: [id number]  
Author: RONEL HAYS MD  
Service: ?  
Author Type: Physician  
Type: Progress Notes  
Filed: 2024 14:14  
Note Text:  
ASSESSMENT/PLAN:  
1. Status post cataract extraction   
and insertion of intraocular lens   
of right  
eye - ICD9: V45.61, V43.1, ICD10:   
Z98.41, Z96.1 (primary diagnosis)  
2. Status post cataract extraction   
and insertion of intraocular lens   
of left eye  
- ICD9: V45.61, V43.1, ICD10:   
Z98.42, Z96.1  
- Use medication as directed  
Current Ophthalmic Meds  
keTORolac (ACULAR) 0.5 %   
ophthalmic solution  
Use 1 Drop in the left eye three   
times a day until 24 then   
stop  
prednisoLONE acetate (PRED FORTE)   
1 % ophthalmic suspension  
Use 1 Drop in the left eye three   
times a day until 24 then   
stop  
keTORolac (ACULAR) 0.5 %   
ophthalmic solution  
Use 1 Drop in the right eye four   
times daily.  
prednisoLONE acetate (PRED FORTE)   
1 % ophthalmic suspension Use 1   
Drop in the  
right eye four times daily.  
Systane Complete Artificial Tears   
- Use 1 Drop into both eyes three   
times a day.  
I have confirmed and edited as   
necessary the relevant HPI,   
ophthalmic history,  
ROS, and the neuro exam findings   
as obtained by others. I have seen   
and  
examined Marzena Mederos.  
I have discussed the case and the   
management of this patient's care   
with the  
Resident/Fellow, if applicable. I   
also have reviewed and agree with   
the  
assessment and plan as stated   
above and agree with all of its   
relevant  
components.                             [facility]  
   
                                                    2024 History of   
Present illness Narrative               Formatting of this note is   
different from the original.  
ASSESSMENT/PLAN:  
1. Status post cataract extraction   
and insertion of intraocular lens   
of right eye - ICD9: V45.61,   
V43.1, ICD10: Z98.41, Z96.1   
(primary diagnosis)  
2. Status post cataract extraction   
and insertion of intraocular lens   
of left eye - ICD9: V45.61, V43.1,   
ICD10: Z98.42, Z96.1  
- Use medication as directed  
Current Ophthalmic Meds  
  
  
  
keTORolac (ACULAR) 0.5 %   
ophthalmic solution  
Use 1 Drop in the left eye three   
times a day until 24 then   
stop  
prednisoLONE acetate (PRED FORTE)   
1 % ophthalmic suspension  
Use 1 Drop in the left eye three   
times a day until 24 then   
stop  
keTORolac (ACULAR) 0.5 %   
ophthalmic solution  
Use 1 Drop in the right eye four   
times daily.  
prednisoLONE acetate (PRED FORTE)   
1 % ophthalmic suspension Use 1   
Drop in the right eye four times   
daily.  
  
Systane Complete Artificial Tears   
- Use 1 Drop into both eyes three   
times a day.  
  
I have confirmed and edited as   
necessary the relevant HPI,   
ophthalmic history, ROS, and the   
neuro exam findings as obtained by   
others. I have seen and examined   
Marzena Mederos.  
I have discussed the case and the   
management of this patient's care   
with the Resident/Fellow, if   
applicable. I also have reviewed   
and agree with the assessment and   
plan as stated above and agree   
with all of its relevant   
components.  
  
  
  
Electronically signed by Ronel Hays MD at 2024 2:14   
PM EST  
documented in this encounter            [facility]  
   
                                                    2024 Hospital   
Discharge instructions                    
  
  
Ronel Hays MD - 2024   
11:48 AM ESTFormatting of this   
note might be different from the   
original.  
Please see enclosed instructions   
from Dr. Hays regarding eye drop   
schedule, restrictions and use of   
eye shield.  
  
Please take bag with eye drops   
that were given to you today as   
well as ALL eye drops that you are   
using at home with you to your   
appointment tomorrow at Dr. Hays's office.  
  
Electronically signed by Ronel Hays MD at 2024 11:48 AM   
EST  
  
documented in this encounter            [facility]  
Work Phone:   
[phone]  
   
                                                    2024 Attending   
History and physical note               Formatting of this note might be   
different from the original.  
H&P reviewed. The patient was   
examined and there are no changes   
to the H&P.  
Electronically signed by Ronel Hays MD at 2024 10:15 AM   
EST  
  
Source Note - Ronel Hays MD   
- 2024 10:15 AM EST  
Formatting of this note is   
different from the original.  
H&P Notes  
- documented in this encounter  
Ronel Hays MD - 2024   
11:26 AM EST  
Formatting of this note is   
different from the original.  
HISTORY AND PHYSICAL EXAMINATION  
  
SERVICE DATE: 2024  
SERVICE TIME: 11:26 AM  
  
PRIMARY CARE PHYSICIAN: Sharifa Almaguer DO  
  
REASON FOR VISIT: Marzena Mederos   
is a 79 year old female who is   
being seen for Combined   
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  
  
The patient has the following:  
ACTIVE PROBLEM LIST  
Postmenopausal Atrophic Vaginitis  
Unspecified Transient Cerebral   
Ischemia  
Carcinoma in Situ of Vulva  
Malignant Neoplasm of Female   
Breast (Hcc)  
Personal History of Malignant   
Neoplasm of Breast  
Senile Nuclear Sclerosis  
Hypermetropia  
Regular Astigmatism  
Presbyopia  
Invasive Ductal Carcinoma of   
Breast, Female (Hcc)  
Ocular Migraine  
Controlled Type 2 Diabetes   
Mellitus Without Complication,   
Without Long-Term Current Use of   
Insulin (Hcc)  
Postmenopausal Bleeding  
S/P Hysterectomy With Oophorectomy  
  
SUBJECTIVE  
CHIEF COMPLAINT: Blurred vision   
for distance and near Right Eye.  
  
PAST MEDICAL HISTORY  
Diagnosis Date  
Breast cancer (HCC)   
invasive ductal carcinoma,   
completed radiation 3/7/11  
Cancer of endometrium (HCC)  
Carcinoma in situ, vulva   
Cellulitis  
of right eye muscle  
Diabetes mellitus without mention   
of complication  
Diabetes mellitus, type II (HCC)  
Diaphragmatic hernia without   
mention of obstruction or gangrene  
Hiatal hernia  
Dyspareunia  
Elevated cholesterol  
Esophageal reflux  
Insomnia, unspecified  
Orbital cellulitis  
Other corneal disorder  
Rt eye corneal erosion .  
PMH - PAST MEDICAL HISTORY OF  
?VESTIBULITIS  
PMH - PAST MEDICAL HISTORY OF  
CLIMACTERIC  
PMH - PAST MEDICAL HISTORY OF   
2006  
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  
Pure hypercholesterolemia  
Rib fracture  
6th Rib  
Senile dementia (HCC)  
Stomatitis and mucositis   
(ulcerative)  
Chronic ulcerative stomatitis on   
mucosal biopsy  
Unspecified essential hypertension  
  
PAST SURGICAL HISTORY  
Procedure Laterality Date  
Bunions bilateral feet removed   
10/2002  
BX/EXC LYMPH NODE OPEN DEEP   
AXILLARY NODE 2010  
RIGHT BREAST LUMP W/ SLN BX  
CHOLECYSTECTOMY 2004  
Cholecystectomy  
COLONOSCOPY FLX DX W/COLLJ SPEC   
WHEN PFRMD   
Colonoscopy  
COLPOSCOPY CERVIX UPPER/ADJACENT   
VAGINA 2009  
Colposcopy of the vulva  
COLPOSCOPY, VULVA 10/2009  
CORRECT BUNION,SIMPLE  
Bunion  
DILATION & CURETTAGE DX&/THER   
NONOBSTETRIC 1970's  
SAB  
HYSTERECTOMY 2017  
LAPAROSCOPY SURG CHOLECYSTECTOMY   
2004  
Cholecystectomy, lap  
MAMMO STEREOTACTIC CORE BIOPSY RT   
10/10/2013  
MASTECTOMY, PARTIAL 2010  
RIGHT BREAST  
PAST SURGICAL HISTORY OF 2006  
partial vulvectomy/sq. cell ca in   
situ  
PAST SURGICAL HISTORY OF   
right eye cornea  
PAST SURGICAL HISTORY OF   
right eye revision  
PAST SURGICAL HISTORY OF  
squamous cell carcinoma right arm  
REMV CATARACT EXTRACAP,INSERT LENS   
10/31/2024  
CCA0T0 - +19.5D  
SALPINGO-OOPHORECTOMY COMPL/PRTL   
UNI/BI SPX 2017  
Toenail Big Toe Left Foot removed   
10/2002  
TONSILLECTOMY PRIMARY/SECONDARY   
Tonsillectomy  
UNLISTED PROCEDURE HANDS/FINGERS   
2012  
CMC Arthoplasty on right hand  
  
FAMILY HISTORY  
Problem Relation Age of Onset  
Stroke Mother  
Diabetes Mother  
GI Father  
 from complications of gb   
surgery  
Arthritis Sister  
Systemic Lupus  
Cancer Maternal Uncle  
LUNG  
Cancer Other  
cousin with breast cancer/cousin   
with bladder ca.  
Stroke Sister  
  
SOCIAL HISTORY:  
Social History  
  
Tobacco Use  
Smoking status: Never  
Smokeless tobacco: Never  
Vaping Use  
Vaping status: Never Used  
Substance Use Topics  
Alcohol use: Yes  
Comment: 1-2 weekly  
Drug use: No  
  
MEDICATIONS:  
Prior to Admission medications as   
of 24 1120  
Medication Sig Last Dose Taking  
glipiZIDE (GLUCOTROL XL) 2.5 mg 24   
hr tablet Take 2.5 mg by mouth.   
Yes  
fluorometholone (FML LIQUID FILM)   
0.1 % ophthalmic suspension Use 1   
Drop in the right eye three times   
a day. Yes  
donepezil (ARICEPT) 10 mg tablet   
Take 10 mg by mouth. Yes  
cholecalciferol (VITAMIN D3) 50   
mcg (2,000 unit) tablet Take 2,000   
Units by mouth once daily. Yes  
losartan-hydroCHLOROthiazide   
(HYZAAR) 100-12.5 mg per tablet   
Take 1 tablet by mouth once daily.   
Yes  
omeprazole (PRILOSEC) 20 mg   
capsule Take 20 mg by mouth once   
daily. Yes  
potassium chloride (KLOR-CON 10)   
10 mEq tablet Take 10 mEq by mouth   
once daily.  
Yes  
metFORMIN ER (GLUCOPHAGE XR) 500   
mg 24 hr tablet Take two tablets   
by mouth once daily. Yes  
simvastatin(ZOCOR 20 MG TAB) Take   
one(1) tablet daily. Yes  
keTORolac (ACULAR) 0.5 %   
ophthalmic solution Use 1 Drop in   
the left eye three times a day.  
prednisoLONE acetate (PRED FORTE)   
1 % ophthalmic suspension Use 1   
Drop in the left eye three times a   
day.  
amLODIPine (NORVASC) 10 mg tablet   
Take 10 mg by mouth.  
  
No medication comments found.  
  
CURRENT ALLERGIES:  
ALLERGIES  
Allergen Reactions  
Adhesive Rash  
Redness  
Ambien [Zolpidem Ta* Rash  
Codeine Mental Status Change  
Neosporin [Neomycin* Rash  
Percocet [Oxycodone*  
Causes Vomitting  
  
REVIEW OF SYSTEMS:  
PAIN ASSESSMENT:  
General: No weight loss, malaise   
or fevers.  
Neuro: Dementia  
Respiratory: No history of current   
cough or dyspnea, or pneumonia in   
the past 6 weeks. No history of   
respiratory/pulmonary symptoms or   
problems  
Cardiovascular: Positive for:   
Hypertension, PVC  
GI: No history of GI symptoms or   
problems. No history of esophageal   
varices, recent ascites, or ETOH   
greater than 2 drinks per day.  
: No history of UTI in past 6   
weeks. No history of renal   
failure. Not currently on or   
requiring dialysis. No history of   
 symptoms or problems.  
GYN: Negative for abnormal vaginal   
bleeding, abnormal vaginal   
discharge.  
Pregnancy: Denies  
Endocrine: Diabetes Mellitus on   
oral agent  
Hematology: No history of bleeding   
or clotting disorder. Pt is not   
taking anti-coagulation or   
platelet medications. No history   
of hematological symptoms or   
problems.  
Oncology: No history of CA   
metastasis, chemo within 30 days,   
or radiotherapy within 90 days.   
Has not lost 10% of body wt in 6   
months. No history of oncological   
symptoms or problems.  
Psych: No history of psychiatric   
symptoms or problems.  
Musculoskeletal: Negative for   
joint pain or swelling, back pain   
or muscle pain.  
Skin: Negative for lesions, rash   
and itching.  
  
PHYSICAL EXAM:  
VITALS:  
/80   Pulse 88  
  
General: Alert and oriented  
Skin: Normal color, no rash, no   
lesions.  
HEENT: EOM, pupils equal, round   
and reactive.  
Cardiovascular: Normal S1 & S2, no   
rubs, murmurs or gallops. No JVD.   
Pulse regular.  
Lungs: Normal breath sounds, no   
wheezes or crackles.  
Abdomen: Soft, non-tender, no   
rigidity.  
Extremities: No deformity, no   
edema or tenderness, no joint   
swelling or clubbing.  
Neurological: Normal cognition and   
motor skills.  
Pulses: Carotid and radial pulses   
normal +2.  
  
Diagnostic tests reviewed for   
today's visit:  
No new labs or tests  
  
ASSESSMENT  
Medication and Non-Pharmacologic   
VTE Prophylaxis/Anticoagulants  
  
VTE Prophylaxis: VTE prophylaxis   
appropriate  
  
Impression: There is no known   
pertinent medical condition which   
may affect trinidad-operative course  
  
[unfilled]  
Clinical Risk Factors for Possible   
Cardiac Complications:  
None  
  
Patient is scheduled for a   
low-risk procedure.  
  
FUNCTIONAL STATUS: Walk indoors,   
such as around the house (1.75   
METs)  
  
Functional Class (NYHA): N/A  
  
[employer]: Not obtained  
  
PLAN  
CONSULTS:  
Patient does not require consults   
for optimization at this time.  
  
The Following Tests/Procedures   
Have Been Initiated:  
None  
  
Instructions Given to Patient:  
Patient given verbal and written   
preop instructions and voices   
comprehension and compliance.  
  
SIGNATURE: Ronel Hays MD   
PATIENT NAME: Marzena Mederos  
DATE: 2024 MRN:   
[id number]  
TIME: 11:26 AM PAGER/CONTACT #:  
  
Electronically signed by Ronel Hays MD at 2024 11:27   
AM EST  
Source Comments  
- [facility]  
In the event this information is   
protected by the Federal   
Confidentiality of Alcohol and   
Drug Abuse Patient Records   
regulations: This information has   
been disclosed to you from records   
protected by Federal   
confidentiality rules (42 CFR Part   
2). The Federal rules prohibit you   
from making any further disclosure   
of this information unless further   
disclosure is expressly permitted   
by the written consent of the   
person to whom it pertains or as   
otherwise permitted by 42 CFR Part   
2. A general authorization for the   
release of medical or other   
information is NOT sufficient for   
this purpose. The Federal rules   
restrict any use of the   
information to criminally   
investigate or prosecute any   
alcohol or drug abuse patient.  
Reason for Visit  
  
Reason for Visit -  
Reason Comments  
Blurred Vision Right Eye  
Difficulty Reading Right Eye  
Glare Right Eye  
  
Encounter Details  
  
Encounter Details  
Date Type Department Care Team   
(Latest Contact Info) Description  
2024 11:15 AM EST Office   
Visit [facility] Ophthalmology  
[id number]  
[phone] Ronel Hays [place]  
 [place] [address]  
[phone] (Work)  
[phone] (Fax) Combined forms   
of age-related cataract of right   
eye (Primary Dx);  
Status post cataract extraction   
and insertion of intraocular lens   
of left eye;  
Type 2 diabetes mellitus without   
retinopathy (HCC);  
Essential hypertension;  
Hypercholesteremia;  
Dementia, unspecified dementia   
severity, unspecified dementia   
type, unspecified whether   
behavioral, psychotic, or mood   
disturbance or anxiety (HCC)  
  
Social History  
- documented as of this encounter  
Social History  
Tobacco Use Types Packs/Day Years   
Used Date  
Smoking Tobacco: Never  
Smokeless Tobacco: Never  
  
Social History  
Alcohol Use Standard Drinks/Week   
Comments  
Yes 0 (1 standard drink = 0.6 oz   
pure alcohol) 1-2 weekly  
  
Social History  
PHQ-2 Answer Date Recorded  
PHQ-2 score 0 10/31/2019  
  
Social History  
Area Deprivation Index Answer Date   
Recorded  
National Score (1-100), lower   
number is lower risk 79 2023  
State Score (1-10), lower number   
is lower risk 7 2023  
Data from:   
https://www.neighborhoodatlas.[facility] Last address used   
for calculation [address]   
2023  
  
Social History  
Sex and Gender Information Value   
Date Recorded  
Sex Assigned at Birth Female   
2017 6:52 AM EST  
Gender Identity Female 2017   
6:52 AM EST  
Sexual Orientation Straight   
2017 6:52 AM EST  
  
Last Filed Vital Signs  
- documented in this encounter  
Last Filed Vital Signs  
Vital Sign Reading Time Taken   
Comments  
Blood Pressure 125/80 2024   
11:18 AM EST  
Pulse 88 2024 11:18 AM EST  
Temperature - -  
Respiratory Rate - -  
Oxygen Saturation - -  
Inhaled Oxygen Concentration - -  
Weight - -  
Height - -  
Body Mass Index - -  
  
Functional Status  
- documented as of this encounter  
Functional Status  
Functional Status Response Date of   
Assessment  
Are you deaf or do you have   
serious difficulty hearing? No   
2017  
Are you blind or do you have   
serious difficulty seeing, even   
when wearing glasses? No   
2017  
Do you have serious difficulty   
walking or climbing stairs? No   
2017  
Do you have difficulty dressing or   
bathing? No 2017  
Because of a physical, mental, or   
emotional condition, do you have   
difficulty doing errands alone   
such as visiting a doctor's office   
or shopping? No 2017  
  
Functional Status  
Cognitive Status Response Date of   
Assessment  
Because of a physical, mental, or   
emotional condition, do you have   
serious difficulty concentrating,   
remembering, or making decisions?   
No 2017  
  
Patient Instructions  
- documented in this encounter  
Patient Instructions  
Ronel Hays MD - 2024   
11:23 AM EST  
Formatting of this note is   
different from the original.  
Plan Cataract Surgery with   
Monofocal Intraocular Lens Implant   
Right Eye on 2024 at   
[facility].  
  
Current Ophthalmic Meds  
  
fluorometholone (FML LIQUID FILM)   
0.1 % ophthalmic suspension Use 1   
Drop in the right eye three times   
a day.  
  
Current Ophthalmic Meds  
  
prednisoLONE acetate (PRED FORTE)   
1 % ophthalmic suspension Use 1   
Drop in the left eye three times   
daily.  
keTORolac (ACULAR) 0.5 %   
ophthalmic solution Use 1 Drop in   
the left eye three times daily.  
  
Continue:  
Systane Complete solution instill   
1 drop 3 times daily Both Eyes.  
  
If you have any questions please   
contact our office at   
[phone].  
After office hours or on the   
weekend, please call Dr. Hays on   
his cell phone at [phone].  
  
Electronically signed by Ronel Hays MD at 2024 11:23   
AM EST  
  
Ordered Prescriptions  
- documented in this encounter  
Reconcile with Patient's Chart  
Ordered Prescriptions  
Prescription Sig Dispensed Refills   
Start Date End Date  
prednisoLONE acetate (PRED FORTE)   
1 % ophthalmic suspension Use 1   
Drop in the left eye three times a   
day. 10 mL 2 2024  
keTORolac (ACULAR) 0.5 %   
ophthalmic solution Use 1 Drop in   
the left eye three times a day. 5   
mL 2 2024  
  
Progress Notes  
- documented in this encounter  
Ronel Hays MD - 2024   
11:20 AM EST  
Formatting of this note is   
different from the original.  
ASSESSMENT/PLAN:  
1. 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   
cataract of right eye - ICD9:   
366.19, ICD10: H25.811 (primary   
diagnosis)  
  
Cataract Presurgical Documentation  
  
Cataract: Right Eye  
  
Current Visual Acuity  
Right Eye Distance CC 20/40  
Left Eye Distance SC 20/20  
  
Glare Testing:  
Right Eye Medium 20/80  
  
Visual Function: Marzena Mederos   
states that the decline in vision   
from the cataract impedes her   
abilities as listed in the HPI, as   
well as other activities of daily   
living.  
  
Marzena Mederos has confirmed that   
she is no longer able to function   
adequately on a day-to-day basis   
because of her current visual   
condition.  
  
Further, it is my medical opinion   
that the cataract is the primary   
cause, or at least a significantly   
contributory cause of her visual   
dysfunction. With uncomplicated   
cataract surgery and lens   
implantation, it is my expectation   
that her visual function and   
quality of life will improve,   
significantly.  
  
The risks, benefits, alternatives,   
personnel and complications of   
cataract surgery with lens   
implantation were discussed with   
Marzena Mederos in detail. She   
appeared to understand and asked   
that I proceed with plans for   
surgery.  
  
Upon eye examination, patient was   
found to have a visually   
significant cataract Right Eye.   
Discussed cataract surgery with   
patient and different intraocular   
lens implant options with patient:   
basic monofocal intraocular lens   
implant, Toric intraocular lens   
implant, and presbyopia correction   
intraocular lens implant. In my   
medical opinion, based on medical   
history and ocular examination,   
cataract surgery with intraocular   
lens implant will correct   
patient's vision and improve   
quality of patient's daily living   
activities. Patient wishes to have   
traditional cataract surgery with   
basic intraocular lens Right Eye.   
Patient wishes to have cataract   
surgery with the option stated   
above. Patient understands that an   
intraocular lens implant does not   
necessarily replace the need for   
glasses. Patient understands that   
it is impossible for the surgeon   
to inform him/her of every   
possible complication that may   
occur. The surgeon has answered   
all of the patient's questions.   
Patient understands that if he/she   
has a mature or dense cataract,   
pseudoexfoliation cataract, or   
history of use of Flomax, he/she   
may require the use of [place]   
Ring and/or Vision Blue during   
surgery. Patient understands the   
risks, benefits, and alternatives   
to surgery.  
  
Patient would like Right Eye   
corrected for near, target -1.50.   
Patient and patient's    
verbalized understanding that   
patient will need glasses for best   
corrected distance, intermediate,   
and near vision and   
post-operatively Right Eye patient   
will be unable to see at distance.  
  
Plan Cataract Surgery with   
Monofocal Intraocular Lens Implant   
Right Eye on 2024 at   
[facility].  
  
Current Ophthalmic Meds  
  
fluorometholone (FML LIQUID FILM)   
0.1 % ophthalmic suspension Use 1   
Drop in the right eye three times   
a day.  
  
Continue:  
Systane Complete solution instill   
1 drop 3 times daily Both Eyes.  
  
PHYSICAL EXAM:  
  
Vital Signs:  
Blood pressure 125/80, pulse 88.  
  
Respiratory:  
Normal breath sounds, no wheezing.  
  
CARD:  
Normal heart sounds 1 & 2, normal   
sinus rhythm.  
  
2. Status post cataract extraction   
and insertion of intraocular lens   
of left eye - ICD9: V45.61, V43.1,   
ICD10: Z98.42, Z96.1  
  
Current Ophthalmic Meds  
  
prednisoLONE acetate (PRED FORTE)   
1 % ophthalmic suspension Use 1   
Drop in the left eye three times   
daily.  
keTORolac (ACULAR) 0.5 %   
ophthalmic solution Use 1 Drop in   
the left eye three times daily.  
  
Continue:  
Systane Complete solution instill   
1 drop 3 times daily Both Eyes.  
  
3. Type 2 diabetes mellitus   
without retinopathy (HCC) - ICD9:   
250.00, ICD10: E11.9  
  
Please keep your blood sugar under   
good control to minimize risk of   
ocular complications from   
diabetes.  
  
Continue to monitor with primary   
care physician.  
  
4. Essential hypertension - ICD9:   
401.9, ICD10: I10  
  
Continue to monitor with primary   
care physician.  
  
5. Hypercholesteremia - ICD9:   
272.0, ICD10: E78.00  
  
Continue to monitor with primary   
care physician.  
  
6. Dementia, unspecified dementia   
severity, unspecified dementia   
type, unspecified whether   
behavioral, psychotic, or mood   
disturbance or anxiety (HCC) -   
ICD9: 294.20, ICD10: F03.90  
  
Continue to monitor with primary   
care physician.  
  
I have confirmed and edited as   
necessary the relevant HPI,   
ophthalmic history, ROS, and the   
neuro exam findings as obtained by   
others. I have seen and examined   
Marzena MAYRA Smithmitch.  
I have discussed the case and the   
management of this patient's care   
with the Resident/Fellow, if   
applicable. I also have reviewed   
and agree with the assessment and   
plan as stated above and agree   
with all of its relevant   
components.  
  
Electronically signed by Ronel Hays MD at 2024 11:27   
AM EST  
Plan of Treatment  
- documented as of this encounter  
Plan of Treatment - Upcoming   
Encounters  
Upcoming Encounters  
Date Type Department Care Team   
(Latest Contact Info) Description  
2024 2:15 PM EST Office   
Visit [facility] Ophthalmology  
 [id number]  
[phone] Ronel Hays MD  
 [id number]  
[phone] (Work)  
[phone] (Fax) 1 DAY PO 2ND RE   
BASIC  
2025 12:50 PM EST   
Appointment Mammogram  
[address] [place] [address]  
[phone] Encounter for   
screening mammogram for breast   
cancer [Z12.31]  
2025 1:20 PM EST Office   
Visit OB/Gynecology  
[address] [place] [address]  
[phone] Jose Delgado MD  
[address] [place] [address]  
[phone] (Work)  
[phone] (Fax) Annual  
2025 11:30 AM EST Visit (SP)   
Office Hematology/Oncology  
[address] [place] [address]  
[phone] Colten Abbott DO  
[address] [place] [address]  
[phone] (Work)  
[phone] (Fax) 1 YR OV/MAMM   
*  
  
Plan of Treatment - Scheduled   
Procedures  
Scheduled Procedures  
Name Priority Associated Diagnoses   
Date/Time  
SURGERY AT NON[facility] FACILITY   
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   
cataract of right eye 2024   
10:10 AM EST  
  
Visit Diagnoses  
- documented in this encounter  
Visit Diagnoses  
Diagnosis  
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   
cataract of right eye - Primary  
Other and combined forms of senile   
cataract  
Status post cataract extraction   
and insertion of intraocular lens   
of left eye  
Type 2 diabetes mellitus without   
retinopathy (HCC)  
Type II or unspecified type   
diabetes mellitus without mention   
of complication, not stated as   
uncontrolled  
Essential hypertension  
Unspecified essential hypertension  
Hypercholesteremia  
Pure hypercholesterolemia  
Dementia, unspecified dementia   
severity, unspecified dementia   
type, unspecified whether   
behavioral, psychotic, or mood   
disturbance or anxiety (HCC)  
  
Discontinued Medications  
- documented as of this encounter  
Discontinued Medications  
Medication Sig Discontinue Reason   
Start Date End Date  
prednisoLONE acetate (PRED FORTE)   
1 % ophthalmic suspension Use 1   
Drop in the left eye four times   
daily. 2024  
keTORolac (ACULAR) 0.5 %   
ophthalmic solution Use 1 Drop in   
the left eye four times daily.   
2024  
  
Eye Exam  
  
Eye Exam - Visual Acuity (Demarco   
Cards)  
Visual Acuity (Demarco Cards)  
Right eye Left eye  
Dist sc 20/20  
Dist cc 20/40  
Near cc J6  
  
Eye Exam - Tonometry (Applanation,   
11:19 AM)  
Tonometry (Applanation, 11:19 AM)  
Right eye Left eye  
Pressure 14 14  
  
Eye Exam - Pupils  
Pupils  
Pupils Dark Light Shape APD  
Right eye PERRL 4 2 Round None  
Left eye PERRL 4 2 Round None  
  
Eye Exam - Visual Fields  
Visual Fields  
Right eye Left eye  
Full Full  
  
Eye Exam - Extraocular Movement  
Extraocular Movement  
Right eye Left eye  
Full Full  
  
Eye Exam - Neuro/Psych  
Neuro/Psych  
Oriented x3: Yes  
Mood/Affect: Normal  
  
Eye Exam - Glare Testing  
Glare Testing  
Medium  
Right eye 20/80  
Left eye  
  
Eye Exam - External Exam  
External Exam  
Right eye Left eye  
External Normal including orbits   
and preauricular lymph nodes   
Normal including orbits and   
preauricular lymph nodes  
  
Eye Exam - Slit Lamp Exam  
Slit Lamp Exam  
Right eye Left eye  
Lids/Lashes Normal lids, lashes,   
lacrimal glands, and lacrimal   
drainage Normal lids, lashes,   
lacrimal glands, and lacrimal   
drainage  
Conjunctiva/Sclera White and quiet   
White and quiet  
Cornea Normal epithelium, stroma,   
endothelium, and tear film Normal   
epithelium, stroma, endothelium,   
and tear film  
Anterior Chamber Deep and quiet   
Deep and quiet  
Iris Round and reactive Round and   
reactive  
Lens 2+ Nuclear sclerotic   
cataract, 2+ Posterior subcapsular   
cataract Posterior chamber   
intraocular lens  
Anterior Vitreous Normal Normal  
  
Eye Exam - Fundus Exam  
Fundus Exam  
Right eye Left eye  
Disc Normal Normal  
C/D Ratio 0.3 0.3  
Macula Normal Normal  
Vessels Normal Normal  
Periphery Normal Normal  
  
Care Teams  
- documented as of this encounter  
Care Teams  
Team Member Relationship Specialty   
Start Date End Date  
Sharifa Almaguer DO  
NPI: [id number]  
 [place] [id number]  
[phone] (Work)  
[phone] (Fax) PCP - General   
Internal Medicine 10/25/23  
Herbie Mills MD  
NPI: [id number]  
[address] [place]  
[place] [address]  
[phone] (Work)  
[phone] (Fax) Physician   
Radiation Oncology 18  
  
Electronically signed by Ronel Hays MD at 2024 10:15 AM   
EST  
                                        [facility]  
Work Phone:   
[phone]  
   
                                                    2024 History and   
physical note                           Formatting of this note is   
different from the original.  
H&P Notes  
- documented in this encounter  
Ronel Hays MD - 2024   
11:26 AM EST  
Formatting of this note is   
different from the original.  
HISTORY AND PHYSICAL EXAMINATION  
  
SERVICE DATE: 2024  
SERVICE TIME: 11:26 AM  
  
PRIMARY CARE PHYSICIAN: Sharifa Almaguer DO  
  
REASON FOR VISIT: Marzena Mederos   
is a 79 year old female who is   
being seen for Combined   
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  
  
The patient has the following:  
ACTIVE PROBLEM LIST  
Postmenopausal Atrophic Vaginitis  
Unspecified Transient Cerebral   
Ischemia  
Carcinoma in Situ of Vulva  
Malignant Neoplasm of Female   
Breast (Hcc)  
Personal History of Malignant   
Neoplasm of Breast  
Senile Nuclear Sclerosis  
Hypermetropia  
Regular Astigmatism  
Presbyopia  
Invasive Ductal Carcinoma of   
Breast, Female (Hcc)  
Ocular Migraine  
Controlled Type 2 Diabetes   
Mellitus Without Complication,   
Without Long-Term Current Use of   
Insulin (Hcc)  
Postmenopausal Bleeding  
S/P Hysterectomy With Oophorectomy  
  
SUBJECTIVE  
CHIEF COMPLAINT: Blurred vision   
for distance and near Right Eye.  
  
PAST MEDICAL HISTORY  
Diagnosis Date  
Breast cancer (HCC)   
invasive ductal carcinoma,   
completed radiation 3/7/11  
Cancer of endometrium (HCC)  
Carcinoma in situ, vulva   
Cellulitis  
of right eye muscle  
Diabetes mellitus without mention   
of complication  
Diabetes mellitus, type II (HCC)  
Diaphragmatic hernia without   
mention of obstruction or gangrene  
Hiatal hernia  
Dyspareunia  
Elevated cholesterol  
Esophageal reflux  
Insomnia, unspecified  
Orbital cellulitis  
Other corneal disorder  
Rt eye corneal erosion .  
PMH - PAST MEDICAL HISTORY OF  
?VESTIBULITIS  
PMH - PAST MEDICAL HISTORY OF  
CLIMACTERIC  
PMH - PAST MEDICAL HISTORY OF   
2006  
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  
Pure hypercholesterolemia  
Rib fracture  
6th Rib  
Senile dementia (HCC)  
Stomatitis and mucositis   
(ulcerative)  
Chronic ulcerative stomatitis on   
mucosal biopsy  
Unspecified essential hypertension  
  
PAST SURGICAL HISTORY  
Procedure Laterality Date  
Bunions bilateral feet removed   
10/2002  
BX/EXC LYMPH NODE OPEN DEEP   
AXILLARY NODE 2010  
RIGHT BREAST LUMP W/ SLN BX  
CHOLECYSTECTOMY 2004  
Cholecystectomy  
COLONOSCOPY FLX DX W/COLLJ SPEC   
WHEN PFRMD   
Colonoscopy  
COLPOSCOPY CERVIX UPPER/ADJACENT   
VAGINA 2009  
Colposcopy of the vulva  
COLPOSCOPY, VULVA 10/2009  
CORRECT BUNION,SIMPLE  
Bunion  
DILATION & CURETTAGE DX&/THER   
NONOBSTETRIC 1970's  
SAB  
HYSTERECTOMY 2017  
LAPAROSCOPY SURG CHOLECYSTECTOMY   
2004  
Cholecystectomy, lap  
MAMMO STEREOTACTIC CORE BIOPSY RT   
10/10/2013  
MASTECTOMY, PARTIAL 2010  
RIGHT BREAST  
PAST SURGICAL HISTORY OF 2006  
partial vulvectomy/sq. cell ca in   
situ  
PAST SURGICAL HISTORY OF   
right eye cornea  
PAST SURGICAL HISTORY OF   
right eye revision  
PAST SURGICAL HISTORY OF  
squamous cell carcinoma right arm  
REMV CATARACT EXTRACAP,INSERT LENS   
10/31/2024  
CCA0T0 - +19.5D  
SALPINGO-OOPHORECTOMY COMPL/PRTL   
UNI/BI SPX 2017  
Toenail Big Toe Left Foot removed   
10/2002  
TONSILLECTOMY PRIMARY/SECONDARY   
Tonsillectomy  
UNLISTED PROCEDURE HANDS/FINGERS   
2012  
CMC Arthoplasty on right hand  
  
FAMILY HISTORY  
Problem Relation Age of Onset  
Stroke Mother  
Diabetes Mother  
GI Father  
 from complications of gb   
surgery  
Arthritis Sister  
Systemic Lupus  
Cancer Maternal Uncle  
LUNG  
Cancer Other  
cousin with breast cancer/cousin   
with bladder ca.  
Stroke Sister  
  
SOCIAL HISTORY:  
Social History  
  
Tobacco Use  
Smoking status: Never  
Smokeless tobacco: Never  
Vaping Use  
Vaping status: Never Used  
Substance Use Topics  
Alcohol use: Yes  
Comment: 1-2 weekly  
Drug use: No  
  
MEDICATIONS:  
Prior to Admission medications as   
of 24 1120  
Medication Sig Last Dose Taking  
glipiZIDE (GLUCOTROL XL) 2.5 mg 24   
hr tablet Take 2.5 mg by mouth.   
Yes  
fluorometholone (FML LIQUID FILM)   
0.1 % ophthalmic suspension Use 1   
Drop in the right eye three times   
a day. Yes  
donepezil (ARICEPT) 10 mg tablet   
Take 10 mg by mouth. Yes  
cholecalciferol (VITAMIN D3) 50   
mcg (2,000 unit) tablet Take 2,000   
Units by mouth once daily. Yes  
losartan-hydroCHLOROthiazide   
(HYZAAR) 100-12.5 mg per tablet   
Take 1 tablet by mouth once daily.   
Yes  
omeprazole (PRILOSEC) 20 mg   
capsule Take 20 mg by mouth once   
daily. Yes  
potassium chloride (KLOR-CON 10)   
10 mEq tablet Take 10 mEq by mouth   
once daily.  
Yes  
metFORMIN ER (GLUCOPHAGE XR) 500   
mg 24 hr tablet Take two tablets   
by mouth once daily. Yes  
simvastatin(ZOCOR 20 MG TAB) Take   
one(1) tablet daily. Yes  
keTORolac (ACULAR) 0.5 %   
ophthalmic solution Use 1 Drop in   
the left eye three times a day.  
prednisoLONE acetate (PRED FORTE)   
1 % ophthalmic suspension Use 1   
Drop in the left eye three times a   
day.  
amLODIPine (NORVASC) 10 mg tablet   
Take 10 mg by mouth.  
  
No medication comments found.  
  
CURRENT ALLERGIES:  
ALLERGIES  
Allergen Reactions  
Adhesive Rash  
Redness  
Ambien [Zolpidem Ta* Rash  
Codeine Mental Status Change  
Neosporin [Neomycin* Rash  
Percocet [Oxycodone*  
Causes Vomitting  
  
REVIEW OF SYSTEMS:  
PAIN ASSESSMENT:  
General: No weight loss, malaise   
or fevers.  
Neuro: Dementia  
Respiratory: No history of current   
cough or dyspnea, or pneumonia in   
the past 6 weeks. No history of   
respiratory/pulmonary symptoms or   
problems  
Cardiovascular: Positive for:   
Hypertension, PVC  
GI: No history of GI symptoms or   
problems. No history of esophageal   
varices, recent ascites, or ETOH   
greater than 2 drinks per day.  
: No history of UTI in past 6   
weeks. No history of renal   
failure. Not currently on or   
requiring dialysis. No history of   
 symptoms or problems.  
GYN: Negative for abnormal vaginal   
bleeding, abnormal vaginal   
discharge.  
Pregnancy: Denies  
Endocrine: Diabetes Mellitus on   
oral agent  
Hematology: No history of bleeding   
or clotting disorder. Pt is not   
taking anti-coagulation or   
platelet medications. No history   
of hematological symptoms or   
problems.  
Oncology: No history of CA   
metastasis, chemo within 30 days,   
or radiotherapy within 90 days.   
Has not lost 10% of body wt in 6   
months. No history of oncological   
symptoms or problems.  
Psych: No history of psychiatric   
symptoms or problems.  
Musculoskeletal: Negative for   
joint pain or swelling, back pain   
or muscle pain.  
Skin: Negative for lesions, rash   
and itching.  
  
PHYSICAL EXAM:  
VITALS:  
/80   Pulse 88  
  
General: Alert and oriented  
Skin: Normal color, no rash, no   
lesions.  
HEENT: EOM, pupils equal, round   
and reactive.  
Cardiovascular: Normal S1 & S2, no   
rubs, murmurs or gallops. No JVD.   
Pulse regular.  
Lungs: Normal breath sounds, no   
wheezes or crackles.  
Abdomen: Soft, non-tender, no   
rigidity.  
Extremities: No deformity, no   
edema or tenderness, no joint   
swelling or clubbing.  
Neurological: Normal cognition and   
motor skills.  
Pulses: Carotid and radial pulses   
normal +2.  
  
Diagnostic tests reviewed for   
today's visit:  
No new labs or tests  
  
ASSESSMENT  
Medication and Non-Pharmacologic   
VTE Prophylaxis/Anticoagulants  
  
VTE Prophylaxis: VTE prophylaxis   
appropriate  
  
Impression: There is no known   
pertinent medical condition which   
may affect trinidad-operative course  
  
[unfilled]  
Clinical Risk Factors for Possible   
Cardiac Complications:  
None  
  
Patient is scheduled for a   
low-risk procedure.  
  
FUNCTIONAL STATUS: Walk indoors,   
such as around the house (1.75   
METs)  
  
Functional Class (NYHA): N/A  
  
[facility]: Not obtained  
  
PLAN  
CONSULTS:  
Patient does not require consults   
for optimization at this time.  
  
The Following Tests/Procedures   
Have Been Initiated:  
None  
  
Instructions Given to Patient:  
Patient given verbal and written   
preop instructions and voices   
comprehension and compliance.  
  
SIGNATURE: Ronel Hays MD   
PATIENT NAME: Marzena Mederos  
DATE: 2024 MRN:   
[id number]  
TIME: 11:26 AM PAGER/CONTACT #:  
  
Electronically signed by Ronel Hays MD at 2024 11:27   
AM EST  
Source Comments  
- [facility]  
In the event this information is   
protected by the Federal   
Confidentiality of Alcohol and   
Drug Abuse Patient Records   
regulations: This information has   
been disclosed to you from records   
protected by Federal   
confidentiality rules (42 CFR Part   
2). The Federal rules prohibit you   
from making any further disclosure   
of this information unless further   
disclosure is expressly permitted   
by the written consent of the   
person to whom it pertains or as   
otherwise permitted by 42 CFR Part   
2. A general authorization for the   
release of medical or other   
information is NOT sufficient for   
this purpose. The Federal rules   
restrict any use of the   
information to criminally   
investigate or prosecute any   
alcohol or drug abuse patient.  
Reason for Visit  
  
Reason for Visit -  
Reason Comments  
Blurred Vision Right Eye  
Difficulty Reading Right Eye  
Glare Right Eye  
  
Encounter Details  
  
Encounter Details  
Date Type Department Care Team   
(Latest Contact Info) Description  
2024 11:15 AM EST Office   
Visit [facility] Ophthalmology  
[address] [place], [place] [id number]  
[phone] Ronel Hays MD  
[address] [place] [address]  
[phone] (Work)  
[phone] (Fax) Combined forms   
of age-related cataract of right   
eye (Primary Dx);  
Status post cataract extraction   
and insertion of intraocular lens   
of left eye;  
Type 2 diabetes mellitus without   
retinopathy (HCC);  
Essential hypertension;  
Hypercholesteremia;  
Dementia, unspecified dementia   
severity, unspecified dementia   
type, unspecified whether   
behavioral, psychotic, or mood   
disturbance or anxiety (HCC)  
  
Social History  
- documented as of this encounter  
Social History  
Tobacco Use Types Packs/Day Years   
Used Date  
Smoking Tobacco: Never  
Smokeless Tobacco: Never  
  
Social History  
Alcohol Use Standard Drinks/Week   
Comments  
Yes 0 (1 standard drink = 0.6 oz   
pure alcohol) 1-2 weekly  
  
Social History  
PHQ-2 Answer Date Recorded  
PHQ-2 score 0 10/31/2019  
  
Social History  
Area Deprivation Index Answer Date   
Recorded  
National Score (1-100), lower   
number is lower risk 79 2023  
State Score (1-10), lower number   
is lower risk 7 2023  
Data from:   
https://www.neighborhoodatlas.[facility] Last address used   
for calculation [address]   
2023  
  
Social History  
Sex and Gender Information Value   
Date Recorded  
Sex Assigned at Birth Female   
2017 6:52 AM EST  
Gender Identity Female 2017   
6:52 AM EST  
Sexual Orientation Straight   
2017 6:52 AM EST  
  
Last Filed Vital Signs  
- documented in this encounter  
Last Filed Vital Signs  
Vital Sign Reading Time Taken   
Comments  
Blood Pressure 125/80 2024   
11:18 AM EST  
Pulse 88 2024 11:18 AM EST  
Temperature - -  
Respiratory Rate - -  
Oxygen Saturation - -  
Inhaled Oxygen Concentration - -  
Weight - -  
Height - -  
Body Mass Index - -  
  
Functional Status  
- documented as of this encounter  
Functional Status  
Functional Status Response Date of   
Assessment  
Are you deaf or do you have   
serious difficulty hearing? No   
2017  
Are you blind or do you have   
serious difficulty seeing, even   
when wearing glasses? No   
2017  
Do you have serious difficulty   
walking or climbing stairs? No   
2017  
Do you have difficulty dressing or   
bathing? No 2017  
Because of a physical, mental, or   
emotional condition, do you have   
difficulty doing errands alone   
such as visiting a doctor's office   
or shopping? No 2017  
  
Functional Status  
Cognitive Status Response Date of   
Assessment  
Because of a physical, mental, or   
emotional condition, do you have   
serious difficulty concentrating,   
remembering, or making decisions?   
No 2017  
  
Patient Instructions  
- documented in this encounter  
Patient Instructions  
Ronel Hays MD - 2024   
11:23 AM EST  
Formatting of this note is   
different from the original.  
Plan Cataract Surgery with   
Monofocal Intraocular Lens Implant   
Right Eye on 2024 at   
[facility].  
  
Current Ophthalmic Meds  
  
fluorometholone (FML LIQUID FILM)   
0.1 % ophthalmic suspension Use 1   
Drop in the right eye three times   
a day.  
  
Current Ophthalmic Meds  
  
prednisoLONE acetate (PRED FORTE)   
1 % ophthalmic suspension Use 1   
Drop in the left eye three times   
daily.  
keTORolac (ACULAR) 0.5 %   
ophthalmic solution Use 1 Drop in   
the left eye three times daily.  
  
Continue:  
Systane Complete solution instill   
1 drop 3 times daily Both Eyes.  
  
If you have any questions please   
contact our office at   
[phone].  
After office hours or on the   
weekend, please call Dr. Hays on   
his cell phone at [phone].  
  
Electronically signed by Ronel Hays MD at 2024 11:23   
AM EST  
  
Ordered Prescriptions  
- documented in this encounter  
Reconcile with Patient's Chart  
Ordered Prescriptions  
Prescription Sig Dispensed Refills   
Start Date End Date  
prednisoLONE acetate (PRED FORTE)   
1 % ophthalmic suspension Use 1   
Drop in the left eye three times a   
day. 10 mL 2 2024  
keTORolac (ACULAR) 0.5 %   
ophthalmic solution Use 1 Drop in   
the left eye three times a day. 5   
mL 2 2024  
  
Progress Notes  
- documented in this encounter  
Ronel Hays MD - 2024   
11:20 AM EST  
Formatting of this note is   
different from the original.  
ASSESSMENT/PLAN:  
1. 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   
cataract of right eye - ICD9:   
366.19, ICD10: H25.811 (primary   
diagnosis)  
  
Cataract Presurgical Documentation  
  
Cataract: Right Eye  
  
Current Visual Acuity  
Right Eye Distance CC 20/40  
Left Eye Distance SC 20/20  
  
Glare Testing:  
Right Eye Medium 20/80  
  
Visual Function: Marzena Mederos   
states that the decline in vision   
from the cataract impedes her   
abilities as listed in the HPI, as   
well as other activities of daily   
living.  
  
Marzena Mederos has confirmed that   
she is no longer able to function   
adequately on a day-to-day basis   
because of her current visual   
condition.  
  
Further, it is my medical opinion   
that the cataract is the primary   
cause, or at least a significantly   
contributory cause of her visual   
dysfunction. With uncomplicated   
cataract surgery and lens   
implantation, it is my expectation   
that her visual function and   
quality of life will improve,   
significantly.  
  
The risks, benefits, alternatives,   
personnel and complications of   
cataract surgery with lens   
implantation were discussed with   
Marzena Mederos in detail. She   
appeared to understand and asked   
that I proceed with plans for   
surgery.  
  
Upon eye examination, patient was   
found to have a visually   
significant cataract Right Eye.   
Discussed cataract surgery with   
patient and different intraocular   
lens implant options with patient:   
basic monofocal intraocular lens   
implant, Toric intraocular lens   
implant, and presbyopia correction   
intraocular lens implant. In my   
medical opinion, based on medical   
history and ocular examination,   
cataract surgery with intraocular   
lens implant will correct   
patient's vision and improve   
quality of patient's daily living   
activities. Patient wishes to have   
traditional cataract surgery with   
basic intraocular lens Right Eye.   
Patient wishes to have cataract   
surgery with the option stated   
above. Patient understands that an   
intraocular lens implant does not   
necessarily replace the need for   
glasses. Patient understands that   
it is impossible for the surgeon   
to inform him/her of every   
possible complication that may   
occur. The surgeon has answered   
all of the patient's questions.   
Patient understands that if he/she   
has a mature or dense cataract,   
pseudoexfoliation cataract, or   
history of use of Flomax, he/she   
may require the use of [place]   
Ring and/or Vision Blue during   
surgery. Patient understands the   
risks, benefits, and alternatives   
to surgery.  
  
Patient would like Right Eye   
corrected for near, target -1.50.   
Patient and patient's    
verbalized understanding that   
patient will need glasses for best   
corrected distance, intermediate,   
and near vision and   
post-operatively Right Eye patient   
will be unable to see at distance.  
  
Plan Cataract Surgery with   
Monofocal Intraocular Lens Implant   
Right Eye on 2024 at   
[facility].  
  
Current Ophthalmic Meds  
  
fluorometholone (FML LIQUID FILM)   
0.1 % ophthalmic suspension Use 1   
Drop in the right eye three times   
a day.  
  
Continue:  
Systane Complete solution instill   
1 drop 3 times daily Both Eyes.  
  
PHYSICAL EXAM:  
  
Vital Signs:  
Blood pressure 125/80, pulse 88.  
  
Respiratory:  
Normal breath sounds, no wheezing.  
  
CARD:  
Normal heart sounds 1 & 2, normal   
sinus rhythm.  
  
2. Status post cataract extraction   
and insertion of intraocular lens   
of left eye - ICD9: V45.61, V43.1,   
ICD10: Z98.42, Z96.1  
  
Current Ophthalmic Meds  
  
prednisoLONE acetate (PRED FORTE)   
1 % ophthalmic suspension Use 1   
Drop in the left eye three times   
daily.  
keTORolac (ACULAR) 0.5 %   
ophthalmic solution Use 1 Drop in   
the left eye three times daily.  
  
Continue:  
Systane Complete solution instill   
1 drop 3 times daily Both Eyes.  
  
3. Type 2 diabetes mellitus   
without retinopathy (HCC) - ICD9:   
250.00, ICD10: E11.9  
  
Please keep your blood sugar under   
good control to minimize risk of   
ocular complications from   
diabetes.  
  
Continue to monitor with primary   
care physician.  
  
4. Essential hypertension - ICD9:   
401.9, ICD10: I10  
  
Continue to monitor with primary   
care physician.  
  
5. Hypercholesteremia - ICD9:   
272.0, ICD10: E78.00  
  
Continue to monitor with primary   
care physician.  
  
6. Dementia, unspecified dementia   
severity, unspecified dementia   
type, unspecified whether   
behavioral, psychotic, or mood   
disturbance or anxiety (HCC) -   
ICD9: 294.20, ICD10: F03.90  
  
Continue to monitor with primary   
care physician.  
  
I have confirmed and edited as   
necessary the relevant HPI,   
ophthalmic history, ROS, and the   
neuro exam findings as obtained by   
others. I have seen and examined   
Marzena Mederos.  
I have discussed the case and the   
management of this patient's care   
with the Resident/Fellow, if   
applicable. I also have reviewed   
and agree with the assessment and   
plan as stated above and agree   
with all of its relevant   
components.  
  
Electronically signed by Ronel Hays MD at 2024 11:27   
AM EST  
Plan of Treatment  
- documented as of this encounter  
Plan of Treatment - Upcoming   
Encounters  
Upcoming Encounters  
Date Type Department Care Team   
(Latest Contact Info) Description  
2024 2:15 PM EST Office   
Visit [facility] Ophthalmology  
[address] [place] [id number]  
[phone] Ronel Hays MD  
[address] [place] [address]  
[phone] (Work)  
[phone] (Fax) 1 DAY PO 2ND RE   
BASIC  
2025 12:50 PM EST   
Appointment Mammogram  
[address] [place] [address]  
[phone] Encounter for   
screening mammogram for breast   
cancer [Z12.31]  
2025 1:20 PM EST Office   
Visit OB/Gynecology  
[address] [place] [address]  
[phone] Jose Delgado MD  
[address] [place] [place] [address]  
[phone] (Work)  
[phone] (Fax) Annual  
2025 11:30 AM EST Visit (SP)   
Office Hematology/Oncology  
[address] [place] [address] [phone] Colten Abbott DO  
[address] [place] [address]  
[phone] (Work)  
[phone] (Fax) 1 YR OV/MAMM   
*  
  
Plan of Treatment - Scheduled   
Procedures  
Scheduled Procedures  
Name Priority Associated Diagnoses   
Date/Time  
SURGERY AT NON[facility] FACILITY   
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   
cataract of right eye 2024   
10:10 AM EST  
  
Visit Diagnoses  
- documented in this encounter  
Visit Diagnoses  
Diagnosis  
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   
cataract of right eye - Primary  
Other and combined forms of senile   
cataract  
Status post cataract extraction   
and insertion of intraocular lens   
of left eye  
Type 2 diabetes mellitus without   
retinopathy (HCC)  
Type II or unspecified type   
diabetes mellitus without mention   
of complication, not stated as   
uncontrolled  
Essential hypertension  
Unspecified essential hypertension  
Hypercholesteremia  
Pure hypercholesterolemia  
Dementia, unspecified dementia   
severity, unspecified dementia   
type, unspecified whether   
behavioral, psychotic, or mood   
disturbance or anxiety (HCC)  
  
Discontinued Medications  
- documented as of this encounter  
Discontinued Medications  
Medication Sig Discontinue Reason   
Start Date End Date  
prednisoLONE acetate (PRED FORTE)   
1 % ophthalmic suspension Use 1   
Drop in the left eye four times   
daily. 2024  
keTORolac (ACULAR) 0.5 %   
ophthalmic solution Use 1 Drop in   
the left eye four times daily.   
2024  
  
Eye Exam  
  
Eye Exam - Visual Acuity (Demarco   
Cards)  
Visual Acuity (Demarco Cards)  
Right eye Left eye  
Dist sc 20/20  
Dist cc 20/40  
Near cc J6  
  
Eye Exam - Tonometry (Applanation,   
11:19 AM)  
Tonometry (Applanation, 11:19 AM)  
Right eye Left eye  
Pressure 14 14  
  
Eye Exam - Pupils  
Pupils  
Pupils Dark Light Shape APD  
Right eye PERRL 4 2 Round None  
Left eye PERRL 4 2 Round None  
  
Eye Exam - Visual Fields  
Visual Fields  
Right eye Left eye  
Full Full  
  
Eye Exam - Extraocular Movement  
Extraocular Movement  
Right eye Left eye  
Full Full  
  
Eye Exam - Neuro/Psych  
Neuro/Psych  
Oriented x3: Yes  
Mood/Affect: Normal  
  
Eye Exam - Glare Testing  
Glare Testing  
Medium  
Right eye 20/80  
Left eye  
  
Eye Exam - External Exam  
External Exam  
Right eye Left eye  
External Normal including orbits   
and preauricular lymph nodes   
Normal including orbits and   
preauricular lymph nodes  
  
Eye Exam - Slit Lamp Exam  
Slit Lamp Exam  
Right eye Left eye  
Lids/Lashes Normal lids, lashes,   
lacrimal glands, and lacrimal   
drainage Normal lids, lashes,   
lacrimal glands, and lacrimal   
drainage  
Conjunctiva/Sclera White and quiet   
White and quiet  
Cornea Normal epithelium, stroma,   
endothelium, and tear film Normal   
epithelium, stroma, endothelium,   
and tear film  
Anterior Chamber Deep and quiet   
Deep and quiet  
Iris Round and reactive Round and   
reactive  
Lens 2+ Nuclear sclerotic   
cataract, 2+ Posterior subcapsular   
cataract Posterior chamber   
intraocular lens  
Anterior Vitreous Normal Normal  
  
Eye Exam - Fundus Exam  
Fundus Exam  
Right eye Left eye  
Disc Normal Normal  
C/D Ratio 0.3 0.3  
Macula Normal Normal  
Vessels Normal Normal  
Periphery Normal Normal  
  
Care Teams  
- documented as of this encounter  
Care Teams  
Team Member Relationship Specialty   
Start Date End Date  
Sharifa Almaguer DO  
NPI: [id number]  
 [place] [address]  
[phone] (Work)  
[phone] (Fax) PCP - General   
Internal Medicine 10/25/23  
Herbie Mills MD  
NPI: [id number]  
[address] [place] [address]  
[phone] (Work)  
[phone] (Fax) Physician   
Radiation Oncology 18  
  
Electronically signed by Ronel Hays MD at 2024 10:15 AM   
[facility]  
Work Phone:   
[phone]  
   
                                                    2024 History and   
physical note                           Formatting of this note might be   
different from the original.  
H&P reviewed. The patient was   
examined and there are no changes   
to the H&P.  
Electronically signed by Ronel Hays MD at 2024 10:15 AM   
EST  
  
Source Note - Ronel Hays MD   
- 2024 10:15 AM EST  
Formatting of this note is   
different from the original.  
H&P Notes  
- documented in this encounter  
Ronel Hays MD - 2024   
11:26 AM EST  
Formatting of this note is   
different from the original.  
HISTORY AND PHYSICAL EXAMINATION  
  
SERVICE DATE: 2024  
SERVICE TIME: 11:26 AM  
  
PRIMARY CARE PHYSICIAN: Sharifa Almaguer DO  
  
REASON FOR VISIT: Marzena Mederos   
is a 79 year old female who is   
being seen for Combined   
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  
  
The patient has the following:  
ACTIVE PROBLEM LIST  
Postmenopausal Atrophic Vaginitis  
Unspecified Transient Cerebral   
Ischemia  
Carcinoma in Situ of Vulva  
Malignant Neoplasm of Female   
Breast (Hcc)  
Personal History of Malignant   
Neoplasm of Breast  
Senile Nuclear Sclerosis  
Hypermetropia  
Regular Astigmatism  
Presbyopia  
Invasive Ductal Carcinoma of   
Breast, Female (Hcc)  
Ocular Migraine  
Controlled Type 2 Diabetes   
Mellitus Without Complication,   
Without Long-Term Current Use of   
Insulin (Hcc)  
Postmenopausal Bleeding  
S/P Hysterectomy With Oophorectomy  
  
SUBJECTIVE  
CHIEF COMPLAINT: Blurred vision   
for distance and near Right Eye.  
  
PAST MEDICAL HISTORY  
Diagnosis Date  
Breast cancer (HCC)   
invasive ductal carcinoma,   
completed radiation 3/7/11  
Cancer of endometrium (HCC)  
Carcinoma in situ, vulva   
Cellulitis  
of right eye muscle  
Diabetes mellitus without mention   
of complication  
Diabetes mellitus, type II (HCC)  
Diaphragmatic hernia without   
mention of obstruction or gangrene  
Hiatal hernia  
Dyspareunia  
Elevated cholesterol  
Esophageal reflux  
Insomnia, unspecified  
Orbital cellulitis  
Other corneal disorder  
Rt eye corneal erosion .  
PMH - PAST MEDICAL HISTORY OF  
?VESTIBULITIS  
PMH - PAST MEDICAL HISTORY OF  
CLIMACTERIC  
PMH - PAST MEDICAL HISTORY OF   
2006  
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  
Pure hypercholesterolemia  
Rib fracture  
6th Rib  
Senile dementia (HCC)  
Stomatitis and mucositis   
(ulcerative)  
Chronic ulcerative stomatitis on   
mucosal biopsy  
Unspecified essential hypertension  
  
PAST SURGICAL HISTORY  
Procedure Laterality Date  
Bunions bilateral feet removed   
10/2002  
BX/EXC LYMPH NODE OPEN DEEP   
AXILLARY NODE 2010  
RIGHT BREAST LUMP W/ SLN BX  
CHOLECYSTECTOMY 2004  
Cholecystectomy  
COLONOSCOPY FLX DX W/COLLJ SPEC   
WHEN PFRMD   
Colonoscopy  
COLPOSCOPY CERVIX UPPER/ADJACENT   
VAGINA 2009  
Colposcopy of the vulva  
COLPOSCOPY, VULVA 10/2009  
CORRECT BUNION,SIMPLE  
Bunion  
DILATION & CURETTAGE DX&/THER   
NONOBSTETRIC 1970's  
SAB  
HYSTERECTOMY 2017  
LAPAROSCOPY SURG CHOLECYSTECTOMY   
2004  
Cholecystectomy, lap  
MAMMO STEREOTACTIC CORE BIOPSY RT   
10/10/2013  
MASTECTOMY, PARTIAL 2010  
RIGHT BREAST  
PAST SURGICAL HISTORY OF 2006  
partial vulvectomy/sq. cell ca in   
situ  
PAST SURGICAL HISTORY OF   
right eye cornea  
PAST SURGICAL HISTORY OF   
right eye revision  
PAST SURGICAL HISTORY OF  
squamous cell carcinoma right arm  
REMV CATARACT EXTRACAP,INSERT LENS   
10/31/2024  
CCA0T0 - +19.5D  
SALPINGO-OOPHORECTOMY COMPL/PRTL   
UNI/BI SPX 2017  
Toenail Big Toe Left Foot removed   
10/2002  
TONSILLECTOMY PRIMARY/SECONDARY   
<AGE 13 2  
Tonsillectomy  
UNLISTED PROCEDURE HANDS/FINGERS   
2012  
CMC Arthoplasty on right hand  
  
FAMILY HISTORY  
Problem Relation Age of Onset  
Stroke Mother  
Diabetes Mother  
GI Father  
 from complications of gb   
surgery  
Arthritis Sister  
Systemic Lupus  
Cancer Maternal Uncle  
LUNG  
Cancer Other  
cousin with breast cancer/cousin   
with bladder ca.  
Stroke Sister  
  
SOCIAL HISTORY:  
Social History  
  
Tobacco Use  
Smoking status: Never  
Smokeless tobacco: Never  
Vaping Use  
Vaping status: Never Used  
Substance Use Topics  
Alcohol use: Yes  
Comment: 1-2 weekly  
Drug use: No  
  
MEDICATIONS:  
Prior to Admission medications as   
of 24 1120  
Medication Sig Last Dose Taking  
glipiZIDE (GLUCOTROL XL) 2.5 mg 24   
hr tablet Take 2.5 mg by mouth.   
Yes  
fluorometholone (FML LIQUID FILM)   
0.1 % ophthalmic suspension Use 1   
Drop in the right eye three times   
a day. Yes  
donepezil (ARICEPT) 10 mg tablet   
Take 10 mg by mouth. Yes  
cholecalciferol (VITAMIN D3) 50   
mcg (2,000 unit) tablet Take 2,000   
Units by mouth once daily. Yes  
losartan-hydroCHLOROthiazide   
(HYZAAR) 100-12.5 mg per tablet   
Take 1 tablet by mouth once daily.   
Yes  
omeprazole (PRILOSEC) 20 mg   
capsule Take 20 mg by mouth once   
daily. Yes  
potassium chloride (KLOR-CON 10)   
10 mEq tablet Take 10 mEq by mouth   
once daily.  
Yes  
metFORMIN ER (GLUCOPHAGE XR) 500   
mg 24 hr tablet Take two tablets   
by mouth once daily. Yes  
simvastatin(ZOCOR 20 MG TAB) Take   
one(1) tablet daily. Yes  
keTORolac (ACULAR) 0.5 %   
ophthalmic solution Use 1 Drop in   
the left eye three times a day.  
prednisoLONE acetate (PRED FORTE)   
1 % ophthalmic suspension Use 1   
Drop in the left eye three times a   
day.  
amLODIPine (NORVASC) 10 mg tablet   
Take 10 mg by mouth.  
  
No medication comments found.  
  
CURRENT ALLERGIES:  
ALLERGIES  
Allergen Reactions  
Adhesive Rash  
Redness  
Ambien [Zolpidem Ta* Rash  
Codeine Mental Status Change  
Neosporin [Neomycin* Rash  
Percocet [Oxycodone*  
Causes Vomitting  
  
REVIEW OF SYSTEMS:  
PAIN ASSESSMENT:  
General: No weight loss, malaise   
or fevers.  
Neuro: Dementia  
Respiratory: No history of current   
cough or dyspnea, or pneumonia in   
the past 6 weeks. No history of   
respiratory/pulmonary symptoms or   
problems  
Cardiovascular: Positive for:   
Hypertension, PVC  
GI: No history of GI symptoms or   
problems. No history of esophageal   
varices, recent ascites, or ETOH   
greater than 2 drinks per day.  
: No history of UTI in past 6   
weeks. No history of renal   
failure. Not currently on or   
requiring dialysis. No history of   
 symptoms or problems.  
GYN: Negative for abnormal vaginal   
bleeding, abnormal vaginal   
discharge.  
Pregnancy: Denies  
Endocrine: Diabetes Mellitus on   
oral agent  
Hematology: No history of bleeding   
or clotting disorder. Pt is not   
taking anti-coagulation or   
platelet medications. No history   
of hematological symptoms or   
problems.  
Oncology: No history of CA   
metastasis, chemo within 30 days,   
or radiotherapy within 90 days.   
Has not lost 10% of body wt in 6   
months. No history of oncological   
symptoms or problems.  
Psych: No history of psychiatric   
symptoms or problems.  
Musculoskeletal: Negative for   
joint pain or swelling, back pain   
or muscle pain.  
Skin: Negative for lesions, rash   
and itching.  
  
PHYSICAL EXAM:  
VITALS:  
/80   Pulse 88  
  
General: Alert and oriented  
Skin: Normal color, no rash, no   
lesions.  
HEENT: EOM, pupils equal, round   
and reactive.  
Cardiovascular: Normal S1 & S2, no   
rubs, murmurs or gallops. No JVD.   
Pulse regular.  
Lungs: Normal breath sounds, no   
wheezes or crackles.  
Abdomen: Soft, non-tender, no   
rigidity.  
Extremities: No deformity, no   
edema or tenderness, no joint   
swelling or clubbing.  
Neurological: Normal cognition and   
motor skills.  
Pulses: Carotid and radial pulses   
normal +2.  
  
Diagnostic tests reviewed for   
today's visit:  
No new labs or tests  
  
ASSESSMENT  
Medication and Non-Pharmacologic   
VTE Prophylaxis/Anticoagulants  
  
VTE Prophylaxis: VTE prophylaxis   
appropriate  
  
Impression: There is no known   
pertinent medical condition which   
may affect trinidad-operative course  
  
[unfilled]  
Clinical Risk Factors for Possible   
Cardiac Complications:  
None  
  
Patient is scheduled for a   
low-risk procedure.  
  
FUNCTIONAL STATUS: Walk indoors,   
such as around the house (1.75   
METs)  
  
Functional Class (NYHA): N/A  
  
[employer]: Not obtained  
  
PLAN  
CONSULTS:  
Patient does not require consults   
for optimization at this time.  
  
The Following Tests/Procedures   
Have Been Initiated:  
None  
  
Instructions Given to Patient:  
Patient given verbal and written   
preop instructions and voices   
comprehension and compliance.  
  
SIGNATURE: Ronel Hays MD   
PATIENT NAME: Marzena Mederos  
DATE: 2024 MRN:   
[id number]  
TIME: 11:26 AM PAGER/CONTACT #:  
  
Electronically signed by Ronel Hays MD at 2024 11:27   
AM EST  
Source Comments  
- [facility]  
In the event this information is   
protected by the Federal   
Confidentiality of Alcohol and   
Drug Abuse Patient Records   
regulations: This information has   
been disclosed to you from records   
protected by Federal   
confidentiality rules (42 CFR Part   
2). The Federal rules prohibit you   
from making any further disclosure   
of this information unless further   
disclosure is expressly permitted   
by the written consent of the   
person to whom it pertains or as   
otherwise permitted by 42 CFR Part   
2. A general authorization for the   
release of medical or other   
information is NOT sufficient for   
this purpose. The Federal rules   
restrict any use of the   
information to criminally   
investigate or prosecute any   
alcohol or drug abuse patient.  
Reason for Visit  
  
Reason for Visit -  
Reason Comments  
Blurred Vision Right Eye  
Difficulty Reading Right Eye  
Glare Right Eye  
  
Encounter Details  
  
Encounter Details  
Date Type Department Care Team   
(Latest Contact Info) Description  
2024 11:15 AM EST Office   
Visit [facility] Ophthalmology  
 [place] [address]  
[phone] Ronel Hays MD  
 [place] [address]  
[phone] (Work)  
[phone] (Fax) Combined forms   
of age-related cataract of right   
eye (Primary Dx);  
Status post cataract extraction   
and insertion of intraocular lens   
of left eye;  
Type 2 diabetes mellitus without   
retinopathy (HCC);  
Essential hypertension;  
Hypercholesteremia;  
Dementia, unspecified dementia   
severity, unspecified dementia   
type, unspecified whether   
behavioral, psychotic, or mood   
disturbance or anxiety (HCC)  
  
Social History  
- documented as of this encounter  
Social History  
Tobacco Use Types Packs/Day Years   
Used Date  
Smoking Tobacco: Never  
Smokeless Tobacco: Never  
  
Social History  
Alcohol Use Standard Drinks/Week   
Comments  
Yes 0 (1 standard drink = 0.6 oz   
pure alcohol) 1-2 weekly  
  
Social History  
PHQ-2 Answer Date Recorded  
PHQ-2 score 0 10/31/2019  
  
Social History  
Area Deprivation Index Answer Date   
Recorded  
National Score (1-100), lower   
number is lower risk 79 2023  
State Score (1-10), lower number   
is lower risk 7 2023  
Data from:   
https://www.neighborhoodatlas.[facility] Last address used   
for calculation [address]    
2023  
  
Social History  
Sex and Gender Information Value   
Date Recorded  
Sex Assigned at Birth Female   
2017 6:52 AM EST  
Gender Identity Female 2017   
6:52 AM EST  
Sexual Orientation Straight   
2017 6:52 AM EST  
  
Last Filed Vital Signs  
- documented in this encounter  
Last Filed Vital Signs  
Vital Sign Reading Time Taken   
Comments  
Blood Pressure 125/80 2024   
11:18 AM EST  
Pulse 88 2024 11:18 AM EST  
Temperature - -  
Respiratory Rate - -  
Oxygen Saturation - -  
Inhaled Oxygen Concentration - -  
Weight - -  
Height - -  
Body Mass Index - -  
  
Functional Status  
- documented as of this encounter  
Functional Status  
Functional Status Response Date of   
Assessment  
Are you deaf or do you have   
serious difficulty hearing? No   
2017  
Are you blind or do you have   
serious difficulty seeing, even   
when wearing glasses? No   
2017  
Do you have serious difficulty   
walking or climbing stairs? No   
2017  
Do you have difficulty dressing or   
bathing? No 2017  
Because of a physical, mental, or   
emotional condition, do you have   
difficulty doing errands alone   
such as visiting a doctor's office   
or shopping? No 2017  
  
Functional Status  
Cognitive Status Response Date of   
Assessment  
Because of a physical, mental, or   
emotional condition, do you have   
serious difficulty concentrating,   
remembering, or making decisions?   
No 2017  
  
Patient Instructions  
- documented in this encounter  
Patient Instructions  
Ronel Hays MD - 2024   
11:23 AM EST  
Formatting of this note is   
different from the original.  
Plan Cataract Surgery with   
Monofocal Intraocular Lens Implant   
Right Eye on 2024 at   
[facility].  
  
Current Ophthalmic Meds  
  
fluorometholone (FML LIQUID FILM)   
0.1 % ophthalmic suspension Use 1   
Drop in the right eye three times   
a day.  
  
Current Ophthalmic Meds  
  
prednisoLONE acetate (PRED FORTE)   
1 % ophthalmic suspension Use 1   
Drop in the left eye three times   
daily.  
keTORolac (ACULAR) 0.5 %   
ophthalmic solution Use 1 Drop in   
the left eye three times daily.  
  
Continue:  
Systane Complete solution instill   
1 drop 3 times daily Both Eyes.  
  
If you have any questions please   
contact our office at   
[phone].  
After office hours or on the   
weekend, please call Dr. Hays on   
his cell phone at [phone].  
  
Electronically signed by Ronel Hays MD at 2024 11:23   
AM EST  
  
Ordered Prescriptions  
- documented in this encounter  
Reconcile with Patient's Chart  
Ordered Prescriptions  
Prescription Sig Dispensed Refills   
Start Date End Date  
prednisoLONE acetate (PRED FORTE)   
1 % ophthalmic suspension Use 1   
Drop in the left eye three times a   
day. 10 mL 2 2024  
keTORolac (ACULAR) 0.5 %   
ophthalmic solution Use 1 Drop in   
the left eye three times a day. 5   
mL 2 2024  
  
Progress Notes  
- documented in this encounter  
Ronel Hays MD - 2024   
11:20 AM EST  
Formatting of this note is   
different from the original.  
ASSESSMENT/PLAN:  
1. 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   
cataract of right eye - ICD9:   
366.19, ICD10: H25.811 (primary   
diagnosis)  
  
Cataract Presurgical Documentation  
  
Cataract: Right Eye  
  
Current Visual Acuity  
Right Eye Distance CC 20/40  
Left Eye Distance SC 20/20  
  
Glare Testing:  
Right Eye Medium 20/80  
  
Visual Function: Marzena Mederos   
states that the decline in vision   
from the cataract impedes her   
abilities as listed in the HPI, as   
well as other activities of daily   
living.  
  
Marzena NUNEZ Rosa M has confirmed that   
she is no longer able to function   
adequately on a day-to-day basis   
because of her current visual   
condition.  
  
Further, it is my medical opinion   
that the cataract is the primary   
cause, or at least a significantly   
contributory cause of her visual   
dysfunction. With uncomplicated   
cataract surgery and lens   
implantation, it is my expectation   
that her visual function and   
quality of life will improve,   
significantly.  
  
The risks, benefits, alternatives,   
personnel and complications of   
cataract surgery with lens   
implantation were discussed with   
Marzena Mederos in detail. She   
appeared to understand and asked   
that I proceed with plans for   
surgery.  
  
Upon eye examination, patient was   
found to have a visually   
significant cataract Right Eye.   
Discussed cataract surgery with   
patient and different intraocular   
lens implant options with patient:   
basic monofocal intraocular lens   
implant, Toric intraocular lens   
implant, and presbyopia correction   
intraocular lens implant. In my   
medical opinion, based on medical   
history and ocular examination,   
cataract surgery with intraocular   
lens implant will correct   
patient's vision and improve   
quality of patient's daily living   
activities. Patient wishes to have   
traditional cataract surgery with   
basic intraocular lens Right Eye.   
Patient wishes to have cataract   
surgery with the option stated   
above. Patient understands that an   
intraocular lens implant does not   
necessarily replace the need for   
glasses. Patient understands that   
it is impossible for the surgeon   
to inform him/her of every   
possible complication that may   
occur. The surgeon has answered   
all of the patient's questions.   
Patient understands that if he/she   
has a mature or dense cataract,   
pseudoexfoliation cataract, or   
history of use of Flomax, he/she   
may require the use of [place]   
Ring and/or Vision Blue during   
surgery. Patient understands the   
risks, benefits, and alternatives   
to surgery.  
  
Patient would like Right Eye   
corrected for near, target -1.50.   
Patient and patient's    
verbalized understanding that   
patient will need glasses for best   
corrected distance, intermediate,   
and near vision and   
post-operatively Right Eye patient   
will be unable to see at distance.  
  
Plan Cataract Surgery with   
Monofocal Intraocular Lens Implant   
Right Eye on 2024 at   
[facility].  
  
Current Ophthalmic Meds  
  
fluorometholone (FML LIQUID FILM)   
0.1 % ophthalmic suspension Use 1   
Drop in the right eye three times   
a day.  
  
Continue:  
Systane Complete solution instill   
1 drop 3 times daily Both Eyes.  
  
PHYSICAL EXAM:  
  
Vital Signs:  
Blood pressure 125/80, pulse 88.  
  
Respiratory:  
Normal breath sounds, no wheezing.  
  
CARD:  
Normal heart sounds 1 & 2, normal   
sinus rhythm.  
  
2. Status post cataract extraction   
and insertion of intraocular lens   
of left eye - ICD9: V45.61, V43.1,   
ICD10: Z98.42, Z96.1  
  
Current Ophthalmic Meds  
  
prednisoLONE acetate (PRED FORTE)   
1 % ophthalmic suspension Use 1   
Drop in the left eye three times   
daily.  
keTORolac (ACULAR) 0.5 %   
ophthalmic solution Use 1 Drop in   
the left eye three times daily.  
  
Continue:  
Systane Complete solution instill   
1 drop 3 times daily Both Eyes.  
  
3. Type 2 diabetes mellitus   
without retinopathy (HCC) - ICD9:   
250.00, ICD10: E11.9  
  
Please keep your blood sugar under   
good control to minimize risk of   
ocular complications from   
diabetes.  
  
Continue to monitor with primary   
care physician.  
  
4. Essential hypertension - ICD9:   
401.9, ICD10: I10  
  
Continue to monitor with primary   
care physician.  
  
5. Hypercholesteremia - ICD9:   
272.0, ICD10: E78.00  
  
Continue to monitor with primary   
care physician.  
  
6. Dementia, unspecified dementia   
severity, unspecified dementia   
type, unspecified whether   
behavioral, psychotic, or mood   
disturbance or anxiety (HCC) -   
ICD9: 294.20, ICD10: F03.90  
  
Continue to monitor with primary   
care physician.  
  
I have confirmed and edited as   
necessary the relevant HPI,   
ophthalmic history, ROS, and the   
neuro exam findings as obtained by   
others. I have seen and examined   
Marzena Mederos.  
I have discussed the case and the   
management of this patient's care   
with the Resident/Fellow, if   
applicable. I also have reviewed   
and agree with the assessment and   
plan as stated above and agree   
with all of its relevant   
components.  
  
Electronically signed by Ronel Hays MD at 2024 11:27   
AM EST  
Plan of Treatment  
- documented as of this encounter  
Plan of Treatment - Upcoming   
Encounters  
Upcoming Encounters  
Date Type Department Care Team   
(Latest Contact Info) Description  
2024 2:15 PM EST Office   
Visit [facility] Ophthalmology  
 [place] [address]  
[phone] Ronel Hays MD  
 [place] [address]  
[phone] (Work)  
[phone] (Fax) 1 DAY PO 2ND RE   
BASIC  
2025 12:50 PM EST   
Appointment Mammogram  
[address] [place] [address]  
[phone] Encounter for   
screening mammogram for breast   
cancer [Z12.31]  
2025 1:20 PM EST Office   
Visit OB/Gynecology  
[address] [place] [place] [address]  
[phone] Jose Delgado MD  
[address] [place] [place] [address]  
[phone] (Work)  
[phone] (Fax) Annual  
2025 11:30 AM EST Visit (SP)   
Office Hematology/Oncology  
[address] [place] [address]  
[phone] Colten Abbott DO  
[address] [place] [address]  
[phone] (Work)  
[phone] (Fax) 1 YR OV/MAMM   
*  
  
Plan of Treatment - Scheduled   
Procedures  
Scheduled Procedures  
Name Priority Associated Diagnoses   
Date/Time  
SURGERY AT NON[facility] FACILITY   
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   
cataract of right eye 2024   
10:10 AM EST  
  
Visit Diagnoses  
- documented in this encounter  
Visit Diagnoses  
Diagnosis  
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   
cataract of right eye - Primary  
Other and combined forms of senile   
cataract  
Status post cataract extraction   
and insertion of intraocular lens   
of left eye  
Type 2 diabetes mellitus without   
retinopathy (HCC)  
Type II or unspecified type   
diabetes mellitus without mention   
of complication, not stated as   
uncontrolled  
Essential hypertension  
Unspecified essential hypertension  
Hypercholesteremia  
Pure hypercholesterolemia  
Dementia, unspecified dementia   
severity, unspecified dementia   
type, unspecified whether   
behavioral, psychotic, or mood   
disturbance or anxiety (HCC)  
  
Discontinued Medications  
- documented as of this encounter  
Discontinued Medications  
Medication Sig Discontinue Reason   
Start Date End Date  
prednisoLONE acetate (PRED FORTE)   
1 % ophthalmic suspension Use 1   
Drop in the left eye four times   
daily. 2024  
keTORolac (ACULAR) 0.5 %   
ophthalmic solution Use 1 Drop in   
the left eye four times daily.   
2024  
  
Eye Exam  
  
Eye Exam - Visual Acuity (Demarco   
Cards)  
Visual Acuity (Demarco Cards)  
Right eye Left eye  
Dist sc 20/20  
Dist cc 20/40  
Near cc J6  
  
Eye Exam - Tonometry (Applanation,   
11:19 AM)  
Tonometry (Applanation, 11:19 AM)  
Right eye Left eye  
Pressure 14 14  
  
Eye Exam - Pupils  
Pupils  
Pupils Dark Light Shape APD  
Right eye PERRL 4 2 Round None  
Left eye PERRL 4 2 Round None  
  
Eye Exam - Visual Fields  
Visual Fields  
Right eye Left eye  
Full Full  
  
Eye Exam - Extraocular Movement  
Extraocular Movement  
Right eye Left eye  
Full Full  
  
Eye Exam - Neuro/Psych  
Neuro/Psych  
Oriented x3: Yes  
Mood/Affect: Normal  
  
Eye Exam - Glare Testing  
Glare Testing  
Medium  
Right eye 20/80  
Left eye  
  
Eye Exam - External Exam  
External Exam  
Right eye Left eye  
External Normal including orbits   
and preauricular lymph nodes   
Normal including orbits and   
preauricular lymph nodes  
  
Eye Exam - Slit Lamp Exam  
Slit Lamp Exam  
Right eye Left eye  
Lids/Lashes Normal lids, lashes,   
lacrimal glands, and lacrimal   
drainage Normal lids, lashes,   
lacrimal glands, and lacrimal   
drainage  
Conjunctiva/Sclera White and quiet   
White and quiet  
Cornea Normal epithelium, stroma,   
endothelium, and tear film Normal   
epithelium, stroma, endothelium,   
and tear film  
Anterior Chamber Deep and quiet   
Deep and quiet  
Iris Round and reactive Round and   
reactive  
Lens 2+ Nuclear sclerotic   
cataract, 2+ Posterior subcapsular   
cataract Posterior chamber   
intraocular lens  
Anterior Vitreous Normal Normal  
  
Eye Exam - Fundus Exam  
Fundus Exam  
Right eye Left eye  
Disc Normal Normal  
C/D Ratio 0.3 0.3  
Macula Normal Normal  
Vessels Normal Normal  
Periphery Normal Normal  
  
Care Teams  
- documented as of this encounter  
Care Teams  
Team Member Relationship Specialty   
Start Date End Date  
Sharifa Almaguer DO  
NPI: [id number]  
[address] [place] [address]  
[phone] (Work)  
[phone] (Fax) PCP - General   
Internal Medicine 10/25/23  
Herbie Mills MD  
NPI: [id number]  
[address] [place] [address]  
[phone] (Work)  
[phone] (Fax) Physician   
Radiation Oncology 18  
  
Electronically signed by Ronel Hays MD at 2024 10:15 AM   
EST  
Formatting of this note is   
different from the original.  
H&P Notes  
- documented in this encounter  
Ronel Hays MD - 2024   
11:26 AM EST  
Formatting of this note is   
different from the original.  
HISTORY AND PHYSICAL EXAMINATION  
  
SERVICE DATE: 2024  
SERVICE TIME: 11:26 AM  
  
PRIMARY CARE PHYSICIAN: Sharifa Almaguer DO  
  
REASON FOR VISIT: Marzena Mederos   
is a 79 year old female who is   
being seen for Combined   
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  
  
The patient has the following:  
ACTIVE PROBLEM LIST  
Postmenopausal Atrophic Vaginitis  
Unspecified Transient Cerebral   
Ischemia  
Carcinoma in Situ of Vulva  
Malignant Neoplasm of Female   
Breast (Hcc)  
Personal History of Malignant   
Neoplasm of Breast  
Senile Nuclear Sclerosis  
Hypermetropia  
Regular Astigmatism  
Presbyopia  
Invasive Ductal Carcinoma of   
Breast, Female (Hcc)  
Ocular Migraine  
Controlled Type 2 Diabetes   
Mellitus Without Complication,   
Without Long-Term Current Use of   
Insulin (Hcc)  
Postmenopausal Bleeding  
S/P Hysterectomy With Oophorectomy  
  
SUBJECTIVE  
CHIEF COMPLAINT: Blurred vision   
for distance and near Right Eye.  
  
PAST MEDICAL HISTORY  
Diagnosis Date  
Breast cancer (HCC)   
invasive ductal carcinoma,   
completed radiation 3/7/11  
Cancer of endometrium (HCC)  
Carcinoma in situ, vulva   
Cellulitis  
of right eye muscle  
Diabetes mellitus without mention   
of complication  
Diabetes mellitus, type II (HCC)  
Diaphragmatic hernia without   
mention of obstruction or gangrene  
Hiatal hernia  
Dyspareunia  
Elevated cholesterol  
Esophageal reflux  
Insomnia, unspecified  
Orbital cellulitis  
Other corneal disorder  
Rt eye corneal erosion .  
PMH - PAST MEDICAL HISTORY OF  
?VESTIBULITIS  
PMH - PAST MEDICAL HISTORY OF  
CLIMACTERIC  
PMH - PAST MEDICAL HISTORY OF   
2006  
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  
Pure hypercholesterolemia  
Rib fracture  
6th Rib  
Senile dementia (HCC)  
Stomatitis and mucositis   
(ulcerative)  
Chronic ulcerative stomatitis on   
mucosal biopsy  
Unspecified essential hypertension  
  
PAST SURGICAL HISTORY  
Procedure Laterality Date  
Bunions bilateral feet removed   
10/2002  
BX/EXC LYMPH NODE OPEN DEEP   
AXILLARY NODE 2010  
RIGHT BREAST LUMP W/ SLN BX  
CHOLECYSTECTOMY 2004  
Cholecystectomy  
COLONOSCOPY FLX DX W/COLLJ SPEC   
WHEN PFRMD   
Colonoscopy  
COLPOSCOPY CERVIX UPPER/ADJACENT   
VAGINA 2009  
Colposcopy of the vulva  
COLPOSCOPY, VULVA 10/2009  
CORRECT BUNION,SIMPLE  
Bunion  
DILATION & CURETTAGE DX&/THER   
NONOBSTETRIC 1970's  
SAB  
HYSTERECTOMY 2017  
LAPAROSCOPY SURG CHOLECYSTECTOMY   
2004  
Cholecystectomy, lap  
MAMMO STEREOTACTIC CORE BIOPSY RT   
10/10/2013  
MASTECTOMY, PARTIAL 2010  
RIGHT BREAST  
PAST SURGICAL HISTORY OF 2006  
partial vulvectomy/sq. cell ca in   
situ  
PAST SURGICAL HISTORY OF   
right eye cornea  
PAST SURGICAL HISTORY OF   
right eye revision  
PAST SURGICAL HISTORY OF  
squamous cell carcinoma right arm  
REMV CATARACT EXTRACAP,INSERT LENS   
10/31/2024  
CCA0T0 - +19.5D  
SALPINGO-OOPHORECTOMY COMPL/PRTL   
UNI/BI SPX 2017  
Toenail Big Toe Left Foot removed   
10/2002  
TONSILLECTOMY PRIMARY/SECONDARY   
<AGE 13   
Tonsillectomy  
UNLISTED PROCEDURE HANDS/FINGERS   
2012  
CMC Arthoplasty on right hand  
  
FAMILY HISTORY  
Problem Relation Age of Onset  
Stroke Mother  
Diabetes Mother  
GI Father  
 from complications of gb   
surgery  
Arthritis Sister  
Systemic Lupus  
Cancer Maternal Uncle  
LUNG  
Cancer Other  
cousin with breast cancer/cousin   
with bladder ca.  
Stroke Sister  
  
SOCIAL HISTORY:  
Social History  
  
Tobacco Use  
Smoking status: Never  
Smokeless tobacco: Never  
Vaping Use  
Vaping status: Never Used  
Substance Use Topics  
Alcohol use: Yes  
Comment: 1-2 weekly  
Drug use: No  
  
MEDICATIONS:  
Prior to Admission medications as   
of 24 1120  
Medication Sig Last Dose Taking  
glipiZIDE (GLUCOTROL XL) 2.5 mg 24   
hr tablet Take 2.5 mg by mouth.   
Yes  
fluorometholone (FML LIQUID FILM)   
0.1 % ophthalmic suspension Use 1   
Drop in the right eye three times   
a day. Yes  
donepezil (ARICEPT) 10 mg tablet   
Take 10 mg by mouth. Yes  
cholecalciferol (VITAMIN D3) 50   
mcg (2,000 unit) tablet Take 2,000   
Units by mouth once daily. Yes  
losartan-hydroCHLOROthiazide   
(HYZAAR) 100-12.5 mg per tablet   
Take 1 tablet by mouth once daily.   
Yes  
omeprazole (PRILOSEC) 20 mg   
capsule Take 20 mg by mouth once   
daily. Yes  
potassium chloride (KLOR-CON 10)   
10 mEq tablet Take 10 mEq by mouth   
once daily.  
Yes  
metFORMIN ER (GLUCOPHAGE XR) 500   
mg 24 hr tablet Take two tablets   
by mouth once daily. Yes  
simvastatin(ZOCOR 20 MG TAB) Take   
one(1) tablet daily. Yes  
keTORolac (ACULAR) 0.5 %   
ophthalmic solution Use 1 Drop in   
the left eye three times a day.  
prednisoLONE acetate (PRED FORTE)   
1 % ophthalmic suspension Use 1   
Drop in the left eye three times a   
day.  
amLODIPine (NORVASC) 10 mg tablet   
Take 10 mg by mouth.  
  
No medication comments found.  
  
CURRENT ALLERGIES:  
ALLERGIES  
Allergen Reactions  
Adhesive Rash  
Redness  
Ambien [Zolpidem Ta* Rash  
Codeine Mental Status Change  
Neosporin [Neomycin* Rash  
Percocet [Oxycodone*  
Causes Vomitting  
  
REVIEW OF SYSTEMS:  
PAIN ASSESSMENT:  
General: No weight loss, malaise   
or fevers.  
Neuro: Dementia  
Respiratory: No history of current   
cough or dyspnea, or pneumonia in   
the past 6 weeks. No history of   
respiratory/pulmonary symptoms or   
problems  
Cardiovascular: Positive for:   
Hypertension, PVC  
GI: No history of GI symptoms or   
problems. No history of esophageal   
varices, recent ascites, or ETOH   
greater than 2 drinks per day.  
: No history of UTI in past 6   
weeks. No history of renal   
failure. Not currently on or   
requiring dialysis. No history of   
 symptoms or problems.  
GYN: Negative for abnormal vaginal   
bleeding, abnormal vaginal   
discharge.  
Pregnancy: Denies  
Endocrine: Diabetes Mellitus on   
oral agent  
Hematology: No history of bleeding   
or clotting disorder. Pt is not   
taking anti-coagulation or   
platelet medications. No history   
of hematological symptoms or   
problems.  
Oncology: No history of CA   
metastasis, chemo within 30 days,   
or radiotherapy within 90 days.   
Has not lost 10% of body wt in 6   
months. No history of oncological   
symptoms or problems.  
Psych: No history of psychiatric   
symptoms or problems.  
Musculoskeletal: Negative for   
joint pain or swelling, back pain   
or muscle pain.  
Skin: Negative for lesions, rash   
and itching.  
  
PHYSICAL EXAM:  
VITALS:  
/80   Pulse 88  
  
General: Alert and oriented  
Skin: Normal color, no rash, no   
lesions.  
HEENT: EOM, pupils equal, round   
and reactive.  
Cardiovascular: Normal S1 & S2, no   
rubs, murmurs or gallops. No JVD.   
Pulse regular.  
Lungs: Normal breath sounds, no   
wheezes or crackles.  
Abdomen: Soft, non-tender, no   
rigidity.  
Extremities: No deformity, no   
edema or tenderness, no joint   
swelling or clubbing.  
Neurological: Normal cognition and   
motor skills.  
Pulses: Carotid and radial pulses   
normal +2.  
  
Diagnostic tests reviewed for   
today's visit:  
No new labs or tests  
  
ASSESSMENT  
Medication and Non-Pharmacologic   
VTE Prophylaxis/Anticoagulants  
  
VTE Prophylaxis: VTE prophylaxis   
appropriate  
  
Impression: There is no known   
pertinent medical condition which   
may affect trinidad-operative course  
  
[unfilled]  
Clinical Risk Factors for Possible   
Cardiac Complications:  
None  
  
Patient is scheduled for a   
low-risk procedure.  
  
FUNCTIONAL STATUS: Walk indoors,   
such as around the house (1.75   
METs)  
  
Functional Class (NYHA): N/A  
  
[facility]: Not obtained  
  
PLAN  
CONSULTS:  
Patient does not require consults   
for optimization at this time.  
  
The Following Tests/Procedures   
Have Been Initiated:  
None  
  
Instructions Given to Patient:  
Patient given verbal and written   
preop instructions and voices   
comprehension and compliance.  
  
SIGNATURE: Ronel Hays MD   
PATIENT NAME: Marzena Mederos  
DATE: 2024 MRN:   
[id number]  
TIME: 11:26 AM PAGER/CONTACT #:  
  
Electronically signed by Ronel Hays MD at 2024 11:27   
AM EST  
Source Comments  
- [facility]  
In the event this information is   
protected by the Federal   
Confidentiality of Alcohol and   
Drug Abuse Patient Records   
regulations: This information has   
been disclosed to you from records   
protected by Federal   
confidentiality rules (42 CFR Part   
2). The Federal rules prohibit you   
from making any further disclosure   
of this information unless further   
disclosure is expressly permitted   
by the written consent of the   
person to whom it pertains or as   
otherwise permitted by 42 CFR Part   
2. A general authorization for the   
release of medical or other   
information is NOT sufficient for   
this purpose. The Federal rules   
restrict any use of the   
information to criminally   
investigate or prosecute any   
alcohol or drug abuse patient.  
Reason for Visit  
  
Reason for Visit -  
Reason Comments  
Blurred Vision Right Eye  
Difficulty Reading Right Eye  
Glare Right Eye  
  
Encounter Details  
  
Encounter Details  
Date Type Department Care Team   
(Latest Contact Info) Description  
2024 11:15 AM EST Office   
Visit [facility] Ophthalmology  
[address] [place], [place] [id number]  
[phone] Ronel Hays MD  
[address] [place] [address]  
[phone] (Work)  
[phone] (Fax) Combined forms   
of age-related cataract of right   
eye (Primary Dx);  
Status post cataract extraction   
and insertion of intraocular lens   
of left eye;  
Type 2 diabetes mellitus without   
retinopathy (HCC);  
Essential hypertension;  
Hypercholesteremia;  
Dementia, unspecified dementia   
severity, unspecified dementia   
type, unspecified whether   
behavioral, psychotic, or mood   
disturbance or anxiety (HCC)  
  
Social History  
- documented as of this encounter  
Social History  
Tobacco Use Types Packs/Day Years   
Used Date  
Smoking Tobacco: Never  
Smokeless Tobacco: Never  
  
Social History  
Alcohol Use Standard Drinks/Week   
Comments  
Yes 0 (1 standard drink = 0.6 oz   
pure alcohol) 1-2 weekly  
  
Social History  
PHQ-2 Answer Date Recorded  
PHQ-2 score 0 10/31/2019  
  
Social History  
Area Deprivation Index Answer Date   
Recorded  
National Score (1-100), lower   
number is lower risk 79 2023  
State Score (1-10), lower number   
is lower risk 7 2023  
Data from:   
https://www.neighborhoodatlas.[facility] Last address used   
for calculation [address]   
2023  
  
Social History  
Sex and Gender Information Value   
Date Recorded  
Sex Assigned at Birth Female   
2017 6:52 AM EST  
Gender Identity Female 2017   
6:52 AM EST  
Sexual Orientation Straight   
2017 6:52 AM EST  
  
Last Filed Vital Signs  
- documented in this encounter  
Last Filed Vital Signs  
Vital Sign Reading Time Taken   
Comments  
Blood Pressure 125/80 2024   
11:18 AM EST  
Pulse 88 2024 11:18 AM EST  
Temperature - -  
Respiratory Rate - -  
Oxygen Saturation - -  
Inhaled Oxygen Concentration - -  
Weight - -  
Height - -  
Body Mass Index - -  
  
Functional Status  
- documented as of this encounter  
Functional Status  
Functional Status Response Date of   
Assessment  
Are you deaf or do you have   
serious difficulty hearing? No   
2017  
Are you blind or do you have   
serious difficulty seeing, even   
when wearing glasses? No   
2017  
Do you have serious difficulty   
walking or climbing stairs? No   
2017  
Do you have difficulty dressing or   
bathing? No 2017  
Because of a physical, mental, or   
emotional condition, do you have   
difficulty doing errands alone   
such as visiting a doctor's office   
or shopping? No 2017  
  
Functional Status  
Cognitive Status Response Date of   
Assessment  
Because of a physical, mental, or   
emotional condition, do you have   
serious difficulty concentrating,   
remembering, or making decisions?   
No 2017  
  
Patient Instructions  
- documented in this encounter  
Patient Instructions  
Ronel Hays MD - 2024   
11:23 AM EST  
Formatting of this note is   
different from the original.  
Plan Cataract Surgery with   
Monofocal Intraocular Lens Implant   
Right Eye on 2024 at   
[facility].  
  
Current Ophthalmic Meds  
  
fluorometholone (FML LIQUID FILM)   
0.1 % ophthalmic suspension Use 1   
Drop in the right eye three times   
a day.  
  
Current Ophthalmic Meds  
  
prednisoLONE acetate (PRED FORTE)   
1 % ophthalmic suspension Use 1   
Drop in the left eye three times   
daily.  
keTORolac (ACULAR) 0.5 %   
ophthalmic solution Use 1 Drop in   
the left eye three times daily.  
  
Continue:  
Systane Complete solution instill   
1 drop 3 times daily Both Eyes.  
  
If you have any questions please   
contact our office at   
[phone].  
After office hours or on the   
weekend, please call Dr. Hays on   
his cell phone at [phone].  
  
Electronically signed by Ronel Hays MD at 2024 11:23   
AM EST  
  
Ordered Prescriptions  
- documented in this encounter  
Reconcile with Patient's Chart  
Ordered Prescriptions  
Prescription Sig Dispensed Refills   
Start Date End Date  
prednisoLONE acetate (PRED FORTE)   
1 % ophthalmic suspension Use 1   
Drop in the left eye three times a   
day. 10 mL 2 2024  
keTORolac (ACULAR) 0.5 %   
ophthalmic solution Use 1 Drop in   
the left eye three times a day. 5   
mL 2 2024  
  
Progress Notes  
- documented in this encounter  
Ronel Hays MD - 2024   
11:20 AM EST  
Formatting of this note is   
different from the original.  
ASSESSMENT/PLAN:  
1. 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   
cataract of right eye - ICD9:   
366.19, ICD10: H25.811 (primary   
diagnosis)  
  
Cataract Presurgical Documentation  
  
Cataract: Right Eye  
  
Current Visual Acuity  
Right Eye Distance CC 20/40  
Left Eye Distance SC 20/20  
  
Glare Testing:  
Right Eye Medium 20/80  
  
Visual Function: Marzena Mederos   
states that the decline in vision   
from the cataract impedes her   
abilities as listed in the HPI, as   
well as other activities of daily   
living.  
  
Marzena NUNEZ Rosa M has confirmed that   
she is no longer able to function   
adequately on a day-to-day basis   
because of her current visual   
condition.  
  
Further, it is my medical opinion   
that the cataract is the primary   
cause, or at least a significantly   
contributory cause of her visual   
dysfunction. With uncomplicated   
cataract surgery and lens   
implantation, it is my expectation   
that her visual function and   
quality of life will improve,   
significantly.  
  
The risks, benefits, alternatives,   
personnel and complications of   
cataract surgery with lens   
implantation were discussed with   
Marzena MAYRA Rosa M in detail. She   
appeared to understand and asked   
that I proceed with plans for   
surgery.  
  
Upon eye examination, patient was   
found to have a visually   
significant cataract Right Eye.   
Discussed cataract surgery with   
patient and different intraocular   
lens implant options with patient:   
basic monofocal intraocular lens   
implant, Toric intraocular lens   
implant, and presbyopia correction   
intraocular lens implant. In my   
medical opinion, based on medical   
history and ocular examination,   
cataract surgery with intraocular   
lens implant will correct   
patient's vision and improve   
quality of patient's daily living   
activities. Patient wishes to have   
traditional cataract surgery with   
basic intraocular lens Right Eye.   
Patient wishes to have cataract   
surgery with the option stated   
above. Patient understands that an   
intraocular lens implant does not   
necessarily replace the need for   
glasses. Patient understands that   
it is impossible for the surgeon   
to inform him/her of every   
possible complication that may   
occur. The surgeon has answered   
all of the patient's questions.   
Patient understands that if he/she   
has a mature or dense cataract,   
pseudoexfoliation cataract, or   
history of use of Flomax, he/she   
may require the use of [place]   
Ring and/or Vision Blue during   
surgery. Patient understands the   
risks, benefits, and alternatives   
to surgery.  
  
Patient would like Right Eye   
corrected for near, target -1.50.   
Patient and patient's    
verbalized understanding that   
patient will need glasses for best   
corrected distance, intermediate,   
and near vision and   
post-operatively Right Eye patient   
will be unable to see at distance.  
  
Plan Cataract Surgery with   
Monofocal Intraocular Lens Implant   
Right Eye on 2024 at   
[facility].  
  
Current Ophthalmic Meds  
  
fluorometholone (FML LIQUID FILM)   
0.1 % ophthalmic suspension Use 1   
Drop in the right eye three times   
a day.  
  
Continue:  
Systane Complete solution instill   
1 drop 3 times daily Both Eyes.  
  
PHYSICAL EXAM:  
  
Vital Signs:  
Blood pressure 125/80, pulse 88.  
  
Respiratory:  
Normal breath sounds, no wheezing.  
  
CARD:  
Normal heart sounds 1 & 2, normal   
sinus rhythm.  
  
2. Status post cataract extraction   
and insertion of intraocular lens   
of left eye - ICD9: V45.61, V43.1,   
ICD10: Z98.42, Z96.1  
  
Current Ophthalmic Meds  
  
prednisoLONE acetate (PRED FORTE)   
1 % ophthalmic suspension Use 1   
Drop in the left eye three times   
daily.  
keTORolac (ACULAR) 0.5 %   
ophthalmic solution Use 1 Drop in   
the left eye three times daily.  
  
Continue:  
Systane Complete solution instill   
1 drop 3 times daily Both Eyes.  
  
3. Type 2 diabetes mellitus   
without retinopathy (HCC) - ICD9:   
250.00, ICD10: E11.9  
  
Please keep your blood sugar under   
good control to minimize risk of   
ocular complications from   
diabetes.  
  
Continue to monitor with primary   
care physician.  
  
4. Essential hypertension - ICD9:   
401.9, ICD10: I10  
  
Continue to monitor with primary   
care physician.  
  
5. Hypercholesteremia - ICD9:   
272.0, ICD10: E78.00  
  
Continue to monitor with primary   
care physician.  
  
6. Dementia, unspecified dementia   
severity, unspecified dementia   
type, unspecified whether   
behavioral, psychotic, or mood   
disturbance or anxiety (HCC) -   
ICD9: 294.20, ICD10: F03.90  
  
Continue to monitor with primary   
care physician.  
  
I have confirmed and edited as   
necessary the relevant HPI,   
ophthalmic history, ROS, and the   
neuro exam findings as obtained by   
others. I have seen and examined   
Marzena Mederos.  
I have discussed the case and the   
management of this patient's care   
with the Resident/Fellow, if   
applicable. I also have reviewed   
and agree with the assessment and   
plan as stated above and agree   
with all of its relevant   
components.  
  
Electronically signed by Ronel Hays MD at 2024 11:27   
AM EST  
Plan of Treatment  
- documented as of this encounter  
Plan of Treatment - Upcoming   
Encounters  
Upcoming Encounters  
Date Type Department Care Team   
(Latest Contact Info) Description  
2024 2:15 PM EST Office   
Visit [facility] Ophthalmology  
 [place] [id number]  
[phone] Ronel Hays MD  
[address] [place] [address]  
[phone] (Work)  
[phone] (Fax) 1 DAY PO 2ND RE   
BASIC  
2025 12:50 PM EST   
Appointment Mammogram  
[address] [place] [address]  
[phone] Encounter for   
screening mammogram for breast   
cancer [Z12.31]  
2025 1:20 PM EST Office   
Visit OB/Gynecology  
[address] [place] [address]  
[phone] Jose Delgado MD  
[address] [place] [address]  
[phone] (Work)  
[phone] (Fax) Annual  
2025 11:30 AM EST Visit (SP)   
Office Hematology/Oncology  
[address] [place] [address]  
[phone] Colten Abbott DO  
[address] [place] [place] [address]  
[phone] (Work)  
[phone] (Fax) 1 YR OV/MAMM   
*  
  
Plan of Treatment - Scheduled   
Procedures  
Scheduled Procedures  
Name Priority Associated Diagnoses   
Date/Time  
SURGERY AT NON[facility] FACILITY   
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   
cataract of right eye 2024   
10:10 AM EST  
  
Visit Diagnoses  
- documented in this encounter  
Visit Diagnoses  
Diagnosis  
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   
cataract of right eye - Primary  
Other and combined forms of senile   
cataract  
Status post cataract extraction   
and insertion of intraocular lens   
of left eye  
Type 2 diabetes mellitus without   
retinopathy (HCC)  
Type II or unspecified type   
diabetes mellitus without mention   
of complication, not stated as   
uncontrolled  
Essential hypertension  
Unspecified essential hypertension  
Hypercholesteremia  
Pure hypercholesterolemia  
Dementia, unspecified dementia   
severity, unspecified dementia   
type, unspecified whether   
behavioral, psychotic, or mood   
disturbance or anxiety (HCC)  
  
Discontinued Medications  
- documented as of this encounter  
Discontinued Medications  
Medication Sig Discontinue Reason   
Start Date End Date  
prednisoLONE acetate (PRED FORTE)   
1 % ophthalmic suspension Use 1   
Drop in the left eye four times   
daily. 2024  
keTORolac (ACULAR) 0.5 %   
ophthalmic solution Use 1 Drop in   
the left eye four times daily.   
2024  
  
Eye Exam  
  
Eye Exam - Visual Acuity (Demarco   
Cards)  
Visual Acuity (Demarco Cards)  
Right eye Left eye  
Dist sc 20/20  
Dist cc 20/40  
Near cc J6  
  
Eye Exam - Tonometry (Applanation,   
11:19 AM)  
Tonometry (Applanation, 11:19 AM)  
Right eye Left eye  
Pressure 14 14  
  
Eye Exam - Pupils  
Pupils  
Pupils Dark Light Shape APD  
Right eye PERRL 4 2 Round None  
Left eye PERRL 4 2 Round None  
  
Eye Exam - Visual Fields  
Visual Fields  
Right eye Left eye  
Full Full  
  
Eye Exam - Extraocular Movement  
Extraocular Movement  
Right eye Left eye  
Full Full  
  
Eye Exam - Neuro/Psych  
Neuro/Psych  
Oriented x3: Yes  
Mood/Affect: Normal  
  
Eye Exam - Glare Testing  
Glare Testing  
Medium  
Right eye 20/80  
Left eye  
  
Eye Exam - External Exam  
External Exam  
Right eye Left eye  
External Normal including orbits   
and preauricular lymph nodes   
Normal including orbits and   
preauricular lymph nodes  
  
Eye Exam - Slit Lamp Exam  
Slit Lamp Exam  
Right eye Left eye  
Lids/Lashes Normal lids, lashes,   
lacrimal glands, and lacrimal   
drainage Normal lids, lashes,   
lacrimal glands, and lacrimal   
drainage  
Conjunctiva/Sclera White and quiet   
White and quiet  
Cornea Normal epithelium, stroma,   
endothelium, and tear film Normal   
epithelium, stroma, endothelium,   
and tear film  
Anterior Chamber Deep and quiet   
Deep and quiet  
Iris Round and reactive Round and   
reactive  
Lens 2+ Nuclear sclerotic   
cataract, 2+ Posterior subcapsular   
cataract Posterior chamber   
intraocular lens  
Anterior Vitreous Normal Normal  
  
Eye Exam - Fundus Exam  
Fundus Exam  
Right eye Left eye  
Disc Normal Normal  
C/D Ratio 0.3 0.3  
Macula Normal Normal  
Vessels Normal Normal  
Periphery Normal Normal  
  
Care Teams  
- documented as of this encounter  
Care Teams  
Team Member Relationship Specialty   
Start Date End Date  
Sharifa Almaguer DO  
NPI: [id number]  
[address] [place] [address]  
[phone] (Work)  
[phone] (Fax) PCP - General   
Internal Medicine 10/25/23  
Herbie Mills MD  
NPI: [id number]  
[address] [place] [address]  
[phone] (Work)  
[phone] (Fax) Physician   
Radiation Oncology 18  
  
Electronically signed by Ronel Hays MD at 2024 10:15 AM   
EST  
documented in this encounter            [facility]  
Work Phone:   
[phone]  
   
                                        2024 Note     Formatting of this n  
ote is   
different from the original.  
No outpatient medications have   
been marked as taking for the   
24 encounter (Hospital   
Encounter).  
  
  
  
  
  
NPO Instructions:  
  
Do not eat any food after midnight   
the night before your   
surgery/procedure.  
You may have clear liquids until   
TWO hours before   
surgery/procedure. This includes   
water, black tea/coffee, (no milk   
or cream) apple juice and   
electrolyte drinks (Gatorade).  
  
Additional Instructions:  
  
Will need  home, will   
receive call day before surgery   
with arrival time  
  
  
Electronically signed by Jolie Germain RN at 2024 12:37 PM   
EST  
                                        [facility]  
   
                                                    2024 Miscellaneous   
Notes                                   Formatting of this note is   
different from the original.  
No outpatient medications have   
been marked as taking for the   
24 encounter (Hospital   
Encounter).  
  
  
  
  
  
NPO Instructions:  
  
Do not eat any food after midnight   
the night before your   
surgery/procedure.  
You may have clear liquids until   
TWO hours before   
surgery/procedure. This includes   
water, black tea/coffee, (no milk   
or cream) apple juice and   
electrolyte drinks (Gatorade).  
  
Additional Instructions:  
  
Will need  home, will   
receive call day before surgery   
with arrival time  
  
  
Electronically signed by Jolie Germain RN at 2024 12:37 PM   
EST  
documented in this encounter            [facility]  
Work Phone:   
[phone]  
   
                                        2024 Note     HNO ID: [id number]  
Author: COLTEN ABBOTT, DO  
Service: ?  
Author Type: Physician  
Type: Progress Notes  
Filed: 2024 10:24  
Note Text:  
Diagnoses:  
1) T1c (1.5 cm; grade II; no AL   
invasion) N0 (0/4 SLN) MX ER/NV   
positive, HER2  
non-amplified invasive ductal   
carcinoma of the right breast.  
2) pT2 N0 FIGO 1 endometrial   
adenocarcinoma with squamous   
differentiation s/p  
adjuvant brachytherapy.  
HPI: Patient is a 79 yo female who   
had a right breast abnormality on   
screening  
mammogram. Biopsy invasive ductal   
carcinoma.  
Underwent partial mastectomy   
2010.  
Pathology:  
1.5 cm invasive ductal carcinoma.  
Grade II.  
ER/NV positive.  
HER2 non-amplified by FISH.  
4 of 4 LNs negative.  
Oncotype: Recurrence score 25   
(16%). Intermediate.  
Previous therapy:  
1) TC x4. Completed 12/15/2010.  
2) Adjuvant radiation 2011 -   
3/7/2011.  
3) Anastrozole--changed to   
tamoxifen 2011 due to   
significant symptoms of  
vaginal atrophy. Stopped 2017.  
She underwent surgery in 2017.  
Pathology:  
FINAL DIAGNOSIS:  
A) LEFT PELVIC SENTINEL LYMPH NODE   
#1 - NEGATIVE FOR MALIGNANCY.  
B) LEFT PELVIC SENTINEL LYMPH NODE   
#2 - NEGATIVE FOR MALIGNANCY.  
C) RIGHT PELVIC SENTINEL LYMPH   
NODE #1 - NEGATIVE FOR MALIGNANCY.  
D) RIGHT PELVIC SENTINEL LYMPH   
NODE #2 - NEGATIVE FOR MALIGNANCY.  
E) HYSTERECTOMY WITH BILATERAL   
SALPINGO-OOPHORECTOMY - FOCUS OF  
ENDOMETRIAL ADENOCARCINOMA,   
ENDOMETRIOID TYPE WITH SQUAMOUS   
DIFFERENTIATION(  
FIGO GRADE 1) WITH EXTENSIVE   
ASSOCIATED OBSCURING CRUSH   
ARTIFACT AND DISRUPTION.  
TUMOR INVOLVES THE ANTERIOR AND   
POSTERIOR LOWER UTERINE SEGMENT   
AND THE  
UPPER ENDOCERVICAL CANAL. (SEE   
COMMENT).  
3.4 CM ENDOMETRIAL POLYP.  
MULTIPLE INTRAMURAL, SUBMUCOSAL   
AND SUBSEROSAL LEIOMYOMAS.  
RIGHT AND LEFT OVARIES- ATROPHIC   
CHANGES. NEGATIVE FOR MALIGNANCY.  
RIGHT AND LEFT FALLOPIAN TUBES -   
SEROSAL WALTHARD REST CYST.   
NEGATIVE  
FOR MALIGNANCY.  
COMMENT: Adenocarcinoma is not   
identified in association with the   
3.4 cm  
endometrial polyp. Endometrioid   
adenocarcinoma identified only on  
slides E12 and E13 representing   
the anterior and posterior uterine   
segment and  
upper endocervical canal. These   
foci of adenocarcinoma are  
associated with extensive   
obscuring crush artifact and   
disruption.  
Adenocarcinoma is identified in   
the upper endocervical canal.   
There is  
possible invasion of the stroma;   
however, the degree of artifactual  
distortion precludes accurate   
evaluation. No intact endometrial  
adenocarcinoma is actually   
identified in association with   
underlying  
myometrium. However, there are   
detached disrupted fragments of  
adenocarcinoma which may have been   
dislodged from some other site in  
the endometrial cavity. Slides E12   
and E13 were also reviewed by Dr. Vinod Corbin. The previous   
outside report from [facility]  
representing an endometrial biopsy   
was obtained for review. The   
biopsy  
was interpreted as a FIGO grade 1   
endometrioid adenocarcinoma and  
mismatch repair proteins were   
performed on this outside case.   
Mismatch  
repair proteins (MLH1, PMS2, MSH2,   
and MSH6) were found to be   
expressed  
in the carcinoma nuclei.  
Endometrial Cancer Case Summary  
Specimen: Uterine corpus, cervix,   
right and left ovaries and right   
and  
left fallopian tubes.  
Procedure: Total hysterectomy.  
Lymph node sampling: Right and   
left sentinel lymph node biopsies  
performed.  
Specimen integrity: Intact   
hysterectomy specimen.  
Tumor size: Cannot be accurately   
determined (see comment).  
Histologic type: Endometrioid   
adenocarcinoma with squamous  
differentiation.  
Histologic grade: FIGO grade 1.  
Myometrial invasion: Cannot be   
accurately determined (see   
comment).  
Involvement of cervix: Cannot be   
accurately determined (see   
comment).  
Other organ involvement: Not   
identified.  
Lymph-vascular invasion: Not   
identified.  
Pelvic lymph nodes:  
Number examined: 4.  
Number involved: 0.  
Pathologic stage: pT1a or pT2(see   
comment) pN0(sn).  
Received brachii therapy while she   
was in [place].  
Presents for ongoing oncologic   
management.  
Interim history:  
Had about 20 lb weight loss this   
summer.  
Lost appetite.  
PCP ordered thorough work up.  
Appetite now recovered.  
Covid 2024--Paxlovid.  
PMH, medications and allergies   
personally reviewed by me today.   
Any changes  
documented in appropriate section.  
ROS:  
Constitutional: Denies episodes   
night sweats.  
Neuro: Denies HA, vertigo,   
dizziness and imbalance. Denies   
symptoms of  
neuropathy.  
HEENT: No recent change in voice   
or vision.  
Resp: Denies shortness of breath   
at rest. Denies P[place].  
CVS: Denies exertional chest pain,   
PND, orthopnea and LE edema.  
GI: Denies dysgeusia. Denies   
symptoms of stomatitis. Denies   
dysphagia and  
odynophagia.  
: Denies dysuria or gross   
hematuria. No symptoms of bladder   
outlet  
obstruction.  
Musculoskeletal: No bone, back or   
joint pain.  
Derm: Denies rash. Denies jaundice   
and diffuse pruritis.  
Heme: Denies unusual bleeding and   
unexplained bruising.  
Psych: Normal (more content not   
included)...                            [facility]  
   
                                                    2024 History of   
Present illness Narrative               Formatting of this note might be   
different from the original.  
Diagnoses:  
1) T1c (1.5 cm; grade II; no AL   
invasion) N0 (0/4 SLN) MX ER/NV   
positive, HER2 non-amplified   
invasive ductal carcinoma of the   
right breast.  
2) pT2 N0 FIGO 1 endometrial   
adenocarcinoma with squamous   
differentiation s/p adjuvant   
brachytherapy.  
  
HPI: Patient is a 79 yo female who   
had a right breast abnormality on   
screening mammogram. Biopsy   
invasive ductal carcinoma.  
  
Underwent partial mastectomy   
2010.  
  
Pathology:  
1.5 cm invasive ductal carcinoma.  
Grade II.  
ER/NV positive.  
HER2 non-amplified by FISH.  
4 of 4 LNs negative.  
  
Oncotype: Recurrence score 25   
(16%). Intermediate.  
  
Previous therapy:  
1) TC x4. Completed 12/15/2010.  
2) Adjuvant radiation 2011 -   
3/7/2011.  
3) Anastrozole--changed to   
tamoxifen 2011 due to   
significant symptoms of vaginal   
atrophy. Stopped 2017.  
  
She underwent surgery in 2017.  
  
Pathology:  
FINAL DIAGNOSIS:  
A) LEFT PELVIC SENTINEL LYMPH NODE   
#1 - NEGATIVE FOR MALIGNANCY.  
  
B) LEFT PELVIC SENTINEL LYMPH NODE   
#2 - NEGATIVE FOR MALIGNANCY.  
  
C) RIGHT PELVIC SENTINEL LYMPH   
NODE #1 - NEGATIVE FOR MALIGNANCY.  
  
D) RIGHT PELVIC SENTINEL LYMPH   
NODE #2 - NEGATIVE FOR MALIGNANCY.  
  
E) HYSTERECTOMY WITH BILATERAL   
SALPINGO-OOPHORECTOMY - FOCUS OF  
ENDOMETRIAL ADENOCARCINOMA,   
ENDOMETRIOID TYPE WITH SQUAMOUS   
DIFFERENTIATION( FIGO GRADE 1)   
WITH EXTENSIVE ASSOCIATED   
OBSCURING CRUSH ARTIFACT AND   
DISRUPTION.  
  
TUMOR INVOLVES THE ANTERIOR AND   
POSTERIOR LOWER UTERINE SEGMENT   
AND THE  
UPPER ENDOCERVICAL CANAL. (SEE   
COMMENT).  
  
3.4 CM ENDOMETRIAL POLYP.  
  
MULTIPLE INTRAMURAL, SUBMUCOSAL   
AND SUBSEROSAL LEIOMYOMAS.  
  
RIGHT AND LEFT OVARIES- ATROPHIC   
CHANGES. NEGATIVE FOR MALIGNANCY.  
  
RIGHT AND LEFT FALLOPIAN TUBES -   
SEROSAL WALTHARD REST CYST.   
NEGATIVE  
FOR MALIGNANCY.  
  
COMMENT: Adenocarcinoma is not   
identified in association with the   
3.4 cm endometrial polyp.   
Endometrioid adenocarcinoma   
identified only on  
slides E12 and E13 representing   
the anterior and posterior uterine   
segment and upper endocervical   
canal. These foci of   
adenocarcinoma are  
associated with extensive   
obscuring crush artifact and   
disruption.  
Adenocarcinoma is identified in   
the upper endocervical canal.   
There is  
possible invasion of the stroma;   
however, the degree of artifactual  
distortion precludes accurate   
evaluation. No intact endometrial  
adenocarcinoma is actually   
identified in association with   
underlying  
myometrium. However, there are   
detached disrupted fragments of  
adenocarcinoma which may have been   
dislodged from some other site in  
the endometrial cavity. Slides E12   
and E13 were also reviewed by Dr. Vinod Corbin. The previous   
outside report from [facility]  
representing an endometrial biopsy   
was obtained for review. The   
biopsy  
was interpreted as a FIGO grade 1   
endometrioid adenocarcinoma and  
mismatch repair proteins were   
performed on this outside case.   
Mismatch  
repair proteins (MLH1, PMS2, MSH2,   
and MSH6) were found to be   
expressed  
in the carcinoma nuclei.  
  
Endometrial Cancer Case Summary  
  
Specimen: Uterine corpus, cervix,   
right and left ovaries and right   
and  
left fallopian tubes.  
Procedure: Total hysterectomy.  
Lymph node sampling: Right and   
left sentinel lymph node biopsies  
performed.  
Specimen integrity: Intact   
hysterectomy specimen.  
Tumor size: Cannot be accurately   
determined (see comment).  
Histologic type: Endometrioid   
adenocarcinoma with squamous  
differentiation.  
Histologic grade: FIGO grade 1.  
Myometrial invasion: Cannot be   
accurately determined (see   
comment).  
Involvement of cervix: Cannot be   
accurately determined (see   
comment).  
Other organ involvement: Not   
identified.  
Lymph-vascular invasion: Not   
identified.  
Pelvic lymph nodes:  
Number examined: 4.  
Number involved: 0.  
Pathologic stage: pT1a or pT2(see   
comment) pN0(sn).  
  
Received brachii therapy while she   
was in [place].  
  
Presents for ongoing oncologic   
management.  
  
Interim history:  
Had about 20 lb weight loss this   
summer.  
Lost appetite.  
PCP ordered thorough work up.  
Appetite now recovered.  
Covid 2024--Paxlovid.  
  
PMH, medications and allergies   
personally reviewed by me today.   
Any changes documented in   
appropriate section.  
  
ROS:  
Constitutional: Denies episodes   
night sweats.  
Neuro: Denies HA, vertigo,   
dizziness and imbalance. Denies   
symptoms of neuropathy.  
HEENT: No recent change in voice   
or vision.  
Resp: Denies shortness of breath   
at rest. Denies [place].  
CVS: Denies exertional chest pain,   
PND, orthopnea and LE edema.  
GI: Denies dysgeusia. Denies   
symptoms of stomatitis. Denies   
dysphagia and odynophagia.  
: Denies dysuria or gross   
hematuria. No symptoms of bladder   
outlet obstruction.  
Musculoskeletal: No bone, back or   
joint pain.  
Derm: Denies rash. Denies jaundice   
and diffuse pruritis.  
Heme: Denies unusual bleeding and   
unexplained bruising.  
Psych: Normal mood.  
  
The sensitive examination was   
discussed with the Patient or   
Patient's Authorized   
Representative. As applicable, any   
other physician, advance practice   
provider, medical student, or   
other health professional student   
that will be observing or involved   
in the sensitive examination for   
educational or training purposes   
was discussed with the Patient or   
Authorized Representative. The   
Patient or Authorized   
Representative has agreed to   
proceed with the sensitive   
examination.  
  
(Sensitive examination includes   
inspection and/or palpation of the   
breasts, pelvis, prostate and   
anorectal regions)  
  
Yessica Hernandez LPN chaperoned.  
  
PHYSICAL EXAM:  
Vitals: Blood pressure 111/68,   
pulse 82, temperature 36.7 C (98.1   
F), temperature source Temporal,   
height 160 cm (5' 3 ), weight 63.7   
kg (140 lb 8 oz), SpO2 97%.  
Well-appearing and in no acute   
distress.  
EYES: Sclerae are anicteric   
bilaterally.  
LYMPHATIC: There is no palpable   
cervical, supraclavicular or   
axillary adenopathy.  
RESPIRATORY: Inspiratory breath   
sounds are of normal intensity in   
all fields.  
BREAST: Both breasts have   
lumpy/bumpy architecture.   
Tenderness in the right upper   
outer breast. There is no   
concerning mass or nodule.   
Left--non-tender; no mass or   
nodule.  
ABDOMEN: The abdomen is   
nondistended.  
Extremities: No edema.  
  
ASSESSMENT  
1) T1c (1.5 cm; grade II; no AL   
invasion) N0 (0/4 SLN) MX ER/NV   
positive; HER2 non-amplified right   
sided invasive ductal carcinoma of   
the breast.  
Assessment:  
-Patient is a 79-year-old female   
who completed adjuvant   
chemotherapy followed by 7 years   
of hormonal therapy for   
intermediate risk early-stage   
breast cancer. She had significant   
depressive symptoms when taking an   
aromatase inhibitor therapy was   
changed to tamoxifen. That was   
discontinued 2017 when   
diagnosed with endometrial cancer.  
-No concerning symptoms or exam   
findings to suggest recurrent   
disease.  
-Reviewed mammogram.  
Plan:  
-Mammogram then OV in about a   
year.  
  
(C54.1) Endometrial cancer (HCC)   
(primary encounter diagnosis)  
Assessment:  
-pT2 N0 FIGO 1 s/p adjuvant   
brachytherapy.  
Plan:  
-Continue annual follow-up with   
gynecology.  
  
Portions of this documentation   
were copied and pasted from   
previous office visit notes in   
order to provide a cohesive   
continuity of the history. The   
note has been reviewed and edited   
and updated as necessary.  
  
I spent a total of 15 minutes on   
the date of the service which   
included preparing to see the   
patient, face-to-face patient   
care, completing clinical   
documentation, obtaining and/or   
reviewing separately obtained   
history, performing a medically   
appropriate examination,   
counseling and educating the   
patient/family/caregiver, ordering   
medications, tests, or procedures,   
communicating with other HCPs (not   
separately reported), and   
communicating results to the   
patient/family/caregiver.  
  
Colten Abbott DO  
Electronically signed by Colten Abbott DO at 2024 10:24 AM   
EST  
documented in this encounter            [facility]  
   
                                        2024 Note     HNO ID: [id number]  
Author: JOSE DELGADO MD  
Service: ?  
Author Type: Physician  
Type: Progress Notes  
Filed: 2024 15:59  
Note Text:  
Marzena is a 79 year old    
who presents for an annual   
gynecologic exam  
without complaints.  
Postmenopausal: Yes  
HRT use: No.  
Last Pap: 2022 normal  
HPV: 2014 negative  
Last mammogram:  normal  
History of abnormal mammogram: Yes   
- h/o breast cancer  
OB History  
 T0  L1  
SAB1 IAB0 Ectopic0 Multiple0 Live   
Births0  
Comment: Plus two step daughters  
Gyn History  
LMP: Hysterectomy  
Age at Menarche:  
Age at First Pregnancy:  
Age at Menopause:  
Gyn History Comments:  
Sexual Activity: Not Asked; Male;   
not asked  
Contraception: No contraception   
data on record  
PAST MEDICAL HISTORY  
Diagnosis Date  
Breast cancer (HCC)   
invasive ductal carcinoma,   
completed radiation 3/7/11  
Cancer of endometrium (HCC)  
Carcinoma in situ, vulva   
Cellulitis  
of right eye muscle  
Diabetes mellitus without mention   
of complication  
Diabetes mellitus, type II (HCC)  
Diaphragmatic hernia without   
mention of obstruction or gangrene  
Hiatal hernia  
Dyspareunia  
Elevated cholesterol  
Esophageal reflux  
Insomnia, unspecified  
Orbital cellulitis  
Other corneal disorder  
Rt eye corneal erosion .  
PMH - PAST MEDICAL HISTORY OF  
?VESTIBULITIS  
PMH - PAST MEDICAL HISTORY OF  
CLIMACTERIC  
PMH - PAST MEDICAL HISTORY OF   
2006  
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  
Pure hypercholesterolemia  
Rib fracture  
6th Rib  
Senile dementia (HCC)  
Stomatitis and mucositis   
(ulcerative)  
Chronic ulcerative stomatitis on   
mucosal biopsy  
Unspecified essential hypertension  
PAST SURGICAL HISTORY  
Procedure Laterality Date  
Bunions bilateral feet removed   
10/2002  
BX/EXC LYMPH NODE OPEN DEEP   
AXILLARY NODE 2010  
RIGHT BREAST LUMP W/ SLN BX  
CHOLECYSTECTOMY 2004  
Cholecystectomy  
COLONOSCOPY FLX DX W/COLLJ SPEC   
WHEN PFRMD   
Colonoscopy  
COLPOSCOPY CERVIX UPPER/ADJACENT   
VAGINA 2009  
Colposcopy of the vulva  
COLPOSCOPY, VULVA 10/2009  
CORRECT BUNION,SIMPLE  
Bunion  
DILATION AND CURETTAGE DXAND/THER   
NONOBSTETRIC   
SAB  
HYSTERECTOMY 2017  
LAPAROSCOPY SURG CHOLECYSTECTOMY   
2004  
Cholecystectomy, lap  
MAMMO STEREOTACTIC CORE BIOPSY RT   
10/10/2013  
MASTECTOMY, PARTIAL 2010  
RIGHT BREAST  
PAST SURGICAL HISTORY OF 2006  
partial vulvectomy/sq. cell ca in   
situ  
PAST SURGICAL HISTORY OF   
right eye cornea  
PAST SURGICAL HISTORY OF   
right eye revision  
PAST SURGICAL HISTORY OF  
squamous cell carcinoma right arm  
REMV CATARACT EXTRACAP,INSERT LENS   
10/31/2024  
CCA0T0 - +19.5D  
SALPINGO-OOPHORECTOMY COMPL/PRTL   
UNI/BI SPX 2017  
Toenail Big Toe Left Foot removed   
10/2002  
TONSILLECTOMY PRIMARY/SECONDARY  
Tonsillectomy  
UNLISTED PROCEDURE HANDS/FINGERS   
2012  
[facility] Arthoplasty on right hand  
FAMILY HISTORY  
Problem Relation Age of Onset  
Stroke Mother  
Diabetes Mother  
GI Father  
 from complications of gb   
surgery  
Arthritis Sister  
Systemic Lupus  
Cancer Maternal Uncle  
LUNG  
Cancer Other  
cousin with breast cancer/cousin   
with bladder ca.  
Stroke Sister  
SOCIAL HISTORY  
Social History  
Tobacco Use  
Smoking status: Never  
Smokeless tobacco: Never  
Vaping Use  
Vaping status: Never Used  
Substance Use Topics  
Alcohol use: Yes  
Comment: 1-2 weekly  
Drug use: No  
REVIEW OF SYSTEMS  
Abdomen: No abdominal pain,   
nausea, vomiting, diarrhea, or   
constipation. No  
bloating, early satiety,   
indigestion, or increased   
flatulence.  
Bladder: No dysuria, gross   
hematuria, urinary frequency,   
urinary urgency, or  
incontinence  
Breast: No breast lumps, nipple   
d/c, overlying skin changes,   
redness or skin  
retraction  
Allergies and current medication   
updated:Yes  
SENSITIVE EXAM: The sensitive   
examination was discussed with the   
Patient or  
Patient's Authorized   
Representative. As applicable, any   
other physician,  
advance practice provider, medical   
student, or other health   
professional student  
that will be observing or involved   
in the sensitive examination for   
educational  
or training purposes was discussed   
with the Patient or Authorized  
Representative. The Patient or   
Authorized Representative has   
agreed to proceed  
with the sensitive examination.   
(Sensitive examination includes   
inspection  
and/or palpation of the breasts,   
pelvis, prostate and anorectal   
regions).  
EXAM: /72   Ht 5' 2.25    
(1.58m)   Wt 137 lb (62.1kg)   BMI   
24.86 kg/(m2).  
GENERAL: pleasant,    
female in no apparent distress  
BREAST: soft, non-tender, no   
dominant mass, normal   
nipple-areolar complex, no  
lymphadenopathy, and no nipple   
discharge  
CHEST: Normal inspiratory effort  
ABDOMEN: soft, non-tender, and no   
masses  
PELVIC: external genitalia normal,   
no vulvar lesions, normal   
appearing perineal  
body and perianal region  
BIMANUAL: no adnexal masses,   
non-tender, and uterus surgically   
absent  
RECTOVAGINAL: deferred.  
NEURO: alert and oriented x3,exam   
grossly non (more content not   
included)...                            [facility]  
   
                                                    2024 History of   
Present illness Narrative               Formatting of this note is   
different from the original.  
Marzena is a 79 year old    
who presents for an annual   
gynecologic exam without   
complaints.  
  
Postmenopausal: Yes  
HRT use: No.  
Last Pap: 2022 normal  
HPV: 2014 negative  
Last mammogram:  normal  
History of abnormal mammogram: Yes   
- h/o breast cancer  
  
OB History  
 T0  L1  
SAB1 IAB0 Ectopic0 Multiple0 Live   
Births0  
  
Comment: Plus two step daughters  
  
Gyn History  
  
LMP: Hysterectomy  
Age at Menarche:  
Age at First Pregnancy:  
Age at Menopause:  
Gyn History Comments:  
Sexual Activity: Not Asked; Male;   
not asked  
Contraception: No contraception   
data on record  
  
  
  
PAST MEDICAL HISTORY  
Diagnosis Date  
Breast cancer (HCC)   
invasive ductal carcinoma,   
completed radiation 3/7/11  
Cancer of endometrium (HCC)  
Carcinoma in situ, vulva   
Cellulitis  
of right eye muscle  
Diabetes mellitus without mention   
of complication  
Diabetes mellitus, type II (HCC)  
Diaphragmatic hernia without   
mention of obstruction or gangrene  
Hiatal hernia  
Dyspareunia  
Elevated cholesterol  
Esophageal reflux  
Insomnia, unspecified  
Orbital cellulitis  
Other corneal disorder  
Rt eye corneal erosion .  
PMH - PAST MEDICAL HISTORY OF  
?VESTIBULITIS  
PMH - PAST MEDICAL HISTORY OF  
CLIMACTERIC  
PMH - PAST MEDICAL HISTORY OF   
2006  
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  
Pure hypercholesterolemia  
Rib fracture  
6th Rib  
Senile dementia (HCC)  
Stomatitis and mucositis   
(ulcerative)  
Chronic ulcerative stomatitis on   
mucosal biopsy  
Unspecified essential hypertension  
  
PAST SURGICAL HISTORY  
Procedure Laterality Date  
Bunions bilateral feet removed   
10/2002  
BX/EXC LYMPH NODE OPEN DEEP   
AXILLARY NODE 2010  
RIGHT BREAST LUMP W/ SLN BX  
CHOLECYSTECTOMY 2004  
Cholecystectomy  
COLONOSCOPY FLX DX W/COLLJ SPEC   
WHEN PFRMD   
Colonoscopy  
COLPOSCOPY CERVIX UPPER/ADJACENT   
VAGINA 2009  
Colposcopy of the vulva  
COLPOSCOPY, VULVA 10/2009  
CORRECT BUNION,SIMPLE  
Bunion  
DILATION & CURETTAGE DX&/THER   
NONOBSTETRIC 's  
SAB  
HYSTERECTOMY 2017  
LAPAROSCOPY SURG CHOLECYSTECTOMY   
2004  
Cholecystectomy, lap  
MAMMO STEREOTACTIC CORE BIOPSY RT   
10/10/2013  
MASTECTOMY, PARTIAL 2010  
RIGHT BREAST  
PAST SURGICAL HISTORY OF 2006  
partial vulvectomy/sq. cell ca in   
situ  
PAST SURGICAL HISTORY OF   
right eye cornea  
PAST SURGICAL HISTORY OF   
right eye revision  
PAST SURGICAL HISTORY OF  
squamous cell carcinoma right arm  
REMV CATARACT EXTRACAP,INSERT LENS   
10/31/2024  
CCA0T0 - +19.5D  
SALPINGO-OOPHORECTOMY COMPL/PRTL   
UNI/BI SPX 2017  
Toenail Big Toe Left Foot removed   
10/2002  
TONSILLECTOMY PRIMARY/SECONDARY   
<AGE 13 1951  
Tonsillectomy  
UNLISTED PROCEDURE HANDS/FINGERS   
2012  
[facility] Arthoplasty on right hand  
  
FAMILY HISTORY  
Problem Relation Age of Onset  
Stroke Mother  
Diabetes Mother  
GI Father  
 from complications of gb   
surgery  
Arthritis Sister  
Systemic Lupus  
Cancer Maternal Uncle  
LUNG  
Cancer Other  
cousin with breast cancer/cousin   
with bladder ca.  
Stroke Sister  
SOCIAL HISTORY  
Social History  
  
Tobacco Use  
Smoking status: Never  
Smokeless tobacco: Never  
Vaping Use  
Vaping status: Never Used  
Substance Use Topics  
Alcohol use: Yes  
Comment: 1-2 weekly  
Drug use: No  
  
REVIEW OF SYSTEMS  
Abdomen: No abdominal pain,   
nausea, vomiting, diarrhea, or   
constipation. No bloating, early   
satiety, indigestion, or increased   
flatulence.  
Bladder: No dysuria, gross   
hematuria, urinary frequency,   
urinary urgency, or incontinence  
Breast: No breast lumps, nipple   
d/c, overlying skin changes,   
redness or skin retraction  
Allergies and current medication   
updated:Yes  
  
SENSITIVE EXAM: The sensitive   
examination was discussed with the   
Patient or Patient's Authorized   
Representative. As applicable, any   
other physician, advance practice   
provider, medical student, or   
other health professional student   
that will be observing or involved   
in the sensitive examination for   
educational or training purposes   
was discussed with the Patient or   
Authorized Representative. The   
Patient or Authorized   
Representative has agreed to   
proceed with the sensitive   
examination. (Sensitive   
examination includes inspection   
and/or palpation of the breasts,   
pelvis, prostate and anorectal   
regions).  
  
EXAM: /72   Ht 5' 2.25    
(1.58m)   Wt 137 lb (62.1kg)   BMI   
24.86 kg/(m^2).  
  
  
GENERAL: pleasant,    
female in no apparent distress  
BREAST: soft, non-tender, no   
dominant mass, normal   
nipple-areolar complex, no   
lymphadenopathy, and no nipple   
discharge  
CHEST: Normal inspiratory effort  
ABDOMEN: soft, non-tender, and no   
masses  
PELVIC: external genitalia normal,   
no vulvar lesions, normal   
appearing perineal body and   
perianal region  
BIMANUAL: no adnexal masses,   
non-tender, and uterus surgically   
absent  
RECTOVAGINAL: deferred.  
NEURO: alert and oriented x3,exam   
grossly non-focal  
EXTREMITIES: normal  
  
ASSESSMENT/PLAN:  
1) Health maintenance:  
Pap/HPV no longer needed  
Mammogram up to date  
2) Follow up one year or sooner as   
needed  
  
Jose Delgado MD  
Electronically signed by Jose Delgado MD at 2024 3:59 PM   
EST  
documented in this encounter            [facility]  
   
                                                    2024 Telephone   
encounter Note                          Summary: RE: Cataract surgery  
  
Formatting of this note might be   
different from the original.  
Mr. Mederos phoned asking that you   
call him as he forgot to ask you a   
question about what type of lens   
they should do for his wife for   
her cataract surgery. He said Dr. Hays is awaiting their decision.  
  
11/15/2024  
Called Mr. Mederos. Discussed   
Intraocular lens options. Decided   
to retain best distance vision in   
each eye and will use multifocal   
glasses.  
Electronically signed by   
Amaya Reis II, OD at   
11/15/2024 1:41 PM EST  
                                        [facility]  
   
                                                    2024 Miscellaneous   
Notes                                   Summary: RE: Cataract surgery  
  
Formatting of this note might be   
different from the original.  
Mr. Mederos phoned asking that you   
call him as he forgot to ask you a   
question about what type of lens   
they should do for his wife for   
her cataract surgery. He said Dr. Hays is awaiting their decision.  
  
11/15/2024  
Called Mr. Mederos. Discussed   
Intraocular lens options. Decided   
to retain best distance vision in   
each eye and will use multifocal   
glasses.  
Electronically signed by   
Amaya Reis II, OD at   
11/15/2024 1:41 PM EST  
documented in this encounter            [facility]  
   
                                                    2024 History and   
physical note                           Formatting of this note is   
different from the original.  
HISTORY AND PHYSICAL EXAMINATION  
  
SERVICE DATE: 2024  
SERVICE TIME: 11:26 AM  
  
PRIMARY CARE PHYSICIAN: Sharifa Amlaguer DO  
  
REASON FOR VISIT: Marzena Mederos   
is a 79 year old female who is   
being seen for Combined   
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  
  
The patient has the following:  
ACTIVE PROBLEM LIST  
Postmenopausal Atrophic Vaginitis  
Unspecified Transient Cerebral   
Ischemia  
Carcinoma in Situ of Vulva  
Malignant Neoplasm of Female   
Breast (Hcc)  
Personal History of Malignant   
Neoplasm of Breast  
Senile Nuclear Sclerosis  
Hypermetropia  
Regular Astigmatism  
Presbyopia  
Invasive Ductal Carcinoma of   
Breast, Female (Hcc)  
Ocular Migraine  
Controlled Type 2 Diabetes   
Mellitus Without Complication,   
Without Long-Term Current Use of   
Insulin (Hcc)  
Postmenopausal Bleeding  
S/P Hysterectomy With Oophorectomy  
  
SUBJECTIVE  
CHIEF COMPLAINT: Blurred vision   
for distance and near Right Eye.  
  
PAST MEDICAL HISTORY  
Diagnosis Date  
Breast cancer (HCC)   
invasive ductal carcinoma,   
completed radiation 3/7/11  
Cancer of endometrium (HCC)  
Carcinoma in situ, vulva   
Cellulitis  
of right eye muscle  
Diabetes mellitus without mention   
of complication  
Diabetes mellitus, type II (HCC)  
Diaphragmatic hernia without   
mention of obstruction or gangrene  
Hiatal hernia  
Dyspareunia  
Elevated cholesterol  
Esophageal reflux  
Insomnia, unspecified  
Orbital cellulitis  
Other corneal disorder  
Rt eye corneal erosion .  
PMH - PAST MEDICAL HISTORY OF  
?VESTIBULITIS  
PMH - PAST MEDICAL HISTORY OF  
CLIMACTERIC  
PMH - PAST MEDICAL HISTORY OF   
2006  
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  
Pure hypercholesterolemia  
Rib fracture  
6th Rib  
Senile dementia (HCC)  
Stomatitis and mucositis   
(ulcerative)  
Chronic ulcerative stomatitis on   
mucosal biopsy  
Unspecified essential hypertension  
  
PAST SURGICAL HISTORY  
Procedure Laterality Date  
Bunions bilateral feet removed   
10/2002  
BX/EXC LYMPH NODE OPEN DEEP   
AXILLARY NODE 2010  
RIGHT BREAST LUMP W/ SLN BX  
CHOLECYSTECTOMY 2004  
Cholecystectomy  
COLONOSCOPY FLX DX W/COLLJ SPEC   
WHEN PFRMD   
Colonoscopy  
COLPOSCOPY CERVIX UPPER/ADJACENT   
VAGINA 2009  
Colposcopy of the vulva  
COLPOSCOPY, VULVA 10/2009  
CORRECT BUNION,SIMPLE  
Bunion  
DILATION & CURETTAGE DX&/THER   
NONOBSTETRIC 1970's  
SAB  
HYSTERECTOMY 2017  
LAPAROSCOPY SURG CHOLECYSTECTOMY   
2004  
Cholecystectomy, lap  
MAMMO STEREOTACTIC CORE BIOPSY RT   
10/10/2013  
MASTECTOMY, PARTIAL 2010  
RIGHT BREAST  
PAST SURGICAL HISTORY OF 2006  
partial vulvectomy/sq. cell ca in   
situ  
PAST SURGICAL HISTORY OF   
right eye cornea  
PAST SURGICAL HISTORY OF   
right eye revision  
PAST SURGICAL HISTORY OF  
squamous cell carcinoma right arm  
REMV CATARACT EXTRACAP,INSERT LENS   
10/31/2024  
CCA0T0 - +19.5D  
SALPINGO-OOPHORECTOMY COMPL/PRTL   
UNI/BI SPX 2017  
Toenail Big Toe Left Foot removed   
10/2002  
TONSILLECTOMY PRIMARY/SECONDARY   
Tonsillectomy  
UNLISTED PROCEDURE HANDS/FINGERS   
2012  
[facility] Arthoplasty on right hand  
  
FAMILY HISTORY  
Problem Relation Age of Onset  
Stroke Mother  
Diabetes Mother  
GI Father  
 from complications of gb   
surgery  
Arthritis Sister  
Systemic Lupus  
Cancer Maternal Uncle  
LUNG  
Cancer Other  
cousin with breast cancer/cousin   
with bladder ca.  
Stroke Sister  
  
SOCIAL HISTORY:  
Social History  
  
Tobacco Use  
Smoking status: Never  
Smokeless tobacco: Never  
Vaping Use  
Vaping status: Never Used  
Substance Use Topics  
Alcohol use: Yes  
Comment: 1-2 weekly  
Drug use: No  
  
MEDICATIONS:  
Prior to Admission medications as   
of 24 1120  
Medication Sig Last Dose Taking  
glipiZIDE (GLUCOTROL XL) 2.5 mg 24   
hr tablet Take 2.5 mg by mouth.   
Yes  
fluorometholone (FML LIQUID FILM)   
0.1 % ophthalmic suspension Use 1   
Drop in the right eye three times   
a day. Yes  
donepezil (ARICEPT) 10 mg tablet   
Take 10 mg by mouth. Yes  
cholecalciferol (VITAMIN D3) 50   
mcg (2,000 unit) tablet Take 2,000   
Units by mouth once daily. Yes  
losartan-hydroCHLOROthiazide   
(HYZAAR) 100-12.5 mg per tablet   
Take 1 tablet by mouth once daily.   
Yes  
omeprazole (PRILOSEC) 20 mg   
capsule Take 20 mg by mouth once   
daily. Yes  
potassium chloride (KLOR-CON 10)   
10 mEq tablet Take 10 mEq by mouth   
once daily.  
Yes  
metFORMIN ER (GLUCOPHAGE XR) 500   
mg 24 hr tablet Take two tablets   
by mouth once daily. Yes  
simvastatin(ZOCOR 20 MG TAB) Take   
one(1) tablet daily. Yes  
keTORolac (ACULAR) 0.5 %   
ophthalmic solution Use 1 Drop in   
the left eye three times a day.  
prednisoLONE acetate (PRED FORTE)   
1 % ophthalmic suspension Use 1   
Drop in the left eye three times a   
day.  
amLODIPine (NORVASC) 10 mg tablet   
Take 10 mg by mouth.  
  
No medication comments found.  
  
CURRENT ALLERGIES:  
ALLERGIES  
Allergen Reactions  
Adhesive Rash  
Redness  
Ambien [Zolpidem Ta* Rash  
Codeine Mental Status Change  
Neosporin [Neomycin* Rash  
Percocet [Oxycodone*  
Causes Vomitting  
  
REVIEW OF SYSTEMS:  
PAIN ASSESSMENT:  
General: No weight loss, malaise   
or fevers.  
Neuro: Dementia  
Respiratory: No history of current   
cough or dyspnea, or pneumonia in   
the past 6 weeks. No history of   
respiratory/pulmonary symptoms or   
problems  
Cardiovascular: Positive for:   
Hypertension, PVC  
GI: No history of GI symptoms or   
problems. No history of esophageal   
varices, recent ascites, or ETOH   
greater than 2 drinks per day.  
: No history of UTI in past 6   
weeks. No history of renal   
failure. Not currently on or   
requiring dialysis. No history of   
 symptoms or problems.  
GYN: Negative for abnormal vaginal   
bleeding, abnormal vaginal   
discharge.  
Pregnancy: Denies  
Endocrine: Diabetes Mellitus on   
oral agent  
Hematology: No history of bleeding   
or clotting disorder. Pt is not   
taking anti-coagulation or   
platelet medications. No history   
of hematological symptoms or   
problems.  
Oncology: No history of CA   
metastasis, chemo within 30 days,   
or radiotherapy within 90 days.   
Has not lost 10% of body wt in 6   
months. No history of oncological   
symptoms or problems.  
Psych: No history of psychiatric   
symptoms or problems.  
Musculoskeletal: Negative for   
joint pain or swelling, back pain   
or muscle pain.  
Skin: Negative for lesions, rash   
and itching.  
  
PHYSICAL EXAM:  
VITALS:  
/80   Pulse 88  
  
  
  
General: Alert and oriented  
Skin: Normal color, no rash, no   
lesions.  
HEENT: EOM, pupils equal, round   
and reactive.  
Cardiovascular: Normal S1 & S2, no   
rubs, murmurs or gallops. No JVD.   
Pulse regular.  
Lungs: Normal breath sounds, no   
wheezes or crackles.  
Abdomen: Soft, non-tender, no   
rigidity.  
Extremities: No deformity, no   
edema or tenderness, no joint   
swelling or clubbing.  
Neurological: Normal cognition and   
motor skills.  
Pulses: Carotid and radial pulses   
normal +2.  
  
Diagnostic tests reviewed for   
today's visit:  
No new labs or tests  
  
ASSESSMENT  
Medication and Non-Pharmacologic   
VTE Prophylaxis/Anticoagulants  
  
  
VTE Prophylaxis: VTE prophylaxis   
appropriate  
  
Impression: There is no known   
pertinent medical condition which   
may affect trinidad-operative course  
  
  
[unfilled]  
Clinical Risk Factors for Possible   
Cardiac Complications:  
None  
  
Patient is scheduled for a   
low-risk procedure.  
  
FUNCTIONAL STATUS: Walk indoors,   
such as around the house (1.75   
METs)  
  
Functional Class ([facility]): N/A  
  
[facility]: Not obtained  
  
PLAN  
CONSULTS:  
Patient does not require consults   
for optimization at this time.  
  
The Following Tests/Procedures   
Have Been Initiated:  
None  
  
Instructions Given to Patient:  
Patient given verbal and written   
preop instructions and voices   
comprehension and compliance.  
  
SIGNATURE: Ronel Hays MD   
PATIENT NAME: Marzena Mederos  
DATE: 2024 MRN:   
[id number]  
TIME: 11:26 AM PAGER/CONTACT #:  
  
Electronically signed by Ronel Hays MD at 2024 11:27   
AM [facility]  
   
                                                    2024 History and   
physical note                           Formatting of this note is   
different from the original.  
HISTORY AND PHYSICAL EXAMINATION  
  
SERVICE DATE: 2024  
SERVICE TIME: 11:26 AM  
  
PRIMARY CARE PHYSICIAN: Sharifa Almaguer DO  
  
REASON FOR VISIT: Marzena Mederos   
is a 79 year old female who is   
being seen for Combined   
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  
  
The patient has the following:  
ACTIVE PROBLEM LIST  
Postmenopausal Atrophic Vaginitis  
Unspecified Transient Cerebral   
Ischemia  
Carcinoma in Situ of Vulva  
Malignant Neoplasm of Female   
Breast (Hcc)  
Personal History of Malignant   
Neoplasm of Breast  
Senile Nuclear Sclerosis  
Hypermetropia  
Regular Astigmatism  
Presbyopia  
Invasive Ductal Carcinoma of   
Breast, Female ([facility])  
Ocular Migraine  
Controlled Type 2 Diabetes   
Mellitus Without Complication,   
Without Long-Term Current Use of   
Insulin ([facility])  
Postmenopausal Bleeding  
S/P Hysterectomy With Oophorectomy  
  
SUBJECTIVE  
CHIEF COMPLAINT: Blurred vision   
for distance and near Right Eye.  
  
PAST MEDICAL HISTORY  
Diagnosis Date  
Breast cancer ([facility])   
invasive ductal carcinoma,   
completed radiation 3/7/11  
Cancer of endometrium ([facility])  
Carcinoma in situ, vulva   
Cellulitis  
of right eye muscle  
Diabetes mellitus without mention   
of complication  
Diabetes mellitus, type II (HCC)  
Diaphragmatic hernia without   
mention of obstruction or gangrene  
Hiatal hernia  
Dyspareunia  
Elevated cholesterol  
Esophageal reflux  
Insomnia, unspecified  
Orbital cellulitis  
Other corneal disorder  
Rt eye corneal erosion .  
PMH - PAST MEDICAL HISTORY OF  
?VESTIBULITIS  
PMH - PAST MEDICAL HISTORY OF  
CLIMACTERIC  
PMH - PAST MEDICAL HISTORY OF   
2006  
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  
Pure hypercholesterolemia  
Rib fracture  
6th Rib  
Senile dementia (HCC)  
Stomatitis and mucositis   
(ulcerative)  
Chronic ulcerative stomatitis on   
mucosal biopsy  
Unspecified essential hypertension  
  
PAST SURGICAL HISTORY  
Procedure Laterality Date  
Bunions bilateral feet removed   
10/2002  
BX/EXC LYMPH NODE OPEN DEEP   
AXILLARY NODE 2010  
RIGHT BREAST LUMP W/ SLN BX  
CHOLECYSTECTOMY 2004  
Cholecystectomy  
COLONOSCOPY FLX DX W/COLLJ SPEC   
WHEN PFRMD   
Colonoscopy  
COLPOSCOPY CERVIX UPPER/ADJACENT   
VAGINA 2009  
Colposcopy of the vulva  
COLPOSCOPY, VULVA 10/2009  
CORRECT BUNION,SIMPLE  
Bunion  
DILATION & CURETTAGE DX&/THER   
NONOBSTETRIC 1970's  
SAB  
HYSTERECTOMY 2017  
LAPAROSCOPY SURG CHOLECYSTECTOMY   
2004  
Cholecystectomy, lap  
MAMMO STEREOTACTIC CORE BIOPSY RT   
10/10/2013  
MASTECTOMY, PARTIAL 2010  
RIGHT BREAST  
PAST SURGICAL HISTORY OF 2006  
partial vulvectomy/sq. cell ca in   
situ  
PAST SURGICAL HISTORY OF   
right eye cornea  
PAST SURGICAL HISTORY OF   
right eye revision  
PAST SURGICAL HISTORY OF  
squamous cell carcinoma right arm  
REMV CATARACT EXTRACAP,INSERT LENS   
10/31/2024  
CCA0T0 - +19.5D  
SALPINGO-OOPHORECTOMY COMPL/PRTL   
UNI/BI SPX 2017  
Toenail Big Toe Left Foot removed   
10/2002  
TONSILLECTOMY PRIMARY/SECONDARY   
<AGE 13 1951  
Tonsillectomy  
UNLISTED PROCEDURE HANDS/FINGERS   
2012  
[facility] Arthoplasty on right hand  
  
FAMILY HISTORY  
Problem Relation Age of Onset  
Stroke Mother  
Diabetes Mother  
GI Father  
 from complications of gb   
surgery  
Arthritis Sister  
Systemic Lupus  
Cancer Maternal Uncle  
LUNG  
Cancer Other  
cousin with breast cancer/cousin   
with bladder ca.  
Stroke Sister  
  
SOCIAL HISTORY:  
Social History  
  
Tobacco Use  
Smoking status: Never  
Smokeless tobacco: Never  
Vaping Use  
Vaping status: Never Used  
Substance Use Topics  
Alcohol use: Yes  
Comment: 1-2 weekly  
Drug use: No  
  
MEDICATIONS:  
Prior to Admission medications as   
of 24 1120  
Medication Sig Last Dose Taking  
glipiZIDE (GLUCOTROL XL) 2.5 mg 24   
hr tablet Take 2.5 mg by mouth.   
Yes  
fluorometholone (FML LIQUID FILM)   
0.1 % ophthalmic suspension Use 1   
Drop in the right eye three times   
a day. Yes  
donepezil (ARICEPT) 10 mg tablet   
Take 10 mg by mouth. Yes  
cholecalciferol (VITAMIN D3) 50   
mcg (2,000 unit) tablet Take 2,000   
Units by mouth once daily. Yes  
losartan-hydroCHLOROthiazide   
(HYZAAR) 100-12.5 mg per tablet   
Take 1 tablet by mouth once daily.   
Yes  
omeprazole (PRILOSEC) 20 mg   
capsule Take 20 mg by mouth once   
daily. Yes  
potassium chloride (KLOR-CON 10)   
10 mEq tablet Take 10 mEq by mouth   
once daily.  
Yes  
metFORMIN ER (GLUCOPHAGE XR) 500   
mg 24 hr tablet Take two tablets   
by mouth once daily. Yes  
simvastatin(ZOCOR 20 MG TAB) Take   
one(1) tablet daily. Yes  
keTORolac (ACULAR) 0.5 %   
ophthalmic solution Use 1 Drop in   
the left eye three times a day.  
prednisoLONE acetate (PRED FORTE)   
1 % ophthalmic suspension Use 1   
Drop in the left eye three times a   
day.  
amLODIPine (NORVASC) 10 mg tablet   
Take 10 mg by mouth.  
  
No medication comments found.  
  
CURRENT ALLERGIES:  
ALLERGIES  
Allergen Reactions  
Adhesive Rash  
Redness  
Ambien [Zolpidem Ta* Rash  
Codeine Mental Status Change  
Neosporin [Neomycin* Rash  
Percocet [Oxycodone*  
Causes Vomitting  
  
REVIEW OF SYSTEMS:  
PAIN ASSESSMENT:  
General: No weight loss, malaise   
or fevers.  
Neuro: Dementia  
Respiratory: No history of current   
cough or dyspnea, or pneumonia in   
the past 6 weeks. No history of   
respiratory/pulmonary symptoms or   
problems  
Cardiovascular: Positive for:   
Hypertension, PVC  
GI: No history of GI symptoms or   
problems. No history of esophageal   
varices, recent ascites, or ETOH   
greater than 2 drinks per day.  
: No history of UTI in past 6   
weeks. No history of renal   
failure. Not currently on or   
requiring dialysis. No history of   
 symptoms or problems.  
GYN: Negative for abnormal vaginal   
bleeding, abnormal vaginal   
discharge.  
Pregnancy: Denies  
Endocrine: Diabetes Mellitus on   
oral agent  
Hematology: No history of bleeding   
or clotting disorder. Pt is not   
taking anti-coagulation or   
platelet medications. No history   
of hematological symptoms or   
problems.  
Oncology: No history of CA   
metastasis, chemo within 30 days,   
or radiotherapy within 90 days.   
Has not lost 10% of body wt in 6   
months. No history of oncological   
symptoms or problems.  
Psych: No history of psychiatric   
symptoms or problems.  
Musculoskeletal: Negative for   
joint pain or swelling, back pain   
or muscle pain.  
Skin: Negative for lesions, rash   
and itching.  
  
PHYSICAL EXAM:  
VITALS:  
/80   Pulse 88  
  
  
  
General: Alert and oriented  
Skin: Normal color, no rash, no   
lesions.  
HEENT: EOM, pupils equal, round   
and reactive.  
Cardiovascular: Normal S1 & S2, no   
rubs, murmurs or gallops. No JVD.   
Pulse regular.  
Lungs: Normal breath sounds, no   
wheezes or crackles.  
Abdomen: Soft, non-tender, no   
rigidity.  
Extremities: No deformity, no   
edema or tenderness, no joint   
swelling or clubbing.  
Neurological: Normal cognition and   
motor skills.  
Pulses: Carotid and radial pulses   
normal +2.  
  
Diagnostic tests reviewed for   
today's visit:  
No new labs or tests  
  
ASSESSMENT  
Medication and Non-Pharmacologic   
VTE Prophylaxis/Anticoagulants  
  
  
VTE Prophylaxis: VTE prophylaxis   
appropriate  
  
Impression: There is no known   
pertinent medical condition which   
may affect trinidad-operative course  
  
  
[unfilled]  
Clinical Risk Factors for Possible   
Cardiac Complications:  
None  
  
Patient is scheduled for a   
low-risk procedure.  
  
FUNCTIONAL STATUS: Walk indoors,   
such as around the house (1.75   
METs)  
  
Functional Class ([facility]): N/A  
  
[facility]: Not obtained  
  
PLAN  
CONSULTS:  
Patient does not require consults   
for optimization at this time.  
  
The Following Tests/Procedures   
Have Been Initiated:  
None  
  
Instructions Given to Patient:  
Patient given verbal and written   
preop instructions and voices   
comprehension and compliance.  
  
SIGNATURE: Ronel Hays MD   
PATIENT NAME: Marzena Mederos  
DATE: 2024 MRN:   
[id number]  
TIME: 11:26 AM PAGER/CONTACT #:  
  
Electronically signed by Ronel Hays MD at 2024 11:27   
AM EST  
documented in this encounter            [facility]  
   
                                        2024 Instructions   
  
  
Ronel Hays MD - 2024   
11:23 AM ESTFormatting of this   
note is different from the   
original.  
Plan Cataract Surgery with   
Monofocal Intraocular Lens Implant   
Right Eye on 2024 at   
[facility].  
  
Current Ophthalmic Meds  
  
  
  
fluorometholone (FML LIQUID FILM)   
0.1 % ophthalmic suspension Use 1   
Drop in the right eye three times   
a day.  
  
Current Ophthalmic Meds  
  
  
  
prednisoLONE acetate (PRED FORTE)   
1 % ophthalmic suspension Use 1   
Drop in the left eye three times   
daily.  
keTORolac (ACULAR) 0.5 %   
ophthalmic solution Use 1 Drop in   
the left eye three times daily.  
  
Continue:  
Systane Complete solution instill   
1 drop 3 times daily Both Eyes.  
  
If you have any questions please   
contact our office at   
[phone].  
After office hours or on the   
weekend, please call Dr. Hays on   
his cell phone at [phone].  
  
Electronically signed by Ronel Hays MD at 2024 11:23   
AM EST  
  
documented in this encounter            [facility]  
   
                                        2024 Note     HNO ID: [id number]  
Author: RONEL HAYS MD  
Service: ?  
Author Type: Physician  
Type: Progress Notes  
Filed: 2024 11:27  
Note Text:  
ASSESSMENT/PLAN:  
1. 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   
cataract of right eye - ICD9:   
366.19, ICD10:  
H25.811 (primary diagnosis)  
Cataract Presurgical Documentation  
Cataract: Right Eye  
Current Visual Acuity  
Right Eye Distance CC 20/40  
Left Eye Distance SC 20/20  
Glare Testing:  
Right Eye Medium 20/80  
Visual Function: Marzena Mederos   
states that the decline in vision   
from the  
cataract impedes her abilities as   
listed in the HPI, as well as   
other activities  
of daily living.  
Marzena Mederos has confirmed that   
she is no longer able to function   
adequately  
on a day-to-day basis because of   
her current visual condition.  
Further, it is my medical opinion   
that the cataract is the primary   
cause, or at  
least a significantly contributory   
cause of her visual dysfunction.   
With  
uncomplicated cataract surgery and   
lens implantation, it is my   
expectation that  
her visual function and quality of   
life will improve, significantly.  
The risks, benefits, alternatives,   
personnel and complications of   
cataract  
surgery with lens implantation   
were discussed with Marzena Mederos in detail.  
She appeared to understand and   
asked that I proceed with plans   
for surgery.  
Upon eye examination, patient was   
found to have a visually   
significant cataract  
Right Eye. Discussed cataract   
surgery with patient and different   
intraocular  
lens implant options with patient:   
basic monofocal intraocular lens   
implant,  
Toric intraocular lens implant,   
and presbyopia correction   
intraocular lens  
implant. In my medical opinion,   
based on medical history and   
ocular  
examination, cataract surgery with   
intraocular lens implant will   
correct  
patient's vision and improve   
quality of patient's daily living   
activities.  
Patient wishes to have traditional   
cataract surgery with basic   
intraocular lens  
Right Eye. Patient wishes to have   
cataract surgery with the option   
stated  
above. Patient understands that an   
intraocular lens implant does not  
necessarily replace the need for   
glasses. Patient understands that   
it is  
impossible for the surgeon to   
inform him/her of every possible   
complication that  
may occur. The surgeon has   
answered all of the patient's   
questions. Patient  
understands that if he/she has a   
mature or dense cataract,   
pseudoexfoliation  
cataract, or history of use of   
Flomax, he/she may require the use   
of Maluyugin  
Ring and/or Vision Blue during   
surgery. Patient understands the   
risks,  
benefits, and alternatives to   
surgery.  
Patient would like Right Eye   
corrected for near, target -1.50.   
Patient and  
patient's  verbalized   
understanding that patient will   
need glasses for  
best corrected distance,   
intermediate, and near vision and   
post-operatively  
Right Eye patient will be unable   
to see at distance.  
Plan Cataract Surgery with   
Monofocal Intraocular Lens Implant   
Right Eye on  
2024 at [facility].  
Current Ophthalmic Meds  
fluorometholone (FML LIQUID FILM)   
0.1 % ophthalmic suspension Use 1   
Drop in the  
right eye three times a day.  
Continue:  
Systane Complete solution instill   
1 drop 3 times daily Both Eyes.  
PHYSICAL EXAM:  
Vital Signs:  
Blood pressure 125/80, pulse 88.  
Respiratory:  
Normal breath sounds, no wheezing.  
CARD:  
Normal heart sounds 1 AND 2,   
normal sinus rhythm.  
2. Status post cataract extraction   
and insertion of intraocular lens   
of left eye  
- ICD9: V45.61, V43.1, ICD10:   
Z98.42, Z96.1  
Current Ophthalmic Meds  
prednisoLONE acetate (PRED FORTE)   
1 % ophthalmic suspension Use 1   
Drop in the  
left eye three times daily.  
keTORolac (ACULAR) 0.5 %   
ophthalmic solution Use 1 Drop in   
the left eye three  
times daily.  
Continue:  
Systane Complete solution instill   
1 drop 3 times daily Both Eyes.  
3. Type 2 diabetes mellitus   
without retinopathy (HCC) - ICD9:   
250.00, ICD10:  
E11.9  
Please keep your blood sugar under   
good control to minimize risk of   
ocular  
complications from diabetes.  
Continue to monitor with primary   
care physician.  
4. Essential hypertension - ICD9:   
401.9, ICD10: I10  
Continue to monitor with primary   
care physician.  
5. Hypercholesteremia - ICD9:   
272.0, ICD10: E78.00  
Continue to monitor with primary   
care physician.  
6. Dementia, unspecified dementia   
severity, unspecified dementia   
type,  
unspecified whether behavioral,   
psychotic, or mood disturbance or   
anxiety (HCC)  
- ICD9: 294.20, ICD10: F03.90  
Continue to monitor with primary   
care physician.  
I have confirmed and edited as   
necessary the relevant HPI,   
ophthalmic history,  
ROS, and the neuro exam findings   
as obtained by others. I have seen   
and  
examined Marzena Mederos.  
I have discussed the case and the   
management of this patient's care   
with the  
Resident/Fellow, if applicable. I   
also have reviewed and agree with   
the  
assessment and plan as stated   
above and agree with all of its   
relevant  
components.                             [facility]  
   
                                                    2024 History of   
Present illness Narrative               Formatting of this note is   
different from the original.  
ASSESSMENT/PLAN:  
1. 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   
cataract of right eye - ICD9:   
366.19, ICD10: H25.811 (primary   
diagnosis)  
  
Cataract Presurgical Documentation  
  
Cataract: Right Eye  
  
Current Visual Acuity  
Right Eye Distance CC 20/40  
Left Eye Distance SC 20/20  
  
  
Glare Testing:  
Right Eye Medium 20/80  
  
Visual Function: Marzena Mederos   
states that the decline in vision   
from the cataract impedes her   
abilities as listed in the HPI, as   
well as other activities of daily   
living.  
  
Marzena Mederos has confirmed that   
she is no longer able to function   
adequately on a day-to-day basis   
because of her current visual   
condition.  
  
Further, it is my medical opinion   
that the cataract is the primary   
cause, or at least a significantly   
contributory cause of her visual   
dysfunction. With uncomplicated   
cataract surgery and lens   
implantation, it is my expectation   
that her visual function and   
quality of life will improve,   
significantly.  
  
The risks, benefits, alternatives,   
personnel and complications of   
cataract surgery with lens   
implantation were discussed with   
Marzena Mederos in detail. She   
appeared to understand and asked   
that I proceed with plans for   
surgery.  
  
Upon eye examination, patient was   
found to have a visually   
significant cataract Right Eye.   
Discussed cataract surgery with   
patient and different intraocular   
lens implant options with patient:   
basic monofocal intraocular lens   
implant, Toric intraocular lens   
implant, and presbyopia correction   
intraocular lens implant. In my   
medical opinion, based on medical   
history and ocular examination,   
cataract surgery with intraocular   
lens implant will correct   
patient's vision and improve   
quality of patient's daily living   
activities. Patient wishes to have   
traditional cataract surgery with   
basic intraocular lens Right Eye.   
Patient wishes to have cataract   
surgery with the option stated   
above. Patient understands that an   
intraocular lens implant does not   
necessarily replace the need for   
glasses. Patient understands that   
it is impossible for the surgeon   
to inform him/her of every   
possible complication that may   
occur. The surgeon has answered   
all of the patient's questions.   
Patient understands that if he/she   
has a mature or dense cataract,   
pseudoexfoliation cataract, or   
history of use of Flomax, he/she   
may require the use of Maluyugin   
Ring and/or Vision Blue during   
surgery. Patient understands the   
risks, benefits, and alternatives   
to surgery.  
  
Patient would like Right Eye   
corrected for near, target -1.50.   
Patient and patient's    
verbalized understanding that   
patient will need glasses for best   
corrected distance, intermediate,   
and near vision and   
post-operatively Right Eye patient   
will be unable to see at distance.  
  
Plan Cataract Surgery with   
Monofocal Intraocular Lens Implant   
Right Eye on 2024 at   
[facility].  
  
Current Ophthalmic Meds  
  
  
  
fluorometholone (FML LIQUID FILM)   
0.1 % ophthalmic suspension Use 1   
Drop in the right eye three times   
a day.  
  
Continue:  
Systane Complete solution instill   
1 drop 3 times daily Both Eyes.  
  
PHYSICAL EXAM:  
  
Vital Signs:  
Blood pressure 125/80, pulse 88.  
  
Respiratory:  
Normal breath sounds, no wheezing.  
  
CARD:  
Normal heart sounds 1 & 2, normal   
sinus rhythm.  
  
2. Status post cataract extraction   
and insertion of intraocular lens   
of left eye - ICD9: V45.61, V43.1,   
ICD10: Z98.42, Z96.1  
  
Current Ophthalmic Meds  
  
  
  
prednisoLONE acetate (PRED FORTE)   
1 % ophthalmic suspension Use 1   
Drop in the left eye three times   
daily.  
keTORolac (ACULAR) 0.5 %   
ophthalmic solution Use 1 Drop in   
the left eye three times daily.  
  
Continue:  
Systane Complete solution instill   
1 drop 3 times daily Both Eyes.  
  
3. Type 2 diabetes mellitus   
without retinopathy (HCC) - ICD9:   
250.00, ICD10: E11.9  
  
Please keep your blood sugar under   
good control to minimize risk of   
ocular complications from   
diabetes.  
  
Continue to monitor with primary   
care physician.  
  
4. Essential hypertension - ICD9:   
401.9, ICD10: I10  
  
Continue to monitor with primary   
care physician.  
  
5. Hypercholesteremia - ICD9:   
272.0, ICD10: E78.00  
  
Continue to monitor with primary   
care physician.  
  
6. Dementia, unspecified dementia   
severity, unspecified dementia   
type, unspecified whether   
behavioral, psychotic, or mood   
disturbance or anxiety (HCC) -   
ICD9: 294.20, ICD10: F03.90  
  
Continue to monitor with primary   
care physician.  
  
I have confirmed and edited as   
necessary the relevant HPI,   
ophthalmic history, ROS, and the   
neuro exam findings as obtained by   
others. I have seen and examined   
Marzena Mederos.  
I have discussed the case and the   
management of this patient's care   
with the Resident/Fellow, if   
applicable. I also have reviewed   
and agree with the assessment and   
plan as stated above and agree   
with all of its relevant   
components.  
  
  
  
Electronically signed by Ronel Hays MD at 2024 11:27   
AM EST  
documented in this encounter            [facility]  
   
                                                    2024 History of   
Present illness Narrative               Formatting of this note might be   
different from the original.  
Radiology Service Progress Note  
  
PATIENT NAME: Marzena Mederos  
MRN: [id number]  
  
DATE OF SERVICE: 2024  
TIME: 9:31 AM  
PATIENT IDENTITY VERIFICATION   
COMPLETED USING TWO (2)   
IDENTIFIERS: Name and Date of   
Birth confirmed by patient   
verbally.  
FALL SCREENING: Has the patient   
had 2 falls in the last year or 1   
fall with injury or currently   
using an Ambulatory Assistive   
Device (Walker, Cane, Wheelchair,   
Crutches, etc.)? No  
PATIENT GENDER DATA: Female.   
Pregnancy status: Pregnant: No   
Breastfeeding status: NO.  
PATIENT RELEVANT IMPLANT DATA   
REVIEWED: Not Applicable  
PATIENT PRESENTS WITH AN   
IMPLANTABLE OR ATTACHED MEDICAL   
DEVICE: No  
  
RADIOLOGY DEPARTMENT: Mammography  
  
PERIPHERAL IV DATA: Not applicable  
  
SIGNED BY: Arlene Velazquez  
2024 9:31 AM  
  
  
Electronically signed by Samuel Yeager Mammo Tech at 2024   
9:32 AM EST  
documented in this encounter            [facility]  
   
                                        2024 Note     HNO ID: [id number]  
Author: SAMUEL YEAGER Mammo Tech  
Service: ?  
Author Type: Technician  
Type: Progress Notes  
Filed: 2024 09:32  
Note Text:  
Radiology Service Progress Note  
PATIENT NAME: Marzena Mederos  
MRN: [id number]  
DATE OF SERVICE: 2024  
TIME: 9:31 AM  
PATIENT IDENTITY VERIFICATION   
COMPLETED USING TWO (2)   
IDENTIFIERS: Name and  
Date of Birth confirmed by patient   
verbally.  
FALL SCREENING: Has the patient   
had 2 falls in the last year or 1   
fall with  
injury or currently using an   
Ambulatory Assistive Device   
(Walker, Cane,  
Wheelchair, Crutches, etc.)? No  
PATIENT GENDER DATA: Female.   
Pregnancy status: Pregnant: No   
Breastfeeding  
status: NO.  
PATIENT RELEVANT IMPLANT DATA   
REVIEWED: Not Applicable  
PATIENT PRESENTS WITH AN   
IMPLANTABLE OR ATTACHED MEDICAL   
DEVICE: No  
RADIOLOGY DEPARTMENT: Mammography  
PERIPHERAL IV DATA: Not applicable  
SIGNED BY: Samuel Yeager [employer]  
2024 9:31 AM               [facility]  
   
                                        2024 Instructions   
  
  
Amaya Reis II OD -   
2024 2:14 PM ESTFormatting   
of this note might be different   
from the original.  
Assessment and Plan  
  
Z98.42, Z96.1 Status post cataract   
extraction and insertion of   
intraocular lens of left eye   
(primary encounter diagnosis)  
Comment: Healing well. Continue   
schedule of postoperative   
medications as directed. Scheduled   
for right eye cataract removal in   
1-2 weeks. Follow-up as   
instructed.Instruct patient to   
immediately report any change in   
condition outside of expected and   
discussed symptoms.  
  
I have confirmed and edited as   
necessary the relevant HPI,   
ophthalmic history, ROS, and the   
neuro exam findings as obtained by   
others. I have seen and examined   
Marzena Mederos.  
I have discussed the case and the   
management of this patient's care   
with the Resident/Fellow, if   
applicable. I also have reviewed   
and agree with the assessment and   
plan as stated above and agree   
with all of its relevant   
components.  
  
  
  
  
  
Electronically signed by   
Amaya Reis II, OD at   
2024 2:14 PM EST  
  
documented in this encounter            [facility]  
   
                                        2024 Note     HNO ID: [id number]  
Author: AMAYA REIS II, OD  
Service: ?  
Author Type: OPTOMETRIST  
Type: Progress Notes  
Filed: 2024 14:14  
Note Text:  
Assessment and Plan  
Z98.42, Z96.1 Status post cataract   
extraction and insertion of   
intraocular lens  
of left eye (primary encounter   
diagnosis)  
Comment: Healing well. Continue   
schedule of postoperative   
medications as  
directed. Scheduled for right eye   
cataract removal in 1-2 weeks.   
Follow-up as  
instructed.Instruct patient to   
immediately report any change in   
condition  
outside of expected and discussed   
symptoms.  
I have confirmed and edited as   
necessary the relevant HPI,   
ophthalmic history,  
ROS, and the neuro exam findings   
as obtained by others. I have seen   
and  
examined Marzena Mederos.  
I have discussed the case and the   
management of this patient's care   
with the  
Resident/Fellow, if applicable. I   
also have reviewed and agree with   
the  
assessment and plan as stated   
above and agree with all of its   
relevant  
components.                             [facility]  
   
                                                    2024 History of   
Present illness Narrative               Formatting of this note might be   
different from the original.  
Assessment and Plan  
  
Z98.42, Z96.1 Status post cataract   
extraction and insertion of   
intraocular lens of left eye   
(primary encounter diagnosis)  
Comment: Healing well. Continue   
schedule of postoperative   
medications as directed. Scheduled   
for right eye cataract removal in   
1-2 weeks. Follow-up as   
instructed.Instruct patient to   
immediately report any change in   
condition outside of expected and   
discussed symptoms.  
  
I have confirmed and edited as   
necessary the relevant HPI,   
ophthalmic history, ROS, and the   
neuro exam findings as obtained by   
others. I have seen and examined   
Marzena Mederos.  
I have discussed the case and the   
management of this patient's care   
with the Resident/Fellow, if   
applicable. I also have reviewed   
and agree with the assessment and   
plan as stated above and agree   
with all of its relevant   
components.  
  
  
  
Electronically signed by   
Amaya Reis II, OD at   
2024 2:14 PM EST  
documented in this encounter            [facility]  
   
                                        2024 Note     Date of Procedure  
2024.  
  
Notes  
See chart notes for measurements        [facility]  
   
                                        2024 Instructions   
  
  
Ronel Hays MD - 2024   
10:20 AM EDTFormatting of this   
note is different from the   
original.  
Current Ophthalmic Meds  
  
  
  
fluorometholone (FML LIQUID FILM)   
0.1 % ophthalmic suspension Use 1   
Drop in the right eye three times   
a day.  
prednisoLONE acetate (PRED FORTE)   
1 % ophthalmic suspension Use 1   
Drop in the left eye four times   
daily.  
keTORolac (ACULAR) 0.5 %   
ophthalmic solution Use 1 Drop in   
the left eye four times daily.  
  
  
  
Continue:  
Systane Complete solution instill   
1 drop 3 times daily Both Eyes.  
  
If you have any questions please   
contact our office at   
[phone].  
After office hours or on the   
weekend, please call Dr. Hays on   
his cell phone at [phone].  
  
Electronically signed by Ronel Hays MD at 2024 10:20   
AM EDT  
  
documented in this encounter            [facility]  
   
                                        2024 Note     HNO ID: [id number]  
Author: RONEL HAYS MD  
Service: ?  
Author Type: Physician  
Type: Progress Notes  
Filed: 2024 10:21  
Note Text:  
ASSESSMENT/PLAN:  
1. Status post cataract extraction   
and insertion of intraocular lens   
of left eye  
- ICD9: V45.61, V43.1, ICD10:   
Z98.42, Z96.1 (primary diagnosis)  
Current Ophthalmic Meds  
prednisoLONE acetate (PRED FORTE)   
1 % ophthalmic suspension Use 1   
Drop in the  
left eye four times daily.  
keTORolac (ACULAR) 0.5 %   
ophthalmic solution Use 1 Drop in   
the left eye four  
times daily.  
2. 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   
cataract of right eye - ICD9:   
366.19, ICD10:  
H25.811  
Current Ophthalmic Meds  
fluorometholone (FML LIQUID FILM)   
0.1 % ophthalmic suspension Use 1   
Drop in the  
right eye three times a day.  
Continue:  
Systane Complete solution instill   
1 drop 3 times daily Both Eyes.  
The patient wishes to have her   
right eye target for reading   
-1.50.  
Upon eye examination, patient was   
found to have a visually   
significant cataract  
right. Discussed cataract surgery   
with patient and different   
intraocular lens  
implant options with patient:   
basic monofocal intraocular lens   
implant, Toric  
intraocular lens implant, and   
presbyopia correction intraocular   
lens implant.  
In my medical opinion, based on   
medical history and ocular   
examination, cataract  
surgery with intraocular lens   
implant will correct patient's   
vision and improve  
quality of patient's daily living   
activities. Patient wishes to have  
traditional cataract surgery with   
basic intraocular lens right.   
Patient wishes  
to have cataract surgery with the   
option stated above. Patient   
understands that  
an intraocular lens implant does   
not necessarily replace the need   
for glasses.  
Patient understands that it is   
impossible for the surgeon to   
inform him/her of  
every possible complication that   
may occur. The surgeon has   
answered all of the  
patient's questions. Patient   
understands that if he/she has a   
mature or dense  
cataract, pseudoexfoliation   
cataract, or history of use of   
Flomax, he/she may  
require the use of Maluyugin Ring   
and/or Vision Blue during surgery.   
Patient  
understands the risks, benefits,   
and alternatives to surgery.  
Patient is scheduled for Cataract   
Surgery with Intraocular lens   
right eye on  
24 at [facility]  
3. Type 2 diabetes mellitus   
without retinopathy (HCC) - ICD9:   
250.00, ICD10:  
E11.9  
Please keep your blood sugar under   
good control to minimize risk of   
ocular  
complications from diabetes.  
4. Essential hypertension - ICD9:   
401.9, ICD10: I10  
5. Hypercholesteremia - ICD9:   
272.0, ICD10: E78.00  
Continue care with primary care   
physician  
6. Dementia, unspecified dementia   
severity, unspecified dementia   
type,  
unspecified whether behavioral,   
psychotic, or mood disturbance or   
anxiety (HCC)  
- ICD9: 294.20, ICD10: F03.90  
Continue care with managing   
physician  
I have confirmed and edited as   
necessary the relevant HPI,   
ophthalmic history,  
ROS, and the neuro exam findings   
as obtained by others. I have seen   
and  
examined Marzena Mederos.  
I have discussed the case and the   
management of this patient's care   
with the  
Resident/Fellow, if applicable. I   
also have reviewed and agree with   
the  
assessment and plan as stated   
above and agree with all of its   
relevant  
components.                             [facility]  
   
                                                    2024 History of   
Present illness Narrative               Formatting of this note is   
different from the original.  
ASSESSMENT/PLAN:  
1. Status post cataract extraction   
and insertion of intraocular lens   
of left eye - ICD9: V45.61, V43.1,   
ICD10: Z98.42, Z96.1 (primary   
diagnosis)  
  
Current Ophthalmic Meds  
  
prednisoLONE acetate (PRED FORTE)   
1 % ophthalmic suspension Use 1   
Drop in the left eye four times   
daily.  
keTORolac (ACULAR) 0.5 %   
ophthalmic solution Use 1 Drop in   
the left eye four times daily.  
  
2. 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   
cataract of right eye - ICD9:   
366.19, ICD10: H25.811  
  
Current Ophthalmic Meds  
  
fluorometholone (FML LIQUID FILM)   
0.1 % ophthalmic suspension Use 1   
Drop in the right eye three times   
a day.  
  
Continue:  
Systane Complete solution instill   
1 drop 3 times daily Both Eyes.  
  
The patient wishes to have her   
right eye target for reading   
-1.50.  
  
Upon eye examination, patient was   
found to have a visually   
significant cataract right.   
Discussed cataract surgery with   
patient and different intraocular   
lens implant options with patient:   
basic monofocal intraocular lens   
implant, Toric intraocular lens   
implant, and presbyopia correction   
intraocular lens implant. In my   
medical opinion, based on medical   
history and ocular examination,   
cataract surgery with intraocular   
lens implant will correct   
patient's vision and improve   
quality of patient's daily living   
activities. Patient wishes to have   
traditional cataract surgery with   
basic intraocular lens right.   
Patient wishes to have cataract   
surgery with the option stated   
above. Patient understands that an   
intraocular lens implant does not   
necessarily replace the need for   
glasses. Patient understands that   
it is impossible for the surgeon   
to inform him/her of every   
possible complication that may   
occur. The surgeon has answered   
all of the patient's questions.   
Patient understands that if he/she   
has a mature or dense cataract,   
pseudoexfoliation cataract, or   
history of use of Flomax, he/she   
may require the use of Maluyugin   
Ring and/or Vision Blue during   
surgery. Patient understands the   
risks, benefits, and alternatives   
to surgery.  
  
Patient is scheduled for Cataract   
Surgery with Intraocular lens   
right eye on 24 at [facility]  
  
3. Type 2 diabetes mellitus   
without retinopathy (HCC) - ICD9:   
250.00, ICD10: E11.9  
Please keep your blood sugar under   
good control to minimize risk of   
ocular complications from   
diabetes.  
  
4. Essential hypertension - ICD9:   
401.9, ICD10: I10  
5. Hypercholesteremia - ICD9:   
272.0, ICD10: E78.00  
  
Continue care with primary care   
physician  
  
6. Dementia, unspecified dementia   
severity, unspecified dementia   
type, unspecified whether   
behavioral, psychotic, or mood   
disturbance or anxiety (HCC) -   
ICD9: 294.20, ICD10: F03.90  
Continue care with managing   
physician  
  
I have confirmed and edited as   
necessary the relevant HPI,   
ophthalmic history, ROS, and the   
neuro exam findings as obtained by   
others. I have seen and examined   
Marzena Mederos.  
I have discussed the case and the   
management of this patient's care   
with the Resident/Fellow, if   
applicable. I also have reviewed   
and agree with the assessment and   
plan as stated above and agree   
with all of its relevant   
components.  
  
  
  
Electronically signed by Ronel Hays MD at 2024 10:21   
AM EDT  
documented in this encounter            [facility]  
   
                                                    10- Hospital   
Discharge instructions                    
  
  
Ronel Hays MD - 10/31/2024   
9:58 AM EDTFormatting of this note   
might be different from the   
original.  
Please see enclosed instructions   
from Dr. Hays regarding eye drop   
schedule, restrictions and use of   
eye shield.  
  
Please take bag with eye drops   
that were given to you today as   
well as ALL eye drops that you are   
using at home with you to your   
appointment tomorrow at Dr. Hays's office.  
  
Electronically signed by Ronel Hays MD at 10/31/2024 9:58 AM   
EDT  
  
documented in this encounter            [facility]  
Work Phone:   
[phone]  
   
                                                    10- Miscellaneous   
Notes                                   Formatting of this note is   
different from the original.  
Phacoemulsification Cataract with   
Insertion Intraocular Lens (L)   
Operative Note  
  
Date: 10/31/2024 OR Location: [facility]   
OR  
  
Name: Marzena Mederos, :   
1945, Age: 79 y.o., MRN:   
[id number], Sex: female  
  
Diagnosis  
Pre-op Diagnosis  
* Combined forms of age-related   
cataract of left eye [H25.812]   
Post-op Diagnosis  
* Combined forms of age-related   
cataract of left eye [H25.812]  
  
Procedures  
Phacoemulsification Cataract with   
Insertion Intraocular Lens  
06182 - NV XCAPSL CTRC RMVL INSJ   
IO LENS PROSTH W/O ECP  
  
Surgeons  
* Ronel Hays - Primary  
  
Resident/Fellow/Other Assistant:  
Surgeons and Role:  
* No surgeons found with a   
matching role *  
  
Staff:  
Circulator: Sarah Fraga Person: Abdullahi  
  
Anesthesia Staff:   
Anesthesiologist: Dell Williamson DO  
  
Procedure Summary  
Anesthesia: Monitor Anesthesia   
Care ASA: III  
Estimated Blood Loss: None  
Intra-op Medications:  
Administrations occurring from   
0930 to 1010 on 10/31/24:  
Medication Name Total Dose  
dexAMETHasone (Decadron) PF   
injection 10 mg/mL 4 mg  
fentaNYL (Sublimaze) injection 50   
mcg/mL 50 mcg  
midazolam PF (Versed) injection 1   
mg/mL 1 mg  
  
Anesthesia Record  
  
  
Intraprocedure I/O Totals  
  
None  
  
Specimen: No specimens collected  
  
Drains and/or Catheters: * None in   
log *  
  
I have reviewed the images and   
report from the Ophthalmic   
Biometry 10-31-24 to determine the   
intraocular lens power calculation   
for the IOL lens implant. I have   
interpreted and agree with the   
calculation of the IOL as listed   
below.  
  
Implants:  
Implants  
  
Type Name Action Serial No.  
Lens MEREDITHEON IOL Implanted   
[id number]  
  
  
  
  
Findings: Combined Form Age   
Related Cataract Left Eye  
  
Indications: Marzena Mederos is an   
79 y.o. female who is having   
surgery for Combined forms of   
age-related cataract of left eye   
[H25.812]. Blurred vision left eye   
for near and for distance.   
Difficulty seeing to read and   
watch TV left eye. Vision left eye   
impedes patients ability to drive.  
  
The patient was seen in the   
preoperative area. The risks,   
benefits, complications, treatment   
options, non-operative   
alternatives, expected recovery   
and outcomes were discussed with   
the patient. The possibilities of   
reaction to medication, pulmonary   
aspiration, injury to surrounding   
structures, bleeding, recurrent   
infection, the need for additional   
procedures, failure to diagnose a   
condition, and creating a   
complication requiring transfusion   
or operation were discussed with   
the patient. The patient concurred   
with the proposed plan, giving   
informed consent. The site of   
surgery was properly noted/marked   
if necessary per policy. The   
patient has been actively warmed   
in preoperative area. Preoperative   
antibiotics are not indicated.   
Venous thrombosis prophylaxis are   
not indicated.  
  
Procedure Details: The patient was   
correctly identified in the preop   
area and the operative eye was   
marked with a marking pen. The   
operative eye was dilated in the   
preoperative area. The patient was   
then taken to the operating room   
where timeout was performed before   
starting the procedure. Combined   
anesthesia with intravenous   
sedation and topical tetracaine   
eyedrops were given the left eye.   
The operative eye was prepped and   
draped in the standard sterile   
ophthalmic fashion in preparation   
for ophthalmic surgery. A   
Sarthak wire speculum was then   
inserted between the eyelids of   
the left eye and the operating   
microscope was placed over the   
left eye. A paracentesis incision   
was made approximately 30 away   
from the planned surgical incision   
site with the help of MVR blade.   
1% lidocaine MPF with   
Phenylephrine 1.5% PF was injected   
into the anterior chamber through   
the paracentesis incision. A near   
limbal clear corneal incision was   
fashioned in the temporal quadrant   
just outside the vascular arcade   
and Viscoat was injected into   
anterior chamber to firm the eye.   
A bent needle cystotome was used   
and Utrata forceps were utilized   
to create a continuous curvilinear   
capsulorrhexis. BSS was injected   
beneath the anterior capsule to   
hydrodissect the nucleus from   
adjacent cortex and capsule. The   
residual cortex were then   
aspirated with irrigation   
aspiration handpiece. The   
posterior capsule was then   
polished with the help of soft   
irrigation-aspiration tip. Provisc   
viscoelastic was then injected   
into the eye to reform the   
anterior chamber and to open the   
capsular bag. The intraocular lens   
implant was taken from its sterile   
wrapping, inspected under the   
surgical microscope and found to   
be in good condition. The   
intraocular lens implant +19.5D   
was injected into the capsule bag.   
The Provisc was then aspirated   
from the anterior chamber and from   
behind the intraocular lens   
implant. The anterior chamber was   
inflated with the help of BSS to   
moderate tension. The edges of the   
surgical incision were then   
hydrated with the help of BSS.   
Vigamox was then injected into the   
anterior chamber and into the   
capsule bag through the   
paracentesis incision. The   
surgical wound was then inspected   
and found to be watertight. The   
wire speculum and drapes were then   
removed. Pred Forte eyedrops,   
Acular eyedrops and Betadine 5%   
sterile ophthalmic solution were   
instilled in the conjunctival sac.  
  
The patient tolerated the   
procedure well and was taken to   
recovery room in stable condition.  
  
Complications: None; patient   
tolerated the procedure well.  
Disposition: Home  
Condition: stable  
  
Attending Attestation: I performed   
the procedure.  
  
Ronel Hays  
Phone Number: [phone]  
  
Electronically signed by Ronel Hays MD at 10/31/2024 10:04 AM   
EDT  
Formatting of this note is   
different from the original.  
No outpatient medications have   
been marked as taking for the   
10/31/24 encounter (Hospital   
Encounter).  
  
  
  
  
  
NPO Instructions:  
  
Do not eat any food after midnight   
the night before your   
surgery/procedure.  
You may have clear liquids until   
TWO hours before   
surgery/procedure. This includes   
water, black tea/coffee, (no milk   
or cream) apple juice and   
electrolyte drinks (Gatorade).  
  
Additional Instructions:  
  
Will need  home, will   
receive call day before surgery   
with arrival time  
  
  
Electronically signed by Jolie Germain RN at 10/28/2024 9:42 AM EDT  
documented in this encounter            [facility]  
Work Phone:   
[phone]  
   
                                        10- Note     Formatting of this n  
ote is   
different from the original.  
Phacoemulsification Cataract with   
Insertion Intraocular Lens (L)   
Operative Note  
  
Date: 10/31/2024 OR Location: [facility]   
OR  
  
Name: Marzena Mederos, :   
1945, Age: 79 y.o., MRN:   
[id number], Sex: female  
  
Diagnosis  
Pre-op Diagnosis  
* Combined forms of age-related   
cataract of left eye [H25.812]   
Post-op Diagnosis  
* Combined forms of age-related   
cataract of left eye [H25.812]  
  
Procedures  
Phacoemulsification Cataract with   
Insertion Intraocular Lens  
86191 - NV XCAPSL CTRC RMVL INSJ   
IO LENS PROSTH W/O ECP  
  
Surgeons  
* Ronel Hays - Primary  
  
Resident/Fellow/Other Assistant:  
Surgeons and Role:  
* No surgeons found with a   
matching role *  
  
Staff:  
Circulator: Sarah Fraga Person: Abdullahi  
  
Anesthesia Staff:   
Anesthesiologist: Dell Williamson,   
DO  
  
Procedure Summary  
Anesthesia: Monitor Anesthesia   
Care ASA: III  
Estimated Blood Loss: None  
Intra-op Medications:  
Administrations occurring from   
0930 to 1010 on 10/31/24:  
Medication Name Total Dose  
dexAMETHasone (Decadron) PF   
injection 10 mg/mL 4 mg  
fentaNYL (Sublimaze) injection 50   
mcg/mL 50 mcg  
midazolam PF (Versed) injection 1   
mg/mL 1 mg  
  
Anesthesia Record  
  
  
Intraprocedure I/O Totals  
  
None  
  
Specimen: No specimens collected  
  
Drains and/or Catheters: * None in   
log *  
  
I have reviewed the images and   
report from the Ophthalmic   
Biometry 10-31-24 to determine the   
intraocular lens power calculation   
for the IOL lens implant. I have   
interpreted and agree with the   
calculation of the IOL as listed   
below.  
  
Implants:  
Implants  
  
Type Name Action Serial No.  
Lens CLAREON IOL Implanted   
[id number]  
  
  
  
  
Findings: Combined Form Age   
Related Cataract Left Eye  
  
Indications: Marzena Mederos is an   
79 y.o. female who is having   
surgery for Combined forms of   
age-related cataract of left eye   
[H25.812]. Blurred vision left eye   
for near and for distance.   
Difficulty seeing to read and   
watch TV left eye. Vision left eye   
impedes patients ability to drive.  
  
The patient was seen in the   
preoperative area. The risks,   
benefits, complications, treatment   
options, non-operative   
alternatives, expected recovery   
and outcomes were discussed with   
the patient. The possibilities of   
reaction to medication, pulmonary   
aspiration, injury to surrounding   
structures, bleeding, recurrent   
infection, the need for additional   
procedures, failure to diagnose a   
condition, and creating a   
complication requiring transfusion   
or operation were discussed with   
the patient. The patient concurred   
with the proposed plan, giving   
informed consent. The site of   
surgery was properly noted/marked   
if necessary per policy. The   
patient has been actively warmed   
in preoperative area. Preoperative   
antibiotics are not indicated.   
Venous thrombosis prophylaxis are   
not indicated.  
  
Procedure Details: The patient was   
correctly identified in the preop   
area and the operative eye was   
marked with a marking pen. The   
operative eye was dilated in the   
preoperative area. The patient was   
then taken to the operating room   
where timeout was performed before   
starting the procedure. Combined   
anesthesia with intravenous   
sedation and topical tetracaine   
eyedrops were given the left eye.   
The operative eye was prepped and   
draped in the standard sterile   
ophthalmic fashion in preparation   
for ophthalmic surgery. A   
Sarthak wire speculum was then   
inserted between the eyelids of   
the left eye and the operating   
microscope was placed over the   
left eye. A paracentesis incision   
was made approximately 30 away   
from the planned surgical incision   
site with the help of MVR blade.   
1% lidocaine MPF with   
Phenylephrine 1.5% PF was injected   
into the anterior chamber through   
the paracentesis incision. A near   
limbal clear corneal incision was   
fashioned in the temporal quadrant   
just outside the vascular arcade   
and Viscoat was injected into   
anterior chamber to firm the eye.   
A bent needle cystotome was used   
and Utrata forceps were utilized   
to create a continuous curvilinear   
capsulorrhexis. BSS was injected   
beneath the anterior capsule to   
hydrodissect the nucleus from   
adjacent cortex and capsule. The   
residual cortex were then   
aspirated with irrigation   
aspiration handpiece. The   
posterior capsule was then   
polished with the help of soft   
irrigation-aspiration tip. Provisc   
viscoelastic was then injected   
into the eye to reform the   
anterior chamber and to open the   
capsular bag. The intraocular lens   
implant was taken from its sterile   
wrapping, inspected under the   
surgical microscope and found to   
be in good condition. The   
intraocular lens implant +19.5D   
was injected into the capsule bag.   
The Provisc was then aspirated   
from the anterior chamber and from   
behind the intraocular lens   
implant. The anterior chamber was   
inflated with the help of BSS to   
moderate tension. The edges of the   
surgical incision were then   
hydrated with the help of BSS.   
Vigamox was then injected into the   
anterior chamber and into the   
capsule bag through the   
paracentesis incision. The   
surgical wound was then inspected   
and found to be watertight. The   
wire speculum and drapes were then   
removed. Pred Forte eyedrops,   
Acular eyedrops and Betadine 5%   
sterile ophthalmic solution were   
instilled in the conjunctival sac.  
  
The patient tolerated the   
procedure well and was taken to   
recovery room in stable condition.  
  
Complications: None; patient   
tolerated the procedure well.  
Disposition: Home  
Condition: stable  
  
Attending Attestation: I performed   
the procedure.  
  
Ronel Hays  
Phone Number: [phone]  
  
Electronically signed by Ronel Hays MD at 10/31/2024 10:04 AM   
EDT  
                                        [facility]  
Work Phone:   
[phone]  
   
                                                    10- Attending   
History and physical note               Formatting of this note might be   
different from the original.  
H&P reviewed. The patient was   
examined and there are no changes   
to the H&P.  
Electronically signed by Ronel Hays MD at 10/31/2024 8:14 AM   
EDT  
  
Source Note - Ronel Hays MD   
- 10/31/2024 8:14 AM EDT  
Formatting of this note is   
different from the original.  
Images from the original note were   
not included.  
H&P Notes  
- documented in this encounter  
Ronel Hays MD - 10/08/2024   
10:45 AM EDT  
Formatting of this note is   
different from the original.  
HISTORY AND PHYSICAL EXAMINATION  
  
SERVICE DATE: 10/8/2024  
SERVICE TIME:  
  
PRIMARY CARE PHYSICIAN: Sharifa Almaguer DO  
  
REASON FOR VISIT:  
Marzena Mederos is a 79 year old   
female who is being seen for   
combined age related cataract left   
eye  
  
The patient has the following:  
ACTIVE PROBLEM LIST  
Postmenopausal Atrophic Vaginitis  
Unspecified Transient Cerebral   
Ischemia  
Carcinoma in Situ of Vulva  
Malignant Neoplasm of Female   
Breast (Hcc)  
Personal History of Malignant   
Neoplasm of Breast  
Senile Nuclear Sclerosis  
Hypermetropia  
Regular Astigmatism  
Presbyopia  
Invasive Ductal Carcinoma of   
Breast, Female (Hcc)  
Ocular Migraine  
Controlled Type 2 Diabetes   
Mellitus Without Complication,   
Without Long-Term Current Use of   
Insulin (Hcc)  
Postmenopausal Bleeding  
S/P Hysterectomy With Oophorectomy  
  
SUBJECTIVE  
CHIEF COMPLAINT: combined age   
related cataract left eye  
HPI: blurry vision at all ranges   
of vision, difficulty with glare   
both eyes  
  
PAST MEDICAL HISTORY  
Diagnosis Date  
Breast cancer ([facility])   
invasive ductal carcinoma,   
completed radiation 3/7/11  
Cancer of endometrium (HCC)  
Carcinoma in situ, vulva   
Cellulitis  
of right eye muscle  
Diabetes mellitus without mention   
of complication  
Diabetes mellitus, type II (HCC)  
Diaphragmatic hernia without   
mention of obstruction or gangrene  
Hiatal hernia  
Dyspareunia  
Elevated cholesterol  
Esophageal reflux  
Insomnia, unspecified  
Orbital cellulitis  
Other corneal disorder  
Rt eye corneal erosion .  
PMH - PAST MEDICAL HISTORY OF  
?VESTIBULITIS  
PMH - PAST MEDICAL HISTORY OF  
CLIMACTERIC  
PMH - PAST MEDICAL HISTORY OF   
2006  
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  
Pure hypercholesterolemia  
Rib fracture  
6th Rib  
Senile dementia (HCC)  
Stomatitis and mucositis   
(ulcerative)  
Chronic ulcerative stomatitis on   
mucosal biopsy  
Unspecified essential hypertension  
  
PAST SURGICAL HISTORY  
Procedure Laterality Date  
Bunions bilateral feet removed   
10/2002  
BX/EXC LYMPH NODE OPEN DEEP   
AXILLARY NODE 8-24-10  
RIGHT BREAST LUMP W/ SLN BX  
CHOLECYSTECTOMY 2004  
Cholecystectomy  
COLONOSCOPY FLX DX W/COLLJ SPEC   
WHEN PFRMD   
Colonoscopy  
COLPOSCOPY CERVIX UPPER/ADJACENT   
VAGINA   
Colposcopy of the vulva  
COLPOSCOPY, VULVA 10/09  
CORRECT BUNION,SIMPLE  
Bunion  
DILATION & CURETTAGE DX&/THER   
NONOBSTETRIC   
SAB  
HYSTERECTOMY 2017  
LAPAROSCOPY SURG CHOLECYSTECTOMY   
3/2004  
Cholecystectomy, lap  
MAMMO STEREOTACTIC CORE BIOPSY RT   
10/10/13  
MASTECTOMY, PARTIAL 8-24-10  
RIGHT BREAST  
PAST SURGICAL HISTORY OF 2006  
partial vulvectomy/sq. cell ca in   
situ  
PAST SURGICAL HISTORY OF   
right eye cornea  
PAST SURGICAL HISTORY OF   
right eye revision  
PAST SURGICAL HISTORY OF  
squamous cell carcinoma right arm  
SALPINGO-OOPHORECTOMY COMPL/PRTL   
UNI/BI SPX 2017  
Toenail Big Toe Left Foot removed   
10/2002  
TONSILLECTOMY PRIMARY/SECONDARY   
Tonsillectomy  
UNLISTED PROCEDURE HANDS/FINGERS   
12  
[facility] Arthoplasty on right hand  
  
FAMILY HISTORY  
Problem Relation Age of Onset  
Stroke Mother  
Diabetes Mother  
GI Father  
 from complications of gb   
surgery  
Arthritis Sister  
Systemic Lupus  
Cancer Maternal Uncle  
LUNG  
Cancer Other  
cousin with breast cancer/cousin   
with bladder ca.  
Stroke Sister  
  
SOCIAL HISTORY:  
Social History  
  
Tobacco Use  
Smoking status: Never  
Smokeless tobacco: Never  
Vaping Use  
Vaping status: Never Used  
Substance Use Topics  
Alcohol use: Yes  
Comment: 1-2 weekly  
Drug use: No  
  
MEDICATIONS:  
Prior to Admission medications as   
of 10/8/24 1011  
Medication Sig Last Dose Taking  
fluorometholone (FML LIQUID FILM)   
0.1 % ophthalmic suspension Use 1   
Drop in both eyes three times a   
day. Taking Yes  
amLODIPine (NORVASC) 10 mg tablet   
Take 10 mg by mouth. Taking Yes  
donepezil (ARICEPT) 10 mg tablet   
Take 10 mg by mouth. Taking Yes  
cholecalciferol (VITAMIN D3) 50   
mcg (2,000 unit) tablet Take 2,000   
Units by mouth once daily. Taking   
Yes  
losartan-hydroCHLOROthiazide   
(HYZAAR) 100-12.5 mg per tablet   
Take 1 tablet by mouth once daily.   
Taking Yes  
omeprazole (PRILOSEC) 20 mg   
capsule Take 20 mg by mouth once   
daily. Taking Yes  
potassium chloride (KLOR-CON 10)   
10 mEq tablet Take 10 mEq by mouth   
once daily.  
Taking Yes  
metFORMIN ER (GLUCOPHAGE XR) 500   
mg 24 hr tablet Take two tablets   
by mouth once daily. Taking Yes  
simvastatin(ZOCOR 20 MG TAB) Take   
one(1) tablet daily. Taking Yes  
  
No medication comments found.  
  
CURRENT ALLERGIES:  
ALLERGIES  
Allergen Reactions  
Adhesive Rash  
Redness  
Ambien [Zolpidem Ta* Rash  
Codeine Mental Status Change  
Neosporin [Neomycin* Rash  
Percocet [Oxycodone*  
Causes Vomitting  
  
REVIEW OF SYSTEMS:  
PAIN ASSESSMENT:  
General: No weight loss, malaise   
or fevers.  
Neuro: Dementia  
Respiratory: No history of current   
cough or dyspnea, or pneumonia in   
the past 6 weeks. No history of   
respiratory/pulmonary symptoms or   
problems  
Cardiovascular: Positive for:   
Hypertension, PVC  
GI: No history of GI symptoms or   
problems. No history of esophageal   
varices, recent ascites, or ETOH   
greater than 2 drinks per day.  
: No history of UTI in past 6   
weeks. No history of renal   
failure. Not currently on or   
requiring dialysis. No history of   
 symptoms or problems.  
GYN: Negative for abnormal vaginal   
bleeding, abnormal vaginal   
discharge.  
Pregnancy: Denies  
Endocrine: Diabetes Mellitus on   
oral agent  
Hematology: No history of bleeding   
or clotting disorder. Pt is not   
taking anti-coagulation or   
platelet medications. No history   
of hematological symptoms or   
problems.  
Oncology: No history of CA   
metastasis, chemo within 30 days,   
or radiotherapy within 90 days.   
Has not lost 10% of body wt in 6   
months. No history of oncological   
symptoms or problems.  
Psych: No history of psychiatric   
symptoms or problems.  
Musculoskeletal: Negative for   
joint pain or swelling, back pain   
or muscle pain.  
Skin: Negative for lesions, rash   
and itching.  
  
PHYSICAL EXAM:  
VITALS:  
/80   Pulse 88  
  
General: Alert and oriented  
Skin: Normal color, no rash, no   
lesions.  
HEENT: EOM, pupils equal, round   
and reactive.  
Cardiovascular: Normal S1 & S2, no   
rubs, murmurs or gallops. No JVD.   
Pulse regular.  
Lungs: Normal breath sounds, no   
wheezes or crackles.  
Abdomen: Soft, non-tender, no   
rigidity.  
Extremities: No deformity, no   
edema or tenderness, no joint   
swelling or clubbing.  
Neurological: Normal cognition and   
motor skills.  
Pulses: Carotid and radial pulses   
normal +2.  
  
Diagnostic tests reviewed for   
today's visit:  
No new labs or tests  
  
ASSESSMENT  
Medication and Non-Pharmacologic   
VTE Prophylaxis/Anticoagulants  
  
VTE Prophylaxis: VTE prophylaxis   
appropriate  
  
Impression: There is no known   
pertinent medical condition which   
may affect trinidad-operative course  
  
[unfilled]  
Clinical Risk Factors for Possible   
Cardiac Complications:  
None  
  
Patient is scheduled for a   
low-risk procedure.  
  
FUNCTIONAL STATUS: Walk indoors,   
such as around the house (1.75   
METs)  
  
Functional Class ([facility]): N/A  
  
[facility]: Not obtained  
  
PLAN  
CONSULTS:  
Patient does not require consults   
for optimization at this time.  
  
The Following Tests/Procedures   
Have Been Initiated:  
None  
  
Instructions Given to Patient:  
Patient given verbal and written   
preop instructions and voices   
comprehension and compliance.  
  
SIGNATURE: Ronel Hays MD   
PATIENT NAME: Marzena Mederos  
DATE: 2024 MRN:   
[id number]  
TIME: 10:45 AM PAGER/CONTACT #:  
  
Electronically signed by Ronel Hays MD at 10/08/2024 1:24   
PM EDT  
Source Comments  
- [facility]  
In the event this information is   
protected by the Federal   
Confidentiality of Alcohol and   
Drug Abuse Patient Records   
regulations: This information has   
been disclosed to you from records   
protected by Federal   
confidentiality rules (42 CFR Part   
2). The Federal rules prohibit you   
from making any further disclosure   
of this information unless further   
disclosure is expressly permitted   
by the written consent of the   
person to whom it pertains or as   
otherwise permitted by 42 CFR Part   
2. A general authorization for the   
release of medical or other   
information is NOT sufficient for   
this purpose. The Federal rules   
restrict any use of the   
information to criminally   
investigate or prosecute any   
alcohol or drug abuse patient.  
Reason for Visit  
  
Reason for Visit -  
Reason Comments  
Cataract Follow Up  
  
Encounter Details  
  
Encounter Details  
Date Type Department Care Team   
(Latest Contact Info) Description  
10/08/2024 10:15 AM EDT Office   
Visit [facility] Ophthalmology  
 [place] [address]  
[phone] Ronel Hays MD  
 [place] [address]  
[phone] (Work)  
[phone] (Fax) Combined forms   
of age-related cataract of left   
eye (Primary Dx);  
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   
cataract of right eye;  
Type 2 diabetes mellitus without   
retinopathy (HCC);  
Essential hypertension;  
Hypercholesteremia;  
Dementia, unspecified dementia   
severity, unspecified dementia   
type, unspecified whether   
behavioral, psychotic, or mood   
disturbance or anxiety (HCC)  
  
Social History  
- documented as of this encounter  
Social History  
Tobacco Use Types Packs/Day Years   
Used Date  
Smoking Tobacco: Never  
Smokeless Tobacco: Never  
  
Social History  
Alcohol Use Standard Drinks/Week   
Comments  
Yes 0 (1 standard drink = 0.6 oz   
pure alcohol) 1-2 weekly  
  
Social History  
PHQ-2 Answer Date Recorded  
PHQ-2 score 0 10/31/2019  
  
Social History  
Area Deprivation Index Answer Date   
Recorded  
National Score (1-100), lower   
number is lower risk 79 2023  
State Score (1-10), lower number   
is lower risk 7 2023  
Data from:   
https://www.neighborhoodatlas.[email or web address] Last address used   
for calculation [address]   
2023  
  
Social History  
Sex and Gender Information Value   
Date Recorded  
Sex Assigned at Birth Female   
2017 6:52 AM EST  
Gender Identity Female 2017   
6:52 AM EST  
Sexual Orientation Straight   
2017 6:52 AM EST  
  
Last Filed Vital Signs  
- documented in this encounter  
Last Filed Vital Signs  
Vital Sign Reading Time Taken   
Comments  
Blood Pressure 125/80 10/08/2024   
10:20 AM EDT  
Pulse 88 10/08/2024 10:20 AM EDT  
Temperature - -  
Respiratory Rate - -  
Oxygen Saturation - -  
Inhaled Oxygen Concentration - -  
Weight - -  
Height - -  
Body Mass Index - -  
  
Functional Status  
- documented as of this encounter  
Functional Status  
Functional Status Response Date of   
Assessment  
Are you deaf or do you have   
serious difficulty hearing? No   
2017  
Are you blind or do you have   
serious difficulty seeing, even   
when wearing glasses? No   
2017  
Do you have serious difficulty   
walking or climbing stairs? No   
2017  
Do you have difficulty dressing or   
bathing? No 2017  
Because of a physical, mental, or   
emotional condition, do you have   
difficulty doing errands alone   
such as visiting a doctor's office   
or shopping? No 2017  
  
Functional Status  
Cognitive Status Response Date of   
Assessment  
Because of a physical, mental, or   
emotional condition, do you have   
serious difficulty concentrating,   
remembering, or making decisions?   
No 2017  
  
Patient Instructions  
- documented in this encounter  
Patient Instructions  
Ronel Hays MD - 10/08/2024   
10:49 AM EDT  
Formatting of this note is   
different from the original.  
Current Ophthalmic Meds  
  
fluorometholone (FML LIQUID FILM)   
0.1 % ophthalmic suspension Use 1   
Drop in both eyes three times a   
day.  
  
Continue:  
Systane Complete solution instill   
1 drop 3 times daily Both Eyes.  
  
Plan Cataract Surgery with   
Monofocal Intraocular Lens Implant   
Left Eye on 2024 at   
[facility].  
  
If you have any questions please   
contact our office at   
[phone].  
After office hours or on the   
weekend, please call Dr. Hays on   
his cell phone at [phone].  
Electronically signed by Ronel Hays MD at 10/08/2024 10:49   
AM EDT  
  
Progress Notes  
- documented in this encounter  
Ronel Hays MD - 10/08/2024   
10:20 AM EDT  
Formatting of this note is   
different from the original.  
ASSESSMENT/PLAN:  
1. 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   
cataract of left eye - ICD9:   
366.19, ICD10: H25.812 (primary   
diagnosis)  
  
PHYSICAL EXAM:  
  
Vital Signs:  
Blood pressure 125/80, pulse 88.  
  
Respiratory:  
Normal breath sounds, no wheezing.  
  
CARD:  
Normal heart sounds 1 & 2, normal   
sinus rhythm.  
  
Cataract Presurgical Documentation  
  
Cataract: Left Eye  
  
Current Visual Acuity  
Right Eye Distance CC 20/40  
Left Eye Distance CC 20/70  
  
Glare Testing:  
  
Visual Function: Marzena Mederos   
states that the decline in vision   
from the cataract impedes her   
abilities as listed in the HPI, as   
well as other activities of daily   
living.  
  
Marzena K Rosa M has confirmed that   
she is no longer able to function   
adequately on a day-to-day basis   
because of her current visual   
condition.  
  
Further, it is my medical opinion   
that the cataract is the primary   
cause, or at least a significantly   
contributory cause of her visual   
dysfunction. With uncomplicated   
cataract surgery and lens   
implantation, it is my expectation   
that her visual function and   
quality of life will improve,   
significantly.  
  
The risks, benefits, alternatives,   
personnel and complications of   
cataract surgery with lens   
implantation were discussed with   
Marzena Mederos in detail. She   
appeared to understand and asked   
that I proceed with plans for   
surgery.  
  
Upon eye examination, patient was   
found to have a visually   
significant cataract Left Eye.   
Discussed cataract surgery with   
patient and different intraocular   
lens implant options with patient:   
basic monofocal intraocular lens   
implant, Toric intraocular lens   
implant, and presbyopia correction   
intraocular lens implant. In my   
medical opinion, based on medical   
history and ocular examination,   
cataract surgery with intraocular   
lens implant will correct   
patient's vision and improve   
quality of patient's daily living   
activities. Patient wishes to have   
traditional cataract surgery with   
basic intraocular lens Left Eye.   
Patient wishes to have cataract   
surgery with the option stated   
above. Patient understands that an   
intraocular lens implant does not   
necessarily replace the need for   
glasses. Patient understands that   
it is impossible for the surgeon   
to inform him/her of every   
possible complication that may   
occur. The surgeon has answered   
all of the patient's questions.   
Patient understands that if he/she   
has a mature or dense cataract,   
pseudoexfoliation cataract, or   
history of use of Flomax, he/she   
may require the use of [place]   
Ring and/or Vision Blue during   
surgery. Patient understands the   
risks, benefits, and alternatives   
to surgery.  
  
Current Ophthalmic Meds  
  
fluorometholone (FML LIQUID FILM)   
0.1 % ophthalmic suspension Use 1   
Drop in both eyes three times a   
day.  
  
Continue:  
Systane Complete solution instill   
1 drop 3 times daily Both Eyes.  
  
Plan Cataract Surgery with   
Monofocal Intraocular Lens Implant   
Left Eye on 2024 at   
[facility].  
  
Patient cleared for surgery by Dr. Almaguer  
  
2. 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   
cataract of right eye - ICD9:   
366.19, ICD10: H25.811  
  
Plan Cataract Surgery with   
Monofocal Intraocular Lens Implant   
Right Eye after Left Eye is   
stable.  
  
3. Type 2 diabetes mellitus   
without retinopathy (HCC) - ICD9:   
250.00, ICD10: E11.9  
  
Please keep your blood sugar under   
good control to minimize risk of   
ocular complications from   
diabetes.  
  
Continue to monitor with primary   
care physician.  
  
4. Essential hypertension - ICD9:   
401.9, ICD10: I10  
  
Continue to monitor with primary   
care physician.  
  
5. Hypercholesteremia - ICD9:   
272.0, ICD10: E78.00  
  
Continue to monitor with primary   
care physician.  
  
6. Dementia, unspecified dementia   
severity, unspecified dementia   
type, unspecified whether   
behavioral, psychotic, or mood   
disturbance or anxiety (HCC) -   
ICD9: 294.20, ICD10: F03.90  
  
Continue to monitor with primary   
care physician.  
  
I have confirmed and edited as   
necessary the relevant HPI,   
ophthalmic history, ROS, and the   
neuro exam findings as obtained by   
others. I have seen and examined   
Marzena Mederos. I have discussed   
the case and the management of   
this patient's care with the   
Resident/Fellow, if applicable. I   
also have reviewed and agree with   
the assessment and plan as stated   
above and agree with all of its   
relevant components.  
  
Electronically signed by Ronel Hays MD at 10/08/2024 1:24   
PM EDT  
Plan of Treatment  
- documented as of this encounter  
Plan of Treatment - Upcoming   
Encounters  
Upcoming Encounters  
Date Type Department Care Team   
(Latest Contact Info) Description  
2024 9:45 AM EDT Office   
Visit [facility] Ophthalmology  
[address] [place] [address]  
[phone] Ronel Hays MD  
[address] [place] [address]  
[phone] (Work)  
[phone] (Fax) 1 day po 1st    
basic  
2024 9:30 AM EST Appointment   
Mammogram  
[address] [place], [place] [id number] [phone] Encounter for   
screening mammogram for breast   
cancer [Z12.31]  
2024 10:10 AM EST Visit (SP)   
Office Hematology/Oncology  
[address] [place], [place] [address] [phone] Colten Abbott, DO  
[address] [place] [address]  
[phone] (Work)  
[phone] (Fax) 1 YR OV/ JONY   
*  
  
Plan of Treatment - Scheduled   
Procedures  
Scheduled Procedures  
Name Priority Associated Diagnoses   
Date/Time  
SURGERY AT NON-[facility] FACILITY   
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   
cataract of left eye 10/31/2024   
9:25 AM EDT  
  
Procedures  
- documented in this encounter  
Procedures  
Procedure Name Priority Date/Time   
Associated Diagnosis Comments  
IOL BIOMETRY W/ IOL CALC OU (BOTH   
EYES) Routine 10/08/2024 10:43 AM   
EDT Combined forms of age-related   
cataract of left eye  
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   
cataract of right eye Results for   
this procedure are in the results   
section.  
  
Imaging Results  
- documented in this encounter  
IOL BIOMETRY W/ IOL CALC OU (BOTH   
EYES) (10/08/2024 10:43 AM EDT)  
Imaging Results - IOL BIOMETRY W/   
IOL CALC OU (BOTH EYES)   
(10/08/2024 10:43 AM EDT)  
Anatomical Region Laterality   
Modality  
Other  
  
Imaging Results - IOL BIOMETRY W/   
IOL CALC OU (BOTH EYES)   
(10/08/2024 10:43 AM EDT)  
Narrative  
10/08/2024 10:43 AM EDT  
Date of Procedure  
10/8/2024.  
  
Technician Information  
Imaging Technician: AB.  
  
Notes  
Measurements only - see Procedure   
Record under Scanned Documents for  
signed results.  
  
LENSTAR  
Right eye: AL 23.10 ACD 2.85 WTW   
12.13  
Left eye: AL 23.40 ACD 2.92 WTW   
12.32  
  
Imaging Results - IOL BIOMETRY W/   
IOL CALC OU (BOTH EYES)   
(10/08/2024 10:43 AM EDT)  
Authorizing Provider Result Type  
Ronel Hays MD OPHTHALMOLOGY  
  
Associated Images  
  
10/8/2024  
IOL BIOMETRY W/ IOL CALC OU (BOTH   
EYES)  
  
Visit Diagnoses  
- documented in this encounter  
Visit Diagnoses  
Diagnosis  
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   
cataract of left eye - Primary  
Other and combined forms of senile   
cataract  
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   
cataract of right eye  
Other and combined forms of senile   
cataract  
Type 2 diabetes mellitus without   
retinopathy (HCC)  
Type II or unspecified type   
diabetes mellitus without mention   
of complication, not stated as   
uncontrolled  
Essential hypertension  
Unspecified essential hypertension  
Hypercholesteremia  
Pure hypercholesterolemia  
Dementia, unspecified dementia   
severity, unspecified dementia   
type, unspecified whether   
behavioral, psychotic, or mood   
disturbance or anxiety (HCC)  
  
Eye Exam  
  
Eye Exam - Visual Acuity (Snellen   
- Linear)  
Visual Acuity (Snellen - Linear)  
Right eye Left eye  
Dist cc 20/40 20/70  
Near cc J6 J8  
  
Eye Exam - Tonometry (Applanation,   
10:51 AM)  
Tonometry (Applanation, 10:51 AM)  
Right eye Left eye  
Pressure 10 10  
  
Eye Exam - Pupils  
Pupils  
Pupils Dark Shape React APD  
Right eye PERRL 4 Round +2 None  
Left eye PERRL 4 Round +2 None  
  
Eye Exam - Visual Fields  
Visual Fields  
Right eye Left eye  
Full Full  
  
Eye Exam - Extraocular Movement  
Extraocular Movement  
Right eye Left eye  
Full Full  
  
Eye Exam - Neuro/Psych  
Neuro/Psych  
Oriented x3: Yes  
Mood/Affect: Normal  
  
Eye Exam - External Exam  
External Exam  
Right eye Left eye  
External Normal including orbits   
and preauricular lymph nodes   
Normal including orbits and   
preauricular lymph nodes  
  
Eye Exam - Slit Lamp Exam  
Slit Lamp Exam  
Right eye Left eye  
Lids/Lashes Normal lids, lashes,   
lacrimal glands, and lacrimal   
drainage Normal lids, lashes,   
lacrimal glands, and lacrimal   
drainage  
Conjunctiva/Sclera White and quiet   
White and quiet  
Cornea Normal epithelium, stroma,   
endothelium, and tear film Normal   
epithelium, stroma, endothelium,   
and tear film  
Anterior Chamber Deep and quiet   
Deep and quiet  
Iris Round and reactive Round and   
reactive  
Lens 2+ Nuclear sclerotic   
cataract, 2+ Posterior subcapsular   
cataract 3+ Nuclear sclerotic   
cataract, 1+ Posterior subcapsular   
cataract, 1+ Cortical cataract  
Anterior Vitreous Normal Normal  
  
Eye Exam - Fundus Exam  
Fundus Exam  
Right eye Left eye  
Disc Normal Normal  
C/D Ratio 0.3 0.3  
Macula Normal Normal  
Vessels Normal Normal  
Periphery Normal Normal  
  
Eye Exam - Wearing Rx  
Wearing Rx  
Sphere Cylinder Axis Add  
Right eye +2.75 +0.00 000 +2.50  
Left eye +0.00 +1.00 060 +2.50  
  
Eye Exam  
Type: PAL  
  
Care Teams  
- documented as of this encounter  
Care Teams  
Team Member Relationship Specialty   
Start Date End Date  
Sharifa Almaguer DO  
NPI: [id number]  
[address] [place] [address]  
[phone] (Work)  
[phone] (Fax) PCP - General   
Internal Medicine 10/25/23  
Herbie Mills MD  
NPI: [id number]  
[address] [place] [address]  
[phone] (Work)  
[phone] (Fax) Physician   
Radiation Oncology 18  
  
Images  
- last updated on 10/31/2024  
After Visit Summary  
  
10/8/2024  
After Visit Summary  
  
Electronically signed by Ronel Hays MD at 10/31/2024 8:14 AM   
EDT  
                                        [facility]  
Work Phone:   
[phone]  
   
                                                    10- History and   
physical note                           Formatting of this note is   
different from the original.  
Images from the original note were   
not included.  
H&P Notes  
- documented in this encounter  
Ronel Hays MD - 10/08/2024   
10:45 AM EDT  
Formatting of this note is   
different from the original.  
HISTORY AND PHYSICAL EXAMINATION  
  
SERVICE DATE: 10/8/2024  
SERVICE TIME:  
  
PRIMARY CARE PHYSICIAN: Sharifa Almaguer DO  
  
REASON FOR VISIT:  
Marzena Mederos is a 79 year old   
female who is being seen for   
combined age related cataract left   
eye  
  
The patient has the following:  
ACTIVE PROBLEM LIST  
Postmenopausal Atrophic Vaginitis  
Unspecified Transient Cerebral   
Ischemia  
Carcinoma in Situ of Vulva  
Malignant Neoplasm of Female   
Breast (Hcc)  
Personal History of Malignant   
Neoplasm of Breast  
Senile Nuclear Sclerosis  
Hypermetropia  
Regular Astigmatism  
Presbyopia  
Invasive Ductal Carcinoma of   
Breast, Female (Hcc)  
Ocular Migraine  
Controlled Type 2 Diabetes   
Mellitus Without Complication,   
Without Long-Term Current Use of   
Insulin (Hcc)  
Postmenopausal Bleeding  
S/P Hysterectomy With Oophorectomy  
  
SUBJECTIVE  
CHIEF COMPLAINT: combined age   
related cataract left eye  
HPI: blurry vision at all ranges   
of vision, difficulty with glare   
both eyes  
  
PAST MEDICAL HISTORY  
Diagnosis Date  
Breast cancer (HCC)   
invasive ductal carcinoma,   
completed radiation 3/7/11  
Cancer of endometrium (HCC)  
Carcinoma in situ, vulva   
Cellulitis  
of right eye muscle  
Diabetes mellitus without mention   
of complication  
Diabetes mellitus, type II (HCC)  
Diaphragmatic hernia without   
mention of obstruction or gangrene  
Hiatal hernia  
Dyspareunia  
Elevated cholesterol  
Esophageal reflux  
Insomnia, unspecified  
Orbital cellulitis  
Other corneal disorder  
Rt eye corneal erosion .  
PMH - PAST MEDICAL HISTORY OF  
?VESTIBULITIS  
PMH - PAST MEDICAL HISTORY OF  
CLIMACTERIC  
PMH - PAST MEDICAL HISTORY OF   
2006  
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  
Pure hypercholesterolemia  
Rib fracture  
6th Rib  
Senile dementia (HCC)  
Stomatitis and mucositis   
(ulcerative)  
Chronic ulcerative stomatitis on   
mucosal biopsy  
Unspecified essential hypertension  
  
PAST SURGICAL HISTORY  
Procedure Laterality Date  
Bunions bilateral feet removed   
10/2002  
BX/EXC LYMPH NODE OPEN DEEP   
AXILLARY NODE 8-24-10  
RIGHT BREAST LUMP W/ SLN BX  
CHOLECYSTECTOMY 2004  
Cholecystectomy  
COLONOSCOPY FLX DX W/COLLJ SPEC   
WHEN PFRMD   
Colonoscopy  
COLPOSCOPY CERVIX UPPER/ADJACENT   
VAGINA   
Colposcopy of the vulva  
COLPOSCOPY, VULVA 10/09  
CORRECT BUNION,SIMPLE  
Bunion  
DILATION & CURETTAGE DX&/THER   
NONOBSTETRIC 1970's  
SAB  
HYSTERECTOMY 2017  
LAPAROSCOPY SURG CHOLECYSTECTOMY   
3/2004  
Cholecystectomy, lap  
MAMMO STEREOTACTIC CORE BIOPSY RT   
10/10/13  
MASTECTOMY, PARTIAL 8-24-10  
RIGHT BREAST  
PAST SURGICAL HISTORY OF 2006  
partial vulvectomy/sq. cell ca in   
situ  
PAST SURGICAL HISTORY OF   
right eye cornea  
PAST SURGICAL HISTORY OF   
right eye revision  
PAST SURGICAL HISTORY OF  
squamous cell carcinoma right arm  
SALPINGO-OOPHORECTOMY COMPL/PRTL   
UNI/BI SPX 2017  
Toenail Big Toe Left Foot removed   
10/2002  
TONSILLECTOMY PRIMARY/SECONDARY   
Tonsillectomy  
UNLISTED PROCEDURE HANDS/FINGERS   
12  
CMC Arthoplasty on right hand  
  
FAMILY HISTORY  
Problem Relation Age of Onset  
Stroke Mother  
Diabetes Mother  
GI Father  
 from complications of gb   
surgery  
Arthritis Sister  
Systemic Lupus  
Cancer Maternal Uncle  
LUNG  
Cancer Other  
cousin with breast cancer/cousin   
with bladder ca.  
Stroke Sister  
  
SOCIAL HISTORY:  
Social History  
  
Tobacco Use  
Smoking status: Never  
Smokeless tobacco: Never  
Vaping Use  
Vaping status: Never Used  
Substance Use Topics  
Alcohol use: Yes  
Comment: 1-2 weekly  
Drug use: No  
  
MEDICATIONS:  
Prior to Admission medications as   
of 10/8/24 1011  
Medication Sig Last Dose Taking  
fluorometholone (FML LIQUID FILM)   
0.1 % ophthalmic suspension Use 1   
Drop in both eyes three times a   
day. Taking Yes  
amLODIPine (NORVASC) 10 mg tablet   
Take 10 mg by mouth. Taking Yes  
donepezil (ARICEPT) 10 mg tablet   
Take 10 mg by mouth. Taking Yes  
cholecalciferol (VITAMIN D3) 50   
mcg (2,000 unit) tablet Take 2,000   
Units by mouth once daily. Taking   
Yes  
losartan-hydroCHLOROthiazide   
(HYZAAR) 100-12.5 mg per tablet   
Take 1 tablet by mouth once daily.   
Taking Yes  
omeprazole (PRILOSEC) 20 mg   
capsule Take 20 mg by mouth once   
daily. Taking Yes  
potassium chloride (KLOR-CON 10)   
10 mEq tablet Take 10 mEq by mouth   
once daily.  
Taking Yes  
metFORMIN ER (GLUCOPHAGE XR) 500   
mg 24 hr tablet Take two tablets   
by mouth once daily. Taking Yes  
simvastatin(ZOCOR 20 MG TAB) Take   
one(1) tablet daily. Taking Yes  
  
No medication comments found.  
  
CURRENT ALLERGIES:  
ALLERGIES  
Allergen Reactions  
Adhesive Rash  
Redness  
Ambien [Zolpidem Ta* Rash  
Codeine Mental Status Change  
Neosporin [Neomycin* Rash  
Percocet [Oxycodone*  
Causes Vomitting  
  
REVIEW OF SYSTEMS:  
PAIN ASSESSMENT:  
General: No weight loss, malaise   
or fevers.  
Neuro: Dementia  
Respiratory: No history of current   
cough or dyspnea, or pneumonia in   
the past 6 weeks. No history of   
respiratory/pulmonary symptoms or   
problems  
Cardiovascular: Positive for:   
Hypertension, PVC  
GI: No history of GI symptoms or   
problems. No history of esophageal   
varices, recent ascites, or ETOH   
greater than 2 drinks per day.  
: No history of UTI in past 6   
weeks. No history of renal   
failure. Not currently on or   
requiring dialysis. No history of   
 symptoms or problems.  
GYN: Negative for abnormal vaginal   
bleeding, abnormal vaginal   
discharge.  
Pregnancy: Denies  
Endocrine: Diabetes Mellitus on   
oral agent  
Hematology: No history of bleeding   
or clotting disorder. Pt is not   
taking anti-coagulation or   
platelet medications. No history   
of hematological symptoms or   
problems.  
Oncology: No history of CA   
metastasis, chemo within 30 days,   
or radiotherapy within 90 days.   
Has not lost 10% of body wt in 6   
months. No history of oncological   
symptoms or problems.  
Psych: No history of psychiatric   
symptoms or problems.  
Musculoskeletal: Negative for   
joint pain or swelling, back pain   
or muscle pain.  
Skin: Negative for lesions, rash   
and itching.  
  
PHYSICAL EXAM:  
VITALS:  
/80   Pulse 88  
  
General: Alert and oriented  
Skin: Normal color, no rash, no   
lesions.  
HEENT: EOM, pupils equal, round   
and reactive.  
Cardiovascular: Normal S1 & S2, no   
rubs, murmurs or gallops. No JVD.   
Pulse regular.  
Lungs: Normal breath sounds, no   
wheezes or crackles.  
Abdomen: Soft, non-tender, no   
rigidity.  
Extremities: No deformity, no   
edema or tenderness, no joint   
swelling or clubbing.  
Neurological: Normal cognition and   
motor skills.  
Pulses: Carotid and radial pulses   
normal +2.  
  
Diagnostic tests reviewed for   
today's visit:  
No new labs or tests  
  
ASSESSMENT  
Medication and Non-Pharmacologic   
VTE Prophylaxis/Anticoagulants  
  
VTE Prophylaxis: VTE prophylaxis   
appropriate  
  
Impression: There is no known   
pertinent medical condition which   
may affect trinidad-operative course  
  
[unfilled]  
Clinical Risk Factors for Possible   
Cardiac Complications:  
None  
  
Patient is scheduled for a   
low-risk procedure.  
  
FUNCTIONAL STATUS: Walk indoors,   
such as around the house (1.75   
METs)  
  
Functional Class (NYHA): N/A  
  
[facility]: Not obtained  
  
PLAN  
CONSULTS:  
Patient does not require consults   
for optimization at this time.  
  
The Following Tests/Procedures   
Have Been Initiated:  
None  
  
Instructions Given to Patient:  
Patient given verbal and written   
preop instructions and voices   
comprehension and compliance.  
  
SIGNATURE: Ronel Hays MD   
PATIENT NAME: Marzena Mederos  
DATE: 2024 MRN:   
[id number]  
TIME: 10:45 AM PAGER/CONTACT #:  
  
Electronically signed by Ronel Hays MD at 10/08/2024 1:24   
PM EDT  
Source Comments  
- [facility]  
In the event this information is   
protected by the Federal   
Confidentiality of Alcohol and   
Drug Abuse Patient Records   
regulations: This information has   
been disclosed to you from records   
protected by Federal   
confidentiality rules (42 CFR Part   
2). The Federal rules prohibit you   
from making any further disclosure   
of this information unless further   
disclosure is expressly permitted   
by the written consent of the   
person to whom it pertains or as   
otherwise permitted by 42 CFR Part   
2. A general authorization for the   
release of medical or other   
information is NOT sufficient for   
this purpose. The Federal rules   
restrict any use of the   
information to criminally   
investigate or prosecute any   
alcohol or drug abuse patient.  
Reason for Visit  
  
Reason for Visit -  
Reason Comments  
Cataract Follow Up  
  
Encounter Details  
  
Encounter Details  
Date Type Department Care Team   
(Latest Contact Info) Description  
10/08/2024 10:15 AM EDT Office   
Visit [facility] Ophthalmology  
[address] [place] [address]  
[phone] Ronel Hays MD  
[address] [place] [address]  
[phone] (Work)  
[phone] (Fax) Combined forms   
of age-related cataract of left   
eye (Primary Dx);  
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   
cataract of right eye;  
Type 2 diabetes mellitus without   
retinopathy (HCC);  
Essential hypertension;  
Hypercholesteremia;  
Dementia, unspecified dementia   
severity, unspecified dementia   
type, unspecified whether   
behavioral, psychotic, or mood   
disturbance or anxiety (HCC)  
  
Social History  
- documented as of this encounter  
Social History  
Tobacco Use Types Packs/Day Years   
Used Date  
Smoking Tobacco: Never  
Smokeless Tobacco: Never  
  
Social History  
Alcohol Use Standard Drinks/Week   
Comments  
Yes 0 (1 standard drink = 0.6 oz   
pure alcohol) 1-2 weekly  
  
Social History  
PHQ-2 Answer Date Recorded  
PHQ-2 score 0 10/31/2019  
  
Social History  
Area Deprivation Index Answer Date   
Recorded  
National Score (1-100), lower   
number is lower risk 79 2023  
State Score (1-10), lower number   
is lower risk 7 2023  
Data from:   
https://www.neighborhoodatlas.[facility].edu/. Last address used   
for calculation [address]    
2023  
  
Social History  
Sex and Gender Information Value   
Date Recorded  
Sex Assigned at Birth Female   
2017 6:52 AM EST  
Gender Identity Female 2017   
6:52 AM EST  
Sexual Orientation Straight   
2017 6:52 AM EST  
  
Last Filed Vital Signs  
- documented in this encounter  
Last Filed Vital Signs  
Vital Sign Reading Time Taken   
Comments  
Blood Pressure 125/80 10/08/2024   
10:20 AM EDT  
Pulse 88 10/08/2024 10:20 AM EDT  
Temperature - -  
Respiratory Rate - -  
Oxygen Saturation - -  
Inhaled Oxygen Concentration - -  
Weight - -  
Height - -  
Body Mass Index - -  
  
Functional Status  
- documented as of this encounter  
Functional Status  
Functional Status Response Date of   
Assessment  
Are you deaf or do you have   
serious difficulty hearing? No   
2017  
Are you blind or do you have   
serious difficulty seeing, even   
when wearing glasses? No   
2017  
Do you have serious difficulty   
walking or climbing stairs? No   
2017  
Do you have difficulty dressing or   
bathing? No 2017  
Because of a physical, mental, or   
emotional condition, do you have   
difficulty doing errands alone   
such as visiting a doctor's office   
or shopping? No 2017  
  
Functional Status  
Cognitive Status Response Date of   
Assessment  
Because of a physical, mental, or   
emotional condition, do you have   
serious difficulty concentrating,   
remembering, or making decisions?   
No 2017  
  
Patient Instructions  
- documented in this encounter  
Patient Instructions  
Ronel Hays MD - 10/08/2024   
10:49 AM EDT  
Formatting of this note is   
different from the original.  
Current Ophthalmic Meds  
  
fluorometholone (FML LIQUID FILM)   
0.1 % ophthalmic suspension Use 1   
Drop in both eyes three times a   
day.  
  
Continue:  
Systane Complete solution instill   
1 drop 3 times daily Both Eyes.  
  
Plan Cataract Surgery with   
Monofocal Intraocular Lens Implant   
Left Eye on 2024 at   
[facility].  
  
If you have any questions please   
contact our office at   
[phone].  
After office hours or on the   
weekend, please call Dr. Hays on   
his cell phone at [phone].  
Electronically signed by Ronel Hays MD at 10/08/2024 10:49   
AM EDT  
  
Progress Notes  
- documented in this encounter  
Ronel Hays MD - 10/08/2024   
10:20 AM EDT  
Formatting of this note is   
different from the original.  
ASSESSMENT/PLAN:  
1. 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   
cataract of left eye - ICD9:   
366.19, ICD10: H25.812 (primary   
diagnosis)  
  
PHYSICAL EXAM:  
  
Vital Signs:  
Blood pressure 125/80, pulse 88.  
  
Respiratory:  
Normal breath sounds, no wheezing.  
  
CARD:  
Normal heart sounds 1 & 2, normal   
sinus rhythm.  
  
Cataract Presurgical Documentation  
  
Cataract: Left Eye  
  
Current Visual Acuity  
Right Eye Distance CC 20/40  
Left Eye Distance CC 20/70  
  
Glare Testing:  
  
Visual Function: Marzena Mederos   
states that the decline in vision   
from the cataract impedes her   
abilities as listed in the HPI, as   
well as other activities of daily   
living.  
  
Marzena Mederos has confirmed that   
she is no longer able to function   
adequately on a day-to-day basis   
because of her current visual   
condition.  
  
Further, it is my medical opinion   
that the cataract is the primary   
cause, or at least a significantly   
contributory cause of her visual   
dysfunction. With uncomplicated   
cataract surgery and lens   
implantation, it is my expectation   
that her visual function and   
quality of life will improve,   
significantly.  
  
The risks, benefits, alternatives,   
personnel and complications of   
cataract surgery with lens   
implantation were discussed with   
Marzena Mederos in detail. She   
appeared to understand and asked   
that I proceed with plans for   
surgery.  
  
Upon eye examination, patient was   
found to have a visually   
significant cataract Left Eye.   
Discussed cataract surgery with   
patient and different intraocular   
lens implant options with patient:   
basic monofocal intraocular lens   
implant, Toric intraocular lens   
implant, and presbyopia correction   
intraocular lens implant. In my   
medical opinion, based on medical   
history and ocular examination,   
cataract surgery with intraocular   
lens implant will correct   
patient's vision and improve   
quality of patient's daily living   
activities. Patient wishes to have   
traditional cataract surgery with   
basic intraocular lens Left Eye.   
Patient wishes to have cataract   
surgery with the option stated   
above. Patient understands that an   
intraocular lens implant does not   
necessarily replace the need for   
glasses. Patient understands that   
it is impossible for the surgeon   
to inform him/her of every   
possible complication that may   
occur. The surgeon has answered   
all of the patient's questions.   
Patient understands that if he/she   
has a mature or dense cataract,   
pseudoexfoliation cataract, or   
history of use of Flomax, he/she   
may require the use of Mal[place]ugin   
Ring and/or Vision Blue during   
surgery. Patient understands the   
risks, benefits, and alternatives   
to surgery.  
  
Current Ophthalmic Meds  
  
fluorometholone (FML LIQUID FILM)   
0.1 % ophthalmic suspension Use 1   
Drop in both eyes three times a   
day.  
  
Continue:  
Systane Complete solution instill   
1 drop 3 times daily Both Eyes.  
  
Plan Cataract Surgery with   
Monofocal Intraocular Lens Implant   
Left Eye on 2024 at   
[facility].  
  
Patient cleared for surgery by Dr. Almaguer  
  
2. 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   
cataract of right eye - ICD9:   
366.19, ICD10: H25.811  
  
Plan Cataract Surgery with   
Monofocal Intraocular Lens Implant   
Right Eye after Left Eye is   
stable.  
  
3. Type 2 diabetes mellitus   
without retinopathy (HCC) - ICD9:   
250.00, ICD10: E11.9  
  
Please keep your blood sugar under   
good control to minimize risk of   
ocular complications from   
diabetes.  
  
Continue to monitor with primary   
care physician.  
  
4. Essential hypertension - ICD9:   
401.9, ICD10: I10  
  
Continue to monitor with primary   
care physician.  
  
5. Hypercholesteremia - ICD9:   
272.0, ICD10: E78.00  
  
Continue to monitor with primary   
care physician.  
  
6. Dementia, unspecified dementia   
severity, unspecified dementia   
type, unspecified whether   
behavioral, psychotic, or mood   
disturbance or anxiety (HCC) -   
ICD9: 294.20, ICD10: F03.90  
  
Continue to monitor with primary   
care physician.  
  
I have confirmed and edited as   
necessary the relevant HPI,   
ophthalmic history, ROS, and the   
neuro exam findings as obtained by   
others. I have seen and examined   
Marzena Mederos. I have discussed   
the case and the management of   
this patient's care with the   
Resident/Fellow, if applicable. I   
also have reviewed and agree with   
the assessment and plan as stated   
above and agree with all of its   
relevant components.  
  
Electronically signed by Ronel Hays MD at 10/08/2024 1:24   
PM EDT  
Plan of Treatment  
- documented as of this encounter  
Plan of Treatment - Upcoming   
Encounters  
Upcoming Encounters  
Date Type Department Care Team   
(Latest Contact Info) Description  
2024 9:45 AM EDT Office   
Visit [facility] Ophthalmology  
 [place] [address]  
[phone] Ronel Hays MD  
 [place] [address]  
[phone] (Work)  
[phone] (Fax) 1 day po 1st le   
basic  
2024 9:30 AM EST Appointment   
Mammogram  
[address] [place] [id number] [phone] Encounter for   
screening mammogram for breast   
cancer [Z12.31]  
2024 10:10 AM EST Visit (SP)   
Office Hematology/Oncology  
[address] [place], [place] [address] [phone] Colten Abbott, DO  
[address] [place] [address]  
[phone] (Work)  
[phone] (Fax) 1 YR OV/ JONY   
*  
  
Plan of Treatment - Scheduled   
Procedures  
Scheduled Procedures  
Name Priority Associated Diagnoses   
Date/Time  
SURGERY AT NON-[facility] FACILITY   
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   
cataract of left eye 10/31/2024   
9:25 AM EDT  
  
Procedures  
- documented in this encounter  
Procedures  
Procedure Name Priority Date/Time   
Associated Diagnosis Comments  
IOL BIOMETRY W/ IOL CALC OU (BOTH   
EYES) Routine 10/08/2024 10:43 AM   
EDT Combined forms of age-related   
cataract of left eye  
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   
cataract of right eye Results for   
this procedure are in the results   
section.  
  
Imaging Results  
- documented in this encounter  
IOL BIOMETRY W/ IOL CALC OU (BOTH   
EYES) (10/08/2024 10:43 AM EDT)  
Imaging Results - IOL BIOMETRY W/   
IOL CALC OU (BOTH EYES)   
(10/08/2024 10:43 AM EDT)  
Anatomical Region Laterality   
Modality  
Other  
  
Imaging Results - IOL BIOMETRY W/   
IOL CALC OU (BOTH EYES)   
(10/08/2024 10:43 AM EDT)  
Narrative  
10/08/2024 10:43 AM EDT  
Date of Procedure  
10/8/2024.  
  
Technician Information  
Imaging Technician: AB.  
  
Notes  
Measurements only - see Procedure   
Record under Scanned Documents for  
signed results.  
  
LENSTAR  
Right eye: AL 23.10 ACD 2.85 WTW   
12.13  
Left eye: AL 23.40 ACD 2.92 WTW   
12.32  
  
Imaging Results - IOL BIOMETRY W/   
IOL CALC OU (BOTH EYES)   
(10/08/2024 10:43 AM EDT)  
Authorizing Provider Result Type  
Ronel Hays MD OPHTHALMOLOGY  
  
Associated Images  
  
10/8/2024  
IOL BIOMETRY W/ IOL CALC OU (BOTH   
EYES)  
  
Visit Diagnoses  
- documented in this encounter  
Visit Diagnoses  
Diagnosis  
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   
cataract of left eye - Primary  
Other and combined forms of senile   
cataract  
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   
cataract of right eye  
Other and combined forms of senile   
cataract  
Type 2 diabetes mellitus without   
retinopathy (HCC)  
Type II or unspecified type   
diabetes mellitus without mention   
of complication, not stated as   
uncontrolled  
Essential hypertension  
Unspecified essential hypertension  
Hypercholesteremia  
Pure hypercholesterolemia  
Dementia, unspecified dementia   
severity, unspecified dementia   
type, unspecified whether   
behavioral, psychotic, or mood   
disturbance or anxiety (HCC)  
  
Eye Exam  
  
Eye Exam - Visual Acuity (Snellen   
- Linear)  
Visual Acuity (Snellen - Linear)  
Right eye Left eye  
Dist cc 20/40 20/70  
Near cc J6 J8  
  
Eye Exam - Tonometry (Applanation,   
10:51 AM)  
Tonometry (Applanation, 10:51 AM)  
Right eye Left eye  
Pressure 10 10  
  
Eye Exam - Pupils  
Pupils  
Pupils Dark Shape React APD  
Right eye PERRL 4 Round +2 None  
Left eye PERRL 4 Round +2 None  
  
Eye Exam - Visual Fields  
Visual Fields  
Right eye Left eye  
Full Full  
  
Eye Exam - Extraocular Movement  
Extraocular Movement  
Right eye Left eye  
Full Full  
  
Eye Exam - Neuro/Psych  
Neuro/Psych  
Oriented x3: Yes  
Mood/Affect: Normal  
  
Eye Exam - External Exam  
External Exam  
Right eye Left eye  
External Normal including orbits   
and preauricular lymph nodes   
Normal including orbits and   
preauricular lymph nodes  
  
Eye Exam - Slit Lamp Exam  
Slit Lamp Exam  
Right eye Left eye  
Lids/Lashes Normal lids, lashes,   
lacrimal glands, and lacrimal   
drainage Normal lids, lashes,   
lacrimal glands, and lacrimal   
drainage  
Conjunctiva/Sclera White and quiet   
White and quiet  
Cornea Normal epithelium, stroma,   
endothelium, and tear film Normal   
epithelium, stroma, endothelium,   
and tear film  
Anterior Chamber Deep and quiet   
Deep and quiet  
Iris Round and reactive Round and   
reactive  
Lens 2+ Nuclear sclerotic   
cataract, 2+ Posterior subcapsular   
cataract 3+ Nuclear sclerotic   
cataract, 1+ Posterior subcapsular   
cataract, 1+ Cortical cataract  
Anterior Vitreous Normal Normal  
  
Eye Exam - Fundus Exam  
Fundus Exam  
Right eye Left eye  
Disc Normal Normal  
C/D Ratio 0.3 0.3  
Macula Normal Normal  
Vessels Normal Normal  
Periphery Normal Normal  
  
Eye Exam - Wearing Rx  
Wearing Rx  
Sphere Cylinder Axis Add  
Right eye +2.75 +0.00 000 +2.50  
Left eye +0.00 +1.00 060 +2.50  
  
Eye Exam  
Type: PAL  
  
Care Teams  
- documented as of this encounter  
Care Teams  
Team Member Relationship Specialty   
Start Date End Date  
Sharifa Almaguer DO  
NPI: [id number]  
[address] [place]  
[place] [address]  
[phone] (Work)  
[phone] (Fax) PCP - General   
Internal Medicine 10/25/23  
Herbie Mills MD  
NPI: [id number]  
[address] [place]  
[place] [address]  
[phone] (Work)  
[phone] (Fax) Physician   
Radiation Oncology 18  
  
Images  
- last updated on 10/31/2024  
After Visit Summary  
  
10/8/2024  
After Visit Summary  
  
Electronically signed by Ronel Hays MD at 10/31/2024 8:14 AM   
EDT  
                                        [facility]  
Work Phone:   
[phone]  
   
                                                    10- History and   
physical note                           Formatting of this note might be   
different from the original.  
H&P reviewed. The patient was   
examined and there are no changes   
to the H&P.  
Electronically signed by Ronel Hays MD at 10/31/2024 8:14 AM   
EDT  
  
Source Note - Ronel Hays MD   
- 10/31/2024 8:14 AM EDT  
Formatting of this note is   
different from the original.  
Images from the original note were   
not included.  
H&P Notes  
- documented in this encounter  
Ronel Hays MD - 10/08/2024   
10:45 AM EDT  
Formatting of this note is   
different from the original.  
HISTORY AND PHYSICAL EXAMINATION  
  
SERVICE DATE: 10/8/2024  
SERVICE TIME:  
  
PRIMARY CARE PHYSICIAN: Sharifa Almaguer DO  
  
REASON FOR VISIT:  
Marzena Mederos is a 79 year old   
female who is being seen for   
combined age related cataract left   
eye  
  
The patient has the following:  
ACTIVE PROBLEM LIST  
Postmenopausal Atrophic Vaginitis  
Unspecified Transient Cerebral   
Ischemia  
Carcinoma in Situ of Vulva  
Malignant Neoplasm of Female   
Breast (Hcc)  
Personal History of Malignant   
Neoplasm of Breast  
Senile Nuclear Sclerosis  
Hypermetropia  
Regular Astigmatism  
Presbyopia  
Invasive Ductal Carcinoma of   
Breast, Female (Hcc)  
Ocular Migraine  
Controlled Type 2 Diabetes   
Mellitus Without Complication,   
Without Long-Term Current Use of   
Insulin (Hcc)  
Postmenopausal Bleeding  
S/P Hysterectomy With Oophorectomy  
  
SUBJECTIVE  
CHIEF COMPLAINT: combined age   
related cataract left eye  
HPI: blurry vision at all ranges   
of vision, difficulty with glare   
both eyes  
  
PAST MEDICAL HISTORY  
Diagnosis Date  
Breast cancer (HCC) 2010  
invasive ductal carcinoma,   
completed radiation 3/7/11  
Cancer of endometrium (HCC)  
Carcinoma in situ, vulva 2006  
Cellulitis  
of right eye muscle  
Diabetes mellitus without mention   
of complication  
Diabetes mellitus, type II (HCC)  
Diaphragmatic hernia without   
mention of obstruction or gangrene  
Hiatal hernia  
Dyspareunia  
Elevated cholesterol  
Esophageal reflux  
Insomnia, unspecified  
Orbital cellulitis  
Other corneal disorder  
Rt eye corneal erosion .  
PMH - PAST MEDICAL HISTORY OF  
?VESTIBULITIS  
PMH - PAST MEDICAL HISTORY OF  
CLIMACTERIC  
PMH - PAST MEDICAL HISTORY OF   
2006  
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  
Pure hypercholesterolemia  
Rib fracture  
6th Rib  
Senile dementia (HCC)  
Stomatitis and mucositis   
(ulcerative)  
Chronic ulcerative stomatitis on   
mucosal biopsy  
Unspecified essential hypertension  
  
PAST SURGICAL HISTORY  
Procedure Laterality Date  
Bunions bilateral feet removed   
10/2002  
BX/EXC LYMPH NODE OPEN DEEP   
AXILLARY NODE 8-24-10  
RIGHT BREAST LUMP W/ SLN BX  
CHOLECYSTECTOMY 2004  
Cholecystectomy  
COLONOSCOPY FLX DX W/COLLJ SPEC   
WHEN PFRMD   
Colonoscopy  
COLPOSCOPY CERVIX UPPER/ADJACENT   
VAGINA   
Colposcopy of the vulva  
COLPOSCOPY, VULVA 10/09  
CORRECT BUNION,SIMPLE  
Bunion  
DILATION & CURETTAGE DX&/THER   
NONOBSTETRIC 's  
SAB  
HYSTERECTOMY 2017  
LAPAROSCOPY SURG CHOLECYSTECTOMY   
3/2004  
Cholecystectomy, lap  
MAMMO STEREOTACTIC CORE BIOPSY RT   
10/10/13  
MASTECTOMY, PARTIAL 8-24-10  
RIGHT BREAST  
PAST SURGICAL HISTORY OF 2006  
partial vulvectomy/sq. cell ca in   
situ  
PAST SURGICAL HISTORY OF   
right eye cornea  
PAST SURGICAL HISTORY OF   
right eye revision  
PAST SURGICAL HISTORY OF  
squamous cell carcinoma right arm  
SALPINGO-OOPHORECTOMY COMPL/PRTL   
UNI/BI SPX 2017  
Toenail Big Toe Left Foot removed   
10/2002  
TONSILLECTOMY PRIMARY/SECONDARY   
<AGE 13 1951  
Tonsillectomy  
UNLISTED PROCEDURE HANDS/FINGERS   
13  
CMC Arthoplasty on right hand  
  
FAMILY HISTORY  
Problem Relation Age of Onset  
Stroke Mother  
Diabetes Mother  
GI Father  
 from complications of gb   
surgery  
Arthritis Sister  
Systemic Lupus  
Cancer Maternal Uncle  
LUNG  
Cancer Other  
cousin with breast cancer/cousin   
with bladder ca.  
Stroke Sister  
  
SOCIAL HISTORY:  
Social History  
  
Tobacco Use  
Smoking status: Never  
Smokeless tobacco: Never  
Vaping Use  
Vaping status: Never Used  
Substance Use Topics  
Alcohol use: Yes  
Comment: 1-2 weekly  
Drug use: No  
  
MEDICATIONS:  
Prior to Admission medications as   
of 10/8/24 1011  
Medication Sig Last Dose Taking  
fluorometholone (FML LIQUID FILM)   
0.1 % ophthalmic suspension Use 1   
Drop in both eyes three times a   
day. Taking Yes  
amLODIPine (NORVASC) 10 mg tablet   
Take 10 mg by mouth. Taking Yes  
donepezil (ARICEPT) 10 mg tablet   
Take 10 mg by mouth. Taking Yes  
cholecalciferol (VITAMIN D3) 50   
mcg (2,000 unit) tablet Take 2,000   
Units by mouth once daily. Taking   
Yes  
losartan-hydroCHLOROthiazide   
(HYZAAR) 100-12.5 mg per tablet   
Take 1 tablet by mouth once daily.   
Taking Yes  
omeprazole (PRILOSEC) 20 mg   
capsule Take 20 mg by mouth once   
daily. Taking Yes  
potassium chloride (KLOR-CON 10)   
10 mEq tablet Take 10 mEq by mouth   
once daily.  
Taking Yes  
metFORMIN ER (GLUCOPHAGE XR) 500   
mg 24 hr tablet Take two tablets   
by mouth once daily. Taking Yes  
simvastatin(ZOCOR 20 MG TAB) Take   
one(1) tablet daily. Taking Yes  
  
No medication comments found.  
  
CURRENT ALLERGIES:  
ALLERGIES  
Allergen Reactions  
Adhesive Rash  
Redness  
Ambien [Zolpidem Ta* Rash  
Codeine Mental Status Change  
Neosporin [Neomycin* Rash  
Percocet [Oxycodone*  
Causes Vomitting  
  
REVIEW OF SYSTEMS:  
PAIN ASSESSMENT:  
General: No weight loss, malaise   
or fevers.  
Neuro: Dementia  
Respiratory: No history of current   
cough or dyspnea, or pneumonia in   
the past 6 weeks. No history of   
respiratory/pulmonary symptoms or   
problems  
Cardiovascular: Positive for:   
Hypertension, PVC  
GI: No history of GI symptoms or   
problems. No history of esophageal   
varices, recent ascites, or ETOH   
greater than 2 drinks per day.  
: No history of UTI in past 6   
weeks. No history of renal   
failure. Not currently on or   
requiring dialysis. No history of   
 symptoms or problems.  
GYN: Negative for abnormal vaginal   
bleeding, abnormal vaginal   
discharge.  
Pregnancy: Denies  
Endocrine: Diabetes Mellitus on   
oral agent  
Hematology: No history of bleeding   
or clotting disorder. Pt is not   
taking anti-coagulation or   
platelet medications. No history   
of hematological symptoms or   
problems.  
Oncology: No history of CA   
metastasis, chemo within 30 days,   
or radiotherapy within 90 days.   
Has not lost 10% of body wt in 6   
months. No history of oncological   
symptoms or problems.  
Psych: No history of psychiatric   
symptoms or problems.  
Musculoskeletal: Negative for   
joint pain or swelling, back pain   
or muscle pain.  
Skin: Negative for lesions, rash   
and itching.  
  
PHYSICAL EXAM:  
VITALS:  
/80   Pulse 88  
  
General: Alert and oriented  
Skin: Normal color, no rash, no   
lesions.  
HEENT: EOM, pupils equal, round   
and reactive.  
Cardiovascular: Normal S1 & S2, no   
rubs, murmurs or gallops. No JVD.   
Pulse regular.  
Lungs: Normal breath sounds, no   
wheezes or crackles.  
Abdomen: Soft, non-tender, no   
rigidity.  
Extremities: No deformity, no   
edema or tenderness, no joint   
swelling or clubbing.  
Neurological: Normal cognition and   
motor skills.  
Pulses: Carotid and radial pulses   
normal +2.  
  
Diagnostic tests reviewed for   
today's visit:  
No new labs or tests  
  
ASSESSMENT  
Medication and Non-Pharmacologic   
VTE Prophylaxis/Anticoagulants  
  
VTE Prophylaxis: VTE prophylaxis   
appropriate  
  
Impression: There is no known   
pertinent medical condition which   
may affect trinidad-operative course  
  
[unfilled]  
Clinical Risk Factors for Possible   
Cardiac Complications:  
None  
  
Patient is scheduled for a   
low-risk procedure.  
  
FUNCTIONAL STATUS: Walk indoors,   
such as around the house (1.75   
METs)  
  
Functional Class (NYHA): N/A  
  
[facility]: Not obtained  
  
PLAN  
CONSULTS:  
Patient does not require consults   
for optimization at this time.  
  
The Following Tests/Procedures   
Have Been Initiated:  
None  
  
Instructions Given to Patient:  
Patient given verbal and written   
preop instructions and voices   
comprehension and compliance.  
  
SIGNATURE: Ronel Hays MD   
PATIENT NAME: Marzena Mederos  
DATE: 2024 MRN:   
[id number]  
TIME: 10:45 AM PAGER/CONTACT #:  
  
Electronically signed by Ronel Hays MD at 10/08/2024 1:24   
PM EDT  
Source Comments  
- [facility]  
In the event this information is   
protected by the Federal   
Confidentiality of Alcohol and   
Drug Abuse Patient Records   
regulations: This information has   
been disclosed to you from records   
protected by Federal   
confidentiality rules (42 CFR Part   
2). The Federal rules prohibit you   
from making any further disclosure   
of this information unless further   
disclosure is expressly permitted   
by the written consent of the   
person to whom it pertains or as   
otherwise permitted by 42 CFR Part   
2. A general authorization for the   
release of medical or other   
information is NOT sufficient for   
this purpose. The Federal rules   
restrict any use of the   
information to criminally   
investigate or prosecute any   
alcohol or drug abuse patient.  
Reason for Visit  
  
Reason for Visit -  
Reason Comments  
Cataract Follow Up  
  
Encounter Details  
  
Encounter Details  
Date Type Department Care Team   
(Latest Contact Info) Description  
10/08/2024 10:15 AM EDT Office   
Visit OP[facility] Ophthalmology  
[address] [place] [address]  
[phone] Ronel Hays MD  
[address] [place] [address]  
[phone] (Work)  
[phone] (Fax) Combined forms   
of age-related cataract of left   
eye (Primary Dx);  
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   
cataract of right eye;  
Type 2 diabetes mellitus without   
retinopathy (HCC);  
Essential hypertension;  
Hypercholesteremia;  
Dementia, unspecified dementia   
severity, unspecified dementia   
type, unspecified whether   
behavioral, psychotic, or mood   
disturbance or anxiety (HCC)  
  
Social History  
- documented as of this encounter  
Social History  
Tobacco Use Types Packs/Day Years   
Used Date  
Smoking Tobacco: Never  
Smokeless Tobacco: Never  
  
Social History  
Alcohol Use Standard Drinks/Week   
Comments  
Yes 0 (1 standard drink = 0.6 oz   
pure alcohol) 1-2 weekly  
  
Social History  
PHQ-2 Answer Date Recorded  
PHQ-2 score 0 10/31/2019  
  
Social History  
Area Deprivation Index Answer Date   
Recorded  
National Score (1-100), lower   
number is lower risk 79 2023  
State Score (1-10), lower number   
is lower risk 7 2023  
Data from:   
https://[email or web address].[email or web address]/. Last address used   
for calculation [address]   
2023  
  
Social History  
Sex and Gender Information Value   
Date Recorded  
Sex Assigned at Birth Female   
2017 6:52 AM EST  
Gender Identity Female 2017   
6:52 AM EST  
Sexual Orientation Straight   
2017 6:52 AM EST  
  
Last Filed Vital Signs  
- documented in this encounter  
Last Filed Vital Signs  
Vital Sign Reading Time Taken   
Comments  
Blood Pressure 125/80 10/08/2024   
10:20 AM EDT  
Pulse 88 10/08/2024 10:20 AM EDT  
Temperature - -  
Respiratory Rate - -  
Oxygen Saturation - -  
Inhaled Oxygen Concentration - -  
Weight - -  
Height - -  
Body Mass Index - -  
  
Functional Status  
- documented as of this encounter  
Functional Status  
Functional Status Response Date of   
Assessment  
Are you deaf or do you have   
serious difficulty hearing? No   
2017  
Are you blind or do you have   
serious difficulty seeing, even   
when wearing glasses? No   
2017  
Do you have serious difficulty   
walking or climbing stairs? No   
2017  
Do you have difficulty dressing or   
bathing? No 2017  
Because of a physical, mental, or   
emotional condition, do you have   
difficulty doing errands alone   
such as visiting a doctor's office   
or shopping? No 2017  
  
Functional Status  
Cognitive Status Response Date of   
Assessment  
Because of a physical, mental, or   
emotional condition, do you have   
serious difficulty concentrating,   
remembering, or making decisions?   
No 2017  
  
Patient Instructions  
- documented in this encounter  
Patient Instructions  
Ronel Hays MD - 10/08/2024   
10:49 AM EDT  
Formatting of this note is   
different from the original.  
Current Ophthalmic Meds  
  
fluorometholone (FML LIQUID FILM)   
0.1 % ophthalmic suspension Use 1   
Drop in both eyes three times a   
day.  
  
Continue:  
Systane Complete solution instill   
1 drop 3 times daily Both Eyes.  
  
Plan Cataract Surgery with   
Monofocal Intraocular Lens Implant   
Left Eye on 2024 at   
[facility].  
  
If you have any questions please   
contact our office at   
[phone].  
After office hours or on the   
weekend, please call Dr. Hays on   
his cell phone at [phone].  
Electronically signed by Ronel Hays MD at 10/08/2024 10:49   
AM EDT  
  
Progress Notes  
- documented in this encounter  
Ronel Hays MD - 10/08/2024   
10:20 AM EDT  
Formatting of this note is   
different from the original.  
ASSESSMENT/PLAN:  
1. 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   
cataract of left eye - ICD9:   
366.19, ICD10: H25.812 (primary   
diagnosis)  
  
PHYSICAL EXAM:  
  
Vital Signs:  
Blood pressure 125/80, pulse 88.  
  
Respiratory:  
Normal breath sounds, no wheezing.  
  
CARD:  
Normal heart sounds 1 & 2, normal   
sinus rhythm.  
  
Cataract Presurgical Documentation  
  
Cataract: Left Eye  
  
Current Visual Acuity  
Right Eye Distance CC 20/40  
Left Eye Distance CC 20/70  
  
Glare Testing:  
  
Visual Function: Marzena Mederos   
states that the decline in vision   
from the cataract impedes her   
abilities as listed in the HPI, as   
well as other activities of daily   
living.  
  
Marzena NUNEZ Rosa M has confirmed that   
she is no longer able to function   
adequately on a day-to-day basis   
because of her current visual   
condition.  
  
Further, it is my medical opinion   
that the cataract is the primary   
cause, or at least a significantly   
contributory cause of her visual   
dysfunction. With uncomplicated   
cataract surgery and lens   
implantation, it is my expectation   
that her visual function and   
quality of life will improve,   
significantly.  
  
The risks, benefits, alternatives,   
personnel and complications of   
cataract surgery with lens   
implantation were discussed with   
Marzena Mederos in detail. She   
appeared to understand and asked   
that I proceed with plans for   
surgery.  
  
Upon eye examination, patient was   
found to have a visually   
significant cataract Left Eye.   
Discussed cataract surgery with   
patient and different intraocular   
lens implant options with patient:   
basic monofocal intraocular lens   
implant, Toric intraocular lens   
implant, and presbyopia correction   
intraocular lens implant. In my   
medical opinion, based on medical   
history and ocular examination,   
cataract surgery with intraocular   
lens implant will correct   
patient's vision and improve   
quality of patient's daily living   
activities. Patient wishes to have   
traditional cataract surgery with   
basic intraocular lens Left Eye.   
Patient wishes to have cataract   
surgery with the option stated   
above. Patient understands that an   
intraocular lens implant does not   
necessarily replace the need for   
glasses. Patient understands that   
it is impossible for the surgeon   
to inform him/her of every   
possible complication that may   
occur. The surgeon has answered   
all of the patient's questions.   
Patient understands that if he/she   
has a mature or dense cataract,   
pseudoexfoliation cataract, or   
history of use of Flomax, he/she   
may require the use of [place]ugin   
Ring and/or Vision Blue during   
surgery. Patient understands the   
risks, benefits, and alternatives   
to surgery.  
  
Current Ophthalmic Meds  
  
fluorometholone (FML LIQUID FILM)   
0.1 % ophthalmic suspension Use 1   
Drop in both eyes three times a   
day.  
  
Continue:  
Systane Complete solution instill   
1 drop 3 times daily Both Eyes.  
  
Plan Cataract Surgery with   
Monofocal Intraocular Lens Implant   
Left Eye on 2024 at   
[facility].  
  
Patient cleared for surgery by Dr. Almaguer  
  
2. 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   
cataract of right eye - ICD9:   
366.19, ICD10: H25.811  
  
Plan Cataract Surgery with   
Monofocal Intraocular Lens Implant   
Right Eye after Left Eye is   
stable.  
  
3. Type 2 diabetes mellitus   
without retinopathy (HCC) - ICD9:   
250.00, ICD10: E11.9  
  
Please keep your blood sugar under   
good control to minimize risk of   
ocular complications from   
diabetes.  
  
Continue to monitor with primary   
care physician.  
  
4. Essential hypertension - ICD9:   
401.9, ICD10: I10  
  
Continue to monitor with primary   
care physician.  
  
5. Hypercholesteremia - ICD9:   
272.0, ICD10: E78.00  
  
Continue to monitor with primary   
care physician.  
  
6. Dementia, unspecified dementia   
severity, unspecified dementia   
type, unspecified whether   
behavioral, psychotic, or mood   
disturbance or anxiety (HCC) -   
ICD9: 294.20, ICD10: F03.90  
  
Continue to monitor with primary   
care physician.  
  
I have confirmed and edited as   
necessary the relevant HPI,   
ophthalmic history, ROS, and the   
neuro exam findings as obtained by   
others. I have seen and examined   
Marzena Mederos. I have discussed   
the case and the management of   
this patient's care with the   
Resident/Fellow, if applicable. I   
also have reviewed and agree with   
the assessment and plan as stated   
above and agree with all of its   
relevant components.  
  
Electronically signed by Ronel Hays MD at 10/08/2024 1:24   
PM EDT  
Plan of Treatment  
- documented as of this encounter  
Plan of Treatment - Upcoming   
Encounters  
Upcoming Encounters  
Date Type Department Care Team   
(Latest Contact Info) Description  
2024 9:45 AM EDT Office   
Visit [facility] Ophthalmology  
 [place] [id number]  
[phone] Ronel Hays MD  
[address] [place] [address]  
[phone] (Work)  
[phone] (Fax) 1 day po 1st le   
basic  
2024 9:30 AM EST Appointment   
Mammogram  
[address]  
[place] [id number] [phone] Encounter for   
screening mammogram for breast   
cancer [Z12.31]  
2024 10:10 AM EST Visit (SP)   
Office Hematology/Oncology  
[address] [place] [id number] [phone] Colten Abbott DO  
[address] [place] [address]  
[phone] (Work)  
[phone] (Fax) 1 YR OV/ JONY   
*  
  
Plan of Treatment - Scheduled   
Procedures  
Scheduled Procedures  
Name Priority Associated Diagnoses   
Date/Time  
SURGERY AT NON-[facility] FACILITY   
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   
cataract of left eye 10/31/2024   
9:25 AM EDT  
  
Procedures  
- documented in this encounter  
Procedures  
Procedure Name Priority Date/Time   
Associated Diagnosis Comments  
IOL BIOMETRY W/ IOL CALC OU (BOTH   
EYES) Routine 10/08/2024 10:43 AM   
EDT Combined forms of age-related   
cataract of left eye  
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   
cataract of right eye Results for   
this procedure are in the results   
section.  
  
Imaging Results  
- documented in this encounter  
IOL BIOMETRY W/ IOL CALC OU (BOTH   
EYES) (10/08/2024 10:43 AM EDT)  
Imaging Results - IOL BIOMETRY W/   
IOL CALC OU (BOTH EYES)   
(10/08/2024 10:43 AM EDT)  
Anatomical Region Laterality   
Modality  
Other  
  
Imaging Results - IOL BIOMETRY W/   
IOL CALC OU (BOTH EYES)   
(10/08/2024 10:43 AM EDT)  
Narrative  
10/08/2024 10:43 AM EDT  
Date of Procedure  
10/8/2024.  
  
Technician Information  
Imaging Technician: AB.  
  
Notes  
Measurements only - see Procedure   
Record under Scanned Documents for  
signed results.  
  
LENSTAR  
Right eye: AL 23.10 ACD 2.85 WTW   
12.13  
Left eye: AL 23.40 ACD 2.92 WTW   
12.32  
  
Imaging Results - IOL BIOMETRY W/   
IOL CALC OU (BOTH EYES)   
(10/08/2024 10:43 AM EDT)  
Authorizing Provider Result Type  
Ronel Hays MD OPHTHALMOLOGY  
  
Associated Images  
  
10/8/2024  
IOL BIOMETRY W/ IOL CALC OU (BOTH   
EYES)  
  
Visit Diagnoses  
- documented in this encounter  
Visit Diagnoses  
Diagnosis  
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   
cataract of left eye - Primary  
Other and combined forms of senile   
cataract  
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   
cataract of right eye  
Other and combined forms of senile   
cataract  
Type 2 diabetes mellitus without   
retinopathy (HCC)  
Type II or unspecified type   
diabetes mellitus without mention   
of complication, not stated as   
uncontrolled  
Essential hypertension  
Unspecified essential hypertension  
Hypercholesteremia  
Pure hypercholesterolemia  
Dementia, unspecified dementia   
severity, unspecified dementia   
type, unspecified whether   
behavioral, psychotic, or mood   
disturbance or anxiety (HCC)  
  
Eye Exam  
  
Eye Exam - Visual Acuity (Snellen   
- Linear)  
Visual Acuity (Snellen - Linear)  
Right eye Left eye  
Dist cc 20/40 20/70  
Near cc J6 J8  
  
Eye Exam - Tonometry (Applanation,   
10:51 AM)  
Tonometry (Applanation, 10:51 AM)  
Right eye Left eye  
Pressure 10 10  
  
Eye Exam - Pupils  
Pupils  
Pupils Dark Shape React APD  
Right eye PERRL 4 Round +2 None  
Left eye PERRL 4 Round +2 None  
  
Eye Exam - Visual Fields  
Visual Fields  
Right eye Left eye  
Full Full  
  
Eye Exam - Extraocular Movement  
Extraocular Movement  
Right eye Left eye  
Full Full  
  
Eye Exam - Neuro/Psych  
Neuro/Psych  
Oriented x3: Yes  
Mood/Affect: Normal  
  
Eye Exam - External Exam  
External Exam  
Right eye Left eye  
External Normal including orbits   
and preauricular lymph nodes   
Normal including orbits and   
preauricular lymph nodes  
  
Eye Exam - Slit Lamp Exam  
Slit Lamp Exam  
Right eye Left eye  
Lids/Lashes Normal lids, lashes,   
lacrimal glands, and lacrimal   
drainage Normal lids, lashes,   
lacrimal glands, and lacrimal   
drainage  
Conjunctiva/Sclera White and quiet   
White and quiet  
Cornea Normal epithelium, stroma,   
endothelium, and tear film Normal   
epithelium, stroma, endothelium,   
and tear film  
Anterior Chamber Deep and quiet   
Deep and quiet  
Iris Round and reactive Round and   
reactive  
Lens 2+ Nuclear sclerotic   
cataract, 2+ Posterior subcapsular   
cataract 3+ Nuclear sclerotic   
cataract, 1+ Posterior subcapsular   
cataract, 1+ Cortical cataract  
Anterior Vitreous Normal Normal  
  
Eye Exam - Fundus Exam  
Fundus Exam  
Right eye Left eye  
Disc Normal Normal  
C/D Ratio 0.3 0.3  
Macula Normal Normal  
Vessels Normal Normal  
Periphery Normal Normal  
  
Eye Exam - Wearing Rx  
Wearing Rx  
Sphere Cylinder Axis Add  
Right eye +2.75 +0.00 000 +2.50  
Left eye +0.00 +1.00 060 +2.50  
  
Eye Exam  
Type: PAL  
  
Care Teams  
- documented as of this encounter  
Care Teams  
Team Member Relationship Specialty   
Start Date End Date  
Sharifa Almaguer DO  
NPI: [id number]  
[address] [place] [address]  
[phone] (Work)  
[phone] (Fax) PCP - General   
Internal Medicine 10/25/23  
Herbie Mills MD  
NPI: [id number]  
[address] [place] [address]  
[phone] (Work)  
[phone] (Fax) Physician   
Radiation Oncology 18  
  
Images  
- last updated on 10/31/2024  
After Visit Summary  
  
10/8/2024  
After Visit Summary  
  
Electronically signed by Ronel Hays MD at 10/31/2024 8:14 AM   
EDT  
Formatting of this note is   
different from the original.  
Images from the original note were   
not included.  
H&P Notes  
- documented in this encounter  
Ronel Hays MD - 10/08/2024   
10:45 AM EDT  
Formatting of this note is   
different from the original.  
HISTORY AND PHYSICAL EXAMINATION  
  
SERVICE DATE: 10/8/2024  
SERVICE TIME:  
  
PRIMARY CARE PHYSICIAN: Sharifa Almaguer DO  
  
REASON FOR VISIT:  
Marzena Mederos is a 79 year old   
female who is being seen for   
combined age related cataract left   
eye  
  
The patient has the following:  
ACTIVE PROBLEM LIST  
Postmenopausal Atrophic Vaginitis  
Unspecified Transient Cerebral   
Ischemia  
Carcinoma in Situ of Vulva  
Malignant Neoplasm of Female   
Breast (Hcc)  
Personal History of Malignant   
Neoplasm of Breast  
Senile Nuclear Sclerosis  
Hypermetropia  
Regular Astigmatism  
Presbyopia  
Invasive Ductal Carcinoma of   
Breast, Female (Hcc)  
Ocular Migraine  
Controlled Type 2 Diabetes   
Mellitus Without Complication,   
Without Long-Term Current Use of   
Insulin (Hcc)  
Postmenopausal Bleeding  
S/P Hysterectomy With Oophorectomy  
  
SUBJECTIVE  
CHIEF COMPLAINT: combined age   
related cataract left eye  
HPI: blurry vision at all ranges   
of vision, difficulty with glare   
both eyes  
  
PAST MEDICAL HISTORY  
Diagnosis Date  
Breast cancer (HCC) 2010  
invasive ductal carcinoma,   
completed radiation 3/7/11  
Cancer of endometrium (HCC)  
Carcinoma in situ, vulva   
Cellulitis  
of right eye muscle  
Diabetes mellitus without mention   
of complication  
Diabetes mellitus, type II (HCC)  
Diaphragmatic hernia without   
mention of obstruction or gangrene  
Hiatal hernia  
Dyspareunia  
Elevated cholesterol  
Esophageal reflux  
Insomnia, unspecified  
Orbital cellulitis  
Other corneal disorder  
Rt eye corneal erosion .  
PMH - PAST MEDICAL HISTORY OF  
?VESTIBULITIS  
PMH - PAST MEDICAL HISTORY OF  
CLIMACTERIC  
PMH - PAST MEDICAL HISTORY OF   
2006  
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  
Pure hypercholesterolemia  
Rib fracture  
6th Rib  
Senile dementia (HCC)  
Stomatitis and mucositis   
(ulcerative)  
Chronic ulcerative stomatitis on   
mucosal biopsy  
Unspecified essential hypertension  
  
PAST SURGICAL HISTORY  
Procedure Laterality Date  
Bunions bilateral feet removed   
10/2002  
BX/EXC LYMPH NODE OPEN DEEP   
AXILLARY NODE 8-24-10  
RIGHT BREAST LUMP W/ SLN BX  
CHOLECYSTECTOMY 2004  
Cholecystectomy  
COLONOSCOPY FLX DX W/COLLJ SPEC   
WHEN PFRMD   
Colonoscopy  
COLPOSCOPY CERVIX UPPER/ADJACENT   
VAGINA   
Colposcopy of the vulva  
COLPOSCOPY, VULVA 10/09  
CORRECT BUNION,SIMPLE  
Bunion  
DILATION & CURETTAGE DX&/THER   
NONOBSTETRIC 1970's  
SAB  
HYSTERECTOMY 2017  
LAPAROSCOPY SURG CHOLECYSTECTOMY   
3/2004  
Cholecystectomy, lap  
MAMMO STEREOTACTIC CORE BIOPSY RT   
10/10/13  
MASTECTOMY, PARTIAL 824-10  
RIGHT BREAST  
PAST SURGICAL HISTORY OF 2006  
partial vulvectomy/sq. cell ca in   
situ  
PAST SURGICAL HISTORY OF   
right eye cornea  
PAST SURGICAL HISTORY OF   
right eye revision  
PAST SURGICAL HISTORY OF  
squamous cell carcinoma right arm  
SALPINGO-OOPHORECTOMY COMPL/PRTL   
UNI/BI SPX 2017  
Toenail Big Toe Left Foot removed   
10/2002  
TONSILLECTOMY PRIMARY/SECONDARY   
<AGE 13   
Tonsillectomy  
UNLISTED PROCEDURE HANDS/FINGERS   
13  
CMC Arthoplasty on right hand  
  
FAMILY HISTORY  
Problem Relation Age of Onset  
Stroke Mother  
Diabetes Mother  
GI Father  
 from complications of gb   
surgery  
Arthritis Sister  
Systemic Lupus  
Cancer Maternal Uncle  
LUNG  
Cancer Other  
cousin with breast cancer/cousin   
with bladder ca.  
Stroke Sister  
  
SOCIAL HISTORY:  
Social History  
  
Tobacco Use  
Smoking status: Never  
Smokeless tobacco: Never  
Vaping Use  
Vaping status: Never Used  
Substance Use Topics  
Alcohol use: Yes  
Comment: 1-2 weekly  
Drug use: No  
  
MEDICATIONS:  
Prior to Admission medications as   
of 10/8/24 1011  
Medication Sig Last Dose Taking  
fluorometholone (FML LIQUID FILM)   
0.1 % ophthalmic suspension Use 1   
Drop in both eyes three times a   
day. Taking Yes  
amLODIPine (NORVASC) 10 mg tablet   
Take 10 mg by mouth. Taking Yes  
donepezil (ARICEPT) 10 mg tablet   
Take 10 mg by mouth. Taking Yes  
cholecalciferol (VITAMIN D3) 50   
mcg (2,000 unit) tablet Take 2,000   
Units by mouth once daily. Taking   
Yes  
losartan-hydroCHLOROthiazide   
(HYZAAR) 100-12.5 mg per tablet   
Take 1 tablet by mouth once daily.   
Taking Yes  
omeprazole (PRILOSEC) 20 mg   
capsule Take 20 mg by mouth once   
daily. Taking Yes  
potassium chloride (KLOR-CON 10)   
10 mEq tablet Take 10 mEq by mouth   
once daily.  
Taking Yes  
metFORMIN ER (GLUCOPHAGE XR) 500   
mg 24 hr tablet Take two tablets   
by mouth once daily. Taking Yes  
simvastatin(ZOCOR 20 MG TAB) Take   
one(1) tablet daily. Taking Yes  
  
No medication comments found.  
  
CURRENT ALLERGIES:  
ALLERGIES  
Allergen Reactions  
Adhesive Rash  
Redness  
Ambien [Zolpidem Ta* Rash  
Codeine Mental Status Change  
Neosporin [Neomycin* Rash  
Percocet [Oxycodone*  
Causes Vomitting  
  
REVIEW OF SYSTEMS:  
PAIN ASSESSMENT:  
General: No weight loss, malaise   
or fevers.  
Neuro: Dementia  
Respiratory: No history of current   
cough or dyspnea, or pneumonia in   
the past 6 weeks. No history of   
respiratory/pulmonary symptoms or   
problems  
Cardiovascular: Positive for:   
Hypertension, PVC  
GI: No history of GI symptoms or   
problems. No history of esophageal   
varices, recent ascites, or ETOH   
greater than 2 drinks per day.  
: No history of UTI in past 6   
weeks. No history of renal   
failure. Not currently on or   
requiring dialysis. No history of   
 symptoms or problems.  
GYN: Negative for abnormal vaginal   
bleeding, abnormal vaginal   
discharge.  
Pregnancy: Denies  
Endocrine: Diabetes Mellitus on   
oral agent  
Hematology: No history of bleeding   
or clotting disorder. Pt is not   
taking anti-coagulation or   
platelet medications. No history   
of hematological symptoms or   
problems.  
Oncology: No history of CA   
metastasis, chemo within 30 days,   
or radiotherapy within 90 days.   
Has not lost 10% of body wt in 6   
months. No history of oncological   
symptoms or problems.  
Psych: No history of psychiatric   
symptoms or problems.  
Musculoskeletal: Negative for   
joint pain or swelling, back pain   
or muscle pain.  
Skin: Negative for lesions, rash   
and itching.  
  
PHYSICAL EXAM:  
VITALS:  
/80   Pulse 88  
  
General: Alert and oriented  
Skin: Normal color, no rash, no   
lesions.  
HEENT: EOM, pupils equal, round   
and reactive.  
Cardiovascular: Normal S1 & S2, no   
rubs, murmurs or gallops. No JVD.   
Pulse regular.  
Lungs: Normal breath sounds, no   
wheezes or crackles.  
Abdomen: Soft, non-tender, no   
rigidity.  
Extremities: No deformity, no   
edema or tenderness, no joint   
swelling or clubbing.  
Neurological: Normal cognition and   
motor skills.  
Pulses: Carotid and radial pulses   
normal +2.  
  
Diagnostic tests reviewed for   
today's visit:  
No new labs or tests  
  
ASSESSMENT  
Medication and Non-Pharmacologic   
VTE Prophylaxis/Anticoagulants  
  
VTE Prophylaxis: VTE prophylaxis   
appropriate  
  
Impression: There is no known   
pertinent medical condition which   
may affect trinidad-operative course  
  
[unfilled]  
Clinical Risk Factors for Possible   
Cardiac Complications:  
None  
  
Patient is scheduled for a   
low-risk procedure.  
  
FUNCTIONAL STATUS: Walk indoors,   
such as around the house (1.75   
METs)  
  
Functional Class (NYHA): N/A  
  
[facility]: Not obtained  
  
PLAN  
CONSULTS:  
Patient does not require consults   
for optimization at this time.  
  
The Following Tests/Procedures   
Have Been Initiated:  
None  
  
Instructions Given to Patient:  
Patient given verbal and written   
preop instructions and voices   
comprehension and compliance.  
  
SIGNATURE: Ronel Hays MD   
PATIENT NAME: Marzena Mederos  
DATE: 2024 MRN:   
[id number]  
TIME: 10:45 AM PAGER/CONTACT #:  
  
Electronically signed by Ronel Hays MD at 10/08/2024 1:24   
PM EDT  
Source Comments  
- [facility]  
In the event this information is   
protected by the Federal   
Confidentiality of Alcohol and   
Drug Abuse Patient Records   
regulations: This information has   
been disclosed to you from records   
protected by Federal   
confidentiality rules (42 CFR Part   
2). The Federal rules prohibit you   
from making any further disclosure   
of this information unless further   
disclosure is expressly permitted   
by the written consent of the   
person to whom it pertains or as   
otherwise permitted by 42 CFR Part   
2. A general authorization for the   
release of medical or other   
information is NOT sufficient for   
this purpose. The Federal rules   
restrict any use of the   
information to criminally   
investigate or prosecute any   
alcohol or drug abuse patient.  
Reason for Visit  
  
Reason for Visit -  
Reason Comments  
Cataract Follow Up  
  
Encounter Details  
  
Encounter Details  
Date Type Department Care Team   
(Latest Contact Info) Description  
10/08/2024 10:15 AM EDT Office   
Visit [facility] Ophthalmology  
[address] [place], [place] [id number]  
[phone] Ronel Hays MD  
[address] [place] [address]  
[phone] (Work)  
[phone] (Fax) Combined forms   
of age-related cataract of left   
eye (Primary Dx);  
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   
cataract of right eye;  
Type 2 diabetes mellitus without   
retinopathy (HCC);  
Essential hypertension;  
Hypercholesteremia;  
Dementia, unspecified dementia   
severity, unspecified dementia   
type, unspecified whether   
behavioral, psychotic, or mood   
disturbance or anxiety (HCC)  
  
Social History  
- documented as of this encounter  
Social History  
Tobacco Use Types Packs/Day Years   
Used Date  
Smoking Tobacco: Never  
Smokeless Tobacco: Never  
  
Social History  
Alcohol Use Standard Drinks/Week   
Comments  
Yes 0 (1 standard drink = 0.6 oz   
pure alcohol) 1-2 weekly  
  
Social History  
PHQ-2 Answer Date Recorded  
PHQ-2 score 0 10/31/2019  
  
Social History  
Area Deprivation Index Answer Date   
Recorded  
National Score (1-100), lower   
number is lower risk 79 2023  
State Score (1-10), lower number   
is lower risk 7 2023  
Data from:   
https://www.neighborhoodatlas.[facility] Last address used   
for calculation [address]   
2023  
  
Social History  
Sex and Gender Information Value   
Date Recorded  
Sex Assigned at Birth Female   
2017 6:52 AM EST  
Gender Identity Female 2017   
6:52 AM EST  
Sexual Orientation Straight   
2017 6:52 AM EST  
  
Last Filed Vital Signs  
- documented in this encounter  
Last Filed Vital Signs  
Vital Sign Reading Time Taken   
Comments  
Blood Pressure 125/80 10/08/2024   
10:20 AM EDT  
Pulse 88 10/08/2024 10:20 AM EDT  
Temperature - -  
Respiratory Rate - -  
Oxygen Saturation - -  
Inhaled Oxygen Concentration - -  
Weight - -  
Height - -  
Body Mass Index - -  
  
Functional Status  
- documented as of this encounter  
Functional Status  
Functional Status Response Date of   
Assessment  
Are you deaf or do you have   
serious difficulty hearing? No   
2017  
Are you blind or do you have   
serious difficulty seeing, even   
when wearing glasses? No   
2017  
Do you have serious difficulty   
walking or climbing stairs? No   
2017  
Do you have difficulty dressing or   
bathing? No 2017  
Because of a physical, mental, or   
emotional condition, do you have   
difficulty doing errands alone   
such as visiting a doctor's office   
or shopping? No 2017  
  
Functional Status  
Cognitive Status Response Date of   
Assessment  
Because of a physical, mental, or   
emotional condition, do you have   
serious difficulty concentrating,   
remembering, or making decisions?   
No 2017  
  
Patient Instructions  
- documented in this encounter  
Patient Instructions  
Ronel Hays MD - 10/08/2024   
10:49 AM EDT  
Formatting of this note is   
different from the original.  
Current Ophthalmic Meds  
  
fluorometholone (FML LIQUID FILM)   
0.1 % ophthalmic suspension Use 1   
Drop in both eyes three times a   
day.  
  
Continue:  
Systane Complete solution instill   
1 drop 3 times daily Both Eyes.  
  
Plan Cataract Surgery with   
Monofocal Intraocular Lens Implant   
Left Eye on 2024 at   
[facility].  
  
If you have any questions please   
contact our office at   
[phone].  
After office hours or on the   
weekend, please call Dr. Hays on   
his cell phone at [phone].  
Electronically signed by Ronel Hays MD at 10/08/2024 10:49   
AM EDT  
  
Progress Notes  
- documented in this encounter  
Ronel Hays MD - 10/08/2024   
10:20 AM EDT  
Formatting of this note is   
different from the original.  
ASSESSMENT/PLAN:  
1. 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   
cataract of left eye - ICD9:   
366.19, ICD10: H25.812 (primary   
diagnosis)  
  
PHYSICAL EXAM:  
  
Vital Signs:  
Blood pressure 125/80, pulse 88.  
  
Respiratory:  
Normal breath sounds, no wheezing.  
  
CARD:  
Normal heart sounds 1 & 2, normal   
sinus rhythm.  
  
Cataract Presurgical Documentation  
  
Cataract: Left Eye  
  
Current Visual Acuity  
Right Eye Distance CC 20/40  
Left Eye Distance CC 20/70  
  
Glare Testing:  
  
Visual Function: Marzena Mederos   
states that the decline in vision   
from the cataract impedes her   
abilities as listed in the HPI, as   
well as other activities of daily   
living.  
  
Marzena Mederos has confirmed that   
she is no longer able to function   
adequately on a day-to-day basis   
because of her current visual   
condition.  
  
Further, it is my medical opinion   
that the cataract is the primary   
cause, or at least a significantly   
contributory cause of her visual   
dysfunction. With uncomplicated   
cataract surgery and lens   
implantation, it is my expectation   
that her visual function and   
quality of life will improve,   
significantly.  
  
The risks, benefits, alternatives,   
personnel and complications of   
cataract surgery with lens   
implantation were discussed with   
Marzena Mederos in detail. She   
appeared to understand and asked   
that I proceed with plans for   
surgery.  
  
Upon eye examination, patient was   
found to have a visually   
significant cataract Left Eye.   
Discussed cataract surgery with   
patient and different intraocular   
lens implant options with patient:   
basic monofocal intraocular lens   
implant, Toric intraocular lens   
implant, and presbyopia correction   
intraocular lens implant. In my   
medical opinion, based on medical   
history and ocular examination,   
cataract surgery with intraocular   
lens implant will correct   
patient's vision and improve   
quality of patient's daily living   
activities. Patient wishes to have   
traditional cataract surgery with   
basic intraocular lens Left Eye.   
Patient wishes to have cataract   
surgery with the option stated   
above. Patient understands that an   
intraocular lens implant does not   
necessarily replace the need for   
glasses. Patient understands that   
it is impossible for the surgeon   
to inform him/her of every   
possible complication that may   
occur. The surgeon has answered   
all of the patient's questions.   
Patient understands that if he/she   
has a mature or dense cataract,   
pseudoexfoliation cataract, or   
history of use of Flomax, he/she   
may require the use of Maluyugin   
Ring and/or Vision Blue during   
surgery. Patient understands the   
risks, benefits, and alternatives   
to surgery.  
  
Current Ophthalmic Meds  
  
fluorometholone (FML LIQUID FILM)   
0.1 % ophthalmic suspension Use 1   
Drop in both eyes three times a   
day.  
  
Continue:  
Systane Complete solution instill   
1 drop 3 times daily Both Eyes.  
  
Plan Cataract Surgery with   
Monofocal Intraocular Lens Implant   
Left Eye on 2024 at   
[facility].  
  
Patient cleared for surgery by Dr. Almaguer  
  
2. 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   
cataract of right eye - ICD9:   
366.19, ICD10: H25.811  
  
Plan Cataract Surgery with   
Monofocal Intraocular Lens Implant   
Right Eye after Left Eye is   
stable.  
  
3. Type 2 diabetes mellitus   
without retinopathy (HCC) - ICD9:   
250.00, ICD10: E11.9  
  
Please keep your blood sugar under   
good control to minimize risk of   
ocular complications from   
diabetes.  
  
Continue to monitor with primary   
care physician.  
  
4. Essential hypertension - ICD9:   
401.9, ICD10: I10  
  
Continue to monitor with primary   
care physician.  
  
5. Hypercholesteremia - ICD9:   
272.0, ICD10: E78.00  
  
Continue to monitor with primary   
care physician.  
  
6. Dementia, unspecified dementia   
severity, unspecified dementia   
type, unspecified whether   
behavioral, psychotic, or mood   
disturbance or anxiety (HCC) -   
ICD9: 294.20, ICD10: F03.90  
  
Continue to monitor with primary   
care physician.  
  
I have confirmed and edited as   
necessary the relevant HPI,   
ophthalmic history, ROS, and the   
neuro exam findings as obtained by   
others. I have seen and examined   
Marzena Mederos. I have discussed   
the case and the management of   
this patient's care with the   
Resident/Fellow, if applicable. I   
also have reviewed and agree with   
the assessment and plan as stated   
above and agree with all of its   
relevant components.  
  
Electronically signed by Ronel Hays MD at 10/08/2024 1:24   
PM EDT  
Plan of Treatment  
- documented as of this encounter  
Plan of Treatment - Upcoming   
Encounters  
Upcoming Encounters  
Date Type Department Care Team   
(Latest Contact Info) Description  
2024 9:45 AM EDT Office   
Visit [facility] Ophthalmology  
[address] [place] [address]  
[phone] Ronel Hays MD  
[address] [place] [address]  
[phone] (Work)  
[phone] (Fax) 1 day po 1st le   
basic  
2024 9:30 AM EST Appointment   
Mammogram  
[address]  
[place], [place] [address] [phone] Encounter for   
screening mammogram for breast   
cancer [Z12.31]  
2024 10:10 AM EST Visit (SP)   
Office Hematology/Oncology  
[address]  
[place], [place] [id number] [phone] Colten Abbott, DO  
[address]  
[place], [place] [address] [phone] (Work)  
[phone] (Fax) 1 YR OV/ JONY   
*  
  
Plan of Treatment - Scheduled   
Procedures  
Scheduled Procedures  
Name Priority Associated Diagnoses   
Date/Time  
SURGERY AT NON-[facility] FACILITY   
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   
cataract of left eye 10/31/2024   
9:25 AM EDT  
  
Procedures  
- documented in this encounter  
Procedures  
Procedure Name Priority Date/Time   
Associated Diagnosis Comments  
IOL BIOMETRY W/ IOL CALC OU (BOTH   
EYES) Routine 10/08/2024 10:43 AM   
EDT Combined forms of age-related   
cataract of left eye  
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   
cataract of right eye Results for   
this procedure are in the results   
section.  
  
Imaging Results  
- documented in this encounter  
IOL BIOMETRY W/ IOL CALC OU (BOTH   
EYES) (10/08/2024 10:43 AM EDT)  
Imaging Results - IOL BIOMETRY W/   
IOL CALC OU (BOTH EYES)   
(10/08/2024 10:43 AM EDT)  
Anatomical Region Laterality   
Modality  
Other  
  
Imaging Results - IOL BIOMETRY W/   
IOL CALC OU (BOTH EYES)   
(10/08/2024 10:43 AM EDT)  
Narrative  
10/08/2024 10:43 AM EDT  
Date of Procedure  
10/8/2024.  
  
Technician Information  
Imaging Technician: AB.  
  
Notes  
Measurements only - see Procedure   
Record under Scanned Documents for  
signed results.  
  
LENSTAR  
Right eye: AL 23.10 ACD 2.85 WTW   
12.13  
Left eye: AL 23.40 ACD 2.92 WTW   
12.32  
  
Imaging Results - IOL BIOMETRY W/   
IOL CALC OU (BOTH EYES)   
(10/08/2024 10:43 AM EDT)  
Authorizing Provider Result Type  
Ronel Hays MD OPHTHALMOLOGY  
  
Associated Images  
  
10/8/2024  
IOL BIOMETRY W/ IOL CALC OU (BOTH   
EYES)  
  
Visit Diagnoses  
- documented in this encounter  
Visit Diagnoses  
Diagnosis  
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   
cataract of left eye - Primary  
Other and combined forms of senile   
cataract  
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   
cataract of right eye  
Other and combined forms of senile   
cataract  
Type 2 diabetes mellitus without   
retinopathy (HCC)  
Type II or unspecified type   
diabetes mellitus without mention   
of complication, not stated as   
uncontrolled  
Essential hypertension  
Unspecified essential hypertension  
Hypercholesteremia  
Pure hypercholesterolemia  
Dementia, unspecified dementia   
severity, unspecified dementia   
type, unspecified whether   
behavioral, psychotic, or mood   
disturbance or anxiety (HCC)  
  
Eye Exam  
  
Eye Exam - Visual Acuity (Snellen   
- Linear)  
Visual Acuity (Snellen - Linear)  
Right eye Left eye  
Dist cc 20/40 20/70  
Near cc J6 J8  
  
Eye Exam - Tonometry (Applanation,   
10:51 AM)  
Tonometry (Applanation, 10:51 AM)  
Right eye Left eye  
Pressure 10 10  
  
Eye Exam - Pupils  
Pupils  
Pupils Dark Shape React APD  
Right eye PERRL 4 Round +2 None  
Left eye PERRL 4 Round +2 None  
  
Eye Exam - Visual Fields  
Visual Fields  
Right eye Left eye  
Full Full  
  
Eye Exam - Extraocular Movement  
Extraocular Movement  
Right eye Left eye  
Full Full  
  
Eye Exam - Neuro/Psych  
Neuro/Psych  
Oriented x3: Yes  
Mood/Affect: Normal  
  
Eye Exam - External Exam  
External Exam  
Right eye Left eye  
External Normal including orbits   
and preauricular lymph nodes   
Normal including orbits and   
preauricular lymph nodes  
  
Eye Exam - Slit Lamp Exam  
Slit Lamp Exam  
Right eye Left eye  
Lids/Lashes Normal lids, lashes,   
lacrimal glands, and lacrimal   
drainage Normal lids, lashes,   
lacrimal glands, and lacrimal   
drainage  
Conjunctiva/Sclera White and quiet   
White and quiet  
Cornea Normal epithelium, stroma,   
endothelium, and tear film Normal   
epithelium, stroma, endothelium,   
and tear film  
Anterior Chamber Deep and quiet   
Deep and quiet  
Iris Round and reactive Round and   
reactive  
Lens 2+ Nuclear sclerotic   
cataract, 2+ Posterior subcapsular   
cataract 3+ Nuclear sclerotic   
cataract, 1+ Posterior subcapsular   
cataract, 1+ Cortical cataract  
Anterior Vitreous Normal Normal  
  
Eye Exam - Fundus Exam  
Fundus Exam  
Right eye Left eye  
Disc Normal Normal  
C/D Ratio 0.3 0.3  
Macula Normal Normal  
Vessels Normal Normal  
Periphery Normal Normal  
  
Eye Exam - Wearing Rx  
Wearing Rx  
Sphere Cylinder Axis Add  
Right eye +2.75 +0.00 000 +2.50  
Left eye +0.00 +1.00 060 +2.50  
  
Eye Exam  
Type: PAL  
  
Care Teams  
- documented as of this encounter  
Care Teams  
Team Member Relationship Specialty   
Start Date End Date  
Sharifa Almaguer DO  
NPI: [id number]  
[address] [place] [address]  
[phone] (Work)  
[phone] (Fax) PCP - General   
Internal Medicine 10/25/23  
Herbei Mills MD  
NPI: [id number]  
[address] [place] [address]  
[phone] (Work)  
[phone] (Fax) Physician   
Radiation Oncology 18  
  
Images  
- last updated on 10/31/2024  
After Visit Summary  
  
10/8/2024  
After Visit Summary  
  
Electronically signed by Ronel Hays MD at 10/31/2024 8:14 AM   
EDT  
documented in this encounter            [facility]  
Work Phone:   
[phone]  
   
                                        10- Note     Formatting of this n  
ote is   
different from the original.  
No outpatient medications have   
been marked as taking for the   
10/31/24 encounter (Hospital   
Encounter).  
  
  
  
  
  
NPO Instructions:  
  
Do not eat any food after midnight   
the night before your   
surgery/procedure.  
You may have clear liquids until   
TWO hours before   
surgery/procedure. This includes   
water, black tea/coffee, (no milk   
or cream) apple juice and   
electrolyte drinks (Gatorade).  
  
Additional Instructions:  
  
Will need  home, will   
receive call day before surgery   
with arrival time  
  
  
Electronically signed by Jolie Germain RN at 10/28/2024 9:42 AM EDT  
                                        [facility]  
   
                                        10- Instructions   
  
  
Ronel Hays MD - 10/08/2024   
10:49 AM EDTFormatting of this   
note is different from the   
original.  
Current Ophthalmic Meds  
  
  
  
fluorometholone (FML LIQUID FILM)   
0.1 % ophthalmic suspension Use 1   
Drop in both eyes three times a   
day.  
  
Continue:  
Systane Complete solution instill   
1 drop 3 times daily Both Eyes.  
  
Plan Cataract Surgery with   
Monofocal Intraocular Lens Implant   
Left Eye on 2024 at   
[facility].  
  
If you have any questions please   
contact our office at   
[phone].  
After office hours or on the   
weekend, please call Dr. Hays on   
his cell phone at [phone].  
Electronically signed by Ronel Hays MD at 10/08/2024 10:49   
AM EDT  
  
documented in this encounter            [facility]  
   
                                                    10- History and   
physical note                           Formatting of this note is   
different from the original.  
HISTORY AND PHYSICAL EXAMINATION  
  
SERVICE DATE: 10/8/2024  
SERVICE TIME:  
  
PRIMARY CARE PHYSICIAN: Sharifa Almaguer DO  
  
REASON FOR VISIT:  
Marzena Mederos is a 79 year old   
female who is being seen for   
combined age related cataract left   
eye  
  
The patient has the following:  
ACTIVE PROBLEM LIST  
Postmenopausal Atrophic Vaginitis  
Unspecified Transient Cerebral   
Ischemia  
Carcinoma in Situ of Vulva  
Malignant Neoplasm of Female   
Breast (Hcc)  
Personal History of Malignant   
Neoplasm of Breast  
Senile Nuclear Sclerosis  
Hypermetropia  
Regular Astigmatism  
Presbyopia  
Invasive Ductal Carcinoma of   
Breast, Female (Hcc)  
Ocular Migraine  
Controlled Type 2 Diabetes   
Mellitus Without Complication,   
Without Long-Term Current Use of   
Insulin (Hcc)  
Postmenopausal Bleeding  
S/P Hysterectomy With Oophorectomy  
  
SUBJECTIVE  
CHIEF COMPLAINT: combined age   
related cataract left eye  
HPI: blurry vision at all ranges   
of vision, difficulty with glare   
both eyes  
  
PAST MEDICAL HISTORY  
Diagnosis Date  
Breast cancer (HCC)   
invasive ductal carcinoma,   
completed radiation 3/7/11  
Cancer of endometrium (HCC)  
Carcinoma in situ, vulva   
Cellulitis  
of right eye muscle  
Diabetes mellitus without mention   
of complication  
Diabetes mellitus, type II (HCC)  
Diaphragmatic hernia without   
mention of obstruction or gangrene  
Hiatal hernia  
Dyspareunia  
Elevated cholesterol  
Esophageal reflux  
Insomnia, unspecified  
Orbital cellulitis  
Other corneal disorder  
Rt eye corneal erosion .  
PMH - PAST MEDICAL HISTORY OF  
?VESTIBULITIS  
PMH - PAST MEDICAL HISTORY OF  
CLIMACTERIC  
PMH - PAST MEDICAL HISTORY OF   
2006  
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  
Pure hypercholesterolemia  
Rib fracture  
6th Rib  
Senile dementia (HCC)  
Stomatitis and mucositis   
(ulcerative)  
Chronic ulcerative stomatitis on   
mucosal biopsy  
Unspecified essential hypertension  
  
PAST SURGICAL HISTORY  
Procedure Laterality Date  
Bunions bilateral feet removed   
10/2002  
BX/EXC LYMPH NODE OPEN DEEP   
AXILLARY NODE 8-24-10  
RIGHT BREAST LUMP W/ SLN BX  
CHOLECYSTECTOMY 2004  
Cholecystectomy  
COLONOSCOPY FLX DX W/COLLJ SPEC   
WHEN PFRMD   
Colonoscopy  
COLPOSCOPY CERVIX UPPER/ADJACENT   
VAGINA   
Colposcopy of the vulva  
COLPOSCOPY, VULVA 10/09  
CORRECT BUNION,SIMPLE  
Bunion  
DILATION & CURETTAGE DX&/THER   
NONOBSTETRIC 's  
SAB  
HYSTERECTOMY 2017  
LAPAROSCOPY SURG CHOLECYSTECTOMY   
3/2004  
Cholecystectomy, lap  
MAMMO STEREOTACTIC CORE BIOPSY RT   
10/10/13  
MASTECTOMY, PARTIAL 8-24-10  
RIGHT BREAST  
PAST SURGICAL HISTORY OF 2006  
partial vulvectomy/sq. cell ca in   
situ  
PAST SURGICAL HISTORY OF   
right eye cornea  
PAST SURGICAL HISTORY OF   
right eye revision  
PAST SURGICAL HISTORY OF  
squamous cell carcinoma right arm  
SALPINGO-OOPHORECTOMY COMPL/PRTL   
UNI/BI SPX 2017  
Toenail Big Toe Left Foot removed   
10/2002  
TONSILLECTOMY PRIMARY/SECONDARY   
Tonsillectomy  
UNLISTED PROCEDURE HANDS/FINGERS   
12  
CMC Arthoplasty on right hand  
  
FAMILY HISTORY  
Problem Relation Age of Onset  
Stroke Mother  
Diabetes Mother  
GI Father  
 from complications of gb   
surgery  
Arthritis Sister  
Systemic Lupus  
Cancer Maternal Uncle  
LUNG  
Cancer Other  
cousin with breast cancer/cousin   
with bladder ca.  
Stroke Sister  
  
SOCIAL HISTORY:  
Social History  
  
Tobacco Use  
Smoking status: Never  
Smokeless tobacco: Never  
Vaping Use  
Vaping status: Never Used  
Substance Use Topics  
Alcohol use: Yes  
Comment: 1-2 weekly  
Drug use: No  
  
MEDICATIONS:  
Prior to Admission medications as   
of 10/8/24 1011  
Medication Sig Last Dose Taking  
fluorometholone (FML LIQUID FILM)   
0.1 % ophthalmic suspension Use 1   
Drop in both eyes three times a   
day. Taking Yes  
amLODIPine (NORVASC) 10 mg tablet   
Take 10 mg by mouth. Taking Yes  
donepezil (ARICEPT) 10 mg tablet   
Take 10 mg by mouth. Taking Yes  
cholecalciferol (VITAMIN D3) 50   
mcg (2,000 unit) tablet Take 2,000   
Units by mouth once daily. Taking   
Yes  
losartan-hydroCHLOROthiazide   
(HYZAAR) 100-12.5 mg per tablet   
Take 1 tablet by mouth once daily.   
Taking Yes  
omeprazole (PRILOSEC) 20 mg   
capsule Take 20 mg by mouth once   
daily. Taking Yes  
potassium chloride (KLOR-CON 10)   
10 mEq tablet Take 10 mEq by mouth   
once daily.  
Taking Yes  
metFORMIN ER (GLUCOPHAGE XR) 500   
mg 24 hr tablet Take two tablets   
by mouth once daily. Taking Yes  
simvastatin(ZOCOR 20 MG TAB) Take   
one(1) tablet daily. Taking Yes  
  
No medication comments found.  
  
CURRENT ALLERGIES:  
ALLERGIES  
Allergen Reactions  
Adhesive Rash  
Redness  
Ambien [Zolpidem Ta* Rash  
Codeine Mental Status Change  
Neosporin [Neomycin* Rash  
Percocet [Oxycodone*  
Causes Vomitting  
  
REVIEW OF SYSTEMS:  
PAIN ASSESSMENT:  
General: No weight loss, malaise   
or fevers.  
Neuro: Dementia  
Respiratory: No history of current   
cough or dyspnea, or pneumonia in   
the past 6 weeks. No history of   
respiratory/pulmonary symptoms or   
problems  
Cardiovascular: Positive for:   
Hypertension, PVC  
GI: No history of GI symptoms or   
problems. No history of esophageal   
varices, recent ascites, or ETOH   
greater than 2 drinks per day.  
: No history of UTI in past 6   
weeks. No history of renal   
failure. Not currently on or   
requiring dialysis. No history of   
 symptoms or problems.  
GYN: Negative for abnormal vaginal   
bleeding, abnormal vaginal   
discharge.  
Pregnancy: Denies  
Endocrine: Diabetes Mellitus on   
oral agent  
Hematology: No history of bleeding   
or clotting disorder. Pt is not   
taking anti-coagulation or   
platelet medications. No history   
of hematological symptoms or   
problems.  
Oncology: No history of CA   
metastasis, chemo within 30 days,   
or radiotherapy within 90 days.   
Has not lost 10% of body wt in 6   
months. No history of oncological   
symptoms or problems.  
Psych: No history of psychiatric   
symptoms or problems.  
Musculoskeletal: Negative for   
joint pain or swelling, back pain   
or muscle pain.  
Skin: Negative for lesions, rash   
and itching.  
  
PHYSICAL EXAM:  
VITALS:  
/80   Pulse 88  
  
  
  
General: Alert and oriented  
Skin: Normal color, no rash, no   
lesions.  
HEENT: EOM, pupils equal, round   
and reactive.  
Cardiovascular: Normal S1 & S2, no   
rubs, murmurs or gallops. No JVD.   
Pulse regular.  
Lungs: Normal breath sounds, no   
wheezes or crackles.  
Abdomen: Soft, non-tender, no   
rigidity.  
Extremities: No deformity, no   
edema or tenderness, no joint   
swelling or clubbing.  
Neurological: Normal cognition and   
motor skills.  
Pulses: Carotid and radial pulses   
normal +2.  
  
Diagnostic tests reviewed for   
today's visit:  
No new labs or tests  
  
ASSESSMENT  
Medication and Non-Pharmacologic   
VTE Prophylaxis/Anticoagulants  
  
  
VTE Prophylaxis: VTE prophylaxis   
appropriate  
  
Impression: There is no known   
pertinent medical condition which   
may affect trinidad-operative course  
  
  
[unfilled]  
Clinical Risk Factors for Possible   
Cardiac Complications:  
None  
  
Patient is scheduled for a   
low-risk procedure.  
  
FUNCTIONAL STATUS: Walk indoors,   
such as around the house (1.75   
METs)  
  
Functional Class (NYHA): N/A  
  
[facility]: Not obtained  
  
PLAN  
CONSULTS:  
Patient does not require consults   
for optimization at this time.  
  
The Following Tests/Procedures   
Have Been Initiated:  
None  
  
Instructions Given to Patient:  
Patient given verbal and written   
preop instructions and voices   
comprehension and compliance.  
  
SIGNATURE: Ronel Hays MD   
PATIENT NAME: Marzena Mederos  
DATE: 2024 MRN:   
[id number]  
TIME: 10:45 AM PAGER/CONTACT #:  
  
Electronically signed by Ronel Hays MD at 10/08/2024 1:24   
PM EDT  
                                        [facility]  
   
                                                    10- History and   
physical note                           Formatting of this note is   
different from the original.  
HISTORY AND PHYSICAL EXAMINATION  
  
SERVICE DATE: 10/8/2024  
SERVICE TIME:  
  
PRIMARY CARE PHYSICIAN: Sharifa Almaguer DO  
  
REASON FOR VISIT:  
Marzena Mederos is a 79 year old   
female who is being seen for   
combined age related cataract left   
eye   
  
The patient has the following:  
ACTIVE PROBLEM LIST  
Postmenopausal Atrophic Vaginitis  
Unspecified Transient Cerebral   
Ischemia  
Carcinoma in Situ of Vulva  
Malignant Neoplasm of Female   
Breast (Hcc)  
Personal History of Malignant   
Neoplasm of Breast  
Senile Nuclear Sclerosis  
Hypermetropia  
Regular Astigmatism  
Presbyopia  
Invasive Ductal Carcinoma of   
Breast, Female (Hcc)  
Ocular Migraine  
Controlled Type 2 Diabetes   
Mellitus Without Complication,   
Without Long-Term Current Use of   
Insulin (Hcc)  
Postmenopausal Bleeding  
S/P Hysterectomy With Oophorectomy  
  
SUBJECTIVE  
CHIEF COMPLAINT: combined age   
related cataract left eye  
HPI: blurry vision at all ranges   
of vision, difficulty with glare   
both eyes  
  
PAST MEDICAL HISTORY  
Diagnosis Date  
Breast cancer (HCC)   
invasive ductal carcinoma,   
completed radiation 3/7/11  
Cancer of endometrium (HCC)  
Carcinoma in situ, vulva   
Cellulitis  
of right eye muscle  
Diabetes mellitus without mention   
of complication  
Diabetes mellitus, type II (HCC)  
Diaphragmatic hernia without   
mention of obstruction or gangrene  
Hiatal hernia  
Dyspareunia  
Elevated cholesterol  
Esophageal reflux  
Insomnia, unspecified  
Orbital cellulitis  
Other corneal disorder  
Rt eye corneal erosion .  
PMH - PAST MEDICAL HISTORY OF  
?VESTIBULITIS  
PMH - PAST MEDICAL HISTORY OF  
CLIMACTERIC  
PMH - PAST MEDICAL HISTORY OF   
2006  
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  
Pure hypercholesterolemia  
Rib fracture  
6th Rib  
Senile dementia (HCC)  
Stomatitis and mucositis   
(ulcerative)  
Chronic ulcerative stomatitis on   
mucosal biopsy  
Unspecified essential hypertension  
  
PAST SURGICAL HISTORY  
Procedure Laterality Date  
Bunions bilateral feet removed   
10/2002  
BX/EXC LYMPH NODE OPEN DEEP   
AXILLARY NODE 8-24-10  
RIGHT BREAST LUMP W/ SLN BX  
CHOLECYSTECTOMY 2004  
Cholecystectomy  
COLONOSCOPY FLX DX W/COLLJ SPEC   
WHEN PFRMD   
Colonoscopy  
COLPOSCOPY CERVIX UPPER/ADJACENT   
VAGINA   
Colposcopy of the vulva  
COLPOSCOPY, VULVA 10/09  
CORRECT BUNION,SIMPLE  
Bunion  
DILATION & CURETTAGE DX&/THER   
NONOBSTETRIC   
SAB  
HYSTERECTOMY 2017  
LAPAROSCOPY SURG CHOLECYSTECTOMY   
3/2004  
Cholecystectomy, lap  
MAMMO STEREOTACTIC CORE BIOPSY RT   
10/10/13  
MASTECTOMY, PARTIAL 8-24-10  
RIGHT BREAST  
PAST SURGICAL HISTORY OF 2006  
partial vulvectomy/sq. cell ca in   
situ  
PAST SURGICAL HISTORY OF   
right eye cornea  
PAST SURGICAL HISTORY OF   
right eye revision  
PAST SURGICAL HISTORY OF  
squamous cell carcinoma right arm  
SALPINGO-OOPHORECTOMY COMPL/PRTL   
UNI/BI SPX 2017  
Toenail Big Toe Left Foot removed   
10/2002  
TONSILLECTOMY PRIMARY/SECONDARY   
<AGE 13 1951  
Tonsillectomy  
UNLISTED PROCEDURE HANDS/FINGERS   
13  
CMC Arthoplasty on right hand  
  
FAMILY HISTORY  
Problem Relation Age of Onset  
Stroke Mother  
Diabetes Mother  
GI Father  
 from complications of gb   
surgery  
Arthritis Sister  
Systemic Lupus  
Cancer Maternal Uncle  
LUNG  
Cancer Other  
cousin with breast cancer/cousin   
with bladder ca.  
Stroke Sister  
  
SOCIAL HISTORY:  
Social History  
  
Tobacco Use  
Smoking status: Never  
Smokeless tobacco: Never  
Vaping Use  
Vaping status: Never Used  
Substance Use Topics  
Alcohol use: Yes  
Comment: 1-2 weekly  
Drug use: No  
  
MEDICATIONS:  
Prior to Admission medications as   
of 10/8/24 1011  
Medication Sig Last Dose Taking  
fluorometholone (FML LIQUID FILM)   
0.1 % ophthalmic suspension Use 1   
Drop in both eyes three times a   
day. Taking Yes  
amLODIPine (NORVASC) 10 mg tablet   
Take 10 mg by mouth. Taking Yes  
donepezil (ARICEPT) 10 mg tablet   
Take 10 mg by mouth. Taking Yes  
cholecalciferol (VITAMIN D3) 50   
mcg (2,000 unit) tablet Take 2,000   
Units by mouth once daily. Taking   
Yes  
losartan-hydroCHLOROthiazide   
(HYZAAR) 100-12.5 mg per tablet   
Take 1 tablet by mouth once daily.   
Taking Yes  
omeprazole (PRILOSEC) 20 mg   
capsule Take 20 mg by mouth once   
daily. Taking Yes  
potassium chloride (KLOR-CON 10)   
10 mEq tablet Take 10 mEq by mouth   
once daily.  
Taking Yes  
metFORMIN ER (GLUCOPHAGE XR) 500   
mg 24 hr tablet Take two tablets   
by mouth once daily. Taking Yes  
simvastatin(ZOCOR 20 MG TAB) Take   
one(1) tablet daily. Taking Yes  
  
No medication comments found.  
  
CURRENT ALLERGIES:  
ALLERGIES  
Allergen Reactions  
Adhesive Rash  
Redness  
Ambien [Zolpidem Ta* Rash  
Codeine Mental Status Change  
Neosporin [Neomycin* Rash  
Percocet [Oxycodone*  
Causes Vomitting  
  
REVIEW OF SYSTEMS:  
PAIN ASSESSMENT:  
General: No weight loss, malaise   
or fevers.  
Neuro: Dementia  
Respiratory: No history of current   
cough or dyspnea, or pneumonia in   
the past 6 weeks. No history of   
respiratory/pulmonary symptoms or   
problems  
Cardiovascular: Positive for:   
Hypertension, PVC  
GI: No history of GI symptoms or   
problems. No history of esophageal   
varices, recent ascites, or ETOH   
greater than 2 drinks per day.  
: No history of UTI in past 6   
weeks. No history of renal   
failure. Not currently on or   
requiring dialysis. No history of   
 symptoms or problems.  
GYN: Negative for abnormal vaginal   
bleeding, abnormal vaginal   
discharge.  
Pregnancy: Denies  
Endocrine: Diabetes Mellitus on   
oral agent  
Hematology: No history of bleeding   
or clotting disorder. Pt is not   
taking anti-coagulation or   
platelet medications. No history   
of hematological symptoms or   
problems.  
Oncology: No history of CA   
metastasis, chemo within 30 days,   
or radiotherapy within 90 days.   
Has not lost 10% of body wt in 6   
months. No history of oncological   
symptoms or problems.  
Psych: No history of psychiatric   
symptoms or problems.  
Musculoskeletal: Negative for   
joint pain or swelling, back pain   
or muscle pain.  
Skin: Negative for lesions, rash   
and itching.  
  
PHYSICAL EXAM:  
VITALS:  
/80   Pulse 88  
  
  
  
General: Alert and oriented  
Skin: Normal color, no rash, no   
lesions.  
HEENT: EOM, pupils equal, round   
and reactive.  
Cardiovascular: Normal S1 & S2, no   
rubs, murmurs or gallops. No JVD.   
Pulse regular.  
Lungs: Normal breath sounds, no   
wheezes or crackles.  
Abdomen: Soft, non-tender, no   
rigidity.  
Extremities: No deformity, no   
edema or tenderness, no joint   
swelling or clubbing.  
Neurological: Normal cognition and   
motor skills.  
Pulses: Carotid and radial pulses   
normal +2.  
  
Diagnostic tests reviewed for   
today's visit:  
No new labs or tests  
  
ASSESSMENT  
Medication and Non-Pharmacologic   
VTE Prophylaxis/Anticoagulants  
  
  
VTE Prophylaxis: VTE prophylaxis   
appropriate  
  
Impression: There is no known   
pertinent medical condition which   
may affect trinidad-operative course  
  
  
[unfilled]  
Clinical Risk Factors for Possible   
Cardiac Complications:  
None  
  
Patient is scheduled for a   
low-risk procedure.  
  
FUNCTIONAL STATUS: Walk indoors,   
such as around the house (1.75   
METs)  
  
Functional Class (NYHA): N/A  
  
[facility]: Not obtained  
  
PLAN  
CONSULTS:  
Patient does not require consults   
for optimization at this time.  
  
The Following Tests/Procedures   
Have Been Initiated:  
None  
  
Instructions Given to Patient:  
Patient given verbal and written   
preop instructions and voices   
comprehension and compliance.  
  
SIGNATURE: Ronel Hays MD   
PATIENT NAME: Marzena Mederos  
DATE: 2024 MRN:   
[id number]  
TIME: 10:45 AM PAGER/CONTACT #:  
  
Electronically signed by Ronel Hays MD at 10/08/2024 1:24   
PM EDT  
documented in this encounter            [facility]  
   
                                        10- Note     Date of Procedure  
10/8/2024.  
  
Technician Information  
Imaging Technician: AB.  
  
Notes  
Measurements only - see Procedure   
Record under Scanned Documents for  
signed results.  
  
  
[facility]  
Right eye: AL 23.10 ACD 2.85 WTW   
12.13  
Left eye: AL 23.40 ACD 2.92 WTW   
12.32                                   [facility]  
   
                                        10- Note     HNO ID: [id number]  
Author: RONEL HAYS MD  
Service: ?  
Author Type: Physician  
Type: Progress Notes  
Filed: 10/08/2024 13:24  
Note Text:  
ASSESSMENT/PLAN:  
1. 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   
cataract of left eye - ICD9:   
366.19, ICD10:  
H25.812 (primary diagnosis)  
PHYSICAL EXAM:  
Vital Signs:  
Blood pressure 125/80, pulse 88.  
Respiratory:  
Normal breath sounds, no wheezing.  
CARD:  
Normal heart sounds 1 AND 2,   
normal sinus rhythm.  
Cataract Presurgical Documentation  
Cataract: Left Eye  
Current Visual Acuity  
Right Eye Distance CC 20/40  
Left Eye Distance CC 20/70  
Glare Testing:  
Visual Function: Marzena NUNEZ Rosa M   
states that the decline in vision   
from the  
cataract impedes her abilities as   
listed in the HPI, as well as   
other activities  
of daily living.  
Marzena Mederos has confirmed that   
she is no longer able to function   
adequately  
on a day-to-day basis because of   
her current visual condition.  
Further, it is my medical opinion   
that the cataract is the primary   
cause, or at  
least a significantly contributory   
cause of her visual dysfunction.   
With  
uncomplicated cataract surgery and   
lens implantation, it is my   
expectation that  
her visual function and quality of   
life will improve, significantly.  
The risks, benefits, alternatives,   
personnel and complications of   
cataract  
surgery with lens implantation   
were discussed with Marzena Mederos in detail.  
She appeared to understand and   
asked that I proceed with plans   
for surgery.  
Upon eye examination, patient was   
found to have a visually   
significant cataract  
Left Eye. Discussed cataract   
surgery with patient and different   
intraocular  
lens implant options with patient:   
basic monofocal intraocular lens   
implant,  
Toric intraocular lens implant,   
and presbyopia correction   
intraocular lens  
implant. In my medical opinion,   
based on medical history and   
ocular  
examination, cataract surgery with   
intraocular lens implant will   
correct  
patient's vision and improve   
quality of patient's daily living   
activities.  
Patient wishes to have traditional   
cataract surgery with basic   
intraocular lens  
Left Eye. Patient wishes to have   
cataract surgery with the option   
stated above.  
Patient understands that an   
intraocular lens implant does not   
necessarily  
replace the need for glasses.   
Patient understands that it is   
impossible for the  
surgeon to inform him/her of every   
possible complication that may   
occur. The  
surgeon has answered all of the   
patient's questions. Patient   
understands that  
if he/she has a mature or dense   
cataract, pseudoexfoliation   
cataract, or history  
of use of Flomax, he/she may   
require the use of [place]ugin Ring   
and/or Vision  
Blue during surgery. Patient   
understands the risks, benefits,   
and alternatives  
to surgery.  
Current Ophthalmic Meds  
fluorometholone (FML LIQUID FILM)   
0.1 % ophthalmic suspension Use 1   
Drop in  
both eyes three times a day.  
Continue:  
Systane Complete solution instill   
1 drop 3 times daily Both Eyes.  
Plan Cataract Surgery with   
Monofocal Intraocular Lens Implant   
Left Eye on  
2024 at [facility].  
Patient cleared for surgery by Dr. Almaguer  
2. 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   
cataract of right eye - ICD9:   
366.19, ICD10:  
H25.811  
Plan Cataract Surgery with   
Monofocal Intraocular Lens Implant   
Right Eye after  
Left Eye is stable.  
3. Type 2 diabetes mellitus   
without retinopathy (HCC) - ICD9:   
250.00, ICD10:  
E11.9  
Please keep your blood sugar under   
good control to minimize risk of   
ocular  
complications from diabetes.  
Continue to monitor with primary   
care physician.  
4. Essential hypertension - ICD9:   
401.9, ICD10: I10  
Continue to monitor with primary   
care physician.  
5. Hypercholesteremia - ICD9:   
272.0, ICD10: E78.00  
Continue to monitor with primary   
care physician.  
6. Dementia, unspecified dementia   
severity, unspecified dementia   
type,  
unspecified whether behavioral,   
psychotic, or mood disturbance or   
anxiety (HCC)  
- ICD9: 294.20, ICD10: F03.90  
Continue to monitor with primary   
care physician.  
I have confirmed and edited as   
necessary the relevant HPI,   
ophthalmic history,  
ROS, and the neuro exam findings   
as obtained by others. I have seen   
and  
examined Marzena Mederos. I have   
discussed the case and the   
management of this  
patient's care with the   
Resident/Fellow, if applicable. I   
also have reviewed  
and agree with the assessment and   
plan as stated above and agree   
with all of its  
relevant components.                    [facility]  
   
                                                    10- History of   
Present illness Narrative               Formatting of this note is   
different from the original.  
ASSESSMENT/PLAN:  
1. 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   
cataract of left eye - ICD9:   
366.19, ICD10: H25.812 (primary   
diagnosis)  
  
PHYSICAL EXAM:  
  
Vital Signs:  
Blood pressure 125/80, pulse 88.  
  
Respiratory:  
Normal breath sounds, no wheezing.  
  
CARD:  
Normal heart sounds 1 & 2, normal   
sinus rhythm.  
  
  
Cataract Presurgical Documentation  
  
Cataract: Left Eye  
  
Current Visual Acuity  
Right Eye Distance CC 20/40  
Left Eye Distance CC 20/70  
  
Glare Testing:  
  
Visual Function: Marzena Mederos   
states that the decline in vision   
from the cataract impedes her   
abilities as listed in the HPI, as   
well as other activities of daily   
living.  
  
Marzena Mederos has confirmed that   
she is no longer able to function   
adequately on a day-to-day basis   
because of her current visual   
condition.  
  
Further, it is my medical opinion   
that the cataract is the primary   
cause, or at least a significantly   
contributory cause of her visual   
dysfunction. With uncomplicated   
cataract surgery and lens   
implantation, it is my expectation   
that her visual function and   
quality of life will improve,   
significantly.  
  
The risks, benefits, alternatives,   
personnel and complications of   
cataract surgery with lens   
implantation were discussed with   
Marzena Mederos in detail. She   
appeared to understand and asked   
that I proceed with plans for   
surgery.  
  
Upon eye examination, patient was   
found to have a visually   
significant cataract Left Eye.   
Discussed cataract surgery with   
patient and different intraocular   
lens implant options with patient:   
basic monofocal intraocular lens   
implant, Toric intraocular lens   
implant, and presbyopia correction   
intraocular lens implant. In my   
medical opinion, based on medical   
history and ocular examination,   
cataract surgery with intraocular   
lens implant will correct   
patient's vision and improve   
quality of patient's daily living   
activities. Patient wishes to have   
traditional cataract surgery with   
basic intraocular lens Left Eye.   
Patient wishes to have cataract   
surgery with the option stated   
above. Patient understands that an   
intraocular lens implant does not   
necessarily replace the need for   
glasses. Patient understands that   
it is impossible for the surgeon   
to inform him/her of every   
possible complication that may   
occur. The surgeon has answered   
all of the patient's questions.   
Patient understands that if he/she   
has a mature or dense cataract,   
pseudoexfoliation cataract, or   
history of use of Flomax, he/she   
may require the use of Maluyugin   
Ring and/or Vision Blue during   
surgery. Patient understands the   
risks, benefits, and alternatives   
to surgery.  
  
Current Ophthalmic Meds  
  
  
  
fluorometholone (FML LIQUID FILM)   
0.1 % ophthalmic suspension Use 1   
Drop in both eyes three times a   
day.  
  
Continue:  
Systane Complete solution instill   
1 drop 3 times daily Both Eyes.  
  
Plan Cataract Surgery with   
Monofocal Intraocular Lens Implant   
Left Eye on 2024 at   
[facility].  
  
Patient cleared for surgery by Dr. Almaguer  
  
2. 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   
cataract of right eye - ICD9:   
366.19, ICD10: H25.811  
  
Plan Cataract Surgery with   
Monofocal Intraocular Lens Implant   
Right Eye after Left Eye is   
stable.  
  
3. Type 2 diabetes mellitus   
without retinopathy (HCC) - ICD9:   
250.00, ICD10: E11.9  
  
Please keep your blood sugar under   
good control to minimize risk of   
ocular complications from   
diabetes.  
  
Continue to monitor with primary   
care physician.  
  
4. Essential hypertension - ICD9:   
401.9, ICD10: I10  
  
Continue to monitor with primary   
care physician.  
  
5. Hypercholesteremia - ICD9:   
272.0, ICD10: E78.00  
  
Continue to monitor with primary   
care physician.  
  
6. Dementia, unspecified dementia   
severity, unspecified dementia   
type, unspecified whether   
behavioral, psychotic, or mood   
disturbance or anxiety (HCC) -   
ICD9: 294.20, ICD10: F03.90  
  
Continue to monitor with primary   
care physician.  
  
I have confirmed and edited as   
necessary the relevant HPI,   
ophthalmic history, ROS, and the   
neuro exam findings as obtained by   
others. I have seen and examined   
Marzena Mederos. I have discussed   
the case and the management of   
this patient's care with the   
Resident/Fellow, if applicable. I   
also have reviewed and agree with   
the assessment and plan as stated   
above and agree with all of its   
relevant components.  
  
  
  
Electronically signed by Ronel Hays MD at 10/08/2024 1:24   
PM EDT  
documented in this encounter            [facility]  
   
                                                    10- Telephone   
encounter Note                          Formatting of this note is   
different from the original.  
Prescription Refill Information  
  
The patient has been identified by   
name and date of birth: Yes  
  
Caregiver verified no other   
encounters exist for this   
prescription request: Yes  
  
Caregiver confirmed with   
patient/requestor that no other   
refills are due, in the near   
future, with this provider at this   
time: Yes  
  
The last office visit in the   
department: 2024  
  
Does the patient have a future   
office visit with this   
provider/department: Yes  
  
Requested Prescriptions  
  
Pending Prescriptions Disp Refills  
fluorometholone (FML LIQUID FILM)   
0.1 % ophthalmic suspension 5 mL 1  
Sig: Use 1 Drop in both eyes three   
times a day.  
  
Jazmine Garvin  
2024 10:26 AM  
  
Electronically signed by Jazmine Garvin at 10/01/2024 10:27 AM EDT  
                                        [facility]  
   
                                                    10- Miscellaneous   
Notes                                   Formatting of this note is   
different from the original.  
Prescription Refill Information  
  
The patient has been identified by   
name and date of birth: Yes  
  
Caregiver verified no other   
encounters exist for this   
prescription request: Yes  
  
Caregiver confirmed with   
patient/requestor that no other   
refills are due, in the near   
future, with this provider at this   
time: Yes  
  
The last office visit in the   
department: 2024  
  
Does the patient have a future   
office visit with this   
provider/department: Yes  
  
Requested Prescriptions  
  
Pending Prescriptions Disp Refills  
fluorometholone (FML LIQUID FILM)   
0.1 % ophthalmic suspension 5 mL 1  
Sig: Use 1 Drop in both eyes three   
times a day.  
  
Jazmine Garvin  
2024 10:26 AM  
  
Electronically signed by Jazmine Garvin at 10/01/2024 10:27 AM EDT  
documented in this encounter            [facility]  
   
                                                    2024 History of   
Present illness Narrative               Formatting of this note is   
different from the original.  
Images from the original note were   
not included.  
  
  
CHIEF COMPLAINT  
Follow up testing  
  
HISTORY OF PRESENT ILLNESS  
  
Patient previously presented for   
cardiac risk stratification for   
cataract removal by Dr. Hays.   
Patient's previous EKGs showing   
nonspecific T wave changes as well   
as reporting prior infarct   
(patient unaware). A nuclear   
stress test was ordered for   
further ischemic evaluation which   
was negative for any ischemia.  
  
Patient denies any current chest   
pain/pressure/discomfort or   
shortness of breath. Patient is   
planning for cataract removal by   
Dr. Hays but awaits cardiac risk   
stratification.  
  
Cardiovascular testin.Echo ()-LVSF is   
normal with a 55-60% EF; grade I   
diastolic dysfunction; mild   
prolapse of the posterior leaflet   
of mitral valve; mild mitral valve   
regurgitation  
  
Past Medical, Surgical, and Family   
History reviewed and updated in   
chart.  
  
Reviewed all medications by   
prescribing practitioner or   
clinical pharmacist (such as   
prescriptions, OTCs, herbal   
therapies and supplements) and   
documented in the medical record.  
  
Past Medical History  
Past Medical History:  
Diagnosis Date  
Breast cancer in female (Multi)   
  
Right side  
Carcinoma in situ of vulva   
10/15/2009  
Last Assessment & Plan: Formatting   
of this note might be different   
from the original. Assessment:   
Endometrium cancer (HCC) [C54.1]   
PLAN: ROBOTIC LAPAROSCOPIC TOTAL   
HYSTERECTOMY W/ BSO UTERUS=<250G   
[27236] ROBOTIC LAPAROSCOPY   
SURGICAL W/ RETROPERITONEAL LYMPH   
NODE SAMPLING SINGLE OR MULTIPLE   
[92252] TRANSFUSION BLOOD [5719]  
Diabetes mellitus (Multi)  
Endometrial cancer (Multi)   
Hypermetropia 2015  
Hypertension  
Invasive ductal carcinoma of   
breast, female (Multi) 2017  
Malignant neoplasm of female   
breast (Multi) 2010  
Formatting of this note might be   
different from the original.   
Survivorship Care plan in abstract   
dated 9/28/15  
Orbital cellulitis on right   
2015  
Last Assessment & Plan: Formatting   
of this note might be different   
from the original. Improving Ct   
scan unchanged Steroid started by   
ophthalmology Continue vanco and   
iv rocephin and po metronidazole   
Dr Mcmahon refer no need of iv   
antibiotics On the id part the   
patient leukocytosis and symptoms   
improve while antibiotics still   
highly suspicious of orbital   
cellulitis This has been discuss   
with the pat  
Personal history of malignant   
neoplasm of breast 2012  
Presbyopia 2015  
Regular astigmatism 2015  
S/P hysterectomy with oophorectomy   
2017  
Transient cerebral ischemia   
01/15/2008  
Vulvar cancer (Multi) 1993  
  
Social History  
Social History  
  
Tobacco Use  
Smoking status: Never  
Smokeless tobacco: Never  
Vaping Use  
Vaping status: Never Used  
Substance Use Topics  
Alcohol use: Yes  
Comment: rarely  
Drug use: Never  
  
Family History  
No family history on file.  
  
Allergies:  
Allergies  
Allergen Reactions  
Zolpidem Hives  
Onion Nausea/vomiting  
Adhesive Rash  
Redness  
Neomycin-Bacitracin-Polymyxin Rash  
Oxycodone-Acetaminophen Nausea And   
Vomiting  
Sulfamethoxazole-Trimethoprim   
Nausea And Vomiting  
Zolpidem Tartrate Rash  
  
  
Outpatient Medications:  
Current Outpatient Medications  
Medication Instructions  
albuterol 90 mcg/actuation inhaler   
2 puffs, inhalation, Every 6 hours   
PRN  
amLODIPine (NORVASC) 10 mg, oral,   
Daily  
blood sugar diagnostic (Blood   
Glucose Test) strip 100 strips,   
miscellaneous, 2 times daily  
cholecalciferol (VITAMIN D-3)   
1,000 Units, oral, Daily  
donepezil (ARICEPT) 10 mg, oral,   
Nightly  
glipiZIDE XL (GLUCOTROL XL) 2.5   
mg, oral, Daily, Do not crush,   
chew, or split.  
lancets (OneTouch Delica Plus   
Lancet) 30 gauge misc   
miscellaneous  
losartan-hydrochlorothiazide   
(Hyzaar) 100-12.5 mg tablet 1   
tablet, oral, Daily  
metFORMIN  mg 24 hr tablet 2   
tablets (1,000 mg) once daily in   
the evening. Take with meals.  
multivitamin tablet 1 tablet,   
oral, Daily  
omeprazole (PRILOSEC) 40 mg, oral,   
Daily before breakfast, Do not   
crush or chew.  
potassium chloride CR 10 mEq ER   
tablet 10 mEq, oral, 2 times daily  
simvastatin (ZOCOR) 20 mg, oral,   
Daily RT  
  
  
Labs:  
CMP:  
Recent Labs  
24  
1306 24  
1128 24  
1311 24  
1156 24  
1143  
 139 138 138 138  
K 4.2 3.4* 3.9 3.7 3.4*  
CL 99 98 100 99 98  
CO2 28 30 31 31 33  
ANIONGAP 15 14 11 12 11  
BUN 14 14 23 16 10  
CREATININE 0.81 0.81 0.84 0.78   
0.60  
EGFR 75 75 72 78 >90  
MG -- -- 1.74 -- --  
  
Recent Labs  
24  
1128 24  
1311 24  
1143 23  
0828  
ALBUMIN 4.3 4.0 4.2 4.4  
ALKPHOS 66 65 79 82  
ALT 22 14 15 13  
AST 19 23 13 13  
BILITOT 0.6 0.7 1.0 0.6  
LIPASE 19 -- -- --  
  
CBC:  
Recent Labs  
24  
1128 24  
1311 24  
1143  
WBC 9.3 12.3* 7.2  
HGB 14.6 14.3 14.4  
HCT 44.6 42.4 43.0  
 299 310  
MCV 93 93 93  
  
COAG: No results for input(s):   
 PTT ,  INR ,  HAUF ,  DDIMERVTE ,   
 HAPTOGLOBIN ,  FIBRINOGEN  in the   
last 12636 hours.  
ABO: No results for input(s):   
 ABO  in the last 81722 hours.  
HEME/ENDO:  
Recent Labs  
24  
1306 24  
1156 23  
0828  
TSH -- 1.32 --  
HGBA1C 8.2* 8.2* 6.8*  
  
CARDIAC:  
Recent Labs  
24  
1311 24  
1143  
TROPHS 7 8  
  
Recent Labs  
23  
0828  
CHOL 182  
HDL 60.0  
TRIG 126  
  
MICRO: No results for input(s):   
 ESR ,  CRP ,  PROCAL  in the last   
76946 hours.  
No results found for the last 90   
days.  
  
Notable Studies: imaging   
personally reviewed  
EKG:  
Encounter Date: 24  
ECG 12 Lead  
Result Value  
Ventricular Rate 80  
Atrial Rate 80  
NV Interval 198  
QRS Duration 90  
QT Interval 380  
QTC Calculation(Bazett) 438  
P Axis 66  
R Axis 49  
T Axis 8  
QRS Count 13  
Q Onset 229  
P Onset 130  
P Offset 197  
T Offset 419  
QTC Fredericia 418  
Narrative  
Sinus rhythm with frequent   
Premature ventricular complexes  
Cannot rule out Anterior infarct   
(cited on or before 03-MAY-2024)  
Abnormal ECG  
When compared with ECG of   
03-MAY-2024 11:25,  
Criteria for Inferior infarct are   
no longer Present  
Inverted T waves have replaced   
nonspecific T wave abnormality in   
Inferior leads  
See ED provider note for full   
interpretation and clinical   
correlation  
Confirmed by Valery Chino   
([id number]) on 2024 5:27:35 PM  
  
Echocardiogram: No results found   
for this or any previous visit   
from the past 1825 days.  
  
Stress Testing: No results found   
for this or any previous visit   
from the past 1825 days.  
  
Cardiac Catheterization: No   
results found for this or any   
previous visit from the past 1825   
days.  
No results found for this or any   
previous visit from the past 3650   
days.  
  
  
REVIEW OF SYSTEMS  
A 10-point system review was   
completed and was negative except   
as noted in the HPI.  
  
VITALS  
Vitals:  
24 1431  
BP: 108/64  
Pulse: 75  
SpO2: 98%  
  
PHYSICAL EXAM  
General: awake, alert and   
oriented. No acute distress.  
Skin: Skin is warm, dry and intact   
without rashes or lesions.  
HEENT: normocephalic, atraumatic;   
conjunctivae are clear without   
exudates or hemorrhage. Sclera is   
non-icteric. Eyelids are normal in   
appearance without swelling or   
lesions. Hearing intact. Nares are   
patent bilaterally. Moist mucous   
membranes.  
Cardiovascular: heart rate and   
rhythm are normal. No murmurs,   
gallops, or rubs are auscultated.   
S1 and S2 are heard and are of   
normal intensity. No JVD, no   
carotid bruits  
Respiratory: bilateral lung sounds   
clear to auscultations without   
rales, rhonchi, or wheezes.  
Neurological: no focal deficits;   
gait steady  
Psychiatric: appropriate mood and   
affect; good judgment and insight  
  
ASSESSMENT AND PLAN  
Assessment/Plan  
Diagnoses and all orders for this   
visit:  
Encounter for pre-operative   
cardiovascular clearance  
  
Cardiac risk stratification:  
-Patient is at Class I risk of   
perioperative cardiac events with   
upcoming procedure. Okay to go for   
proceed without further cardiac   
testing.  
  
RTC: PRN  
  
Thank you for allowing me to   
participate in the care of this   
patient. Please reach me out if   
you have any questions or if you   
need any clarifications regarding   
the patient's care.  
  
Bernice Vital DNP, DAMON, FNP-C  
Division of Cardiovascular   
Medicine  
[facility]   
[place]  
[facility]  
Electronically signed by DAMON Neal-KEITH ONEAL at 2024   
2:51 PM EDT  
documented in this encounter            [facility]  
Work Phone:   
[phone]  
   
                                                    2024 Evaluation +   
Plan note                               Associated Problem(s): Hypokalemia  
Formatting of this note might be   
different from the original.  
-Potassium is corrected after   
doubling her potassium dose  
Electronically signed by Sharifa Almaguer DO at 2024 10:42 AM   
[facility]  
Work Phone:   
[phone]  
   
                                                    2024 Evaluation +   
Plan note                               Associated Problem(s): Benign   
liver cyst  
Formatting of this note might be   
different from the original.  
-Follow-up ultrasound to the   
abnormality identified on CT scan   
shows a simple liver cyst  
Electronically signed by Sharifa Almaguer DO at 2024 10:42 AM   
[facility]  
Work Phone:   
[phone]  
   
                                                    2024 Evaluation +   
Plan note                               Associated Problem(s): Controlled   
type 2 diabetes mellitus without   
complication, without long-term   
current use of insulin (Multi)  
Formatting of this note might be   
different from the original.  
-Her blood sugars fluctuate widely   
and therefore we will keep her   
medicine the same and not increase   
the dose  
-Her hemoglobin A1c is 8.2 and we   
are satisfied at that level-we   
will check it again when she comes   
back in 6 months  
  
Electronically signed by Sharifa Almaguer DO at 2024 10:42 AM   
[facility]  
Work Phone:   
[phone]  
   
                                                    2024 Miscellaneous   
Notes                                   Associated Problem(s): Hypokalemia  
Formatting of this note might be   
different from the original.  
-Potassium is corrected after   
doubling her potassium dose  
Electronically signed by Sharifa Almaguer DO at 2024 10:42 AM   
EDT  
Associated Problem(s): Benign   
liver cyst  
Formatting of this note might be   
different from the original.  
-Follow-up ultrasound to the   
abnormality identified on CT scan   
shows a simple liver cyst  
Electronically signed by Sharifa Almaguer DO at 2024 10:42 AM   
EDT  
Associated Problem(s): Controlled   
type 2 diabetes mellitus without   
complication, without long-term   
current use of insulin (Multi)  
Formatting of this note might be   
different from the original.  
-Her blood sugars fluctuate widely   
and therefore we will keep her   
medicine the same and not increase   
the dose  
-Her hemoglobin A1c is 8.2 and we   
are satisfied at that level-we   
will check it again when she comes   
back in 6 months  
  
Electronically signed by Sharifa Almaguer DO at 2024 10:42 AM   
EDT  
Associated Problem(s): Weight   
loss, unintentional  
Formatting of this note might be   
different from the original.  
-Her weight was doing very well   
until recently when she contracted   
COVID-19 infection  
-I do believe that the recent   
weight change was because of her   
infection as she was not feeling   
well   
-Her  reports she is eating   
much better now and he will weigh   
her at home. He knows to call if   
her weight is going down  
Electronically signed by Sharifa Almaguer DO at 2024 10:41 AM   
EDT  
Associated Problem(s): Abnormal   
EKG  
Formatting of this note might be   
different from the original.  
-She is currently being assessed   
by cardiology because of an   
abnormal EKG and needing clearance   
for her eye procedure  
-She will have a stress test this   
coming Thursday and we talked   
about holding her diabetic   
medications the morning of  
Electronically signed by Sharifa Almaguer DO at 2024 10:41 AM   
EDT  
Associated Problem(s): HTN   
(hypertension)  
Formatting of this note might be   
different from the original.  
-Blood pressures currently   
well-controlled so we will   
continue with her current   
antihypertensive regimen  
Electronically signed by Sharifa Almaguer DO at 2024 10:40 AM   
EDT  
documented in this encounter            [facility]  
Work Phone:   
[phone]  
   
                                                    2024 Evaluation +   
Plan note                               Associated Problem(s): Weight   
loss, unintentional  
Formatting of this note might be   
different from the original.  
-Her weight was doing very well   
until recently when she contracted   
COVID-19 infection  
-I do believe that the recent   
weight change was because of her   
infection as she was not feeling   
well  
-Her  reports she is eating   
much better now and he will weigh   
her at home. He knows to call if   
her weight is going down  
Electronically signed by Sharifa Almaguer DO at 2024 10:41 AM   
EDT  
                                        [facility]  
Work Phone:   
[phone]  
   
                                                    2024 Evaluation +   
Plan note                               Associated Problem(s): Abnormal   
EKG  
Formatting of this note might be   
different from the original.  
-She is currently being assessed   
by cardiology because of an   
abnormal EKG and needing clearance   
for her eye procedure  
-She will have a stress test this   
coming Thursday and we talked   
about holding her diabetic   
medications the morning of  
Electronically signed by Sharifa Almaguer DO at 2024 10:41 AM   
[facility]  
Work Phone:   
[phone]  
   
                                                    2024 Evaluation +   
Plan note                               Associated Problem(s): HTN   
(hypertension)  
Formatting of this note might be   
different from the original.  
-Blood pressures currently   
well-controlled so we will   
continue with her current   
antihypertensive regimen  
Electronically signed by Sharifa Almaguer DO at 2024 10:40 AM   
[facility]  
Work Phone:   
[phone]  
   
                                                    2024 History of   
Present illness Narrative               Formatting of this note is   
different from the original.  
Subjective  
Patient ID: Marzena Mederos is a   
78 y.o. female who presents for   
Follow-up.  
HPI  
She is here today for follow-up   
and accompanied by her .   
Unfortunately she contracted   
COVID-19 infection and had a   
pretty severe cough. She was seen   
at urgent care and given   
medications for symptomatic   
treatment. Her  states it   
worked well and she is finally   
getting over the virus. We also   
had sent her for an ultrasound of   
her liver as a follow-up to an   
abnormal finding on CT scan. The   
good news is it did not show any   
significant abnormalities and the   
radiologist reporting that is a   
cyst.  
We also reviewed her most recent   
hemoglobin A1c which came back at   
8.2. Unfortunately her blood   
glucose levels fluctuate quite   
dramatically and therefore we will   
keep her medicine the same for   
now. I explained that as long as   
we can keep her around 8 that is   
acceptable for a woman her age.  
Also her potassium level is   
perfect this time and we will have   
her take 2 of the potassium   
tablets daily  
She also saw the cardiology team   
for her abnormal EKG. She was   
scheduled to have a stress test   
but had to be canceled because of   
her COVID infection and now she is   
scheduled for this coming   
Thursday. Her  had   
questions about the medications   
and I did explain that she should   
hold her diabetic medicines until   
after the test is complete. He   
will call the cardiology team   
regarding her blood pressure   
medicines just to be sure that   
they want her to have them in the   
morning.  
We decided that she can safely   
return in 6 months and sooner if   
any problems.  
Objective  
Physical Exam  
Vitals and nursing note reviewed.  
Constitutional:  
General: She is not in acute   
distress.  
Appearance: Normal appearance.  
HENT:  
Head: Normocephalic and   
atraumatic.  
Eyes:  
Conjunctiva/sclera: Conjunctivae   
normal.  
Cardiovascular:  
Rate and Rhythm: Normal rate and   
regular rhythm.  
Heart sounds: Normal heart sounds.  
Pulmonary:  
Effort: No respiratory distress.  
Breath sounds: No wheezing.  
Abdominal:  
Palpations: Abdomen is soft.  
Tenderness: There is no abdominal   
tenderness. There is no guarding.  
Musculoskeletal:  
General: No swelling. Normal range   
of motion.  
Skin:  
General: Skin is warm and dry.  
Neurological:  
General: No focal deficit present.  
Mental Status: She is alert and   
oriented to person, place, and   
time.  
Psychiatric:  
Behavior: Behavior normal.  
  
Recent Results (from the past 672   
hour(s))  
POCT SARS-COV-2/FLU/RSV PCR   
SYMPTOMATIC manually resulted  
Collection Time: 24 10:43 AM  
Result Value Ref Range  
POC Coronavirus 2019, PCR Detected   
(A) Not Detected  
POC Flu A Result Not Detected Not   
Detected  
POC Flu B Result Not Detected Not   
Detected  
POC RSV PCR Not Detected Not   
Detected  
Hemoglobin A1C  
Collection Time: 24 1:06 PM  
Result Value Ref Range  
Hemoglobin A1C 8.2 (H) See comment   
%  
Estimated Average Glucose 189 Not   
Established mg/dL  
Basic Metabolic Panel  
Collection Time: 24 1:06 PM  
Result Value Ref Range  
Glucose 138 (H) 74 - 99 mg/dL  
Sodium 138 136 - 145 mmol/L  
Potassium 4.2 3.5 - 5.3 mmol/L  
Chloride 99 98 - 107 mmol/L  
Bicarbonate 28 21 - 32 mmol/L  
Anion Gap 15 10 - 20 mmol/L  
Urea Nitrogen 14 6 - 23 mg/dL  
Creatinine 0.81 0.50 - 1.05 mg/dL  
eGFR 75 >60 mL/min/1.73m*2  
Calcium 9.8 8.6 - 10.3 mg/dL  
  
Assessment/Plan  
Problem List Items Addressed This   
Visit  
  
ICD-10-CM  
HTN (hypertension) I10  
-Blood pressures currently   
well-controlled so we will   
continue with her current   
antihypertensive regimen  
  
  
Relevant Medications  
amLODIPine (Norvasc) 10 mg tablet  
losartan-hydrochlorothiazide   
(Hyzaar) 100-12.5 mg tablet  
potassium chloride CR 10 mEq ER   
tablet  
Controlled type 2 diabetes   
mellitus without complication,   
without long-term current use of   
insulin (Multi) E11.9  
-Her blood sugars fluctuate widely   
and therefore we will keep her   
medicine the same and not increase   
the dose  
-Her hemoglobin A1c is 8.2 and we   
are satisfied at that level-we   
will check it again when she comes   
back in 6 months  
  
  
  
Relevant Medications  
glipiZIDE XL (Glucotrol XL) 2.5 mg   
24 hr tablet  
Other Relevant Orders  
Basic Metabolic Panel  
Hemoglobin A1C  
Mixed hyperlipidemia E78.2  
Relevant Medications  
simvastatin (Zocor) 20 mg tablet  
Mild dementia without behavioral   
disturbance, psychotic   
disturbance, mood disturbance, or   
anxiety, unspecified dementia type   
(Multi) F03.A0  
Relevant Medications  
donepezil (Aricept) 10 mg tablet  
Gastroesophageal reflux disease   
without esophagitis K21.9  
Relevant Medications  
omeprazole (PriLOSEC) 40 mg DR   
capsule  
Abnormal EKG - Primary R94.31  
-She is currently being assessed   
by cardiology because of an   
abnormal EKG and needing clearance   
for her eye procedure  
-She will have a stress test this   
coming Thursday and we talked   
about holding her diabetic   
medications the morning of  
  
  
Weight loss, unintentional R63.4  
-Her weight was doing very well   
until recently when she contracted   
COVID-19 infection  
-I do believe that the recent   
weight change was because of her   
infection as she was not feeling   
well  
-Her  reports she is eating   
much better now and he will weigh   
her at home. He knows to call if   
her weight is going down  
  
  
Hypokalemia E87.6  
-Potassium is corrected after   
doubling her potassium dose  
  
  
Benign liver cyst K76.89  
-Follow-up ultrasound to the   
abnormality identified on CT scan   
shows a simple liver cyst  
  
  
PT INSTRUCTIONS  
As we discussed she will not take   
her diabetic medications the   
morning of her stress test since   
she will not be eating and   
exercising. Specifically hold the   
metformin and the glipizide until   
after her test and she is eating   
normally  
Please clarify with the cardiology   
team about her other medications   
prior to her exam  
Please also weigh once a week and   
if the weight is still going down   
please contact me  
We have decided that you can   
safely return in 6 months. Please   
keep checking the sugars   
periodically and if they get high   
here than what they are now please   
call as we would then make changes   
in the diabetic medications  
Please remember to get your blood   
work done just prior to your next   
follow-up visit  
  
  
Sharifa Almaguer DO  
Electronically signed by Sharifa Almaguer DO at 2024 10:45 AM   
EDT  
documented in this encounter            [facility]  
Work Phone:   
[phone]  
   
                                        2024 Instructions   
  
  
Sharifa Almaguer DO - 2024   
10:20 AM EDTFormatting of this   
note might be different from the   
original.  
As we discussed she will not take   
her diabetic medications the   
morning of her stress test since   
she will not be eating and   
exercising. Specifically hold the   
metformin and the glipizide until   
after her test and she is eating   
normally  
Please clarify with the cardiology   
team about her other medications   
prior to her exam  
Please also weigh once a week and   
if the weight is still going down   
please contact me  
We have decided that you can   
safely return in 6 months. Please   
keep checking the sugars   
periodically and if they get high   
here than what they are now please   
call as we would then make changes   
in the diabetic medications  
Please remember to get your blood   
work done just prior to your next   
follow-up visit  
Electronically signed by Sharifa Almaguer DO at 2024 10:43 AM   
EDT  
  
documented in this encounter            [facility]  
Work Phone:   
[phone]  
   
                                                    2024 History of   
Present illness Narrative               Formatting of this note is   
different from the original.  
78 y.o. female presents with   
 for evaluation of URI.   
Symptoms including cough,   
congestion, body aches, malaise,   
and headache have been present for   
2 days and refractory to OTC meds.   
No fever, chills, nausea,   
vomiting, abdominal pain, CP,   
orthopnea or SOB. Recently had   
ECHO which was normal. No   
exacerbating factors. No known   
COVID 19/flu exposure.  
  
Vitals:  
24 1002  
BP: 130/80  
Pulse: 84  
Resp: 16  
Temp: 36.3 C (97.3 F)  
SpO2: 100%  
  
Allergies  
Allergen Reactions  
Zolpidem Hives  
Onion Nausea/vomiting  
Adhesive Rash  
Redness  
Neomycin-Bacitracin-Polymyxin Rash  
Oxycodone-Acetaminophen Nausea And   
Vomiting  
Sulfamethoxazole-Trimethoprim   
Nausea And Vomiting  
Zolpidem Tartrate Rash  
  
Medication Documentation Review   
Audit  
  
Reviewed by Riya Kebede MA   
(Medical Assistant) on 24 at   
1001  
  
Medication Order Taking? Sig   
Documenting Provider Last Dose   
Status  
amLODIPine (Norvasc) 10 mg tablet   
[id number] Yes TAKE 1 TABLET (10   
MG) BY MOUTH ONCE DAILY. Sharifa   
S Royal, DO Taking Active  
blood sugar diagnostic (Blood   
Glucose Test) strip [id number] Yes   
100 strips 2 times a day. Sharifa Almaguer, DO Taking Active  
cholecalciferol (Vitamin D-3) 25   
MCG (1000 UT) capsule [id number]   
Yes Take 1 capsule (25 mcg) by   
mouth once daily. Historical   
Provider, MD Taking Active  
donepezil (Aricept) 10 mg tablet   
[id number] Yes Take 1 tablet (10   
mg) by mouth once daily at   
bedtime. Sharifa Almaguer, DO   
Taking Active  
glipiZIDE XL (Glucotrol XL) 2.5 mg   
24 hr tablet [id number] Yes Take 1   
tablet (2.5 mg) by mouth once   
daily. Do not crush, chew, or   
split. Sharifa Almaguer, DO Taking   
Active  
losartan-hydrochlorothiazide   
(Hyzaar) 100-12.5 mg tablet   
[id number] Yes Take 1 tablet by   
mouth once daily. Sharifa Almaguer, DO Taking Active  
metFORMIN  mg 24 hr tablet   
[id number] Yes 2 tablets (1,000 mg)   
once daily in the evening. Take   
with meals. Sharifa Almaguer DO   
Taking Active  
multivitamin tablet [id number] Yes   
Take 1 tablet by mouth once daily.   
Historical Provider, MD Taking   
Active  
omeprazole (PriLOSEC) 40 mg DR   
capsule [id number] Take 1 capsule   
(40 mg) by mouth once daily in the   
morning. Take before meals. Do not   
crush or chew. Sharifa Almaguer DO  24 2359  
potassium chloride CR 10 mEq ER   
tablet [id number] Yes Take 1 tablet   
(10 mEq) by mouth 2 times a day.   
Sharifa Almaguer DO Taking Active  
simvastatin (Zocor) 20 mg tablet   
[id number] Yes Take 1 tablet (20   
mg) by mouth once daily. Sharifa Almaguer DO Taking Active  
  
  
  
  
  
  
Past Medical History:  
Diagnosis Date  
Breast cancer in female (Multi)   
  
Right side  
Carcinoma in situ of vulva   
10/15/2009  
Last Assessment & Plan: Formatting   
of this note might be different   
from the original. Assessment:   
Endometrium cancer (HCC) [C54.1]   
PLAN: ROBOTIC LAPAROSCOPIC TOTAL   
HYSTERECTOMY W/ BSO UTERUS=<250G   
[66203] ROBOTIC LAPAROSCOPY   
SURGICAL W/ RETROPERITONEAL LYMPH   
NODE SAMPLING SINGLE OR MULTIPLE   
[84263] TRANSFUSION BLOOD [5719]  
Diabetes mellitus (Multi)  
Endometrial cancer (Multi)   
Hypermetropia 2015  
Hypertension  
Invasive ductal carcinoma of   
breast, female (Multi) 2017  
Malignant neoplasm of female   
breast (Multi) 2010  
Formatting of this note might be   
different from the original.   
Survivorship Care plan in abstract   
dated 9/28/15  
Orbital cellulitis on right   
2015  
Last Assessment & Plan: Formatting   
of this note might be different   
from the original. Improving Ct   
scan unchanged Steroid started by   
ophthalmology Continue vanco and   
iv rocephin and po metronidazole   
Dr Mcmahon refer no need of iv   
antibiotics On the id part the   
patient leukocytosis and symptoms   
improve while antibiotics still   
highly suspicious of orbital   
cellulitis This has been discuss   
with the pat  
Personal history of malignant   
neoplasm of breast 2012  
Presbyopia 2015  
Regular astigmatism 2015  
S/P hysterectomy with oophorectomy   
2017  
Transient cerebral ischemia   
01/15/2008  
Vulvar cancer (Multi)   
  
Past Surgical History:  
Procedure Laterality Date  
BREAST LUMPECTOMY Right  
She went to the [facility]   
in [place] in   
HAND SURGERY Right   
By Dr. Don  
HYSTERECTOMY   
For endometrial cancer  
  
ROS  
See HPI  
  
Physical Exam  
Vitals and nursing note reviewed.  
Constitutional:  
General: She is not in acute   
distress.  
Appearance: Normal appearance. She   
is not ill-appearing or   
toxic-appearing.  
HENT:  
Head: Normocephalic and   
atraumatic.  
Right Ear: Tympanic membrane and   
ear canal normal.  
Left Ear: Tympanic membrane and   
ear canal normal.  
Nose: Congestion present.  
Mouth/Throat:  
Mouth: Mucous membranes are moist.  
Pharynx: Oropharynx is clear.   
Posterior oropharyngeal erythema   
(mild) present.  
Eyes:  
Extraocular Movements: Extraocular   
movements intact.  
Conjunctiva/sclera: Conjunctivae   
normal.  
Pupils: Pupils are equal, round,   
and reactive to light.  
Cardiovascular:  
Rate and Rhythm: Normal rate and   
regular rhythm.  
Pulmonary:  
Effort: Pulmonary effort is   
normal.  
Breath sounds: Normal breath   
sounds.  
Comments: Dry cough  
Lymphadenopathy:  
Cervical: Cervical adenopathy   
present.  
Skin:  
General: Skin is warm and dry.  
Capillary Refill: Capillary refill   
takes less than 2 seconds.  
Neurological:  
General: No focal deficit present.  
Mental Status: She is alert and   
oriented to person, place, and   
time.  
Psychiatric:  
Mood and Affect: Mood normal.  
Behavior: Behavior normal.  
  
Recent Results (from the past 1   
hour(s))  
POCT SARS-COV-2/FLU/RSV PCR   
SYMPTOMATIC manually resulted  
Collection Time: 24 10:43 AM  
Result Value Ref Range  
POC Coronavirus 2019, PCR Detected   
(A) Not Detected  
POC Flu A Result Not Detected Not   
Detected  
POC Flu B Result Not Detected Not   
Detected  
POC RSV PCR Not Detected Not   
Detected  
  
Assessment/Plan/MDM  
Marzena was seen today for cough.  
Diagnoses and all orders for this   
visit:  
COVID (Primary)  
- methylPREDNISolone (Medrol   
Dospak) 4 mg tablets; Take as   
directed on package.  
- albuterol 90 mcg/actuation   
inhaler; Inhale 2 puffs every 6   
hours if needed for wheezing.  
Acute upper respiratory infection  
- POCT SARS-COV-2/FLU/RSV PCR   
SYMPTOMATIC manually resulted  
  
Discussed quarantine restrictions   
with COVID-19. Encouraged pt to   
continue otc cold remedies PRN,   
push PO fluids and rest. Patient's   
clinical presentation is otherwise   
unremarkable at this time. Patient   
is discharged with instructions to   
follow-up with primary care or   
seek emergency medical attention   
for worsening symptoms or any new   
concerns.  
  
I did personally review Marzena's   
past medical history, surgical   
history, social history, as well   
as family history (when relevant).   
In this case, I also oversaw the   
her drug management by reviewing   
her medication list, allergy list,   
as well as the medications that I   
prescribed during the UC course   
and/or recommended as an   
out-patient (including possible   
OTC medications such as   
acetaminophen, NSAIDs , etc).  
  
After reviewing the items above, I   
did look at previous medical   
documentation, such as recent   
hospitalizations, office visits,   
and/or recent consultations with   
PCP/specialist.  
  
SDOH: Another factor that I   
considered in Marzena's care was   
her Social Determinants of Health   
(SDOH). During this UC encounter,   
she did not have social   
determinants of health. Those SDOH   
influencing Marzena's care are:   
none  
  
Seth Ceballos CNP  
[facility]  
[phone]  
Electronically signed by DAMON Zarco-CNP at 2024   
10:58 AM EDT  
documented in this encounter            [facility]  
Work Phone:   
[phone]  
   
                                                    2024 History of   
Present illness Narrative               Formatting of this note is   
different from the original.  
Images from the original note were   
not included.  
  
  
CHIEF COMPLAINT  
Cardiac risk stratification  
  
HISTORY OF PRESENT ILLNESS  
Procedure: cataract removal  
Provider: Dr. Hays  
Date: TBD  
  
Patient presents for cardiac risk   
stratification prior to cataract   
removal. Patient denies any   
previous or current chest   
pain/pressure/discomfort, SOB,   
palpitations, orthopnea/PND,   
dizziness, or lower leg edema.   
When reviewing her previous EKGs   
she has had nonspecific T wave   
changes as well as reporting prior   
infarct.  
  
Cardiovascular testing:  
Echo ()-LVSF is normal   
with a 55-60% EF; grade I   
diastolic dysfunction; mild   
prolapse of the posterior leaflet   
of mitral valve; mild mitral valve   
regurgitation  
  
Past Medical, Surgical, and Family   
History reviewed and updated in   
chart.  
  
Reviewed all medications by   
prescribing practitioner or   
clinical pharmacist (such as   
prescriptions, OTCs, herbal   
therapies and supplements) and   
documented in the medical record.  
  
Past Medical History  
Past Medical History:  
Diagnosis Date  
Breast cancer in female (Multi)   
  
Right side  
Carcinoma in situ of vulva   
10/15/2009  
Last Assessment & Plan: Formatting   
of this note might be different   
from the original. Assessment:   
Endometrium cancer (HCC) [C54.1]   
PLAN: ROBOTIC LAPAROSCOPIC TOTAL   
HYSTERECTOMY W/ BSO UTERUS=<250G   
[52468] ROBOTIC LAPAROSCOPY   
SURGICAL W/ RETROPERITONEAL LYMPH   
NODE SAMPLING SINGLE OR MULTIPLE   
[85432] TRANSFUSION BLOOD [5719]  
Diabetes mellitus (Multi)  
Endometrial cancer (Multi)   
Hypermetropia 2015  
Hypertension  
Invasive ductal carcinoma of   
breast, female (Multi) 2017  
Malignant neoplasm of female   
breast (Multi) 2010  
Formatting of this note might be   
different from the original.   
Survivorship Care plan in abstract   
dated 9/28/15  
Orbital cellulitis on right   
2015  
Last Assessment & Plan: Formatting   
of this note might be different   
from the original. Improving Ct   
scan unchanged Steroid started by   
ophthalmology Continue vanco and   
iv rocephin and po metronidazole   
Dr Mcmahon refer no need of iv   
antibiotics On the id part the   
patient leukocytosis and symptoms   
improve while antibiotics still   
highly suspicious of orbital   
cellulitis This has been discuss   
with the pat  
Personal history of malignant   
neoplasm of breast 2012  
Presbyopia 2015  
Regular astigmatism 2015  
S/P hysterectomy with oophorectomy   
2017  
Transient cerebral ischemia   
01/15/2008  
Vulvar cancer (Multi)   
  
Social History  
Social History  
  
Tobacco Use  
Smoking status: Never  
Smokeless tobacco: Never  
Vaping Use  
Vaping status: Never Used  
Substance Use Topics  
Alcohol use: Yes  
Comment: rarely  
Drug use: Never  
  
Family History  
No family history on file.  
  
Allergies:  
Allergies  
Allergen Reactions  
Zolpidem Hives  
Onion Nausea/vomiting  
Adhesive Rash  
Redness  
Neomycin-Bacitracin-Polymyxin Rash  
Oxycodone-Acetaminophen Nausea And   
Vomiting  
Sulfamethoxazole-Trimethoprim   
Nausea And Vomiting  
Zolpidem Tartrate Rash  
  
  
Outpatient Medications:  
Current Outpatient Medications  
Medication Instructions  
amLODIPine (NORVASC) 10 mg, oral,   
Daily  
blood sugar diagnostic (Blood   
Glucose Test) strip 100 strips,   
miscellaneous, 2 times daily  
cholecalciferol (VITAMIN D-3)   
1,000 Units, oral, Daily  
donepezil (ARICEPT) 10 mg, oral,   
Nightly  
losartan-hydrochlorothiazide   
(Hyzaar) 100-12.5 mg tablet 1   
tablet, oral, Daily  
metFORMIN  mg 24 hr tablet 2   
tablets (1,000 mg) once daily in   
the evening. Take with meals.  
multivitamin tablet 1 tablet,   
oral, Daily  
omeprazole (PRILOSEC) 40 mg, oral,   
Daily before breakfast, Do not   
crush or chew.  
potassium chloride CR 10 mEq ER   
tablet 10 mEq, oral, 2 times daily  
simvastatin (ZOCOR) 20 mg, oral,   
Daily RT  
  
  
Labs:  
CMP:  
Recent Labs  
24  
1128 24  
1311 24  
1156 24  
1143 23  
0828  
 138 138 138 134*  
K 3.4* 3.9 3.7 3.4* 4.2  
CL 98 100 99 98 96*  
CO2 30 31 31 32 31  
ANIONGAP 14 11 12 11 11  
BUN 14 23 16 10 12  
CREATININE 0.81 0.84 0.78 0.60   
0.64  
EGFR 75 72 78 >90 >90  
MG -- 1.74 -- -- --  
  
Recent Labs  
24  
1128 24  
1311 24  
1143 23  
0828  
ALBUMIN 4.3 4.0 4.2 4.4  
ALKPHOS 66 65 79 82  
ALT 22 14 15 13  
AST 19 23 13 13  
BILITOT 0.6 0.7 1.0 0.6  
LIPASE 19 -- -- --  
  
CBC:  
Recent Labs  
24  
1128 24  
1311 24  
1143  
WBC 9.3 12.3* 7.2  
HGB 14.6 14.3 14.4  
HCT 44.6 42.4 43.0  
 299 310  
MCV 93 93 93  
  
COAG: No results for input(s):   
 PTT ,  INR ,  HAUF ,  DDIMERVTE ,   
 HAPTOGLOBIN ,  FIBRINOGEN  in the   
last 31067 hours.  
ABO: No results for input(s):   
 ABO  in the last 56128 hours.  
HEME/ENDO:  
Recent Labs  
24  
1156 23  
0828  
TSH 1.32 --  
HGBA1C 8.2* 6.8*  
  
CARDIAC:  
Recent Labs  
24  
1311 24  
1143  
TROPHS 7 8  
  
Recent Labs  
23  
0828  
CHOL 182  
HDL 60.0  
TRIG 126  
  
MICRO: No results for input(s):   
 ESR ,  CRP ,  PROCAL  in the last   
43303 hours.  
No results found for the last 90   
days.  
  
Notable Studies: imaging   
personally reviewed  
EKG:  
Encounter Date: 24  
ECG 12 Lead  
Result Value  
Ventricular Rate 80  
Atrial Rate 80  
NV Interval 198  
QRS Duration 90  
QT Interval 380  
QTC Calculation(Bazett) 438  
P Axis 66  
R Axis 49  
T Axis 8  
QRS Count 13  
Q Onset 229  
P Onset 130  
P Offset 197  
T Offset 419  
QTC Fredericia 418  
Narrative  
Sinus rhythm with frequent   
Premature ventricular complexes  
Cannot rule out Anterior infarct   
(cited on or before 03-MAY-2024)  
Abnormal ECG  
When compared with ECG of   
03-MAY-2024 11:25,  
Criteria for Inferior infarct are   
no longer Present  
Inverted T waves have replaced   
nonspecific T wave abnormality in   
Inferior leads  
See ED provider note for full   
interpretation and clinical   
correlation  
Confirmed by Valery Chino   
([id number]) on 2024 5:27:35 PM  
  
Echocardiogram: No results found   
for this or any previous visit   
from the past 1825 days.  
  
Stress Testing: No results found   
for this or any previous visit   
from the past 1825 days.  
  
Cardiac Catheterization: No   
results found for this or any   
previous visit from the past 1825   
days.  
No results found for this or any   
previous visit from the past 3650   
days.  
  
  
REVIEW OF SYSTEMS  
A 10-point system review was   
completed and was negative except   
as noted in the HPI.  
  
VITALS  
Vitals:  
24 1335  
BP: 104/70  
Pulse: 79  
SpO2: 96%  
  
PHYSICAL EXAM  
General: awake, alert and   
oriented. No acute distress.  
Skin: Skin is warm, dry and intact   
without rashes or lesions.  
HEENT: normocephalic, atraumatic;   
conjunctivae are clear without   
exudates or hemorrhage. Sclera is   
non-icteric. Eyelids are normal in   
appearance without swelling or   
lesions. Hearing intact. Nares are   
patent bilaterally. Moist mucous   
membranes.  
Cardiovascular: heart rate and   
rhythm are normal. No murmurs,   
gallops, or rubs are auscultated.   
S1 and S2 are heard and are of   
normal intensity. No JVD, no   
carotid bruits  
Respiratory: bilateral lung sounds   
clear to auscultations without   
rales, rhonchi, or wheezes. No   
accessory muscle use or stridor  
Musculoskeletal: ROM intact, no   
deformities  
Extremities: pulses palpable   
bilaterally; no swelling or   
erythema  
Neurological: no focal deficits;   
gait steady  
Psychiatric: appropriate mood and   
affect; good judgment and insight  
  
ASSESSMENT AND PLAN  
Assessment/Plan  
Diagnoses and all orders for this   
visit:  
Encounter for pre-operative   
cardiovascular clearance  
Abnormal EKG  
Mitral valve prolapse determined   
by imaging  
-Reviewed recent echo results;   
will monitor for now, no prior   
echo for comparison  
-Patient is asymptomatic, however,   
I do not like the changes I have   
seen on the current and previous   
EKG, therefore, I will order a   
nuclear stress test to rule out   
ischemia. Patient and spouse   
agreeable and informed, if   
abnormal a LHC will be recommended  
  
RTC: after testing  
  
Thank you for allowing me to   
participate in the care of this   
patient. Please reach me out if   
you have any questions or if you   
need any clarifications regarding   
the patient's care.  
  
Bernice Vital DNP, DAMON, FNP-C  
Division of Cardiovascular   
Medicine  
[facility]   
[place]  
[facility]  
Electronically signed by DAMON Neal-CNP, DNP at 2024   
2:09 PM EDT  
documented in this encounter            [facility]  
Work Phone:   
[phone]  
   
                                                    2024 Evaluation +   
Plan note                               Associated Problem(s): Hypokalemia  
Formatting of this note might be   
different from the original.  
-She will increase her potassium   
intake from 10 mill equivalents   
once daily to twice daily  
-She will have a repeat potassium   
done just prior to her next   
follow-up visit in September  
Electronically signed by Sharifa Almaguer DO at 2024 11:48 AM   
EDT  
                                        [facility]  
Work Phone:   
[phone]  
   
                                                    2024 Evaluation +   
Plan note                               Associated Problem(s): Weight   
loss, unintentional  
Formatting of this note might be   
different from the original.  
-Weight has stabilized and she   
will focus on keeping her caloric   
intake at a satisfactory level  
-CT scan of the abdomen did not   
reveal any contributing factors   
although we are ordering an   
ultrasound of the liver as a   
follow-up to her liver lesions and   
I will call them with the results  
Electronically signed by Sharifa Almaguer DO at 2024 11:48 AM   
EDT  
                                        [facility]  
Work Phone:   
[phone]  
   
                                                    2024 Miscellaneous   
Notes                                   Associated Problem(s): Hypokalemia  
Formatting of this note might be   
different from the original.  
-She will increase her potassium   
intake from 10 mill equivalents   
once daily to twice daily  
-She will have a repeat potassium   
done just prior to her next   
follow-up visit in September  
Electronically signed by Sharifa Almaguer DO at 2024 11:48 AM   
EDT  
Associated Problem(s): Weight   
loss, unintentional  
Formatting of this note might be   
different from the original.  
-Weight has stabilized and she   
will focus on keeping her caloric   
intake at a satisfactory level  
-CT scan of the abdomen did not   
reveal any contributing factors   
although we are ordering an   
ultrasound of the liver as a   
follow-up to her liver lesions and   
I will call them with the results  
Electronically signed by Sharifa Almaguer DO at 2024 11:48 AM   
EDT  
Associated Problem(s): HTN   
(hypertension)  
Formatting of this note might be   
different from the original.  
-Blood pressures currently   
well-controlled  
Electronically signed by Sharifa Almaguer DO at 2024 11:47 AM   
EDT  
Associated Problem(s): Abnormal   
EKG  
Formatting of this note might be   
different from the original.  
-EKG obtained in May showed   
 anterior infarct    
-Echocardiogram is normal  
-Has been indicated to Dr. Hays   
once clearance from cardiology so   
we will help facilitate a referral  
Electronically signed by Sharifa Almaguer DO at 2024 11:47 AM   
EDT  
documented in this encounter            [facility]  
Work Phone:   
[phone]  
   
                                                    2024 Evaluation +   
Plan note                               Associated Problem(s): HTN   
(hypertension)  
Formatting of this note might be   
different from the original.  
-Blood pressures currently   
well-controlled  
Electronically signed by Sharifa Almaguer DO at 2024 11:47 AM   
[facility]  
Work Phone:   
[phone]  
   
                                                    2024 Evaluation +   
Plan note                               Associated Problem(s): Abnormal   
EKG  
Formatting of this note might be   
different from the original.  
-EKG obtained in May showed   
 anterior infarct    
-Echocardiogram is normal  
-Has been indicated to Dr. Hays   
once clearance from cardiology so   
we will help facilitate a referral  
Electronically signed by Sharifa Almaguer DO at 2024 11:47 AM   
[facility]  
Work Phone:   
[phone]  
   
                                                    2024 History of   
Present illness Narrative               Formatting of this note is   
different from the original.  
Subjective  
Patient ID: Marzena Mederos is a   
78 y.o. female who presents for   
Follow-up.  
HPI  
She is here today for follow-up   
and accompanied by her .   
When asked how she was feeling she   
indicated she was feeling fine. We   
also see that her weight has   
stabilized and her  remarks   
that she is actually eating better   
in recent times. When we saw her   
last time there was a big concern   
about  unexplained weight loss  .   
So we did some testing including   
CT scan of the abdomen and pelvis.   
I was pleased to inform her that   
the CT scan did not show any   
alarming findings although she did   
have a few lesions in the liver   
which we will follow-up with an   
ultrasound. We discussed   
continuing with her better eating   
patterns and monitoring closely.  
She also is trying to get cleared   
for an eye procedure with Dr. Hays and she had an EKG a couple   
months ago that showed  anterior   
infarct  . We did a follow-up   
echocardiogram which came back   
looking relatively normal. Her   
 indicates that the office   
of Dr. Hays now wants a clearance   
from cardiology so we will help   
facilitate a referral. We did   
conduct a review of systems and   
she appears to be doing okay at   
this time. We will do no   
additional testing but I will be   
seeing her back in September for   
her regularly scheduled   
appointment.  
She also had a low potassium on   
her recent lab test results so I   
am increasing her potassium from   
10 mill equivalents once a daily   
to twice daily and we will recheck   
it again at her next follow-up   
visit.  
Review of Systems  
Constitutional: Negative for   
fatigue and unexpected weight   
change.  
Respiratory: Negative for cough,   
chest tightness, shortness of   
breath and wheezing.  
Cardiovascular: Negative for chest   
pain, palpitations and leg   
swelling.  
Gastrointestinal: Negative for   
abdominal pain, blood in stool,   
diarrhea, nausea and vomiting.  
Musculoskeletal: Negative for   
arthralgias and back pain.  
  
Objective  
Physical Exam  
Vitals and nursing note reviewed.  
Constitutional:  
General: She is not in acute   
distress.  
Appearance: Normal appearance.  
HENT:  
Head: Normocephalic and   
atraumatic.  
Eyes:  
Conjunctiva/sclera: Conjunctivae   
normal.  
Cardiovascular:  
Rate and Rhythm: Normal rate and   
regular rhythm.  
Heart sounds: Normal heart sounds.  
Pulmonary:  
Effort: No respiratory distress.  
Breath sounds: No wheezing.  
Abdominal:  
Palpations: Abdomen is soft.  
Tenderness: There is no abdominal   
tenderness. There is no guarding.  
Musculoskeletal:  
General: No swelling. Normal range   
of motion.  
Skin:  
General: Skin is warm and dry.  
Neurological:  
General: No focal deficit present.  
Mental Status: She is alert and   
oriented to person, place, and   
time.  
Psychiatric:  
Behavior: Behavior normal.  
  
Recent Results (from the past 672   
hour(s))  
CBC and Auto Differential  
Collection Time: 24 11:28 AM  
Result Value Ref Range  
WBC 9.3 4.4 - 11.3 x10*3/uL  
nRBC 0.0 0.0 - 0.0 /100 WBCs  
RBC 4.80 4.00 - 5.20 x10*6/uL  
Hemoglobin 14.6 12.0 - 16.0 g/dL  
Hematocrit 44.6 36.0 - 46.0 %  
MCV 93 80 - 100 fL  
MCH 30.4 26.0 - 34.0 pg  
MCHC 32.7 32.0 - 36.0 g/dL  
RDW 12.5 11.5 - 14.5 %  
Platelets 362 150 - 450 x10*3/uL  
Neutrophils % 53.3 40.0 - 80.0 %  
Immature Granulocytes %, Automated   
0.3 0.0 - 0.9 %  
Lymphocytes % 38.8 13.0 - 44.0 %  
Monocytes % 5.9 2.0 - 10.0 %  
Eosinophils % 1.5 0.0 - 6.0 %  
Basophils % 0.2 0.0 - 2.0 %  
Neutrophils Absolute 4.95 1.60 -   
5.50 x10*3/uL  
Immature Granulocytes Absolute,   
Automated 0.03 0.00 - 0.50   
x10*3/uL  
Lymphocytes Absolute 3.61 (H) 0.80   
- 3.00 x10*3/uL  
Monocytes Absolute 0.55 0.05 -   
0.80 x10*3/uL  
Eosinophils Absolute 0.14 0.00 -   
0.40 x10*3/uL  
Basophils Absolute 0.02 0.00 -   
0.10 x10*3/uL  
Lipase  
Collection Time: 24 11:28 AM  
Result Value Ref Range  
Lipase 19 9 - 82 U/L  
Comprehensive Metabolic Panel  
Collection Time: 24 11:28 AM  
Result Value Ref Range  
Glucose 237 (H) 74 - 99 mg/dL  
Sodium 139 136 - 145 mmol/L  
Potassium 3.4 (L) 3.5 - 5.3 mmol/L  
Chloride 98 98 - 107 mmol/L  
Bicarbonate 30 21 - 32 mmol/L  
Anion Gap 14 10 - 20 mmol/L  
Urea Nitrogen 14 6 - 23 mg/dL  
Creatinine 0.81 0.50 - 1.05 mg/dL  
eGFR 75 >60 mL/min/1.73m*2  
Calcium 9.8 8.6 - 10.3 mg/dL  
Albumin 4.3 3.4 - 5.0 g/dL  
Alkaline Phosphatase 66 33 - 136   
U/L  
Total Protein 6.6 6.4 - 8.2 g/dL  
AST 19 9 - 39 U/L  
Bilirubin, Total 0.6 0.0 - 1.2   
mg/dL  
ALT 22 7 - 45 U/L  
Transthoracic Echo (TTE) Complete  
Collection Time: 24 11:45 AM  
Result Value Ref Range  
LVOT diam 1.80 cm  
MV E/A ratio 0.62  
LV Biplane EF 51 %  
Tricuspid annular plane systolic   
excursion 1.7 cm  
LA vol index A/L 15.6 ml/m2  
LV EF 58 %  
RV free wall pk S' 17.10 cm/s  
LVIDd 4.60 cm  
LV A4C EF 53.2  
  
Assessment/Plan  
Problem List Items Addressed This   
Visit  
  
ICD-10-CM  
HTN (hypertension) I10  
-Blood pressures currently   
well-controlled  
  
  
Relevant Medications  
potassium chloride CR 10 mEq ER   
tablet  
Abnormal EKG R94.31  
-EKG obtained in May showed   
 anterior infarct    
-Echocardiogram is normal  
-Has been indicated to Dr. Hays   
once clearance from cardiology so   
we will help facilitate a referral  
  
  
Relevant Orders  
Referral to Cardiology  
Weight loss, unintentional R63.4  
-Weight has stabilized and she   
will focus on keeping her caloric   
intake at a satisfactory level  
-CT scan of the abdomen did not   
reveal any contributing factors   
although we are ordering an   
ultrasound of the liver as a   
follow-up to her liver lesions and   
I will call them with the results  
  
  
Liver lesion - Primary K76.9  
Relevant Orders  
US abdomen limited liver  
Hypokalemia E87.6  
-She will increase her potassium   
intake from 10 mill equivalents   
once daily to twice daily  
-She will have a repeat potassium   
done just prior to her next   
follow-up visit in September  
  
  
  
  
  
Sharifa Almaguer DO  
Electronically signed by Sharifa Almaguer DO at 2024 11:49 AM   
EDT  
documented in this encounter            [facility]  
Work Phone:   
[phone]  
   
                                        2024 Instructions   
  
  
Sharifa Almaguer DO - 2024   
11:00 AM EDTFormatting of this   
note might be different from the   
original.  
As we discussed we are having her   
increase her potassium intake from   
1 tablet daily up to twice daily   
and when you come back in   
September please remember to get   
fasting lab work done just prior   
to that visit  
The staff will be calling you to   
get an appointment with cardiology   
for your cardiac clearance  
The staff will be helping to   
schedule an ultrasound of the   
liver and when the results come   
back I will call you  
We will see you back in September   
as planned  
Electronically signed by Sharifa Almaguer DO at 2024 11:47 AM   
EDT  
  
documented in this encounter            [facility]  
Work Phone:   
[phone]  
   
                                        2024 Instructions   
  
  
Ronel Hays MD - 2024   
2:36 PM EDTFormatting of this note   
is different from the original.  
Current Ophthalmic Meds  
  
  
  
fluorometholone (FML LIQUID FILM)   
0.1 % ophthalmic suspension Use 1   
Drop in both eyes three times a   
day.  
  
Continue:  
Systane Complete solution instill   
1 drop 3 times daily Both Eyes.  
  
Plan Cataract Surgery with   
Monofocal Intraocular Lens Implant   
Left Eye on 2024 at   
[facility].  
  
If you have any questions please   
contact our office at   
[phone].  
After office hours or on the   
weekend, please call Dr. Hays on   
his cell phone at [phone].  
  
Electronically signed by Ronel Hays MD at 2024 2:57   
PM EDT  
  
documented in this encounter            [facility]  
   
                                        2024 Note     HNO ID: [id number]  
Author: RONEL HAYS MD  
Service: ?  
Author Type: Physician  
Type: Progress Notes  
Filed: 2024 15:28  
Note Text:  
ASSESSMENT/PLAN:  
1. 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   
cataract of left eye - ICD9:   
366.19, ICD10:  
H25.812 (primary diagnosis)  
- IOL BIOMETRY W/ IOL CALC OU   
(BOTH EYES)  
- OCT MACULA CIRRUS OU (BOTH EYES)  
Cataract Presurgical Documentation  
Cataract: Left Eye  
Current Visual Acuity  
Right Eye Distance CC 20/40  
Left Eye Distance CC 20/70  
Glare Testing:  
Right Eye Medium 20/80  
Visual Function: Marzena Mederos   
states that the decline in vision   
from the  
cataract impedes her abilities as   
listed in the HPI, as well as   
other activities  
of daily living.  
Marzena Mederos has confirmed that   
she is no longer able to function   
adequately  
on a day-to-day basis because of   
her current visual condition.  
Further, it is my medical opinion   
that the cataract is the primary   
cause, or at  
least a significantly contributory   
cause of her visual dysfunction.   
With  
uncomplicated cataract surgery and   
lens implantation, it is my   
expectation that  
her visual function and quality of   
life will improve, significantly.  
The risks, benefits, alternatives,   
personnel and complications of   
cataract  
surgery with lens implantation   
were discussed with Marzena Mederos in detail.  
She appeared to understand and   
asked that I proceed with plans   
for surgery.  
Upon eye examination, patient was   
found to have a visually   
significant cataract  
Left Eye. Discussed cataract   
surgery with patient and different   
intraocular  
lens implant options with patient:   
basic monofocal intraocular lens   
implant,  
Toric intraocular lens implant,   
and presbyopia correction   
intraocular lens  
implant. In my medical opinion,   
based on medical history and   
ocular  
examination, cataract surgery with   
intraocular lens implant will   
correct  
patient's vision and improve   
quality of patient's daily living   
activities.  
Patient wishes to have traditional   
cataract surgery with basic   
intraocular lens  
Left Eye. Patient wishes to have   
cataract surgery with the option   
stated above.  
Patient understands that an   
intraocular lens implant does not   
necessarily  
replace the need for glasses.   
Patient understands that it is   
impossible for the  
surgeon to inform him/her of every   
possible complication that may   
occur. The  
surgeon has answered all of the   
patient's questions. Patient   
understands that  
if he/she has a mature or dense   
cataract, pseudoexfoliation   
cataract, or history  
of use of Flomax, he/she may   
require the use of [place] Ring   
and/or Vision  
Blue during surgery. Patient   
understands the risks, benefits,   
and alternatives  
to surgery.  
Current Ophthalmic Meds  
fluorometholone (FML LIQUID FILM)   
0.1 % ophthalmic suspension Use 1   
Drop in  
both eyes three times a day.  
Continue:  
Systane Complete solution instill   
1 drop 3 times daily Both Eyes.  
Plan Cataract Surgery with   
Monofocal Intraocular Lens Implant   
Left Eye on  
2024 at [facility].  
PHYSICAL EXAM:  
Vital Signs:  
Blood pressure 125/80, pulse 88.  
Respiratory:  
Normal breath sounds, no wheezing.  
CARD:  
Normal heart sounds 1 AND 2,   
normal sinus rhythm.  
Patient will need physical, EKG,   
and BMP before proceeding with   
surgery.  
2. 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   
cataract of right eye - ICD9:   
366.19, ICD10:  
H25.811  
- IOL BIOMETRY W/ IOL CALC OU   
(BOTH EYES)  
- OCT MACULA CIRRUS OU (BOTH EYES)  
Plan Cataract Surgery with   
Monofocal Intraocular Lens Implant   
Right Eye after  
Left Eye is stable.  
Patient will need EKG before   
proceeding with surgery.  
3. Type 2 diabetes mellitus   
without retinopathy (HCC) - ICD9:   
250.00, ICD10:  
E11.9  
Please keep your blood sugar under   
good control to minimize risk of   
ocular  
complications from diabetes.  
Continue to monitor with primary   
care physician.  
4. Essential hypertension - ICD9:   
401.9, ICD10: I10  
Continue to monitor with primary   
care physician.  
5. Hypercholesteremia - ICD9:   
272.0, ICD10: E78.00  
Continue to monitor with primary   
care physician.  
6. Dementia, unspecified dementia   
severity, unspecified dementia   
type,  
unspecified whether behavioral,   
psychotic, or mood disturbance or   
anxiety (HCC)  
- ICD9: 294.20, ICD10: F03.90  
Continue to monitor with primary   
care physician.  
I have confirmed and edited as   
necessary the relevant HPI,   
ophthalmic history,  
ROS, and the neuro exam findings   
as obtained by others. I have seen   
and  
examined Marzena Mederos. I have   
discussed the case and the   
management of this  
patient's care with the   
Resident/Fellow, if applicable. I   
also have reviewed  
and agree with the assessment and   
plan as stated above and agree   
with all of its  
relevant components.                    [facility]  
   
                                                    2024 History of   
Present illness Narrative               Formatting of this note is   
different from the original.  
ASSESSMENT/PLAN:  
1. 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   
cataract of left eye - ICD9:   
366.19, ICD10: H25.812 (primary   
diagnosis)  
  
- IOL BIOMETRY W/ IOL CALC OU   
(BOTH EYES)  
- OCT MACULA CIRRUS OU (BOTH EYES)  
  
Cataract Presurgical Documentation  
  
Cataract: Left Eye  
  
Current Visual Acuity  
Right Eye Distance CC 20/40  
Left Eye Distance CC 20/70  
  
Glare Testing:  
Right Eye Medium 20/80  
  
Visual Function: Marzena Mederos   
states that the decline in vision   
from the cataract impedes her   
abilities as listed in the HPI, as   
well as other activities of daily   
living.  
  
Marzena Mederos has confirmed that   
she is no longer able to function   
adequately on a day-to-day basis   
because of her current visual   
condition.  
  
Further, it is my medical opinion   
that the cataract is the primary   
cause, or at least a significantly   
contributory cause of her visual   
dysfunction. With uncomplicated   
cataract surgery and lens   
implantation, it is my expectation   
that her visual function and   
quality of life will improve,   
significantly.  
  
The risks, benefits, alternatives,   
personnel and complications of   
cataract surgery with lens   
implantation were discussed with   
Marzena Mederos in detail. She   
appeared to understand and asked   
that I proceed with plans for   
surgery.  
  
Upon eye examination, patient was   
found to have a visually   
significant cataract Left Eye.   
Discussed cataract surgery with   
patient and different intraocular   
lens implant options with patient:   
basic monofocal intraocular lens   
implant, Toric intraocular lens   
implant, and presbyopia correction   
intraocular lens implant. In my   
medical opinion, based on medical   
history and ocular examination,   
cataract surgery with intraocular   
lens implant will correct   
patient's vision and improve   
quality of patient's daily living   
activities. Patient wishes to have   
traditional cataract surgery with   
basic intraocular lens Left Eye.   
Patient wishes to have cataract   
surgery with the option stated   
above. Patient understands that an   
intraocular lens implant does not   
necessarily replace the need for   
glasses. Patient understands that   
it is impossible for the surgeon   
to inform him/her of every   
possible complication that may   
occur. The surgeon has answered   
all of the patient's questions.   
Patient understands that if he/she   
has a mature or dense cataract,   
pseudoexfoliation cataract, or   
history of use of Flomax, he/she   
may require the use of [place]ugin   
Ring and/or Vision Blue during   
surgery. Patient understands the   
risks, benefits, and alternatives   
to surgery.  
  
Current Ophthalmic Meds  
  
  
  
fluorometholone (FML LIQUID FILM)   
0.1 % ophthalmic suspension Use 1   
Drop in both eyes three times a   
day.  
  
Continue:  
Systane Complete solution instill   
1 drop 3 times daily Both Eyes.  
  
Plan Cataract Surgery with   
Monofocal Intraocular Lens Implant   
Left Eye on 2024 at   
[facility].  
  
PHYSICAL EXAM:  
  
Vital Signs:  
Blood pressure 125/80, pulse 88.  
  
Respiratory:  
Normal breath sounds, no wheezing.  
  
CARD:  
Normal heart sounds 1 & 2, normal   
sinus rhythm.  
  
Patient will need physical, EKG,   
and BMP before proceeding with   
surgery.  
  
2. 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   
cataract of right eye - ICD9:   
366.19, ICD10: H25.811  
  
- IOL BIOMETRY W/ IOL CALC OU   
(BOTH EYES)  
- OCT MACULA CIRRUS OU (BOTH EYES)  
  
Plan Cataract Surgery with   
Monofocal Intraocular Lens Implant   
Right Eye after Left Eye is   
stable.  
  
Patient will need EKG before   
proceeding with surgery.  
  
3. Type 2 diabetes mellitus   
without retinopathy (HCC) - ICD9:   
250.00, ICD10: E11.9  
  
Please keep your blood sugar under   
good control to minimize risk of   
ocular complications from   
diabetes.  
  
Continue to monitor with primary   
care physician.  
  
4. Essential hypertension - ICD9:   
401.9, ICD10: I10  
  
Continue to monitor with primary   
care physician.  
  
5. Hypercholesteremia - ICD9:   
272.0, ICD10: E78.00  
  
Continue to monitor with primary   
care physician.  
  
6. Dementia, unspecified dementia   
severity, unspecified dementia   
type, unspecified whether   
behavioral, psychotic, or mood   
disturbance or anxiety (HCC) -   
ICD9: 294.20, ICD10: F03.90  
  
Continue to monitor with primary   
care physician.  
  
I have confirmed and edited as   
necessary the relevant HPI,   
ophthalmic history, ROS, and the   
neuro exam findings as obtained by   
others. I have seen and examined   
Marzena Mederos. I have discussed   
the case and the management of   
this patient's care with the   
Resident/Fellow, if applicable. I   
also have reviewed and agree with   
the assessment and plan as stated   
above and agree with all of its   
relevant components.  
  
  
  
Electronically signed by Ronel Hays MD at 2024 3:28   
PM EDT  
documented in this encounter            [facility]  
   
                                        2024 Note     Date of Procedure  
2024.  
  
Technician Information  
Imaging Technician: JAMES.  
  
Interpretation  
Right Eye  
Normal without fluid.  
  
Left Eye  
Normal without fluid.                   [facility]  
   
                                        2024 Note     Date of Procedure  
2024.  
  
Technician Information  
Imaging Technician: JAMES.  
  
Notes  
Measurements only - see Procedure   
Record under Scanned Documents for  
signed results.                         [facility]  
   
                                        2024 Instructions   
  
  
Amaya Reis II, OD -   
2024 3:45 PM EDTFormatting   
of this note might be different   
from the original.  
Assessment and Plan  
  
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   
cataract of both eyes (primary   
encounter diagnosis)  
Comment: Cataracts interfering   
with activities of daily living.   
Medically necessary surgery   
recommended to improve functional   
vision. Intraocular lens options   
discussed. Risks involved with   
cataract surgery discussed.   
Appointment made for consult.  
  
E11.9 Type 2 diabetes mellitus   
without retinopathy (HCC)  
Comment: Examination shows no   
ocular diabetic complications   
today. Discussed need for optimal   
diabetes control to minimize   
chance of ocular complications.   
Advise patient to immediately   
report worsening in status or   
additional symptoms. Continue   
yearly dilated eye examinations.  
  
H52.03 Hyperopia of both eyes  
H52.223 Regular astigmatism of   
both eyes  
H52.4 Presbyopia  
Comment: Myopic shift in glasses   
power but Visual acuity's remain   
reduced due to cataract.  
  
I have confirmed and edited as   
necessary the relevant HPI,   
ophthalmic history, ROS, and the   
neuro exam findings as obtained by   
others. I have seen and examined   
Marzena Mederos.  
I have discussed the case and the   
management of this patient's care   
with the Resident/Fellow, if   
applicable. I also have reviewed   
and agree with the assessment and   
plan as stated above and agree   
with all of its relevant   
components.  
  
  
  
  
  
Electronically signed by   
Amaya Reis II, OD at   
2024 3:45 PM EDT  
  
documented in this encounter            [facility]  
   
                                        2024 Note     HNO ID: [id number]  
Author: AMAYA REIS II, OD  
Service: ?  
Author Type: OPTOMETRIST  
Type: Progress Notes  
Filed: 2024 15:46  
Note Text:  
Assessment and Plan  
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   
cataract of both eyes (primary   
encounter  
diagnosis)  
Comment: Cataracts interfering   
with activities of daily living.   
Medically  
necessary surgery recommended to   
improve functional vision.   
Intraocular lens  
options discussed. Risks involved   
with cataract surgery discussed.   
Appointment  
made for consult.  
E11.9 Type 2 diabetes mellitus   
without retinopathy (HCC)  
Comment: Examination shows no   
ocular diabetic complications   
today. Discussed  
need for optimal diabetes control   
to minimize chance of ocular   
complications.  
Advise patient to immediately   
report worsening in status or   
additional symptoms.  
Continue yearly dilated eye   
examinations.  
H52.03 Hyperopia of both eyes  
H52.223 Regular astigmatism of   
both eyes  
H52.4 Presbyopia  
Comment: Myopic shift in glasses   
power but Visual acuity's remain   
reduced due to  
cataract.  
I have confirmed and edited as   
necessary the relevant HPI,   
ophthalmic history,  
ROS, and the neuro exam findings   
as obtained by others. I have seen   
and  
examined Marzena Mederos.  
I have discussed the case and the   
management of this patient's care   
with the  
Resident/Fellow, if applicable. I   
also have reviewed and agree with   
the  
assessment and plan as stated   
above and agree with all of its   
relevant  
components.                             [facility]  
   
                                                    2024 History of   
Present illness Narrative               Formatting of this note might be   
different from the original.  
Assessment and Plan  
  
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   
cataract of both eyes (primary   
encounter diagnosis)  
Comment: Cataracts interfering   
with activities of daily living.   
Medically necessary surgery   
recommended to improve functional   
vision. Intraocular lens options   
discussed. Risks involved with   
cataract surgery discussed.   
Appointment made for consult.  
  
E11.9 Type 2 diabetes mellitus   
without retinopathy (HCC)  
Comment: Examination shows no   
ocular diabetic complications   
today. Discussed need for optimal   
diabetes control to minimize   
chance of ocular complications.   
Advise patient to immediately   
report worsening in status or   
additional symptoms. Continue   
yearly dilated eye examinations.  
  
H52.03 Hyperopia of both eyes  
H52.223 Regular astigmatism of   
both eyes  
H52.4 Presbyopia  
Comment: Myopic shift in glasses   
power but Visual acuity's remain   
reduced due to cataract.  
  
I have confirmed and edited as   
necessary the relevant HPI,   
ophthalmic history, ROS, and the   
neuro exam findings as obtained by   
others. I have seen and examined   
Marzena Mederos.  
I have discussed the case and the   
management of this patient's care   
with the Resident/Fellow, if   
applicable. I also have reviewed   
and agree with the assessment and   
plan as stated above and agree   
with all of its relevant   
components.  
  
  
  
Electronically signed by   
Amaya Reis II OD at   
2024 3:46 PM EDT  
documented in this encounter            [facility]  
   
                                        2024 Note     Date of Procedure  
2024.  
  
Technician Information  
Imaging Technician: [email or web address]. Start   
time: 3:28 PM.  
  
Interpretation  
Right Eye  
Normal foveal contour.  
  
Left Eye  
Normal foveal contour.  
  
Interval Change  
Right Eye  
Initial.  
  
Left Eye  
Initial.  
  
Notes  
Blur most likely due to cataract   
progression                             [facility]  
   
                                                    2024 Evaluation +   
Plan note                               Associated Problem(s): Hiccup  
Formatting of this note might be   
different from the original.  
-I am increasing the omeprazole to   
treat what I think is acid reflux   
causing her hiccups  
-Her  will let me know if   
the hiccups do not resolve  
Electronically signed by Sharifa Almaguer DO at 2024 11:37 AM   
[facility]  
Work Phone:   
[phone]  
   
                                                    2024 Evaluation +   
Plan note                               Associated Problem(s): Controlled   
type 2 diabetes mellitus without   
complication, without long-term   
current use of insulin (Multi)  
Formatting of this note might be   
different from the original.  
-Her most recent hemoglobin A1c   
was a little generous at 8.2  
-We will continue with her current   
medical regimen and I invited her   
to return in approximately 3   
months for reevaluation with   
hemoglobin A1c  
Electronically signed by Sharifa Almaguer DO at 2024 11:37 AM   
[facility]  
Work Phone:   
[phone]  
   
                                                    2024 Evaluation +   
Plan note                               Associated Problem(s):   
Gastroesophageal reflux disease   
without esophagitis  
Formatting of this note might be   
different from the original.  
-We are increasing her omeprazole   
from 20 mg daily up to 40 mg daily  
-They will let me know if nausea   
and vomiting becomes a recurrent   
issue  
Electronically signed by Sharifa Almaguer DO at 2024 11:37 AM   
[facility]  
Work Phone:   
[phone]  
   
                                                    2024 Evaluation +   
Plan note                               Associated Problem(s): Mild   
dementia without behavioral   
disturbance, psychotic   
disturbance, mood disturbance, or   
anxiety, unspecified dementia type   
(Multi)  
Formatting of this note might be   
different from the original.  
-She continues to have a gradual   
decline and now requires retirement   
care  
-I have filled out paperwork so   
that she is permitted to go to the   
[facility] for   
 especially when her   
 has to be away  
-He has also made arrangements   
with a young lady who comes to sit   
with her during the summer months  
-We will continue to monitor and   
provide supportive care  
-We did go over the DNR CC arrest   
form which was explained and he   
has chosen that category  
Electronically signed by Sharifa Almaguer DO at 2024 11:37 AM   
EDT  
                                        [facility]  
Work Phone:   
[phone]  
   
                                                    2024 Miscellaneous   
Notes                                   Associated Problem(s): Hiccup  
Formatting of this note might be   
different from the original.  
-I am increasing the omeprazole to   
treat what I think is acid reflux   
causing her hiccups  
-Her  will let me know if   
the hiccups do not resolve  
Electronically signed by Sharifa Almaguer DO at 2024 11:37 AM   
EDT  
Associated Problem(s): Controlled   
type 2 diabetes mellitus without   
complication, without long-term   
current use of insulin (Multi)  
Formatting of this note might be   
different from the original.  
-Her most recent hemoglobin A1c   
was a little generous at 8.2  
-We will continue with her current   
medical regimen and I invited her   
to return in approximately 3   
months for reevaluation with   
hemoglobin A1c  
Electronically signed by Sharifa Almaguer DO at 2024 11:37 AM   
EDT  
Associated Problem(s):   
Gastroesophageal reflux disease   
without esophagitis  
Formatting of this note might be   
different from the original.  
-We are increasing her omeprazole   
from 20 mg daily up to 40 mg daily  
-They will let me know if nausea   
and vomiting becomes a recurrent   
issue  
Electronically signed by Sharifa Almaguer DO at 2024 11:37 AM   
EDT  
Associated Problem(s): Mild   
dementia without behavioral   
disturbance, psychotic   
disturbance, mood disturbance, or   
anxiety, unspecified dementia type   
(Multi)  
Formatting of this note might be   
different from the original.  
-She continues to have a gradual   
decline and now requires retirement   
care  
-I have filled out paperwork so   
that she is permitted to go to the   
[facility] care [place] for   
 especially when her   
 has to be away  
-He has also made arrangements   
with a young lady who comes to sit   
with her during the summer months  
-We will continue to monitor and   
provide supportive care  
-We did go over the DNR CC arrest   
form which was explained and he   
has chosen that category  
Electronically signed by Sharifa Almaguer DO at 2024 11:37 AM   
EDT  
documented in this encounter            [facility]  
Work Phone:   
[phone]  
   
                                                    2024 History of   
Present illness Narrative               Formatting of this note is   
different from the original.  
Subjective  
Patient ID: Marzena Mederos is a   
78 y.o. female who presents for   
Follow-up (3 week FUV).  
HPI  
She is here today for evaluation   
and accompanied by her .   
She is smiling and in good spirits   
and her  provides much of   
the history. He explains that   
recently while he was away running   
an errand she ended up wandering   
outside and was walking through   
the neighborhood. A neighbor   
spotted her and asked her what she   
was doing. She indicated that she   
was simply taking a walk but she   
did not know where she was going   
and they found the situation a   
little bit dangerous.   
Unfortunately her memory is   
declining and her  has   
taken action to provide somebody   
to watch her while he is away.   
Actually there is a teenager in   
the neighborhood that she used to   
watch when they were an infant.   
The teenager comes and stays with   
her while he is away. She will be   
available until school starts. He   
also found the [facility] in   
assisted care in [facility] that   
provides . We did an   
assessment today and filled out   
all the paperwork.  
Also unfortunately very recently   
while she was at [employer] she   
suddenly became nauseated and   
actually had vomiting and passed   
out. Her  indicates that he   
thinks her sugar dropped because   
she did not eat that morning.   
After eating breakfast she was   
better. She was having some   
repeated cases of nausea and   
vomiting and she was taken to the   
emergency room on May 24. There   
they did an exhaustive workup and   
did not find anything of a serious   
nature. Since that time she has   
had no reported nausea or vomiting   
and she has not passed out. He   
states he makes to the point for   
her to drink an Ensure before she   
goes to [employer]. He has noticed   
some hiccuping. He states has been   
going on for a while. I decided to   
increase the dose of her   
omeprazole and he will let me know   
if the hiccups do not resolve. She   
had a recent chest x-ray which was   
clear. Again she is very pleasant   
and smiling and cooperative with   
today's exam.  
Review of Systems  
Constitutional: Negative for   
fatigue.  
Hiccup  
Respiratory: Negative for cough,   
shortness of breath and wheezing.  
Cardiovascular: Negative for chest   
pain, palpitations and leg   
swelling.  
Gastrointestinal: Negative for   
abdominal pain, blood in stool,   
diarrhea, nausea and vomiting.  
Musculoskeletal: Negative for   
arthralgias and back pain.  
  
Objective  
Physical Exam  
Vitals and nursing note reviewed.  
Constitutional:  
General: She is not in acute   
distress.  
Appearance: Normal appearance.  
HENT:  
Head: Normocephalic and   
atraumatic.  
Eyes:  
Conjunctiva/sclera: Conjunctivae   
normal.  
Cardiovascular:  
Rate and Rhythm: Normal rate and   
regular rhythm.  
Heart sounds: Normal heart sounds.  
Pulmonary:  
Effort: No respiratory distress.  
Breath sounds: No wheezing.  
Abdominal:  
Palpations: Abdomen is soft.  
Tenderness: There is no abdominal   
tenderness. There is no guarding.  
Musculoskeletal:  
General: No swelling. Normal range   
of motion.  
Skin:  
General: Skin is warm and dry.  
Neurological:  
General: No focal deficit present.  
Mental Status: She is alert and   
oriented to person, place, and   
time.  
Psychiatric:  
Behavior: Behavior normal.  
  
Recent Results (from the past 672   
hour(s))  
Basic Metabolic Panel  
Collection Time: 24 11:56 AM  
Result Value Ref Range  
Glucose 226 (H) 74 - 99 mg/dL  
Sodium 138 136 - 145 mmol/L  
Potassium 3.7 3.5 - 5.3 mmol/L  
Chloride 99 98 - 107 mmol/L  
Bicarbonate 31 21 - 32 mmol/L  
Anion Gap 12 10 - 20 mmol/L  
Urea Nitrogen 16 6 - 23 mg/dL  
Creatinine 0.78 0.50 - 1.05 mg/dL  
eGFR 78 >60 mL/min/1.73m*2  
Calcium 9.5 8.6 - 10.3 mg/dL  
Hemoglobin A1C  
Collection Time: 24 11:56 AM  
Result Value Ref Range  
Hemoglobin A1C 8.2 (H) see below %  
Estimated Average Glucose 189 Not   
Established mg/dL  
TSH  
Collection Time: 24 11:56 AM  
Result Value Ref Range  
Thyroid Stimulating Hormone 1.32   
0.44 - 3.98 mIU/L  
Vitamin B12  
Collection Time: 24 11:56 AM  
Result Value Ref Range  
Vitamin B12 392 211 - 911 pg/mL  
Urinalysis with Reflex Microscopic  
Collection Time: 24 12:57 PM  
Result Value Ref Range  
Color, Urine Yellow Straw, Yellow  
Appearance, Urine Hazy (N) Clear  
Specific Gravity, Urine 1.030   
1.005 - 1.035  
pH, Urine 6.0 5.0, 5.5, 6.0, 6.5,   
7.0, 7.5, 8.0  
Protein, Urine 100 (2+) (A)   
NEGATIVE, TRACE mg/dL  
Glucose, Urine 500 (3+) (A)   
NEGATIVE mg/dL  
Blood, Urine NEGATIVE NEGATIVE  
Ketones, Urine 15 (1+) (A)   
NEGATIVE mg/dL  
Bilirubin, Urine NEGATIVE NEGATIVE  
Urobilinogen, Urine 0.2 0.2, 1.0   
mg/dL  
Nitrite, Urine NEGATIVE NEGATIVE  
Leukocyte Esterase, Urine SMALL   
(1+) (A) NEGATIVE  
Microscopic Only, Urine  
Collection Time: 24 12:57 PM  
Result Value Ref Range  
WBC, Urine 1-5 1-5, NONE /HPF  
RBC, Urine 1-2 NONE, 1-2, 3-5 /HPF  
Squamous Epithelial Cells, Urine   
1-9 (SPARSE) Reference range not   
established. /HPF  
Bacteria, Urine 1+ (A) NONE SEEN   
/HPF  
CBC and Auto Differential  
Collection Time: 24 1:11 PM  
Result Value Ref Range  
WBC 12.3 (H) 4.4 - 11.3 x10*3/uL  
nRBC 0.0 0.0 - 0.0 /100 WBCs  
RBC 4.58 4.00 - 5.20 x10*6/uL  
Hemoglobin 14.3 12.0 - 16.0 g/dL  
Hematocrit 42.4 36.0 - 46.0 %  
MCV 93 80 - 100 fL  
MCH 31.2 26.0 - 34.0 pg  
MCHC 33.7 32.0 - 36.0 g/dL  
RDW 12.2 11.5 - 14.5 %  
Platelets 299 150 - 450 x10*3/uL  
Neutrophils % 73.5 40.0 - 80.0 %  
Immature Granulocytes %, Automated   
0.2 0.0 - 0.9 %  
Lymphocytes % 20.2 13.0 - 44.0 %  
Monocytes % 5.2 2.0 - 10.0 %  
Eosinophils % 0.7 0.0 - 6.0 %  
Basophils % 0.2 0.0 - 2.0 %  
Neutrophils Absolute 8.99 (H) 1.60   
- 5.50 x10*3/uL  
Immature Granulocytes Absolute,   
Automated 0.03 0.00 - 0.50   
x10*3/uL  
Lymphocytes Absolute 2.48 0.80 -   
3.00 x10*3/uL  
Monocytes Absolute 0.64 0.05 -   
0.80 x10*3/uL  
Eosinophils Absolute 0.09 0.00 -   
0.40 x10*3/uL  
Basophils Absolute 0.03 0.00 -   
0.10 x10*3/uL  
Troponin I, High Sensitivity  
Collection Time: 24 1:11 PM  
Result Value Ref Range  
Troponin I, High Sensitivity 7 0 -   
13 ng/L  
Magnesium  
Collection Time: 24 1:11 PM  
Result Value Ref Range  
Magnesium 1.74 1.60 - 2.40 mg/dL  
Comprehensive Metabolic Panel  
Collection Time: 24 1:11 PM  
Result Value Ref Range  
Glucose 288 (H) 74 - 99 mg/dL  
Sodium 138 136 - 145 mmol/L  
Potassium 3.9 3.5 - 5.3 mmol/L  
Chloride 100 98 - 107 mmol/L  
Bicarbonate 31 21 - 32 mmol/L  
Anion Gap 11 10 - 20 mmol/L  
Urea Nitrogen 23 6 - 23 mg/dL  
Creatinine 0.84 0.50 - 1.05 mg/dL  
eGFR 72 >60 mL/min/1.73m*2  
Calcium 9.1 8.6 - 10.3 mg/dL  
Albumin 4.0 3.4 - 5.0 g/dL  
Alkaline Phosphatase 65 33 - 136   
U/L  
Total Protein 6.7 6.4 - 8.2 g/dL  
AST 23 9 - 39 U/L  
Bilirubin, Total 0.7 0.0 - 1.2   
mg/dL  
ALT 14 7 - 45 U/L  
ECG 12 Lead  
Collection Time: 24 2:40 PM  
Result Value Ref Range  
Ventricular Rate 80 BPM  
Atrial Rate 80 BPM  
NV Interval 198 ms  
QRS Duration 90 ms  
QT Interval 380 ms  
QTC Calculation(Bazett) 438 ms  
P Axis 66 degrees  
R Axis 49 degrees  
T Axis 8 degrees  
QRS Count 13 beats  
Q Onset 229 ms  
P Onset 130 ms  
P Offset 197 ms  
T Offset 419 ms  
QTC Fredericia 418 ms  
  
Assessment/Plan  
Problem List Items Addressed This   
Visit  
  
ICD-10-CM  
Controlled type 2 diabetes   
mellitus without complication,   
without long-term current use of   
insulin (Multi) - Primary E11.9  
-Her most recent hemoglobin A1c   
was a little generous at 8.2  
-We will continue with her current   
medical regimen and I invited her   
to return in approximately 3   
months for reevaluation with   
hemoglobin A1c  
  
  
Relevant Orders  
Hemoglobin A1C  
Basic Metabolic Panel  
Mild dementia without behavioral   
disturbance, psychotic   
disturbance, mood disturbance, or   
anxiety, unspecified dementia type   
(Multi) F03.A0  
-She continues to have a gradual   
decline and now requires retirement   
care  
-I have filled out paperwork so   
that she is permitted to go to the   
[facility] for   
 especially when her   
 has to be away  
-He has also made arrangements   
with a young lady who comes to sit   
with her during the summer months  
-We will continue to monitor and   
provide supportive care  
-We did go over the DNR CC arrest   
form which was explained and he   
has chosen that category  
  
  
Hiccup R06.6  
-I am increasing the omeprazole to   
treat what I think is acid reflux   
causing her hiccups  
-Her  will let me know if   
the hiccups do not resolve  
  
  
Gastroesophageal reflux disease   
without esophagitis K21.9  
-We are increasing her omeprazole   
from 20 mg daily up to 40 mg daily  
-They will let me know if nausea   
and vomiting becomes a recurrent   
issue  
  
  
Relevant Medications  
omeprazole (PriLOSEC) 40 mg DR zev Almaguer DO  
Electronically signed by Sharifa Almaguer DO at 2024 11:39 AM   
EDT  
documented in this encounter            [facility]  
Work Phone:   
[phone]  
   
                                        2024 Instructions   
  
  
Sharifa Almaguer DO - 2024   
11:00 AM EDTFormatting of this   
note might be different from the   
original.  
As you are well aware we filled   
out the form for the [facility] but I am also   
sending a copy of today's progress   
note so that they have a complete   
history with complete information   
about your health  
Please let us know if there are   
any issues  
I am also increasing the   
omeprazole from 20 mg daily up to   
40 mg daily to see if this will   
eradicate the hiccups. Please let   
me know in a couple of weeks how   
things are going  
I did not feel we needed to do   
additional evaluation for the   
nausea or vomiting or even when   
she passed out but please be sure   
she eats regularly and if she ever   
passes out again she will clearly   
need further evaluation  
I would like to see you back in   
approximately 3 months and please   
remember to get lab work done   
prior to that visit  
Electronically signed by Sharifa Almaguer DO at 2024 11:39 AM   
EDT  
  
documented in this encounter            [facility]  
Work Phone:   
[phone]  
   
                                                    2024 Evaluation +   
Plan note                               Associated Problem(s): Memory loss  
Formatting of this note might be   
different from the original.  
-We will be checking a vitamin B12   
level and TSH  
-We did perform a Folstein   
Mini-Mental status examination and   
she did score 25 out of 30  
-Her family has noticed some   
issues with memory loss  
Electronically signed by Sharifa Almaguer DO at 2024 11:35 AM   
EDT  
                                        [facility]  
Work Phone:   
[phone]  
   
                                                    2024 Miscellaneous   
Notes                                   Associated Problem(s): Memory loss  
Formatting of this note might be   
different from the original.  
-We will be checking a vitamin B12   
level and TSH  
-We did perform a Folstein   
Mini-Mental status examination and   
she did score 25 out of 30  
-Her family has noticed some   
issues with memory loss  
Electronically signed by Sharifa Almaguer DO at 2024 11:35 AM   
EDT  
Associated Problem(s):   
Infiltrating ductal carcinoma of   
right female breast (Multi)  
Formatting of this note might be   
different from the original.  
-She has been in remission for   
very long time and has no signs of   
recurrence at this time. We will   
continue to monitor  
Electronically signed by Sharifa Almaguer DO at 2024 11:34 AM   
EDT  
Associated Problem(s): Controlled   
type 2 diabetes mellitus without   
complication, without long-term   
current use of insulin (Multi)  
Formatting of this note might be   
different from the original.  
-We will have her get a hemoglobin   
A1c soon and she will be returning   
to go over the results  
-Continue with her current glucose   
lowering medication and we will   
continue to monitor closely  
Electronically signed by Sharifa Almaguer DO at 2024 11:34 AM   
EDT  
Associated Problem(s): HTN   
(hypertension)  
Formatting of this note might be   
different from the original.  
-Currently well told so we will   
continue with her current   
antihypertensive regimen  
Electronically signed by Sharifa Almaguer DO at 2024 11:33 AM   
EDT  
documented in this encounter            [facility]  
Work Phone:   
[phone]  
   
                                                    2024 Evaluation +   
Plan note                               Associated Problem(s):   
Infiltrating ductal carcinoma of   
right female breast (Multi)  
Formatting of this note might be   
different from the original.  
-She has been in remission for   
very long time and has no signs of   
recurrence at this time. We will   
continue to monitor  
Electronically signed by Sharifa Almaguer DO at 2024 11:34 AM   
[facility]  
Work Phone:   
[phone]  
   
                                                    2024 Evaluation +   
Plan note                               Associated Problem(s): Controlled   
type 2 diabetes mellitus without   
complication, without long-term   
current use of insulin (Multi)  
Formatting of this note might be   
different from the original.  
-We will have her get a hemoglobin   
A1c soon and she will be returning   
to go over the results  
-Continue with her current glucose   
lowering medication and we will   
continue to monitor closely  
Electronically signed by Sharifa Almaguer DO at 2024 11:34 AM   
[facility]  
Work Phone:   
[phone]  
   
                                                    2024 Evaluation +   
Plan note                               Associated Problem(s): HTN   
(hypertension)  
Formatting of this note might be   
different from the original.  
-Currently well told so we will   
continue with her current   
antihypertensive regimen  
Electronically signed by Sharifa Almaguer DO at 2024 11:33 AM   
[facility]  
Work Phone:   
[phone]  
   
                                                    2024 History of   
Present illness Narrative               Formatting of this note is   
different from the original.  
Subjective  
Patient ID: Marzena Mederos is a   
78 y.o. female who presents for   
Follow-up (6 MO FUV).  
HPI  
She is here today for evaluation   
as she was recently seen at   
[facility] on   
the third. Her  ended up   
taking her there because she   
noticed that she seemed to be   
unusually confused. He accompanies   
her today. He explains that they   
were returning from [place] and   
she does seem to be disoriented.   
When she went to the emergency   
department they did several tests   
including a CAT scan of the brain   
as well as lab work and urine   
test. It looks like she might have   
had a urinary tract infection so   
they prescribed Cipro which she   
did complete recently. Her urine   
culture came back showing no signs   
of a bacterial infection. They   
both state that she seems about   
the same. We talked about the   
memory and her  does   
explain that he has noticed some   
issues with memory but it just   
seemed to get worse suddenly. She   
states that she is not really   
concerned about her memory and she   
seems to be pretty happy and   
taking things in stride. I did   
form a Folstein Mini-Mental status   
examination and she did have some   
problems with short-term recall.   
She scored a 25 out of 30.  
Her  states that sometimes   
she has the same questions over   
and over again. I do suspect that   
there is some memory issue and I   
do want to check a vitamin B12 and   
a thyroid blood test.  
I will decided to start her on   
Aricept or donepezil 5 mg daily   
and we will see her back at about   
the third week of taking the   
medicine. We can then decide how   
she is doing and make adjustments   
from there.  
They understand that sometimes 1   
can have extensive   
neuropsychiatric testing but we   
are limited with what we can do   
here in [place]. I did explain   
that sometimes we do offer to see   
a neurologist for more extensive   
evaluation but at this time it   
appears that this is a clear case   
of some mild dementia.  
We are providing refills on all of   
her medications today and will   
also be checking a hemoglobin A1c.  
Objective  
Physical Exam  
Vitals and nursing note reviewed.  
Constitutional:  
General: She is not in acute   
distress.  
Appearance: Normal appearance.  
HENT:  
Head: Normocephalic and   
atraumatic.  
Eyes:  
Conjunctiva/sclera: Conjunctivae   
normal.  
Cardiovascular:  
Rate and Rhythm: Normal rate and   
regular rhythm.  
Heart sounds: Normal heart sounds.  
Pulmonary:  
Effort: No respiratory distress.  
Breath sounds: No wheezing.  
Abdominal:  
Palpations: Abdomen is soft.  
Tenderness: There is no abdominal   
tenderness. There is no guarding.  
Musculoskeletal:  
General: No swelling. Normal range   
of motion.  
Skin:  
General: Skin is warm and dry.  
Neurological:  
General: No focal deficit present.  
Mental Status: She is alert. She   
is disoriented.  
Psychiatric:  
Behavior: Behavior normal.  
  
Recent Results (from the past 672   
hour(s))  
ECG 12 lead  
Collection Time: 24 11:30 AM  
Result Value Ref Range  
Ventricular Rate 73 BPM  
Atrial Rate 73 BPM  
NV Interval 184 ms  
QRS Duration 92 ms  
QT Interval 392 ms  
QTC Calculation(Bazett) 431 ms  
P Axis 28 degrees  
R Axis 3 degrees  
T Axis 50 degrees  
QRS Count 12 beats  
Q Onset 214 ms  
P Onset 122 ms  
P Offset 181 ms  
T Offset 410 ms  
QTC Fredericia 418 ms  
Comprehensive metabolic panel  
Collection Time: 24 11:43 AM  
Result Value Ref Range  
Glucose 217 (H) 74 - 99 mg/dL  
Sodium 138 136 - 145 mmol/L  
Potassium 3.4 (L) 3.5 - 5.3 mmol/L  
Chloride 98 98 - 107 mmol/L  
Bicarbonate 32 21 - 32 mmol/L  
Anion Gap 11 10 - 20 mmol/L  
Urea Nitrogen 10 6 - 23 mg/dL  
Creatinine 0.60 0.50 - 1.05 mg/dL  
eGFR >90 >60 mL/min/1.73m*2  
Calcium 9.9 8.6 - 10.3 mg/dL  
Albumin 4.2 3.4 - 5.0 g/dL  
Alkaline Phosphatase 79 33 - 136   
U/L  
Total Protein 7.0 6.4 - 8.2 g/dL  
AST 13 9 - 39 U/L  
Bilirubin, Total 1.0 0.0 - 1.2   
mg/dL  
ALT 15 7 - 45 U/L  
Troponin I, High Sensitivity  
Collection Time: 24 11:43 AM  
Result Value Ref Range  
Troponin I, High Sensitivity 8 0 -   
13 ng/L  
CBC  
Collection Time: 24 11:43 AM  
Result Value Ref Range  
WBC 7.2 4.4 - 11.3 x10*3/uL  
nRBC 0.0 0.0 - 0.0 /100 WBCs  
RBC 4.63 4.00 - 5.20 x10*6/uL  
Hemoglobin 14.4 12.0 - 16.0 g/dL  
Hematocrit 43.0 36.0 - 46.0 %  
MCV 93 80 - 100 fL  
MCH 31.1 26.0 - 34.0 pg  
MCHC 33.5 32.0 - 36.0 g/dL  
RDW 12.4 11.5 - 14.5 %  
Platelets 310 150 - 450 x10*3/uL  
Urinalysis with Reflex Culture and   
Microscopic  
Collection Time: 24 11:44 AM  
Result Value Ref Range  
Color, Urine Yellow Straw, Yellow  
Appearance, Urine Hazy (N) Clear  
Specific Gravity, Urine 1.018   
1.005 - 1.035  
pH, Urine 6.0 5.0, 5.5, 6.0, 6.5,   
7.0, 7.5, 8.0  
Protein, Urine 30 (1+) (N)   
NEGATIVE mg/dL  
Glucose, Urine 50 (1+) (A)   
NEGATIVE mg/dL  
Blood, Urine NEGATIVE NEGATIVE  
Ketones, Urine NEGATIVE NEGATIVE   
mg/dL  
Bilirubin, Urine NEGATIVE NEGATIVE  
Urobilinogen, Urine <2.0 <2.0   
mg/dL  
Nitrite, Urine NEGATIVE NEGATIVE  
Leukocyte Esterase, Urine MODERATE   
(2+) (A) NEGATIVE  
Extra Urine Gray Tube  
Collection Time: 24 11:44 AM  
Result Value Ref Range  
Extra Tube Hold for add-ons.  
Microscopic Only, Urine  
Collection Time: 24 11:44 AM  
Result Value Ref Range  
WBC, Urine 6-10 (A) 1-5, NONE /HPF  
RBC, Urine NONE NONE, 1-2, 3-5   
/HPF  
Transitional Epithelial Cells,   
Urine 1-2 (FEW) Reference range   
not established. /HPF  
Mucus, Urine 3+ Reference range   
not established. /LPF  
Urine Culture  
Collection Time: 24 11:44 AM  
Specimen: Clean Catch/Voided;   
Urine  
Result Value Ref Range  
Urine Culture No significant   
growth  
  
Assessment/Plan  
Problem List Items Addressed This   
Visit  
  
ICD-10-CM  
HTN (hypertension) I10  
-Currently well told so we will   
continue with her current   
antihypertensive regimen  
  
  
Relevant Medications  
amLODIPine (Norvasc) 10 mg tablet  
losartan-hydrochlorothiazide   
(Hyzaar) 100-12.5 mg tablet  
potassium chloride CR 10 mEq ER   
tablet  
Infiltrating ductal carcinoma of   
right female breast (Multi)   
C50.911  
-She has been in remission for   
very long time and has no signs of   
recurrence at this time. We will   
continue to monitor  
  
  
Controlled type 2 diabetes   
mellitus without complication,   
without long-term current use of   
insulin (Multi) E11.9  
-We will have her get a hemoglobin   
A1c soon and she will be returning   
to go over the results  
-Continue with her current glucose   
lowering medication and we will   
continue to monitor closely  
  
  
Relevant Medications  
metFORMIN  mg 24 hr tablet  
Other Relevant Orders  
Hemoglobin A1C  
Mixed hyperlipidemia E78.2  
Relevant Medications  
simvastatin (Zocor) 20 mg tablet  
Memory loss - Primary R41.3  
-We will be checking a vitamin B12   
level and TSH  
-We did perform a Folstein   
Mini-Mental status examination and   
she did score 25 out of 30  
-Her family has noticed some   
issues with memory loss  
  
  
Relevant Medications  
donepezil (Aricept) 5 mg tablet  
Other Relevant Orders  
TSH  
Vitamin B12  
Mild dementia without behavioral   
disturbance, psychotic   
disturbance, mood disturbance, or   
anxiety, unspecified dementia type   
(Multi) F03.A0  
  
  
  
Sharifa Almaguer DO  
Electronically signed by Sharifa Almaguer DO at 2024 11:36 AM   
EDT  
documented in this encounter            [facility]  
Work Phone:   
[phone]  
   
                                        2024 Instructions   
  
  
Sharifa Almaguer DO - 2024   
11:00 AM EDTFormatting of this   
note might be different from the   
original.  
As we discussed I would like to   
get some additional lab work which   
would include checking the sugar,   
a thyroid blood test, and a   
vitamin B12 level. These labs do   
not require fasting so you can go   
at any time at your earliest   
convenience to get them done and   
we will go over the results when   
you come back  
We did do a memory test called the   
Folstein Mini-Mental status   
examination and you scored a 25   
out of 30 which means that it does   
look like you are struggling with   
your memory somewhat  
I sent a prescription to your   
pharmacy for donepezil which is   
also known as Aricept. You are   
taking a low-dose of just 5 mg   
daily and sometimes this drug can   
help with memory. I would like for   
you to take this every day unless   
you are having problems and I will   
see you back in about 3 weeks to   
see how you are doing  
Please bring all your pills and   
medications in a bag for   
medication reconciliation and yes   
you do need to go back on your   
potassium because your potassium   
was low in the emergency room. I   
sent a new prescription to your   
pharmacy  
Electronically signed by Sharifa Almaguer DO at 2024 11:36 AM   
EDT  
  
documented in this encounter            [facility]  
Work Phone:   
[phone]  
   
                                                    2024 Hospital   
Discharge instructions                    
  
  
Kelsi Murray MD - 2024   
12:56 PM EDTFormatting of this   
note might be different from the   
original.  
Start CIPRO FOR UTI  
DR ROYAL FOLLOW UP NEXT WEEK  
NEUROLOGY EVALUATION IF SYMPTOMS   
PERSISTS  
Electronically signed by Kelsi Murray MD at 2024 12:56 PM   
EDT  
  
  
  
  
The following attachments cannot   
be sent through Care   
Everywhere.Urinary Tract   
Infection, Adult ED   
(English)Delirium (confusion)   
(English)documented in this   
encounter                               [facility]  
Work Phone:   
[phone]  
   
                                                    2023 Evaluation +   
Plan note                               Associated Problem(s):   
Infiltrating ductal carcinoma of   
right female breast (CMS/HCC)  
Formatting of this note might be   
different from the original.  
-She completed her mammogram   
several weeks ago and the great   
news is there are no signs of   
cancer  
Electronically signed by Sharifa Almaguer DO at 2023 11:22 AM   
EST  
                                        [facility]  
Work Phone:   
[phone]  
   
                                                    2023 Evaluation +   
Plan note                               Associated Problem(s): Medicare   
annual wellness visit, subsequent  
Formatting of this note might be   
different from the original.  
-We talked about fall prevention   
and I have specifically   
recommended she put grab bars in   
the bathroom  
-We talked about eating more   
fruits and vegetables and we   
talked about exercise  
Electronically signed by Sharifa Almaguer DO at 2023 11:22 AM   
EST  
                                        [facility]  
Work Phone:   
[phone]  
   
                                                    2023 Miscellaneous   
Notes                                   Associated Problem(s):   
Infiltrating ductal carcinoma of   
right female breast (CMS/HCC)  
Formatting of this note might be   
different from the original.  
-She completed her mammogram   
several weeks ago and the great   
news is there are no signs of   
cancer  
Electronically signed by Sharifa Almaguer DO at 2023 11:22 AM   
EST  
Associated Problem(s): Medicare   
annual wellness visit, subsequent  
Formatting of this note might be   
different from the original.  
-We talked about fall prevention   
and I have specifically   
recommended she put grab bars in   
the bathroom  
-We talked about eating more   
fruits and vegetables and we   
talked about exercise  
Electronically signed by Sharifa Almaguer DO at 2023 11:22 AM   
EST  
Associated Problem(s): Mixed   
hyperlipidemia  
Formatting of this note might be   
different from the original.  
-Her cholesterol profile was   
excellent this time and we will   
check it once a year per Medicare   
protocol  
-She will remain on simvastatin 20   
mg at bedtime  
Electronically signed by Sharifa Almaguer DO at 2023 11:21 AM   
EST  
Associated Problem(s): Controlled   
type 2 diabetes mellitus without   
complication, without long-term   
current use of insulin (CMS/HCC)  
Formatting of this note might be   
different from the original.  
-Her hemoglobin A1c is fine at   
6.8-she will continue with   
metformin  mg 2 tablets with   
her evening meal  
Electronically signed by Sharifa Almaguer DO at 2023 11:20 AM   
EST  
Associated Problem(s): HTN   
(hypertension)  
Formatting of this note might be   
different from the original.  
-Blood pressure has improved on   
amlodipine 5 mg daily but because   
her trend is higher than desirable   
we will try a 10 mg dose of   
amlodipine and she will continue   
to monitor and give me an update   
on how she is doing  
-She will continue with   
losartan-hydrochlorothiazide   
100-12.51 tablet daily  
-Potassium chloride CR 10 mEq   
daily  
Electronically signed by Sharifa Almaguer DO at 2023 11:21 AM   
EST  
documented in this encounter            [facility]  
Work Phone:   
[phone]  
   
                                                    2023 Evaluation +   
Plan note                               Associated Problem(s): Mixed   
hyperlipidemia  
Formatting of this note might be   
different from the original.  
-Her cholesterol profile was   
excellent this time and we will   
check it once a year per Medicare   
protocol  
-She will remain on simvastatin 20   
mg at bedtime  
Electronically signed by Sharifa Almaguer DO at 2023 11:21 AM   
EST  
                                        [facility]  
Work Phone:   
[phone]  
   
                                                    2023 Evaluation +   
Plan note                               Associated Problem(s): Controlled   
type 2 diabetes mellitus without   
complication, without long-term   
current use of insulin (CMS/[facility])  
Formatting of this note might be   
different from the original.  
-Her hemoglobin A1c is fine at   
6.8-she will continue with   
metformin  mg 2 tablets with   
her evening meal  
Electronically signed by Sharifa Almaguer DO at 2023 11:20 AM   
EST  
                                        [facility]  
Work Phone:   
[phone]  
   
                                                    2023 Evaluation +   
Plan note                               Associated Problem(s): HTN   
(hypertension)  
Formatting of this note might be   
different from the original.  
-Blood pressure has improved on   
amlodipine 5 mg daily but because   
her trend is higher than desirable   
we will try a 10 mg dose of   
amlodipine and she will continue   
to monitor and give me an update   
on how she is doing  
-She will continue with   
losartan-hydrochlorothiazide   
100-12.51 tablet daily  
-Potassium chloride CR 10 mEq   
daily  
Electronically signed by Sharifa Almaguer DO at 2023 11:21 AM   
EST  
                                        [facility]  
Work Phone:   
[phone]  
   
                                                    2023 History of   
Present illness Narrative               Formatting of this note is   
different from the original.  
Subjective  
Reason for Visit: Marzena Mederos   
is an 78 y.o. female here for a   
Medicare Wellness visit.  
  
Past Medical, Surgical, and Family   
History reviewed and updated in   
chart.  
  
Reviewed all medications by   
prescribing practitioner or   
clinical pharmacist (such as   
prescriptions, OTCs, herbal   
therapies and supplements) and   
documented in the medical record.  
  
HPI  
She is here today for follow-up   
from her previous visit and we   
also discovered that she was due   
for her annual Medicare wellness   
visit. We did conduct a full   
review of systems and we went   
through the Medicare   
questionnaire. She brought in her   
personal blood pressure monitor   
which was actually sent to her   
household by [employer]. She has been   
checking her blood pressure   
regularly and although her numbers   
improved there is still a little   
bit higher than desirable. So far   
she has tolerated the amlodipine   
well and have decided to increase   
it to 10 mg. She will give us an   
update again on how she is doing   
and we certainly are happy to   
adjust medicine as necessary. She   
is getting ready to leave [place] for   
the winter and will be returning   
in May. We also reviewed her most   
recent laboratory test results and   
for the most part I am very   
pleased with her numbers. Her   
kidney function looks great and   
her liver enzymes are normal. Her   
hemoglobin A1c was just fine at   
6.8 although her fasting glucose   
that morning was a little bit on   
the generous side. She does   
continue to monitor closely and   
will call if she is trending   
upward. Her cholesterol was   
fantastic and she knows we will   
check that once a year. She denies   
any symptoms of depression and she   
has had no falls in the last year.   
We talked about fall prevention   
and I have specifically   
recommended she put grab bars in   
the bathroom. Her memory appears   
to be good and she does wear   
hearing aids. Her diet she states   
could be a little bit better as   
far as vegetables. We also   
discussed the importance of   
routine exercise and she does   
enjoy playing pickle ball. She   
also has advanced directives and   
has provided a copy for her chart   
here. We decided that we can see   
her back in approximately 6 months   
for follow-up.  
Patient Care Team:  
Sharifa Almaguer DO as PCP -   
General (Internal Medicine)  
  
Review of Systems  
  
Objective  
Vitals:  
/79   Pulse 76   Ht 1.6 m   
(5' 3 )   Wt 70.5 kg (155 lb 8 oz)   
  BMI 27.55 kg/m  
  
Physical Exam  
Vitals and nursing note reviewed.  
Constitutional:  
General: She is not in acute   
distress.  
Appearance: Normal appearance.  
HENT:  
Head: Normocephalic and   
atraumatic.  
Eyes:  
Conjunctiva/sclera: Conjunctivae   
normal.  
Cardiovascular:  
Rate and Rhythm: Normal rate and   
regular rhythm.  
Heart sounds: Normal heart sounds.  
Pulmonary:  
Effort: No respiratory distress.  
Breath sounds: No wheezing.  
Abdominal:  
Palpations: Abdomen is soft.  
Tenderness: There is no abdominal   
tenderness. There is no guarding.  
Musculoskeletal:  
General: No swelling. Normal range   
of motion.  
Skin:  
General: Skin is warm and dry.  
Neurological:  
General: No focal deficit present.  
Mental Status: She is alert and   
oriented to person, place, and   
time.  
Psychiatric:  
Behavior: Behavior normal.  
  
Recent Results (from the past 672   
hour(s))  
Comprehensive Metabolic Panel  
Collection Time: 23 8:28 AM  
Result Value Ref Range  
Glucose 193 (H) 74 - 99 mg/dL  
Sodium 134 (L) 136 - 145 mmol/L  
Potassium 4.2 3.5 - 5.3 mmol/L  
Chloride 96 (L) 98 - 107 mmol/L  
Bicarbonate 31 21 - 32 mmol/L  
Anion Gap 11 10 - 20 mmol/L  
Urea Nitrogen 12 6 - 23 mg/dL  
Creatinine 0.64 0.50 - 1.05 mg/dL  
eGFR >90 >60 mL/min/1.73m*2  
Calcium 9.6 8.6 - 10.3 mg/dL  
Albumin 4.4 3.4 - 5.0 g/dL  
Alkaline Phosphatase 82 33 - 136   
U/L  
Total Protein 6.6 6.4 - 8.2 g/dL  
AST 13 9 - 39 U/L  
Bilirubin, Total 0.6 0.0 - 1.2   
mg/dL  
ALT 13 7 - 45 U/L  
Hemoglobin A1C  
Collection Time: 23 8:28 AM  
Result Value Ref Range  
Hemoglobin A1C 6.8 (H) see below %  
Estimated Average Glucose 148 Not   
Established mg/dL  
Lipid panel  
Collection Time: 23 8:28 AM  
Result Value Ref Range  
Cholesterol 182 0 - 199 mg/dL  
HDL-Cholesterol 60.0 mg/dL  
Cholesterol/HDL Ratio 3.0  
LDL Calculated 97 <=99 mg/dL  
VLDL 25 0 - 40 mg/dL  
Triglycerides 126 0 - 149 mg/dL  
Non HDL Cholesterol 122 0 - 149   
mg/dL  
  
Assessment/Plan  
Problem List Items Addressed This   
Visit  
  
HTN (hypertension) - Primary  
-Blood pressure has improved on   
amlodipine 5 mg daily but because   
her trend is higher than desirable   
we will try a 10 mg dose of   
amlodipine and she will continue   
to monitor and give me an update   
on how she is doing  
-She will continue with   
losartan-hydrochlorothiazide   
100-12.51 tablet daily  
-Potassium chloride CR 10 mEq   
daily  
  
  
Relevant Medications  
amLODIPine (Norvasc) 10 mg tablet  
Other Relevant Orders  
Basic Metabolic Panel  
Infiltrating ductal carcinoma of   
right female breast (CMS/HCC)  
-She completed her mammogram   
several weeks ago and the great   
news is there are no signs of   
cancer  
  
  
Controlled type 2 diabetes   
mellitus without complication,   
without long-term current use of   
insulin (CMS/HCC)  
-Her hemoglobin A1c is fine at   
6.8-she will continue with   
metformin  mg 2 tablets with   
her evening meal  
  
  
Relevant Orders  
Hemoglobin A1C  
Medicare annual wellness visit,   
subsequent  
-We talked about fall prevention   
and I have specifically   
recommended she put grab bars in   
the bathroom  
-We talked about eating more   
fruits and vegetables and we   
talked about exercise  
  
  
Mixed hyperlipidemia  
-Her cholesterol profile was   
excellent this time and we will   
check it once a year per Medicare   
protocol  
-She will remain on simvastatin 20   
mg at bedtime  
  
  
  
  
Electronically signed by Sharifa Almaguer DO at 2023 11:23 AM   
EST  
documented in this encounter            [facility]  
Work Phone:   
[phone]  
   
                                        2023 Instructions   
  
  
Sharifa Almaguer DO - 2023   
11:00 AM ESTFormatting of this   
note might be different from the   
original.  
As we discussed we completed your   
annual Medicare wellness visit   
today  
Please remember to try to eat more   
vegetables and please remember the   
things we discussed about fall   
prevention  
I was very pleased with your   
checkup today and your lab work   
looks wonderful  
We will see you back in   
approximately 6 months for a   
follow-up and please remember to   
go to the lab fasting prior to   
your visit  
Electronically signed by Sharifa Almaguer DO at 2023 11:23 AM   
EST  
  
documented in this encounter            [facility]  
Work Phone:   
[phone]  
   
                                                    2023 Miscellaneous   
Notes                                   Formatting of this note might be   
different from the original.  
2023  
  
PID: [id number] Marzena Mederos  
[address] [id number]  
  
Dear Ms. Mederos,  
  
We are pleased to inform you that   
the results of your recent breast   
imaging exam on 2023 are   
normal.  
  
Early detection of cancer is very   
important. We also understand   
recommendations regarding breast   
cancer screening are   
controversial. Please discuss with   
your primary care provider which   
strategy is best for you and   
whether a mammogram is right for   
you.  
  
Your imaging studies and report   
will be kept on file at [facility] as part of your permanent   
medical record and are available   
for your continuing care.  
  
Thank you for allowing us to help   
in meeting your health care needs.  
  
Sincerely,  
  
Dr. Short  
Interpreting Radiologist  
[facility]  
  
(Normal over 40)  
Electronically signed by   
Coordinator, Mammography at   
2023 2:05 PM EST  
documented in this encounter            [facility]  
   
                                                    2023 History of   
Present illness Narrative               Formatting of this note might be   
different from the original.  
Radiology Service Progress Note  
  
PATIENT NAME: Marzena Mederos  
MRN: [id number]  
  
DATE OF SERVICE: 2023  
TIME: 9:29 AM  
PATIENT IDENTITY VERIFICATION   
COMPLETED USING TWO (2)   
IDENTIFIERS: Name and Date of   
Birth confirmed by patient   
verbally.  
FALL SCREENING: Has the patient   
had 2 falls in the last year or 1   
fall with injury or currently   
using an Ambulatory Assistive   
Device (Walker, Cane, Wheelchair,   
Crutches, etc.)? No  
PATIENT GENDER DATA: Female.   
Pregnancy status: Pregnant: No   
Breastfeeding status: NO.  
PATIENT RELEVANT IMPLANT DATA   
REVIEWED: Not Applicable  
  
RADIOLOGY DEPARTMENT: Mammography  
  
PERIPHERAL IV DATA: Not applicable  
  
SIGNED BY: Arlene Herrera  
2023 9:29 AM  
  
  
Electronically signed by   
Shavonne, Mary, Mammo Tech at   
2023 9:51 AM EST  
documented in this encounter            [facility]  
   
                                                    10- Evaluation +   
Plan note                               Associated Problem(s):   
Infiltrating ductal carcinoma of   
right female breast (CMS/HCC)  
Formatting of this note might be   
different from the original.  
-Was diagnosed many years ago and   
successfully treated and   
disease-free at this time  
-She goes for her screening   
mammogram this coming Monday  
Electronically signed by Sharifa Almaguer DO at 10/31/2023 4:11 PM   
EDT  
                                        [facility]  
Work Phone:   
[phone]  
   
                                                    10- Evaluation +   
Plan note                               Associated Problem(s): Controlled   
type 2 diabetes mellitus without   
complication, without long-term   
current use of insulin (CMS/HCC)  
Formatting of this note might be   
different from the original.  
-We will be checking a hemoglobin   
A1c  
-She is on metformin 500 mg 2   
tablets each evening  
Electronically signed by Sharifa Almaguer DO at 10/31/2023 4:10 PM   
T  
                                        [facility]  
Work Phone:   
[phone]  
   
                                                    10- Evaluation +   
Plan note                               Associated Problem(s): HTN   
(hypertension)  
Formatting of this note might be   
different from the original.  
-Her blood pressure was elevated   
today and she states while   
checking it at home she typically   
gets systolic readings in the 140s   
or higher  
-We are adding amlodipine 5 mg   
daily to her regimen  
-She will continue with   
losartan-hydrochlorothiazide   
100-12.51 tablet daily  
-She will continue with potassium   
chloride 10 mEq daily  
-She will check her blood   
pressures at home and bring her   
monitor with her to her follow-up   
visit  
Electronically signed by Sharifa Almaguer DO at 10/31/2023 4:10 PM   
EDT  
                                        [facility]  
Work Phone:   
[phone]  
   
                                                    10- Miscellaneous   
Notes                                   Associated Problem(s):   
Infiltrating ductal carcinoma of   
right female breast (CMS/HCC)  
Formatting of this note might be   
different from the original.  
-Was diagnosed many years ago and   
successfully treated and   
disease-free at this time  
-She goes for her screening   
mammogram this coming Monday  
Electronically signed by Sharifa Almaguer DO at 10/31/2023 4:11 PM   
EDT  
Associated Problem(s): Controlled   
type 2 diabetes mellitus without   
complication, without long-term   
current use of insulin (CMS/HCC)  
Formatting of this note might be   
different from the original.  
-We will be checking a hemoglobin   
A1c  
-She is on metformin 500 mg 2   
tablets each evening  
Electronically signed by Sharifa Almaguer DO at 10/31/2023 4:10 PM   
EDT  
Associated Problem(s): HTN   
(hypertension)  
Formatting of this note might be   
different from the original.  
-Her blood pressure was elevated   
today and she states while   
checking it at home she typically   
gets systolic readings in the 140s   
or higher  
-We are adding amlodipine 5 mg   
daily to her regimen  
-She will continue with   
losartan-hydrochlorothiazide   
100-12.51 tablet daily  
-She will continue with potassium   
chloride 10 mEq daily  
-She will check her blood   
pressures at home and bring her   
monitor with her to her follow-up   
visit  
Electronically signed by Sharifa Almaguer DO at 10/31/2023 4:10 PM   
EDT  
documented in this encounter            [facility]  
Work Phone:   
[phone]  
   
                                                    10- History of   
Present illness Narrative               Formatting of this note might be   
different from the original.  
Here to establish care  
Electronically signed by Sharifa Anderson LPN at 10/31/2023 4:13 PM   
EDT  
Formatting of this note is   
different from the original.  
Subjective  
Patient ID: Marzena Mederos is a   
78 y.o. female who presents for   
Establish Care.  
HPI  
She is here today to get   
established. Her previous   
physician of 25 years is moving   
out of state and she is naturally   
sad about it. Otherwise we are   
glad to meet her today and we did   
review her past medical history.   
She has had a history of both   
breast cancer and endometrial   
cancer. She had surgery and has   
done remarkably well all this   
time. She also had a history of a   
severe eye infection requiring   
hospitalization with IV   
antibiotics about 6 years ago.   
Today she is looking great and   
exudes energy. She states she is   
preparing to leave for [place] for   
the winter months and will leave   
at the end of December. She is   
very physically active and plays   
pickle ball. She does have hearing   
aids and hears well today. She is   
also a type II diabetic and we did   
review her medications today. We   
determined that she is due for   
laboratory checkup and has agreed   
to go soon. We will see her back   
to go over the results we will   
also complete her annual Medicare   
wellness visit. She also brought   
in a copy of her living will and   
power of  for healthcare.  
Review of Systems  
Constitutional: Negative for   
fatigue.  
Respiratory: Positive for cough.   
Negative for shortness of breath   
and wheezing.  
Cardiovascular: Negative for chest   
pain, palpitations and leg   
swelling.  
Gastrointestinal: Negative for   
abdominal pain, blood in stool,   
diarrhea, nausea and vomiting.  
Musculoskeletal: Negative for   
arthralgias and back pain.  
  
Objective  
Physical Exam  
Vitals and nursing note reviewed.  
Constitutional:  
General: She is not in acute   
distress.  
Appearance: Normal appearance.  
HENT:  
Head: Normocephalic and   
atraumatic.  
Eyes:  
Conjunctiva/sclera: Conjunctivae   
normal.  
Cardiovascular:  
Rate and Rhythm: Normal rate and   
regular rhythm.  
Heart sounds: Normal heart sounds.  
Pulmonary:  
Effort: No respiratory distress.  
Breath sounds: No wheezing.  
Abdominal:  
Palpations: Abdomen is soft.  
Tenderness: There is no abdominal   
tenderness. There is no guarding.  
Musculoskeletal:  
General: No swelling. Normal range   
of motion.  
Skin:  
General: Skin is warm and dry.  
Neurological:  
General: No focal deficit present.  
Mental Status: She is alert and   
oriented to person, place, and   
time.  
Psychiatric:  
Behavior: Behavior normal.  
  
Assessment/Plan  
Problem List Items Addressed This   
Visit  
  
ICD-10-CM  
HTN (hypertension) I10  
-Her blood pressure was elevated   
today and she states while   
checking it at home she typically   
gets systolic readings in the 140s   
or higher  
-We are adding amlodipine 5 mg   
daily to her regimen  
-She will continue with   
losartan-hydrochlorothiazide   
100-12.51 tablet daily  
-She will continue with potassium   
chloride 10 mEq daily  
-She will check her blood   
pressures at home and bring her   
monitor with her to her follow-up   
visit  
  
  
Relevant Medications  
amLODIPine (Norvasc) 5 mg tablet  
Infiltrating ductal carcinoma of   
right female breast (CMS/[facility]) -   
Primary C50.911  
-Was diagnosed many years ago and   
successfully treated and   
disease-free at this time  
-She goes for her screening   
mammogram this coming Monday  
  
  
Controlled type 2 diabetes   
mellitus without complication,   
without long-term current use of   
insulin (CMS/[facility]) E11.9  
-We will be checking a hemoglobin   
A1c  
-She is on metformin 500 mg 2   
tablets each evening  
  
  
Relevant Orders  
Comprehensive Metabolic Panel  
Hemoglobin A1C  
Albumin , Urine Random  
  
Other Visit Diagnoses  
  
Codes  
Mixed hyperlipidemia E78.2  
Relevant Orders  
Lipid panel  
  
  
  
  
  
Sharifa Almaguer DO  
  
Electronically signed by Sharifa Almaguer DO at 10/31/2023 4:13 PM   
EDT  
documented in this encounter            [facility]  
Work Phone:   
[phone]  
   
                                        10- Instructions   
  
  
Sharifa Almaguer DO - 10/31/2023   
3:40 PM EDTFormatting of this note   
might be different from the   
original.  
As we discussed I have ordered   
several labs to be done at your   
earliest convenience and we would   
like for you to be fasting. You   
can have plenty of water but just   
nothing with calories for   
approximately 8 to 12 hours  
When you come back we will go over   
the results  
I also added a medication to your   
medical regimen called amlodipine   
5 mg to be taken once daily with   
your other blood pressure   
medication. Please try to check   
her blood pressure at home with   
your personal monitor and please   
sit down and relax for 10 to 20   
minutes prior to checking your   
blood pressure. Please also bring   
your monitor with you to your   
follow-up appointment  
When you return we will be   
completing your annual Medicare   
wellness visit  
Electronically signed by Sharifa Almaguer DO at 10/31/2023 4:12 PM   
EDT  
  
documented in this encounter            [facility]  
Work Phone:   
[phone]  
   
                                        10- Instructions   
  
  
Amaya Reis II, OD -   
10/25/2023 1:02 PM EDTFormatting   
of this note might be different   
from the original.  
Assessment and Plan  
  
H00.011 Hordeolum externum of   
right upper eyelid (primary   
encounter diagnosis)  
Comment: Start use of Keflex 500   
mg po twice a day x 10 days.   
Polytrim 1 gt both eyes four times   
a day x 1 week. Recheck as needed.  
  
I have confirmed and edited as   
necessary the relevant ophthalmic   
history, ROS, and the neuro exam   
findings as obtained by others. I   
have seen and examined Marzena Mederos.  
I have discussed the case and the   
management of this patient's care   
with the Resident/Fellow, if   
applicable. I also have reviewed   
and agree with the assessment and   
plan as stated above and agree   
with all of its relevant   
components.  
  
Amaya Reis II, OD  
  
  
  
  
Electronically signed by   
Amaya Reis II, OD at   
10/25/2023 1:02 PM EDT  
  
documented in this encounter            [facility]  
   
                                                    10- History of   
Present illness Narrative               Formatting of this note might be   
different from the original.  
Assessment and Plan  
  
H00.011 Hordeolum externum of   
right upper eyelid (primary   
encounter diagnosis)  
Comment: Start use of Keflex 500   
mg po twice a day x 10 days.   
Polytrim 1 gt both eyes four times   
a day x 1 week. Recheck as needed.  
  
I have confirmed and edited as   
necessary the relevant ophthalmic   
history, ROS, and the neuro exam   
findings as obtained by others. I   
have seen and examined Marzena Mederos.  
I have discussed the case and the   
management of this patient's care   
with the Resident/Fellow, if   
applicable. I also have reviewed   
and agree with the assessment and   
plan as stated above and agree   
with all of its relevant   
components.  
  
Amaya Reis II, OD  
  
  
Electronically signed by   
Amaya Reis II OD at   
10/25/2023 1:03 PM EDT  
documented in this encounter            [facility]  
   
                                        2023 Instructions   
  
  
Amaya Reis II, OD -   
2023 10:15 AM EDTFormatting   
of this note might be different   
from the original.  
Assessment and Plan  
  
E11.9 Type 2 diabetes mellitus   
without retinopathy (HCC) (primary   
encounter diagnosis)  
Comment: Examination shows no   
ocular diabetic complications   
today. Discussed need for optimal   
diabetes control to minimize   
chance of ocular complications.   
Advise patient to immediately   
report worsening in status or   
additional symptoms. Continue   
yearly dilated eye examinations.  
  
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   
cataract of both eyes  
Comment: Cataract formation   
progressing. Patient will tolerate   
vision and defer surgical   
correction until a later time.  
  
H52.03 Hyperopia of both eyes  
H52.222 Regular astigmatism of   
left eye  
H52.4 Presbyopia  
Comment: Small shift in glasses   
power. Update as desired.  
  
I have confirmed and edited as   
necessary the relevant ophthalmic   
history, ROS, and the neuro exam   
findings as obtained by others. I   
have seen and examined Marzena Mederos.  
I have discussed the case and the   
management of this patient's care   
with the Resident/Fellow, if   
applicable. I also have reviewed   
and agree with the assessment and   
plan as stated above and agree   
with all of its relevant   
components.  
  
Amaya Reis II, OD  
  
  
  
  
Electronically signed by Amaya Reis II, OD at 2023   
10:15 AM EDT  
  
documented in this encounter            [facility]  
   
                                                    2023 History of   
Present illness Narrative               Formatting of this note might be   
different from the original.  
Assessment and Plan  
  
E11.9 Type 2 diabetes mellitus   
without retinopathy (HCC) (primary   
encounter diagnosis)  
Comment: Examination shows no   
ocular diabetic complications   
today. Discussed need for optimal   
diabetes control to minimize   
chance of ocular complications.   
Advise patient to immediately   
report worsening in status or   
additional symptoms. Continue   
yearly dilated eye examinations.  
  
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   
cataract of both eyes  
Comment: Cataract formation   
progressing. Patient will tolerate   
vision and defer surgical   
correction until a later time.  
  
H52.03 Hyperopia of both eyes  
H52.222 Regular astigmatism of   
left eye  
H52.4 Presbyopia  
Comment: Small shift in glasses   
power. Update as desired.  
  
I have confirmed and edited as   
necessary the relevant ophthalmic   
history, ROS, and the neuro exam   
findings as obtained by others. I   
have seen and examined Marzena Mederos.  
I have discussed the case and the   
management of this patient's care   
with the Resident/Fellow, if   
applicable. I also have reviewed   
and agree with the assessment and   
plan as stated above and agree   
with all of its relevant   
components.  
  
Amaya Reis II, OD  
  
  
Electronically signed by Amaya Reis II, OD at 2023   
10:15 AM EDT  
documented in this encounter            [facility]  
   
                                                    2022 History of   
Present illness Narrative               Formatting of this note is   
different from the original.  
Chaperone offered: Patient   
declines.  
  
Marzena is a 77 year old    
who presents for an annual   
gynecologic exam without   
complaints.  
  
Postmenopausal: Yes  
HRT use: No.  
Last Pap: 2017 normal  
HPV: 2014 negative  
History of abnormal pap: Yes  
Last mammogram:  normal  
History of abnormal mammogram: Yes  
Sexually active: No  
  
OB History  
 T0  L1  
SAB1 IAB0 Ectopic0 Multiple0 Live   
Births0  
  
Comment: Plus two step daughters  
  
Gyn History  
  
LMP: Hysterectomy  
Age at Menarche:  
Age at First Pregnancy:  
Age at Menopause:  
Gyn History Comments:  
Sexual Activity: Not Asked; Male;   
not asked  
Contraception: No contraception   
data on record  
  
  
  
PAST MEDICAL HISTORY  
Diagnosis Date  
Breast cancer (HCC)   
invasive ductal carcinoma,   
completed radiation 3/7/11  
Cancer of endometrium (HCC)  
Carcinoma in situ, vulva   
Cellulitis  
of right eye muscle  
Diabetes mellitus without mention   
of complication  
Diabetes mellitus, type II (HCC)  
Diaphragmatic hernia without   
mention of obstruction or gangrene  
Hiatal hernia  
Dyspareunia  
Elevated cholesterol  
Esophageal reflux  
Insomnia, unspecified  
Orbital cellulitis  
Other corneal disorder  
Rt eye corneal erosion .  
PMH - PAST MEDICAL HISTORY OF  
?VESTIBULITIS  
PMH - PAST MEDICAL HISTORY OF  
CLIMACTERIC  
PMH - PAST MEDICAL HISTORY OF   
  
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  
Pure hypercholesterolemia  
Rib fracture  
6th Rib  
Stomatitis and mucositis   
(ulcerative)  
Chronic ulcerative stomatitis on   
mucosal biopsy  
Unspecified essential hypertension  
  
PAST SURGICAL HISTORY  
Procedure Laterality Date  
Bunions bilateral feet removed   
10/2002  
BX/EXC LYMPH NODE OPEN DEEP   
AXILLARY NODE 8-24-10  
RIGHT BREAST LUMP W/ SLN BX  
CHOLECYSTECTOMY 2004  
Cholecystectomy  
COLONOSCOPY FLX DX W/COLLJ SPEC   
WHEN PFRMD   
Colonoscopy  
COLPOSCOPY CERVIX UPPER/ADJACENT   
VAGINA   
Colposcopy of the vulva  
COLPOSCOPY, VULVA 10/09  
CORRECT BUNION,SIMPLE  
Bunion  
DILATION & CURETTAGE DX&/THER   
NONOBSTETRIC 1970's  
SAB  
HYSTERECTOMY 2017  
LAPAROSCOPY SURG CHOLECYSTECTOMY   
3/2004  
Cholecystectomy, lap  
MAMMO STEREOTACTIC CORE BIOPSY RT   
10/10/13  
MASTECTOMY, PARTIAL 8-24-10  
RIGHT BREAST  
PAST SURGICAL HISTORY OF 2006  
partial vulvectomy/sq. cell ca in   
situ  
PAST SURGICAL HISTORY OF   
right eye cornea  
PAST SURGICAL HISTORY OF   
right eye revision  
PAST SURGICAL HISTORY OF  
squamous cell carcinoma right arm  
SALPINGO-OOPHORECTOMY COMPL/PRTL   
UNI/BI SPX 2017  
Toenail Big Toe Left Foot removed   
10/2002  
TONSILLECTOMY PRIMARY/SECONDARY   
<AGE 13   
Tonsillectomy  
UNLISTED PROCEDURE HANDS/FINGERS   
13  
[facility] Arthoplasty on right hand  
  
FAMILY HISTORY  
Problem Relation Age of Onset  
Stroke Mother  
Diabetes Mother  
GI Father  
 from complications of gb   
surgery  
Arthritis Sister  
Systemic Lupus  
Cancer Maternal Uncle  
LUNG  
Cancer Other  
cousin with breast cancer/cousin   
with bladder ca.  
Stroke Sister  
SOCIAL HISTORY  
Social History  
  
Tobacco Use  
Smoking status: Never  
Smokeless tobacco: Never  
Vaping Use  
Vaping Use: Never used  
Substance Use Topics  
Alcohol use: Yes  
Comment: 1-2 weekly  
Drug use: No  
  
REVIEW OF SYSTEMS  
Abdomen: No abdominal pain,   
nausea, vomiting, diarrhea,   
+constipation. No bloating, early   
satiety, indigestion, or increased   
flatulence.  
Bladder: No dysuria, gross   
hematuria, urinary frequency,   
urinary urgency, or incontinence  
Breast: No breast lumps, nipple   
d/c, overlying skin changes,   
redness or skin retraction  
Allergies and current medication   
updated:Yes  
  
EXAM: There were no vitals taken   
for this visit.  
  
  
GENERAL: pleasant,    
female in no apparent distress  
HEENT: Normocephalic, atraumatic,   
and no lesions  
NECK: Supple, full range of   
motion, no adenopathy, and thyroid   
normal  
DERMATOLOGY: Normal, without   
lesions, non-icteric, and   
non-hirsute  
BREAST: soft, non-tender,   
symmetric, no dominant mass,   
normal nipple-areolar complex, no   
lymphadenopathy, and no nipple   
discharge  
CHEST: Normal inspiratory effort  
ABDOMEN: soft, non-tender, and no   
masses  
PELVIC: external genitalia normal,   
normal Bartholin's glands,   
urethra, [place]'s glands, no vulvar   
lesions, physiologic discharge   
present, normal appearing perineal   
body and perianal region, cervix   
surgically absent  
BIMANUAL: no adnexal masses,   
non-tender, and uterus surgically   
absent  
RECTOVAGINAL: deferred.  
NEURO: alert and oriented x3,exam   
grossly non-focal  
EXTREMITIES: normal  
  
ASSESSMENT/PLAN:  
1) Health maintenance:  
Pap done  
Mammogram up to date  
Nutrition, exercise and routine   
health maintenance exams reviewed.  
Calcium/Vitamin D supplementation   
information provided.  
2) Follow up one year or sooner as   
needed  
  
[email or web address]  
Electronically signed by Amanda Jo APRN.[email or web address] at 2022   
11:32 AM EST  
documented in this encounter            [facility]  
   
                                                    2022 Miscellaneous   
Notes                                   Formatting of this note might be   
different from the original.  
2022  
  
PID: [id number] Marzena MORELAND Sarahmitch  
[address] [place] [address]  
  
Dear Ms. Mederos,  
  
We are pleased to inform you that   
the results of your recent breast   
imaging exam on 11/3/2022 are   
normal.  
  
Your mammogram demonstrates that   
you have dense breast tissue,   
which could hide abnormalities.   
Dense breast tissue, in and of   
itself, is a relatively common   
condition. Therefore, this   
information is not provided to   
cause undue concern; rather, it is   
to raise your awareness and   
promote discussion with your   
health care provider regarding the   
presence of dense breast tissue in   
addition to other risk factors.  
  
Early detection of cancer is very   
important. We also understand   
recommendations regarding breast   
cancer screening are   
controversial. Please discuss with   
your primary care provider which   
strategy is best for you and   
whether a mammogram is right for   
you.  
  
Your imaging studies and report   
will be kept on file at [facility] as part of your permanent   
medical record and are available   
for your continuing care.  
  
Thank you for allowing us to help   
in meeting your health care needs.  
  
Sincerely,  
  
Dr. Henson  
Interpreting Radiologist  
[facility]  
  
(Normal over 40)  
Electronically signed by   
Mammography Coordinator at   
2022 9:18 AM EDT  
documented in this encounter            [facility]  
   
                                                    2022 History of   
Present illness Narrative               Formatting of this note might be   
different from the original.  
Radiology Service Progress Note  
  
PATIENT NAME: Marzena Mederos  
MRN: [id number]  
  
DATE OF SERVICE: November 3, 2022  
TIME: 9:15 AM  
PATIENT IDENTITY VERIFICATION   
COMPLETED USING TWO (2)   
IDENTIFIERS: Name and Date of   
Birth confirmed by patient   
verbally.  
FALL SCREENING: Has the patient   
had 2 falls in the last year or 1   
fall with injury or currently   
using an Ambulatory Assistive   
Device (Walker, Cane, Wheelchair,   
Crutches, etc.)? No  
PATIENT GENDER DATA: Female.   
Pregnancy status: Pregnant: No   
Breastfeeding status: NO.  
PATIENT RELEVANT IMPLANT DATA   
REVIEWED: Not Applicable  
  
RADIOLOGY DEPARTMENT: Mammography  
  
PERIPHERAL IV DATA: Not applicable  
  
SIGNED BY: RT Misti(R)  
November 3, 2022 9:15 AM  
  
  
Electronically signed by Josie Montanez RT(R) at 2022 9:15   
AM EDT  
documented in this encounter            [facility]  
  
  
  
                                                    2006 History of Past i  
llness Narrative   
  
  
  
                                Problem         Noted Date      Resolved Date  
   
                                APPOINTMENT CANCELLED 2006  
  
documented as of this encounter (statuses as of 2022)  
[facility]- History of Past illness Narrative*   
  
                                Problem         Noted Date      Resolved Date  
   
                                APPOINTMENT CANCELLED 2006  
  
documented as of this encounter (statuses as of 2022)  
[facility]- History of Past illness Narrative*   
  
                                Problem         Noted Date      Resolved Date  
   
                                APPOINTMENT CANCELLED 2006  
  
documented as of this encounter (statuses as of 2023)  
[facility]- History of Past illness Narrative*   
  
                          Problem      Noted Date   Diagnosed Date Resolved Date  
   
                          APPOINTMENT CANCELLED 2006  
  
documented as of this encounter (statuses as of 10/25/2023)  
[facility]- History of Past illness Narrative*   
  
                          Problem      Noted Date   Diagnosed Date Resolved Date  
   
                          APPOINTMENT CANCELLED 2006  
  
documented as of this encounter (statuses as of 2023)  
[facility]- History of Past illness Narrative*   
  
                          Problem      Noted Date   Diagnosed Date Resolved Date  
   
                          APPOINTMENT CANCELLED 2006  
  
documented as of this encounter (statuses as of 2023)  
[facility]- History of Past illness Narrative*   
  
                          Problem      Noted Date   Diagnosed Date Resolved Date  
   
                          APPOINTMENT CANCELLED 2006  
  
documented as of this encounter (statuses as of 2023)  
[facility] note*   
  
                                                    Diagnosis  
   
                                                      
  
  
Encounter for gynecological examination (general) (routine) without abnormal   
findings- Primary  
   
                                                      
  
  
Encounter for screening for malignant neoplasm of vagina  
  
  
Special screening for malignant neoplasms, vagina  
  
documented in this encounter  
[facility]   
  
                                                    Diagnosis  
   
                                                      
  
  
Type 2 diabetes mellitus without retinopathy (HCC)- Primary  
  
  
Type II or unspecified type diabetes mellitus without mention of complication, 
not   
stated as uncontrolled  
   
                                                      
  
  
Combined form of senile cataract of both eyes  
   
                                                      
  
  
Hyperopia of both eyes  
   
                                                      
  
  
Regular astigmatism of left eye  
  
  
Regular astigmatism  
   
                                                      
  
  
Presbyopia  
  
documented in this encounter  
[facility] note*   
  
                                                    Diagnosis  
   
                                                      
  
  
Hordeolum externum of right upper eyelid- Primary  
  
  
Hordeolum externum  
  
documented in this encounter  
[facility] note*   
  
                                                    Diagnosis  
   
                                                      
  
  
Infiltrating ductal carcinoma of right female breast (CMS/HCC)- Primary  
   
                                                      
  
  
Controlled type 2 diabetes mellitus without complication, without long-term 
current   
use of insulin (CMS/HCC)  
   
                                                      
  
  
Mixed hyperlipidemia  
   
                                                      
  
  
Secondary hypertension  
  
  
Other secondary hypertension, unspecified  
  
documented in this encounter  
[facility]  
Work Phone: [phone] note*   
  
                                                    Diagnosis  
   
                                                      
  
  
Encounter for screening mammogram for malignant neoplasm of breast  
  
  
Other screening mammogram  
  
documented in this encounter  
[facility]   
  
                                                    Diagnosis  
   
                                                      
  
  
Malignant neoplasm of right breast in female, estrogen receptor positive, 
unspecified   
site of breast (HCC)  
   
                                                      
  
  
Encounter for screening mammogram for high-risk patient  
  
documented in this encounter  
[facility] note*   
  
                                                    Diagnosis  
   
                                                      
  
  
Secondary hypertension- Primary  
  
  
Other secondary hypertension, unspecified  
   
                                                      
  
  
Medicare annual wellness visit, subsequent  
   
                                                      
  
  
Controlled type 2 diabetes mellitus without complication, without long-term 
current   
use of insulin (CMS/HCC)  
   
                                                      
  
  
Mixed hyperlipidemia  
   
                                                      
  
  
Infiltrating ductal carcinoma of right female breast (CMS/HCC)  
  
documented in this encounter  
[facility]  
Work Phone: [phone] note*   
  
                                                    Diagnosis  
   
                                                      
  
  
UTI (urinary tract infection), bacterial- Primary  
   
                                                      
  
  
Confusion  
  
  
Unspecified psychosis  
  
documented in this encounter  
[facility]  
Work Phone: [phone] note*   
  
                                                    Diagnosis  
   
                                                      
  
  
Memory loss- Primary  
   
                                                      
  
  
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  
   
                                                      
  
  
Controlled type 2 diabetes mellitus without complication, without long-term 
current   
use of insulin (Multi)  
   
                                                      
  
  
Secondary hypertension  
  
  
Other secondary hypertension, unspecified  
   
                                                      
  
  
Mixed hyperlipidemia  
   
                                                      
  
  
Mild dementia without behavioral disturbance, psychotic disturbance, mood   
disturbance, or anxiety, unspecified dementia type (Multi)  
  
documented in this encounter  
[facility]  
Work Phone: [phone] note*   
  
                                                    Diagnosis  
   
                                                      
  
  
Nausea and vomiting, unspecified vomiting type- Primary  
   
                                                      
  
  
Intermittent lightheadedness  
  
documented in this encounter  
[facility]  
Work Phone: [phone] note*   
  
                                                    Diagnosis  
   
                                                      
  
  
Controlled type 2 diabetes mellitus without complication, without long-term 
current   
use of insulin (Multi)- Primary  
   
                                                      
  
  
Hiccup  
  
  
Hiccough  
   
                                                      
  
  
Mild dementia without behavioral disturbance, psychotic disturbance, mood   
disturbance, or anxiety, unspecified dementia type (Multi)  
   
                                                      
  
  
Gastroesophageal reflux disease without esophagitis  
  
  
Esophageal reflux  
  
documented in this encounter  
[facility]  
Work Phone: [phone] note*   
  
                                                    Diagnosis  
   
                                                      
  
  
Combined form of senile cataract of both eyes- Primary  
   
                                                      
  
  
Type 2 diabetes mellitus without retinopathy (HCC)  
  
  
Type II or unspecified type diabetes mellitus without mention of complication, 
not   
stated as uncontrolled  
   
                                                      
  
  
Hyperopia of both eyes  
   
                                                      
  
  
Regular astigmatism of both eyes  
  
  
Regular astigmatism  
   
                                                      
  
  
Presbyopia  
  
documented in this encounter  
[facility]   
  
                                                    Diagnosis  
   
                                                      
  
  
Combined forms of age-related cataract of left eye- Primary  
  
  
Other and combined forms of senile cataract  
   
                                                      
  
  
Combined forms of age-related cataract of right eye  
  
  
Other and combined forms of senile cataract  
   
                                                      
  
  
Type 2 diabetes mellitus without retinopathy (HCC)  
  
  
Type II or unspecified type diabetes mellitus without mention of complication, 
not   
stated as uncontrolled  
   
                                                      
  
  
Essential hypertension  
  
  
Unspecified essential hypertension  
   
                                                      
  
  
Hypercholesteremia  
  
  
Pure hypercholesterolemia  
   
                                                      
  
  
Dementia, unspecified dementia severity, unspecified dementia type, unspecified   
whether behavioral, psychotic, or mood disturbance or anxiety (HCC)  
  
documented in this encounter  
[facility]   
  
                                                    Diagnosis  
   
                                                      
  
  
Controlled type 2 diabetes mellitus without complication, without long-term 
current   
use of insulin (HCC)- Primary  
   
                                                      
  
  
Carcinoma in situ of vulva  
  
  
Carcinoma in situ, vulva  
   
                                                      
  
  
Postmenopausal bleeding  
   
                                                      
  
  
Combined forms of age-related cataract of left eye- Primary  
  
  
Other and combined forms of senile cataract  
   
                                                      
  
  
Combined forms of age-related cataract of right eye  
  
  
Other and combined forms of senile cataract  
   
                                                      
  
  
Type 2 diabetes mellitus without retinopathy (HCC)  
  
  
Type II or unspecified type diabetes mellitus without mention of complication, 
not   
stated as uncontrolled  
   
                                                      
  
  
Essential hypertension  
  
  
Unspecified essential hypertension  
   
                                                      
  
  
Hypercholesteremia  
  
  
Pure hypercholesterolemia  
   
                                                      
  
  
Dementia, unspecified dementia severity, unspecified dementia type, unspecified   
whether behavioral, psychotic, or mood disturbance or anxiety (HCC)  
  
documented in this encounter  
[facility]   
  
                                                    Diagnosis  
   
                                                      
  
  
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  
   
                                                      
  
  
Controlled type 2 diabetes mellitus without complication, without long-term 
current   
use of insulin (Multi)  
   
                                                      
  
  
Mixed hyperlipidemia  
   
                                                      
  
  
Secondary hypertension  
  
  
Other secondary hypertension, unspecified  
   
                                                      
  
  
Secondary hypertension- Primary  
  
  
Other secondary hypertension, unspecified  
   
                                                      
  
  
Medicare annual wellness visit, subsequent  
   
                                                      
  
  
Controlled type 2 diabetes mellitus without complication, without long-term 
current   
use of insulin (Multi)  
   
                                                      
  
  
Mixed hyperlipidemia  
   
                                                      
  
  
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  
   
                                                      
  
  
Memory loss- Primary  
   
                                                      
  
  
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  
   
                                                      
  
  
Controlled type 2 diabetes mellitus without complication, without long-term 
current   
use of insulin (Multi)  
   
                                                      
  
  
Secondary hypertension  
  
  
Other secondary hypertension, unspecified  
   
                                                      
  
  
Mixed hyperlipidemia  
   
                                                      
  
  
Mild dementia without behavioral disturbance, psychotic disturbance, mood   
disturbance, or anxiety, unspecified dementia type  
   
                                                      
  
  
Controlled type 2 diabetes mellitus without complication, without long-term 
current   
use of insulin (Multi)- Primary  
   
                                                      
  
  
Hiccup  
  
  
Hiccough  
   
                                                      
  
  
Mild dementia without behavioral disturbance, psychotic disturbance, mood   
disturbance, or anxiety, unspecified dementia type  
   
                                                      
  
  
Gastroesophageal reflux disease without esophagitis  
  
  
Esophageal reflux  
   
                                                      
  
  
Abnormal EKG- Primary  
  
  
Nonspecific abnormal electrocardiogram (ECG) (EKG)  
   
                                                      
  
  
Weight loss, unintentional  
  
  
Loss of weight  
   
                                                      
  
  
Gastroesophageal reflux disease without esophagitis  
  
  
Esophageal reflux  
   
                                                      
  
  
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  
   
                                                      
  
  
Hiccup  
  
  
Hiccough  
   
                                                      
  
  
Liver lesion- Primary  
  
  
Other specified disorders of liver  
   
                                                      
  
  
Abnormal EKG  
  
  
Nonspecific abnormal electrocardiogram (ECG) (EKG)  
   
                                                      
  
  
Weight loss, unintentional  
  
  
Loss of weight  
   
                                                      
  
  
Hypokalemia  
  
  
Hypopotassemia  
   
                                                      
  
  
Secondary hypertension  
  
  
Other secondary hypertension, unspecified  
   
                                                      
  
  
Abnormal EKG- Primary  
  
  
Nonspecific abnormal electrocardiogram (ECG) (EKG)  
   
                                                      
  
  
Weight loss, unintentional  
  
  
Loss of weight  
   
                                                      
  
  
Controlled type 2 diabetes mellitus without complication, without long-term 
current   
use of insulin (Multi)  
   
                                                      
  
  
Benign liver cyst  
  
  
Other specified disorders of liver  
   
                                                      
  
  
Secondary hypertension  
  
  
Other secondary hypertension, unspecified  
   
                                                      
  
  
Mild dementia without behavioral disturbance, psychotic disturbance, mood   
disturbance, or anxiety, unspecified dementia type  
   
                                                      
  
  
Gastroesophageal reflux disease without esophagitis  
  
  
Esophageal reflux  
   
                                                      
  
  
Mixed hyperlipidemia  
   
                                                      
  
  
Hypokalemia  
  
  
Hypopotassemia  
   
                                                      
  
  
Combined forms of age-related cataract of left eye- Primary  
  
documented in this encounter  
[facility]  
Work Phone: [phone] note*   
  
                                                    Diagnosis  
   
                                                      
  
  
Controlled type 2 diabetes mellitus without complication, without long-term 
current   
use of insulin (HCC)- Primary  
   
                                                      
  
  
Carcinoma in situ of vulva  
  
  
Carcinoma in situ, vulva  
   
                                                      
  
  
Postmenopausal bleeding  
   
                                                      
  
  
Status post cataract extraction and insertion of intraocular lens of left eye-   
Primary  
   
                                                      
  
  
Combined forms of age-related cataract of right eye  
  
  
Other and combined forms of senile cataract  
   
                                                      
  
  
Type 2 diabetes mellitus without retinopathy (HCC)  
  
  
Type II or unspecified type diabetes mellitus without mention of complication, 
not   
stated as uncontrolled  
   
                                                      
  
  
Essential hypertension  
  
  
Unspecified essential hypertension  
   
                                                      
  
  
Hypercholesteremia  
  
  
Pure hypercholesterolemia  
   
                                                      
  
  
Dementia, unspecified dementia severity, unspecified dementia type, unspecified   
whether behavioral, psychotic, or mood disturbance or anxiety (HCC)  
  
documented in this encounter  
[facility] note*   
  
                                                    Diagnosis  
   
                                                      
  
  
Controlled type 2 diabetes mellitus without complication, without long-term 
current   
use of insulin (HCC)- Primary  
   
                                                      
  
  
Carcinoma in situ of vulva  
  
  
Carcinoma in situ, vulva  
   
                                                      
  
  
Postmenopausal bleeding  
   
                                                      
  
  
Status post cataract extraction and insertion of intraocular lens of left eye-   
Primary  
  
documented in this encounter  
[facility] note*   
  
                                                    Diagnosis  
   
                                                      
  
  
Controlled type 2 diabetes mellitus without complication, without long-term 
current   
use of insulin (HCC)- Primary  
   
                                                      
  
  
Carcinoma in situ of vulva  
  
  
Carcinoma in situ, vulva  
   
                                                      
  
  
Postmenopausal bleeding  
   
                                                      
  
  
Encounter for screening mammogram for breast cancer  
  
documented in this encounter  
[facility] note*   
  
                                                    Diagnosis  
   
                                                      
  
  
Controlled type 2 diabetes mellitus without complication, without long-term 
current   
use of insulin (HCC)- Primary  
   
                                                      
  
  
Carcinoma in situ of vulva  
  
  
Carcinoma in situ, vulva  
   
                                                      
  
  
Postmenopausal bleeding  
   
                                                      
  
  
Combined forms of age-related cataract of right eye- Primary  
  
  
Other and combined forms of senile cataract  
   
                                                      
  
  
Status post cataract extraction and insertion of intraocular lens of left eye  
   
                                                      
  
  
Type 2 diabetes mellitus without retinopathy (HCC)  
  
  
Type II or unspecified type diabetes mellitus without mention of complication, 
not   
stated as uncontrolled  
   
                                                      
  
  
Essential hypertension  
  
  
Unspecified essential hypertension  
   
                                                      
  
  
Hypercholesteremia  
  
  
Pure hypercholesterolemia  
   
                                                      
  
  
Dementia, unspecified dementia severity, unspecified dementia type, unspecified   
whether behavioral, psychotic, or mood disturbance or anxiety (HCC)  
  
documented in this encounter  
[facility] note*   
  
                                                    Diagnosis  
   
                                                      
  
  
Controlled type 2 diabetes mellitus without complication, without long-term 
current   
use of insulin (HCC)- Primary  
   
                                                      
  
  
Carcinoma in situ of vulva  
  
  
Carcinoma in situ, vulva  
   
                                                      
  
  
Postmenopausal bleeding  
   
                                                      
  
  
Encounter for gynecological examination (general) (routine) without abnormal   
findings- Primary  
   
                                                      
  
  
Encounter for screening mammogram for breast cancer  
   
                                                      
  
  
Combined forms of age-related cataract of right eye  
  
  
Other and combined forms of senile cataract  
  
documented in this encounter  
[facility] note*   
  
                                                    Diagnosis  
   
                                                      
  
  
Controlled type 2 diabetes mellitus without complication, without long-term 
current   
use of insulin (HCC)- Primary  
   
                                                      
  
  
Carcinoma in situ of vulva  
  
  
Carcinoma in situ, vulva  
   
                                                      
  
  
Postmenopausal bleeding  
   
                                                      
  
  
History of breast cancer- Primary  
  
  
Personal history of malignant neoplasm of breast  
   
                                                      
  
  
Encounter for screening mammogram for breast cancer  
   
                                                      
  
  
Combined forms of age-related cataract of right eye  
  
  
Other and combined forms of senile cataract  
  
documented in this encounter  
[facility] note*   
  
                                                    Diagnosis  
   
                                                      
  
  
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  
   
                                                      
  
  
Controlled type 2 diabetes mellitus without complication, without long-term 
current   
use of insulin (Multi)  
   
                                                      
  
  
Mixed hyperlipidemia  
   
                                                      
  
  
Secondary hypertension  
  
  
Other secondary hypertension, unspecified  
   
                                                      
  
  
Secondary hypertension- Primary  
  
  
Other secondary hypertension, unspecified  
   
                                                      
  
  
Medicare annual wellness visit, subsequent  
   
                                                      
  
  
Controlled type 2 diabetes mellitus without complication, without long-term 
current   
use of insulin (Multi)  
   
                                                      
  
  
Mixed hyperlipidemia  
   
                                                      
  
  
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  
   
                                                      
  
  
Memory loss- Primary  
   
                                                      
  
  
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  
   
                                                      
  
  
Controlled type 2 diabetes mellitus without complication, without long-term 
current   
use of insulin (Multi)  
   
                                                      
  
  
Secondary hypertension  
  
  
Other secondary hypertension, unspecified  
   
                                                      
  
  
Mixed hyperlipidemia  
   
                                                      
  
  
Mild dementia without behavioral disturbance, psychotic disturbance, mood   
disturbance, or anxiety, unspecified dementia type  
   
                                                      
  
  
Controlled type 2 diabetes mellitus without complication, without long-term 
current   
use of insulin (Multi)- Primary  
   
                                                      
  
  
Hiccup  
  
  
Hiccough  
   
                                                      
  
  
Mild dementia without behavioral disturbance, psychotic disturbance, mood   
disturbance, or anxiety, unspecified dementia type  
   
                                                      
  
  
Gastroesophageal reflux disease without esophagitis  
  
  
Esophageal reflux  
   
                                                      
  
  
Abnormal EKG- Primary  
  
  
Nonspecific abnormal electrocardiogram (ECG) (EKG)  
   
                                                      
  
  
Weight loss, unintentional  
  
  
Loss of weight  
   
                                                      
  
  
Gastroesophageal reflux disease without esophagitis  
  
  
Esophageal reflux  
   
                                                      
  
  
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  
   
                                                      
  
  
Hiccup  
  
  
Hiccough  
   
                                                      
  
  
Liver lesion- Primary  
  
  
Other specified disorders of liver  
   
                                                      
  
  
Abnormal EKG  
  
  
Nonspecific abnormal electrocardiogram (ECG) (EKG)  
   
                                                      
  
  
Weight loss, unintentional  
  
  
Loss of weight  
   
                                                      
  
  
Hypokalemia  
  
  
Hypopotassemia  
   
                                                      
  
  
Secondary hypertension  
  
  
Other secondary hypertension, unspecified  
   
                                                      
  
  
Abnormal EKG- Primary  
  
  
Nonspecific abnormal electrocardiogram (ECG) (EKG)  
   
                                                      
  
  
Weight loss, unintentional  
  
  
Loss of weight  
   
                                                      
  
  
Controlled type 2 diabetes mellitus without complication, without long-term 
current   
use of insulin (Multi)  
   
                                                      
  
  
Benign liver cyst  
  
  
Other specified disorders of liver  
   
                                                      
  
  
Secondary hypertension  
  
  
Other secondary hypertension, unspecified  
   
                                                      
  
  
Mild dementia without behavioral disturbance, psychotic disturbance, mood   
disturbance, or anxiety, unspecified dementia type  
   
                                                      
  
  
Gastroesophageal reflux disease without esophagitis  
  
  
Esophageal reflux  
   
                                                      
  
  
Mixed hyperlipidemia  
   
                                                      
  
  
Hypokalemia  
  
  
Hypopotassemia  
   
                                                      
  
  
Combined forms of age-related cataract of right eye- Primary  
   
                                                      
  
  
Class 2 obesity in adult  
   
                                                      
  
  
Malignant neoplasm of female breast  
  
  
Malignant neoplasm of breast (female), unspecified site  
   
                                                      
  
  
Malignant neoplasm of exocervix (Multi)  
  
  
Malignant neoplasm of exocervix  
  
documented in this encounter  
[facility]  
Work Phone: [phone] note*   
  
                                                    Diagnosis  
   
                                                      
  
  
Controlled type 2 diabetes mellitus without complication, without long-term 
current   
use of insulin (HCC)- Primary  
   
                                                      
  
  
Carcinoma in situ of vulva  
  
  
Carcinoma in situ, vulva  
   
                                                      
  
  
Postmenopausal bleeding  
   
                                                      
  
  
Status post cataract extraction and insertion of intraocular lens of right eye-   
Primary  
   
                                                      
  
  
Status post cataract extraction and insertion of intraocular lens of left eye  
  
[facility] in this encounter  
[facility]   
  
                                                    [facility]  
   
                                                      
  
  
Controlled type 2 diabetes mellitus without complication, without long-term 
current   
use of insulin (HCC)- Primary  
   
                                                      
  
  
Carcinoma in situ of vulva  
  
  
Carcinoma in situ, vulva  
   
                                                      
  
  
Postmenopausal bleeding  
   
                                                      
  
  
Status post cataract extraction and insertion of intraocular lens of right eye-   
Primary  
   
                                                      
  
  
Status post cataract extraction and insertion of intraocular lens of left eye  
  
documented in this encounter  
[facility]   
  
                                                    Diagn[facility]  
   
                                                      
  
  
Infiltrating ductal carcinoma of right female breast (Multi)- Primary  
   
                                                      
  
  
Controlled type 2 diabetes mellitus without complication, without long-term 
current   
use of insulin (Multi)  
   
                                                      
  
  
Mixed hyperlipidemia  
   
                                                      
  
  
Secondary hypertension  
  
  
Other secondary hypertension, unspecified  
   
                                                      
  
  
Secondary hypertension- Primary  
  
  
Other secondary hypertension, unspecified  
   
                                                      
  
  
Medicare annual wellness visit, subsequent  
   
                                                      
  
  
Controlled type 2 diabetes mellitus without complication, without long-term 
current   
use of insulin (Multi)  
   
                                                      
  
  
Mixed hyperlipidemia  
   
                                                      
  
  
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  
   
                                                      
  
  
Memory loss- Primary  
   
                                                      
  
  
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  
   
                                                      
  
  
Controlled type 2 diabetes mellitus without complication, without long-term 
current   
use of insulin (Multi)  
   
                                                      
  
  
Secondary hypertension  
  
  
Other secondary hypertension, unspecified  
   
                                                      
  
  
Mixed hyperlipidemia  
   
                                                      
  
  
Mild dementia without behavioral disturbance, psychotic disturbance, mood   
disturbance, or anxiety, unspecified dementia type (Multi)  
   
                                                      
  
  
Controlled type 2 diabetes mellitus without complication, without long-term 
current   
use of insulin (Multi)- Primary  
   
                                                      
  
  
Hiccup  
  
  
Hiccough  
   
                                                      
  
  
Mild dementia without behavioral disturbance, psychotic disturbance, mood   
disturbance, or anxiety, unspecified dementia type (Multi)  
   
                                                      
  
  
Gastroesophageal reflux disease without esophagitis  
  
  
Esophageal reflux  
   
                                                      
  
  
Abnormal EKG- Primary  
  
  
Nonspecific abnormal electrocardiogram (ECG) (EKG)  
   
                                                      
  
  
Weight loss, unintentional  
  
  
Loss of weight  
   
                                                      
  
  
Gastroesophageal reflux disease without esophagitis  
  
  
Esophageal reflux  
   
                                                      
  
  
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  
   
                                                      
  
  
Hiccup  
  
  
Hiccough  
   
                                                      
  
  
Weight loss, unintentional  
  
  
Loss of weight  
  
documented in this encounter  
[facility]  
Work Phone: [phone] note*   
  
                                                    Diagnosis  
   
                                                      
  
  
Infiltrating ductal carcinoma of right female breast (Multi)- Primary  
   
                                                      
  
  
Controlled type 2 diabetes mellitus without complication, without long-term 
current   
use of insulin (Multi)  
   
                                                      
  
  
Mixed hyperlipidemia  
   
                                                      
  
  
Secondary hypertension  
  
  
Other secondary hypertension, unspecified  
   
                                                      
  
  
Secondary hypertension- Primary  
  
  
Other secondary hypertension, unspecified  
   
                                                      
  
  
Medicare annual wellness visit, subsequent  
   
                                                      
  
  
Controlled type 2 diabetes mellitus without complication, without long-term 
current   
use of insulin (Multi)  
   
                                                      
  
  
Mixed hyperlipidemia  
   
                                                      
  
  
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  
   
                                                      
  
  
Memory loss- Primary  
   
                                                      
  
  
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  
   
                                                      
  
  
Controlled type 2 diabetes mellitus without complication, without long-term 
current   
use of insulin (Multi)  
   
                                                      
  
  
Secondary hypertension  
  
  
Other secondary hypertension, unspecified  
   
                                                      
  
  
Mixed hyperlipidemia  
   
                                                      
  
  
Mild dementia without behavioral disturbance, psychotic disturbance, mood   
disturbance, or anxiety, unspecified dementia type (Multi)  
   
                                                      
  
  
Controlled type 2 diabetes mellitus without complication, without long-term 
current   
use of insulin (Multi)- Primary  
   
                                                      
  
  
Hiccup  
  
  
Hiccough  
   
                                                      
  
  
Mild dementia without behavioral disturbance, psychotic disturbance, mood   
disturbance, or anxiety, unspecified dementia type (Multi)  
   
                                                      
  
  
Gastroesophageal reflux disease without esophagitis  
  
  
Esophageal reflux  
   
                                                      
  
  
Abnormal EKG- Primary  
  
  
Nonspecific abnormal electrocardiogram (ECG) (EKG)  
   
                                                      
  
  
Weight loss, unintentional  
  
  
Loss of weight  
   
                                                      
  
  
Gastroesophageal reflux disease without esophagitis  
  
  
Esophageal reflux  
   
                                                      
  
  
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  
   
                                                      
  
  
Hiccup  
  
  
Hiccough  
   
                                                      
  
  
Abnormal EKG  
  
  
Nonspecific abnormal electrocardiogram (ECG) (EKG)  
  
documented in this encounter  
[facility]  
Work Phone: [phone] note*   
  
                                                    Diagnosis  
   
                                                      
  
  
Infiltrating ductal carcinoma of right female breast (Multi)- Primary  
   
                                                      
  
  
Controlled type 2 diabetes mellitus without complication, without long-term 
current   
use of insulin (Multi)  
   
                                                      
  
  
Mixed hyperlipidemia  
   
                                                      
  
  
Secondary hypertension  
  
  
Other secondary hypertension, unspecified  
   
                                                      
  
  
Secondary hypertension- Primary  
  
  
Other secondary hypertension, unspecified  
   
                                                      
  
  
Medicare annual wellness visit, subsequent  
   
                                                      
  
  
Controlled type 2 diabetes mellitus without complication, without long-term 
current   
use of insulin (Multi)  
   
                                                      
  
  
Mixed hyperlipidemia  
   
                                                      
  
  
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  
   
                                                      
  
  
Memory loss- Primary  
   
                                                      
  
  
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  
   
                                                      
  
  
Controlled type 2 diabetes mellitus without complication, without long-term 
current   
use of insulin (Multi)  
   
                                                      
  
  
Secondary hypertension  
  
  
Other secondary hypertension, unspecified  
   
                                                      
  
  
Mixed hyperlipidemia  
   
                                                      
  
  
Mild dementia without behavioral disturbance, psychotic disturbance, mood   
disturbance, or anxiety, unspecified dementia type (Multi)  
   
                                                      
  
  
Controlled type 2 diabetes mellitus without complication, without long-term 
current   
use of insulin (Multi)- Primary  
   
                                                      
  
  
Hiccup  
  
  
Hiccough  
   
                                                      
  
  
Mild dementia without behavioral disturbance, psychotic disturbance, mood   
disturbance, or anxiety, unspecified dementia type (Multi)  
   
                                                      
  
  
Gastroesophageal reflux disease without esophagitis  
  
  
Esophageal reflux  
   
                                                      
  
  
Abnormal EKG- Primary  
  
  
Nonspecific abnormal electrocardiogram (ECG) (EKG)  
   
                                                      
  
  
Weight loss, unintentional  
  
  
Loss of weight  
   
                                                      
  
  
Gastroesophageal reflux disease without esophagitis  
  
  
Esophageal reflux  
   
                                                      
  
  
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  
   
                                                      
  
  
Hiccup  
  
  
Hiccough  
   
                                                      
  
  
Liver lesion- Primary  
  
  
Other specified disorders of liver  
   
                                                      
  
  
Abnormal EKG  
  
  
Nonspecific abnormal electrocardiogram (ECG) (EKG)  
   
                                                      
  
  
Weight loss, unintentional  
  
  
Loss of weight  
   
                                                      
  
  
Hypokalemia  
  
  
Hypopotassemia  
   
                                                      
  
  
Secondary hypertension  
  
  
Other secondary hypertension, unspecified  
  
documented in this encounter  
[facility]  
Work Phone: [phone] note*   
  
                                                    Diagnosis  
   
                                                      
  
  
Infiltrating ductal carcinoma of right female breast (Multi)- Primary  
   
                                                      
  
  
Controlled type 2 diabetes mellitus without complication, without long-term 
current   
use of insulin (Multi)  
   
                                                      
  
  
Mixed hyperlipidemia  
   
                                                      
  
  
Secondary hypertension  
  
  
Other secondary hypertension, unspecified  
   
                                                      
  
  
Secondary hypertension- Primary  
  
  
Other secondary hypertension, unspecified  
   
                                                      
  
  
Medicare annual wellness visit, subsequent  
   
                                                      
  
  
Controlled type 2 diabetes mellitus without complication, without long-term 
current   
use of insulin (Multi)  
   
                                                      
  
  
Mixed hyperlipidemia  
   
                                                      
  
  
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  
   
                                                      
  
  
Memory loss- Primary  
   
                                                      
  
  
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  
   
                                                      
  
  
Controlled type 2 diabetes mellitus without complication, without long-term 
current   
use of insulin (Multi)  
   
                                                      
  
  
Secondary hypertension  
  
  
Other secondary hypertension, unspecified  
   
                                                      
  
  
Mixed hyperlipidemia  
   
                                                      
  
  
Mild dementia without behavioral disturbance, psychotic disturbance, mood   
disturbance, or anxiety, unspecified dementia type (Multi)  
   
                                                      
  
  
Controlled type 2 diabetes mellitus without complication, without long-term 
current   
use of insulin (Multi)- Primary  
   
                                                      
  
  
Hiccup  
  
  
Hiccough  
   
                                                      
  
  
Mild dementia without behavioral disturbance, psychotic disturbance, mood   
disturbance, or anxiety, unspecified dementia type (Multi)  
   
                                                      
  
  
Gastroesophageal reflux disease without esophagitis  
  
  
Esophageal reflux  
   
                                                      
  
  
Abnormal EKG- Primary  
  
  
Nonspecific abnormal electrocardiogram (ECG) (EKG)  
   
                                                      
  
  
Weight loss, unintentional  
  
  
Loss of weight  
   
                                                      
  
  
Gastroesophageal reflux disease without esophagitis  
  
  
Esophageal reflux  
   
                                                      
  
  
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  
   
                                                      
  
  
Hiccup  
  
  
Hiccough  
   
                                                      
  
  
Liver lesion- Primary  
  
  
Other specified disorders of liver  
   
                                                      
  
  
Abnormal EKG  
  
  
Nonspecific abnormal electrocardiogram (ECG) (EKG)  
   
                                                      
  
  
Weight loss, unintentional  
  
  
Loss of weight  
   
                                                      
  
  
Hypokalemia  
  
  
Hypopotassemia  
   
                                                      
  
  
Secondary hypertension  
  
  
Other secondary hypertension, unspecified  
   
                                                      
  
  
Encounter for pre-operative cardiovascular clearance- Primary  
   
                                                      
  
  
Abnormal EKG  
  
  
Nonspecific abnormal electrocardiogram (ECG) (EKG)  
   
                                                      
  
  
Mitral valve prolapse determined by imaging  
  
documented in this encounter  
[facility]  
Work Phone: [phone] note*   
  
                                                    Diagnosis  
   
                                                      
  
  
Infiltrating ductal carcinoma of right female breast (Multi)- Primary  
   
                                                      
  
  
Controlled type 2 diabetes mellitus without complication, without long-term 
current   
use of insulin (Multi)  
   
                                                      
  
  
Mixed hyperlipidemia  
   
                                                      
  
  
Secondary hypertension  
  
  
Other secondary hypertension, unspecified  
   
                                                      
  
  
Secondary hypertension- Primary  
  
  
Other secondary hypertension, unspecified  
   
                                                      
  
  
Medicare annual wellness visit, subsequent  
   
                                                      
  
  
Controlled type 2 diabetes mellitus without complication, without long-term 
current   
use of insulin (Multi)  
   
                                                      
  
  
Mixed hyperlipidemia  
   
                                                      
  
  
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  
   
                                                      
  
  
Memory loss- Primary  
   
                                                      
  
  
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  
   
                                                      
  
  
Controlled type 2 diabetes mellitus without complication, without long-term 
current   
use of insulin (Multi)  
   
                                                      
  
  
Secondary hypertension  
  
  
Other secondary hypertension, unspecified  
   
                                                      
  
  
Mixed hyperlipidemia  
   
                                                      
  
  
Mild dementia without behavioral disturbance, psychotic disturbance, mood   
disturbance, or anxiety, unspecified dementia type (Multi)  
   
                                                      
  
  
Controlled type 2 diabetes mellitus without complication, without long-term 
current   
use of insulin (Multi)- Primary  
   
                                                      
  
  
Hiccup  
  
  
Hiccough  
   
                                                      
  
  
Mild dementia without behavioral disturbance, psychotic disturbance, mood   
disturbance, or anxiety, unspecified dementia type (Multi)  
   
                                                      
  
  
Gastroesophageal reflux disease without esophagitis  
  
  
Esophageal reflux  
   
                                                      
  
  
Abnormal EKG- Primary  
  
  
Nonspecific abnormal electrocardiogram (ECG) (EKG)  
   
                                                      
  
  
Weight loss, unintentional  
  
  
Loss of weight  
   
                                                      
  
  
Gastroesophageal reflux disease without esophagitis  
  
  
Esophageal reflux  
   
                                                      
  
  
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  
   
                                                      
  
  
Hiccup  
  
  
Hiccough  
   
                                                      
  
  
Liver lesion- Primary  
  
  
Other specified disorders of liver  
   
                                                      
  
  
Abnormal EKG  
  
  
Nonspecific abnormal electrocardiogram (ECG) (EKG)  
   
                                                      
  
  
Weight loss, unintentional  
  
  
Loss of weight  
   
                                                      
  
  
Hypokalemia  
  
  
Hypopotassemia  
   
                                                      
  
  
Secondary hypertension  
  
  
Other secondary hypertension, unspecified  
   
                                                      
  
  
Liver lesion  
  
  
Other specified disorders of liver  
  
documented in this encounter  
[facility]  
Work Phone: [phone] note*   
  
                                                    Diagnosis  
   
                                                      
  
  
Infiltrating ductal carcinoma of right female breast (Multi)- Primary  
   
                                                      
  
  
Controlled type 2 diabetes mellitus without complication, without long-term 
current   
use of insulin (Multi)  
   
                                                      
  
  
Mixed hyperlipidemia  
   
                                                      
  
  
Secondary hypertension  
  
  
Other secondary hypertension, unspecified  
   
                                                      
  
  
Secondary hypertension- Primary  
  
  
Other secondary hypertension, unspecified  
   
                                                      
  
  
Medicare annual wellness visit, subsequent  
   
                                                      
  
  
Controlled type 2 diabetes mellitus without complication, without long-term 
current   
use of insulin (Multi)  
   
                                                      
  
  
Mixed hyperlipidemia  
   
                                                      
  
  
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  
   
                                                      
  
  
Memory loss- Primary  
   
                                                      
  
  
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  
   
                                                      
  
  
Controlled type 2 diabetes mellitus without complication, without long-term 
current   
use of insulin (Multi)  
   
                                                      
  
  
Secondary hypertension  
  
  
Other secondary hypertension, unspecified  
   
                                                      
  
  
Mixed hyperlipidemia  
   
                                                      
  
  
Mild dementia without behavioral disturbance, psychotic disturbance, mood   
disturbance, or anxiety, unspecified dementia type (Multi)  
   
                                                      
  
  
Controlled type 2 diabetes mellitus without complication, without long-term 
current   
use of insulin (Multi)- Primary  
   
                                                      
  
  
Hiccup  
  
  
Hiccough  
   
                                                      
  
  
Mild dementia without behavioral disturbance, psychotic disturbance, mood   
disturbance, or anxiety, unspecified dementia type (Multi)  
   
                                                      
  
  
Gastroesophageal reflux disease without esophagitis  
  
  
Esophageal reflux  
   
                                                      
  
  
Abnormal EKG- Primary  
  
  
Nonspecific abnormal electrocardiogram (ECG) (EKG)  
   
                                                      
  
  
Weight loss, unintentional  
  
  
Loss of weight  
   
                                                      
  
  
Gastroesophageal reflux disease without esophagitis  
  
  
Esophageal reflux  
   
                                                      
  
  
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  
   
                                                      
  
  
Hiccup  
  
  
Hiccough  
   
                                                      
  
  
Liver lesion- Primary  
  
  
Other specified disorders of liver  
   
                                                      
  
  
Abnormal EKG  
  
  
Nonspecific abnormal electrocardiogram (ECG) (EKG)  
   
                                                      
  
  
Weight loss, unintentional  
  
  
Loss of weight  
   
                                                      
  
  
Hypokalemia  
  
  
Hypopotassemia  
   
                                                      
  
  
Secondary hypertension  
  
  
Other secondary hypertension, unspecified  
   
                                                      
  
  
COVID- Primary  
   
                                                      
  
  
Acute upper respiratory infection  
  
  
Acute upper respiratory infections of unspecified site  
  
documented in this encounter  
[facility]  
Work Phone: [phone] note*   
  
                                                    Diagnosis  
   
                                                      
  
  
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  
   
                                                      
  
  
Controlled type 2 diabetes mellitus without complication, without long-term 
current   
use of insulin (Multi)  
   
                                                      
  
  
Mixed hyperlipidemia  
   
                                                      
  
  
Secondary hypertension  
  
  
Other secondary hypertension, unspecified  
   
                                                      
  
  
Secondary hypertension- Primary  
  
  
Other secondary hypertension, unspecified  
   
                                                      
  
  
Medicare annual wellness visit, subsequent  
   
                                                      
  
  
Controlled type 2 diabetes mellitus without complication, without long-term 
current   
use of insulin (Multi)  
   
                                                      
  
  
Mixed hyperlipidemia  
   
                                                      
  
  
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  
   
                                                      
  
  
Memory loss- Primary  
   
                                                      
  
  
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  
   
                                                      
  
  
Controlled type 2 diabetes mellitus without complication, without long-term 
current   
use of insulin (Multi)  
   
                                                      
  
  
Secondary hypertension  
  
  
Other secondary hypertension, unspecified  
   
                                                      
  
  
Mixed hyperlipidemia  
   
                                                      
  
  
Mild dementia without behavioral disturbance, psychotic disturbance, mood   
disturbance, or anxiety, unspecified dementia type  
   
                                                      
  
  
Controlled type 2 diabetes mellitus without complication, without long-term 
current   
use of insulin (Multi)- Primary  
   
                                                      
  
  
Hiccup  
  
  
Hiccough  
   
                                                      
  
  
Mild dementia without behavioral disturbance, psychotic disturbance, mood   
disturbance, or anxiety, unspecified dementia type  
   
                                                      
  
  
Gastroesophageal reflux disease without esophagitis  
  
  
Esophageal reflux  
   
                                                      
  
  
Abnormal EKG- Primary  
  
  
Nonspecific abnormal electrocardiogram (ECG) (EKG)  
   
                                                      
  
  
Weight loss, unintentional  
  
  
Loss of weight  
   
                                                      
  
  
Gastroesophageal reflux disease without esophagitis  
  
  
Esophageal reflux  
   
                                                      
  
  
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  
   
                                                      
  
  
Hiccup  
  
  
Hiccough  
   
                                                      
  
  
Liver lesion- Primary  
  
  
Other specified disorders of liver  
   
                                                      
  
  
Abnormal EKG  
  
  
Nonspecific abnormal electrocardiogram (ECG) (EKG)  
   
                                                      
  
  
Weight loss, unintentional  
  
  
Loss of weight  
   
                                                      
  
  
Hypokalemia  
  
  
Hypopotassemia  
   
                                                      
  
  
Secondary hypertension  
  
  
Other secondary hypertension, unspecified  
   
                                                      
  
  
Abnormal EKG- Primary  
  
  
Nonspecific abnormal electrocardiogram (ECG) (EKG)  
   
                                                      
  
  
Weight loss, unintentional  
  
  
Loss of weight  
   
                                                      
  
  
Controlled type 2 diabetes mellitus without complication, without long-term 
current   
use of insulin (Multi)  
   
                                                      
  
  
Benign liver cyst  
  
  
Other specified disorders of liver  
   
                                                      
  
  
Secondary hypertension  
  
  
Other secondary hypertension, unspecified  
   
                                                      
  
  
Mild dementia without behavioral disturbance, psychotic disturbance, mood   
disturbance, or anxiety, unspecified dementia type  
   
                                                      
  
  
Gastroesophageal reflux disease without esophagitis  
  
  
Esophageal reflux  
   
                                                      
  
  
Mixed hyperlipidemia  
   
                                                      
  
  
Hypokalemia  
  
  
Hypopotassemia  
  
documented in this encounter  
[facility]  
Work Phone: [phone] note*   
  
                                                    Diagnosis  
   
                                                      
  
  
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  
   
                                                      
  
  
Controlled type 2 diabetes mellitus without complication, without long-term 
current   
use of insulin (Multi)  
   
                                                      
  
  
Mixed hyperlipidemia  
   
                                                      
  
  
Secondary hypertension  
  
  
Other secondary hypertension, unspecified  
   
                                                      
  
  
Secondary hypertension- Primary  
  
  
Other secondary hypertension, unspecified  
   
                                                      
  
  
Medicare annual wellness visit, subsequent  
   
                                                      
  
  
Controlled type 2 diabetes mellitus without complication, without long-term 
current   
use of insulin (Multi)  
   
                                                      
  
  
Mixed hyperlipidemia  
   
                                                      
  
  
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  
   
                                                      
  
  
Memory loss- Primary  
   
                                                      
  
  
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  
   
                                                      
  
  
Controlled type 2 diabetes mellitus without complication, without long-term 
current   
use of insulin (Multi)  
   
                                                      
  
  
Secondary hypertension  
  
  
Other secondary hypertension, unspecified  
   
                                                      
  
  
Mixed hyperlipidemia  
   
                                                      
  
  
Mild dementia without behavioral disturbance, psychotic disturbance, mood   
disturbance, or anxiety, unspecified dementia type  
   
                                                      
  
  
Controlled type 2 diabetes mellitus without complication, without long-term 
current   
use of insulin (Multi)- Primary  
   
                                                      
  
  
Hiccup  
  
  
Hiccough  
   
                                                      
  
  
Mild dementia without behavioral disturbance, psychotic disturbance, mood   
disturbance, or anxiety, unspecified dementia type  
   
                                                      
  
  
Gastroesophageal reflux disease without esophagitis  
  
  
Esophageal reflux  
   
                                                      
  
  
Abnormal EKG- Primary  
  
  
Nonspecific abnormal electrocardiogram (ECG) (EKG)  
   
                                                      
  
  
Weight loss, unintentional  
  
  
Loss of weight  
   
                                                      
  
  
Gastroesophageal reflux disease without esophagitis  
  
  
Esophageal reflux  
   
                                                      
  
  
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  
   
                                                      
  
  
Hiccup  
  
  
Hiccough  
   
                                                      
  
  
Liver lesion- Primary  
  
  
Other specified disorders of liver  
   
                                                      
  
  
Abnormal EKG  
  
  
Nonspecific abnormal electrocardiogram (ECG) (EKG)  
   
                                                      
  
  
Weight loss, unintentional  
  
  
Loss of weight  
   
                                                      
  
  
Hypokalemia  
  
  
Hypopotassemia  
   
                                                      
  
  
Secondary hypertension  
  
  
Other secondary hypertension, unspecified  
   
                                                      
  
  
Abnormal EKG- Primary  
  
  
Nonspecific abnormal electrocardiogram (ECG) (EKG)  
   
                                                      
  
  
Weight loss, unintentional  
  
  
Loss of weight  
   
                                                      
  
  
Controlled type 2 diabetes mellitus without complication, without long-term 
current   
use of insulin (Multi)  
   
                                                      
  
  
Benign liver cyst  
  
  
Other specified disorders of liver  
   
                                                      
  
  
Secondary hypertension  
  
  
Other secondary hypertension, unspecified  
   
                                                      
  
  
Mild dementia without behavioral disturbance, psychotic disturbance, mood   
disturbance, or anxiety, unspecified dementia type  
   
                                                      
  
  
Gastroesophageal reflux disease without esophagitis  
  
  
Esophageal reflux  
   
                                                      
  
  
Mixed hyperlipidemia  
   
                                                      
  
  
Hypokalemia  
  
  
Hypopotassemia  
   
                                                      
  
  
Abnormal EKG  
  
  
Nonspecific abnormal electrocardiogram (ECG) (EKG)  
   
                                                      
  
  
Abnormal EKG  
  
  
Nonspecific abnormal electrocardiogram (ECG) (EKG)  
   
                                                      
  
  
Encounter for other preprocedural examination  
  
documented in this encounter  
[facility]  
Work Phone: [phone] note*   
  
                                                    Diagnosis  
   
                                                      
  
  
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  
   
                                                      
  
  
Controlled type 2 diabetes mellitus without complication, without long-term 
current   
use of insulin (Multi)  
   
                                                      
  
  
Mixed hyperlipidemia  
   
                                                      
  
  
Secondary hypertension  
  
  
Other secondary hypertension, unspecified  
   
                                                      
  
  
Secondary hypertension- Primary  
  
  
Other secondary hypertension, unspecified  
   
                                                      
  
  
Medicare annual wellness visit, subsequent  
   
                                                      
  
  
Controlled type 2 diabetes mellitus without complication, without long-term 
current   
use of insulin (Multi)  
   
                                                      
  
  
Mixed hyperlipidemia  
   
                                                      
  
  
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  
   
                                                      
  
  
Memory loss- Primary  
   
                                                      
  
  
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  
   
                                                      
  
  
Controlled type 2 diabetes mellitus without complication, without long-term 
current   
use of insulin (Multi)  
   
                                                      
  
  
Secondary hypertension  
  
  
Other secondary hypertension, unspecified  
   
                                                      
  
  
Mixed hyperlipidemia  
   
                                                      
  
  
Mild dementia without behavioral disturbance, psychotic disturbance, mood   
disturbance, or anxiety, unspecified dementia type  
   
                                                      
  
  
Controlled type 2 diabetes mellitus without complication, without long-term 
current   
use of insulin (Multi)- Primary  
   
                                                      
  
  
Hiccup  
  
  
Hiccough  
   
                                                      
  
  
Mild dementia without behavioral disturbance, psychotic disturbance, mood   
disturbance, or anxiety, unspecified dementia type  
   
                                                      
  
  
Gastroesophageal reflux disease without esophagitis  
  
  
Esophageal reflux  
   
                                                      
  
  
Abnormal EKG- Primary  
  
  
Nonspecific abnormal electrocardiogram (ECG) (EKG)  
   
                                                      
  
  
Weight loss, unintentional  
  
  
Loss of weight  
   
                                                      
  
  
Gastroesophageal reflux disease without esophagitis  
  
  
Esophageal reflux  
   
                                                      
  
  
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  
   
                                                      
  
  
Hiccup  
  
  
Hiccough  
   
                                                      
  
  
Liver lesion- Primary  
  
  
Other specified disorders of liver  
   
                                                      
  
  
Abnormal EKG  
  
  
Nonspecific abnormal electrocardiogram (ECG) (EKG)  
   
                                                      
  
  
Weight loss, unintentional  
  
  
Loss of weight  
   
                                                      
  
  
Hypokalemia  
  
  
Hypopotassemia  
   
                                                      
  
  
Secondary hypertension  
  
  
Other secondary hypertension, unspecified  
   
                                                      
  
  
Abnormal EKG- Primary  
  
  
Nonspecific abnormal electrocardiogram (ECG) (EKG)  
   
                                                      
  
  
Weight loss, unintentional  
  
  
Loss of weight  
   
                                                      
  
  
Controlled type 2 diabetes mellitus without complication, without long-term 
current   
use of insulin (Multi)  
   
                                                      
  
  
Benign liver cyst  
  
  
Other specified disorders of liver  
   
                                                      
  
  
Secondary hypertension  
  
  
Other secondary hypertension, unspecified  
   
                                                      
  
  
Mild dementia without behavioral disturbance, psychotic disturbance, mood   
disturbance, or anxiety, unspecified dementia type  
   
                                                      
  
  
Gastroesophageal reflux disease without esophagitis  
  
  
Esophageal reflux  
   
                                                      
  
  
Mixed hyperlipidemia  
   
                                                      
  
  
Hypokalemia  
  
  
Hypopotassemia  
   
                                                      
  
  
Abnormal EKG  
  
  
Nonspecific abnormal electrocardiogram (ECG) (EKG)  
   
                                                      
  
  
Abnormal EKG  
  
  
Nonspecific abnormal electrocardiogram (ECG) (EKG)  
   
                                                      
  
  
Encounter for other preprocedural examination  
  
documented in this encounter  
[facility]  
Work Phone: [phone] note*   
  
                                                    Diagnosis  
   
                                                      
  
  
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  
   
                                                      
  
  
Controlled type 2 diabetes mellitus without complication, without long-term 
current   
use of insulin (Multi)  
   
                                                      
  
  
Mixed hyperlipidemia  
   
                                                      
  
  
Secondary hypertension  
  
  
Other secondary hypertension, unspecified  
   
                                                      
  
  
Secondary hypertension- Primary  
  
  
Other secondary hypertension, unspecified  
   
                                                      
  
  
Medicare annual wellness visit, subsequent  
   
                                                      
  
  
Controlled type 2 diabetes mellitus without complication, without long-term 
current   
use of insulin (Multi)  
   
                                                      
  
  
Mixed hyperlipidemia  
   
                                                      
  
  
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  
   
                                                      
  
  
Memory loss- Primary  
   
                                                      
  
  
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  
   
                                                      
  
  
Controlled type 2 diabetes mellitus without complication, without long-term 
current   
use of insulin (Multi)  
   
                                                      
  
  
Secondary hypertension  
  
  
Other secondary hypertension, unspecified  
   
                                                      
  
  
Mixed hyperlipidemia  
   
                                                      
  
  
Mild dementia without behavioral disturbance, psychotic disturbance, mood   
disturbance, or anxiety, unspecified dementia type  
   
                                                      
  
  
Controlled type 2 diabetes mellitus without complication, without long-term 
current   
use of insulin (Multi)- Primary  
   
                                                      
  
  
Hiccup  
  
  
Hiccough  
   
                                                      
  
  
Mild dementia without behavioral disturbance, psychotic disturbance, mood   
disturbance, or anxiety, unspecified dementia type  
   
                                                      
  
  
Gastroesophageal reflux disease without esophagitis  
  
  
Esophageal reflux  
   
                                                      
  
  
Abnormal EKG- Primary  
  
  
Nonspecific abnormal electrocardiogram (ECG) (EKG)  
   
                                                      
  
  
Weight loss, unintentional  
  
  
Loss of weight  
   
                                                      
  
  
Gastroesophageal reflux disease without esophagitis  
  
  
Esophageal reflux  
   
                                                      
  
  
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  
   
                                                      
  
  
Hiccup  
  
  
Hiccough  
   
                                                      
  
  
Liver lesion- Primary  
  
  
Other specified disorders of liver  
   
                                                      
  
  
Abnormal EKG  
  
  
Nonspecific abnormal electrocardiogram (ECG) (EKG)  
   
                                                      
  
  
Weight loss, unintentional  
  
  
Loss of weight  
   
                                                      
  
  
Hypokalemia  
  
  
Hypopotassemia  
   
                                                      
  
  
Secondary hypertension  
  
  
Other secondary hypertension, unspecified  
   
                                                      
  
  
Abnormal EKG- Primary  
  
  
Nonspecific abnormal electrocardiogram (ECG) (EKG)  
   
                                                      
  
  
Weight loss, unintentional  
  
  
Loss of weight  
   
                                                      
  
  
Controlled type 2 diabetes mellitus without complication, without long-term 
current   
use of insulin (Multi)  
   
                                                      
  
  
Benign liver cyst  
  
  
Other specified disorders of liver  
   
                                                      
  
  
Secondary hypertension  
  
  
Other secondary hypertension, unspecified  
   
                                                      
  
  
Mild dementia without behavioral disturbance, psychotic disturbance, mood   
disturbance, or anxiety, unspecified dementia type  
   
                                                      
  
  
Gastroesophageal reflux disease without esophagitis  
  
  
Esophageal reflux  
   
                                                      
  
  
Mixed hyperlipidemia  
   
                                                      
  
  
Hypokalemia  
  
  
Hypopotassemia  
   
                                                      
  
  
Encounter for pre-operative cardiovascular clearance- Primary  
  
documented in this encounter  
[facility]  
Work Phone: [phone] note*   
  
                                                    Diagnosis  
   
                                                      
  
  
Controlled type 2 diabetes mellitus without complication, without long-term 
current   
use of insulin (HCC)- Primary  
   
                                                      
  
  
Carcinoma in situ of vulva  
  
  
Carcinoma in situ, vulva  
   
                                                      
  
  
Postmenopausal bleeding  
   
                                                      
  
  
Status post cataract extraction and insertion of intraocular lens of right eye-   
Primary  
   
                                                      
  
  
Status post cataract extraction and insertion of intraocular lens of left eye  
  
documented in this encounter  
[facility] Discharge instructions* Attachments  
  
The following attachments cannot be sent through Care Everywhere.  
  
* Dizziness, Adult ED (English)  
* Nausea and Vomiting, Adult ED (English)  
  
[facility]  
Work Phone: [phone] for referral (narrative)* Consultation 
  (Routine) - Authorized  
  
                          Specialty    Diagnoses / Procedures Referred By Contac  
t Referred To Contact  
   
                                        Primary Care          
  
  
Procedures  
  
  
Follow Up In Primary Care                 
  
  
Sharifa Almaguer DO  
  
  
 [facility]   
[place] [address]  
  
  
Phone: [phone]  
  
  
Fax: [phone]                         
  
  
  
  
  
                    Referral ID Status    Reason    Start Date Expiration Date V  
isits   
Requested                               Visits   
Authorized  
   
                [id number] Authorized         10/31/2023 10/30/2024 1       1  
  
  
  
  
Electronically signed by Sharifa Almaguer DO at 10/31/2023 4:04 PM EDT  
  
  
[facility]  
Work Phone: [phone] for referral (narrative)* Diagnostic Procedure 
  Only (Routine) - Closed  
  
                          Specialty    Diagnoses / Procedures Referred By Contac  
t Referred To Contact  
   
                                        [facility]            
  
  
Diagnoses  
  
  
Encounter for screening   
mammogram for malignant   
neoplasm of breast  
  
  
  
Procedures  
  
  
JONY SCREENING W HUGO  
  
  
SCREENING BREAST DGTL HUGO   
UNI/BILAT ADD ON  
  
  
SCREENING MAMMOGRAPHY BI   
2-VIEW BREAST INC CAD                     
  
  
Colten Abbott,   
  
  
[address] [place] [address]  
  
  
Phone: [phone]  
  
  
Fax: [phone]                         
  
  
[facility]  
  
  
[address] [place] [address]   
[id number]  
  
  
  
                    Referral ID Status    Reason    Start Date Expiration Date V  
isits   
Requested                               Visits   
Authorized  
   
                                [id number]        Closed            
  
  
Auto-Generate  
d Referral      12/15/2021      2023       1               1  
  
  
  
  
Electronically signed by Colten Abbott DO at 2022 9:14 AM EDT  
  
  
[facility] for referral (narrative)* Diagnostic Procedure Only 
  (Routine) - Closed  
  
                          Specialty    Diagnoses / Procedures Referred By Contac  
t Referred To Contact  
   
                                        [facility]            
  
  
Diagnoses  
  
  
Malignant neoplasm of right   
breast in female, estrogen   
receptor positive,   
unspecified site of breast   
(HCC)  
  
  
Encounter for screening   
mammogram for high-risk   
patient  
  
  
  
Procedures  
  
  
JONY SCREENING W HUGO  
  
  
SCREENING DIGITAL BREAST   
TOMOSYNTHESIS BI  
  
  
SCREENING MAMMOGRAPHY BI   
2-VIEW BREAST INC CAD                     
  
  
Rosa Murdock APRN.CNP  
  
  
[address] [place] [address]  
  
  
Phone: [phone]  
  
  
Fax: [phone]                         
  
  
[facility]  
  
  
[address] [place]   
[address]  
  
  
  
                    Referral ID Status    Reason    Start Date Expiration Date V  
isits   
Requested                               Visits   
Authorized  
   
                                [id number]        Closed            
  
  
Auto-Generate  
d Referral      2022       1               1  
  
  
  
  
Electronically signed by Rosa Murdock APRN.CNP at 2023 9:22 AM EST  
  
  
[facility] for referral (narrative)* Consultation (Routine) - 
  Authorized  
  
                          Specialty    Diagnoses / Procedures Referred By Contac  
t Referred To Contact  
   
                                        Primary Care          
  
  
Procedures  
  
  
Follow Up In Primary Care                 
  
  
Sharifa Almaguer DO  
  
  
 [facility]  
  
  
[facility]   
[place] [address]  
  
  
Phone: [phone]  
  
  
Fax: [phone]                         
  
  
  
  
  
                    Referral ID Status    Reason    Start Date Expiration Date V  
isits   
Requested                               Visits   
Authorized  
   
                [id number] Authorized         2023 1       1  
  
  
  
  
Electronically signed by Sharifa Almaguer DO at 2023 11:17 AM [facility]  
Work Phone: [phone] for referral (narrative)* Consultation 
  (Routine) - Authorized  
  
                          Specialty    Diagnoses / Procedures Referred By Contac  
t Referred To Contact  
   
                                        Primary Care          
  
  
Procedures  
  
  
Follow Up In Primary Care                 
  
  
Sharifa AlmaguerDO  
  
  
 [facility]   
[place] [address]  
  
  
Phone: [phone]  
  
  
Fax: [phone]                         
  
  
  
  
  
                    Referral ID Status    Reason    Start Date Expiration Date V  
isits   
Requested                               Visits   
Authorized  
   
                [id number] Authorized         2024 1       1  
  
  
  
  
Electronically signed by Sharifa Almaguer DO at 2024 11:23 AM [facility]  
Work Phone: [phone] for referral (narrative)* Consultation 
  (Routine) - Authorized  
  
                          Specialty    Diagnoses / Procedures Referred By Contac  
t Referred To Contact  
   
                                        Primary Care          
  
  
Procedures  
  
  
Follow Up In Primary Care                 
  
  
Sharifa AlmaguerDO  
  
  
 [place] [address]  
  
  
Phone: [phone]  
  
  
Fax: [phone]                         
  
  
  
  
  
                    Referral ID Status    Reason    Start Date Expiration Date V  
isits   
Requested                               Visits   
Authorized  
   
                [id number] Authorized         2024 1       1  
  
  
  
  
Electronically signed by Sharifa Almaguer DO at 2024 11:29 AM [facility]  
Work Phone: [phone] for referral (narrative)* Diagnostic Procedure 
  Only (Routine) - New Request  
  
                          Specialty    Diagnoses / Procedures Referred By Contac  
t Referred To Contact  
   
                                        [facility]            
  
  
Diagnoses  
  
  
Encounter for gynecological   
examination (general)   
(routine) without abnormal   
findings  
  
  
Encounter for screening   
mammogram for breast cancer  
  
  
  
Procedures  
  
  
JONY SCREENING W HUGO  
  
  
SCREENING DIGITAL BREAST   
TOMOSYNTHESIS BI  
  
  
SCREENING MAMMOGRAPHY BI   
2-VIEW BREAST INC CAD                     
  
  
Jose Delgado  [address]  
  
  
[place] [address]  
  
  
Phone: [phone]  
  
  
Fax: [phone]                         
  
  
[facility]  
  
  
[address] [place]   
[address]  
  
  
  
                          Referral ID  Status       Reason       Start   
Date                                    Expiration   
Date                                    Visits   
Requested                               Visits   
Authorized  
   
                                [id number]        New Request       
  
  
Auto-Generat  
ed Referral                               
4                   2025          1                   1  
  
  
  
  
Electronically signed by Jose Delgado MD at 2024 3:40 PM [facility] for referral (narrative)* Diagnostic Procedure Only 
  (Routine) - Authorized  
  
                          Specialty    Diagnoses / Procedures Referred By Contac  
t Referred To Contact  
   
                                        [facility]            
  
  
Diagnoses  
  
  
Encounter for screening   
mammogram for breast cancer  
  
  
  
Procedures  
  
  
JONY SCREENING W HUGO  
  
  
SCREENING DIGITAL BREAST   
TOMOSYNTHESIS BI  
  
  
SCREENING MAMMOGRAPHY BI   
2-VIEW BREAST INC CAD                     
  
  
Colten Abbott DO  
  
  
[address] [place] [address]  
  
  
Phone: [phone]  
  
  
Fax: [phone]                         
  
  
[facility]  
  
  
[address] [place]   
[address]  
  
  
  
                          Referral ID  Status       Reason       Start   
Date                                    Expiration   
Date                                    Visits   
Requested                               Visits   
Authorized  
   
                                [id number]        Authorized        
  
  
Auto-Generat  
ed Referral                               
4                   2025          1                   1  
  
  
  
  
Electronically signed by Colten Abbott DO at 2024 10:21 AM [facility] for referral (narrative)* Consultation (Routine) - 
  Authorized  
  
                          Specialty    Diagnoses / Procedures Referred By Contac  
t Referred To Contact  
   
                                        Cardiology            
  
  
Diagnoses  
  
  
Abnormal EKG  
                                          
  
  
Sharifa Almaguer DO  
  
  
 [facility]   
[place] [id number]  
  
  
Phone: [phone]  
  
  
Fax: [phone]                         
  
  
  
  
  
                          Referral ID  Status       Reason       Start   
Date                                    Expiration   
Date                                    Visits   
Requested                               Visits   
Authorized  
   
                                [id number]         Authorized        
  
  
Specialty   
Services   
Required        2024       1               1  
  
  
  
  
Electronically signed by Sharifa Almaguer DO at 2024 11:41 AM EDT  
  
  
* Imaging (Routine) - Authorized  
  
                          Specialty    Diagnoses / Procedures Referred By Contac  
t Referred To Contact  
   
                                        Radiology             
  
  
Diagnoses  
  
  
Liver lesion  
  
  
  
Procedures  
  
  
US abdomen limited liver                  
  
  
Sharifa Almaguer DO  
  
  
 [facility]   
[place] [id number]  
  
  
Phone: [phone]  
  
  
Fax: [phone]                         
  
  
  
  
  
                          Referral ID  Status       Reason       Start   
Date                                    Expiration   
Date                                    Visits   
Requested                               Visits   
Authorized  
   
                                [id number]         Authorized        
  
  
Perform   
Procedure       2024       1               1  
  
  
  
  
Electronically signed by Sharifa Almaguer DO at 2024 11:41 AM [facility]  
Work Phone: [phone] for referral (narrative)* Consultation 
  (Routine) - Authorized  
  
                          Specialty    Diagnoses / Procedures Referred By Contac  
t Referred To Contact  
   
                                        Primary Care          
  
  
Procedures  
  
  
Follow Up In Primary Care                 
  
  
Sharifa Almaguer DO  
  
  
[address] [facility] [address]  
  
  
[place] [address]  
  
  
Phone: [phone]  
  
  
Fax: [phone]                         
  
  
  
  
  
                    Referral ID Status    Reason    Start Date Expiration Date V  
isits   
Requested                               Visits   
Authorized  
   
                [id number] Authorized         2024 1       1  
  
  
  
  
Electronically signed by Sharifa Almaguer DO at 2024 10:35 AM [facility]  
Work Phone: [phone] for visit Narrative* Diagnostic Procedure Only 
  (Routine) - Closed  
  
                          Specialty    Diagnoses / Procedures Referred By Contac  
t Referred To Contact  
   
                                        [facility]            
  
  
Diagnoses  
  
  
Encounter for screening   
mammogram for malignant   
neoplasm of breast  
  
  
  
Procedures  
  
  
JONY SCREENING W HUGO  
  
  
SCREENING BREAST DGTL HUGO   
UNI/BILAT ADD ON  
  
  
SCREENING MAMMOGRAPHY BI   
2-VIEW BREAST INC CAD                     
  
  
Colten Abbott, DO  
  
  
[address]  
  
  
[place] [address]  
  
  
Phone: [phone]  
  
  
Fax: [phone]                         
  
  
[facility]  
  
  
[address] [place]   
[address]  
  
  
  
                    Referral ID Status    Reason    Start Date Expiration Date V  
isits   
Requested                               Visits   
Authorized  
   
                                [id number]        Closed            
  
  
Auto-Generate  
d Referral      12/15/2021      2023       1               1  
  
  
  
[facility] for visit Narrative* Diagnostic Procedure Only (Routine) 
  - Closed  
  
                          Specialty    Diagnoses / Procedures Referred By Contac  
t Referred To Contact  
   
                                        [facility]            
  
  
Diagnoses  
  
  
Malignant neoplasm of right   
breast in female, estrogen   
receptor positive,   
unspecified site of breast   
(HCC)  
  
  
Encounter for screening   
mammogram for high-risk   
patient  
  
  
  
Procedures  
  
  
JONY SCREENING W HUGO  
  
  
SCREENING DIGITAL BREAST   
TOMOSYNTHESIS BI  
  
  
SCREENING MAMMOGRAPHY BI   
2-VIEW BREAST INC CAD                     
  
  
Rosa Murdock APRN.CNP  
  
  
[address]  
  
  
[place] [address]  
  
  
Phone: [phone]  
  
  
Fax: [phone]                         
  
  
[facility]  
  
  
[address] [place]   
[address]  
  
  
  
                    Referral ID Status    Reason    Start Date Expiration Date V  
isits   
Requested                               Visits   
Authorized  
   
                                [id number]        Closed            
  
  
Auto-Generate  
d Referral      2022       1               1  
  
  
  
[facility] for visit Narrative* Consultation (Routine) - Authorized  
  
                          Specialty    Diagnoses / Procedures Referred By Contac  
t Referred To Contact  
   
                                        Primary Care          
  
  
Procedures  
  
  
Follow Up In Primary Care                 
  
  
Sharifa Almaguer DO  
  
  
[address]  
  
  
[facility]   
[place] [address]  
  
  
Phone: [phone]  
  
  
Fax: [phone]                         
  
  
  
  
  
                    Referral ID Status    Reason    Start Date Expiration Date V  
isits   
Requested                               Visits   
Authorized  
   
                [id number] Authorized         10/31/2023 10/30/2024 1       1  
  
  
  
[facility]  
Work Phone: [phone] for visit Narrative* Auth/Cert  
  
                          Specialty    Diagnoses / Procedures Referred By Contac  
t Referred To Contact  
   
                                                              
  
  
Diagnoses  
  
  
Combined forms of   
age-related cataract of   
left eye  
  
  
Combined forms of   
age-related cataract of   
left eye [H25.812]  
  
  
  
Procedures  
  
  
NV XCAPSL CTRC RMVL INSJ IO   
LENS PROSTH W/O ECP  
  
  
Phacoemulsification   
Cataract with Insertion   
Intraocular Lens                          
  
  
Ronel Hays MD  
  
  
[address] [place] [address] Eye [facility] [place]   
[place] [address]  
  
  
Phone:   
tel:[phone]  
  
  
fax:[phone]                       
  
  
[facility] [place]  
  
  
[address] [place] [address]  
  
  
fax:[phone]  
  
  
  
                Referral ID Status  Reason  Start Date Expiration Date Visits Re  
quested Visits   
Authorized  
   
                [id number]                                 1       1  
  
  
  
[facility]  
Work Phone: [phone] for visit Narrative* Diagnostic Procedure Only 
  (Routine) - Closed  
  
                          Specialty    Diagnoses / Procedures Referred By Contac  
t Referred To Contact  
   
                                        [facility]            
  
  
Diagnoses  
  
  
Encounter for screening   
mammogram for breast cancer  
  
  
  
Procedures  
  
  
JONY SCREENING W HUGO  
  
  
SCREENING DIGITAL BREAST   
TOMOSYNTHESIS BI  
  
  
SCREENING MAMMOGRAPHY BI   
2-VIEW BREAST INC Colten Rivera, DO  
  
  
[address]  
  
  
[place] [address]  
  
  
Phone: [phone]  
  
  
Fax: [phone]                         
  
  
[facility]  
  
  
[address]  
  
  
[place]   
[address]  
  
  
  
                    Referral ID Status    Reason    Start Date Expiration Date V  
isits   
Requested                               Visits   
Authorized  
   
                                [id number]        Closed            
  
  
Auto-Generate  
d Referral      2023       1               1  
  
  
  
[facility]Cert  
  
                          Specialty    Diagnoses / Procedures Referred By Contac  
t Referred To Contact  
   
                                                              
  
  
Diagnoses  
  
  
Combined forms of   
age-related cataract of   
right eye  
  
  
Combined forms of   
age-related cataract of   
right eye [H25.811]  
  
  
  
Procedures  
  
  
NV XCAPSL CTRC RMVL INSJ IO   
LENS PROSTH W/O ECP  
  
  
Phacoemulsification   
Cataract with Insertion   
Intraocular Lens                          
  
  
Ronel Hays MD  
  
  
[address] [facility]   
[place] [address]  
  
  
Phone:   
tel:[phone]  
  
  
fax:[phone]                       
  
  
[facility] [place]  
  
  
[address] [place] [address]  
  
  
fax:[phone]  
  
  
  
                Referral ID Status  Reason  Start Date Expiration Date Visits Re  
quested Visits   
Authorized  
   
                [id number]                                 1       1  
  
  
  
[facility]  
Work Phone: [phone]  
  
Summary Purpose  
  
  
                                                      
  
  
  
Family History  
No Family History Records FoundNo Family History Records FoundNo Family History 
Records FoundNo Family History Records FoundNo Family History Records FoundNo 
Family History Records FoundNo Family History Records FoundNo Family History 
Records FoundNo Family History Records Found  
  
Advance Directives  
                                Documents on File  
  
                          Type         Date Recorded Patient Representative Expl  
anation  
   
                          Healthcare Power of Atty 2023                 [facility]  
[facility]   
CARE POWER OF   
   
                          Living Will  2023                 [facility]   
DECLARATION  
  
                                Documents on File  
  
                          Type         Date Recorded Patient Representative Expl  
[facility]  
   
                          Healthcare Power of Atty 2023                 [facility]  
[facility]   
CARE POWER OF   
   
                          Living Will  2023                 [facility]   
DECLARATION  
  
  
  
                                Date Activated  Date Inactivated Comments  
   
                                10/31/2024 8:11 AM                   
  
  
  
                                Question        Answer          Comments  
   
                                Plan of Care:   Code Status Discussion Not Compl  
eted   
   
                                Decision Maker: Provider          
   
                                Rationale:      Patient condition does not warra  
nt discussion   
  
  
  
Medications Administered Section  
      Active Administered Medications - up to 3 most recent administrations  
  
                    Medication Order MAR Action Action Date Dose      Rate        
Site  
   
                                                      
  
  
PHENYLephrine 2.5 % 1 Drop   
(AK-DILATE, CALVIN-SYNEPHRINE)  
  
  
1 Drop, BOTH EYES, AS DIRECTED,   
Starting on 23 at 1030,   
Until 23 at 2229,   
Administer for dilation PROTECT   
FROM LIGHT   Given        2023 10:30 AM EDT 1 Drop                      
   
                                                               
   
                                                      
  
  
tropicamide 1 % 1 Drop   
(MYDRIACYL)  
  
  
1 Drop, BOTH EYES, AS DIRECTED,   
Starting on 23 at 1030,   
Until 23 at 2229,   
Administer for dilation Given        2023 10:30 AM EDT 1 Drop               
         
  
  
  
Reason for Referral  
  
  
                          Specialty    Diagnoses / Procedures Referred By Contac  
t Referred To Contact  
   
                                        Radiology             
  
  
Diagnoses  
  
  
Weight loss, unintentional  
  
  
  
Procedures  
  
  
CT abdomen pelvis w IV   
contrast                                  
  
  
Sharifa Almaguer, DO  
  
  
 [facility]  
  
  
[facility]   
[place] [address]  
  
  
Phone: [phone]  
  
  
Fax: [phone]                         
  
  
  
  
  
                          Referral ID  Status       Reason       Start   
Date                                    Expiration   
Date                                    Visits   
Requested                               Visits   
Authorized  
   
                                [id number]         Authorized        
  
  
Perform   
Procedure       2024       1               1  
  
  
  
                          Specialty    Diagnoses / Procedures Referred By Contac  
t Referred To Contact  
   
                                        Cardiology            
  
  
Diagnoses  
  
  
Abnormal EKG  
  
  
  
Procedures  
  
  
Transthoracic Echo (TTE)   
Complete  
  
  
NV ECHO TTHRC R-T 2D   
W/WOM-MODE COMPL SPEC&COLR D              
  
  
Sharifa Almaguer S, DO  
  
  
 [facility]   
[place] [address]  
  
  
Phone: [phone]  
  
  
Fax: [phone]                         
  
  
  
  
  
                          Referral ID  Status       Reason       Start   
Date                                    Expiration   
Date                                    Visits   
Requested                               Visits   
Authorized  
   
                                [id number]         Authorized        
  
  
Perform   
Procedure       2024       1               1  
  
  
  
                          Specialty    Diagnoses / Procedures Referred By Contac  
t Referred To Contact  
   
                                        Cardiology            
  
  
Diagnoses  
  
  
Abnormal EKG  
  
  
  
Procedures  
  
  
Nuclear Stress Test  
  
  
CHG MYOCARDIAL SPECT   
MULTIPLE STUDIES                          
  
  
Bernice Vital APRN-CNP,   
KEITH  
  
  
[address]  
  
  
[facility], [place] [address]  
  
  
Phone: [phone]  
  
  
Fax: [phone]                         
  
  
  
  
  
                          Referral ID  Status       Reason       Start   
Date                                    Expiration   
Date                                    Visits   
Requested                               Visits   
Authorized  
   
                                        [id number]             Pending   
Review                                    
  
  
Perform   
Procedure       2024       3               3  
  
  
  
                          Specialty    Diagnoses / Procedures Referred By Contac  
t Referred To Contact  
   
                                                              
  
  
Diagnoses  
  
  
Abnormal EKG  
  
  
  
Procedures  
  
  
ECG 12 lead (Clinic   
Performed)                                
  
  
Bernice Vital APRN-CNP,   
KEITH  
  
  
[address]  
  
  
[facility], [place] [address]  
  
  
Phone: [phone]  
  
  
Fax: [phone]                         
  
  
  
  
  
                    Referral ID Status    Reason    Start Date Expiration Date V  
isits   
Requested                               Visits   
Authorized  
   
                [id number] Authorized         2024 1       1  
  
  
  
                          Specialty    Diagnoses / Procedures Referred By Contac  
t Referred To Contact  
   
                                        Radiology             
  
  
Diagnoses  
  
  
Liver lesion  
  
  
  
Procedures  
  
  
US abdomen limited liver                  
  
  
Sharifa Almaguer, DO  
  
  
[address]  
  
  
[facility]   
[place] [address]  
  
  
Phone: [phone]  
  
  
Fax: [phone]                         
  
  
  
  
  
                          Referral ID  Status       Reason       Start   
Date                                    Expiration   
Date                                    Visits   
Requested                               Visits   
Authorized  
   
                                [id number]         Authorized        
  
  
Perform   
Procedure       2024       1               1  
  
  
  
                          Referral ID  Status       Reason       Start   
Date                                    Expiration   
Date                                    Visits   
Requested                               Visits   
Authorized  
   
                                [id number]         Authorized        
  
  
Perform   
Procedure       2024       5               5  
  
  
  
                          Specialty    Diagnoses / Procedures Referred By Contac  
t Referred To Contact  
   
                                                              
  
  
Diagnoses  
  
  
Abnormal EKG  
  
  
  
Procedures  
  
  
Cardiology Interpretation Of   
Nuclear Stress - See Other   
Report For Nuclear Portion                
  
  
Bernice Vital,   
APRN-CNP, DNP  
  
  
[address]  
  
  
[place] [address]  
  
  
[place] [address]  
  
  
Phone: [phone]  
  
  
Fax: [phone]                         
  
  
[address] [address]  
  
  
Phone: [phone]  
  
  
  
                    Referral ID Status    Reason    Start Date Expiration Date V  
isits   
Requested                               Visits   
Authorized  
   
                [id number] Authorized         2024 1       1  
  
  
  
Additional Source Comments  
  
  
  
                                                    INFORMATION SOURCE (unrecogn  
ized section and content)  
   
                                          
  
  
  
                                        DATE CREATED        AUTHOR  
   
                                2018                      [facility]  
alth System  
  
  
  
                                DATE CREATED    AUTHOR          AUTHOR'S ORGANIZ  
ATION  
   
                                2019                      [facility]  
dical Center  
  
  
  
                                DATE CREATED    AUTHOR          AUTHOR'S ORGANIZ  
ATION  
   
                                10/13/2020                       [employer]  
  
  
  
                                DATE CREATED    AUTHOR          AUTHOR'S ORGANIZ  
ATION  
   
                                2021                      [facility]  
  
  
  
                                DATE CREATED    AUTHOR          AUTHOR'S ORGANIZ  
ATION  
   
                                2024                      [facility]  
  
  
  
                                DATE CREATED    AUTHOR          AUTHOR'S ORGANIZ  
ATION  
   
                                2024                      [facility]  
  
  
  
                                DATE CREATED    AUTHOR          AUTHOR'S ORGANIZ  
ATION  
   
                                10/02/2024                      [facility]  
  
  
  
                                DATE CREATED    AUTHOR          AUTHOR'S ORGANIZ  
ATION  
   
                                2024                      [facility]  
  
  
  
                                DATE CREATED    AUTHOR          AUTHOR'S ORGANIZ  
ATION  
   
                                2024                      [facility]  
  
  
  
  
  
                                                    Source Comments (unrecognize  
d section and content)  
   
                                                    In the event this informatio  
n is protected by the Federal Confidentiality of   
Alcohol   
and Drug Abuse Patient Records regulations: This information has been disclosed 
to   
you from records protected by Federal confidentiality rules (42 CFR Part 2). The
   
Federal rules prohibit you from making any further disclosure of this 
information   
unless further disclosure is expressly permitted by the written consent of the 
person   
to whom it pertains or as otherwise permitted by 42 CFR Part 2. A general   
authorization for the release of medical or other information is NOT sufficient 
for   
this purpose. The Federal rules restrict any use of the information to 
criminally   
investigate or prosecute any alcohol or drug abuse patient.[facility] 
the   
event this information is protected by the Federal Confidentiality of Alcohol 
and   
Drug Abuse Patient Records regulations: This information has been disclosed to 
you   
from records protected by Federal confidentiality rules (42 CFR Part 2). The 
Federal   
rules prohibit you from making any further disclosure of this information unless
   
further disclosure is expressly permitted by the written consent of the person 
to   
whom it pertains or as otherwise permitted by 42 CFR Part 2. A general 
authorization   
for the release of medical or other information is NOT sufficient for this 
purpose.   
The Federal rules restrict any use of the information to criminally investigate 
or   
prosecute any alcohol or drug abuse patient.[facility] the event this   
information is protected by the Federal Confidentiality of Alcohol and Drug 
Abuse   
Patient Records regulations: This information has been disclosed to you from 
records   
protected by Federal confidentiality rules (42 CFR Part 2). The Federal rules   
prohibit you from making any further disclosure of this information unless 
further   
disclosure is expressly permitted by the written consent of the person to whom 
it   
pertains or as otherwise permitted by 42 CFR Part 2. A general authorization for
 the   
release of medical or other information is NOT sufficient for this purpose. The   
Federal rules restrict any use of the information to criminally investigate or   
prosecute any alcohol or drug abuse patient.[facility]onIn the event this   
information is protected by the Federal Confidentiality of Alcohol and Drug 
Abuse   
Patient Records regulations: This information has been disclosed to you from 
records   
protected by Federal confidentiality rules (42 CFR Part 2). The Federal rules   
prohibit you from making any further disclosure of this information unless 
further   
disclosure is expressly permitted by the written consent of the person to whom 
it   
pertains or as otherwise permitted by 42 CFR Part 2. A general authorization for
 the   
release of medical or other information is NOT sufficient for this purpose. The   
Federal rules restrict any use of the information to criminally investigate or   
prosecute any alcohol or drug abuse patient.[facility]onIn the event this   
information is protected by the Federal Confidentiality of Alcohol and Drug 
Abuse   
Patient Records regulations: This information has been disclosed to you from 
records   
protected by Federal confidentiality rules (42 CFR Part 2). The Federal rules   
prohibit you from making any further disclosure of this information unless 
further   
disclosure is expressly permitted by the written consent of the person to whom 
it   
pertains or as otherwise permitted by 42 CFR Part 2. A general authorization for
 the   
release of medical or other information is NOT sufficient for this purpose. The   
Federal rules restrict any use of the information to criminally investigate or   
prosecute any alcohol or drug abuse patient.[facility]onIn the event this   
information is protected by the Federal Confidentiality of Alcohol and Drug 
Abuse   
Patient Records regulations: This information has been disclosed to you from 
records   
protected by Federal confidentiality rules (42 CFR Part 2). The Federal rules   
prohibit you from making any further disclosure of this information unless 
further   
disclosure is expressly permitted by the written consent of the person to whom 
it   
pertains or as otherwise permitted by 42 CFR Part 2. A general authorization for
 the   
release of medical or other information is NOT sufficient for this purpose. The   
Federal rules restrict any use of the information to criminally investigate or   
prosecute any alcohol or drug abuse patient.[facility]onIn the event this   
information is protected by the Federal Confidentiality of Alcohol and Drug 
Abuse   
Patient Records regulations: This information has been disclosed to you from 
records   
protected by Federal confidentiality rules (42 CFR Part 2). The Federal rules   
prohibit you from making any further disclosure of this information unless 
further   
disclosure is expressly permitted by the written consent of the person to whom 
it   
pertains or as otherwise permitted by 42 CFR Part 2. A general authorization for
 the   
release of medical or other information is NOT sufficient for this purpose. The   
Federal rules restrict any use of the information to criminally investigate or   
prosecute any alcohol or drug abuse patient.[facility]onIn the event this   
information is protected by the Federal Confidentiality of Alcohol and Drug 
Abuse   
Patient Records regulations: This information has been disclosed to you from 
records   
protected by Federal confidentiality rules (42 CFR Part 2). The Federal rules   
prohibit you from making any further disclosure of this information unless 
further   
disclosure is expressly permitted by the written consent of the person to whom 
it   
pertains or as otherwise permitted by 42 CFR Part 2. A general authorization for
 the   
release of medical or other information is NOT sufficient for this purpose. The   
Federal rules restrict any use of the information to criminally investigate or   
prosecute any alcohol or drug abuse patient.[facility]onIn the event this   
information is protected by the Federal Confidentiality of Alcohol and Drug 
Abuse   
Patient Records regulations: This information has been disclosed to you from 
records   
protected by Federal confidentiality rules (42 CFR Part 2). The Federal rules   
prohibit you from making any further disclosure of this information unless 
further   
disclosure is expressly permitted by the written consent of the person to whom 
it   
pertains or as otherwise permitted by 42 CFR Part 2. A general authorization for
 the   
release of medical or other information is NOT sufficient for this purpose. The   
Federal rules restrict any use of the information to criminally investigate or   
prosecute any alcohol or drug abuse patient.[facility]onIn the event this   
information is protected by the Federal Confidentiality of Alcohol and Drug 
Abuse   
Patient Records regulations: This information has been disclosed to you from 
records   
protected by Federal confidentiality rules (42 CFR Part 2). The Federal rules   
prohibit you from making any further disclosure of this information unless 
further   
disclosure is expressly permitted by the written consent of the person to whom 
it   
pertains or as otherwise permitted by 42 CFR Part 2. A general authorization for
 the   
release of medical or other information is NOT sufficient for this purpose. The   
Federal rules restrict any use of the information to criminally investigate or   
prosecute any alcohol or drug abuse patient.[facility]onIn the event this   
information is protected by the Federal Confidentiality of Alcohol and Drug 
Abuse   
Patient Records regulations: This information has been disclosed to you from 
records   
protected by Federal confidentiality rules (42 CFR Part 2). The Federal rules   
prohibit you from making any further disclosure of this information unless 
further   
disclosure is expressly permitted by the written consent of the person to whom 
it   
pertains or as otherwise permitted by 42 CFR Part 2. A general authorization for
 the   
release of medical or other information is NOT sufficient for this purpose. The   
Federal rules restrict any use of the information to criminally investigate or   
prosecute any alcohol or drug abuse patient.[facility]onIn the event this   
information is protected by the Federal Confidentiality of Alcohol and Drug 
Abuse   
Patient Records regulations: This information has been disclosed to you from 
records   
protected by Federal confidentiality rules (42 CFR Part 2). The Federal rules   
prohibit you from making any further disclosure of this information unless 
further   
disclosure is expressly permitted by the written consent of the person to whom 
it   
pertains or as otherwise permitted by 42 CFR Part 2. A general authorization for
 the   
release of medical or other information is NOT sufficient for this purpose. The   
Federal rules restrict any use of the information to criminally investigate or   
prosecute any alcohol or drug abuse patient.[facility]onIn the event this   
information is protected by the Federal Confidentiality of Alcohol and Drug 
Abuse   
Patient Records regulations: This information has been disclosed to you from 
records   
protected by Federal confidentiality rules (42 CFR Part 2). The Federal rules   
prohibit you from making any further disclosure of this information unless 
further   
disclosure is expressly permitted by the written consent of the person to whom 
it   
pertains or as otherwise permitted by 42 CFR Part 2. A general authorization for
 the   
release of medical or other information is NOT sufficient for this purpose. The   
Federal rules restrict any use of the information to criminally investigate or   
prosecute any alcohol or drug abuse patient.[facility]onIn the event this   
information is protected by the Federal Confidentiality of Alcohol and Drug 
Abuse   
Patient Records regulations: This information has been disclosed to you from 
records   
protected by Federal confidentiality rules (42 CFR Part 2). The Federal rules   
prohibit you from making any further disclosure of this information unless 
further   
disclosure is expressly permitted by the written consent of the person to whom 
it   
pertains or as otherwise permitted by 42 CFR Part 2. A general authorization for
 the   
release of medical or other information is NOT sufficient for this purpose. The   
Federal rules restrict any use of the information to criminally investigate or   
prosecute any alcohol or drug abuse patient.[facility]onIn the event this   
information is protected by the Federal Confidentiality of Alcohol and Drug 
Abuse   
Patient Records regulations: This information has been disclosed to you from 
records   
protected by Federal confidentiality rules (42 CFR Part 2). The Federal rules   
prohibit you from making any further disclosure of this information unless 
further   
disclosure is expressly permitted by the written consent of the person to whom 
it   
pertains or as otherwise permitted by 42 CFR Part 2. A general authorization for
 the   
release of medical or other information is NOT sufficient for this purpose. The   
Federal rules restrict any use of the information to criminally investigate or   
prosecute any alcohol or drug abuse patient.[facility]onIn the event this   
information is protected by the Federal Confidentiality of Alcohol and Drug 
Abuse   
Patient Records regulations: This information has been disclosed to you from 
records   
protected by Federal confidentiality rules (42 CFR Part 2). The Federal rules   
prohibit you from making any further disclosure of this information unless 
further   
disclosure is expressly permitted by the written consent of the person to whom 
it   
pertains or as otherwise permitted by 42 CFR Part 2. A general authorization for
 the   
release of medical or other information is NOT sufficient for this purpose. The   
Federal rules restrict any use of the information to criminally investigate or   
prosecute any alcohol or drug abuse patient.[facility]onIn the event this   
information is protected by the Federal Confidentiality of Alcohol and Drug 
Abuse   
Patient Records regulations: This information has been disclosed to you from 
records   
protected by Federal confidentiality rules (42 CFR Part 2). The Federal rules   
prohibit you from making any further disclosure of this information unless 
further   
disclosure is expressly permitted by the written consent of the person to whom 
it   
pertains or as otherwise permitted by 42 CFR Part 2. A general authorization for
 the   
release of medical or other information is NOT sufficient for this purpose. The   
Federal rules restrict any use of the information to criminally investigate or   
prosecute any alcohol or drug abuse patient.[facility]onIn the event this   
information is protected by the Federal Confidentiality of Alcohol and Drug 
Abuse   
Patient Records regulations: This information has been disclosed to you from 
records   
protected by Federal confidentiality rules (42 CFR Part 2). The Federal rules   
prohibit you from making any further disclosure of this information unless 
further   
disclosure is expressly permitted by the written consent of the person to whom 
it   
pertains or as otherwise permitted by 42 CFR Part 2. A general authorization for
 the   
release of medical or other information is NOT sufficient for this purpose. The   
Federal rules restrict any use of the information to criminally investigate or   
prosecute any alcohol or drug abuse patient.[facility]onIn the event this   
information is protected by the Federal Confidentiality of Alcohol and Drug 
Abuse   
Patient Records regulations: This information has been disclosed to you from 
records   
protected by Federal confidentiality rules (42 CFR Part 2). The Federal rules   
prohibit you from making any further disclosure of this information unless 
further   
disclosure is expressly permitted by the written consent of the person to whom 
it   
pertains or as otherwise permitted by 42 CFR Part 2. A general authorization for
 the   
release of medical or other information is NOT sufficient for this purpose. The   
Federal rules restrict any use of the information to criminally investigate or   
prosecute any alcohol or drug abuse patient.[facility]onIn the event this   
information is protected by the Federal Confidentiality of Alcohol and Drug 
Abuse   
Patient Records regulations: This information has been disclosed to you from 
records   
protected by Federal confidentiality rules (42 CFR Part 2). The Federal rules   
prohibit you from making any further disclosure of this information unless 
further   
disclosure is expressly permitted by the written consent of the person to whom 
it   
pertains or as otherwise permitted by 42 CFR Part 2. A general authorization for
 the   
release of medical or other information is NOT sufficient for this purpose. The   
Federal rules restrict any use of the information to criminally investigate or   
prosecute any alcohol or drug abuse patient.[facility]onIn [facility] event this   
information is protected by the [facility] Confidentiality of Alcohol and Drug 
Abuse   
Patient Records regulations: This information has been disclosed to you from 
records   
protected by Federal confidentiality rules (42 CFR Part 2). The Federal rules   
prohibit you from making any further disclosure of this information unless 
further   
disclosure is expressly permitted by the written consent of the person to whom 
it   
pertains or as otherwise permitted by 42 CFR Part 2. A general authorization for
 the   
release of medical or other information is NOT sufficient for this purpose. The   
Federal rules restrict any use of the information to criminally investigate or   
prosecute any alcohol or drug abuse patient.[facility]onIn the event this   
information is protected by the Federal Confidentiality of Alcohol and Drug 
Abuse   
Patient Records regulations: This information has been disclosed to you from 
records   
protected by Federal confidentiality rules (42 CFR Part 2). The Federal rules   
prohibit you from making any further disclosure of this information unless 
further   
disclosure is expressly permitted by the written consent of the person to whom 
it   
pertains or as otherwise permitted by 42 CFR Part 2. A general authorization for
 the   
release of medical or other information is NOT sufficient for this purpose. The   
Federal rules restrict any use of the information to criminally investigate or   
prosecute any alcohol or drug abuse patient.[facility]  
  
  
  
  
                                                    Care Teams (unrecognized sec  
tion and content)  
   
                                          
  
  
  
                      Team Member Relationship Specialty  Start Date End Date  
   
                                                      
  
  
Dell Busch  
  
  
[address]  
  
  
[place] [address]  
  
  
[phone] (Work)  
  
  
[phone] (Fax) PCP - General                   01           
   
                                                      
  
  
Herbie [place] MD, MD  
  
  
[address] [place] [address]  
  
  
[phone] (Work)  
  
  
[phone] (Fax) Physician       Radiation Oncology 18           
  
  
  
                      Team Member Relationship Specialty  Start Date End Date  
   
                                                      
  
  
Dell Busch  
  
  
[address] [place] [address]  
  
  
[phone] (Work)  
  
  
[phone] (Fax) PCP - General                   01           
   
                                                      
  
  
Herbie [place] MD, MD  
  
  
[address]  
  
  
[place], [place] [address] [phone] (Work)  
  
  
[phone] (Fax) Physician       Radiation Oncology 18           
  
  
  
                      Team Member Relationship Specialty  Start Date End Date  
   
                                                      
  
  
Dell Busch  
  
  
[address]  
  
  
[place] [address]  
  
  
[phone] (Work)  
  
  
[phone] (Fax) PCP - General                   01           
   
                                                      
  
  
Herbie [place] MD, MD  
  
  
[address] [place] [address]  
  
  
[phone] (Work)  
  
  
[phone] (Fax) Physician       Radiation Oncology 18           
  
  
  
                      Team Member Relationship Specialty  Start Date End Date  
   
                                                      
  
  
Sharifa Almaguer DO  
  
  
NPI: [id number]  
  
  
[address] [place] [address]  
  
  
[phone] (Work)  
  
  
[phone] (Fax) PCP - General   Internal Medicine 10/25/23          
   
                                                      
  
  
Herbie [place] MD, MD  
  
  
NPI: [id number]  
  
  
[address]  
  
  
[place] [address]  
  
  
[phone] (Work)  
  
  
[phone] (Fax) Physician       Radiation Oncology 18           
  
  
  
                      Team Member Relationship Specialty  Start Date End Date  
   
                                                      
  
  
Sharifa Almaguer DO  
  
  
NPI: [id number]  
  
  
[address] [place] [address] [place] [address]  
  
  
[phone] (Work)  
  
  
[phone] (Fax) PCP - General   Internal Medicine 10/31/23          
  
  
  
                      Team Member Relationship Specialty  Start Date End Date  
   
                                                      
  
  
Dell Busch MD  
  
  
NPI: [id number]  
  
  
[address]  
  
  
[place] [address]  
  
  
[phone] (Work)  
  
  
[phone] (Fax) PCP - General                   11/2/01         10/24/23  
   
                                                      
  
  
Herbie [place] MD, MD  
  
  
NPI: [id number]  
  
  
[address] [place] [address]  
  
  
[phone] (Work)  
  
  
[phone] (Fax) Physician       Radiation Oncology 18           
  
  
  
                      Team Member Relationship Specialty  Start Date End Date  
   
                                                      
  
  
Sharifa Almaguer DO  
  
  
NPI: [id number]  
  
  
 [place] [address]  
  
  
[phone] (Work)  
  
  
[phone] (Fax) PCP - General   Internal Medicine 10/25/23          
   
                                                      
  
  
Herbie [place] MD, MD  
  
  
NPI: [id number]  
  
  
[address] [place] [address]  
  
  
[phone] (Work)  
  
  
[phone] (Fax) Physician       Radiation Oncology 18           
  
  
  
                      Team Member Relationship Specialty  Start Date End Date  
   
                                                      
  
  
Sharifa Almageur DO  
  
  
NPI: [id number]  
  
  
 [place] [address]  
  
  
[phone] (Work)  
  
  
[phone] (Fax) PCP - General   Internal Medicine 10/25/23          
   
                                                      
  
  
Herbie [place] MD, MD  
  
  
NPI: [id number]  
  
  
[address] [place] [address]  
  
  
[phone] (Work)  
  
  
[phone] (Fax) Physician       Radiation Oncology 18           
  
  
  
                      Team Member Relationship Specialty  Start Date End Date  
   
                                                      
  
  
Sharifa Almaguer DO  
  
  
NPI: [id number]  
  
  
 [address]  
  
  
[facility]   
[place] [address]  
  
  
[phone] (Work)  
  
  
[phone] (Fax) PCP - General   Internal Medicine 10/31/23          
  
  
  
                      Team Member Relationship Specialty  Start Date End Date  
   
                                                      
  
  
Sharifa Almaguer DO  
  
  
NPI: [id number]  
  
  
 [address]  
  
  
[facility]   
[place] [address]  
  
  
[phone] (Work)  
  
  
[phone] (Fax) PCP - General   Internal Medicine 10/31/23          
   
                                                      
  
  
Sharifa Almaguer DO  
  
  
NPI: [id number]  
  
  
 [address]  
  
  
[facility]   
Office [place], [place] [id number] [phone] (Work)  
  
  
[phone] (Fax)                      PCP - [employer] PCP                           24                
  
  
  
                      Team Member Relationship Specialty  Start Date End Date  
   
                                                      
  
  
Sharifa Almaguer DO  
  
  
NPI: [id number]  
  
  
 [address]  
  
  
[facility]   
Office [place], [place] [id number] [phone] (Work)  
  
  
[phone] (Fax) PCP - General   Internal Medicine 10/31/23          
   
                                                      
  
  
Sharifa Almaguer DO  
  
  
NPI: [id number]  
  
  
 [address]  
  
  
[facility]   
Office [place] [id number]  
  
  
[phone] (Work)  
  
  
[phone] (Fax)                      PCP - [employer]vantage PCP                           24                
  
  
  
                      Team Member Relationship Specialty  Start Date End Date  
   
                                                      
  
  
Sharifa Almaguer DO  
  
  
NPI: [id number]  
  
  
 [facility]   
Office [place] [id number]  
  
  
[phone] (Work)  
  
  
[phone] (Fax) PCP - General   Internal Medicine 10/31/23          
   
                                                      
  
  
Sharifa Almaguer DO  
  
  
NPI: [id number]  
  
  
[address] [place] [address] [facility]   
Office [place] [id number]  
  
  
[phone] (Work)  
  
  
[phone] (Fax)                      PCP - [employer]vantage PCP                           24                
  
  
  
                      Team Member Relationship Specialty  Start Date End Date  
   
                                                      
  
  
Sharifa Almaguer DO  
  
  
NPI: [id number]  
  
  
[address] [place] [address] Medical   
Office [place] [id number]  
  
  
[phone] (Work)  
  
  
[phone] (Fax) PCP - General   Internal Medicine 10/31/23          
   
                                                      
  
  
Sharifa Almaguer DO  
  
  
NPI: [id number]  
  
  
[address] [place] [address] Medical   
Office [place] [id number]  
  
  
[phone] (Work)  
  
  
[phone] (Fax)                      PCP - [employer]vantage PCP                           24                
  
  
  
                      Team Member Relationship Specialty  Start Date End Date  
   
                                                      
  
  
Sharifa Almaguer DO  
  
  
NPI: [id number]  
  
  
[address] [place] [address] Medical   
[place] [address]  
  
  
[phone] (Work)  
  
  
[phone] (Fax) PCP - General   Internal Medicine 10/31/23          
   
                                                      
  
  
Sharifa Almaguer DO  
  
  
NPI: [id number]  
  
  
[address] [place] [address] [facility]   
[place] [address]  
  
  
[phone] (Work)  
  
  
[phone] (Fax)                      PCP - [employer] Medicare Advantage PCP                           24                
  
  
  
                      Team Member Relationship Specialty  Start Date End Date  
   
                                                      
  
  
Sharifa Almaguer SDO  
  
  
NPI: [id number]  
  
  
 [facility] Medical   
[place] [address]  
  
  
[phone] (Work)  
  
  
[phone] (Fax) PCP - General   Internal Medicine 10/31/23          
   
                                                      
  
  
Fauzia Almaguersusannah GALLAGHER DO  
  
  
NPI: [id number]  
  
  
 [place] [address]  
  
  
[phone] (Work)  
  
  
[phone] (Fax)                      PCP - [employer] Medicare Advantage PCP                           24                
  
  
  
                      Team Member Relationship Specialty  Start Date End Date  
   
                                                      
  
  
Lianna Almaguerly SDO  
  
  
NPI: [id number]  
  
  
 [place] [address]  
  
  
[phone] (Work)  
  
  
[phone] (Fax) PCP - General   Internal Medicine 10/25/23          
   
                                                      
  
  
Herbie Mills MD  
  
  
NPI: [id number]  
  
  
[address]  
  
  
[place] [address]  
  
  
[phone] (Work)  
  
  
[phone] (Fax) Physician       Radiation Oncology 18           
  
  
  
                      Team Member Relationship Specialty  Start Date End Date  
   
                                                      
  
  
FauziaSharifa SDO  
  
  
NPI: [id number]  
  
  
 [place] [address]  
  
  
[phone] (Work)  
  
  
[phone] (Fax) PCP - General   Internal Medicine 10/25/23          
   
                                                      
  
  
Herbie Mills MD  
  
  
NPI: [id number]  
  
  
[address]  
  
  
[place] [address]  
  
  
[phone] (Work)  
  
  
[phone] (Fax) Physician       Radiation Oncology 18           
  
  
  
                      Team Member Relationship Specialty  Start Date End Date  
   
                                                      
  
  
Sharifa Almaguer DO  
  
  
NPI: [id number]  
  
  
 [place] [address]  
  
  
[phone] (Work)  
  
  
[phone] (Fax) PCP - General   Internal Medicine 10/25/23          
   
                                                      
  
  
Herbie Mills MD  
  
  
NPI: [id number]  
  
  
[address] [place] [address]  
  
  
[phone] (Work)  
  
  
[phone] (Fax) Physician       Radiation Oncology 18           
  
  
  
                      Team Member Relationship Specialty  Start Date End Date  
   
                                                      
  
  
Sharifa Almaguer DO  
  
  
NPI: [id number]  
  
  
[address] [facility]  
  
  
[place] [address]  
  
  
[phone] (Work)  
  
  
[phone] (Fax) PCP - General   Internal Medicine 10/25/23          
   
                                                      
  
  
Herbie Mills MD  
  
  
NPI: [id number]  
  
  
[address] [place], [place] [address] [phone] (Work)  
  
  
[phone] (Fax) Physician       Radiation Oncology 18           
  
  
  
                      Team Member Relationship Specialty  Start Date End Date  
   
                                                      
  
  
Sharifa Almaguer DO  
  
  
NPI: [id number]  
  
  
[address] [facility]  
  
  
[place] [address]  
  
  
[phone] (Work)  
  
  
[phone] (Fax) PCP - General   Internal Medicine 10/25/23          
   
                                                      
  
  
Herbie Mills MD  
  
  
NPI: [id number]  
  
  
[address] [facility] [place], [place] [address] [phone] (Work)  
  
  
[phone] (Fax) Physician       Radiation Oncology 18           
  
  
  
                      Team Member Relationship Specialty  Start Date End Date  
   
                                                      
  
  
Sharifa Almaguer DO  
  
  
NPI: [id number]  
  
  
[address] [address] [phone] (Work)  
  
  
[phone] (Fax)                      PCP - [employer] Medicare Advantage PCP                           24                
   
                                                      
  
  
Sharifa Almaguer DO  
  
  
NPI: [id number]  
  
  
[address] [address]  
  
  
[phone] (Work)  
  
  
[phone] (Fax) PCP - General   Internal Medicine 24            
  
  
  
                      Team Member Relationship Specialty  Start Date End Date  
   
                                                      
  
  
Sharifa Almaguer DO  
  
  
NPI: [id number]  
  
  
[address] [facility]  
  
  
[place] [address]  
  
  
[phone] (Work)  
  
  
[phone] (Fax) PCP - General   Internal Medicine 10/25/23          
   
                                                      
  
  
Herbie Mills MD  
  
  
NPI: [id number]  
  
  
[address] [facility] [place], [place] [address] [phone] (Work)  
  
  
[phone] (Fax) Physician       Radiation Oncology 18           
  
  
  
                      Team Member Relationship Specialty  Start Date End Date  
   
                                                      
  
  
Sharifa Almaguer DO  
  
  
NPI: [id number]  
  
  
[address]  
  
  
[place] [address]  
  
  
[phone] (Work)  
  
  
[phone] (Fax) PCP - General   Internal Medicine 10/25/23          
   
                                                      
  
  
Herbie Mills MD  
  
  
NPI: [id number]  
  
  
[address] [place] [address]  
  
  
[phone] (Work)  
  
  
[phone] (Fax) Physician       Radiation Oncology 18           
  
  
  
                      Team Member Relationship Specialty  Start Date End Date  
   
                                                      
  
  
Sharifa Alamguer DO  
  
  
NPI: [id number]  
  
  
[address]  
  
  
[place] [address]  
  
  
[phone] (Work)  
  
  
[phone] (Fax) PCP - General   Internal Medicine 10/25/23          
   
                                                      
  
  
Herbie Mills MD  
  
  
NPI: [id number]  
  
  
[address] [place] [place] [address] [phone] (Work)  
  
  
[phone] (Fax) Physician       Radiation Oncology 18           
  
  
  
                      Team Member Relationship Specialty  Start Date End Date  
   
                                                      
  
  
Sharifa Almaguer DO  
  
  
NPI: [id number]  
  
  
[address] [facility] [address]  
  
  
[place] [address]  
  
  
[phone] (Work)  
  
  
[phone] (Fax) PCP - General   Internal Medicine 10/25/23          
   
                                                      
  
  
Herbie Mills MD  
  
  
NPI: [id number]  
  
  
[address] [place], [place] [address] [phone] (Work)  
  
  
[phone] (Fax) Physician       Radiation Oncology 18           
  
  
  
                      Team Member Relationship Specialty  Start Date End Date  
   
                                                      
  
  
Sharifa Almaguer DO  
  
  
NPI: [id number]  
  
  
[address]  
  
  
[place] [address]  
  
  
[phone] (Work)  
  
  
[phone] (Fax) PCP - General   Internal Medicine 10/25/23          
   
                                                      
  
  
Herbie Mills MD  
  
  
NPI: [id number]  
  
  
[address] [place] [address]  
  
  
[phone] (Work)  
  
  
[phone] (Fax) Physician       Radiation Oncology 18           
  
  
  
                      Team Member Relationship Specialty  Start Date End Date  
   
                                                      
  
  
Sharifa Almaguer DO  
  
  
NPI: [id number]  
  
  
[address] [address]  
  
  
[phone] (Work)  
  
  
[phone] (Fax)                      PCP - Len Medicare   
Advantage PCP                           24                
   
                                                      
  
  
Sharifa Almaguer DO  
  
  
NPI: [id number]  
  
  
[address] [address]  
  
  
[phone] (Work)  
  
  
[phone] (Fax) PCP - General   Internal Medicine 24            
  
  
  
                      Team Member Relationship Specialty  Start Date End Date  
   
                                                      
  
  
Sharifa Almaguer DO  
  
  
NPI: [id number]  
  
  
[address] [place] [address]  
  
  
[phone] (Work)  
  
  
[phone] (Fax) PCP - General   Internal Medicine 10/25/23          
   
                                                      
  
  
Herbie Mills MD  
  
  
NPI: [id number]  
  
  
[address] [place] [address]  
  
  
[phone] (Work)  
  
  
[phone] (Fax) Physician       Radiation Oncology 18           
  
  
  
                      Team Member Relationship Specialty  Start Date End Date  
   
                                                      
  
  
Sharifa Almaguer DO  
  
  
NPI: [id number]  
  
  
 [address]  
  
  
[facility] Medical   
[place] [address]  
  
  
[phone] (Work)  
  
  
[phone] (Fax) PCP - General   Internal Medicine 10/31/23          
   
                                                      
  
  
Sharifa Almaguer DO  
  
  
NPI: [id number]  
  
  
 [address]  
  
  
[facility] Medical   
[place] [address]  
  
  
[phone] (Work)  
  
  
[phone] (Fax)                      PCP - Len Medicare Advantage PCP                           24                
  
  
  
                      Team Member Relationship Specialty  Start Date End Date  
   
                                                      
  
  
Sharifa Almaguer DO  
  
  
NPI: [id number]  
  
  
 [address]  
  
  
[facility] Medical   
[place] [address]  
  
  
[phone] (Work)  
  
  
[phone] (Fax) PCP - General   Internal Medicine 10/31/23          
   
                                                      
  
  
Sharifa Almaguer DO  
  
  
NPI: [id number]  
  
  
 [address]  
  
  
[facility] Medical   
[place] [address]  
  
  
[phone] (Work)  
  
  
[phone] (Fax)                      PCP - Len Medicare   
Advantage PCP                           24                
  
  
  
                      Team Member Relationship Specialty  Start Date End Date  
   
                                                      
  
  
Sharifa Almaguer DO  
  
  
NPI: [id number]  
  
  
 [address]  
  
  
[facility] Medical   
[place] [address]  
  
  
[phone] (Work)  
  
  
[phone] (Fax) PCP - General   Internal Medicine 10/31/23          
   
                                                      
  
  
Sharifa Almaguer DO  
  
  
NPI: [id number]  
  
  
 [address]  
  
  
[facility] Medical   
[place] [address]  
  
  
[phone] (Work)  
  
  
[phone] (Fax)                      PCP - [employer] Medicare Advantage PCP                           24                
  
  
  
                      Team Member Relationship Specialty  Start Date End Date  
   
                                                      
  
  
Sharifa Almaguer DO  
  
  
NPI: [id number]  
  
  
 [address]  
  
  
[facility]   
[place] [address]  
  
  
[phone] (Work)  
  
  
[phone] (Fax) PCP - General   Internal Medicine 10/31/23          
   
                                                      
  
  
Sharifa Almaguer DO  
  
  
NPI: [id number]  
  
  
 [address]  
  
  
[facility] Medical   
[place] [address]  
  
  
[phone] (Work)  
  
  
[phone] (Fax)                      PCP - [employer] Medicare Advantage PCP                           24                
  
  
  
                      Team Member Relationship Specialty  Start Date End Date  
   
                                                      
  
  
Sharifa Almaguer DO  
  
  
NPI: [id number]  
  
  
 [address]  
  
  
[facility]   
[place] [address]  
  
  
[phone] (Work)  
  
  
[phone] (Fax) PCP - General   Internal Medicine 10/31/23          
   
                                                      
  
  
Sharifa Almaguer DO  
  
  
NPI: [id number]  
  
  
 [address]  
  
  
[facility]   
[place] [address]  
  
  
[phone] (Work)  
  
  
[phone] (Fax)                      PCP - [employer] Medicare Advantage PCP                           24                
  
  
  
                      Team Member Relationship Specialty  Start Date End Date  
   
                                                      
  
  
Sharifa Almaguer DO  
  
  
NPI: [id number]  
  
  
 [address]  
  
  
[facility]   
Office [place] [id number]  
  
  
[phone] (Work)  
  
  
[phone] (Fax) PCP - General   Internal Medicine 10/31/23          
   
                                                      
  
  
Sharifa Almaguer DO  
  
  
NPI: [id number]  
  
  
 [facility] Medical   
[place] [address]  
  
  
[phone] (Work)  
  
  
[phone] (Fax)                      PCP - [employer] Medicare   
Advantage PCP                           24                
  
  
  
                      Team Member Relationship Specialty  Start Date End Date  
   
                                                      
  
  
Sharifa Almaguer DO  
  
  
NPI: [id number]  
  
  
[address] [address]  
  
  
[phone] (Work)  
  
  
[phone] (Fax)                      PCP - [employer] Medicare   
Advantage PCP                           24                
   
                                                      
  
  
Sharifa Almaguer DO  
  
  
NPI: [id number]  
  
  
[address] [address]  
  
  
[phone] (Work)  
  
  
[phone] (Fax) PCP - General   Internal Medicine 24            
  
  
  
                      Team Member Relationship Specialty  Start Date End Date  
   
                                                      
  
  
Sharifa Almaguer DO  
  
  
NPI: [id number]  
  
  
[address] [address]  
  
  
[phone] (Work)  
  
  
[phone] (Fax)                      PCP - [employer] Medicare   
Advantage PCP                           24                
   
                                                      
  
  
Sharifa Almaguer DO  
  
  
NPI: [id number]  
  
  
[address] [address]  
  
  
[phone] (Work)  
  
  
[phone] (Fax) PCP - General   Internal Medicine 24            
  
  
  
                      Team Member Relationship Specialty  Start Date End Date  
   
                                                      
  
  
Sharifa Almaguer DO  
  
  
NPI: [id number]  
  
  
[address] [address]  
  
  
[phone] (Work)  
  
  
[phone] (Fax)                      PCP - [employer] Medicare   
Advantage PCP                           24                
   
                                                      
  
  
Sharifa Almaguer DO  
  
  
NPI: [id number]  
  
  
[address] [address]  
  
  
[phone] (Work)  
  
  
[phone] (Fax) PCP - General   Internal Medicine 24            
  
  
  
                      Team Member Relationship Specialty  Start Date End Date  
   
                                                      
  
  
Sharifa Almaguer DO  
  
  
NPI: [id number]  
  
  
[address] [address]  
  
  
[phone] (Work)  
  
  
[phone] (Fax)                      PCP - [employer] Medicare   
Advantage PCP                           24                
   
                                                      
  
  
Sharifa Almaguer DO  
  
  
NPI: [id number]  
  
  
[address] [address]  
  
  
[phone] (Work)  
  
  
[phone] (Fax) PCP - General   Internal Medicine 24            
  
  
  
                      Team Member Relationship Specialty  Start Date End Date  
   
                                                      
  
  
Sharifa Almaguer DO  
  
  
NPI: [id number]  
  
  
[address] [address]  
  
  
[phone] (Work)  
  
  
[phone] (Fax)                      PCP - [employer] Medicare   
Advantage PCP                           24                
   
                                                      
  
  
Sharifa Almaguer DO  
  
  
NPI: [id number]  
  
  
[address] [address]  
  
  
[phone] (Work)  
  
  
[phone] (Fax) PCP - General   Internal Medicine 24            
  
  
  
                      Team Member Relationship Specialty  Start Date End Date  
   
                                                      
  
  
Sharifa Almaguer DO  
  
  
NPI: [id number]  
  
  
[address] [address]  
  
  
[phone] (Work)  
  
  
[phone] (Fax)                      PCP - [employer] Medicare   
Advantage PCP                           24                
   
                                                      
  
  
Sharifa Almaguer DO  
  
  
NPI: [id number]  
  
  
[address] [address]  
  
  
[phone] (Work)  
  
  
[phone] (Fax) PCP - General   Internal Medicine 24            
  
  
  
                      Team Member Relationship Specialty  Start Date End Date  
   
                                                      
  
  
Sharifa Almaguer DO  
  
  
NPI: [id number]  
  
  
[address] [place] [address]  
  
  
[phone] (Work)  
  
  
[phone] (Fax) PCP - General   Internal Medicine 10/25/23          
   
                                                      
  
  
Herbie Mills MD  
  
  
NPI: [id number]  
  
  
[address] [place], [place] [address] [phone] (Work)  
  
  
[phone] (Fax) Physician       Radiation Oncology 18           
  
  
  
  
  
                                                    Reason for Visit (unrecogniz  
ed section and content)  
   
                                          
  
  
  
                                        Reason              Comments  
   
                                        Follow-up             
  
  
  
                          Specialty    Diagnoses / Procedures Referred By Contac  
t Referred To Contact  
   
                                        Primary Care          
  
  
Procedures  
  
  
Follow Up In Primary Care                 
  
  
Sharifa Almaguer DO  
  
  
 [facility] Medical   
[place] [address]  
  
  
Phone: [phone]  
  
  
Fax: [phone]                         
  
  
  
  
  
                    Referral ID Status    Reason    Start Date Expiration Date V  
isits   
Requested                               Visits   
Authorized  
   
                [id number] Authorized         2024 1       1  
  
  
  
                                        Reason              Comments  
   
                                        Follow-up           3 week [facility]  
  
  
  
                    Referral ID Status    Reason    Start Date Expiration Date V  
isits   
Requested                               Visits   
Authorized  
   
                [id number] Authorized         2024 1       1  
  
  
  
                                        Reason              Comments  
   
                                        Yearly GYN exam       
  
  
  
                                        Reason              Comments  
   
                                        Diabetes              
   
                                        Cataract Evaluation   
  
  
  
                                        Reason              Comments  
   
                                        Sore upper eyelid of right eye   
  
  
  
                                        Reason              Comments  
   
                                        Establish Care        
  
  
  
                                        Reason              Comments  
   
                                        Altered Mental Status Pt  reports  
 patient confusion since driving home   
from   
[place] on Tuesday. Pt denies complaints.  
  
  
  
                                        Reason              Comments  
   
                                        Follow-up           6 MO [facility]  
  
  
  
                    Referral ID Status    Reason    Start Date Expiration Date V  
isits   
Requested                               Visits   
Authorized  
   
                [id number] Authorized         2023 1       1  
  
  
  
                                        Reason              Comments  
   
                                        Vomiting            Nausea and vomiting   
since . Pt denies abdominal pain, pt was   
syncopal   
 at [facility], but was not evaluated.  states she was seen   
recently for confusion and started on Aricept about a week ago. Confusion   
has continued.  
  
  
  
                                        Reason              Comments  
   
                                        Blurred Vision Both Eyes   
   
                                        Difficulty Reading Both Eyes   
   
                                        Glare               Both Eyes  
   
                                        Non-insulin Dependent Diabetes Mellitus   
Pateint does not check blood sugar   
levels.   
HbA1c: 8.2  
  
  
  
                                Reason          Onset Date      Comments  
   
                                Refill Request  10/01/2024        
  
  
  
                                        Reason              Comments  
   
                                        Cataract Follow Up    
  
  
  
                                        Reason              Comments  
   
                                        Refill Request        
  
  
  
                                        Reason              Comments  
   
                                        Status post cataract surgery with IOL le  
ft eye   
   
                                        Blurred Vision Right Eye   
  
  
  
                                        Reason              Comments  
   
                                        Post-op Cataract OS 10/31/24  
  
  
  
                                        Reason              Comments  
   
                                        Blurred Vision Right Eye   
   
                                        Difficulty Reading Right Eye   
   
                                        Glare               Right Eye  
  
  
  
                                        Reason              Comments  
   
                                        Yearly Exam           
  
  
  
                                        Reason              Comments  
   
                                        Patient Question      
  
  
  
                                        Reason              Comments  
   
                                        Established Patient   
  
  
  
                                        Reason              Comments  
   
                                        Post-op Cataract OD S/P Cataract surgery  
 with IOL right eye done on 24  
  
  
  
                                        Reason              Comments  
   
                                        Post-op Cataract OD 2024  
   
                                        Post-op Cataract OS 10/31/2024  
  
  
  
                          Specialty    Diagnoses / Procedures Referred By Contac  
t Referred To Contact  
   
                                        Radiology             
  
  
Diagnoses  
  
  
Weight loss, unintentional  
  
  
  
Procedures  
  
  
CT abdomen pelvis w IV   
contrast                                  
  
  
Sharifa Almaguer S, DO  
  
  
 [address]  
  
  
[facility]   
[place] [address]  
  
  
Phone: [phone]  
  
  
Fax: [phone]                         
  
  
  
  
  
                          Referral ID  Status       Reason       Start   
Date                                    Expiration   
Date                                    Visits   
Requested                               Visits   
Authorized  
   
                                [id number]         Authorized        
  
  
Perform   
Procedure       2024       1               1  
  
  
  
                          Specialty    Diagnoses / Procedures Referred By Contac  
t Referred To Contact  
   
                                        Cardiology            
  
  
Diagnoses  
  
  
Abnormal EKG  
  
  
  
Procedures  
  
  
Transthoracic Echo (TTE)   
Complete  
  
  
NV ECHO TTHRC R-T 2D   
W/WOM-MODE COMPL SPEC&COLR D              
  
  
Sharifa Almaguer, DO  
  
  
 [place] [address]  
  
  
Phone: [phone]  
  
  
Fax: [phone]                         
  
  
  
  
  
                          Referral ID  Status       Reason       Start   
Date                                    Expiration   
Date                                    Visits   
Requested                               Visits   
Authorized  
   
                                [id number]         Authorized        
  
  
Perform   
Procedure       2024       1               1  
  
  
  
                                        Reason              Comments  
   
                                        Abnormal ECG        NPV  
  
  
  
                          Specialty    Diagnoses / Procedures Referred By Contac  
t Referred To Contact  
   
                                        Cardiology            
  
  
Diagnoses  
  
  
Abnormal EKG  
                                          
  
  
Sharifa Almaguer, DO  
  
  
 [place] [address]  
  
  
Phone: [phone]  
  
  
Fax: [phone]                         
  
  
  
  
  
                          Referral ID  Status       Reason       Start   
Date                                    Expiration   
Date                                    Visits   
Requested                               Visits   
Authorized  
   
                                [id number]         Authorized        
  
  
Specialty   
Services   
Required        2024       1               1  
  
  
  
                          Specialty    Diagnoses / Procedures Referred By Contac  
t Referred To Contact  
   
                                        Radiology             
  
  
Diagnoses  
  
  
Liver lesion  
  
  
  
Procedures  
  
  
US abdomen limited liver                  
  
  
Sharifa Almaguer, DO  
  
  
 [place] [address]  
  
  
Phone: [phone]  
  
  
Fax: [phone]                         
  
  
  
  
  
                          Referral ID  Status       Reason       Start   
Date                                    Expiration   
Date                                    Visits   
Requested                               Visits   
Authorized  
   
                                [id number]         Authorized        
  
  
Perform   
Procedure       2024       1               1  
  
  
  
                                        Reason              Comments  
   
                                        Cough               Cough and wheezing,   
nausea, vomiting , sore throat X 2 days  
  
  
  
                                        Reason              Comments  
   
                                        Follow-up             
  
  
  
                          Specialty    Diagnoses / Procedures Referred By Contac  
t Referred To Contact  
   
                                        Primary Care          
  
  
Procedures  
  
  
Follow Up In Primary Care                 
  
  
Sharifa Almaguer, DO  
  
  
[address] [facility]  
  
  
[address]  
  
  
[place] [address]  
  
  
Phone: [phone]  
  
  
Fax: [phone]                         
  
  
  
  
  
                    Referral ID Status    Reason    Start Date Expiration Date V  
isits   
Requested                               Visits   
Authorized  
   
                [id number] Authorized         2024 1       1  
  
  
  
                          Specialty    Diagnoses / Procedures Referred By Contac  
t Referred To Contact  
   
                                        Cardiology            
  
  
Diagnoses  
  
  
Abnormal EKG  
  
  
  
Procedures  
  
  
Nuclear Stress Test  
  
  
CHG MYOCARDIAL SPECT   
MULTIPLE STUDIES                          
  
  
Bernice Vital, APRN-CNP,   
DNP  
  
  
[address]   
  
  
[facility], [address]  
  
  
[place] [address]  
  
  
Phone: [phone]  
  
  
Fax: [phone]                         
  
  
  
  
  
                          Referral ID  Status       Reason       Start   
Date                                    Expiration   
Date                                    Visits   
Requested                               Visits   
Authorized  
   
                                [id number]         Authorized        
  
  
Perform   
Procedure       2024       5               5  
  
  
  
                                        Reason              Comments  
   
                                        1 month f/u           
  
  
  
                                        Reason              Comments  
   
                                        Post-op Cataract    Right eye-   
4Left eye- 10/31/2024  
  
  
  
                                   Continuous  
  
                                                    Active and Recently Administ  
ered Medications (unrecognized section and content)  
   
                                          
  
  
  
                          Medication Order 2024  
   
                                                      
  
  
sodium chloride 0.9% infusion  
125 mL/hr, intravenous,   
Continuous, Starting on Fri   
5/3/24 at 1125  
                                                            1146 (New Bag - Prov  
ider:   
Nadege Haskins RN)1303 (Stopped -   
Provider: Nadege Haskins RN)  
  
  
                                    Scheduled  
  
                          Medication Order 2024  
   
                                                      
  
  
ondansetron (Zofran) injection 4   
mg (COMPLETED)  
4 mg, intravenous, Once, On 24 at 1255, For 1 dose, When   
administering via IV Push,   
administer over 3-5 minutes.  
                                                            1313 (Given - Provid  
er: Kaylee Thibodeaux RN)  
  
   
                                                      
  
  
sodium chloride 0.9 % bolus 1,000   
mL (COMPLETED)  
1,000 mL, intravenous, at 2,000   
mL/hr, Administer over 30   
Minutes, Once, On 24 at   
1255, For 1 dose  
                                                            1313 (New Bag - Prov  
ider: Kaylee Thibodeaux RN)1343 (Stopped -   
Provider: Kaylee Thibodeaux RN)  
  
  
                                    Scheduled  
  
                          Medication Order 10/29/2024   10/30/2024   10/31/2024  
   
                                                      
  
  
ketorolac (Acular) 0.5 %   
ophthalmic solution 1 drop   
(COMPLETED)  
1 drop, Left Eye, Every 5 min,   
First dose on Thu 10/31/24 at   
0830, For 4 doses, Preprocedure  
                                                            0835 (Given - Provid  
er:   
Chioma Yang RN)0838   
(Given - Provider: Chioma Yang RN)0853 (Given -   
Provider: Chioma Yang RN)0908 (Given - Provider:   
Chioma Yang RN)  
  
   
                                                      
  
  
lidocaine 1%-phenylephrine 1.5%   
intravitreal injection 2 mL  
2 mL, intravitreal, Once, On Thu   
10/31/24 at 0945, For 1 dose,   
Intraprocedure, To be given   
intracameral  
                                                            0945 (Due)  
  
   
                                                      
  
  
lubricating eye drops ophthalmic   
solution 1 drop (COMPLETED)  
1 drop, Left Eye, Every 5 min,   
First dose on Thu 10/31/24 at   
0830, For 4 doses, Preprocedure  
                                                            0835 (Given - Provid  
er:   
Chioma Yang RN)0838   
(Given - Provider: Chioma Yang RN)0904 (Given -   
Provider: Chioma Yang RN)0908 (Given - Provider:   
Chioma Yang RN)  
  
   
                                                      
  
  
midazolam (Versed) injection 1 mg   
(COMPLETED)  
1 mg, intravenous, Once, On Thu   
10/31/24 at 0830, For 1 dose,   
Preprocedure  
                                                            0905 (Given - Provid  
er:   
Chioma Yang RN -   
Comment: routine order)  
  
   
                                                      
  
  
moxifloxacin (Vigamox) 1.5 mg/1 mL   
(0.15%) injection 1.5 mg  
1.5 mg, Left Eye, Once, On Thu   
10/31/24 at 0945, For 1 dose,   
Intraprocedure  
                                                            0945 (Due)  
  
   
                                                      
  
  
ondansetron (Zofran) injection 4   
mg (COMPLETED)  
4 mg, intravenous, Once, On Thu   
10/31/24 at 0830, For 1 dose,   
Preprocedure, When administering   
via IV Push, administer over 3-5   
minutes.  
                                                            0901 (Given - Provid  
er:   
Chioma Yang RN)  
  
   
                                                      
  
  
phenylephrine-tropicamide 10 %-1 %   
ophthalmic solution 1 drop   
(COMPLETED)  
1 drop, Left Eye, Every 5 min,   
First dose on Thu 10/31/24 at   
0830, For 4 doses, Preprocedure  
                                                            0835 (Given - Provid  
er:   
Chioma Yang RN)0838   
(Given - Provider: Chioma Yang RN)0904 (Given -   
Provider: Chioma Yang RN)0908 (Given - Provider:   
Chioma Yang RN)  
  
   
                                                      
  
  
prednisoLONE acetate (Pred-Forte)   
1 % ophthalmic suspension 1 drop  
1 drop, Left Eye, Once, On Thu   
10/31/24 at 0945, For 1 dose,   
Intraprocedure  
                                                            0945 (Due)  
  
   
                                                      
  
  
tetracaine (PF) 0.5 % ophthalmic   
solution 1 drop (COMPLETED)  
1 drop, Left Eye, Once, On Thu   
10/31/24 at 0830, For 1 dose,   
Preprocedure  
                                                            0838 (Given - Provid  
er:   
Chioma Yang RN)  
  
  
                                       PRN  
  
                          Medication Order 10/29/2024   10/30/2024   10/31/2024  
   
                                                      
  
  
ondansetron (Zofran) injection 4 mg  
4 mg, intravenous, Once as needed, nausea/vomiting,   
second line, Starting on Thu 10/31/24 at 1000, For 1   
dose, Recovery (only), When administering via IV   
Push, administer over 3-5 minutes.  
                                                              
   
                                                      
  
  
oxygen (O2) therapy  
inhalation, Continuous PRN - O2/gases, other,   
Starting on Thu 10/31/24 at 1000, Recovery (only),   
Device: Nasal Cannula, Rate in liters per minute: 2   
LPM, Keep O2 Sat Above: 92%  
                                                              
   
                                                      
  
  
promethazine (Phenergan) 6.25 mg in sodium chloride   
0.9% 50 mL IV  
6.25 mg, intravenous, Administer over 15 Minutes,   
Once as needed, Nausea/vomiting first line, Starting   
on Thu 10/31/24 at 1000, For 1 dose, Recovery (only)  
                                                              
  
                                    Scheduled  
  
                          Medication Order 2024  
   
                                                      
  
  
ketorolac (Acular) 0.5 %   
ophthalmic solution 1 drop   
(COMPLETED)  
1 drop, Right Eye, Every 5 min,   
First dose on Thu 24 at   
1030, For 4 doses, Preprocedure  
                                                            1015 (Given - Provid  
er:   
Chioma Yang RN)1025   
(Given - Provider: Chioma Yang RN)1030 (Given -   
Provider: Chioma Yang RN)1039 (Given - Provider:   
Chioma Yang RN)  
  
   
                                                      
  
  
lidocaine 1%-phenylephrine 1.5%   
intravitreal injection 2 mL   
(COMPLETED)  
2 mL, intravitreal, Once, On Thu   
24 at 1030, For 1 dose,   
Intraprocedure  
                                                            1030 (Due)1129 (Give  
n -   
Provider: Ronel Hays MD)  
  
   
                                                      
  
  
lubricating eye drops ophthalmic   
solution 1 drop (COMPLETED)  
1 drop, Right Eye, Every 5 min,   
First dose on Thu 24 at   
1030, For 4 doses, Preprocedure  
                                                            1015 (Given - Provid  
er:   
Chioma Yang RN)1025   
(Given - Provider: Chioma Yang RN)1031 (Given -   
Provider: Chioma Yang RN)1039 (Given - Provider:   
Chioma Yang RN)  
  
   
                                                      
  
  
moxifloxacin (Vigamox) 1.5 mg/1 mL   
(0.15%) injection 1.5 mg   
(COMPLETED)  
1.5 mg, Right Eye, Once, On Thu   
24 at 1030, For 1 dose,   
Intraprocedure  
                                                            1030 (Due)1129 (Give  
n -   
Provider: Ronel Hays MD)  
  
   
                                                      
  
  
oxyCODONE (Roxicodone) immediate   
release tablet 5 mg  
5 mg, oral, Once, On u 24   
at 0930, For 1 dose, If ordered   
PRN for pain, nurse is permitted   
to administer this medication for   
higher pain scores based on   
patient preference? Yes  
                                                            0930 (Due)  
  
   
                                                      
  
  
oxygen (O2) therapy  
inhalation, Continuous -   
Inhalation, First dose on u   
24 at 1215, Recovery (only),   
Device: Nasal Cannula, Rate in   
liters per minute: 4 LPM  
                                                            1215 (Due)2000 (Due)  
  
   
                                                      
  
  
phenylephrine-tropicamide 10 %-1 %   
ophthalmic solution 1 drop   
(COMPLETED)  
1 drop, Right Eye, Every 5 min,   
First dose on u 24 at   
1030, For 4 doses, Preprocedure  
                                                            1015 (Given - Provid  
er:   
Chioma Yang RN)1025   
(Given - Provider: Chioma Yang RN)1031 (Given -   
Provider: Chioma Yang RN)1039 (Given - Provider:   
Chioma Yang RN)  
  
   
                                                      
  
  
povidone-iodine 5 % ophthalmic   
solution (COMPLETED)  
Right Eye, Once, On Thu 24   
at 1030, For 1 dose, Preprocedure  
                                                            1015 (Given - Provid  
er:   
Chioma Yang RN)  
  
   
                                                      
  
  
prednisoLONE acetate (Pred-Forte)   
1 % ophthalmic suspension 1 drop   
(COMPLETED)  
1 drop, Right Eye, Once, On Thu   
24 at 1030, For 1 dose,   
Intraprocedure  
                                                            1030 (Due)1129 (Give  
n -   
Provider: Ronel Hays MD)  
  
   
                                                      
  
  
tetracaine (PF) 0.5 % ophthalmic   
solution 1 drop (COMPLETED)  
1 drop, Right Eye, Once, On Thu   
24 at 1030, For 1 dose,   
Preprocedure  
                                                            1015 (Given - Provid  
er:   
Chioma Yang RN)  
  
  
                                       PRN  
  
                          Medication Order 2024  
   
                                                      
  
  
HYDROmorphone (Dilaudid) injection   
0.5 mg  
0.5 mg, intravenous, Every 5 min   
PRN, pain severe (7-10), first   
line, Starting on Thu 24 at   
1159, Recovery (only)  
                                                              
   
                                                      
  
  
lidocaine PF (Xylocaine) 10 mg/mL   
(1 %) injection (CANCELED)  
As needed, Starting on Thu   
24 at 1130, Intraprocedure  
                                                            1130 (Given - Provid  
er:   
Ronel Hays MD -   
Comment: TOPICAL EYE RIGHT   
SIDE)  
  
   
                                                      
  
  
ondansetron (Zofran) injection 4   
mg  
4 mg, intravenous, Every 15 min   
PRN, nausea/vomiting, first line,   
Starting on Thu 24 at 1159,   
For 2 doses, Recovery (only), When   
administering via IV Push,   
administer over 3-5 minutes.  
                                                              
  
  
FOR RECORDS PERTAINING TO PATIENTS WHO ARE OR HAVE BEEN ENROLLED IN A CHEMICAL 
DEPENDENCY/SUBSTANCEABUSE PROGRAM, SOME INFORMATION MAY BE OMITTED. This 
clinical summary was aggregated from multiple sources. Caution should be 
exercised in using it in the provision of clinical care. This summary normalizes
 information from multiple sources, and as a consequence, information in this 
document may materially change the coding, format and clinical context of 
patient data. In addition, data may be omitted in some cases. CLINICAL DECISIONS
 SHOULD BE BASED ON THE PRIMARY CLINICAL RECORDS. [employer] [facility]. provides
 no warranty or guarantee of the accuracy or completeness of information in this
 document.

## 2025-01-23 ENCOUNTER — HOSPITAL ENCOUNTER (OUTPATIENT)
Age: 80
Discharge: HOME | End: 2025-01-23
Payer: MEDICARE

## 2025-01-23 DIAGNOSIS — I10: Primary | ICD-10-CM

## 2025-01-23 DIAGNOSIS — Z13.89: ICD-10-CM

## 2025-01-23 DIAGNOSIS — E11.65: ICD-10-CM

## 2025-01-23 DIAGNOSIS — G30.9: ICD-10-CM

## 2025-01-23 DIAGNOSIS — E55.9: ICD-10-CM

## 2025-01-23 LAB
ALANINE AMINOTRANSFER ALT/SGPT: 21 U/L (ref 13–56)
ALBUMIN SERPL-MCNC: 3.5 G/DL (ref 3.2–5)
ALKALINE PHOSPHATASE: 73 U/L (ref 45–117)
ANION GAP: 7 (ref 5–15)
AST(SGOT): 11 U/L (ref 15–37)
BUN SERPL-MCNC: 15 MG/DL (ref 7–18)
BUN/CREAT RATIO: 16.8 RATIO (ref 10–20)
CALCIUM SERPL-MCNC: 9.6 MG/DL (ref 8.5–10.1)
CARBON DIOXIDE: 30 MMOL/L (ref 21–32)
CHLORIDE: 103 MMOL/L (ref 98–107)
CREAT UR-MCNC: 284 MG/DL
EST GLOM FILT RATE - AFR AMER: 79 ML/MIN (ref 60–?)
GLOBULIN: 3.5 G/DL (ref 2.2–4.2)
GLUCOSE: 235 MG/DL (ref 74–106)
MICROALBUMIN UR-MCNC: 88.1 MG/L
POTASSIUM: 3.4 MMOL/L (ref 3.5–5.1)

## 2025-01-23 PROCEDURE — 82570 ASSAY OF URINE CREATININE: CPT

## 2025-01-23 PROCEDURE — 80053 COMPREHEN METABOLIC PANEL: CPT

## 2025-01-23 PROCEDURE — 82043 UR ALBUMIN QUANTITATIVE: CPT

## 2025-01-23 PROCEDURE — 36415 COLL VENOUS BLD VENIPUNCTURE: CPT

## 2025-01-24 ENCOUNTER — HOSPITAL ENCOUNTER (EMERGENCY)
Age: 80
LOS: 1 days | Discharge: HOME | End: 2025-01-25
Payer: MEDICARE

## 2025-01-24 VITALS — DIASTOLIC BLOOD PRESSURE: 81 MMHG | SYSTOLIC BLOOD PRESSURE: 124 MMHG | HEART RATE: 96 BPM

## 2025-01-24 VITALS
OXYGEN SATURATION: 97 % | RESPIRATION RATE: 18 BRPM | TEMPERATURE: 97.5 F | BODY MASS INDEX: 25.3 KG/M2 | SYSTOLIC BLOOD PRESSURE: 164 MMHG | DIASTOLIC BLOOD PRESSURE: 89 MMHG | HEART RATE: 105 BPM

## 2025-01-24 DIAGNOSIS — Z79.899: ICD-10-CM

## 2025-01-24 DIAGNOSIS — I10: ICD-10-CM

## 2025-01-24 DIAGNOSIS — Z79.51: ICD-10-CM

## 2025-01-24 DIAGNOSIS — R42: ICD-10-CM
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
## 2025-01-24 DIAGNOSIS — Z79.84: ICD-10-CM

## 2025-01-24 DIAGNOSIS — E11.9: ICD-10-CM

## 2025-01-24 DIAGNOSIS — E78.5: ICD-10-CM

## 2025-01-24 DIAGNOSIS — R55: Primary | ICD-10-CM

## 2025-01-24 DIAGNOSIS — S00.03XA: ICD-10-CM

## 2025-01-24 PROCEDURE — 85025 COMPLETE CBC W/AUTO DIFF WBC: CPT

## 2025-01-24 PROCEDURE — 80048 BASIC METABOLIC PNL TOTAL CA: CPT

## 2025-01-24 PROCEDURE — 83735 ASSAY OF MAGNESIUM: CPT

## 2025-01-24 PROCEDURE — 70450 CT HEAD/BRAIN W/O DYE: CPT

## 2025-01-24 PROCEDURE — 96360 HYDRATION IV INFUSION INIT: CPT

## 2025-01-24 PROCEDURE — A4216 STERILE WATER/SALINE, 10 ML: HCPCS

## 2025-01-24 PROCEDURE — 99284 EMERGENCY DEPT VISIT MOD MDM: CPT

## 2025-01-24 PROCEDURE — 93005 ELECTROCARDIOGRAM TRACING: CPT

## 2025-01-24 NOTE — XMS RPT_ITS
Comprehensive CCD (C-CDA v2.1)  
  
                          Created on: 2025  
  
  
Rosa M Mrs. Marzena NUNEZ  
External Reference #: CDR,[id number]  
: 1945  
Sex: Female  
  
Demographics  
  
  
                                        Address             [address] [place]  [address]  
   
                                        Home Phone          [phone]  
   
                                        Mobile Phone        [phone]  
   
                                        Preferred Language  en  
   
                                        Marital Status        
   
                                        Church Affiliation Unknown  
   
                                        Race                White  
   
                                        Ethnic Group        Not  or Lati  
no  
  
  
Author  
  
  
                                        Organization        [facility]  
  
  
Care Team Providers  
  
  
                                Care Team Member Name Role            Phone  
   
                                GARCIA, KULWINDER   Unavailable     Unavailable  
   
                                GARCIA, KULWINDER   Unavailable     Unavailable  
   
                                GARCIA, KULWINDER   Unavailable     Unavailable  
   
                                GARCIA, KULWINDER   Unavailable     Unavailable  
   
                                NICK, DELMA   Unavailable     Unavailable  
   
                                GARCIA, KULWINDER   Unavailable     Unavailable  
   
                                [facility]            Unavailable     Unavailable  
   
                                GARCIA, KULWINDER   Unavailable     Unavailable  
   
                                [facility]            Unavailable     Unavailable  
   
                                [facility]            Unavailable     Unavailable  
   
                                GARCIA, KULWINDER   Unavailable     Unavailable  
   
                                [facility]            Unavailable     Unavailable  
   
                                [facility]            Unavailable     Unavailable  
   
                                GARCIA, KULWINDER   Unavailable     Unavailable  
   
                                [facility]            Unavailable     Unavailable  
   
                                [facility]            Unavailable     Unavailable  
   
                                GARCIA, KULWINDER   Unavailable     Unavailable  
   
                                [facility]            Unavailable     Unavailable  
   
                                [facility]            Unavailable     Unavailable  
   
                                GARCIA, KULWINDER   Unavailable     Unavailable  
   
                                [facility]            Unavailable     Unavailable  
   
                                [facility]            Unavailable     Unavailable  
   
                                GARCIA, KULWINDER   Unavailable     Unavailable  
   
                                [facility]            Unavailable     Unavailable  
   
                                [facility]            Unavailable     Unavailable  
   
                                Jeannette, Constance  Unavailable     Unavailable  
   
                                Pending Provider Unavailable     Unavailable  
   
                                Dell Busch Primary Care Provider [phone]  
   
                                Gene BEGUM MD, Daesung Unavailable     [phone]  
   
                                Dell Busch Primary Care Provider [phone]  
   
                                Gene BEGUM MD, Williamsung Unavailable     [phone]  
   
                                Hampton DO, Sharifa S Primary Care Provider [phone]  
   
                                Hampton DO, Sharifa S Primary Care Provider [phone]  
   
                                Dell Busch MD Primary Care Provider [phone]  
[phone]  
   
                                Royal DO, Sharifa S Unavailable     [phone]  
   
                                Herbie Mills MD Unavailable     [phone]  
   
                                Hampton DO, Sharifa S Primary Care Provider [phone]  
   
                                ROYAL, SHARIFA S Primary Care    Unavailable  
   
                                ROYAL, SHARIFA S Primary Care    Unavailable  
   
                                ROYAL, SHARIFA S Primary Care    Unavailable  
   
                                ROYAL, SHARIFA S Primary Care    Unavailable  
   
                                ROYAL, SHARIFA S Attending       Unavailable  
   
                                ROYAL, SHARIFA S Primary Care    Unavailable  
   
                                ROYAL, SHARIFA S Attending       Unavailable  
   
                                ROYAL, SHARIFA S Referring       Unavailable  
   
                                ROYAL, SHARIFA S Primary Care    Unavailable  
   
                                ROYAL, SHARIFA S Attending       Unavailable  
   
                                ROYAL, SHARIFA S Referring       Unavailable  
   
                                ROYAL, SHARIFA S Primary Care    Unavailable  
   
                                ROYAL, SHARIFA S Attending       Unavailable  
   
                                ROYAL, SHARIFA S Referring       Unavailable  
   
                                ROYAL, SHARIFA S Primary Care    Unavailable  
   
                                ROYAL, SHARIFA S Attending       Unavailable  
   
                                ROYAL, SHARIFA S Primary Care    Unavailable  
   
                                ROYAL, SHARIFA S Attending       Unavailable  
   
                                ROYAL, SHARIFA S Primary Care    Unavailable  
   
                                ROYAL, SHARIFA S Referring       Unavailable  
   
                                BERNICE VITAL  Attending       Unavailable  
   
                                ROYAL, SHARIFA S Referring       Unavailable  
   
                                ROYAL, SHARIFA S Primary Care    Unavailable  
   
                                ROYAL, SHARIFA S Attending       Unavailable  
   
                                ROYAL, SHARIFA S Referring       Unavailable  
   
                                ROYAL, SHARIFA S Primary Care    Unavailable  
   
                                BERNICE VITAL  Attending       Unavailable  
   
                                ROYAL, SHARIFA S Primary Care    Unavailable  
   
                                Royal DO, Sharifa S Unavailable     [phone]  
[phone]  
   
                                Hampton DO, Sharifa S Primary Care Provider [phone]  
   
                                ROYAL, SHARIFA S Primary Care    Unavailable  
   
                                KELSI MURRAY  Attending       Unavailable  
   
                                GRISEL LEÓN   Referring       Unavailable  
   
                                ROYAL, SHARIFA S Primary Care    Unavailable  
   
                                ROYAL, SHARIFA S Primary Care    Unavailable  
   
                                FREDY WATTERS Attending       Unavailable  
   
                                VINOD WELCH  Referring       Unavailable  
   
                                ROYAL, SHARIFA S Primary Care    Unavailable  
   
                                ROYAL, SHARIFA S Referring       Unavailable  
   
                                ROYAL, SHARIFA S Primary Care    Unavailable  
   
                                ROYAL, SHARIFA S Referring       Unavailable  
   
                                ROYAL, SHARIFA S Primary Care    Unavailable  
   
                                ROYAL, SHARIFA S Referring       Unavailable  
   
                                ROYAL, SHARIFA S Primary Care    Unavailable  
   
                                ROYAL, SHARIFA S Primary Care    Unavailable  
   
                                SETH CEBALLOS Attending       Unavailable  
   
                                BERNICE VITAL  Referring       Unavailable  
   
                                ROYAL, SHARIFA S Primary Care    Unavailable  
   
                                RONEL HAYS Admitting       Unavailable  
   
                                HAYSRONEL NAZARIO Attending       Unavailable  
   
                                ROYAL, SHARIFA S Primary Care    Unavailable  
   
                                HAYSRONEL NAZARIO Admitting       Unavailable  
   
                                HAYSRONEL NAZARIO Attending       Unavailable  
   
                                ROYAL, SHARIFA S Primary Care    Unavailable  
   
                                ROYAL, SHARIFA S Primary Care    Unavailable  
   
                                HAYSRONEL NAZARIO Attending       Unavailable  
   
                                HAYSRONEL NAZARIO Referring       Unavailable  
   
                                ROYAL, SHARIFA S Primary Care    Unavailable  
   
                                HAYSRONEL NAZARIO Attending       Unavailable  
   
                                HAYSRONEL NAZARIO Referring       Unavailable  
   
                                ROYAL, SHARIFA S Primary Care    Unavailable  
   
                                RONEL HAYS Attending       Unavailable  
   
                                AMAYA REIS II Referring       Unavailabl  
e  
   
                                ROYAL, SHARIFA S Primary Care    Unavailable  
   
                                LAURENRIDER IIAMAYA Attending       Unavailabl  
e  
   
                                ROYAL, SHARIFA S Primary Care    Unavailable  
   
                                AMAYA REIS II Attending       Unavailabl  
e  
   
                                RONEL HAYS Attending       Unavailable  
   
                                ROYAL, SHARIFA S Primary Care    Unavailable  
   
                                HAYS, RONEL K Referring       Unavailable  
   
                                ROYAL, SHARIFA S Primary Care    Unavailable  
   
                                HAYSRONEL NAZARIO Attending       Unavailable  
   
                                HAYSRONEL K Referring       Unavailable  
   
                                ROSA MURDOCK Referring       Unavailable  
   
                                ROYAL, SHARIFA S Primary Care    Unavailable  
   
                                COLETN ABBOTT   Attending       Unavailable  
   
                                ROYAL, SHARIFA S Primary Care    Unavailable  
   
                                JOSE DELGADO   Attending       Unavailable  
   
                                ROYAL, SHARIFA S Primary Care    Unavailable  
   
                                HAYSRONEL NAZARIO Attending       Unavailable  
   
                                HAYS, RONEL K Referring       Unavailable  
   
                                ROYAL, SHARIFA S Primary Care    Unavailable  
   
                                COLTEN ABBOTT   Referring       Unavailable  
   
                                ROYAL, SHARIFA S Primary Care    Unavailable  
   
                                HAYSRONEL NAZARIO K Referring       Unavailable  
   
                                CHATO BOLTON, AMAYA PENNY Attending       Unavailabl  
e  
  
  
  
Allergies  
  
  
                                                    Allergy   
Classification                          Reported   
Allergen(s)               Allergy Type              Date of   
Onset                     Reaction(s)               Facility  
   
                                                    Acetaminophen /   
oxyCODONE  
(1 source)                              Acetaminophen /   
oxyCODONE                 Drug Allergy              20  
12                                      Nausea And   
Vomiting                                [facility]  
   
                                                    bacitracin /   
neomycin /   
polymyxin b  
(1 source)                              bacitracin /   
neomycin /   
polymyxin b               Drug Allergy              20  
05                        [facility]  
Work Phone:   
[phone]  
   
                                                    Onion extract  
(1 source)          Onion extract       Drug Allergy        20  
24                                      Nausea/vomitin  
g                                       [facility]  
   
                                                    Sulfamethoxazole /   
Trimethoprim  
(1 source)                              Sulfamethoxazole /   
Trimethoprim              Drug Allergy              20  
15                                      Nausea And   
Vomiting                                [facility]  
Work Phone:   
[phone]  
[phone]  
   
                                                    zolpidem  
(2 sources)         zolpidem            Drug Allergy        20  
11                        Hives, [facility]  
Work Phone:   
[phone]  
   
                                                      
(20 sources)                            Acetaminophen /   
oxyCODONE;   
Translations:   
[OXYCODONE-ACETAMIN  
OPHEN]                    Drug Allergy              20  
05                                      Nausea And   
Vomiting                                [facility]   
Repository  
   
                                                      
(20 sources)                            Adhesive agent;   
Translations:   
[ADHESIVE]                              Propensity to   
adverse   
reactions   
(disorder)                              10-04-20  
10                        [facility]   
Repository  
   
                                                      
(20 sources)                            Codeine;   
Translations:   
[CODEINE]                 Drug Allergy              20  
06                                      Mental Status   
Change                                  [facility]   
Repository  
   
                                                      
(20 sources)                            zolpidem;   
Translations:   
[ZOLPIDEM TARTRATE]       Drug Allergy              20  
11                        Rash                      [facility]   
Repository  
   
                                                      
(1 source)                              ALCOHOL;   
Translations:   
[ALCOHOL]                               Propensity to   
adverse   
reactions   
(disorder)                                                  [facility]   
Repository  
   
                                                      
(20 sources)                            NEOMYCIN-BACITRACIN  
-POLYMYXIN;   
Translations:   
[NEOMYCIN-BACITRACI  
N-POLYMYXIN]                            Propensity to   
adverse   
reactions   
(disorder)                              20  
05                        Rash                      [facility]   
Repository  
   
                                                      
(20 sources)                            Sulfamethoxazole /   
Trimethoprim;   
Translations:   
[SULFAMETHOXAZOLE-T  
RIMETHOPRIM]              Drug Allergy              20  
15                                      Nausea And   
Vomiting                                [facility]  
Work Phone:   
[phone]  
   
                                                      
(20 sources)                            zolpidem;   
Translations:   
[ZOLPIDEM]                Drug Allergy              20  
15                        Hives                     [facility]  
Work Phone:   
[phone]  
   
                                                      
(20 sources)                            Onion extract;   
Translations:   
[ONION]                   Drug Allergy              20  
24                                      Nausea/vomitin  
g                                       [facility]  
  
  
  
Medications  
Current Medications  
  
  
  
                      Medication Drug Class(es) Dates      Sig (Normalized) Sig   
(Original)  
   
                                                    amLODIPine 10 mg   
oral tablet  
(20 sources)                            Dihydropyridine   
Calcium Channel   
Blocker                                 Start:   
2023  
End:   
2025                              take 1 tablet by   
mouth once daily                        amLODIPine   
(NORVASC) 10 mg   
tablet Take 10   
mg by mouth once   
daily.   
2023   
Active  
  
  
  
                                                    Start: 10-  
End: 2024                         take 1 tablet by mouth once   
daily                                   amLODIPine (Norvasc) 5 mg tablet   
Indications: Secondary hypertension   
Take 1 tablet (5 mg) by mouth once   
daily. 30 tablet 5 10/31/2023   
2023 Discontinued (Therapy   
completed)  
  
  
  
                                                    cephalexin 500 mg   
oral capsule  
(2 sources)                             Cephalosporin   
Antibacterial                           Start:   
10-  
End: 2023                         take 1   
capsule by   
mouth twice   
daily                                   cephALEXin (KEFLEX)   
500 mg capsule Take   
1 capsule by mouth   
two times a day for   
10 days. 20 capsule   
0 10/25/2023   
2023 Active  
   
                                        Comment on above:   Take 1 capsule by mo  
ut two times a day for 10 days.   
   
                                                    cholecalciferol 0.05   
mg oral tablet  
(20 sources)        Vitamin D                               take 1   
tablet by   
mouth once   
daily                                   cholecalciferol   
(VITAMIN D3) 50 mcg   
(2,000 unit) tablet   
Take 2,000 Units by   
mouth once daily.   
Active  
  
  
  
                                                            take 1 capsule by mo  
uth once   
daily                                   cholecalciferol (Vitamin D-3) 25 MCG   
(1000 UT) capsule Take 1 capsule (25   
mcg) by mouth once daily. Suspended  
   
                                                                cholecalciferol   
(Vitamin D3) 10 MCG   
(400 UNIT) tablet Take by mouth once   
daily. 0 Active  
   
                                                      
End: 2022                         take 1 tablet by mouth once   
daily                                   cholecalciferol (VITAMIN D-3) 5,000   
unit tab Take 5,000 Units by mouth   
once daily. 0 2022 Discontinued  
  
  
  
                                        Comment on above:   Take 2,000 Units by   
mouth once daily.   
   
                                                            Take 5,000 Units by   
mouth once daily.   
   
                                                    ciprofloxacin 500 mg   
oral tablet  
(3 sources)                             Quinolone   
Antimicrobial                           Start:   
20  
End:   
20                                      take 1 tablet   
by mouth twice   
daily                                   ciprofloxacin (Cipro)   
500 mg tablet   
Indications: UTI   
(urinary tract   
infection), bacterial   
Take 1 tablet (500   
mg) by mouth 2 times   
a day for 7 days. 14   
tablet 2024   
Discontinued (Therapy   
completed)  
   
                                                    donepezil   
hydrochloride 10 mg   
oral tablet  
(20 sources)                                        Start:   
20  
End:   
20                                      take 1 tablet   
by mouth once   
daily                                   donepezil (ARICEPT)   
10 mg tablet Take 10   
mg by mouth once   
daily. 2024   
Active  
  
  
  
                                                    Start: 2024  
End: 11-                         take 1 tablet by mouth once   
daily at bedtime                        donepezil (Aricept) 5 mg tablet   
Indications: Memory loss Take 1   
tablet (5 mg) by mouth once daily at   
bedtime. 30 tablet 5 2024   
11/10/2024 Active  
  
  
  
                                                    glipiZIDE er 2.5   
mg 24 hr extended   
release oral   
tablet  
(20 sources)              Sulfonylurea              Start: 2024  
End: 2025                         take 1 tablet by   
mouth every   
twenty-four hours                       glipiZIDE   
(GLUCOTROL XL) 2.5   
mg 24 hr tablet   
Take 2.5 mg by   
mouth. 2024 Active  
  
  
  
                                                    Start: 2024  
End: 2025                         take 1 tablet by mouth once   
daily                                   glipiZIDE (GLUCOTROL XL) 2.5 mg 24   
hr tablet Take 2.5 mg by mouth once   
daily. 2024 Active  
   
                                                    Start: 07-  
End: 2022                         take 1 tablet by mouth once   
daily                                   glipiZIDE XL (GLUCOTROL XL) 2.5 mg   
24 hr tablet Take 2.5 mg by mouth   
once daily. 0 07/15/2015 2022   
Discontinued  
  
  
  
                                        Comment on above:   Take 2.5 mg by mouth  
 once daily.   
   
                                                    hydroCHLOROthiazide 12.5 mg   
/ losartan potassium 100 mg   
oral tablet  
(20 sources)                            Thiazide Diuretic,   
Angiotensin 2   
Receptor Blocker                        Start:   
  
023  
End:   
  
024                                     take 1 tablet   
by mouth once   
daily                                   losartan-hydrochlor  
othiazide (Hyzaar)   
100-12.5 mg tablet   
Indications:   
Secondary   
hypertension Take 1   
tablet by mouth   
once daily. 90   
tablet 1 2024   
Active  
  
  
  
                                                    Start: 2022  
End: 2022                                     losartan-hydroCHLOROthiazide  
 (HYZAAR)   
100-12.5 mg per tablet  
   
                                                      
End: 2022                         take 1 tablet by mouth   
once daily                              losartan-hydrochlorothiazide (HYZAAR) 10  
0-25   
mg per tablet Take 1 tablet by mouth once   
daily. 0 2022 Discontinued  
  
  
  
                                        Comment on above:   Take 1 tablet by kaylan  
 once daily.   
   
                                                    0.5 ml HYDROmorphone   
hydrochloride 1 mg/ml   
prefilled syringe  
(1 source)                Opioid Agonist            Start:   
  
4                                                   0.5 mg,   
intravenous, Every   
5 min PRN, pain   
severe (7-10),   
first line,   
Starting on Thu   
24 at 1159,   
Recovery (only)  
   
                                                    ketorolac tromethamine   
5 mg/ml ophthalmic   
solution  
(16 sources)                            Nonsteroidal   
Anti-inflammatory Drug,   
Cyclooxygenase   
Inhibitor                               Start:   
  
4                                       take 1   
drop(s) into   
the eye(s)   
four times   
daily                                   keTORolac (ACULAR)   
0.5 % ophthalmic   
solution Use 1   
Drop in the right   
eye four times   
daily. 5 mL 1   
2024 Active  
  
  
  
                                                    Start: 2024  
End: 2024                                     1 drop, Right Eye, Every 5 m  
in, First   
dose on Thu 24 at 1030, For 4   
doses, Preprocedure  
   
                                        Start: 2024   take 1 drop(s) into   
the eye(s)   
three times daily                       keTORolac (ACULAR) 0.5 % ophthalmic   
solution Use 1 Drop in the left eye   
three times a day. 5 mL 2 2024   
Active  
   
                                                    Start: 2024  
End: 2024                         take 1 drop(s) into the eye(s)   
four times daily                        keTORolac (ACULAR) 0.5 % ophthalmic   
solution Use 1 Drop in the left eye   
four times daily. 5 mL 2 2024 Discontinued  
   
                                                    Start: 10-  
End: 10-                                     1 drop, Left Eye, Every 5 mi  
n, First   
dose on Thu 10/31/24 at 0830, For 4   
doses, Preprocedure  
  
  
  
                                                    24 hr metFORMIN   
hydrochloride 500 mg   
extended release oral tablet  
(20 sources)              Biguanide                 Start: 2024  
End: 2024                                     metFORMIN  mg 24 hr   
tablet Indications:   
Controlled type 2 diabetes   
mellitus without   
complication, without   
long-term current use of   
insulin (Multi) 2 tablets   
(1,000 mg) once daily in   
the evening. Take with   
meals. 180 tablet 1   
2024 Active  
  
  
  
                                Start: 2023                 metFORMIN XR 5  
00 mg 24 hr tablet 2   
tablets (1,000 mg) once daily in   
the evening. Take with meals. 0   
2023 Active  
   
                                        Start: 2015   take 2 tablets by mo  
uth once   
daily                                   metFORMIN ER (GLUCOPHAGE XR) 500 mg   
24 hr tablet Take two tablets by   
mouth once daily. 2015 Active  
  
  
  
                                        Comment on above:   Take two tablets by   
mouth once daily.   
   
                                                    omeprazole 40 mg   
delayed release   
oral capsule  
(20 sources)                            Proton Pump   
Inhibitor                               Start:   
20  
End:   
10-24-20  
24                                      take 1 capsule   
by mouth once   
daily before   
mealtime                                omeprazole (PriLOSEC) 40   
mg DR capsule   
Indications:   
Gastroesophageal reflux   
disease without   
esophagitis Take 1   
capsule (40 mg) by mouth   
once daily in the   
morning. Take before   
meals. Do not crush or   
chew. 90 capsule 1   
2024 10/24/2024   
Active  
  
  
  
                                                    Start: 2024  
End: 2025                         take 1 capsule by mouth once   
daily before mealtime                   omeprazole (PriLOSEC) 20 mg DR   
capsule Indications: Gastroesophageal   
reflux disease without esophagitis   
Take 1 capsule (20 mg) by mouth once   
daily in the morning. Take before   
meals. 90 capsule 1 2024 Discontinued (Ineffective)  
   
                                                            take 2 capsules by m  
outh once   
daily                                   omeprazole (PRILOSEC) 20 mg capsule   
Take two capsules by mouth once   
daily. Active  
   
                                                      
End: 2022                         take 1 capsule by mouth once   
daily                                   Omeprazole 40 mg capsule Take 40 mg   
by mouth once daily. 0 2022   
Discontinued  
  
  
  
                                        Comment on above:   Take 20 mg by mouth   
once daily.   
   
                                                            Take 40 mg by mouth   
once daily.   
   
                                                    2 ml ondansetron 2 mg/ml   
injection  
(4 sources)                             Serotonin-3 Receptor   
Antagonist                              Start:   
2024                                          4 mg, intravenous, Every   
15 min PRN,   
nausea/vomiting, first   
line, Starting on Thu   
24 at 1159, For 2   
doses, Recovery (only),   
When administering via IV   
Push, administer over 3-5   
minutes.  
  
  
  
                                                    Start: 10-  
End: 10-                                     4 mg, intravenous, Once as n  
eeded, nausea/vomiting, second   
line,   
Starting on Thu 10/31/24 at 1000, For 1 dose, Recovery (only),   
When administering via IV Push, administer over 3-5 minutes.  
   
                                                    Start: 2024  
End: 2024                                     4 mg, intravenous, Once, On   
24 at 1255, For 1 dose,   
When   
administering via IV Push, administer over 3-5 minutes.  
  
  
  
                                                    oxygen (O2) therapy  
(2 sources)                     Start: 2024                 inhalation, Co  
ntinuous - Inhalation, First   
dose on Thu 24 at 1215, Recovery   
(only), Device: Nasal Cannula, Rate in   
liters per minute: 4 LPM  
  
  
  
                                Start: 10-                 inhalation, Co  
ntinuous PRN - O2/gases, other, Starting on Thu  
   
10/31/24 at 1000, Recovery (only), Device: Nasal Cannula, Rate in   
liters per minute: 2 LPM, Keep O2 Sat Above: 92%  
  
  
  
                                                    phenylephrine   
hydrochloride 25 mg/ml   
ophthalmic solution  
(3 sources)                             alpha-1 Adrenergic   
Agonist                                 Start: 2024  
End: 2024                                     PHENYLephrine 2.5 % 1   
Drop (AK-DILATE,   
CALVIN-SYNEPHRINE)  
  
  
  
                                                    Start: 2023  
End: 2023                                     PHENYLephrine 2.5 % 1 Drop (  
AK-DILATE, CALVIN-SYNEPHRINE)  
  
  
  
                                                    prednisoLONE acetate 10   
mg/ml ophthalmic   
suspension  
(14 sources)    Corticosteroid  Start: 2024                 prednisoLONE a  
cetate   
(PRED FORTE) 1 %   
ophthalmic suspension Use   
1 Drop in the right eye   
four times daily. 5 mL 1   
2024 Active  
  
  
  
                                Start: 2024                 prednisoLONE a  
cetate (PRED FORTE) 1 % ophthalmic suspension   
Use 1   
Drop in the left eye three times a day. 10 mL 2 2024 Active  
   
                                                    Start: 2024  
End: 2024                                     prednisoLONE acetate (PRED F  
ORTE) 1 % ophthalmic suspension Use  
 1   
Drop in the left eye four times daily. 10 mL 2 2024 Discontinued  
  
  
  
                                                    promethazine   
(Phenergan) 6.25 mg in   
sodium chloride 0.9% 50   
mL IV  
(1 source)                      Start: 10-                 6.25 mg, intra  
venous,   
Administer over 15   
Minutes, Once as needed,   
Nausea/vomiting first   
line, Starting on Thu   
10/31/24 at 1000, For 1   
dose, Recovery (only)  
   
                                                    proparacaine   
hydrochloride 5 mg/ml   
ophthalmic solution  
(3 sources)               Local Anesthetic          Start: 2024  
End: 2024                                     proparacaine 0.5 % 1 Drop   
(ALCAINE)  
  
  
  
                                                    Start: 2024  
End: 2024                                     proparacaine 0.5 % 1 Drop (A  
LCAINE)  
  
  
  
                                                    simvastatin 20 mg   
oral tablet  
(20 sources)                            HMG-CoA Reductase   
Inhibitor                               Start: 01-  
End: 2024                                     simvastatin(ZOCOR 20 MG   
TAB) Take one(1) tablet   
daily. 30 0 01/10/2008   
Active  
   
                                        Comment on above:   Take one(1) tablet d  
aily.   
   
                                                    tropicamide 10   
mg/ml ophthalmic   
solution  
(5 sources)               Anticholinergic           Start: 2024  
End: 2024                                     tropicamide 1 % 1 Drop   
(MYDRIACYL)  
  
  
  
                                                    Start: 2024  
End: 2024                                     tropicamide 0.5 % 1 Drop (MY  
DRIACYL)  
   
                                                    Start: 2023  
End: 2023                                     tropicamide 1 % 1 Drop (MYDR  
IACYL)  
  
  
  
Completed/Discontinued Medications  
  
  
  
                      Medication Drug Class(es) Dates      Sig (Normalized) Sig   
(Original)  
   
                                                    acetaminophen 325 mg   
/ HYDROcodone   
bitartrate 5 mg oral   
tablet  
(1 source)                Opioid Agonist            Start:   
2017  
End:   
2022                                          HYDROcodone-acetami  
nophen (NORCO)   
5-325 mg per tablet   
0 2017   
Discontinued  
   
                                                    [id number] 200 actuat   
albuterol 0.09   
mg/actuat metered   
dose inhaler  
(10 sources)                            beta2-Adrenergic   
Agonist                                 Start:   
2024  
End:   
2025                              take 2 puff(s) by   
inhalation every   
six hours for   
wheezing                                albuterol 90   
mcg/actuation   
inhaler   
Indications: COVID   
Inhale 2 puffs   
every 6 hours if   
needed for   
wheezing. 18 g   
2024   
Suspended  
   
                                                    biotin 1 mg oral   
capsule  
(1 source)                                            
End:   
2022                                          biotin 1 mg cap   
Take by mouth. 0   
2022   
Discontinued  
   
                                        Comment on above:   Take by mouth.   
   
                                                    chlorthalidone 25 mg   
oral tablet  
(1 source)                              Thiazide-like   
Diuretic                                  
End:   
2022                              take 0.5 tablet   
by mouth once   
daily                                   chlorthalidone   
(HYGROTON) 25 mg   
tablet Take 1/2   
tablet by mouth   
once daily. 0   
2022   
Discontinued  
   
                                        Comment on above:   Take 1/2 tablet by m  
outh once daily.  
   
   
                                                    citalopram 20 mg   
oral tablet  
(1 source)                              Serotonin Reuptake   
Inhibitor                                 
End:   
2022                              take 1 tablet by   
mouth once daily                        citalopram (CELEXA)   
20 mg tablet Take   
20 mg by mouth once   
daily. 0 2022   
Discontinued  
   
                                        Comment on above:   Take 20 mg by mouth   
once daily.   
   
                                                    diphenhydrAMINE   
hydrochloride 25 mg   
oral tablet  
(1 source)                              Histamine-1   
Receptor Antagonist                       
End:   
2022                              take 1 tablet by   
mouth every   
twenty-four hours   
as needed                               diphenhydrAMINE   
(BENADRYL ALLERGY)   
25 mg tablet Take   
25 mg by mouth at   
bedtime as needed.   
0 2022   
Discontinued  
   
                                        Comment on above:   Take 25 mg by mouth   
at bedtime as needed.  
   
   
                                                    fluorometholone 1   
mg/ml ophthalmic   
suspension  
(15 sources)              Corticosteroid            Start:   
2024  
End:   
2024                                          fluorometholone   
(FML LIQUID FILM)   
0.1 % ophthalmic   
suspension Use 1   
Drop in the right   
eye three times a   
day. 5 mL 1   
2024   
Discontinued   
(Course of therapy   
completed)  
   
                                                    FLUoxetine 10 mg   
oral tablet  
(1 source)                              Serotonin Reuptake   
Inhibitor                                 
End:   
2022                              take 1 tablet by   
mouth once daily                        FLUoxetine 10 mg   
tablet Take 10 mg   
by mouth once   
daily. 0 2022   
Discontinued  
   
                                        Comment on above:   Take 10 mg by mouth   
once daily.   
   
                                                    iohexol (OMNIPaque)   
12 mg iodine/mL oral   
contrast 500 mL  
(1 source)                                          Start:   
2024  
End:   
2024                                          500 mL, oral, Once   
in imaging,   
Starting on 24 at 1045,   
For 1 dose,   
Administer over   
20-60 minutes as   
directed by imaging   
protocol and/or   
imaging provider.   
CONTRAST - for   
procedural imaging   
use only.  
   
                                                    iohexol (OMNIPaque)   
350 mg iodine/mL   
solution 70 mL  
(1 source)                                          Start:   
2024  
End:   
2024                                          70 mL, intravenous,   
Once in imaging,   
Starting on 24 at 1045,   
For 1 dose  
   
                                                    lisinopril 20 mg   
oral tablet  
(1 source)                              Angiotensin   
Converting Enzyme   
Inhibitor                               Start:   
10-  
End:   
2022                              take 1 tablet by   
mouth once daily                        lisinopril   
(ZESTRIL, PRINIVIL)   
20 mg tablet Take   
20 mg by mouth once   
daily. 0 10/18/2018   
2022   
Discontinued  
   
                                        Comment on above:   Take 20 mg by mouth   
once daily.  
   
   
                                                    losartan potassium   
100 mg oral tablet  
(2 sources)                             Angiotensin 2   
Receptor Blocker                        Start:   
2019  
End:   
2022                              take 1 tablet by   
mouth once daily                        losartan (COZAAR)   
100 mg tablet Take   
100 mg by mouth   
once daily. 0   
2019   
Discontinued  
  
  
  
                                                    Start: 10-  
End: 2022                         take 1 tablet by mouth once   
daily                                   losartan (COZAAR) 50 mg tablet Take   
50 mg by mouth once daily. 0   
10/24/2018 2022 Discontinued  
  
  
  
                                        Comment on above:   Take 50 mg by mouth   
once daily.  
   
   
                                                            Take 100 mg by mouth  
 once daily.  
   
   
                                                    meloxicam 15 mg oral tablet  
(1 source)                              Nonsteroidal   
Anti-inflammatory Drug                  Start:   
10-22-20  
18  
End:   
20                                                  meloxicam (MOBIC) 15 mg   
tablet  
   
                                                    methylPREDNISolone  
(4 sources)               Corticosteroid            Start:   
20  
End:   
20                                                  methylPREDNISolone (Medrol   
Dospak) 4 mg tablets   
Indications: COVID Take as   
directed on package. 21   
tablet 2024 Discontinued   
(Med List Cleanup)  
  
  
  
                                Start: 2024                 methylPREDNISo  
lone (Medrol Dospak) 4 mg tablets Indications:   
COVID Take as directed on package. 21 tablet 2024 Active  
  
  
  
                                                    24 hr metoprolol   
succinate 100 mg   
extended release   
oral tablet  
(2 sources)                             beta-Adrenergic   
Blocker                                 Start: 2019  
End: 2022                         take 100 mg   
by mouth   
once daily                              metoprolol   
succinate ER   
(TOPROL XL) 100 mg   
Tb24 Take 100 mg   
by mouth once   
daily. 0   
2019   
Discontinued  
  
  
  
                                                    Start: 2005  
End: 2022                                     TOPROL XL 50 MG 24 HR TAB Ta  
ke one(1) tablet daily. 0   
2005 Discontinued  
  
  
  
                                        Comment on above:   Take one(1) tablet d  
aily.   
   
                                                            Take 100 mg by mouth  
 once daily.  
   
   
                                                    5 ml midazolam 1   
mg/ml injection  
(1 source)                Benzodiazepine            Start:   
10-31-20  
24  
End:   
10-31-20  
24                                                  1 mg, intravenous,   
Once, On u   
10/31/24 at 0830,   
For 1 dose,   
Preprocedure  
   
                                                    multivitamin tablet  
(20 sources)                                                take 1 tablet   
by mouth once   
daily                                   multivitamin tablet   
Take 1 tablet by   
mouth once daily.   
Suspended  
  
  
  
                                                take 1 tablet by mouth once bettie  
y multivitamin tablet Take 1 tablet by mouth   
once   
daily. Active  
  
  
  
                                                    multivitamin with   
minerals (ONE-A-DAY 50   
PLUS ORAL)  
(1 source)                                            
End: 2022                                     multivitamin with   
minerals (ONE-A-DAY 50   
PLUS ORAL) Take by   
mouth. 0 2022   
Discontinued  
   
                                        Comment on above:   Take by mouth.   
   
                                                    Phenylephrine /   
Tropicamide  
(2 sources)                             Anticholinergic,   
alpha-1 Adrenergic   
Agonist                                 Start: 2024  
End: 2024                                     1 drop, Right Eye,   
Every 5 min, First   
dose on Thu 24   
at 1030, For 4 doses,   
Preprocedure  
  
  
  
                                                    Start: 10-  
End: 10-                                     1 drop, Left Eye, Every 5 mi  
n, First dose on Thu 10/31/24 at   
0830, For 4 doses, Preprocedure  
  
  
  
                                                    polyvinyl alcohol 0.014 ml/m  
l /   
povidone 6 mg/ml ophthalmic   
solution  
(2 sources)                                         Start: 2024  
End: 2024                                     1 drop, Right Eye, Every 5 m  
in,   
First dose on Thu 24 at   
1030, For 4 doses, Preprocedure  
  
  
  
                                                    Start: 10-  
End: 10-                                     1 drop, Left Eye, Every 5 mi  
n, First dose on Thu 10/31/24 at   
0830, For 4 doses, Preprocedure  
  
  
  
                                                    potassium chloride 10   
meq extended release   
oral tablet  
(20 sources)                            Start: 2024   take 1 tablet by   
mouth twice daily                       potassium chloride CR 10   
mEq ER tablet   
Indications: Secondary   
hypertension Take 1   
tablet (10 mEq) by mouth   
2 times a day. 180   
tablet 1 2024   
Suspended  
  
  
  
                                                    Start: 2024  
End: 2024                         take 1 tablet by mouth twice   
daily                                   potassium chloride CR 10 mEq ER   
tablet Indications: Secondary   
hypertension Take 1 tablet (10 mEq)   
by mouth 2 times a day. 180 tablet 1   
2024 Discontinued   
(Reorder)  
   
                                                    Start: 2024  
End: 2024                         take 1 tablet by mouth once   
daily                                   potassium chloride CR 10 mEq ER   
tablet Indications: Secondary   
hypertension Take 1 tablet (10 mEq)   
by mouth once daily. 90 tablet 1   
2024 Discontinued  
   
                                                                potassium chlori  
de (KLOR-CON 10) 10   
mEq tablet Take 10 mEq by mouth once   
daily. Active  
   
                                                            take 1 tablet by kaylan  
th once   
daily                                   potassium chloride CR 10 mEq ER   
tablet Take 1 tablet (10 mEq) by   
mouth once daily. 0 Active  
  
  
  
                                        Comment on above:   Take 10 mEq by mouth  
 once daily.  
   
   
                                                    povidone-iodine 50   
mg/ml ophthalmic   
solution  
(1 source)                Antiseptic                Start:   
2024  
End:   
2024                              take 1 dose   
into the   
eye(s) once                             Right Eye, Once, On   
Thu 24 at 1030,   
For 1 dose,   
Preprocedure  
  
  
  
                                                    Start: 2024  
End: 2024                         take 1 dose into the eye(s)   
once                                    Right Eye, Once, On Thu 24 at   
1030, For 1 dose, Preprocedure  
  
  
  
                                                    sertraline 50 mg   
oral tablet  
(1 source)                              Serotonin   
Reuptake   
Inhibitor                                 
End: 2022                         take 1 tablet   
by mouth once   
daily                                   sertraline (ZOLOFT)   
50 mg tablet Take   
50 mg by mouth once   
daily. 0 2022   
Discontinued  
   
                                        Comment on above:   Take 50 mg by mouth   
once daily.   
   
                                                    1000 ml sodium   
chloride 9 mg/ml   
injection  
(2 sources)                                         Start: 2024  
End: 2024                                     1,000 mL,   
intravenous, at   
2,000 mL/hr,   
Administer over 30   
Minutes, Once, On   
24 at   
1255, For 1 dose  
  
  
  
                                        Start: 2024   take 125 mL intraven  
ously every   
hour                                    125 mL/hr, intravenous,   
Continuous, Starting on Fri   
5/3/24 at 1125  
  
  
  
                                                    Tc-99m tetrofosmin   
(Myoview) injection   
11 millicurie  
(1 source)                                          Start: 2024  
End: 2024                                     11 millicurie,   
intravenous, Once   
in imaging,   
Starting on 24 at 0804,   
For 1 dose,   
Administer 45 to 90   
minutes prior to   
imaging unless   
otherwise   
indicated.  
   
                                                    Tc-99m tetrofosmin   
(Myoview) injection   
32.9 millicurie  
(1 source)                                          Start: 2024  
End: 2024                                     32.9 millicurie,   
intravenous, Once   
in imaging,   
Starting on 24 at 1010,   
For 1 dose,   
Administer 45 to 90   
minutes prior to   
imaging unless   
otherwise   
indicated.  
   
                                                    tetracaine   
hydrochloride 5 mg/ml   
ophthalmic solution  
(2 sources)                             Gi Local   
Anesthetic                              Start: 2024  
End: 2024                         take 1   
drop(s)   
into the   
eye(s) once                             1 drop, Right Eye,   
Once, On Thu   
24 at 1030,   
For 1 dose,   
Preprocedure  
  
  
  
                                                    Start: 10-  
End: 10-                         take 1 drop(s) into the eye(s)   
once                                    1 drop, Left Eye, Once, On Thu   
10/31/24 at 0830, For 1 dose,   
Preprocedure  
  
  
  
                                                    vitamin e 100   
unt oral capsule  
(1 source)                                            
End: 2022                         take 1 capsule   
by mouth once   
daily                                   Vitamin E, dl,   
acetate, (VITAMIN E)   
100 unit capsule Take   
100 Units by mouth   
once daily. 0   
2022   
Discontinued  
   
                                                    Comment on   
above:                                  Take 100 Units by mouth once daily.   
  
  
  
Problems  
Active Problems  
  
  
                                                    Problem   
Classification  Problem         Date            Documented Date Episodic/Chronic  
   
                                                    Cancer of breast  
(20 sources)                            Malignant neoplasm of   
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   
Translations:   
[Malignant neoplasm of   
unspecified site of   
unspecified female   
breast]                                 Onset:   
2010  
Resolved:   
10-                12-                Chronic  
   
                                                    Cancer of cervix  
(2 sources)                             Malignant neoplasm of   
exocervix;   
Translations:   
[Malignant neoplasm of   
exocervix]                              Onset:   
2024                Chronic  
   
                                                    Cancer of other   
female genital   
organs  
(20 sources)                            Carcinoma in situ of   
vulva; Translations:   
[Carcinoma in situ of   
vulva]                                  Onset:   
10-  
Resolved:   
10-                12-                Chronic  
   
                                                    Cataract  
(20 sources)                            Nuclear senile   
cataract;   
Translations:   
[Age-related nuclear   
cataract, unspecified   
eye]                                    Onset:   
2015                Chronic  
   
                                                    Cataract  
(1 source)                Cataract                  Onset:   
01-                                            
   
                                                    Delirium, dementia,   
and amnestic and   
other cognitive   
disorders  
(20 sources)                            Dementia;   
Translations: [Mild   
dementia without   
behavioral   
disturbance, psychotic   
disturbance, mood   
disturbance, or   
anxiety, unspecified   
dementia type (Multi)]                  Onset:   
2024                Chronic  
   
                                                    Diabetes mellitus   
without complication  
(20 sources)                            Type 2 diabetes   
mellitus without   
complication;   
Translations: [Type 2   
diabetes mellitus   
without complications]                  Onset:   
2017                Chronic  
   
                                                    Disorders of lipid   
metabolism  
(20 sources)                            Mixed hyperlipidemia;   
Translations: [Mixed   
hyperlipidemia]                         Onset:   
11-                10-                Chronic  
   
                                                    Esophageal disorders  
(20 sources)                            Gastroesophageal   
reflux disease without   
esophagitis;   
Translations:   
[Gastro-esophageal   
reflux disease without   
esophagitis]                            Onset:   
2024                Chronic  
   
                                                    Essential   
hypertension  
(20 sources)                            Hypertensive disorder;   
Translations:   
[Essential (primary)   
hypertension]                           Onset:   
01-                10-                Chronic  
   
                                                    Headache; including   
migraine  
(20 sources)                            Ophthalmic migraine;   
Translations:   
[Migraine with aura,   
not intractable,   
without status   
migrainosus]                            Onset:   
2017                Chronic  
   
                                                    Heart valve   
disorders  
(11 sources)                            Mitral valve prolapse;   
Translations:   
[Nonrheumatic mitral   
(valve) prolapse]                       Onset:   
2024                Chronic  
   
                                                    Hypertension with   
complications and   
secondary   
hypertension  
(9 sources)                             Secondary   
hypertension;   
Translations:   
[Secondary   
hypertension,   
unspecified]                            Onset:   
10-                10-                Chronic  
   
                                                    Menopausal disorders  
(20 sources)                            Atrophic vaginitis;   
Translations:   
[Postmenopausal   
atrophic vaginitis]                     Onset:   
2006                Chronic  
   
                                                    Other connective   
tissue disease  
(1 source)                              Foot pain;   
Translations: [Right   
foot pain]                                                  Episodic  
   
                                                    Other eye disorders  
(6 sources)                             H/O: L cataract   
extraction;   
Translations:   
[Cataract extraction   
status, left eye]                       2024          Episodic  
   
                                                    Other eye disorders  
(3 sources)                             H/O: R cataract   
extraction;   
Translations:   
[Cataract extraction   
status, right eye]                      2024          Episodic  
   
                                                    Other liver diseases  
(2 sources)                             Other specified   
diseases of liver;   
Translations: [Other   
specified diseases of   
liver]                                  Onset:   
2024                                          Chronic  
   
                                                    Other liver diseases  
(4 sources)                             Liver disease,   
unspecified;   
Translations: [Liver   
disease, unspecified]                   Onset:   
2024                                          Chronic  
   
                                                    Other liver diseases  
(10 sources)                            Liver cyst;   
Translations: [Other   
specified diseases of   
liver]                                  Onset:   
2024                Chronic  
   
                                                    Other nutritional;   
endocrine; and   
metabolic disorders  
(2 sources)                             Obesity; Translations:   
[Class 2 obesity in   
adult]                                  Onset:   
2024                Chronic  
   
                                                    Other screening for   
suspected conditions   
(not mental   
disorders or   
infectious disease)  
(20 sources)                            Patient encounter   
status; Translations:   
[Encounter for   
screening for   
malignant neoplasm of   
vagina]                                 Onset:   
2024  
Resolved:   
2024                                          Episodic  
   
                                                    Other upper   
respiratory   
infections  
(3 sources)                             Acute upper   
respiratory infection,   
unspecified;   
Translations: [Acute   
upper respiratory   
infection]                              Onset:   
2024                                          Episodic  
   
                                                    Residual codes;   
unclassified  
(1 source)                              Confusional state;   
Translations:   
[Disorientation,   
unspecified]                            2024          Episodic  
   
                                                    Transient cerebral   
ischemia  
(20 sources)                            Cerebral ischemia;   
Translations:   
[Transient cerebral   
ischemic attack,   
unspecified]                            Onset:   
01-  
Resolved:   
10-                01-                Chronic  
   
                                                    Unclassified  
(1 source)                Unknown / UNK(Unknown)    Onset:   
2017                                            
   
                                                    Unclassified  
(2 sources)               1 month f/u               Onset:   
2024                                            
   
                                                    Unclassified  
(1 source)                              Unspecified dementia,   
mild, without   
behavioral   
disturbance, psychotic   
disturbance, mood   
disturbance, and   
anxiety; Translations:   
[Unspecified dementia,   
mild, without   
behavioral   
disturbance, psychotic   
disturbance, mood   
disturbance, and   
anxiety]                                Onset:   
2024                                            
   
                                                    Unclassified  
(1 source)                              Unspecified dementia,   
mild, without   
behavioral   
disturbance, psychotic   
disturbance, mood   
disturbance, and   
anxiety (Multi);   
Translations:   
[Unspecified dementia,   
mild, without   
behavioral   
disturbance, psychotic   
disturbance, mood   
disturbance, and   
anxiety (Multi)]                        Onset:   
2024                                            
   
                                                    Viral infection  
(2 sources)                             COVID-19;   
Translations:   
[COVID-19]                              Onset:   
2024                                            
  
  
Past or Other Problems  
  
  
                                                    Problem   
Classification  Problem         Date            Documented Date Episodic/Chronic  
   
                                                    Bacterial infection;   
unspecified site  
(2 sources)                             Bacterial infection,   
unspecified;   
Translations:   
[Bacterial   
infection,   
unspecified]                            Onset:   
2024                                          Episodic  
   
                                                    Blindness and vision   
defects  
(20 sources)                            Hypermetropia;   
Translations:   
[Hypermetropia,   
unspecified eye]                        Onset:   
2015  
Resolved:   
10-                04-                Episodic  
   
                                                    Cancer of breast  
(20 sources)                            History of malignant   
neoplasm of breast;   
Translations:   
[Personal history of   
malignant neoplasm   
of breast]                              Onset:   
2012  
Resolved:   
10-                04-                Episodic  
   
                                                    Conditions associated   
with dizziness or   
vertigo  
(3 sources)                             Lightheadedness;   
Translations:   
[Dizziness and   
giddiness]                              Onset:   
2024                Episodic  
   
                                                    Fluid and electrolyte   
disorders  
(17 sources)                            Hypokalemia;   
Translations:   
[Hypokalemia]                           Onset:   
2024                                          Episodic  
   
                                                    Inflammation;   
infection of eye   
(except that caused   
by tuberculosis or   
sexually   
transmitteddisease)  
(20 sources)                            Hordeolum externum   
of upper eyelid of   
right eye;   
Translations:   
[Hordeolum externum   
right upper eyelid]                     Onset:   
2015  
Resolved:   
10-                10-                Episodic  
   
                                                    Nausea and vomiting  
(3 sources)                             Nausea and vomiting;   
Translations:   
[Nausea with   
vomiting,   
unspecified]                            Onset:   
2024                Episodic  
   
                                                    Other liver diseases  
(15 sources)                            Lesion of liver;   
Translations: [Liver   
disease,   
unspecified]                            Onset:   
2024  
Resolved:   
2024                Chronic  
   
                                                    Other lower   
respiratory disease  
(17 sources)                            Hiccoughs;   
Translations:   
[Hiccough]                              Onset:   
2024  
Resolved:   
2024                Episodic  
   
                                                    Other lower   
respiratory disease  
(2 sources)                             Hiccough;   
Translations:   
[Hiccough]                              Onset:   
2024                                          Episodic  
   
                                                    Other nutritional;   
endocrine; and   
metabolic disorders  
(6 sources)                             Abnormal weight   
loss; Translations:   
[Abnormal weight   
loss]                                   Onset:   
2024                                          Episodic  
   
                                                    Other nutritional;   
endocrine; and   
metabolic disorders  
(18 sources)                            Unintentional weight   
loss; Translations:   
[Abnormal weight   
loss]                                   Onset:   
2024                Episodic  
   
                                                    Residual codes;   
unclassified  
(20 sources)                            Amnesia;   
Translations: [Other   
amnesia]                                Onset:   
2024  
Resolved:   
2024                Episodic  
   
                                                    Residual codes;   
unclassified  
(4 sources)                             Other amnesia;   
Translations: [Other   
amnesia]                                Onset:   
2024                                          Episodic  
   
                                                    Residual codes;   
unclassified  
(2 sources)                             Disorientation,   
unspecified;   
Translations:   
[Disorientation,   
unspecified]                            Onset:   
2024                                          Episodic  
   
                                                    Unclassified  
(20 sources)                                        Onset:   
10-  
Resolved:   
09-                10-                  
   
                                                    Unclassified  
(15 sources)                            APPOINTMENT   
CANCELLED                               Onset:   
2006  
Resolved:   
2013                  
   
                                                    Unclassified  
(1 source)                              Unspecified   
dementia, mild,   
without behavioral   
disturbance,   
psychotic   
disturbance, mood   
disturbance, and   
anxiety;   
Translations:   
[Unspecified   
dementia, mild,   
without behavioral   
disturbance,   
psychotic   
disturbance, mood   
disturbance, and   
anxiety]                                Onset:   
2024                                            
   
                                                    Unclassified  
(1 source)                              Unspecified   
dementia, mild,   
without behavioral   
disturbance,   
psychotic   
disturbance, mood   
disturbance, and   
anxiety (Multi);   
Translations:   
[Unspecified   
dementia, mild,   
without behavioral   
disturbance,   
psychotic   
disturbance, mood   
disturbance, and   
anxiety (Multi)]                        Onset:   
2024                                            
   
                                                    Urinary tract   
infections  
(3 sources)                             Bacterial urinary   
infection;   
Translations:   
[Urinary tract   
infection, site not   
specified]                              Onset:   
2024                Episodic  
   
                                                    Viral infection  
(1 source)                              Disease caused by   
2019-nCoV;   
Translations:   
[COVID-19]                              2024          Episodic  
   
                                                    NEGATED: Highlighted   
row has not   
occurred!Residual   
codes; unclassified  
(6 sources)     Disease                                         Episodic  
  
  
  
Results  
  
  
                          Test Name    Value        Interpretation Reference   
Range                                   Facility  
   
                                                    Glucose Test strip manual (B  
ld) [Mass/Vol]on 2024   
   
                          Glucose [Mass/Vol] 167 mg/dL    High         74 - 99   
mg/dL                                   [facility]  
   
                                                    Interpretation and   
review of laboratory   
results         Abnormal                                        [facility]  
   
                      Glucose [Mass/Vol] 167 mg/dL  High       74-99      [facility]  
   
                                        Comment on above:   Performed By: #### 2  
[id number] ####  
GUILLAUME SEKOU ([id number])  
[facility] ([facility])  
[address] [place] [address]   
   
                                                    CNOVSPon 2024   
   
                                        CNOVSP              Visit (SP) Office   
(HEMJOSUÉ)  
-----------------------  
-----------  
MARZENA MEDEROS   
([id number]) 1945 F  
Date Time Provider   
Department  
24 10:10 AM   
COLTEN ABBOTT  
During your visit   
today, we recorded the   
following information   
about you:  
Temperature Pulse Blood   
pressure Weight  
98.1 degrees 82/minute   
111/68 63.7 kg  
Height  
1.6 m  
Colten Abbott DO   
2024 10:24 AM   
Signed  
Diagnoses:  
1) T1c (1.5 cm; grade   
II; no AL invasion) N0   
(0/4 SLN) MX ER/ID   
positive, HER2  
non-amplified invasive   
ductal carcinoma of the   
right breast.  
2) pT2 N0 FIGO 1   
endometrial   
adenocarcinoma with   
squamous   
differentiation s/p  
adjuvant brachytherapy.  
HPI: Patient is a 77 yo   
female who had a right   
breast abnormality on   
screening  
mammogram. Biopsy   
invasive ductal   
carcinoma.  
Underwent partial   
mastectomy 2010.  
Pathology:  
1.5 cm invasive ductal   
carcinoma.  
Grade II.  
ER/ID positive.  
HER2 non-amplified by   
FISH.  
4 of 4 LNs negative.  
Oncotype: Recurrence   
score 25 (16%).   
Intermediate.  
Previous therapy:  
1) TC x4. Completed   
12/15/2010.  
2) Adjuvant radiation   
2011 - 3/7/2011.  
3) Anastrozole--changed   
to tamoxifen 2011 due   
to significant symptoms   
of  
vaginal atrophy.   
Stopped 2017.  
She underwent surgery   
in 2017.  
Pathology:  
FINAL DIAGNOSIS:  
A) LEFT PELVIC SENTINEL   
LYMPH NODE #1 -   
NEGATIVE FOR   
MALIGNANCY.  
B) LEFT PELVIC SENTINEL   
LYMPH NODE #2 -   
NEGATIVE FOR   
MALIGNANCY.  
C) RIGHT PELVIC   
SENTINEL LYMPH NODE #1   
- NEGATIVE FOR   
MALIGNANCY.  
D) RIGHT PELVIC   
SENTINEL LYMPH NODE #2   
- NEGATIVE FOR   
MALIGNANCY.  
E) HYSTERECTOMY WITH   
BILATERAL   
SALPINGO-OOPHORECTOMY -   
FOCUS OF  
ENDOMETRIAL   
ADENOCARCINOMA,   
ENDOMETRIOID TYPE WITH   
SQUAMOUS   
DIFFERENTIATION(  
FIGO GRADE 1) WITH   
EXTENSIVE ASSOCIATED   
OBSCURING CRUSH   
ARTIFACT AND  
DISRUPTION.  
TUMOR INVOLVES THE   
ANTERIOR AND POSTERIOR   
LOWER UTERINE SEGMENT   
AND THE  
UPPER ENDOCERVICAL   
CANAL. (SEE COMMENT).  
3.4 CM ENDOMETRIAL   
POLYP.  
MULTIPLE INTRAMURAL,   
SUBMUCOSAL AND   
SUBSEROSAL LEIOMYOMAS.  
RIGHT AND LEFT OVARIES-   
ATROPHIC CHANGES.   
NEGATIVE FOR   
MALIGNANCY.  
RIGHT AND LEFT   
FALLOPIAN TUBES -   
SEROSAL WALTHARD REST   
CYST. NEGATIVE  
FOR MALIGNANCY.  
COMMENT: Adenocarcinoma   
is not identified in   
association with the   
3.4 cm  
endometrial polyp.   
Endometrioid   
adenocarcinoma   
identified only on  
slides E12 and E13   
representing the   
anterior and posterior   
uterine segment and  
upper endocervical   
canal. These foci of   
adenocarcinoma are  
associated with   
extensive obscuring   
crush artifact and   
disruption.  
Adenocarcinoma is   
identified in the upper   
endocervical canal.   
There is  
possible invasion of   
the stroma; however,   
the degree of   
artifactual  
distortion precludes   
accurate evaluation. No   
intact endometrial  
adenocarcinoma is   
actually identified in   
association with   
underlying  
myometrium. However,   
there are detached   
disrupted fragments of  
adenocarcinoma which   
may have been dislodged   
from some other site in  
the endometrial cavity.   
Slides E12 and E13 were   
also reviewed by Dr. Vinod Corbin. The   
previous outside report   
from [facility]  
representing an   
endometrial biopsy was   
obtained for review.   
The biopsy  
was interpreted as a   
FIGO grade 1   
endometrioid   
adenocarcinoma and  
mismatch repair   
proteins were performed   
on this outside case.   
Mismatch  
repair proteins (MLH1,   
PMS2, MSH2, and MSH6)   
were found to be   
expressed  
in the carcinoma   
nuclei.  
Endometrial Cancer Case   
Summary  
Specimen: Uterine   
corpus, cervix, right   
and left ovaries and   
right and  
left fallopian tubes.  
Procedure: Total   
hysterectomy.  
Lymph node sampling:   
Right and left sentinel   
lymph node biopsies  
performed.  
Specimen integrity:   
Intact hysterectomy   
specimen.  
Tumor size: Cannot be   
accurately determined   
(see comment).  
Histologic type:   
Endometrioid   
adenocarcinoma with   
squamous  
differentiation.  
Histologic grade: FIGO   
grade 1.  
Myometrial invasion:   
Cannot be accurately   
determined (see   
comment).  
Involvement of cervix:   
Cannot be accurately   
determined (see   
comment).  
Other organ   
involvement: Not   
identified.  
Lymph-vascular   
invasion: Not   
identified.  
Pelvic lymph nodes:  
Number examined: 4.  
Number involved: 0.  
Pathologic stage: pT1a   
or pT2(see comment)   
pN0(sn).  
Received brachii   
therapy while she was   
in [place].  
Presents for ongoing   
oncologic management.  
Interim history:  
Had about 20 lb weight   
loss this summer.  
Lost appetite.  
PCP ordered thorough   
work up.  
Appetite now recovered.  
Covid 2024--Paxlovid.  
PMH, medications and   
allergies personally   
reviewed by me today.   
Any changes  
documented in   
appropriate section.  
ROS:  
Constitutional: Denies   
episodes night sweats.  
Neuro: Denies HA,   
vertigo, dizziness and   
imbalance. Denies   
symptoms of  
neuropathy.  
HEENT: No recent change   
in voice or vision.  
Resp: Denies shortness   
of breath at rest.   
Denies PEMBERTON.  
CVS: Denies exertional   
chest pain, PND,   
orthopnea and LE edema.  
GI: Denies dysgeusia.   
Denies symptoms of   
stomatitis. (more   
content not   
included)...        Normal                                  [facility]  
   
                                                    CNOVon 2024   
   
                                        CNOV                Office Visit ([facility]  
)  
-----------------------  
-----------  
MARZENA MEDEROS   
([id number]) 1945 F  
Date Time Provider   
Department  
24 3:40 PM JOSE DELGADO [facility]  
During your visit   
today, we recorded the   
following information   
about you:  
Blood pressure Weight   
Height  
110/72 62.1 kg 1.581 m  
Jose Delgado MD   
2024 3:59 PM   
Signed  
Marzena is a 79 year old   
 who presents   
for an annual   
gynecologic exam  
without complaints.  
Postmenopausal: Yes  
HRT use: No.  
Last Pap: 2022   
normal  
HPV: 2014 negative  
Last mammogram:    
normal  
History of abnormal   
mammogram: Yes - h/o   
breast cancer  
OB History  
 T0  L1  
SAB1 IAB0 Ectopic0   
Multiple0 Live Births0  
Comment: Plus two step   
daughters  
Gyn History  
LMP: Hysterectomy  
Age at Menarche:  
Age at First Pregnancy:  
Age at Menopause:  
Gyn History Comments:  
Sexual Activity: Not   
Asked; Male; not asked  
Contraception: No   
contraception data on   
record  
PAST MEDICAL HISTORY  
Diagnosis Date  
Breast cancer (HCC)   
  
invasive ductal   
carcinoma, completed   
radiation 3/7/11  
Cancer of endometrium   
(HCC)  
Carcinoma in situ,   
vulva   
Cellulitis  
of right eye muscle  
Diabetes mellitus   
without mention of   
complication  
Diabetes mellitus, type   
II (HCC)  
Diaphragmatic hernia   
without mention of   
obstruction or gangrene  
Hiatal hernia  
Dyspareunia  
Elevated cholesterol  
Esophageal reflux  
Insomnia, unspecified  
Orbital cellulitis  
Other corneal disorder  
Rt eye corneal erosion   
.  
PMH - PAST MEDICAL   
HISTORY OF  
?VESTIBULITIS  
PMH - PAST MEDICAL   
HISTORY OF  
CLIMACTERIC  
PMH - PAST MEDICAL   
HISTORY OF 2006  
Squamous CIS of the   
Vulva 233.3  
Pure   
hypercholesterolemia  
Rib fracture  
6th Rib  
Senile dementia (HCC)  
Stomatitis and   
mucositis (ulcerative)  
Chronic ulcerative   
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   
biopsy  
Unspecified essential   
hypertension  
PAST SURGICAL HISTORY  
Procedure Laterality   
Date  
Bunions bilateral feet   
removed 10/2002  
BX/EXC LYMPH NODE OPEN   
DEEP AXILLARY NODE   
2010  
RIGHT BREAST LUMP W/   
SLN BX  
CHOLECYSTECTOMY 2004  
Cholecystectomy  
COLONOSCOPY FLX DX   
W/COLLJ SPEC WHEN PFRMD   
  
Colonoscopy  
COLPOSCOPY CERVIX   
UPPER/ADJACENT VAGINA   
2009  
Colposcopy of the vulva  
COLPOSCOPY, VULVA   
10/2009  
CORRECT BUNION,SIMPLE  
Bunion  
DILATION AND CURETTAGE   
DXAND/THER NONOBSTETRIC   
1970's  
SAB  
HYSTERECTOMY 2017  
LAPAROSCOPY SURG   
CHOLECYSTECTOMY 2004  
Cholecystectomy, lap  
MAMMO STEREOTACTIC CORE   
BIOPSY RT 10/10/2013  
MASTECTOMY, PARTIAL   
2010  
RIGHT BREAST  
PAST SURGICAL HISTORY   
OF 2006  
partial vulvectomy/sq.   
cell ca in situ  
PAST SURGICAL HISTORY   
OF   
right eye cornea  
PAST SURGICAL HISTORY   
OF   
right eye revision  
PAST SURGICAL HISTORY   
OF  
squamous cell carcinoma   
right arm  
REMV CATARACT   
EXTRACAP,INSERT LENS   
10/31/2024  
CCA0T0 - +19.5D  
SALPINGO-OOPHORECTOMY   
COMPL/PRTL UNI/BI SPX   
2017  
Toenail Big Toe Left   
Foot removed 10/2002  
TONSILLECTOMY   
PRIMARY/SECONDARY  
Tonsillectomy  
UNLISTED PROCEDURE   
HANDS/FINGERS   
2012  
[facility] Arthoplasty on   
right hand  
FAMILY HISTORY  
Problem Relation Age of   
Onset  
Stroke Mother  
Diabetes Mother  
GI Father  
 from   
complications of gb   
surgery  
Arthritis Sister  
Systemic Lupus  
Cancer Maternal Uncle  
LUNG  
Cancer Other  
cousin with breast   
cancer/cousin with   
bladder ca.  
Stroke Sister  
SOCIAL HISTORY  
Social History  
Tobacco Use  
Smoking status: Never  
Smokeless tobacco:   
Never  
Vaping Use  
Vaping status: Never   
Used  
Substance Use Topics  
Alcohol use: Yes  
Comment: 1-2 weekly  
Drug use: No  
REVIEW OF SYSTEMS  
Abdomen: No abdominal   
pain, nausea, vomiting,   
diarrhea, or   
constipation. No  
bloating, early   
satiety, indigestion,   
or increased   
flatulence.  
Bladder: No dysuria,   
gross hematuria,   
urinary frequency,   
urinary urgency, or  
incontinence  
Breast: No breast   
lumps, nipple d/c,   
overlying skin changes,   
redness or skin  
retraction  
Allergies and current   
medication updated:Yes  
SENSITIVE EXAM: The   
sensitive examination   
was discussed with the   
Patient or  
Patient's Authorized   
Representative. As   
applicable, any other   
physician,  
advance practice   
provider, medical   
student, or other   
health professional  
student that will be   
observing or involved   
in the sensitive   
examination for  
educational or training   
purposes was discussed   
with the Patient or   
Authorized  
Representative. The   
Patient or Authorized   
Representative has   
agreed to proceed  
with the sensitive   
examination. (Sensitive   
examination includes   
inspection  
and/or palpation of the   
breasts, pelvis,   
prostate and anorectal   
regions).  
EXAM: /72   Ht 5'   
2.25  (1.58m)   Wt 137   
lb (62.1kg)   BMI 24.86  
kg/(m2).  
GENERAL: pleasant,   
 female in no   
apparent distress  
BREAST: soft,   
non-tender, no dominant   
mass, normal   
nipple-areolar complex,   
no  
lymphadenopathy, and no   
nipple discharge  
CHEST: Normal   
inspiratory effort  
ABDOMEN: soft,   
non-tender, and no   
masses  
P (more content not   
included)...        Normal                                  [facility] 2024   
   
                                        CNPN                Telephone ([employer])  
-----------------------  
-----------  
MARZENA MEDEROS   
([id number]) 1945 F  
Date Time Provider   
Department  
24 AMAYA REIS II  
During your visit   
today, we recorded the   
following information   
about you:  
HiltonZaida George   
2024 10:05 AM   
Addendum  
Mr. Mederos phoned   
asking that you call   
him as he forgot to ask   
you a question  
about what type of lens   
they should do for his   
wife for her cataract   
surgery.  
He said Dr. Hays is   
awaiting their   
decision.  
11/15/2024  
Called Mr. Mederos.   
Discussed Intraocular   
lens options. Decided   
to retain best  
distance vision in each   
eye and will use   
multifocal glasses.  
Allergies As of Date:   
2024 Noted   
Allergy Reaction  
ADHESIVE 10/04/2010 2 -   
Rash  
Comments: Redness  
AMBIEN (ZOLPIDEM   
TARTRATE) 2011 2   
- Rash  
CODEINE 2006 1 -   
Mental Status Change  
NEOSPORIN   
(NEOMYCIN-BACITRACIN-PO  
*2005 2 - Rash  
PERCOCET   
(OXYCODONE-ACETAMINOPHE  
N)2005  
Comments: Causes   
Vomitting  
Date Reviewed:   
2024  
Reviewed by: Jose Delgado MD - Fully   
Assessed  
Reason for Visit:  
Patient Question [6421]  
Prescriptions as of   
11/15/2024  
- keTORolac (ACULAR)   
0.5 % ophthalmic   
solution  
Use 1 Drop in the left   
eye three times a day.  
- prednisoLONE acetate   
(PRED FORTE) 1 %   
ophthalmic suspension  
Use 1 Drop in the left   
eye three times a day.  
- glipiZIDE (GLUCOTROL   
XL) 2.5 mg 24 hr tablet  
Take 2.5 mg by mouth.  
- fluorometholone (FML   
LIQUID FILM) 0.1 %   
ophthalmic suspension  
Use 1 Drop in the right   
eye three times a day.  
- amLODIPine (NORVASC)   
10 mg tablet  
Take 10 mg by mouth.  
- donepezil (ARICEPT)   
10 mg tablet  
Take 10 mg by mouth.  
- cholecalciferol   
(VITAMIN D3) 50 mcg   
(2,000 unit) tablet  
Take 2,000 Units by   
mouth once daily.  
-   
losartan-hydroCHLOROthi  
azide (HYZAAR) 100-12.5   
mg per tablet  
Take 1 tablet by mouth   
once daily.  
- omeprazole (PRILOSEC)   
20 mg capsule  
Take 20 mg by mouth   
once daily.  
- potassium chloride   
(KLOR-CON 10) 10 mEq   
tablet  
Take 10 mEq by mouth   
once daily.  
- metFORMIN ER   
(GLUCOPHAGE XR) 500 mg   
24 hr tablet  
Take two tablets by   
mouth once daily.  
- simvastatin(ZOCOR 20   
MG TAB)  
Take one(1) tablet   
daily.  
Facility-Administered   
Medications as of   
11/15/2024  
- iv contrast   
(radiology procedure)  
Problem List As Of Date   
2024 Noted   
Resolved  
ATROPHIC VAGINITIS   
[N95.2] 2006  
APPOINTMENT CANCELLED   
[XCCC] 2006  
TRANSIENT CEREBRAL   
ISCHEMIA NOS [G45.9]   
01/15/2008  
Carcinoma in situ of   
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   
10/15/2009  
Malignant neoplasm of   
female breast (HCC)   
[C50.*2010  
Personal history of   
malignant neoplasm of   
breas*2012  
Senile nuclear   
sclerosis [H25.10]   
2015  
Hypermetropia [H52.00]   
2015  
Regular astigmatism   
[H52.229] 2015  
Presbyopia [H52.4]   
2015  
Invasive ductal   
carcinoma of breast,   
female (HC*2017  
Ocular migraine   
[G43.109] 2017  
Controlled type 2   
diabetes mellitus   
without com*2017  
Postmenopausal bleeding   
[N95.0] 2017  
S/P hysterectomy with   
oophorectomy [Z90.710,   
Z9*2017  
Encounter Number:   
[id number]  
Encounter Status:Closed   
by AMAYA REIS II   
on 11/15/24         Normal                                  [facility]  
   
                                                    HISTORY PHYSICALon    
   
                                        HISTORY PHYSICAL    HNO ID: [id number]  
Author: RONEL HAYS MD  
Service: ?  
Author Type: Physician  
Type: H&P  
Filed: 2024 11:27  
Note Text:  
HISTORY AND PHYSICAL   
EXAMINATION  
SERVICE DATE:   
2024  
SERVICE TIME: 11:26 AM  
PRIMARY CARE PHYSICIAN:   
Sharifa Almaguer DO  
REASON FOR VISIT:   
Marzena Mederos is a 79   
year old female who is   
being seen for  
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   
Cataract Right Eye.  
The patient has the   
following:  
ACTIVE PROBLEM LIST  
Postmenopausal Atrophic   
Vaginitis  
Unspecified Transient   
Cerebral Ischemia  
Carcinoma in Situ of   
Vulva  
Malignant Neoplasm of   
Female Breast (Hcc)  
Personal History of   
Malignant Neoplasm of   
Breast  
Senile Nuclear   
Sclerosis  
Hypermetropia  
Regular Astigmatism  
Presbyopia  
Invasive Ductal   
Carcinoma of Breast,   
Female (Hcc)  
Ocular Migraine  
Controlled Type 2   
Diabetes Mellitus   
Without Complication,   
Without Long-Term  
Current Use of Insulin   
(Hcc)  
Postmenopausal Bleeding  
S/P Hysterectomy With   
Oophorectomy  
SUBJECTIVE  
CHIEF COMPLAINT:   
Blurred vision for   
distance and near Right   
Eye.  
PAST MEDICAL HISTORY  
Diagnosis Date  
Breast cancer (HCC)   
  
invasive ductal   
carcinoma, completed   
radiation 3/7/11  
Cancer of endometrium   
(HCC)  
Carcinoma in situ,   
vulva   
Cellulitis  
of right eye muscle  
Diabetes mellitus   
without mention of   
complication  
Diabetes mellitus, type   
II (HCC)  
Diaphragmatic hernia   
without mention of   
obstruction or gangrene  
Hiatal hernia  
Dyspareunia  
Elevated cholesterol  
Esophageal reflux  
Insomnia, unspecified  
Orbital cellulitis  
Other corneal disorder  
Rt eye corneal erosion   
.  
PMH - PAST MEDICAL   
HISTORY OF  
?VESTIBULITIS  
PMH - PAST MEDICAL   
HISTORY OF  
CLIMACTERIC  
PMH - PAST MEDICAL   
HISTORY OF 2006  
Squamous CIS of the   
Vulva 233.3  
Pure   
hypercholesterolemia  
Rib fracture  
6th Rib  
Senile dementia (HCC)  
Stomatitis and   
mucositis (ulcerative)  
Chronic ulcerative   
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   
biopsy  
Unspecified essential   
hypertension  
PAST SURGICAL HISTORY  
Procedure Laterality   
Date  
Bunions bilateral feet   
removed 10/2002  
BX/EXC LYMPH NODE OPEN   
DEEP AXILLARY NODE   
2010  
RIGHT BREAST LUMP W/   
SLN BX  
CHOLECYSTECTOMY 2004  
Cholecystectomy  
COLONOSCOPY FLX DX   
W/COLLJ SPEC WHEN PFRMD   
  
Colonoscopy  
COLPOSCOPY CERVIX   
UPPER/ADJACENT VAGINA   
2009  
Colposcopy of the vulva  
COLPOSCOPY, VULVA   
10/2009  
CORRECT BUNION,SIMPLE  
Bunion  
DILATION AND CURETTAGE   
DXAND/THER NONOBSTETRIC   
1970's  
SAB  
HYSTERECTOMY 2017  
LAPAROSCOPY SURG   
CHOLECYSTECTOMY 2004  
Cholecystectomy, lap  
MAMMO STEREOTACTIC CORE   
BIOPSY RT 10/10/2013  
MASTECTOMY, PARTIAL   
2010  
RIGHT BREAST  
PAST SURGICAL HISTORY   
OF 2006  
partial vulvectomy/sq.   
cell ca in situ  
PAST SURGICAL HISTORY   
OF   
right eye cornea  
PAST SURGICAL HISTORY   
OF   
right eye revision  
PAST SURGICAL HISTORY   
OF  
squamous cell carcinoma   
right arm  
REMV CATARACT   
EXTRACAP,INSERT LENS   
10/31/2024  
CCA0T0 - +19.5D  
SALPINGO-OOPHORECTOMY   
COMPL/PRTL UNI/BI SPX   
2017  
Toenail Big Toe Left   
Foot removed 10/2002  
TONSILLECTOMY   
PRIMARY/SECONDARY  
Tonsillectomy  
UNLISTED PROCEDURE   
HANDS/FINGERS   
2012  
[facility] Arthoplasty on   
right hand  
FAMILY HISTORY  
Problem Relation Age of   
Onset  
Stroke Mother  
Diabetes Mother  
GI Father  
 from   
complications of gb   
surgery  
Arthritis Sister  
Systemic Lupus  
Cancer Maternal Uncle  
LUNG  
Cancer Other  
cousin with breast   
cancer/cousin with   
bladder ca.  
Stroke Sister  
SOCIAL HISTORY:  
Social History  
Tobacco Use  
Smoking status: Never  
Smokeless tobacco:   
Never  
Vaping Use  
Vaping status: Never   
Used  
Substance Use Topics  
Alcohol use: Yes  
Comment: 1-2 weekly  
Drug use: No  
MEDICATIONS:  
Prior to Admission   
medications as of   
24 1120  
Medication Sig Last   
Dose Taking  
glipiZIDE (GLUCOTROL   
XL) 2.5 mg 24 hr tablet   
Take 2.5 mg by mouth.   
Yes  
fluorometholone (FML   
LIQUID FILM) 0.1 %   
ophthalmic suspension   
Use 1 Drop in the  
right eye three times a   
day. Yes  
donepezil (ARICEPT) 10   
mg tablet Take 10 mg by   
mouth. Yes  
cholecalciferol   
(VITAMIN D3) 50 mcg   
(2,000 unit) tablet   
Take 2,000 Units by  
mouth once daily. Yes  
losartan-hydroCHLOROthi  
azide (HYZAAR) 100-12.5   
mg per tablet Take 1   
tablet by  
mouth once daily. Yes  
omeprazole (PRILOSEC)   
20 mg capsule Take 20   
mg by mouth once daily.   
Yes  
potassium chloride   
(KLOR-CON 10) 10 mEq   
tablet Take 10 mEq by   
mouth once daily.  
Yes  
metFORMIN ER   
(GLUCOPHAGE XR) 500 mg   
24 hr tablet Take two   
tablets by mouth once  
daily. Yes  
simvastatin(ZOCOR 20 MG   
TAB) Take one(1) tablet   
daily. Yes  
keTORolac (ACULAR) 0.5   
% ophthalmic solution   
Use 1 Drop in the left   
eye three  
times a day.  
prednisoLONE acetate   
(PRED FORTE) 1 %   
ophthalmic suspension   
Use 1 Drop in the  
left eye three times a   
day.  
amLODIPine (NORVASC) 10   
mg tablet Take 10 mg by   
mouth.  
No medication comments   
found.  
CURRENT ALLERGIES:  
ALLERGIES  
Allergen Reactions  
Adhesive Rash  
Redness  
Ambien [Zolpidem Ta*   
Rash  
Codeine Mental Status   
Change  
Neospo (more content   
not included)...    Normal                                  [facility]  
   
                                                    JONY SCREENING W TOMOon    
   
                                        JONY SCREENING W HUGO * * *Final Report*   
* *  
DATE OF EXAM: 2024 10:02AM  
[facility] [id number] - JONY   
SCREENING W HUGO /   
ACCESSION # [id number]  
PROCEDURE REASON:   
Encounter for screening   
mammogram for breast   
cancer  
  
* * * * Physician   
Interpretation * * * *  
RESULT: [address] [address] [place] [address]  
Phone: [phone]  
HISTORY:  
Patient is 79 years old   
and is seen for   
screening and is   
asymptomatic in  
both breasts. The   
patient has a history   
of right breast cancer.  
COMPARISON STUDIES:  
The present examination   
has been compared to   
prior imaging studies   
dated  
10/28/2019 (mammogram),   
10/29/2020 (mammogram),   
2021 (mammogram),  
2022 (mammogram)   
and 2023   
(mammogram).  
MAMMOGRAM TECHNIQUE:  
The study was acquired   
using full field   
digital technology and  
interpreted from soft   
copy. Digital Breast   
Tomosynthesis (DBT)   
images  
were obtained and used   
to assist in the   
interpretation of this  
examination.   
Computer-aided   
detection was utilized   
by the radiologist in  
the interpretation of   
this examination.  
MAMMOGRAM FINDINGS:  
There are scattered   
areas of fibroglandular   
density.  
There are stable   
post-operative changes   
in the right breast.   
There are no  
significant changes   
from the prior study.  
No suspicious masses,   
calcifications or other   
abnormalities are seen   
in  
either breast.  
IMPRESSION:  
There is no   
mammographic evidence   
of malignancy.  
Routine screening   
mammogram is   
recommended. Annual   
mammogram will be due  
in 1 year.  
BI-RADS Category 2:   
Benign  
Interpreting   
Radiologist: Laurie Handy M.D.  
Electronically signed   
on: 2024  
:   
RAYSHAWN  
Transcribe Date/Time:   
2024 9:28A  
Dictated by: LAURIE HANDY MD  
This examination was   
interpreted and the   
report reviewed and  
electronically signed   
by:  
LAURIE HANDY MD on   
2024 7:53AM EST  
[id number] Normal                                  [facility]  
   
                                                    ASCAN ONLY - DIAGNOSTIC OD (  
RIGHT EYE)on 2024   
   
                                                                  [facility]  
   
                                                    Radiology Study   
observation   
(narrative)                                                     [facility]  
   
                                                    Glucose Test strip manual (B  
ld) [Mass/Vol]on 10-   
   
                          Glucose [Mass/Vol] 156 mg/dL    High         74 - 99   
mg/dL                                   [facility]  
   
                                                    Interpretation and   
review of laboratory   
results         Abnormal                                        [facility]  
   
                      Glucose [Mass/Vol] 156 mg/dL  High       74-99      [facility]  
   
                                        Comment on above:   Performed By: #### 2  
[id number] ####  
GUILLAUME SEKOU ([id number])  
[facility] ([facility])  
[address] [id number]   
   
                                                    HISTORY PHYSICALon 10-  
4   
   
                                        HISTORY PHYSICAL    HNO ID: [id number]  
Author: RONEL HAYS MD  
Service: ?  
Author Type: Physician  
Type: H&P  
Filed: 10/08/2024 13:24  
Note Text:  
HISTORY AND PHYSICAL   
EXAMINATION  
SERVICE DATE: 10/8/2024  
SERVICE TIME:  
PRIMARY CARE PHYSICIAN:   
Sharifa Almaguer DO  
REASON FOR VISIT:  
Marzena Mederos is a 79   
year old female who is   
being seen for combined   
age  
related cataract left   
eye  
The patient has the   
following:  
ACTIVE PROBLEM LIST  
Postmenopausal Atrophic   
Vaginitis  
Unspecified Transient   
Cerebral Ischemia  
Carcinoma in Situ of   
Vulva  
Malignant Neoplasm of   
Female Breast (Hcc)  
Personal History of   
Malignant Neoplasm of   
Breast  
Senile Nuclear   
Sclerosis  
Hypermetropia  
Regular Astigmatism  
Presbyopia  
Invasive Ductal   
Carcinoma of Breast,   
Female (Hcc)  
Ocular Migraine  
Controlled Type 2   
Diabetes Mellitus   
Without Complication,   
Without Long-Term  
Current Use of Insulin   
(Hcc)  
Postmenopausal Bleeding  
S/P Hysterectomy With   
Oophorectomy  
SUBJECTIVE  
CHIEF COMPLAINT:   
combined age related   
cataract left eye  
HPI: blurry vision at   
all ranges of vision,   
difficulty with glare   
both eyes  
PAST MEDICAL HISTORY  
Diagnosis Date  
Breast cancer (HCC)   
  
invasive ductal   
carcinoma, completed   
radiation 3/7/11  
Cancer of endometrium   
(HCC)  
Carcinoma in situ,   
vulva   
Cellulitis  
of right eye muscle  
Diabetes mellitus   
without mention of   
complication  
Diabetes mellitus, type   
II (HCC)  
Diaphragmatic hernia   
without mention of   
obstruction or gangrene  
Hiatal hernia  
Dyspareunia  
Elevated cholesterol  
Esophageal reflux  
Insomnia, unspecified  
Orbital cellulitis  
Other corneal disorder  
Rt eye corneal erosion   
.  
PMH - PAST MEDICAL   
HISTORY OF  
?VESTIBULITIS  
PMH - PAST MEDICAL   
HISTORY OF  
CLIMACTERIC  
PMH - PAST MEDICAL   
HISTORY OF 2006  
Squamous CIS of the   
Vulva 233.3  
Pure   
hypercholesterolemia  
Rib fracture  
6th Rib  
Senile dementia (HCC)  
Stomatitis and   
mucositis (ulcerative)  
Chronic ulcerative   
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   
biopsy  
Unspecified essential   
hypertension  
PAST SURGICAL HISTORY  
Procedure Laterality   
Date  
Bunions bilateral feet   
removed 10/2002  
BX/EXC LYMPH NODE OPEN   
DEEP AXILLARY NODE   
8-24-10  
RIGHT BREAST LUMP W/   
SLN BX  
CHOLECYSTECTOMY 2004  
Cholecystectomy  
COLONOSCOPY FLX DX   
W/COLLJ SPEC WHEN PFRMD   
  
Colonoscopy  
COLPOSCOPY CERVIX   
UPPER/ADJACENT VAGINA   
  
Colposcopy of the vulva  
COLPOSCOPY, VULVA 10/09  
CORRECT BUNION,SIMPLE  
Bunion  
DILATION AND CURETTAGE   
DXAND/THER NONOBSTETRIC   
  
SAB  
HYSTERECTOMY 2017  
LAPAROSCOPY SURG   
CHOLECYSTECTOMY 3/2004  
Cholecystectomy, lap  
MAMMO STEREOTACTIC CORE   
BIOPSY RT 10/10/13  
MASTECTOMY, PARTIAL   
8-24-10  
RIGHT BREAST  
PAST SURGICAL HISTORY   
OF 2006  
partial vulvectomy/sq.   
cell ca in situ  
PAST SURGICAL HISTORY   
OF   
right eye cornea  
PAST SURGICAL HISTORY   
OF   
right eye revision  
PAST SURGICAL HISTORY   
OF  
squamous cell carcinoma   
right arm  
SALPINGO-OOPHORECTOMY   
COMPL/PRTL UNI/BI SPX   
2017  
Toenail Big Toe Left   
Foot removed 10/2002  
TONSILLECTOMY   
PRIMARY/SECONDARY  
Tonsillectomy  
UNLISTED PROCEDURE   
HANDS/FINGERS 12  
[facility] Arthoplasty on   
right hand  
FAMILY HISTORY  
Problem Relation Age of   
Onset  
Stroke Mother  
Diabetes Mother  
GI Father  
 from   
complications of gb   
surgery  
Arthritis Sister  
Systemic Lupus  
Cancer Maternal Uncle  
LUNG  
Cancer Other  
cousin with breast   
cancer/cousin with   
bladder ca.  
Stroke Sister  
SOCIAL HISTORY:  
Social History  
Tobacco Use  
Smoking status: Never  
Smokeless tobacco:   
Never  
Vaping Use  
Vaping status: Never   
Used  
Substance Use Topics  
Alcohol use: Yes  
Comment: 1-2 weekly  
Drug use: No  
MEDICATIONS:  
Prior to Admission   
medications as of   
10/8/24 1011  
Medication Sig Last   
Dose Taking  
fluorometholone (FML   
LIQUID FILM) 0.1 %   
ophthalmic suspension   
Use 1 Drop in both  
eyes three times a day.   
Taking Yes  
amLODIPine (NORVASC) 10   
mg tablet Take 10 mg by   
mouth. Taking Yes  
donepezil (ARICEPT) 10   
mg tablet Take 10 mg by   
mouth. Taking Yes  
cholecalciferol   
(VITAMIN D3) 50 mcg   
(2,000 unit) tablet   
Take 2,000 Units by  
mouth once daily.   
Taking Yes  
losartan-hydroCHLOROthi  
azide (HYZAAR) 100-12.5   
mg per tablet Take 1   
tablet by  
mouth once daily.   
Taking Yes  
omeprazole (PRILOSEC)   
20 mg capsule Take 20   
mg by mouth once daily.   
Taking Yes  
potassium chloride   
(KLOR-CON 10) 10 mEq   
tablet Take 10 mEq by   
mouth once daily.  
Taking Yes  
metFORMIN ER   
(GLUCOPHAGE XR) 500 mg   
24 hr tablet Take two   
tablets by mouth once  
daily. Taking Yes  
simvastatin(ZOCOR 20 MG   
TAB) Take one(1) tablet   
daily. Taking Yes  
No medication comments   
found.  
CURRENT ALLERGIES:  
ALLERGIES  
Allergen Reactions  
Adhesive Rash  
Redness  
Ambien [Zolpidem Ta*   
Rash  
Codeine Mental Status   
Change  
Neosporin [Neomycin*   
Rash  
Percocet [Oxycodone*  
Causes Vomitting  
REVIEW OF SYSTEMS:  
PAIN ASSESSMENT:  
General: No weight   
loss, malaise or   
fevers.  
Neuro: Dementia  
Respiratory: No history   
of current cough or   
dyspnea, or pneumonia   
in the (more content   
not included)...    Normal                                  [facility]  
   
                                                    IOL BIOMETRY W/ IOL CALC OU   
(BOTH EYES)on 10-   
   
                                                                  [facility]  
   
                                                    Radiology Study   
observation   
(narrative)                                                     [facility]  
   
                                                    CARDIOLOGY INTERPRETATION OF  
 NUCLEAR STRESSon 2024   
   
                                                    CARDIOLOGY   
INTERPRETATION OF   
NUCLEAR STRESS                          [place] [address]  
Phone [phone], Fax   
[phone]  
Nuclear Treadmill   
Stress Test  
Patient Name: MARZENA MEDEROS Ordering   
Provider: [id number] [address]  
Study Date: 2024   
Reading Physician:   
[id number] Josafat Bergman MD  
MRN/PID: [id number]   
Supervising Physician:   
[id number] Josafat Bergman MD  
Accession#:   
[id number] Fellow:  
Date of Birth/Age:   
1945 / 79 years   
Fellow:  
Gender: F Nurse: N/A  
Admit Date: 2024   
Technician: Acosta Encinas RRT,  
CVT  
Admission Status:   
Outpatient Sonographer:   
N/A  
Height: 160.02 cm   
Technologist:  
Weight: 63.50 kg   
Additional Staff:  
BSA: 1.66 m2   
Encounter#: [id number]  
BMI: 24.80 kg/m2   
Patient Location: [facility]   
Stress Lab  
Study Type: CARDIOLOGY   
INTERPRETATION OF   
NUCLEAR STRESS  
Diagnosis/ICD: Abnormal   
electrocardiogram [ECG]   
[EKG]-R94.31; Encounter   
for  
other preprocedural   
examination-Z01.818  
Indication: Abnormal   
EKG and Pre-Op   
Evaluation  
CPT Codes: Stress Test   
Interpretation-83413;   
Stress Test   
Supervision-[id number]  
Falls Risk: Moderate:   
Patient has moderate   
risk for sustaining a   
fall; a falls   
prevention plan has   
been implemented.  
  
Study Details: Correct   
procedure and correct   
patient verified   
verbally and with  
ID Band checked.  
  
Patient History:   
Hypertension, mitral   
valve prolapse, and   
hyperlipidemia.  
Allergies:   
Neomycin-Bacitracin-David  
ymixin, oxycodone,   
zolpidem, sulfonamides.  
Smoker: Never.  
Diabetes: Yes, managed   
with Oral medicine.  
BMI: Normal 18.5 - 25.  
  
Medications: Losartan,   
hydrochlorothiazide,   
simvastatin,   
amlodipine, glucophage   
and glypizide. The   
patient did not take   
medications as   
prescribed.  
  
Patient Performance:   
The patient exercised   
to stage II on a Bill   
protocol for 3 minutes   
and 45 seconds,   
achieving 5.50 METS.   
Patient received a   
total of 32.9 mCi of   
Myoview at 9:39:21 AM.   
The peak heart rate   
achieved was 133 bpm,   
which was 94 % of the   
age predicted target   
heart rate of 142 bpm.   
The resting blood   
pressure was 147/89   
mmHg with a heart rate   
of 81 bpm. The standing   
blood pressure was   
130/88 mmHg with a   
heart rate of 93 bpm.   
The patient developed   
leg fatigue during the   
stress exam. The   
symptoms resolved with   
rest 4 minutes into   
recovery. The blood   
pressure response was   
normal. The test was   
terminated due to: leg   
fatigue and   
musculoskeletal   
weakness.  
  
Baseline ECG: Resting   
ECG showed normal sinus   
rhythm with normal   
tracing.  
  
Stress ECG: Stress ECG   
showed sinus   
tachycardia, with   
frequent premature   
ventricular   
contractions.  
  
Stress Stage Data:  
+-----------------+---+  
------+-------+  
   HR  Sys BP Alejandra BP   
+-----------------+---+  
------+-------+  
 Baseline Resting  81   
 147  89    
+-----------------+---+  
------+-------+  
 Baseline Standing 93   
 130  88    
+-----------------+---+  
------+-------+  
 Stage I  120 184  88    
+-----------------+---+  
------+-------+  
 Stage II  133 178  89   
   
+-----------------+---+  
------+-------+  
  
Recovery ECG: Recovery   
ECG showed sinus   
tachycardia, with   
frequent premature   
ventricular   
contractions. The heart   
rate recovery was   
normal.  
  
+-----------+---+------  
+-------+  
   HR  Sys BP Alejandra BP   
+-----------+---+------  
+-------+  
 Recovery I  122       
+-----------+---+------  
+-------+  
 Recovery  129   
 85    
+-----------+---+------  
+-------+  
 Recovery  127   
 74    
+-----------+---+------  
+-------+  
  
Summary:  
1. Baseline EKG showing   
normal sinus rhythm   
with no resting ST-T   
segment changes.  
2. With regadenoson   
infusion, there are no   
ST-T segment changes   
suggestive of ischemia.   
No sustained   
ventricular arrhythmias   
are seen.  
3. Frequent PVCs were   
seen.  
4. Regadenoson stress   
EKG is negative for   
ischemia.  
5. Adequate level of   
stress achieved.  
6. Nuclear image   
results are reported   
separately.  
[id number] Josafat Bergman MD  
Electronically signed   
on 2024 at   
10:56:10 AM  
  
  
** Final **         Normal                                  [facility]  
   
                                                    NM Heart Perfusion W stress   
and W radionuclide Humberto 2024   
   
                                                            1. No evidence of   
inducible myocardial   
ischemia or prior   
infarction.  
2. The left ventricle   
is normal in size.  
3. Normal LV wall   
motion with a   
post-stress LV EF   
estimated at  
greater than 65%.  
  
I personally reviewed   
the images/study and I   
agree with the findings  
as stated by Resident   
Rosi Montelongo. This study   
was interpreted at  
[place], [place].  
  
MACRO:  
None  
  
Signed by: Titus Thao   
2024 11:13 AM  
Dictation workstation:   
[id number]                                                 MMODAL  
   
                                                            Interpreted By: Titus Gill and Awan Komal  
STUDY:  
NUCLEAR STRESS TEST;   
2024 10:40 am  
  
INDICATION:  
Signs/Symptoms:abnormal   
EKG.  
,R94.31 Abnormal   
electrocardiogram (ECG)   
(EKG)  
  
COMPARISON:  
None.  
  
ACCESSION NUMBER(S):  
[id number]  
  
ORDERING CLINICIAN:  
BERNICE VITAL  
  
TECHNIQUE:  
DIVISION OF NUCLEAR   
MEDICINE  
STRESS MYOCARDIAL   
PERFUSION SCAN, ONE DAY   
PROTOCOL  
  
The patient received an   
intravenous dose of 11   
mCi of Tc-99m  
Myoview and resting   
emission tomographic   
(SPECT) images of the  
myocardium were   
acquired. The patient   
then exercised via   
treadmill  
stress to 94 % of MPHR   
and achieved 5.50 METS.   
At peak stress 32.9  
mCi of Tc-99m Myoview   
were administered and   
stress phase SPECT  
images of the   
myocardium were then   
acquired. These   
included ECG-gated  
images to assess and   
quantify ventricular   
function. A low-dose,  
nondiagnostic regional   
CT was utilized for   
attenuation correction  
purposes.  
  
FINDINGS:  
Both stress and rest   
studies demonstrate   
grossly normal   
perfusion  
throughout the left   
ventricle.  
  
The left ventricle is   
normal in size.  
  
Gated images   
demonstrate normal LV   
wall motion with a   
post-stress LV  
EF estimated at greater   
than 65%.  
  
Attenuation correction   
CT images demonstrate   
no gross anatomic  
abnormality.  
  
Note is made of bowel   
artifact greater on   
rest images as compared   
to  
stress .  
                                                            [facility]  
   
                                                            Titus Thao MD   
-   
2024 Formatting   
of this note might be   
different from the   
original.  
Interpreted By: Titus Thao and Awan Komal  
STUDY:  
NUCLEAR STRESS TEST;   
2024 10:40 am  
  
INDICATION:  
Signs/Symptoms:abnormal   
EKG.  
,R94.31 Abnormal   
electrocardiogram (ECG)   
(EKG)  
  
COMPARISON:  
None.  
  
ACCESSION NUMBER(S):  
[id number]  
  
ORDERING CLINICIAN:  
BERNICE VITAL  
  
TECHNIQUE:  
DIVISION OF NUCLEAR   
MEDICINE  
STRESS MYOCARDIAL   
PERFUSION SCAN, ONE DAY   
PROTOCOL  
  
The patient received an   
intravenous dose of 11   
mCi of Tc-99m  
Myoview and resting   
emission tomographic   
(SPECT) images of the  
myocardium were   
acquired. The patient   
then exercised via   
treadmill  
stress to 94 % of MPHR   
and achieved 5.50 METS.   
At peak stress 32.9  
mCi of Tc-99m Myoview   
were administered and   
stress phase SPECT  
images of the   
myocardium were then   
acquired. These   
included ECG-gated  
images to assess and   
quantify ventricular   
function. A low-dose,  
nondiagnostic regional   
CT was utilized for   
attenuation correction  
purposes.  
  
FINDINGS:  
Both stress and rest   
studies demonstrate   
grossly normal   
perfusion  
throughout the left   
ventricle.  
  
The left ventricle is   
normal in size.  
  
Gated images   
demonstrate normal LV   
wall motion with a   
post-stress LV  
EF estimated at greater   
than 65%.  
  
Attenuation correction   
CT images demonstrate   
no gross anatomic  
abnormality.  
  
Note is made of bowel   
artifact greater on   
rest images as compared   
to  
stress .  
  
IMPRESSION:  
1. No evidence of   
inducible myocardial   
ischemia or prior   
infarction.  
2. The left ventricle   
is normal in size.  
3. Normal LV wall   
motion with a   
post-stress LV EF   
estimated at  
greater than 65%.  
  
I personally reviewed   
the images/study and I   
agree with the findings  
as stated by Resident   
Rosi Montelongo. This study   
was interpreted at  
[place], [place].  
  
MACRO:  
None  
  
Signed by: Titus Thao   
2024 11:13 AM  
Dictation workstation:   
[id number]  
                                                            [facility]  
Work Phone:   
[phone]  
   
                                                    Radiology Study   
observation   
(narrative)                                                     [facility]  
Work Phone:   
[phone]  
   
                                                    NM Heart Perfusion W stress   
and W radionuclide IVOrdered By: Titus Thao on   
2024   
   
                                                                  [facility]  
Work Phone:   
[phone]  
   
                                                    NUCLEAR STRESS TESTon 2024   
   
                                        NUCLEAR STRESS TEST Interpreted By: Titus Gill and Awan Komal  
STUDY:  
NUCLEAR STRESS TEST;   
2024 10:40 am  
  
INDICATION:  
Signs/Symptoms:abnormal   
EKG.  
,R94.31 Abnormal   
electrocardiogram (ECG)   
(EKG)  
  
COMPARISON:  
None.  
  
ACCESSION NUMBER(S):  
[id number]  
  
ORDERING CLINICIAN:  
BERNICE VITAL  
  
TECHNIQUE:  
DIVISION OF NUCLEAR   
MEDICINE  
STRESS MYOCARDIAL   
PERFUSION SCAN, ONE DAY   
PROTOCOL  
  
The patient received an   
intravenous dose of 11   
mCi of Tc-99m  
Myoview and resting   
emission tomographic   
(SPECT) images of the  
myocardium were   
acquired. The patient   
then exercised via   
treadmill  
stress to 94 % of MPHR   
and achieved 5.50 METS.   
At peak stress 32.9  
mCi of Tc-99m Myoview   
were administered and   
stress phase SPECT  
images of the   
myocardium were then   
acquired. These   
included ECG-gated  
images to assess and   
quantify ventricular   
function. A low-dose,  
nondiagnostic regional   
CT was utilized for   
attenuation correction  
purposes.  
  
FINDINGS:  
Both stress and rest   
studies demonstrate   
grossly normal   
perfusion  
throughout the left   
ventricle.  
  
The left ventricle is   
normal in size.  
  
Gated images   
demonstrate normal LV   
wall motion with a   
post-stress LV  
EF estimated at greater   
than 65%.  
  
Attenuation correction   
CT images demonstrate   
no gross anatomic  
abnormality.  
  
Note is made of bowel   
artifact greater on   
rest images as compared   
to  
stress .  
  
IMPRESSION:  
1. No evidence of   
inducible myocardial   
ischemia or prior   
infarction.  
2. The left ventricle   
is normal in size.  
3. Normal LV wall   
motion with a   
post-stress LV EF   
estimated at  
greater than 65%.  
  
I personally reviewed   
the images/study and I   
agree with the findings  
as stated by Resident   
Rosi Montelongo. This study   
was interpreted at  
[place], [place].  
  
MACRO:  
None  
  
Signed by: Titus Thao   
2024 11:13 AM  
Dictation workstation:   
[id number]        [facility]  
   
                                                    No Panel Informationon    
   
                                                              
[place] [address]  
Phone [phone], Fax   
[phone]  
  
Nuclear Treadmill   
Stress Test  
  
Patient Name: MARZENA MEDEROS Ordering   
Provider: [id number] BERNICE VITAL  
Study Date: 2024   
Reading Physician:   
[id number] Josafat Bergman MD  
MRN/PID: [id number]   
Supervising Physician:   
[id number] Josafat Bergman MD  
Accession#:   
[id number] Fellow:  
Date of Birth/Age:   
1945 / 79 years   
Fellow:  
Gender: F Nurse: N/A  
Admit Date: 2024   
Technician: Acosta Encinas RRT,  
CVT  
Admission Status:   
Outpatient Sonographer:   
N/A  
Height: 160.02 cm   
Technologist:  
Weight:  63.50 kg   
Additional Staff:  
BSA: 1.66 m2   
Encounter#: [id number]  
BMI: 24.80 kg/m2   
Patient Location: [facility]   
Stress Lab  
  
Study Type: CARDIOLOGY   
INTERPRETATION OF   
NUCLEAR STRESS  
Diagnosis/ICD: Abnormal   
electrocardiogram [ECG]   
[EKG]-R94.31; Encounter   
for  
other preprocedural   
examination-Z01.818  
Indication: Abnormal   
EKG and Pre-Op   
Evaluation  
CPT Codes: Stress Test   
Interpretation-95773;   
Stress Test   
Supervision-42717  
  
Falls Risk: Moderate:   
Patient has moderate   
risk for sustaining a   
fall; a falls   
prevention plan has   
been implemented.  
  
Study Details: Correct   
procedure and correct   
patient verified   
verbally and with  
ID Band checked.  
  
  
Patient History:   
Hypertension, mitral   
valve prolapse, and   
hyperlipidemia.  
Allergies:   
Neomycin-Bacitracin-David  
ymixin, oxycodone,   
zolpidem, sulfonamides.  
Smoker: Never.  
Diabetes: Yes, managed   
with Oral medicine.  
BMI: Normal 18.5 - 25.  
  
  
Medications: Losartan,   
hydrochlorothiazide,   
simvastatin,   
amlodipine, glucophage   
and glypizide. The   
patient did not take   
medications as   
prescribed.  
  
Patient Performance:   
The patient exercised   
to stage II on a Bill   
protocol for 3 minutes   
and 45 seconds,   
achieving 5.50 METS.   
Patient received a   
total of 32.9 mCi of   
Myoview at 9:39:21 AM.   
The peak heart rate   
achieved was 133 bpm,   
which was 94 % of the   
age predicted target   
heart rate of 142 bpm.   
The resting blood   
pressure was 147/89   
mmHg with a heart rate   
of 81 bpm. The standing   
blood pressure was   
130/88 mmHg with a   
heart rate of 93 bpm.   
The patient developed   
leg fatigue during the   
stress exam. The   
symptoms resolved with   
rest 4 minutes into   
recovery. The blood   
pressure response was   
normal. The test was   
terminated due to: leg   
fatigue and   
musculoskeletal   
weakness.  
  
Baseline ECG: Resting   
ECG showed normal sinus   
rhythm with normal   
tracing.  
  
Stress ECG: Stress ECG   
showed sinus   
tachycardia, with   
frequent premature   
ventricular   
contractions.  
  
Stress Stage Data:  
+-----------------+---+  
------+-------+  
   HR  Sys BP Alejandra BP   
+-----------------+---+  
------+-------+  
 Baseline Resting  81   
 147  89    
+-----------------+---+  
------+-------+  
 Baseline Standing 93   
 130  88    
+-----------------+---+  
------+-------+  
 Stage I  120 184  88    
+-----------------+---+  
------+-------+  
 Stage II  133 178  89   
   
+-----------------+---+  
------+-------+  
  
  
Recovery ECG: Recovery   
ECG showed sinus   
tachycardia, with   
frequent premature   
ventricular   
contractions. The heart   
rate recovery was   
normal.  
  
+-----------+---+------  
+-------+  
   HR  Sys BP Alejandra BP   
+-----------+---+------  
+-------+  
 Recovery I  122       
+-----------+---+------  
+-------+  
 Recovery  129   
 85    
+-----------+---+------  
+-------+  
 Recovery  127   
 74    
+-----------+---+------  
+-------+  
  
  
Summary:  
1. Baseline EKG showing   
normal sinus rhythm   
with no resting ST-T   
segment changes.  
2. With regadenoson   
infusion, there are no   
ST-T segment changes   
suggestive of ischemia.   
No sustained   
ventricular arrhythmias   
are seen.  
3. Frequent PVCs were   
seen.  
4. Regadenoson stress   
EKG is negative for   
ischemia.  
5. Adequate level of   
stress achieved.  
6. Nuclear image   
results are reported   
separately.  
  
[id number] Josafat Bergman MD  
Electronically signed   
on 2024 at   
10:56:10 AM  
  
  
  
  
  
  
  
  
  
** Final **  
                                                            Josafat López MD -   
2024 Formatting   
of this note might be   
different from the   
original.  
  
[place] [address]  
Phone [phone], Fax   
[phone]  
  
Nuclear Treadmill   
Stress Test  
  
Patient Name: MARZENA MEDEROS Ordering   
Provider: [id number] BERNICE VITAL  
Study Date: 2024   
Reading Physician:   
[id number] Josafat Bergman MD  
MRN/PID: [id number]   
Supervising Physician:   
[id number] Josafat Bergman MD  
Accession#:   
[id number] Fellow:  
Date of Birth/Age:   
1945 / 79 years   
Fellow:  
Gender: F Nurse: N/A  
Admit Date: 2024   
Technician: Acosta Encinas RRT,  
CVT  
Admission Status:   
Outpatient Sonographer:   
N/A  
Height: 160.02 cm   
Technologist:  
Weight: 63.50 kg   
Additional Staff:  
BSA: 1.66 m2   
Encounter#: [id number]  
BMI: 24.80 kg/m2   
Patient Location: [facility]   
Stress Lab  
  
Study Type: CARDIOLOGY   
INTERPRETATION OF   
NUCLEAR STRESS  
Diagnosis/ICD: Abnormal   
electrocardiogram [ECG]   
[EKG]-R94.31; Encounter   
for  
other preprocedural   
examination-Z01.818  
Indication: Abnormal   
EKG and Pre-Op   
Evaluation  
CPT Codes: Stress Test   
Interpretation-24882;   
Stress Test   
Supervision-17950  
  
Falls Risk: Moderate:   
Patient has moderate   
risk for sustaining a   
fall; a falls   
prevention plan has   
been implemented.  
  
Study Details: Correct   
procedure and correct   
patient verified   
verbally and with  
ID Band checked.  
  
  
Patient History:   
Hypertension, mitral   
valve prolapse, and   
hyperlipidemia.  
Allergies:   
Neomycin-Bacitracin-David  
ymixin, oxycodone,   
zolpidem, sulfonamides.  
Smoker: Never.  
Diabetes: Yes, managed   
with Oral medicine.  
BMI: Normal 18.5 - 25.  
  
  
Medications: Losartan,   
hydrochlorothiazide,   
simvastatin,   
amlodipine, glucophage   
and glypizide. The   
patient did not take   
medications as   
prescribed.  
  
Patient Performance:   
The patient exercised   
to stage II on a Bill   
protocol for 3 minutes   
and 45 seconds,   
achieving 5.50 METS.   
Patient received a   
total of 32.9 mCi of   
Myoview at 9:39:21 AM.   
The peak heart rate   
achieved was 133 bpm,   
which was 94 % of the   
age predicted target   
heart rate of 142 bpm.   
The resting blood   
pressure was 147/89   
mmHg with a heart rate   
of 81 bpm. The standing   
blood pressure was   
130/88 mmHg with a   
heart rate of 93 bpm.   
The patient developed   
leg fatigue during the   
stress exam. The   
symptoms resolved with   
rest 4 minutes into   
recovery. The blood   
pressure response was   
normal. The test was   
terminated due to: leg   
fatigue and   
musculoskeletal   
weakness.  
  
Baseline ECG: Resting   
ECG showed normal sinus   
rhythm with normal   
tracing.  
  
Stress ECG: Stress ECG   
showed sinus   
tachycardia, with   
frequent premature   
ventricular   
contractions.  
  
Stress Stage Data:  
+-----------------+---+  
------+-------+  
   HR  Sys BP Alejandra BP   
+-----------------+---+  
------+-------+  
 Baseline Resting  81   
 147  89    
+-----------------+---+  
------+-------+  
 Baseline Standing 93   
 130  88    
+-----------------+---+  
------+-------+  
 Stage I  120 184  88    
+-----------------+---+  
------+-------+  
 Stage II  133 178  89   
   
+-----------------+---+  
------+-------+  
  
  
Recovery ECG: Recovery   
ECG showed sinus   
tachycardia, with   
frequent premature   
ventricular   
contractions. The heart   
rate recovery was   
normal.  
  
+-----------+---+------  
+-------+  
   HR  Sys BP Alejandra BP   
+-----------+---+------  
+-------+  
 Recovery I  122       
+-----------+---+------  
+-------+  
 Recovery  129   
 85    
+-----------+---+------  
+-------+  
 Recovery  127   
 74    
+-----------+---+------  
+-------+  
  
  
Summary:  
1. Baseline EKG showing   
normal sinus rhythm   
with no resting ST-T   
segment changes.  
2. With regadenoson   
infusion, there are no   
ST-T segment changes   
suggestive of ischemia.   
No sustained   
ventricular arrhythmias   
are seen.  
3. Frequent PVCs were   
seen.  
4. Regadenoson stress   
EKG is negative for   
ischemia.  
5. Adequate level of   
stress achieved.  
6. Nuclear image   
results are reported   
separately.  
  
[id number] Josafat Bergman MD  
Electronically signed   
on 2024 at   
10:56:10 AM  
  
  
  
  
  
  
  
  
  
** Final **  
  
                                                            [facility]  
Work Phone:   
[phone]  
   
                                                    No Panel InformationOrdered   
By: Josafat Bergman on 2024   
   
                                                                  [facility]  
Work Phone:   
[phone]  
   
                                                    Basic metabolic 2000 panelon  
 2024   
   
                      Anion gap [Moles/Vol] 15 mmol/L  Normal     10-20      [facility]  
   
                                        Comment on above:   Performed By: #### 2  
4331-1 ####  
MARKELL SAPP ([id number])  
[facility] ([facility])  
[address] [place] [address]   
   
                      Calcium [Mass/Vol] 9.8 mg/dL  Normal     8.6-10.3   [facility]  
   
                                        Comment on above:   Performed By: #### 2  
4331-1 ####  
MARKELL SAPP ([id number])  
[facility] ([facility])  
[address] [place] [address]   
   
                      Chloride [Moles/Vol] 99 mmol/L  Normal          [facility]  
   
                                        Comment on above:   Performed By: #### 2  
4331-1 ####  
MARKELL SAPP ([id number])  
[facility] ([facility])  
[address] [place] [address]   
   
                      CO2 [Moles/Vol] 28 mmol/L  Normal     21-32      [facility]  
   
                                        Comment on above:   Performed By: #### 2  
4331-1 ####  
MARKELL SAPP ([id number])  
[facility] ([facility])  
[address] [place] [address]   
   
                      Creatinine [Mass/Vol] 0.81 mg/dL Normal     0.50-1.05  [facility]  
   
                                        Comment on above:   Performed By: #### 2  
4331-1 ####  
MARKELL SAPP ([id number])  
[facility] ([facility])  
[address] [place] [address]   
   
                                                    Glomerular filtration   
rate/1.73 sq   
M.predicted     75 mL/min/1.73m*2 Normal          >60             [facility]  
   
                                        Comment on above:   Result Comment: Calc  
ulations of estimated GFR are performed   
using the  CKD-EPI Study Refit equation without the race   
variable for the IDMS-Traceable creatinine methods.  
https://jasn.asnjournals.org/content/early//ASN.083075  
0988   
   
                                                            Performed By: #### 2  
4331-1 ####  
MARKELL SAPP ([id number])  
[facility] ([facility])  
[address] [place] [address]   
   
                      Glucose [Mass/Vol] 138 mg/dL  High       74-99      [facility]  
   
                                        Comment on above:   Performed By: #### 2  
4331-1 ####  
MARKELL SAPP ([id number])  
[facility] ([facility])  
[address] [place] [address]   
   
                      Potassium [Moles/Vol] 4.2 mmol/L Normal     3.5-5.3    [facility]  
   
                                        Comment on above:   Performed By: #### 2  
4331-1 ####  
MARKELL SAPP ([id number])  
[facility] ([facility])  
[address] [place] [address]   
   
                      Sodium [Moles/Vol] 138 mmol/L Normal     136-145    [facility]  
   
                                        Comment on above:   Performed By: #### 2  
4331-1 ####  
MARKELL SAPP ([id number])  
[facility] ([facility])  
[address] [place] [address]   
   
                                                    Urea nitrogen   
[Mass/Vol]      14 mg/dL        Normal          6-23            [facility]  
   
                                        Comment on above:   Performed By: #### 2  
4331-1 ####  
MARKELL SAPP ([id number])  
[facility] ([facility])  
[address] [place] [address]   
   
                                                    HbA1c (Bld) [Mass fraction]o  
n 2024   
   
                                                    Average glucose   
Estimated from   
glycated hemoglobin   
(Bld) [Mass/Vol]    189 mg/dL           Normal              Not   
Established                             [facility]  
   
                                        Comment on above:   Order Comment: Diagn  
osis of Diabetes-AdultsNon-Diabetic: < or   
=   
5.6%Increased risk for developing diabetes: 5.7-6.4%Diagnostic   
of diabetes: > or = 6.5%   
   
                                                            Performed By: #### 2  
4331-1 ####  
MARKELL SAPP ([id number])  
[facility] ([facility])  
[address] [place] [address]   
   
                                                    Hemoglobin A1c/Hemoglobin.to  
dayna 2024   
   
                                                    HbA1c (Bld) [Mass   
fraction]       8.2 %           High            See comment     [facility]  
   
                                        Comment on above:   Order Comment: Diagn  
osis of Diabetes-AdultsNon-Diabetic: < or   
=   
5.6%Increased risk for developing diabetes: 5.7-6.4%Diagnostic   
of diabetes: > or = 6.5%   
   
                                                            Performed By: #### 2  
4331-1 ####  
MARKELL SAPP ([id number])  
[facility] ([facility])  
[address] [place] [address]   
   
                                                    POCT SARS-COV-2/FLU/RSV PCR   
SYMPTOMATIC manually resultedOrdered By: Riya Kebede   
on 2024   
   
                                                    FLUAV RNA THANIA+probe   
Ql (Resp)       Not detected                    Not Detected    [facility]  
   
                                                    FLUBV RNA THANIA+probe   
Ql (Resp)       Not detected                    Not Detected    [facility]  
   
                                                    Interpretation and   
review of laboratory   
results         Abnormal                                        [facility]  
   
                                                    RSV RNA THANIA+probe Ql   
(Resp)          Not detected                    Not Detected    [facility]  
   
                                                    SARS-CoV-2 (COVID-19)   
RNA THANIA+probe Ql   
(Resp)          Detected        Abnormal        Not Detected    [facility]  
   
                                                    ECG 12 lead (Clinic Performe  
d)on 2024   
   
                                                    Interpretation and   
review of laboratory   
results         Abnormal                                        [facility]  
Work Phone:   
[phone]  
   
                                                                  [facility]  
Work Phone:   
[phone]  
   
                                                    US ABDOMEN LIMITED LIVERon 0  
2024   
   
                                                    US ABDOMEN LIMITED   
LIVER                                   Interpreted By:   
Candy Salter,  
STUDY:  
US ABDOMEN LIMITED   
LIVER; 2024 12:24   
pm  
  
INDICATION:  
Signs/Symptoms:Follow-u  
p to lesions identified   
on liver on CAT scan.  
  
COMPARISON:  
CT abdomen and pelvis   
dated 2024   
demonstrated hypodense   
masses  
in the liver felt to   
represent cysts.  
  
ACCESSION NUMBER(S):  
[id number]  
  
ORDERING CLINICIAN:  
SHARIFA ALMAGUER  
  
TECHNIQUE:  
Multiple images of the   
abdomen were obtained.  
  
FINDINGS:  
LIVER:  
The echogenicity of the   
liver is within normal   
limits.  
There is a 1.7 cm cyst   
in the left lobe with a   
thin septation, benign.  
There is a 1.0 cm   
simple left hepatic   
cyst, benign.  
The sagittal dimension   
of the right lobe of   
the liver is 13.3 cm,   
not  
enlarged.  
  
GALLBLADDER:  
Status post   
cholecystectomy.  
  
BILE DUCTS:  
There is no   
intrahepatic biliary   
dilatation.  
The common bile duct is   
nondilated measuring   
0.7 cm.  
  
PANCREAS:  
The pancreas is   
unremarkable.  
  
RIGHT KIDNEY:  
The right kidney is   
normal in size   
measuring 9.3 cm in   
length.  
  
The echogenicity of the   
cortex is within normal   
limits.  
There is no renal mass.  
There is no intrarenal   
calculus or   
hydronephrosis.  
  
IMPRESSION:  
1. Benign hepatic   
cysts. No follow-up is   
needed.  
2. Status post   
cholecystectomy.  
  
MACRO:  
None  
  
Signed by: Candy Salter 2024 11:55   
AM  
Dictation workstation:   
[id number]        [facility]  
   
                                                    CT ABDOMEN PELVIS W IV CONTR  
Tami 2024   
   
                                                    CT ABDOMEN PELVIS W   
IV CONTRAST                             Interpreted By:   
Mark Flynn,  
STUDY:  
CT ABDOMEN PELVIS W IV   
CONTRAST; 2024   
10:44 am  
  
INDICATION:  
Signs/Symptoms:Unexplai  
juan r weight loss with   
some nausea and   
vomiting.  
  
COMPARISON:  
None.  
  
ACCESSION NUMBER(S):  
[id number]  
  
ORDERING CLINICIAN:  
SHARIFA ALMAGUER  
  
TECHNIQUE:  
CT of the abdomen and   
pelvis was performed.   
Standard contiguous  
axial images were   
obtained at 3 mm slice   
thickness through the  
abdomen and pelvis.   
Coronal and sagittal   
reconstructions at 3 mm  
slice thickness were   
performed.  
  
70 ml of contrast   
Omnipaque 350 were   
administered   
intravenously  
without immediate   
complication. 500 mL of   
positive oral contrast   
was  
also given.  
  
FINDINGS:  
LOWER CHEST:  
Mild left and minimal   
right lower lung   
subsegmental   
atelectasis  
and/or scarring.   
Circumflex coronary   
artery calcification.  
  
ABDOMEN:  
  
LIVER:  
Up to 1.7 cm superior   
subcapsular left lobe   
cyst. Other at most 1   
cm  
hypoattenuating right   
and left lobe lesions   
are probably cysts, but   
1  
of them in the superior   
subcapsular right lobe   
on image 29 series 3  
and coronal image 44   
has an indeterminate   
attenuation of 26 HU   
and  
might be further   
evaluated with   
ultrasound.  
  
BILE DUCTS:  
The extra pancreatic   
common bile duct   
measures up to 8 mm in  
diameter, normal   
following a   
cholecystectomy. No   
intrahepatic biliary  
dilation or calcified   
stone.  
  
GALLBLADDER:  
Cholecystectomy clips.  
  
PANCREAS:  
Unremarkable aside from   
atrophy that is   
greatest in the head   
and  
uncinate process.  
  
SPLEEN:  
Normal size and   
homogeneous. Up to 11   
mm nearby round soft   
tissue  
densities are   
consistent with   
splenules.  
  
ADRENAL GLANDS:  
Within normal limits.  
  
KIDNEYS AND URETERS:  
Normal size kidneys   
without a mass or   
perinephric stranding.   
3.7 cm  
exophytic posterior   
left lower pole   
cortical cyst. A   
subcentimeter  
cortical lesion   
laterally in the mid   
left kidney is probably   
a cyst,  
but is too small to   
definitively   
characterize. No  
hydroureteronephrosis   
or   
nephroureterolithiasis   
is identified, but  
the lack of unenhanced   
images limits   
evaluation for small  
nonobstructing calculi.  
  
PELVIS:  
  
BLADDER:  
Collapsed. No calcified   
stone.  
  
REPRODUCTIVE ORGANS:  
Status post   
hysterectomy. The   
ovaries can not be   
identified.  
  
BOWEL:  
The stomach is   
unremarkable.   
Nonspecific large and   
small intestinal  
air/fluid levels   
without bowel   
distention. Several   
diverticula at the  
junction of the sigmoid   
and descending colon   
without evidence of  
diverticulitis. A   
normal appendix   
contains contrast   
material but no  
gas.  
  
VESSELS:  
The aorta and IVC   
appear normal. Moderate   
vascular calcification  
includes left renal   
artery origin   
calcification  
  
PERITONEUM/RETROPERITON  
EUM/LYMPH NODES:  
No ascites or free air,   
no fluid collection. No   
abdominopelvic  
lymphadenopathy is   
apparent.  
  
BONES AND ABDOMINAL   
WALL:  
Multilevel spinal   
degenerative changes   
are noted. Diastasis   
recti. A  
minute umbilical hernia   
contains only fat.  
  
IMPRESSION:  
1. Hepatic and left   
renal cysts.  
2. Coronary artery   
calcification.  
3. Diverticulosis.  
  
MACRO:  
None  
  
Signed by: Mark Flynn 2024   
4:57 PM  
Dictation workstation:   
[id number]        [facility]  
   
                                                    TRANSTHORACIC ECHO (TTE) COM  
[facility]   
   
                                                    TRANSTHORACIC ECHO   
(TTE) [place] [address]  
Phone [phone], Fax   
[phone]  
TRANSTHORACIC   
ECHOCARDIOGRAM REPORT  
Patient Name: MARZENA MEDEROS Reading   
Physician: [id number]   
Santiago Da Silva MD  
Study Date: 2024   
Ordering Provider:   
[id number] SHARIFA ALMAGUER  
MRN/PID: [id number]   
Fellow:  
Accession#:   
[id number] Nurse:   
Ellyn Delarosa RN  
Date of Birth/Age:   
1945 / 78 years   
Sonographer: Maxwell Salvador RDCS  
Gender: F Additional   
Staff:  
Height: 160.02 cm Admit   
Date:  
Weight: 63.05 kg   
Admission Status:   
Outpatient  
BSA / BMI: 1.66 m2 /   
24.62 Department   
Location: [facility] Lab  
kg/m2  
Blood Pressure: 157 /84   
mmHg  
Study Type:   
TRANSTHORACIC ECHO   
(TTE) COMPLETE  
Diagnosis/ICD: Abnormal   
electrocardiogram [ECG]   
[EKG]-R94.31  
CPT Codes: Echo   
Complete w Full   
[id number]  
Study Detail: The   
following Echo studies   
were performed: 2D,   
M-Mode, Doppler and  
color flow. Agitated   
saline used as a   
contrast agent for  
intraseptal flow   
evaluation.  
  
PHYSICIAN   
INTERPRETATION:  
Left Ventricle: Left   
ventricular ejection   
fraction is normal, by   
visual estimate at   
55-60%. There are no   
regional wall motion   
abnormalities. The left   
ventricular cavity size   
is normal. Spectral   
Doppler shows an   
impaired relaxation   
pattern of left   
ventricular diastolic   
filling.  
Left Atrium: The left   
atrium is mildly   
dilated. A bubble study   
using agitated saline   
was performed. Bubble   
study is negative.  
Right Ventricle: The   
right ventricle is   
normal in size. There   
is normal right   
ventricular global   
systolic function.  
Right Atrium: The right   
atrium is normal in   
size.  
Aortic Valve: The   
aortic valve is   
trileaflet. There is no   
evidence of aortic   
valve regurgitation.  
Mitral Valve: The   
mitral valve is   
abnormal. There is mild   
mitral valve   
regurgitation. Mild   
prolapse of the   
posterior leaflet of   
the mitral valve.  
Tricuspid Valve: The   
tricuspid valve is   
structurally normal.   
There is trace   
tricuspid   
regurgitation.  
Pulmonic Valve: The   
pulmonic valve is   
structurally normal.   
There is trace pulmonic   
valve regurgitation.  
Pericardium: There is   
no pericardial effusion   
noted.  
Aorta: The aortic root   
is normal.  
Systemic Veins: The   
inferior vena cava   
appears to be of normal   
size. There is IVC   
inspiratory collapse   
greater than 50%.  
In comparison to the   
previous   
echocardiogram(s):   
There are no prior   
studies on this patient   
for comparison   
purposes.  
  
CONCLUSIONS:  
1. Left ventricular   
ejection fraction is   
normal, by visual   
estimate at 55-60%.  
2. Spectral Doppler   
shows an impaired   
relaxation pattern of   
left ventricular   
diastolic filling.  
3. There is normal   
right ventricular   
global systolic   
function.  
4. Mild prolapse of the   
posterior leaflet of   
the mitral valve.  
5. Mild mitral valve   
regurgitation.  
QUANTITATIVE DATA   
SUMMARY:  
2D MEASUREMENTS:  
Normal Ranges:  
Ao Root d: 3.20 cm   
(2.0-3.7cm)  
LAs: 3.60 cm   
(2.7-4.0cm)  
IVSd: 1.00 cm   
(0.6-1.1cm)  
LVPWd: 0.76 cm   
(0.6-1.1cm)  
LVIDd: 4.60 cm   
(3.9-5.9cm)  
LVIDs: 2.48 cm  
LV Mass Index: 80.6   
g/m2  
LV % FS 46.1 %  
LA VOLUME:  
Normal Ranges:  
LA Vol A4C: 24.7 ml   
(22+/-6mL/m2)  
LA Vol A2C: 23.8 ml  
LA Vol BP: 25.9 ml  
LA Vol Index A4C:   
14.9ml/m2  
LA Vol Index A2C: 14.4   
ml/m2  
LA Vol Index BP: 15.6   
ml/m2  
LA Area A4C: 10.3 cm2  
LA Area A2C: 10.8 cm2  
LA Major Axis A4C: 3.6   
cm  
LA Major Axis A2C: 4.2   
cm  
LA Volume Index: 14.3   
ml/m2  
LA Vol A4C: 23.7 ml  
LA Vol A2C: 23.8 ml  
LA Vol Index BSA: 14.3   
ml/m2  
LV SYSTOLIC FUNCTION BY   
2D PLANIMETRY (MOD):  
Normal Ranges:  
EF-A4C View: 53 %   
(>=55%)  
EF-A2C View: 50 %  
EF-Biplane: 51 %  
EF-Visual: 58 %  
LV EF Reported: 58 %  
LV DIASTOLIC FUNCTION:  
Normal Ranges:  
MV Peak E: 0.67 m/s   
(0.7-1.2 m/s)  
MV Peak A: 1.08 m/s   
(0.42-0.7 m/s)  
E/A Ratio: 0.62   
(1.0-2.2)  
MV e' 0.054 m/s (>8.0)  
MV lateral e' 0.06 m/s  
MV medial e' 0.05 m/s  
E/e' Ratio: 12.42   
(<8.0)  
MITRAL VALVE:  
Normal Ranges:  
MV DT: 162 msec   
(150-240msec)  
AORTIC VALVE:  
Normal Ranges:  
LVOT Diameter: 1.80 cm   
(1.8-2.4cm)  
  
RIGHT VENTRICLE:  
RV Basal 4.02 cm  
RV Mid 3.13 cm  
RV Major 6.6 cm  
TAPSE: 16.6 mm  
RV s' 0.17 m/s  
  
[id number] Santiago Da Silva MD  
Electronically signed   
on 2024 at   
11:54:54 AM  
  
** Final **         Normal                                  [facility]  
   
                                                    US Heart TransthoracicOrdere  
d By: Santiago Da Silva on 2024   
   
                      LA vol index A/L 15.6 ml/m2                       [facility]  
Work Phone:   
[phone]  
   
                      LV A4C EF  53.2                             [facility]  
Work Phone:   
[phone]  
   
                      LV Biplane EF 51 %                             [facility]  
Work Phone:   
[phone]  
   
                      LV EF      58 %                             [facility]  
Work Phone:   
[phone]  
   
                      LVIDd      4.60 cm                          [facility]  
Work Phone:   
[phone]  
   
                      LVOT diam  1.80 cm                          [facility]  
Work Phone:   
[phone]  
   
                      MV E/A ratio 0.62                             [facility]  
Work Phone:   
[phone]  
   
                      RV free wall pk S' 17.10 cm/s                       [facility]  
Work Phone:   
[phone]  
   
                                                    Tricuspid annular   
plane systolic   
excursion       1.7 cm                                          [facility]  
Work Phone:   
[phone]  
   
                                                                  [facility]  
Work Phone:   
[phone]  
   
                                                     [facility]    
   
                                                              
[place] [address]  
Phone [phone], Fax   
[phone]  
  
TRANSTHORACIC   
ECHOCARDIOGRAM REPORT  
  
Patient Name: MARZENA MAYRA Kidd   
Physician: [id number]   
Santiago Da Silva MD  
Study Date: 2024   
Ordering Provider:   
[id number] ALMAGUER  
MRN/PID: [id number]   
Fellow:  
Accession#:   
[id number] Nurse:   
Ellyn Delarosa RN  
Date of Birth/Age:   
1945 / 78 years   
Sonographer: Maxwell Salvador RDCS  
Gender: F Additional   
Staff:  
Height: 160.02 cm Admit   
Date:  
Weight: 63.05 kg    
Admission Status:   
Outpatient  
BSA / BMI: 1.66 m2 /   
24.62 Department   
Location: [facility] Echo Lab  
kg/m2  
Blood Pressure: 157 /84   
mmHg  
  
Study Type:   
TRANSTHORACIC ECHO   
(TTE) COMPLETE  
Diagnosis/ICD: Abnormal   
electrocardiogram [ECG]   
[EKG]-R94.31  
CPT Codes: Echo   
Complete w Full   
[id number]  
Study Detail: The   
following Echo studies   
were performed: 2D,   
M-Mode, Doppler and  
color flow. Agitated   
saline used as a   
contrast agent for  
intraseptal flow   
evaluation.  
  
  
PHYSICIAN   
INTERPRETATION:  
Left Ventricle: Left   
ventricular ejection   
fraction is normal, by   
visual estimate at   
55-60%. There are no   
regional wall motion   
abnormalities. The left   
ventricular cavity size   
is normal. Spectral   
Doppler shows an   
impaired relaxation   
pattern of left   
ventricular diastolic   
filling.  
Left Atrium: The left   
atrium is mildly   
dilated. A bubble study   
using agitated saline   
was performed. Bubble   
study is negative.  
Right Ventricle: The   
right ventricle is   
normal in size. There   
is normal right   
ventricular global   
systolic function.  
Right Atrium: The right   
atrium is normal in   
size.  
Aortic Valve: The   
aortic valve is   
trileaflet. There is no   
evidence of aortic   
valve regurgitation.  
Mitral Valve: The   
mitral valve is   
abnormal. There is mild   
mitral valve   
regurgitation. Mild   
prolapse of the   
posterior leaflet of   
the mitral valve.  
Tricuspid Valve: The   
tricuspid valve is   
structurally normal.   
There is trace   
tricuspid   
regurgitation.  
Pulmonic Valve: The   
pulmonic valve is   
structurally normal.   
There is trace pulmonic   
valve regurgitation.  
Pericardium: There is   
no pericardial effusion   
noted.  
Aorta: The aortic root   
is normal.  
Systemic Veins: The   
inferior vena cava   
appears to be of normal   
size. There is IVC   
inspiratory collapse   
greater than 50%.  
In comparison to the   
previous   
echocardiogram(s):   
There are no prior   
studies on this patient   
for comparison   
purposes.  
  
  
CONCLUSIONS:  
1. Left ventricular   
ejection fraction is   
normal, by visual   
estimate at 55-60%.  
2. Spectral Doppler   
shows an impaired   
relaxation pattern of   
left ventricular   
diastolic filling.  
3. There is normal   
right ventricular   
global systolic   
function.  
4. Mild prolapse of the   
posterior leaflet of   
the mitral valve.  
5. Mild mitral valve   
regurgitation.  
  
QUANTITATIVE DATA   
SUMMARY:  
2D MEASUREMENTS:  
Normal Ranges:  
Ao Root d:  3.20 cm   
(2.0-3.7cm)  
LAs: 3.60 cm   
(2.7-4.0cm)  
IVSd: 1.00 cm   
(0.6-1.1cm)  
LVPWd: 0.76 cm    
(0.6-1.1cm)  
LVIDd: 4.60 cm   
(3.9-5.9cm)  
LVIDs: 2.48 cm  
LV Mass Index: 80.6   
g/m2  
LV % FS 46.1 %  
  
LA VOLUME:  
Normal Ranges:  
LA Vol A4C: 24.7 ml   
(22+/-6mL/m2)  
LA Vol A2C: 23.8 ml  
LA Vol BP: 25.9 ml  
LA Vol Index A4C:   
14.9ml/m2  
LA Vol Index A2C: 14.4   
ml/m2  
LA Vol Index BP: 15.6   
ml/m2  
LA Area A4C: 10.3 cm2  
LA Area A2C: 10.8 cm2  
LA Major Axis A4C: 3.6   
cm  
LA Major Axis A2C: 4.2   
cm  
LA Volume Index: 14.3   
ml/m2  
LA Vol A4C: 23.7 ml  
LA Vol A2C: 23.8 ml  
LA Vol Index BSA: 14.3   
ml/m2  
  
LV SYSTOLIC FUNCTION BY   
2D PLANIMETRY (MOD):  
Normal Ranges:  
EF-A4C View: 53 %   
(>=55%)  
EF-A2C View: 50 %  
EF-Biplane: 51 %  
EF-Visual: 58 %  
LV EF Reported: 58 %  
  
LV DIASTOLIC FUNCTION:  
Normal Ranges:  
MV Peak E: 0.67 m/s   
(0.7-1.2 m/s)  
MV Peak A: 1.08 m/s   
(0.42-0.7 m/s)  
E/A Ratio: 0.62   
(1.0-2.2)  
MV e' 0.054 m/s (>8.0)  
MV lateral e' 0.06 m/s  
MV medial e' 0.05 m/s  
E/e' Ratio: 12.42   
(<8.0)  
  
MITRAL VALVE:  
Normal Ranges:  
MV DT: 162 msec   
(150-240msec)  
  
AORTIC VALVE:  
Normal Ranges:  
LVOT Diameter: 1.80 cm   
(1.8-2.4cm)  
  
  
RIGHT VENTRICLE:  
RV Basal 4.02 cm  
RV Mid 3.13 cm  
RV Major 6.6 cm  
TAPSE: 16.6 mm  
RV s' 0.17 m/s  
  
  
[id number] Santiago Da Silva MD  
Electronically signed   
on 2024 at   
11:54:54 AM  
  
  
  
** Final **  
                                                            Santiago Denson MD  
 -   
2024 Formatting   
of this note might be   
different from the   
original.  
  
[place] [address]  
Phone [phone], Fax   
[phone]  
  
TRANSTHORACIC   
ECHOCARDIOGRAM REPORT  
  
Patient Name: MARZENA MEDEROS Reading   
Physician: [id number]   
Santiago Da Silva MD  
Study Date: 2024   
Ordering Provider:   
[id number] SHARIFA S  
  
MRN/PID: [id number]   
Fellow:  
Accession#:   
[id number] Nurse:   
Ellyn Delarosa RN  
Date of Birth/Age:   
1945 / 78 years   
Sonographer: Maxwell Salvador RDCS  
Gender: F Additional   
Staff:  
Height: 160.02 cm Admit   
Date:  
Weight: 63.05 kg   
Admission Status:   
Outpatient  
BSA / BMI: 1.66 m2 /   
24.62 Department   
Location: [facility] Echo Lab  
kg/m2  
Blood Pressure: 157 /84   
mmHg  
  
Study Type:   
TRANSTHORACIC ECHO   
(TTE) COMPLETE  
Diagnosis/ICD: Abnormal   
electrocardiogram [ECG]   
[EKG]-R94.31  
CPT Codes: Echo   
Complete w Full   
[id number]  
Study Detail: The   
following Echo studies   
were performed: 2D,   
M-Mode, Doppler and  
color flow. Agitated   
saline used as a   
contrast agent for  
intraseptal flow   
evaluation.  
  
  
PHYSICIAN   
INTERPRETATION:  
Left Ventricle: Left   
ventricular ejection   
fraction is normal, by   
visual estimate at   
55-60%. There are no   
regional wall motion   
abnormalities. The left   
ventricular cavity size   
is normal. Spectral   
Doppler shows an   
impaired relaxation   
pattern of left   
ventricular diastolic   
filling.  
Left Atrium: The left   
atrium is mildly   
dilated. A bubble study   
using agitated saline   
was performed. Bubble   
study is negative.  
Right Ventricle: The   
right ventricle is   
normal in size. There   
is normal right   
ventricular global   
systolic function.  
Right Atrium: The right   
atrium is normal in   
size.  
Aortic Valve: The   
aortic valve is   
trileaflet. There is no   
evidence of aortic   
valve regurgitation.  
Mitral Valve: The   
mitral valve is   
abnormal. There is mild   
mitral valve   
regurgitation. Mild   
prolapse of the   
posterior leaflet of   
the mitral valve.  
Tricuspid Valve: The   
tricuspid valve is   
structurally normal.   
There is trace   
tricuspid   
regurgitation.  
Pulmonic Valve: The   
pulmonic valve is   
structurally normal.   
There is trace pulmonic   
valve regurgitation.  
Pericardium: There is   
no pericardial effusion   
noted.  
Aorta: The aortic root   
is normal.  
Systemic Veins: The   
inferior vena cava   
appears to be of normal   
size. There is IVC   
inspiratory collapse   
greater than 50%.  
In comparison to the   
previous   
echocardiogram(s):   
There are no prior   
studies on this patient   
for comparison   
purposes.  
  
  
CONCLUSIONS:  
1. Left ventricular   
ejection fraction is   
normal, by visual   
estimate at 55-60%.  
2. Spectral Doppler   
shows an impaired   
relaxation pattern of   
left ventricular   
diastolic filling.  
3. There is normal   
right ventricular   
global systolic   
function.  
4. Mild prolapse of the   
posterior leaflet of   
the mitral valve.  
5. Mild mitral valve   
regurgitation.  
  
QUANTITATIVE DATA   
SUMMARY:  
2D MEASUREMENTS:  
Normal Ranges:  
Ao Root d: 3.20 cm   
(2.0-3.7cm)  
LAs: 3.60 cm   
(2.7-4.0cm)  
IVSd: 1.00 cm   
(0.6-1.1cm)  
LVPWd: 0.76 cm   
(0.6-1.1cm)  
LVIDd: 4.60 cm   
(3.9-5.9cm)  
LVIDs: 2.48 cm  
LV Mass Index: 80.6   
g/m2  
LV % FS 46.1 %  
  
LA VOLUME:  
Normal Ranges:  
LA Vol A4C: 24.7 ml   
(22+/-6mL/m2)  
LA Vol A2C: 23.8 ml  
LA Vol BP: 25.9 ml  
LA Vol Index A4C:   
14.9ml/m2  
LA Vol Index A2C: 14.4   
ml/m2  
LA Vol Index BP: 15.6   
ml/m2  
LA Area A4C: 10.3 cm2  
LA Area A2C: 10.8 cm2  
LA Major Axis A4C: 3.6   
cm  
LA Major Axis A2C: 4.2   
cm  
LA Volume Index: 14.3   
ml/m2  
LA Vol A4C: 23.7 ml  
LA Vol A2C: 23.8 ml  
LA Vol Index BSA: 14.3   
ml/m2  
  
LV SYSTOLIC FUNCTION BY   
2D PLANIMETRY (MOD):  
Normal Ranges:  
EF-A4C View: 53 %   
(>=55%)  
EF-A2C View: 50 %  
EF-Biplane: 51 %  
EF-Visual: 58 %  
LV EF Reported: 58 %  
  
LV DIASTOLIC FUNCTION:  
Normal Ranges:  
MV Peak E: 0.67 m/s   
(0.7-1.2 m/s)  
MV Peak A: 1.08 m/s   
(0.42-0.7 m/s)  
E/A Ratio: 0.62   
(1.0-2.2)  
MV e' 0.054 m/s (>8.0)  
MV lateral e' 0.06 m/s  
MV medial e' 0.05 m/s  
E/e' Ratio: 12.42   
(<8.0)  
  
MITRAL VALVE:  
Normal Ranges:  
MV DT: 162 msec   
(150-240msec)  
  
AORTIC VALVE:  
Normal Ranges:  
LVOT Diameter: 1.80 cm   
(1.8-2.4cm)  
  
  
RIGHT VENTRICLE:  
RV Basal 4.02 cm  
RV Mid 3.13 cm  
RV Major 6.6 cm  
TAPSE: 16.6 mm  
RV s' 0.17 m/s  
  
  
[id number] Santiago Da Silva MD  
Electronically signed   
on 2024 at   
11:54:54 AM  
  
  
  
** Final **  
  
                                                            [facility]  
Work Phone:   
[phone]  
   
                                                    CBC W Auto Differential pane  
l (Bld)on 2024   
   
                                                    Basophils (Bld)   
[#/Vol]         0.02 x10*3/uL   Normal          0.00-0.10       [facility]  
   
                                        Comment on above:   Performed By: #### 5  
7021-8 ####  
MARKELL SAPP ([id number])  
[facility] ([facility])  
[address] [place] [address]   
   
                                                    Basophils/100 WBC   
(Bld)           0.2 %           Normal          0.0-2.0         [facility]  
   
                                        Comment on above:   Performed By: #### 5  
7021-8 ####  
MARKELL SAPP ([id number])  
[facility] ([facility])  
[address] [place] [address]   
   
                                                    Eosinophils (Bld)   
[#/Vol]         0.14 x10*3/uL   Normal          0.00-0.40       [facility]  
   
                                        Comment on above:   Performed By: #### 5  
7021-8 ####  
MARKELL SAPP ([id number])  
[facility] ([facility])  
[address] [place] [address]   
   
                                                    Eosinophils/100 WBC   
(Bld)           1.5 %           Normal          0.0-6.0         [facility]  
   
                                        Comment on above:   Performed By: #### 5  
7021-8 ####  
MAKRELL SAPP ([id number])  
[facility] ([facility])  
[address] [place] [address]   
   
                                                    Erythrocyte   
distribution width   
(RBC) [Ratio]   12.5 %          Normal          11.5-14.5       [facility]  
   
                                        Comment on above:   Performed By: #### 5  
7021-8 ####  
MARKELL SAPP ([id number])  
[facility] ([facility])  
[address] [place] [address]   
   
                                                    Hematocrit (Bld)   
[Volume fraction] 44.6 %          Normal          36.0-46.0       [facility]  
   
                                        Comment on above:   Performed By: #### 5  
7021-8 ####  
MARKELL SAPP ([id number])  
[facility] ([facility])  
[address] [place] [address]   
   
                                                    Hemoglobin (Bld)   
[Mass/Vol]      14.6 g/dL       Normal          12.0-16.0       [facility]  
   
                                        Comment on above:   Performed By: #### 5  
7021-8 ####  
MARKELL SAPP ([id number])  
[facility] ([facility])  
[address] [place] [address]   
   
                                                    Immature granulocytes   
(Bld) [#/Vol]   0.03 x10*3/uL   Normal          0.00-0.50       [facility]  
   
                                        Comment on above:   Performed By: #### 5  
7021-8 ####  
MARKELL SAPP ([id number])  
[facility] ([facility])  
[address] [place], [place] [id number]   
   
                                                    Immature   
granulocytes/100 WBC   
(Bld)           0.3 %           Normal          0.0-0.9         [facility]  
   
                                        Comment on above:   Result Comment: Krys  
ture Granulocyte Count (IG) includes   
promyelocytes, myelocytes and metamyelocytes but does not   
include bands. Percent differential counts (%) should be   
interpreted in the context of the absolute cell counts   
(cells/UL).   
   
                                                            Performed By: #### 5  
7021-8 ####  
MARKELL SAPP ([id number])  
[facility] ([facility])  
[address] [place] [address]   
   
                                                    Lymphocytes (Bld)   
[#/Vol]         3.61 x10*3/uL   High            0.80-3.00       [facility]  
   
                                        Comment on above:   Performed By: #### 5  
7021-8 ####  
MARKELL SAPP ([id number])  
[facility] ([facility])  
[address] [place] [address]   
   
                                                    Lymphocytes/100 WBC   
(Bld)           38.8 %          Normal          13.0-44.0       [facility]  
   
                                        Comment on above:   Performed By: #### 5  
7021-8 ####  
MARKELL SAPP ([id number])  
[facility] ([facility])  
[address] [place] [address]   
   
                                                    MCH (RBC) [Entitic   
mass]           30.4 pg         Normal          26.0-34.0       [facility]  
   
                                        Comment on above:   Performed By: #### 5  
7021-8 ####  
MARKELL SAPP ([id number])  
[facility] ([facility])  
[address] [place] [address]   
   
                      MCHC (RBC) [Mass/Vol] 32.7 g/dL  Normal     32.0-36.0  [facility]  
   
                                        Comment on above:   Performed By: #### 5  
7021-8 ####  
MARKELL SAPP ([id number])  
[facility] ([facility])  
[address] [place] [address]   
   
                                                    MCV (RBC) [Entitic   
vol]            93 fL           Normal                    [facility]  
   
                                        Comment on above:   Performed By: #### 5  
7021-8 ####  
MARKELL SAPP ([id number])  
[facility] ([facility])  
[address] [place] [address]   
   
                                                    Monocytes (Bld)   
[#/Vol]         0.55 x10*3/uL   Normal          0.05-0.80       [facility]  
   
                                        Comment on above:   Performed By: #### 5  
7021-8 ####  
MARKELL SAPP ([id number])  
[facility] ([facility])  
[address] [place] [address]   
   
                                                    Monocytes/100 WBC   
(Bld)           5.9 %           Normal          2.0-10.0        [facility]  
   
                                        Comment on above:   Performed By: #### 5  
7021-8 ####  
MARKELL SAPP ([id number])  
[facility] ([facility])  
[address] [place] [address]   
   
                                                    Neutrophils (Bld)   
[#/Vol]         4.95 x10*3/uL   Normal          1.60-5.50       [facility]  
   
                                        Comment on above:   Result Comment: Perc  
ent differential counts (%) should be   
interpreted in the context of the absolute cell counts   
(cells/uL).   
   
                                                            Performed By: #### 5  
7021-8 ####  
MARKELL SAPP ([id number])  
[facility] ([facility])  
[address] [place] [address]   
   
                                                    Neutrophils/100 WBC   
(Bld)           53.3 %          Normal          40.0-80.0       [facility]  
   
                                        Comment on above:   Performed By: #### 5  
7021-8 ####  
MARKELL SAPP ([id number])  
[facility] ([facility])  
[address] [place] [address]   
   
                                                    Nucleated RBC/100 WBC   
(Bld) [Ratio]   0.0 /100 WBCs   Normal          0.0-0.0         [facility]  
   
                                        Comment on above:   Performed By: #### 5  
7021-8 ####  
MARKELL SAPP ([id number])  
[facility] ([facility])  
[address] [place] [address]   
   
                                                    Platelets (Bld)   
[#/Vol]         362 x10*3/uL    Normal          150-450         [facility]  
   
                                        Comment on above:   Performed By: #### 5  
7021-8 ####  
MARKELL SAPP ([id number])  
[facility] ([facility])  
[address] [place] [address]   
   
                      RBC (Bld) [#/Vol] 4.80 x10*6/uL Normal     4.00-5.20  [facility]  
   
                                        Comment on above:   Performed By: #### 5  
7021-8 ####  
MARKELL SAPP ([id number])  
[facility] ([facility])  
[address] [place] [address]   
   
                      WBC (Bld) [#/Vol] 9.3 x10*3/uL Normal     4.4-11.3   [facility]  
   
                                        Comment on above:   Performed By: #### 5  
7021-8 ####  
MARKELL SAPP ([id number])  
[facility] ([facility])  
[address] [place] [address]   
   
                                                    Comprehensive metabolic 2000  
 panelon 2024   
   
                                                    Albumin BCP dye   
[Mass/Vol]      4.3 g/dL        Normal          3.4-5.0         [facility]  
   
                                        Comment on above:   Performed By: #### 2  
4323-8 ####  
MARKELL SAPP ([id number])  
[facility] ([facility])  
[address] [place] [address]   
   
                                                    ALP [Catalytic   
activity/Vol]   66 U/L          Normal                    [facility]  
   
                                        Comment on above:   Performed By: #### 2  
4323-8 ####  
MARKELL SAPP ([id number])  
[facility] ([facility])  
[address] [place] [address]   
   
                                                    ALT With P-5'-P   
[Catalytic   
activity/Vol]   22 U/L          Normal          7-45            [facility]  
   
                                        Comment on above:   Result Comment: Sandra  
ents treated with Sulfasalazine may   
generate   
falsely decreased results for ALT.   
   
                                                            Performed By: #### 2  
4323-8 ####  
MARKELL SAPP ([id number])  
[facility] ([facility])  
[address] [place] [address]   
   
                      Anion gap [Moles/Vol] 14 mmol/L  Normal     10-20      [facility]  
   
                                        Comment on above:   Performed By: #### 2  
4323-8 ####  
MARKELL SAPP ([id number])  
[facility] ([facility])  
[address] [place] [address]   
   
                                                    AST With P-5'-P   
[Catalytic   
activity/Vol]   19 U/L          Normal          9-39            [facility]  
   
                                        Comment on above:   Performed By: #### 2  
4323-8 ####  
MARKELL SAPP ([id number])  
[facility] ([facility])  
[address] [place] [address]   
   
                      Bilirubin [Mass/Vol] 0.6 mg/dL  Normal     0.0-1.2    [facility]  
   
                                        Comment on above:   Performed By: #### 2  
4323-8 ####  
MARKELL SAPP ([id number])  
[facility] ([facility])  
[address] [place] [address]   
   
                      Calcium [Mass/Vol] 9.8 mg/dL  Normal     8.6-10.3   [facility]  
   
                                        Comment on above:   Performed By: #### 2  
3-8 ####  
MARKELL SAPP ([id number])  
[facility] ([facility])  
[address] [place] [address]   
   
                      Chloride [Moles/Vol] 98 mmol/L  Normal          [facility]  
   
                                        Comment on above:   Performed By: #### 2  
4323-8 ####  
MARKELL SAPP ([id number])  
[facility] ([facility])  
[address] [place] [address]   
   
                      CO2 [Moles/Vol] 30 mmol/L  Normal     21-32      [facility]  
   
                                        Comment on above:   Performed By: #### 2  
4323-8 ####  
MARKELL SAPP ([id number])  
[facility] ([facility])  
[address] [place] [address]   
   
                      Creatinine [Mass/Vol] 0.81 mg/dL Normal     0.50-1.05  [facility]  
   
                                        Comment on above:   Performed By: #### 2  
4323-8 ####  
MARKELL SAPP ([id number])  
[facility] ([facility])  
[address] [place] [address]   
   
                                                    Glomerular filtration   
rate/1.73 sq   
M.predicted     75 mL/min/1.73m*2 Normal          >60             [facility]  
   
                                        Comment on above:   Result Comment: Calc  
ulations of estimated GFR are performed   
using the  CKD-EPI Study Refit equation without the race   
variable for the IDMS-Traceable creatinine methods.  
https://jasn.asnjournals.org/content/early//ASN.002632  
0988   
   
                                                            Performed By: #### 2  
4323-8 ####  
MARKELL SAPP ([id number])  
[facility] ([facility])  
[address] [place] [address]   
   
                      Glucose [Mass/Vol] 237 mg/dL  High       74-99      [facility]  
   
                                        Comment on above:   Performed By: #### 2  
4323-8 ####  
MARKELL SAPP ([id number])  
[facility] ([facility])  
[address] [place] [address]   
   
                      Potassium [Moles/Vol] 3.4 mmol/L Low        3.5-5.3    [facility]  
   
                                        Comment on above:   Performed By: #### 2  
4323-8 ####  
MARKELL SAPP ([id number])  
[facility] ([facility])  
[address] [place] [address]   
   
                      Protein [Mass/Vol] 6.6 g/dL   Normal     6.4-8.2    [facility]  
   
                                        Comment on above:   Performed By: #### 2  
4323-8 ####  
MARKELL SAPP ([id number])  
[facility] ([facility])  
[address] [place] [address]   
   
                      Sodium [Moles/Vol] 139 mmol/L Normal     136-145    [facility]  
   
                                        Comment on above:   Performed By: #### 2  
4323-8 ####  
MARKELL SAPP ([id number])  
[facility] ([facility])  
[address] [place] [address]   
   
                                                    Urea nitrogen   
[Mass/Vol]      14 mg/dL        Normal          6-23            [facility]  
   
                                        Comment on above:   Performed By: #### 2  
4323-8 ####  
MARKELL SAPP ([id number])  
[facility] ([facility])  
[address] [place] [address]   
   
                                                    Triacylglycerol lipaseon    
   
                                                    Lipase [Catalytic   
activity/Vol]   19 U/L          Normal          9-82            [facility]  
   
                                        Comment on above:   Order Comment: Venip  
uncture immediately after or during the   
administration of Metamizole may lead to falsely low results.   
Testing should be performed immediately prior to Metamizole   
dosing.   
   
                                                            Performed By: #### 3  
040-3 ####  
MARKELL SAPP ([id number])  
[facility] ([facility])  
[address] [place], [place] [address]   
   
                                                    IOL BIOMETRY W/ IOL CALC OU   
(BOTH EYES)on 2024   
   
                                                                  [facility]  
   
                                                    Radiology Study   
observation   
(narrative)                                                     [facility]  
   
                                                    OCT MACULA CIRRUS OU (BOTH E  
YES)on 2024   
   
                                                                  [facility]  
   
                                                    Radiology Study   
observation   
(narrative)                                                     [facility]  
   
                                                    OCT MACULA CIRRUS OU (BOTH E  
YES)on 2024   
   
                                                                  [facility]  
   
                                                    Radiology Study   
observation   
(narrative)                                                     [facility]  
   
                                                    CBC W Auto Differential pane  
l (Bld)on 2024   
   
                                                    Basophils (Bld)   
[#/Vol]         0.03 10*3/uL                                    [facility]  
   
                                                    Basophils/100 WBC   
(Bld)           0.2 %                           0.0 - 2.0 %     [facility]  
   
                                                    Eosinophils (Bld)   
[#/Vol]         0.09 10*3/uL                                    [facility]  
   
                                                    Eosinophils/100 WBC   
(Bld)           0.7 %                           0.0 - 6.0 %     [facility]  
   
                                                    Erythrocyte   
distribution width   
(RBC) [Ratio]       12.2 %                                  11.5 - 14.5   
%                                       [facility]  
   
                                                    Hematocrit (Bld)   
[Volume fraction]   42.4 %                                  36.0 - 46.0   
%                                       [facility]  
   
                                                    Hemoglobin (Bld)   
[Mass/Vol]          14.3 g/dL                               12.0 - 16.0   
g/dL                                    [facility]  
   
                                                    Immature granulocytes   
(Bld) [#/Vol]   0.03 10*3/uL                                    [facility]  
   
                                                    Immature   
granulocytes/100 WBC   
(Bld)           0.2 %                           0.0 - 0.9 %     [facility]  
   
                                        Comment on above:   Immature Granulocyte  
 Count (IG) includes promyelocytes,   
myelocytes and metamyelocytes but does not include bands.   
Percent differential counts (%) should be interpreted in the   
context of the absolute cell counts (cells/UL).   
   
                                                    Interpretation and   
review of laboratory   
results         Abnormal                                        [facility]  
   
                                                    Lymphocytes (Bld)   
[#/Vol]         2.48 10*3/uL                                    [facility]  
   
                                                    Lymphocytes/100 WBC   
(Bld)               20.2 %                                  13.0 - 44.0   
%                                       [facility]  
   
                                                    MCH (RBC) [Entitic   
mass]               31.2 pg                                 26.0 - 34.0   
pg                                      [facility]  
   
                          MCHC (RBC) [Mass/Vol] 33.7 g/dL                 32.0 -  
 36.0   
g/dL                                    [facility]  
   
                                                    MCV (RBC) [Entitic   
vol]            93 fL                           80 - 100 fL     [facility]  
   
                                                    Monocytes (Bld)   
[#/Vol]         0.64 10*3/uL                                    [facility]  
   
                                                    Monocytes/100 WBC   
(Bld)           5.2 %                           2.0 - 10.0 %    [facility]  
   
                                                    Neutrophils (Bld)   
[#/Vol]         8.99 10*3/uL    High                            [facility]  
   
                                        Comment on above:   Percent differential  
 counts (%) should be interpreted in the   
context of the absolute cell counts (cells/uL).   
   
                                                    Neutrophils/100 WBC   
(Bld)               73.5 %                                  40.0 - 80.0   
%                                       [facility]  
   
                                                    Nucleated RBC/100 WBC   
(Bld) [Ratio]   0.0 %                                           [facility]  
   
                                                    Platelets (Bld)   
[#/Vol]         299 10*3/uL                                     [facility]  
   
                      RBC (Bld) [#/Vol] 4.58 10*6/uL                       [facility]  
[facility]  
   
                      WBC (Bld) [#/Vol] 12.3 10*3/uL High                  [facility]  
[facility]  
   
                                                    Basophils (Bld)   
[#/Vol]         0.03 x10*3/uL   Normal          0.00-0.10       [facility]  
   
                                        Comment on above:   Performed By: #### 5  
7021-8 ####  
MARKELL SAPP ([id number])  
[facility] ([facility])  
[address] [place] [address]   
   
                                                    Basophils/100 WBC   
(Bld)           0.2 %           Normal          0.0-2.0         [facility]  
   
                                        Comment on above:   Performed By: #### 5  
7021-8 ####  
MARKELL SAPP ([id number])  
[facility] ([facility])  
[address] [place] [address]   
   
                                                    Eosinophils (Bld)   
[#/Vol]         0.09 x10*3/uL   Normal          0.00-0.40       [facility]  
   
                                        Comment on above:   Performed By: #### 5  
7021-8 ####  
MARKELL SAPP ([id number])  
[facility] ([facility])  
[address] [place] [address]   
   
                                                    Eosinophils/100 WBC   
(Bld)           0.7 %           Normal          0.0-6.0         [facility]  
   
                                        Comment on above:   Performed By: #### 5  
7021-8 ####  
MARKELL SAPP ([id number])  
[facility] ([facility])  
[address] [place] [address]   
   
                                                    Erythrocyte   
distribution width   
(RBC) [Ratio]   12.2 %          Normal          11.5-14.5       [facility]  
   
                                        Comment on above:   Performed By: #### 5  
7021-8 ####  
MARKELL SAPP ([id number])  
[facility] ([facility])  
[address] [place] [address]   
   
                                                    Hematocrit (Bld)   
[Volume fraction] 42.4 %          Normal          36.0-46.0       [facility]  
   
                                        Comment on above:   Performed By: #### 5  
7021-8 ####  
MARKELL SAPP ([id number])  
[facility] ([facility])  
[address] [place] [address]   
   
                                                    Hemoglobin (Bld)   
[Mass/Vol]      14.3 g/dL       Normal          12.0-16.0       [facility]  
   
                                        Comment on above:   Performed By: #### 5  
7021-8 ####  
MARKELL SAPP ([id number])  
[facility] ([facility])  
[address] [place] [address]   
   
                                                    Immature granulocytes   
(Bld) [#/Vol]   0.03 x10*3/uL   Normal          0.00-0.50       [facility]  
   
                                        Comment on above:   Performed By: #### 5  
7021-8 ####  
MARKELL SAPP ([id number])  
[facility] ([facility])  
[address] [place] [address]   
   
                                                    Immature   
granulocytes/100 WBC   
(Bld)           0.2 %           Normal          0.0-0.9         [facility]  
   
                                        Comment on above:   Result Comment: Krys  
ture Granulocyte Count (IG) includes   
promyelocytes, myelocytes and metamyelocytes but does not   
include bands. Percent differential counts (%) should be   
interpreted in the context of the absolute cell counts   
(cells/UL).   
   
                                                            Performed By: #### 5  
7021-8 ####  
MARKELL SAPP ([id number])  
[facility] ([facility])  
[address] [place] [address]   
   
                                                    Lymphocytes (Bld)   
[#/Vol]         2.48 x10*3/uL   Normal          0.80-3.00       [facility]  
   
                                        Comment on above:   Performed By: #### 5  
7021-8 ####  
MARKELL SAPP ([id number])  
[facility] ([facility])  
[address] [place] [address]   
   
                                                    Lymphocytes/100 WBC   
(Bld)           20.2 %          Normal          13.0-44.0       [facility]  
   
                                        Comment on above:   Performed By: #### 5  
70218 ####  
MARKELL SAPP ([id number])  
[facility] ([facility])  
[address] [place] [address]   
   
                                                    MCH (RBC) [Entitic   
mass]           31.2 pg         Normal          26.0-34.0       [facility]  
   
                                        Comment on above:   Performed By: #### 5  
7021-8 ####  
MARKELL SAPP ([id number])  
[facility] ([facility])  
[address] [place] [address]   
   
                      MCHC (RBC) [Mass/Vol] 33.7 g/dL  Normal     32.0-36.0  [facility]  
   
                                        Comment on above:   Performed By: #### 5  
7021-8 ####  
MARKELL SAPP ([id number])  
[facility] ([facility])  
[address] [place] [address]   
   
                                                    MCV (RBC) [Entitic   
vol]            93 fL           Normal                    [facility]  
   
                                        Comment on above:   Performed By: #### 5  
7021-8 ####  
MARKELL SAPP ([id number])  
[facility] ([facility])  
[address] [place] [address]   
   
                                                    Monocytes (Bld)   
[#/Vol]         0.64 x10*3/uL   Normal          0.05-0.80       [facility]  
   
                                        Comment on above:   Performed By: #### 5  
7021-8 ####  
MARKELL SAPP ([id number])  
[facility] ([facility])  
[address] [place] [address]   
   
                                                    Monocytes/100 WBC   
(Bld)           5.2 %           Normal          2.0-10.0        [facility]  
   
                                        Comment on above:   Performed By: #### 5  
7021-8 ####  
MARKELL SAPP ([id number])  
[facility] ([facility])  
[address] [place] [address]   
   
                                                    Neutrophils (Bld)   
[#/Vol]         8.99 x10*3/uL   High            1.60-5.50       [facility]  
   
                                        Comment on above:   Result Comment: Perc  
ent differential counts (%) should be   
interpreted in the context of the absolute cell counts   
(cells/uL).   
   
                                                            Performed By: #### 5  
7021-8 ####  
MARKELL SAPP ([id number])  
[facility] ([facility])  
[address] [place] [address]   
   
                                                    Neutrophils/100 WBC   
(Bld)           73.5 %          Normal          40.0-80.0       [facility]  
   
                                        Comment on above:   Performed By: #### 5  
7021-8 ####  
MARKELL SAPP ([id number])  
[facility] ([facility])  
[address] [place] [address]   
   
                                                    Nucleated RBC/100 WBC   
(Bld) [Ratio]   0.0 /100 WBCs   Normal          0.0-0.0         [facility]  
   
                                        Comment on above:   Performed By: #### 5  
7021-8 ####  
MARKELL SAPP ([id number])  
[facility] ([facility])  
[address] [place] [address]   
   
                                                    Platelets (Bld)   
[#/Vol]         299 x10*3/uL    Normal          150-450         [facility]  
   
                                        Comment on above:   Performed By: #### 5  
7021-8 ####  
MARKELL SAPP ([id number])  
[facility] ([facility])  
[address] [place] [address]   
   
                      RBC (Bld) [#/Vol] 4.58 x10*6/uL Normal     4.00-5.20  [facility]  
   
                                        Comment on above:   Performed By: #### 5  
7021-8 ####  
MARKELL SAPP ([id number])  
[facility] ([facility])  
[address] [place] [address]   
   
                      WBC (Bld) [#/Vol] 12.3 x10*3/uL High       4.4-11.3   [facility]  
   
                                        Comment on above:   Performed By: #### 5  
7021-8 ####  
MARKELL SAPP ([id number])  
[facility] ([facility])  
[address] [place] [address]   
   
                                                    CT HEAD WO IV CONTRASTon    
   
                                                    CT HEAD WO IV   
CONTRAST                                Interpreted By:   
Dell Bhat,  
STUDY:  
CT HEAD WO IV CONTRAST;   
2024 1:39 pm  
  
INDICATION:  
Confusion, syncope 6   
days ago.  
  
COMPARISON:  
2024  
  
ACCESSION NUMBER(S):  
[id number]  
  
ORDERING CLINICIAN:  
VINOD WELCH  
  
TECHNIQUE:  
A helical acquisition   
data was obtained.  
  
One or more of the   
following dose   
reduction techniques   
were used:  
Automated exposure   
control Adjustment of   
the mA and/or kV   
according  
to patient size, and/or   
use of iterative   
reconstruction   
technique.  
  
FINDINGS:  
Intracranial findings:   
There is no evidence   
for intracranial  
hemorrhage or mass   
effect. There is a 2.6   
by 0.7 cm bony   
protuberance  
from the outer table of   
the calvarium at the   
parietal convexity,  
unchanged compared to   
the prior study   
consistent with a   
calvarial  
osteoma.  
  
Paranasal sinuses and   
temporal bone findings:   
The visualized  
portions of the   
paranasal sinuses,   
mastoid air cells, and   
middle ear  
cavities are clear.  
  
Orbital findings: The   
visualized portions of   
the orbits are  
unremarkable.  
  
IMPRESSION:  
No acute intracranial   
pathologic findings are   
identified.  
Benign-appearing   
calvarial osteoma on   
the right.  
There is no interval   
change when compared to   
the previous   
examination.  
  
MACRO:  
none  
  
Signed by: Dell Bhat 2024 1:54   
PM  
Dictation workstation:   
[id number]        [facility]  
   
                                                    CT Head WO contraston 2024   
   
                                                            No acute intracrania  
l   
pathologic findings are   
identified.  
Benign-appearing   
calvarial osteoma on   
the right.  
There is no interval   
change when compared to   
the previous   
examination.  
  
MACRO:  
none  
  
Signed by: Dell Bhat 2024 1:54   
PM  
Dictation workstation:   
[id number]                                                [facility]  
   
                                                            Interpreted By:   
Dell Bhat,  
STUDY:  
CT HEAD WO IV CONTRAST;   
2024 1:39 pm  
  
INDICATION:  
Confusion, syncope 6   
days ago.  
  
COMPARISON:  
2024  
  
ACCESSION NUMBER(S):  
[id number]  
  
ORDERING CLINICIAN:  
VINOD WELCH  
  
TECHNIQUE:  
A helical acquisition   
data was obtained.  
  
One or more of the   
following dose   
reduction techniques   
were used:  
Automated exposure   
control Adjustment of   
the mA and/or kV   
according  
to patient size, and/or   
use of iterative   
reconstruction   
technique.  
  
FINDINGS:  
Intracranial findings:   
There is no evidence   
for intracranial  
hemorrhage or mass   
effect. There is a 2.6   
by 0.7 cm bony   
protuberance  
from the outer table of   
the calvarium at the   
parietal convexity,  
unchanged compared to   
the prior study   
consistent with a   
calvarial  
osteoma.  
  
Paranasal sinuses and   
temporal bone findings:   
The visualized  
portions of the   
paranasal sinuses,   
mastoid air cells, and   
middle ear  
cavities are clear.  
  
Orbital findings: The   
visualized portions of   
the orbits are  
unremarkable.  
                                                            [facility]  
   
                                                            Dell Bhat MD  
 -   
2024 Formatting   
of this note might be   
different from the   
original.  
Interpreted By:   
Dell Bhat,  
STUDY:  
CT HEAD WO IV CONTRAST;   
2024 1:39 pm  
  
INDICATION:  
Confusion, syncope 6   
days ago.  
  
COMPARISON:  
2024  
  
ACCESSION NUMBER(S):  
[id number]  
  
ORDERING CLINICIAN:  
VINOD WELCH  
  
TECHNIQUE:  
A helical acquisition   
data was obtained.  
  
One or more of the   
following dose   
reduction techniques   
were used:  
Automated exposure   
control Adjustment of   
the mA and/or kV   
according  
to patient size, and/or   
use of iterative   
reconstruction   
technique.  
  
FINDINGS:  
Intracranial findings:   
There is no evidence   
for intracranial  
hemorrhage or mass   
effect. There is a 2.6   
by 0.7 cm bony   
protuberance  
from the outer table of   
the calvarium at the   
parietal convexity,  
unchanged compared to   
the prior study   
consistent with a   
calvarial  
osteoma.  
  
Paranasal sinuses and   
temporal bone findings:   
The visualized  
portions of the   
paranasal sinuses,   
mastoid air cells, and   
middle ear  
cavities are clear.  
  
Orbital findings: The   
visualized portions of   
the orbits are  
unremarkable.  
  
IMPRESSION:  
No acute intracranial   
pathologic findings are   
identified.  
Benign-appearing   
calvarial osteoma on   
the right.  
There is no interval   
change when compared to   
the previous   
examination.  
  
MACRO:  
none  
  
Signed by: Dell Bhat 2024 1:54   
PM  
Dictation workstation:   
[id number]  
                                                            [facility]  
Work Phone:   
[phone]  
   
                                                                  [facility]  
Work Phone:   
[phone]  
   
                                                    Radiology Study   
observation   
(narrative)                                                     [facility]  
Work Phone:   
[phone]  
   
                                                    Comprehensive metabolic 2000  
 panelon 2024   
   
                                                    Albumin BCP dye   
[Mass/Vol]          4.0 g/dL                                3.4 - 5.0   
g/dL                                    [facility]  
   
                                                    ALP [Catalytic   
activity/Vol]   65 U/L                          33 - 136 U/L    [facility]  
   
                                                    ALT With P-5'-P   
[Catalytic   
activity/Vol]   14 U/L                          7 - 45 U/L      [facility]  
   
                                        Comment on above:   Patients treated wit  
h Sulfasalazine may generate falsely   
decreased results for ALT.   
   
                          Anion gap [Moles/Vol] 11 mmol/L                 10 - 2  
0   
mmol/L                                  [facility]  
   
                                                    AST With P-5'-P   
[Catalytic   
activity/Vol]   23 U/L                          9 - 39 U/L      [facility]  
   
                                        Comment on above:   MILD HEMOLYSIS DETEC  
ACSEY. The result may be falsely elevated   
due   
to hemolysis or other interferents. Clinical correlation is   
recommended. Repeat testing may be considered.   
   
                          Bilirubin [Mass/Vol] 0.7 mg/dL                 0.0 - 1  
.2   
mg/dL                                   [facility]  
   
                          Calcium [Mass/Vol] 9.1 mg/dL                 8.6 - 10.  
3   
mg/dL                                   [facility]  
   
                          Chloride [Moles/Vol] 100 mmol/L                98 - 10  
7   
mmol/L                                  [facility]  
   
                          CO2 [Moles/Vol] 31 mmol/L                 21 - 32   
mmol/L                                  [facility]  
   
                          Creatinine [Mass/Vol] 0.84 mg/dL                0.50 -  
 1.05   
mg/dL                                   [facility]  
   
                                                    GFR/1.73 sq   
M.predicted among   
non-blacks MDRD   
(S/P/Bld) [Vol   
rate/Area]      72 mL/min/{1.73_m2}                 - PINF          [facility]  
   
                                        Comment on above:   Calculations of del  
mated GFR are performed using the    
CKD-EPI Study Refit equation without the race variable for the   
IDMS-Traceable creatinine methods.  
https://jasn.asnjournals.org/content/early/ASN.108388  
0988  
   
   
                          Glucose [Mass/Vol] 288 mg/dL    High         74 - 99   
mg/dL                                   [facility]  
   
                                                    Interpretation and   
review of laboratory   
results         Abnormal                                        [facility]  
   
                          Potassium [Moles/Vol] 3.9 mmol/L                3.5 -   
5.3   
mmol/L                                  [facility]  
   
                                        Comment on above:   MILD HEMOLYSIS DETEC  
CASEY. The result may be falsely elevated   
due   
to hemolysis or other interferents. Clinical correlation is   
recommended. Repeat testing may be considered.   
   
                          Protein [Mass/Vol] 6.7 g/dL                  6.4 - 8.2  
   
g/dL                                    [facility]  
   
                          Sodium [Moles/Vol] 138 mmol/L                136 - 145  
   
mmol/L                                  [facility]  
   
                                                    Urea nitrogen   
[Mass/Vol]      23 mg/dL                        6 - 23 mg/dL    [facility]  
   
                                                    Albumin BCP dye   
[Mass/Vol]      4.0 g/dL        Normal          3.4-5.0         [facility]  
   
                                        Comment on above:   Performed By: #### 2  
4323-8 ####MARKELL SAPP ([id number])[facility] ([facility])[address] [place] [address]   
   
                                                    ALP [Catalytic   
activity/Vol]   65 U/L          Normal                    [facility]  
   
                                        Comment on above:   Performed By: #### 2  
4323-8 ####MARKELL SAPP ([id number])[facility] ([facility])[address] [place] [address]   
   
                                                    ALT With P-5'-P   
[Catalytic   
activity/Vol]   14 U/L          Normal          7-45            [facility]  
   
                                        Comment on above:   Result Comment: Sandra  
ents treated with Sulfasalazine may   
generate   
falsely decreased results for ALT.   
   
                                                            Performed By: #### 2  
4323-8 ####MARKELL SAPP ([id number])[facility] ([facility])[address] [place] [address]   
   
                      Anion gap [Moles/Vol] 11 mmol/L  Normal     10-20      [facility]  
   
                                        Comment on above:   Performed By: #### 2  
4323-8 ####MARKELL SAPP ([id number])[facility] ([facility])[address] [place] [address]   
   
                                                    AST With P-5'-P   
[Catalytic   
activity/Vol]   23 U/L          Normal          9-39            [facility]  
   
                                        Comment on above:   Result Comment: MILD  
 HEMOLYSIS DETECTED. The result may be   
falsely elevated due to hemolysis or other interferents.   
Clinical correlation is recommended. Repeat testing may be   
considered.   
   
                                                            Performed By: #### 2  
4323-8 ####MARKELL SAPP ([id number])[facility] ([facility])[address] [place] [address]   
   
                      Bilirubin [Mass/Vol] 0.7 mg/dL  Normal     0.0-1.2    [facility]  
   
                                        Comment on above:   Performed By: #### 2  
4323-8 ####MARKELL SAPP ([id number])[facility] ([facility])[address] [place] [address]   
   
                      Calcium [Mass/Vol] 9.1 mg/dL  Normal     8.6-10.3   [facility]  
   
                                        Comment on above:   Performed By: #### 2  
4323-8 ####MARKELL SAPP ([id number])[facility] ([facility])[address] [place] [address]   
   
                      Chloride [Moles/Vol] 100 mmol/L Normal          [facility]  
   
                                        Comment on above:   Performed By: #### 2  
4323-8 ####MARKELL SAPP ([id number])[facility] ([facility])[address] [place] [address]   
   
                      CO2 [Moles/Vol] 31 mmol/L  Normal     21-32      [facility]  
   
                                        Comment on above:   Performed By: #### 2  
4323-8 ####MARKELL SAPP ([id number])[facility] ([facility])[address] [place] [address]   
   
                      Creatinine [Mass/Vol] 0.84 mg/dL Normal     0.50-1.05  [facility]  
   
                                        Comment on above:   Performed By: #### 2  
4323-8 ####MARKELL SAPP ([id number])[facility] ([facility])[address] [place] [address]   
   
                                                    Glomerular filtration   
rate/1.73 sq   
M.predicted     72 mL/min/1.73m*2 Normal          >60             [facility]  
   
                                        Comment on above:   Result Comment: Calc  
ulations of estimated GFR are performed   
using the  CKD-EPI Study Refit equation without the race   
variable for the IDMS-Traceable creatinine methods.  
https://jasn.asnjournals.org/content/early//ASN.802358  
0988   
   
                                                            Performed By: #### 2  
4323-8 ####MARKELL SAPP ([id number])[facility] ([facility] [facility] [place] [address]   
   
                      Glucose [Mass/Vol] 288 mg/dL  High       74-99      [facility]  
   
                                        Comment on above:   Performed By: #### 2  
4323-8 ####MARKELL SAPP ([id number])[facility] ([facility])[address] [place] [address]   
   
                      Potassium [Moles/Vol] 3.9 mmol/L Normal     3.5-5.3    [facility]  
   
                                        Comment on above:   Result Comment: MILD  
 HEMOLYSIS DETECTED. The result may be   
falsely elevated due to hemolysis or other interferents.   
Clinical correlation is recommended. Repeat testing may be   
considered.   
   
                                                            Performed By: #### 2  
4323-8 ####MARKELL SAPP ([id number])[facility] ([facility])[address] [place] [address]   
   
                      Protein [Mass/Vol] 6.7 g/dL   Normal     6.4-8.2    [facility]  
   
                                        Comment on above:   Performed By: #### 2  
4323-8 ####MARKELL SAPP ([id number])[facility] ([facility])[address] [place] [address]   
   
                      Sodium [Moles/Vol] 138 mmol/L Normal     136-145    [facility]  
   
                                        Comment on above:   Performed By: #### 2  
4323-8 ####MARKELL SAPP ([id number])[facility] ([facility])[address] [place] [address]   
   
                                                    Urea nitrogen   
[Mass/Vol]      23 mg/dL        Normal          6-23            [facility]  
   
                                        Comment on above:   Performed By: #### 2  
4323-8 ####MARKELL SAPP ([id number])[facility] ([facility])[address] [place] [address]   
   
                                                    ECG 12-LEADon 2024   
   
                                        ECG 12-LEAD         Ventricular Rate  
80  
Atrial Rate  
80  
P-R Interval  
198  
QRS Duration  
90  
Q-T Interval  
380  
QTC Calculation(Bazett)  
438  
P Axis  
66  
R Axis  
49  
T Axis  
8  
QRS Count  
13  
Q Onset  
229  
P Onset  
130  
P Offset  
197  
T Offset  
419  
QTC Fredericia  
418  
Diagnosis  
Sinus rhythm with   
frequent Premature   
ventricular complexes  
Cannot rule out   
Anterior infarct (cited   
on or before   
03-MAY-2024)  
Abnormal ECG  
When compared with ECG   
of 03-MAY-2024 11:25,  
Criteria for Inferior   
infarct are no longer   
Present  
Inverted T waves have   
replaced nonspecific T   
wave abnormality in   
Inferior leads  
See ED provider note   
for full interpretation   
and clinical   
correlation  
Confirmed by   
Valery Chino   
([id number]) on 2024   
5:27:35 PM          Normal                                  [facility]  
   
                                                    Magnesiumon 2024   
   
                          Magnesium [Mass/Vol] 1.74 mg/dL                1.60 -   
2.40   
mg/dL                                   [facility]  
   
                                        Comment on above:   MILD HEMOLYSIS DETEC  
CASEY. The result may be falsely elevated   
due   
to hemolysis or other interferents. Clinical correlation is   
recommended. Repeat testing may be considered.   
   
                      Magnesium [Mass/Vol] 1.74 mg/dL Normal     1.60-2.40  [facility]  
   
                                        Comment on above:   Result Comment: MILD  
 HEMOLYSIS DETECTED. The result may be   
falsely elevated due to hemolysis or other interferents.   
Clinical correlation is recommended. Repeat testing may be   
considered.   
   
                                                            Performed By: #### 1  
[id number] ####  
GUILLAUME SEKOU ([id number])  
[facility] ([facility])  
[facility] [place] [address]   
   
                                                    Magnesium [Mass/Vol]on    
   
                                                    Interpretation and   
review of laboratory   
results         Normal                                          [facility]  
   
                                                    No Panel Informationon    
   
                                                                  [facility]  
   
                                                    Tropinin I.cardiac panel Hig  
h sensitivity methodon 2024   
   
                                                    Interpretation and   
review of laboratory   
results         Normal                                          [facility]  
   
                                                            Less than 99th   
percentile of normal   
range cutoff-  
Female and children   
under 18 years old <14   
ng/L; Male <21 ng/L:   
Negative  
Repeat testing should   
be performed if   
clinically indicated.  
  
Female and children   
under 18 years old   
14-50 ng/L; Male 21-50   
ng/L:  
Consistent with   
possible cardiac damage   
and possible increased   
clinical  
risk. Serial   
measurements may help   
to assess extent of   
myocardial damage.  
  
>50 ng/L: Consistent   
with cardiac damage,   
increased clinical risk   
and  
myocardial infarction.   
Serial measurements may   
help assess extent of  
myocardial damage.  
  
NOTE: Children less   
than 1 year old may   
have higher baseline   
troponin  
levels and results   
should be interpreted   
in conjunction with the   
overall  
clinical context.  
  
NOTE: Troponin I   
testing is performed   
using a different  
testing methodology at   
[facility] than at other  
[facility].   
Direct result   
comparisons should only  
be made within the same   
method.                                                     [facility]  
   
                                                    Troponin I, High Sensitivity  
on 2024   
   
                                                    Tropinin I.cardiac   
panel High   
sensitivity method 7 ng/L                          0 - 13 ng/L     [facility]  
   
                                                    Troponin I.cardiac panelon 0  
2024   
   
                                                    Tropinin I.cardiac   
panel High   
sensitivity method 7 ng/L          Normal          0-13            [facility]  
   
                                        Comment on above:   Order Comment: Less   
than 99th percentile of normal range   
cutoff-Female and children under 18 years old <14 ng/L; Male <21   
ng/L: NegativeRepeat testing should be performed if clinically   
indicated.Female and children under 18 years old 14-50 ng/L;   
Male 21-50 ng/L:Consistent with possible cardiac damage and   
possible increased clinicalrisk. Serial measurements may help to   
assess extent of myocardial damage.>50 ng/L: Consistent with   
cardiac damage, increased clinical risk andmyocardial   
infarction. Serial measurements may help assess extent   
ofmyocardial damage.NOTE: Children less than 1 year old may have   
higher baseline troponinlevels and results should be interpreted   
in conjunction with the overallclinical context.NOTE: Troponin I   
testing is performed using a differenttesting methodology at   
[facility] than at [facility]. Direct   
result comparisons should onlybe made within the same method.   
   
                                                            Performed By: #### 8  
[id number] ####GUILLAUME SEKOU ([id number])[facility] ([facility])[address] [place] [address]   
   
                                                    Urinalysis complete panel (U  
)Ordered By: Olivia Mejia on 2024   
   
                      Appearance (U) Hazy       Abnormal   Clear      [facility]  
   
                                                    Bilirubin (U)   
[Mass/Vol]      Negative                        NEGATIVE        [facility]  
   
                          Color (U)    Yellow                    Straw,   
Yellow                                  [facility]  
   
                                                    Glucose Auto test   
strip (U) [Mass/Vol] 500 (3+)            Abnormal            NEGATIVE   
mg/dL                                   [facility]  
   
                                                    Interpretation and   
review of laboratory   
results         Abnormal                                        [facility]  
   
                                                    Ketones (U)   
[Mass/Vol]          15 (1+)             Abnormal            NEGATIVE   
mg/dL                                   [facility]  
   
                                                    Leukocyte esterase   
Auto test strip Ql   
(U)             SMALL (1+)      Abnormal        NEGATIVE        [facility]  
   
                                                    Nitrite Auto test   
strip Ql (U)    Negative                        NEGATIVE        [facility]  
   
                          pH (U)       6.0 [pH]                  5.0, 5.5,   
6.0, 6.5,   
7.0, 7.5,   
8.0                                     [facility]  
   
                                                    Protein (U)   
[Mass/Vol]          100 (2+)            Abnormal            NEGATIVE,   
TRACE mg/dL                             [facility]  
   
                      RBC (U) [#/Vol] Negative              NEGATIVE   [facility]  
   
                                                    Specific gravity (U)   
[Rel density]       1.030                                   1.005 -   
1.035                                   [facility]  
   
                                                    Urobilinogen (U)   
[Mass/Vol]      0.2 mg/dL                       0.2, 1.0        [facility]  
   
                                                            MICROSCOPIC ON UNSPU  
N   
SPECIMEN, <2 ML   
SPECIMEN RECEIVED.                                          [facility]  
   
                                                    Urinalysis complete panel (U  
)on 2024   
   
                      Appearance (U) Hazy       Normal     Clear      [facility]  
   
                                        Comment on above:   Order Comment: MICRO  
SCOPIC ON UNSPUN SPECIMEN, <2 ML SPECIMEN   
RECEIVED.   
   
                                                            Performed By: #### 2  
4356-8 ####MARKELL SAPP ([id number])[facility] ([facility])[address] [place] [address]   
   
                                                    Bilirubin (U)   
[Mass/Vol]      Negative        Normal          NEGATIVE        [facility]  
   
                                        Comment on above:   Order Comment: MICRO  
SCOPIC ON UNSPUN SPECIMEN, <2 ML SPECIMEN   
RECEIVED.   
   
                                                            Performed By: #### 2  
4356-8 ####MARKELL SAPP ([id number])[facility] ([facility])[address] [place] [address]   
   
                          Color (U)    Yellow       Normal       Straw,   
Yellow                                  [facility]  
   
                                        Comment on above:   Order Comment: MICRO  
SCOPIC ON UNSPUN SPECIMEN, <2 ML SPECIMEN   
RECEIVED.   
   
                                                            Performed By: #### 2  
4356-8 ####MARKELL SAPP ([id number])[facility] ([facility])[address] [place], [place] [address]   
   
                                                    Glucose Auto test   
strip (U) [Mass/Vol] 500 (3+)        Abnormal        NEGATIVE        [facility]  
   
                                        Comment on above:   Order Comment: MICRO  
SCOPIC ON UNSPUN SPECIMEN, <2 ML SPECIMEN   
RECEIVED.   
   
                                                            Performed By: #### 2  
4356-8 ####MARKELL SAPP ([id number])[facility] ([facility])[address] [place] [address]   
   
                                                    Ketones (U)   
[Mass/Vol]      15 (1+)         Abnormal        NEGATIVE        [facility]  
   
                                        Comment on above:   Order Comment: MICRO  
SCOPIC ON UNSPUN SPECIMEN, <2 ML SPECIMEN   
RECEIVED.   
   
                                                            Performed By: #### 2  
4356-8 ####MARKELL SAPP ([id number])[facility] ([facility])[address] [place] [address]   
   
                                                    Leukocyte esterase   
Auto test strip Ql   
(U)             SMALL (1+)      Abnormal        NEGATIVE        [facility]  
   
                                        Comment on above:   Order Comment: MICRO  
SCOPIC ON UNSPUN SPECIMEN, <2 ML SPECIMEN   
RECEIVED.   
   
                                                            Performed By: #### 2  
4356-8 ####MARKELL SAPP ([id number])[facility] ([facility])[address] [place], [place] [address]   
   
                                                    Nitrite Auto test   
strip Ql (U)    Negative        Normal          NEGATIVE        [facility]  
   
                                        Comment on above:   Order Comment: MICRO  
SCOPIC ON UNSPUN SPECIMEN, <2 ML SPECIMEN   
RECEIVED.   
   
                                                            Performed By: #### 2  
4356-8 ####MARKELL SAPP ([id number])[facility] ([facility])[address] [place] [address]   
   
                          pH (U)       6.0 [pH]     Normal       5.0, 5.5,   
6.0, 6.5,   
7.0, 7.5,   
8.0                                     [facility]  
   
                                        Comment on above:   Order Comment: MICRO  
SCOPIC ON UNSPUN SPECIMEN, <2 ML SPECIMEN   
RECEIVED.   
   
                                                            Performed By: #### 2  
4356-8 ####MARKELL SAPP ([id number])[facility] ([facility])[address] [place], [place] [address]   
   
                                                    Protein (U)   
[Mass/Vol]          100 (2+)            Abnormal            NEGATIVE,   
TRACE                                   [facility]  
   
                                        Comment on above:   Order Comment: MICRO  
SCOPIC ON UNSPUN SPECIMEN, <2 ML SPECIMEN   
RECEIVED.   
   
                                                            Performed By: #### 2  
4356-8 ####MARKELL SAPP ([id number])[facility] ([facility])[address] [place] [address]   
   
                      RBC (U) [#/Vol] Negative   Normal     NEGATIVE   [facility]  
   
                                        Comment on above:   Order Comment: MICRO  
SCOPIC ON UNSPUN SPECIMEN, <2 ML SPECIMEN   
RECEIVED.   
   
                                                            Performed By: #### 2  
4356-8 ####MARKELL SAPP ([id number])[facility] ([facility])[address] [place] [address]   
   
                                                    Specific gravity (U)   
[Rel density]   1.030           Normal          1.005-1.035     [facility]  
   
                                        Comment on above:   Order Comment: MICRO  
SCOPIC ON UNSPUN SPECIMEN, <2 ML SPECIMEN   
RECEIVED.   
   
                                                            Performed By: #### 2  
4356-8 ####MARKELL SAPP ([id number])[facility] ([facility])[address] [place] [address]   
   
                                                    Urobilinogen (U)   
[Mass/Vol]      0.2 mg/dL       Normal          0.2, 1.0        [facility]  
   
                                        Comment on above:   Order Comment: MICRO  
SCOPIC ON UNSPUN SPECIMEN, <2 ML SPECIMEN   
RECEIVED.   
   
                                                            Performed By: #### 2  
4356-8 ####MARKELL SAPP ([id number])[facility] ([facility])[address] [place] [address]   
   
                                                    Urinalysis microscopic panel  
 Auto Ql (U)on 2024   
   
                                                    Bacteria Auto (Urine   
sed) [#/Area]       1+                  Abnormal            NONE SEEN   
/HPF                                    [facility]  
Work Phone:   
[phone]  
   
                                                    Epithelial   
cells.squamous Auto   
(Urine sed) [#/Area] 1-9 (SPARSE)                            Reference   
range not   
established.   
/HPF                                    [facility]  
Work Phone:   
[phone]  
   
                                                    Interpretation and   
review of laboratory   
results         Abnormal                                        [facility]  
Work Phone:   
[phone]  
   
                                                    RBC Auto (Urine sed)   
[#/Area]            1-2                                     NONE, 1-2,   
3-5 /HPF                                [facility]  
Work Phone:   
[phone]  
   
                                                    WBC Auto (Urine sed)   
[#/Area]            1-5                                     1-5, NONE   
/HPF                                    [facility]  
Work Phone:   
[phone]  
   
                                                                  [facility]  
Work Phone:   
[phone]  
   
                                                    Bacteria Auto (Urine   
sed) [#/Area]   1+ /HPF         Abnormal        NONE SEEN       [facility]  
   
                                        Comment on above:   Performed By: #### 5  
5975-8 ####MARKELL SAPP ([id number])[facility] ([facility])[address] [place] [address]   
   
                                                    Epithelial   
cells.squamous Auto   
(Urine sed) [#/Area] 1-9 (SPARSE)        Normal              Reference   
range not   
established.                            [facility]  
   
                                        Comment on above:   Performed By: #### 5  
3315-8 ####MARKELL SAPP ([id number])[facility] ([facility])[address] [place] [address]   
   
                                                    RBC Auto (Urine sed)   
[#/Area]            1-2                 Normal              NONE, 1-2,   
3-5                                     [facility]  
   
                                        Comment on above:   Performed By: #### 5  
3315-8 ####MARKELL SAPP ([id number])[facility] ([facility])[address] [place] [address]   
   
                                                    WBC Auto (Urine sed)   
[#/Area]        1-5             Normal          1-5, NONE       [facility]  
   
                                        Comment on above:   Performed By: #### 5  
3315-8 ####MARKELL SAPP ([id number])[facility] ([facility])[address] [place] [address]   
   
                                                    XR CHEST 2 VIEWSon   
4   
   
                                        XR CHEST 2 VIEWS    Interpreted By:   
Dell Bhat,  
STUDY:  
XR CHEST 2 VIEWS;   
2024 1:27 pm  
  
INDICATION:  
Lightheadedness.   
Vomiting.  
  
COMPARISON:  
2024  
  
ACCESSION NUMBER(S):  
[id number]  
  
ORDERING CLINICIAN:  
VINOD WELCH  
  
TECHNIQUE:  
PA and lateral views of   
the chest were   
obtained.  
  
FINDINGS:  
The cardiac silhouette   
is normal in   
appearance. The aorta   
is  
tortuous. Incidental   
note is made of the   
presence of surgical   
clips  
in the right upper   
quadrant consistent   
with a previous  
cholecystectomy.   
Spurring is noted   
throughout the spine.   
No  
infiltrates or   
effusions are   
identified.  
  
IMPRESSION:  
No acute pathologic   
findings are identified   
on this exam.  
There is no interval   
change when compared to   
the previous   
examination.  
  
MACRO:  
none  
  
Signed by: Dell Bhat 2024 1:49   
PM  
Dictation workstation:   
[id number]        Normal                                  [facility]  
   
                                                    XR Chest 2 Viewson   
4   
   
                                                            No acute pathologic   
findings are identified   
on this exam.  
There is no interval   
change when compared to   
the previous   
examination.  
  
MACRO:  
none  
  
Signed by: Dell Bhat 2024 1:49   
PM  
Dictation workstation:   
[id number]                                                [facility]  
   
                                                            Interpreted By:   
Dell Bhat,  
STUDY:  
XR CHEST 2 VIEWS;   
2024 1:27 pm  
  
INDICATION:  
Lightheadedness.   
Vomiting.  
  
COMPARISON:  
2024  
  
ACCESSION NUMBER(S):  
[id number]  
  
ORDERING CLINICIAN:  
VINOD WELCH  
  
TECHNIQUE:  
PA and lateral views of   
the chest were   
obtained.  
  
FINDINGS:  
The cardiac silhouette   
is normal in   
appearance. The aorta   
is  
tortuous. Incidental   
note is made of the   
presence of surgical   
clips  
in the right upper   
quadrant consistent   
with a previous  
cholecystectomy.   
Spurring is noted   
throughout the spine.   
No  
infiltrates or   
effusions are   
identified.  
                                                             [facility]  
   
                                                            Dell Bhat MD  
 -   
2024 Formatting   
of this note might be   
different from the   
original.  
Interpreted By:   
Dell Bhat,  
STUDY:  
XR CHEST 2 VIEWS;   
2024 1:27 pm  
  
INDICATION:  
Lightheadedness.   
Vomiting.  
  
COMPARISON:  
2024  
  
ACCESSION NUMBER(S):  
[id number]  
  
ORDERING CLINICIAN:  
VINOD WELCH  
  
TECHNIQUE:  
PA and lateral views of   
the chest were   
obtained.  
  
FINDINGS:  
The cardiac silhouette   
is normal in   
appearance. The aorta   
is  
tortuous. Incidental   
note is made of the   
presence of surgical   
clips  
in the right upper   
quadrant consistent   
with a previous  
cholecystectomy.   
Spurring is noted   
throughout the spine.   
No  
infiltrates or   
effusions are   
identified.  
  
IMPRESSION:  
No acute pathologic   
findings are identified   
on this exam.  
There is no interval   
change when compared to   
the previous   
examination.  
  
MACRO:  
none  
  
Signed by: Dell Bhat 2024 1:49   
PM  
Dictation workstation:   
[id number]  
                                                            [facility]  
Work Phone:   
[phone]  
   
                                                    Radiology Study   
observation   
(narrative)                                                     [facility]  
Work Phone:   
[phone]  
   
                                                    XR Chest 2 ViewsOrdered By:   
Dell Bhat on 2024   
   
                                                                  [facility]  
Work Phone:   
[phone]  
   
                                                    Basic metabolic 2000 panelon  
 2024   
   
                      Anion gap [Moles/Vol] 12 mmol/L  Normal     10-20      [facility]  
   
                                        Comment on above:   Performed By: #### 2  
4321-2 ####  
GUILLAUME SEKOU ([id number])  
[facility] ([facility])  
[address] [place] [address]   
   
                      Calcium [Mass/Vol] 9.5 mg/dL  Normal     8.6-10.3   [facility]  
   
                                        Comment on above:   Performed By: #### 2  
4321-2 ####  
MARKELL SAPP ([id number])  
[facility] ([facility])  
[address] [place] [address]   
   
                      Chloride [Moles/Vol] 99 mmol/L  Normal          [facility]  
   
                                        Comment on above:   Performed By: #### 2  
4321-2 ####  
MARKELL SAPP ([id number])  
[facility] ([facility])  
[address] [place] [address]   
   
                      CO2 [Moles/Vol] 31 mmol/L  Normal     21-32      [facility]  
   
                                        Comment on above:   Performed By: #### 2  
4321-2 ####  
MARKELL SAPP ([id number])  
[facility] ([facility])  
[address] [place] [address]   
   
                      Creatinine [Mass/Vol] 0.78 mg/dL Normal     0.50-1.05  [facility]  
   
                                        Comment on above:   Performed By: #### 2  
4321-2 ####  
MARKELL SAPP ([id number])  
[facility] ([facility])  
[address] [place] [address]   
   
                                                    Glomerular filtration   
rate/1.73 sq   
M.predicted     78 mL/min/1.73m*2 Normal          >60             [facility]  
   
                                        Comment on above:   Result Comment: Calc  
ulations of estimated GFR are performed   
using the  CKD-EPI Study Refit equation without the race   
variable for the IDMS-Traceable creatinine methods.  
https://jasn.asnjournals.org/content/early//ASN.249928  
0988   
   
                                                            Performed By: #### 2  
4321-2 ####  
MARKELL SAPP ([id number])  
[facility] ([facility])  
[address] [place] [address]   
   
                      Glucose [Mass/Vol] 226 mg/dL  High       74-99      [facility]  
   
                                        Comment on above:   Performed By: #### 2  
4321-2 ####  
MARKELL SAPP ([id number])  
[facility] ([facility])  
[address] [place] [address]   
   
                      Potassium [Moles/Vol] 3.7 mmol/L Normal     3.5-5.3    [facility]  
   
                                        Comment on above:   Performed By: #### 2  
4321-2 ####  
MARKELL SAPP ([id number])  
[facility] ([facility])  
[address] [place] [address]   
   
                      Sodium [Moles/Vol] 138 mmol/L Normal     136-145    [facility]  
   
                                        Comment on above:   Performed By: #### 2  
4321-2 ####  
MARKELL SAPP ([id number])  
[facility] ([facility])  
[address] [place] [address]   
   
                                                    Urea nitrogen   
[Mass/Vol]      16 mg/dL        Normal          6-23            [facility]  
   
                                        Comment on above:   Performed By: #### 2  
4321-2 ####  
MARKELL SAPP ([id number])  
[facility] ([facility])  
[address] [place] [address]   
   
                                                    Cobalaminson 2024   
   
                                                    Cobalamin (Vitamin   
B12) [Mass/Vol] 392 pg/mL       Normal          211-911         [facility]  
   
                                        Comment on above:   Performed By: #### 2  
132-9 ####  
MARKELL SAPP ([id number])  
[facility] ([facility])  
[address] [place] [address]   
   
                                                    HbA1c (Bld) [Mass fraction]o  
n 2024   
   
                                                    Average glucose   
Estimated from   
glycated hemoglobin   
(Bld) [Mass/Vol]    189 mg/dL           Normal              Not   
Established                             [facility]  
   
                                        Comment on above:   Order Comment: Diagn  
osis of Diabetes-Adults  
Non-Diabetic: < or = 5.6%  
Increased risk for developing diabetes: 5.7-6.4%  
Diagnostic of diabetes: > or = 6.5%  
Monitoring of Diabetes  
Age (y)....................... Therapeutic Goal (%)  
Adults: >18.........................<7.0  
Pediatrics: 13-18...................<7.5  
Pediatrics: 7-12....................<8.0  
Pediatrics: 0-6..................... 7.5-8.5  
American Diabetes Association. Diabetes Care 33(S1), 2010   
   
                                                            Performed By: #### 4  
548-4 ####  
MARKELL SAPP ([id number])  
[facility] ([facility])  
[facility] [id number]   
   
                                                    Hemoglobin A1c/Hemoglobin.to  
dayna 2024   
   
                                                    HbA1c (Bld) [Mass   
fraction]       8.2 %           High            see below       [facility]  
   
                                        Comment on above:   Order Comment: Diagn  
osis of Diabetes-Adults  
Non-Diabetic: < or = 5.6%  
Increased risk for developing diabetes: 5.7-6.4%  
Diagnostic of diabetes: > or = 6.5%  
Monitoring of Diabetes  
Age (y)....................... Therapeutic Goal (%)  
Adults: >18.........................<7.0  
Pediatrics: 13-18...................<7.5  
Pediatrics: 7-12....................<8.0  
Pediatrics: 0-6..................... 7.5-8.5  
American Diabetes Association. Diabetes Care 33(S1), 2010   
   
                                                            Performed By: #### 4  
548-4 ####  
MARKELL SAPP ([id number])  
[facility] ([facility])  
[facility] [place] [address]   
   
                                                    Thyrotropinon 2024   
   
                      TSH Qn     1.32 m[IU]/L Normal     0.44-3.98  [facility]  
   
                                        Comment on above:   Order Comment: TSH t  
esting is performed using different   
testing   
methodology at [facility] than at other [facility]. Direct result comparisons should only be made within   
the same method.   
   
                                                            Performed By: #### 3  
016-3 ####  
MARKELL SAPP ([id number])  
[facility] ([facility])  
[address] [place] [address]   
   
                                                    Bacteria identifiedon 2024   
   
                                                    Bacteria identified   
Cx Nom (U)                              Test: Urine Culture  
Specimen Source: Clean   
Catch/Voided  
Specimen Type: Urine  
Specimen Date: 5/3/2024   
114  
Result Date: 2024   
1247  
Result Status: Final   
result  
Abnormal: No  
Resulting Lab: [facility]   
LAB  
[id number] [place], [place] [id number]  
Tel: [phone]  
  
CULTURE  
------------------  
No significant growth Normal                                  [facility]  
   
                                        Comment on above:   Performed By: #### 6  
30-4 ####  
MARIBEL LEIGH ([id number])  
[facility] ([facility])  
[address] [place], [place] [address]   
   
                                                    CBC panel Auto (Bld)on    
   
                                                    Erythrocyte   
distribution width   
(RBC) [Ratio]       12.4 %                                  11.5 - 14.5   
%                                       [facility]  
   
                                                    Hematocrit (Bld)   
[Volume fraction]   43.0 %                                  36.0 - 46.0   
%                                       [facility]  
   
                                                    Hemoglobin (Bld)   
[Mass/Vol]          14.4 g/dL                               12.0 - 16.0   
g/dL                                    [facility]  
   
                                                    Interpretation and   
review of laboratory   
results         Normal                                          [facility]  
   
                                                    MCH (RBC) [Entitic   
mass]               31.1 pg                                 26.0 - 34.0   
pg                                      [facility]  
   
                          MCHC (RBC) [Mass/Vol] 33.5 g/dL                 32.0 -  
 36.0   
g/dL                                    [facility]  
   
                                                    MCV (RBC) [Entitic   
vol]            93 fL                           80 - 100 fL     [facility]  
   
                                                    Nucleated RBC/100 WBC   
(Bld) [Ratio]   0.0 %                                           [facility]  
   
                                                    Platelets (Bld)   
[#/Vol]         310 10*3/uL                                     [facility]  
   
                      RBC (Bld) [#/Vol] 4.63 10*6/uL                       [facility]  
   
                      WBC (Bld) [#/Vol] 7.2 10*3/uL                       [facility]  
   
                                                    Erythrocyte   
distribution width   
(RBC) [Ratio]   12.4 %          Normal          11.5-14.5       [facility]  
   
                                        Comment on above:   Performed By: #### 5  
8410-2 ####  
MARKELL SAPP ([id number])  
[facility] ([facility])  
[address] [place] [address]   
   
                                                    Hematocrit (Bld)   
[Volume fraction] 43.0 %          Normal          36.0-46.0       [facility]  
   
                                        Comment on above:   Performed By: #### 5  
8410-2 ####  
MARKELL SAPP ([id number])  
[facility] ([facility])  
[address] [place] [address]   
   
                                                    Hemoglobin (Bld)   
[Mass/Vol]      14.4 g/dL       Normal          12.0-16.0       [facility]  
   
                                        Comment on above:   Performed By: #### 5  
8410-2 ####  
MARKELL SAPP ([id number])  
[facility] ([facility])  
[address] [place] [address]   
   
                                                    MCH (RBC) [Entitic   
mass]           31.1 pg         Normal          26.0-34.0       [facility]  
   
                                        Comment on above:   Performed By: #### 5  
8410-2 ####  
MARKELL SAPP ([id number])  
[facility] ([facility])  
[address] [place] [address]   
   
                      MCHC (RBC) [Mass/Vol] 33.5 g/dL  Normal     32.0-36.0  [facility]  
   
                                        Comment on above:   Performed By: #### 5  
8410-2 ####  
MARKELL SAPP ([id number])  
[facility] ([facility])  
[address] [place] [address]   
   
                                                    MCV (RBC) [Entitic   
vol]            93 fL           Normal                    [facility]  
   
                                        Comment on above:   Performed By: #### 5  
8410-2 ####  
MARKELL SAPP ([id number])  
[facility] ([facility])  
[address] [place] [address]   
   
                                                    Nucleated RBC/100 WBC   
(Bld) [Ratio]   0.0 /100 WBCs   Normal          0.0-0.0         [facility]  
   
                                        Comment on above:   Performed By: #### 5  
8410-2 ####  
MARKELL SAPP ([id number])  
[facility] ([facility])  
[facility] [place] [address]   
   
                                                    Platelets (Bld)   
[#/Vol]         310 x10*3/uL    Normal          150-450         [facility]  
   
                                        Comment on above:   Performed By: #### 5  
8410-2 ####  
MARKELL SAPP ([id number])  
[facility] ([facility])  
[facility] [place] [address]   
   
                      RBC (Bld) [#/Vol] 4.63 x10*6/uL Normal     4.00-5.20  [facility]  
   
                                        Comment on above:   Performed By: #### 5  
8410-2 ####  
MARKELL SAPP ([id number])  
[facility] ([facility])  
[address] [place] [address]   
   
                      WBC (Bld) [#/Vol] 7.2 x10*3/uL Normal     4.4-11.3   [facility]  
   
                                        Comment on above:   Performed By: #### 5  
8410-2 ####  
MARKELL SAPP ([id number])  
[facility] ([facility])  
[address] [place] [address]   
   
                                                    CT HEAD WO IV CONTRASTon    
   
                                                    CT HEAD WO IV   
CONTRAST                                Interpreted By:   
Gene Alejandro,  
STUDY:  
CT HEAD WO IV CONTRAST;   
5/3/2024 12:02 pm  
  
INDICATION:  
Signs/Symptoms:confusio  
n.  
  
COMPARISON:  
None.  
  
ACCESSION NUMBER(S):  
[id number]  
  
ORDERING CLINICIAN:  
GRISEL LEÓN  
  
TECHNIQUE:  
Contiguous axial CT   
images were obtained   
through the head at 3   
mm  
slice thickness without   
contrast   
administration.  
  
FINDINGS:  
INTRACRANIAL:  
The ventricles, sulci   
and basal cisterns are   
within normal limits   
for  
size and configuration.   
The grey-white   
differentiation is   
intact.  
There is no mass effect   
or midline shift. There   
is no extraaxial  
fluid collection. There   
is no intracranial   
hemorrhage. The   
calvarium  
is unremarkable.  
  
EXTRACRANIAL:  
Visualized paranasal   
sinuses and mastoids   
are clear.  
  
IMPRESSION:  
No evidence of acute   
cortical infarct or   
intracranial   
hemorrhage.  
  
MACRO:  
None  
  
  
Signed by: Gene Alejandro   
5/3/2024 12:28 PM  
Dictation workstation:   
[id number]        [facility]  
   
                                                    CT Head WO contraston 2024   
   
                                                            No evidence of acute  
   
cortical infarct or   
intracranial   
hemorrhage.  
  
MACRO:  
None  
  
  
Signed by: Gene Alejandro   
5/3/2024 12:28 PM  
Dictation workstation:   
[id number]                                                 [facility]  
   
                                                            Interpreted By:   
Gene Alejandro,  
STUDY:  
CT HEAD WO IV CONTRAST;   
5/3/2024 12:02 pm  
  
INDICATION:  
Signs/Symptoms:confusio  
n.  
  
COMPARISON:  
None.  
  
ACCESSION NUMBER(S):  
[id number]  
  
ORDERING CLINICIAN:  
GRISEL LEÓN  
  
TECHNIQUE:  
Contiguous axial CT   
images were obtained   
through the head at 3   
mm  
slice thickness without   
contrast   
administration.  
  
FINDINGS:  
INTRACRANIAL:  
The ventricles, sulci   
and basal cisterns are   
within normal limits   
for  
size and configuration.   
The grey-white   
differentiation is   
intact.  
There is no mass effect   
or midline shift. There   
is no extraaxial  
fluid collection. There   
is no intracranial   
hemorrhage. The   
calvarium  
is unremarkable.  
  
EXTRACRANIAL:  
Visualized paranasal   
sinuses and mastoids   
are clear.  
                                                             [facility]  
   
                                                            Gene Alejandro MD   
-   
2024 Formatting   
of this note might be   
different from the   
original.  
Interpreted By:   
Gene Alejandro,  
STUDY:  
CT HEAD WO IV CONTRAST;   
5/3/2024 12:02 pm  
  
INDICATION:  
Signs/Symptoms:confusio  
n.  
  
COMPARISON:  
None.  
  
ACCESSION NUMBER(S):  
[id number]  
  
ORDERING CLINICIAN:  
GRISEL LEÓN  
  
TECHNIQUE:  
Contiguous axial CT   
images were obtained   
through the head at 3   
mm  
slice thickness without   
contrast   
administration.  
  
FINDINGS:  
INTRACRANIAL:  
The ventricles, sulci   
and basal cisterns are   
within normal limits   
for  
size and configuration.   
The grey-white   
differentiation is   
intact.  
There is no mass effect   
or midline shift. There   
is no extraaxial  
fluid collection. There   
is no intracranial   
hemorrhage. The   
calvarium  
is unremarkable.  
  
EXTRACRANIAL:  
Visualized paranasal   
sinuses and mastoids   
are clear.  
  
IMPRESSION:  
No evidence of acute   
cortical infarct or   
intracranial   
hemorrhage.  
  
MACRO:  
None  
  
  
Signed by: Gene Alejandro   
5/3/2024 12:28 PM  
Dictation workstation:   
[id number]  
                                                            [facility]  
Work Phone:   
[phone]  
   
                                                    Radiology Study   
observation   
(narrative)                                                     [facility]  
Work Phone:   
[phone]  
   
                                                    CT Head WO contrastOrdered B  
y: Gene Alejandro on 2024   
   
                                                                  [facility]  
Work Phone:   
[phone]  
   
                                                    Comprehensive metabolic 2000  
 panelon 2024   
   
                                                    Albumin BCP dye   
[Mass/Vol]          4.2 g/dL                                3.4 - 5.0   
g/dL                                    [facility]  
   
                                                    ALP [Catalytic   
activity/Vol]   79 U/L                          33 - 136 U/L    [facility]  
   
                                                    ALT With P-5'-P   
[Catalytic   
activity/Vol]   15 U/L                          7 - 45 U/L      [facility]  
   
                                        Comment on above:   Patients treated wit  
h Sulfasalazine may generate falsely   
decreased results for ALT.   
   
                          Anion gap [Moles/Vol] 11 mmol/L                 10 - 2  
0   
mmol/L                                  [facility]  
   
                                                    AST With P-5'-P   
[Catalytic   
activity/Vol]   13 U/L                          9 - 39 U/L      [facility]  
   
                          Bilirubin [Mass/Vol] 1.0 mg/dL                 0.0 - 1  
.2   
mg/dL                                   [facility]  
   
                          Calcium [Mass/Vol] 9.9 mg/dL                 8.6 - 10.  
3   
mg/dL                                   [facility]  
   
                          Chloride [Moles/Vol] 98 mmol/L                 98 - 10  
7   
mmol/L                                  [facility]  
   
                          CO2 [Moles/Vol] 32 mmol/L                 21 - 32   
mmol/L                                  [facility]  
   
                          Creatinine [Mass/Vol] 0.60 mg/dL                0.50 -  
 1.05   
mg/dL                                   [facility]  
   
                      eGFR                             - PINF     [facility]  
   
                                        Comment on above:   Calculations of del  
mated GFR are performed using the    
CKD-EPI Study Refit equation without the race variable for the   
IDMS-Traceable creatinine methods.  
https://jasn.asnjournals.org/content/early//ASN.689184  
0988  
   
   
                          Glucose [Mass/Vol] 217 mg/dL    High         74 - 99   
mg/dL                                   [facility]  
   
                                                    Interpretation and   
review of laboratory   
results         Abnormal                                        [facility]  
   
                          Potassium [Moles/Vol] 3.4 mmol/L   Low          3.5 -   
5.3   
mmol/L                                  [facility]  
   
                          Protein [Mass/Vol] 7.0 g/dL                  6.4 - 8.2  
   
g/dL                                    [facility]  
   
                          Sodium [Moles/Vol] 138 mmol/L                136 - 145  
   
mmol/L                                  [facility]  
   
                                                    Urea nitrogen   
[Mass/Vol]      10 mg/dL                        6 - 23 mg/dL    [facility]  
   
                                                    Albumin BCP dye   
[Mass/Vol]      4.2 g/dL        Normal          3.4-5.0         [facility]  
   
                                        Comment on above:   Performed By: #### 2  
4323-8 ####  
MARKELL SAPP ([id number])  
[facility] ([facility])  
[address] [place] [address]   
   
                                                    ALP [Catalytic   
activity/Vol]   79 U/L          Normal                    [facility]  
   
                                        Comment on above:   Performed By: #### 2  
4323-8 ####  
MARKELL SAPP ([id number])  
[facility] ([facility])  
[address] [place] [address]   
   
                                                    ALT With P-5'-P   
[Catalytic   
activity/Vol]   15 U/L          Normal          7-45            [facility]  
   
                                        Comment on above:   Result Comment: Sandra  
ents treated with Sulfasalazine may   
generate   
falsely decreased results for ALT.   
   
                                                            Performed By: #### 2  
4323-8 ####  
MARKELL SAPP ([id number])  
[facility] ([facility])  
[address] [place] [address]   
   
                      Anion gap [Moles/Vol] 11 mmol/L  Normal     10-20      [facility]  
   
                                        Comment on above:   Performed By: #### 2  
-8 ####  
MARKELL SAPP ([id number])  
[facility] ([facility])  
[address] [place] [address]   
   
                                                    AST With P-5'-P   
[Catalytic   
activity/Vol]   13 U/L          Normal          9-39            [facility]  
   
                                        Comment on above:   Performed By: #### 2  
4323-8 ####  
MARKELL SAPP ([id number])  
[facility] ([facility])  
[address] [place] [address]   
   
                      Bilirubin [Mass/Vol] 1.0 mg/dL  Normal     0.0-1.2    [facility]  
   
                                        Comment on above:   Performed By: #### 2  
4323-8 ####  
MARKELL SAPP ([id number])  
[facility] ([facility])  
[address] [place] [address]   
   
                      Calcium [Mass/Vol] 9.9 mg/dL  Normal     8.6-10.3   [facility]  
   
                                        Comment on above:   Performed By: #### 2  
4323-8 ####  
MARKELL SAPP ([id number])  
[facility] ([facility])  
[facility] [place] [address]   
   
                      Chloride [Moles/Vol] 98 mmol/L  Normal          [facility]  
   
                                        Comment on above:   Performed By: #### 2  
4323-8 ####  
MARKELL SAPP ([id number])  
[facility] ([facility])  
[address] [place] [address]   
   
                      CO2 [Moles/Vol] 32 mmol/L  Normal     21-32      [facility]  
   
                                        Comment on above:   Performed By: #### 2  
4323-8 ####  
MARKELL SAPP ([id number])  
[facility] ([facility])  
[address] [place] [address]   
   
                      Creatinine [Mass/Vol] 0.60 mg/dL Normal     0.50-1.05  [facility]  
   
                                        Comment on above:   Performed By: #### 2  
4323-8 ####  
MARKELL SAPP ([id number])  
[facility] ([facility])  
[address] [place] [address]   
   
                                                    GFR/1.73 sq   
M.predicted MDRD   
(S/P/Bld) [Vol   
rate/Area]      mL/min/{1.73_m2} Normal          >60             [facility]  
   
                                        Comment on above:   Result Comment: Calc  
ulations of estimated GFR are performed   
using the  CKD-EPI Study Refit equation without the race   
variable for the IDMS-Traceable creatinine methods.  
https://jasn.asnjournals.org/content/early//ASN.506962  
0988   
   
                                                            Performed By: #### 2  
4323-8 ####  
MARKELL SAPP ([id number])  
[facility] ([facility])  
[address] [place] [address]   
   
                      Glucose [Mass/Vol] 217 mg/dL  High       74-99      [facility]  
   
                                        Comment on above:   Performed By: #### 2  
4323-8 ####  
MARKELL SAPP ([id number])  
[facility] ([facility])  
[address] [place] [address]   
   
                      Potassium [Moles/Vol] 3.4 mmol/L Low        3.5-5.3    [facility]  
   
                                        Comment on above:   Performed By: #### 2  
4323-8 ####  
MARKELL SAPP ([id number])  
[facility] ([facility])  
[address] [place] [address]   
   
                      Protein [Mass/Vol] 7.0 g/dL   Normal     6.4-8.2    [facility]  
   
                                        Comment on above:   Performed By: #### 2  
4323-8 ####  
MARKELL SAPP ([id number])  
[facility] ([facility])  
[address] [place] [address]   
   
                      Sodium [Moles/Vol] 138 mmol/L Normal     136-145    [facility]  
   
                                        Comment on above:   Performed By: #### 2  
4323-8 ####  
MARKELL SAPP ([id number])  
[facility] ([facility])  
[address] [place], [place] [id number]   
   
                                                    Urea nitrogen   
[Mass/Vol]      10 mg/dL        Normal          6-23            [facility]  
   
                                        Comment on above:   Performed By: #### 2  
4323-8 ####  
MARKELL SAPP ([id number])  
[facility] ([facility])  
[address] [place] [address]   
   
                                                    ECG 12-LEADon 2024   
   
                                        ECG 12-LEAD         Ventricular Rate  
73  
Atrial Rate  
73  
P-R Interval  
184  
QRS Duration  
92  
Q-T Interval  
392  
QTC Calculation(Bazett)  
431  
P Axis  
28  
R Axis  
3  
T Axis  
50  
QRS Count  
12  
Q Onset  
214  
P Onset  
122  
P Offset  
181  
T Offset  
410  
QTC Fredericia  
418  
Diagnosis  
Sinus rhythm with   
occasional Premature   
ventricular complexes  
Inferior infarct , age   
undetermined  
Cannot rule out   
Anterior infarct , age   
undetermined  
Abnormal ECG  
No previous ECGs   
available  
See ED provider note   
for full interpretation   
and clinical   
correlation  
Confirmed by Grisel León ([id number]) on   
2024 3:53:11 PM Normal                                  [facility]  
   
                                                    No Panel Informationon    
   
                                                    Interpretation and   
review of laboratory   
results         Abnormal                                        [facility]  
   
                                                    Tropinin I.cardiac panel Hig  
h sensitivity methodon 2024   
   
                                                    Interpretation and   
review of laboratory   
results         Normal                                          [facility]  
   
                                                            Less than 99th   
percentile of normal   
range cutoff-  
Female and children   
under 18 years old <14   
ng/L; Male <21 ng/L:   
Negative  
Repeat testing should   
be performed if   
clinically indicated.  
  
Female and children   
under 18 years old   
14-50 ng/L; Male 21-50   
ng/L:  
Consistent with   
possible cardiac damage   
and possible increased   
clinical  
risk. Serial   
measurements may help   
to assess extent of   
myocardial damage.  
  
>50 ng/L: Consistent   
with cardiac damage,   
increased clinical risk   
and  
myocardial infarction.   
Serial measurements may   
help assess extent of  
myocardial damage.  
  
NOTE: Children less   
than 1 year old may   
have higher baseline   
troponin  
levels and results   
should be interpreted   
in conjunction with the   
overall  
clinical context.  
  
NOTE: Troponin I   
testing is performed   
using a different  
testing methodology at   
[facility] than at [facility].   
Direct result   
comparisons should only  
be made within the same   
method.                                                     [facility]  
   
                                                    Troponin I, High Sensitivity  
on 2024   
   
                                                    Tropinin I.cardiac   
panel High   
sensitivity method 8 ng/L                          0 - 13 ng/L     [facility]  
   
                                                    Troponin I.cardiac panelon 0  
2024   
   
                                                    Tropinin I.cardiac   
panel High   
sensitivity method 8 ng/L          Normal          0-13            [facility]  
   
                                        Comment on above:   Order Comment: Less   
than 99th percentile of normal range   
cutoff-  
Female and children under 18 years old <14 ng/L; Male <21 ng/L:   
Negative  
Repeat testing should be performed if clinically indicated.  
Female and children under 18 years old 14-50 ng/L; Male 21-50   
ng/L:  
Consistent with possible cardiac damage and possible increased   
clinical  
risk. Serial measurements may help to assess extent of   
myocardial damage.  
>50 ng/L: Consistent with cardiac damage, increased clinical   
risk and  
myocardial infarction. Serial measurements may help assess   
extent of  
myocardial damage.  
NOTE: Children less than 1 year old may have higher baseline   
troponin  
levels and results should be interpreted in conjunction with the   
overall  
clinical context.  
NOTE: Troponin I testing is performed using a different  
testing methodology at [facility] than at [facility]. Direct result comparisons should only  
be made within the same method.   
   
                                                            Performed By: #### 8  
9577-1 ####  
GUILLAUME SEKOU ([id number])  
[facility] ([facility])  
[address] [place] [address]   
   
                                                    Urinalysis complete W Reflex  
 Culture panel (U)on 2024   
   
                      Appearance (U) Hazy       Abnormal   Clear      [facility]  
   
                                                    Bilirubin (U)   
[Mass/Vol]      Negative                        NEGATIVE        [facility]  
   
                          Color (U)    Yellow                    Straw,   
Yellow                                  [facility]  
   
                                                    Glucose Auto test   
strip (U) [Mass/Vol] 50 (1+)             Abnormal            NEGATIVE   
mg/dL                                   [facility]  
   
                                                    Ketones (U)   
[Mass/Vol]          Negative                                NEGATIVE   
mg/dL                                   [facility]  
   
                                                    Leukocyte esterase   
Auto test strip Ql   
(U)             MODERATE (2+)   Abnormal        NEGATIVE        [facility]  
   
                                                    Nitrite Auto test   
strip Ql (U)    Negative                        NEGATIVE        [facility]  
   
                          pH (U)       6.0 [pH]                  5.0, 5.5,   
6.0, 6.5,   
7.0, 7.5,   
8.0                                     [facility]  
   
                                                    Protein (U)   
[Mass/Vol]          30 (1+)             Abnormal            NEGATIVE   
mg/dL                                   [facility]  
   
                      RBC (U) [#/Vol] Negative              NEGATIVE   [facility]  
   
                                                    Specific gravity (U)   
[Rel density]       1.018                                   1.005 -   
1.035                                   [facility]  
   
                                                    Urobilinogen (U)   
[Mass/Vol]          mg/dL                                   NINF - 2.0   
mg/dL                                   [facility]  
   
                      Appearance (U) Hazy       Normal     Clear      [facility]  
   
                                        Comment on above:   Performed By: #### 5  
8077-9 ####  
MARKELL SAPP ([id number])  
[facility] ([facility])  
[address] [place] [address]   
   
                                                    Bilirubin (U)   
[Mass/Vol]      Negative        Normal          NEGATIVE        [facility]  
   
                                        Comment on above:   Performed By: #### 5  
8077-9 ####  
MARKELL SAPP ([id number])  
[facility] ([facility])  
[address] [place] [address]   
   
                          Color (U)    Yellow       Normal       Straw,   
Yellow                                  [facility]  
   
                                        Comment on above:   Performed By: #### 5  
8077-9 ####  
MARKELL SAPP ([id number])  
[facility] ([facility])  
[address] [place] [address]   
   
                                                    Glucose Auto test   
strip (U) [Mass/Vol] 50 (1+)         Abnormal        NEGATIVE        [facility]  
   
                                        Comment on above:   Performed By: #### 5  
8077-9 ####  
MARKELL SAPP ([id number])  
[facility] ([facility])  
[address] [place] [address]   
   
                                                    Ketones (U)   
[Mass/Vol]      Negative        Normal          NEGATIVE        [facility]  
   
                                        Comment on above:   Performed By: #### 5  
8077-9 ####  
MARKELL SAPP ([id number])  
[facility] ([facility])  
[address] [place] [address]   
   
                                                    Leukocyte esterase   
Auto test strip Ql   
(U)             MODERATE (2+)   Abnormal        NEGATIVE        [facility]  
   
                                        Comment on above:   Performed By: #### 5  
8077-9 ####  
MARKELL SAPP ([id number])  
[facility] ([facility])  
[address] [place] [address]   
   
                                                    Nitrite Auto test   
strip Ql (U)    Negative        Normal          NEGATIVE        [facility]  
   
                                        Comment on above:   Performed By: #### 5  
8077-9 ####  
MARKELL SAPP ([id number])  
[facility] ([facility])  
[address] [place] [address]   
   
                          pH (U)       6.0 [pH]     Normal       5.0, 5.5,   
6.0, 6.5,   
7.0, 7.5,   
8.0                                     [facility]  
   
                                        Comment on above:   Performed By: #### 5  
8077-9 ####  
MARKELL SAPP ([id number])  
[facility] ([facility])  
[address] [place] [address]   
   
                                                    Protein (U)   
[Mass/Vol]      30 (1+)         Normal          NEGATIVE        [facility]  
   
                                        Comment on above:   Performed By: #### 5  
8077-9 ####  
MARKELL SAPP ([id number])  
[facility] ([facility])  
[address] [place] [address]   
   
                      RBC (U) [#/Vol] Negative   Normal     NEGATIVE   [facility]  
   
                                        Comment on above:   Performed By: #### 5  
8077-9 ####  
MARKELL SAPP ([id number])  
[facility] ([facility])  
[address] [place] [address]   
   
                                                    Specific gravity (U)   
[Rel density]   1.018           Normal          1.005-1.035     [facility]  
   
                                        Comment on above:   Performed By: #### 5  
8077-9 ####  
MARKELL SAPP ([id number])  
[facility] ([facility])  
[address] [place] [address]   
   
                                                    Urobilinogen (U)   
[Mass/Vol]      mg/dL           Normal          <2.0            [facility]  
   
                                        Comment on above:   Performed By: #### 5  
8077-9 ####  
MARKELL SAPP ([id number])  
[facility] ([facility])  
[address] [place] [address]   
   
                                                    Urinalysis microscopic panel  
 Auto Ql (U)on 2024   
   
                                                    Mucus Auto (Urine   
sed) [#/Area]       3+                                      Reference   
range not   
established.   
/LPF                                    [facility]  
   
                                                    RBC Auto (Urine sed)   
[#/Area]            NONE                                    NONE, 1-2,   
3-5 /HPF                                [facility]  
   
                                                    Transitional cells   
Computer assisted (U)   
[#/Area]            1-2 (FEW)                               Reference   
range not   
established.   
/HPF                                    [facility]  
   
                                                    WBC Auto (Urine sed)   
[#/Area]            6-10                Abnormal            1-5, NONE   
/HPF                                    [facility]  
   
                                                    Mucus Auto (Urine   
sed) [#/Area]       3+ /LPF             Normal              Reference   
range not   
established.                            [facility]  
   
                                        Comment on above:   Performed By: #### 5  
3315-8 ####  
MARKELL SAPP ([id number])  
[facility] ([facility])  
[address] [place] [address]   
   
                                                    RBC Auto (Urine sed)   
[#/Area]            NONE                Normal              NONE, 1-2,   
3-5                                     [facility]  
   
                                        Comment on above:   Performed By: #### 5  
3315-8 ####  
MARKELL SAPP ([id number])  
[facility] ([facility])  
[address] [place] [address]   
   
                                                    Transitional cells   
Computer assisted (U)   
[#/Area]            1-2 (FEW)           Normal              Reference   
range not   
established.                            [facility]  
   
                                        Comment on above:   Performed By: #### 5  
3315-8 ####  
MARKELL SAPP ([id number])  
[facility] ([facility])  
[address] [place], [place] [address]   
   
                                                    WBC Auto (Urine sed)   
[#/Area]        6-10            Abnormal        1-5, NONE       [facility]  
   
                                        Comment on above:   Performed By: #### 5  
3315-8 ####  
GUILLAUME SEKOU ([id number])  
[facility] ([facility])  
[address] [place] [address]   
   
                                                    XR CHEST 1 VIEWon 2024  
   
   
                                        XR CHEST 1 VIEW     STUDY:  
Chest Radiograph;   
5/3/2024 11:32 AM  
INDICATION:  
Confusion.  
COMPARISON:  
2021 XR chest.  
ACCESSION NUMBER(S):  
[id number]  
ORDERING CLINICIAN:  
GRISEL LEÓN  
TECHNIQUE: Frontal   
chest was obtained at   
11:31 hours.  
FINDINGS:  
No acute opacities or   
effusions are   
visualized. Heart size   
is top  
normal. There is no   
evidence of a   
pneumothorax.  
IMPRESSION:  
1. No acute findings on   
single AP view of the   
chest.  
Signed by Jason Morton MD                  [facility]  
   
                                                    XR Chest Single viewon    
   
                                                            1. No acute findings  
 on   
single AP view of the   
chest.  
Signed by Jason Morton MD                                                             
TELERADIOLOGY  
   
                                                            STUDY:  
Chest Radiograph;   
5/3/2024 11:32 AM  
INDICATION:  
Confusion.  
COMPARISON:  
2021 XR chest.  
ACCESSION NUMBER(S):  
[id number]  
ORDERING CLINICIAN:  
GRISEL LEÓN  
TECHNIQUE: Frontal   
chest was obtained at   
11:31 hours.  
FINDINGS:  
No acute opacities or   
effusions are   
visualized.  Heart size   
is top  
normal. There is no   
evidence of a   
pneumothorax.                                                  
TELERADIOLOGY  
   
                                                            Jason Morton MD   
-   
2024 Formatting   
of this note might be   
different from the   
original.  
STUDY:  
Chest Radiograph;   
5/3/2024 11:32 AM  
INDICATION:  
Confusion.  
COMPARISON:  
2021 XR chest.  
ACCESSION NUMBER(S):  
[id number]  
ORDERING CLINICIAN:  
GRISEL LEÓN  
TECHNIQUE: Frontal   
chest was obtained at   
11:31 hours.  
FINDINGS:  
No acute opacities or   
effusions are   
visualized. Heart size   
is top  
normal. There is no   
evidence of a   
pneumothorax.  
IMPRESSION:  
1. No acute findings on   
single AP view of the   
chest.  
Signed by Jason Morton MD  
                                                            [facility]  
Work Phone:   
[phone]  
   
                                                    Radiology Study   
observation   
(narrative)                                                     [facility]  
Work Phone:   
[phone]  
   
                                                    XR Chest Single viewOrdered   
By: Jason Morton on 2024   
   
                                                                  [facility]  
Work Phone:   
[phone]  
   
                                                    Comprehensive metabolic 2000  
 panelon 2023   
   
                                                    Albumin BCP dye   
[Mass/Vol]      4.4 g/dL        Normal          3.4-5.0         [facility]  
   
                                        Comment on above:   Performed By: #### 2  
4323-8 ####  
MARKELL SAPP ([id number])  
[facility] ([facility])  
[address] [place] [address]   
   
                                                    ALP [Catalytic   
activity/Vol]   82 U/L          Normal                    [facility]  
   
                                        Comment on above:   Performed By: #### 2  
4323-8 ####  
MARKELL SAPP ([id number])  
[facility] ([facility])  
[address] [place] [address]   
   
                                                    ALT With P-5'-P   
[Catalytic   
activity/Vol]   13 U/L          Normal          7-45            [facility]  
   
                                        Comment on above:   Result Comment: Sandra  
ents treated with Sulfasalazine may   
generate   
falsely decreased results for ALT.   
   
                                                            Performed By: #### 2  
4323-8 ####  
MARKELL SAPP ([id number])  
[facility] ([facility])  
[address] [place] [address]   
   
                      Anion gap [Moles/Vol] 11 mmol/L  Normal     10-20      [facility]  
   
                                        Comment on above:   Performed By: #### 2  
4323-8 ####  
MARKELL SAPP ([id number])  
[facility] ([facility])  
[address] [place] [address]   
   
                                                    AST With P-5'-P   
[Catalytic   
activity/Vol]   13 U/L          Normal          9-39            [facility]  
   
                                        Comment on above:   Performed By: #### 2  
4323-8 ####  
MARKELL SAPP ([id number])  
[facility] ([facility])  
[address] [place] [address]   
   
                      Bilirubin [Mass/Vol] 0.6 mg/dL  Normal     0.0-1.2    [facility]  
   
                                        Comment on above:   Performed By: #### 2  
4323-8 ####  
MARKELL SAPP ([id number])  
[facility] ([facility])  
[address] [place] [address]   
   
                      Calcium [Mass/Vol] 9.6 mg/dL  Normal     8.6-10.3   [facility]  
   
                                        Comment on above:   Performed By: #### 2  
4323-8 ####  
MARKELL SAPP ([id number])  
[facility] ([facility])  
[address] [place] [address]   
   
                      Chloride [Moles/Vol] 96 mmol/L  Low             [facility]  
   
                                        Comment on above:   Performed By: #### 2  
4323-8 ####  
MARKELL SAPP ([id number])  
[facility] ([facility])  
[address] [place] [address]   
   
                      CO2 [Moles/Vol] 31 mmol/L  Normal     21-32      [facility]  
   
                                        Comment on above:   Performed By: #### 2  
4323-8 ####  
MARKELL SAPP ([id number])  
[facility] ([facility])  
[address] [place] [address]   
   
                      Creatinine [Mass/Vol] 0.64 mg/dL Normal     0.50-1.05  [facility]  
   
                                        Comment on above:   Performed By: #### 2  
4323-8 ####  
MARKELL SAPP ([id number])  
[facility] ([facility])  
[address] [place] [address]   
   
                                                    GFR/1.73 sq   
M.predicted MDRD   
(S/P/Bld) [Vol   
rate/Area]      mL/min/{1.73_m2} Normal          >60             [facility]  
   
                                        Comment on above:   Result Comment: Calc  
ulations of estimated GFR are performed   
using the  CKD-EPI Study Refit equation without the race   
variable for the IDMS-Traceable creatinine methods.  
https://jasn.asnjournals.org/content/early//ASN.698731  
0988   
   
                                                            Performed By: #### 2  
4323-8 ####  
MARKELL SAPP ([id number])  
[facility] ([facility])  
[address] [place] [address]   
   
                      Glucose [Mass/Vol] 193 mg/dL  High       74-99      [facility]  
   
                                        Comment on above:   Performed By: #### 2  
4323-8 ####  
MARKELL SAPP ([id number])  
[facility] ([facility])  
[facility] [place] [address]   
   
                      Potassium [Moles/Vol] 4.2 mmol/L Normal     3.5-5.3    [facility]  
   
                                        Comment on above:   Performed By: #### 2  
4323-8 ####  
MARKELL SAPP ([id number])  
[facility] ([facility])  
[address] [place] [address]   
   
                      Protein [Mass/Vol] 6.6 g/dL   Normal     6.4-8.2    [facility]  
   
                                        Comment on above:   Performed By: #### 2  
4323-8 ####  
MARKELL SAPP ([id number])  
[facility] ([facility])  
[address] [place] [address]   
   
                      Sodium [Moles/Vol] 134 mmol/L Low        136-145    [facility]  
   
                                        Comment on above:   Performed By: #### 2  
4323-8 ####  
MARKELL SAPP ([id number])  
[facility] ([facility])  
[address] [place] [address]   
   
                                                    Urea nitrogen   
[Mass/Vol]      12 mg/dL        Normal          6-23            [facility]  
   
                                        Comment on above:   Performed By: #### 2  
4323-8 ####  
MARKELL SAPP ([id number])  
[facility] ([facility])  
[address] [place] [address]   
   
                                                    HbA1c (Bld) [Mass fraction]o  
n 2023   
   
                                                    Average glucose   
Estimated from   
glycated hemoglobin   
(Bld) [Mass/Vol]    148 mg/dL           Normal              Not   
Established                             [facility]  
   
                                        Comment on above:   Order Comment: Diagn  
osis of Diabetes-Adults  
Non-Diabetic: < or = 5.6%  
Increased risk for developing diabetes: 5.7-6.4%  
Diagnostic of diabetes: > or = 6.5%  
Monitoring of Diabetes  
Age (y)....................... Therapeutic Goal (%)  
Adults: >18.........................<7.0  
Pediatrics: 13-18...................<7.5  
Pediatrics: 7-12....................<8.0  
Pediatrics: 0-6..................... 7.5-8.5  
American Diabetes Association. Diabetes Care 33(S1)   
   
                                                            Performed By: #### 4  
548-4 ####  
MARKELL SAPP ([id number])  
[facility] ([facility])  
[address] [id number]   
   
                                                    Hemoglobin A1c/Hemoglobin.to  
dayna 2023   
   
                                                    HbA1c (Bld) [Mass   
fraction]       6.8 %           High            see below       [facility]  
   
                                        Comment on above:   Order Comment: Diagn  
osis of Diabetes-Adults  
Non-Diabetic: < or = 5.6%  
Increased risk for developing diabetes: 5.7-6.4%  
Diagnostic of diabetes: > or = 6.5%  
Monitoring of Diabetes  
Age (y)....................... Therapeutic Goal (%)  
Adults: >18.........................<7.0  
Pediatrics: 13-18...................<7.5  
Pediatrics: 7-12....................<8.0  
Pediatrics: 0-6..................... 7.5-8.5  
American Diabetes Association. Diabetes Care 33(S1), 2010   
   
                                                            Performed By: #### 4  
548-4 ####  
MARKELL SAPP ([id number])  
[facility] ([facility])  
[address] [place] [address]   
   
                                                    Lipid 1996 panelon   
3   
   
                                                    Cholesterol   
[Mass/Vol]      182 mg/dL       Normal          0-199           [facility]  
   
                                        Comment on above:   Result Comment:  
Age Desirable Borderline High High  
0-19 Y 0 - 169 170 - 199 >/= 200  
20-24 Y 0 - 189 190 - 224 >/= 225  
>24 Y 0 - 199 200 - 239 >/= 240  
**All ranges are based on fasting samples. Specific  
therapeutic targets will vary based on patient-specific  
cardiac risk.  
Pediatric guidelines reference:Pediatrics 2011, 128(S5).Adult   
guidelines reference: NCEP ATPIII Guidelines,NAOMI 2001,   
258:2486-15  
Venipuncture immediately after or during the administration of   
Metamizole may lead to falsely low results. Testing should be   
performed immediately prior to Metamizole dosing.   
   
                                                            Performed By: #### 2  
4331-1 ####  
MARKELL SAPP ([id number])  
[facility] ([facility])  
[address] [place] [address]   
   
                                                    Cholesterol in HDL   
[Mass/Vol]      60.0 mg/dL      Normal                          [facility]  
   
                                        Comment on above:   Result Comment:  
Age Very Low Low Normal High  
0-19 Y < 35 < 40 40-45 ----  
20-24 Y ---- < 40 >45 ----  
>24 Y ---- < 40 40-60 >60   
   
                                                            Performed By: #### 2  
4331-1 ####  
MARKELL SAPP ([id number])  
[facility] ([facility])  
[address] [place] [address]   
   
                                                    Cholesterol in LDL   
[Mass/Vol]      97 mg/dL        Normal          <=99            [facility]  
   
                                        Comment on above:   Result Comment:  
Near Borderline  
AGE Desirable Optimal High High Very High  
0-19 Y 0 - 109 --- 110-129 >/= 130 ----  
20-24 Y 0 - 119 --- 120-159 >/= 160 ----  
>24 Y 0 - 99 100-129 130-159 160-189 >/=190   
   
                                                            Performed By: #### 2  
4331-1 ####  
MARKELL SAPP ([id number])  
[facility] ([facility])  
[address] [place] [address]   
   
                                                    Cholesterol in VLDL   
[Mass/Vol]      25 mg/dL        Normal          0-40            [facility]  
   
                                        Comment on above:   Performed By: #### 2  
4331-1 ####  
MARKELL SAPP ([id number])  
[facility] ([facility])  
[facility] [place] [address]   
   
                      CHOLESTEROL/HDL RATIO 3.0        Normal                [facility]  
[facility]  
   
                                        Comment on above:   Result Comment:  
Ref Values  
Desirable < 3.4  
High Risk > 5.0   
   
                                                            Performed By: #### 2  
4331-1 ####  
MARKELL SAPP ([id number])  
[facility] ([facility])  
[address] [place], [place] [id number]   
   
                      NON HDL CHOLESTEROL 122 mg/dL  Normal     0-149      [facility]  
   
                                        Comment on above:   Result Comment:  
Age Desirable Borderline High High Very High  
0-19 Y 0 - 119 120 - 144 >/= 145 >/= 160  
20-24 Y 0 - 149 150 - 189 >/= 190 ----  
>24 Y 30 mg/dL above LDL Cholesterol goal   
   
                                                            Performed By: #### 2  
4331-1 ####  
MARKELL SAPP ([id number])  
[facility] ([facility])  
[address] [place], [place] [id number]   
   
                                                    Triglyceride   
[Mass/Vol]      126 mg/dL       Normal          0-149           [facility]  
   
                                        Comment on above:   Result Comment:  
Age Desirable Borderline High High Very High  
0 D-90 D 19 - 174 ---- ---- ----  
91 D- 9 Y 0 - 74 75 - 99 >/= 100 ----  
10-19 Y 0 - 89 90 - 129 >/= 130 ----  
20-24 Y 0 - 114 115 - 149 >/= 150 ----  
>24 Y 0 - 149 150 - 199 200- 499 >/= 500  
Venipuncture immediately after or during the administration of   
Metamizole may lead to falsely low results. Testing should be   
performed immediately prior to Metamizole dosing.   
   
                                                            Performed By: #### 2  
4331-1 ####  
MARKELL SAPP ([id number])  
[facility] ([facility])  
[address] [place], [place] [id number]   
   
                                                    [facility]CRE[facility]    
   
                                                                  [facility]  
   
                                                    [facility]    
   
                                                                  [facility]  
   
                                                    CHEST 2 VIEW PA AND LATon    
   
                                                    CHEST 2 VIEW PA AND   
LAT                                     MRN: [id number]  
Patient Name: MARZENA MEDEROS  
  
STUDY:  
TH CHEST 2 VIEW PA AND   
LAT;  
  
INDICATION:  
Cough  
  
COMPARISON:  
10/23/2012  
  
ACCESSION NUMBER(S):  
[id number]  
  
ORDERING CLINICIAN:  
DELL BUSCH  
  
FINDINGS:  
The cardiac silhouette   
size is within normal   
limits.  
There is no focal   
consolidation, edema or   
pneumothorax.  
No sizeable pleural   
effusion.  
No acute osseous   
abnormality.  
  
IMPRESSION:  
No acute   
cardiopulmonary   
process.  
Electronically signed   
by: YAHAIRA SINGH MD        [facility]  
   
                                                    PT Progress Noteon 10-  
0   
   
                                        PT Progress Note    Therapy Diagnosis  
Assessed  
Right foot pain (729.5)   
(M79.671)  
Plan  
Goals: Goals set and   
discussed today.  
1. Independent HEP to   
allow for 50% reduction   
in max ADL C/C sx   
(9/10) 2-3wks  
2. Survey score   
improvement from 46/80   
to 60/80 (LEFS) 3-4wks  
3. ROM increase to   
allow for improved ADL   
descending stairs (from   
3 DF to 7) 3-4wks  
, goal met  
Planned interventions   
include: cryotherapy,   
dry needling,   
education/instruction,   
gait training, home   
program, hot pack,   
kinesiotaping, manual   
therapy, self care/home   
management and   
therapeutic exercises.  
Frequency and duration:   
No further visits   
planned.  
Potential to achieve   
rehab goals is good  
Pt being placed on hold   
for 30 days at this   
time and is going to   
attempt independence   
with HEP. If pt does   
not elect to resume PT   
within 30 days, this   
will serve as his D/C.   
Refer back in future if   
necessary.  
  
Monitor home program.  
Patient instructed to   
call if problems.  
  
Assessment  
Pt confirmed via    
and Full Name.  
Pt has responded well   
to skilled PT with   
improvements noted in   
objective and   
subjective outcomes. Pt   
demonstrates improved   
ankle DF, strength, and   
overall balance, as   
well as decreased   
myofascial restriction   
throughout Right lower   
leg. She understands   
all HEP and symptom   
management and is   
appropriate to attempt   
independence at this   
time.  
Response to treatment:   
no change in pain.  
Patient was able to   
complete today's   
treatment with some   
difficulty.  
  
Adult Risk Screening  
Initial Fall Risk   
Screening:  
MARZENA has fallen in   
the last 6 months. She   
has fallen due to   
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   
in flower beds. Her   
fall did not result in   
injury. MARZENA does not   
have a fear of falling.   
She does not need   
assistance with   
sitting, standing or   
walking. Does not need   
assistance walking in   
her home. She does not   
need assistance in an   
unfamiliar setting.  
Pain Scale: On a scale   
of 0 to 10, the patient   
rates the pain at 1.  
Please identify   
location of pain: R   
heel.  
Pain Quality: aching,   
dull and tightness.  
The pain makes it hard   
for the patient to do   
these things: walking.  
  
Insurance  
Insurance reviewed  
Visit number: 14  
  
Evaluating therapist   
Christine Sanchez PT, DPT  
M37.312; latex allergy  
  
Subjective  
Patient reports:.  
Pt notes her foot has   
been feeling much   
better recently and   
notes only minimal   
pain. Pt notes she   
continues with HEP and   
performing frozen water   
bottle to roll out her   
foot.  
Patient identified by   
name and date of birth.  
  
Home program performing   
as directed: Yes.  
Precautions: Fall Risk:   
none  
Pertinent medical HX   
includes: Db; HBP   
(managed);  female    
CA-hysterectomy   
done-resolved Latex   
allergy; B bunion surg   
12 yrs ago.  
  
Objective  
Ortho  
AROM:  
R Ankle DF:   
3-->4-->4+/5  
L Ankle DF:   
3-->4-->4+/5  
R Ankle PF: 5/5 (25   
single limb calf   
raises)  
Ankle ROM:  
DF:  
RLE: 8  
LLE: 12  
SLS:  
RLE: 30 sec single.  
Gait / Mobility  
Gait: reduced R   
push-off.  
Strength  
Knee:  
(Key:   P!   Denotes   
Pain with Movement)  
Knee extension was 5/5   
on right, 5/5 on left.  
Knee flexion was 5/5 on   
right, 5/5 on left.  
Foot and Ankle:  
(Key:   P!   Denotes   
Pain with Movement)  
Foot dorsiflexion was   
5/5 on right, 5/5 on   
left.  
Foot plantar flexion   
was 5/5 on right, 5/5   
on left.  
Ankle eversion was 5/5   
on right, 5/5 on left.  
Ankle inversion was 5/5   
on right, 5/5 on left.  
Additional Findings:  
B toes 1-5 gr5 flex/ext   
with no sx.  
  
Treatment  
Time in clinic started   
at 1135 am  
Time in clinic ended at   
1205 am  
Total time in clinic is   
30 minutes.  
Total timed code time   
is 28 minutes.  
Therapeutic exercise   
(09670): timed minutes   
15, units 1 . Bike 5?   
level 2  
Pt re-assessed for   
updated POC,   
updated/reviewed HEP,   
and discussed continued   
symptom management x   
10'  
**LATEX ALLERGY**  
Slant board 2 x 60?  
SLS 3 x 20?  
Standing heel raises 2   
x 10 (P reps)  
Antonia step overs large   
antonia 2 x 10 B  
Standing hip ABD 2 x 10  
Step ups 6  2 x 10 fwd   
(N lat)  
Baps Board (A)  
Bridge 2 x 10  
BOSU mini lunges 2 x 10   
B (N)  
gastroc strap stretch   
10x10? hold X  
towel crunch 2x20 (D/C   
to HEP)  
Tband PF,DF,IR/ER 2x10   
purple non latex (D/C   
to HEP)  
Great toe stretch with   
strap 10 x 10? (D/C to   
HEP)  
ABC trace 1 cycle (D/C   
to HEP)  
[place] 2x10 CW/CCW ea   
(D/C to HEP).  
Manual Therapy (31962):   
timed minutes 13, units   
1 . Cupping with   
mobilization then   
stationary holds to   
plantar fascia (X)  
STW to plantar fascia   
and posterior tib x 13'  
IASTM (X)  
DTW R prox plantar   
fascia 3'x (X)  
manual plantar fascial   
stretch 30?(X)  
.  
  
'Scores and Scales'  
  
Signatures  
Electronically signed   
by : Christine Sancehz,   
PT; Oct 12 2020 12:35PM   
EST (Author)        Normal                                   [facility]  
   
                                                    Therapy Re-eval Noteon 10-12  
-2020   
   
                                        Therapy Re-eval Note Therapy Diagnosis  
Assessed  
1. Right foot pain   
(729.5) (M79.671)  
Plan  
Goals: Goals set and   
discussed today.  
1. Independent HEP to   
allow for 50% reduction   
in max ADL C/C sx   
(9/10) 2-3wks  
2. Survey score   
improvement from 46/80   
to 60/80 (LEFS) 3-4wks  
3. ROM increase to   
allow for improved ADL   
descending stairs (from   
3 DF to 7) 3-4wks  
, goal met  
Planned interventions   
include: cryotherapy,   
dry needling,   
education/instruction,   
gait training, home   
program, hot pack,   
kinesiotaping, manual   
therapy, self care/home   
management and   
therapeutic exercises.  
Frequency and duration:   
No further visits   
planned.  
Potential to achieve   
rehab goals is good  
Pt being placed on hold   
for 30 days at this   
time and is going to   
attempt independence   
with HEP. If pt does   
not elect to resume PT   
within 30 days, this   
will serve as his D/C.   
Refer back in future if   
necessary.  
  
Monitor home program.  
Patient instructed to   
call if problems.  
  
Assessment  
Pt confirmed via    
and Full Name.  
Pt has responded well   
to skilled PT with   
improvements noted in   
objective and   
subjective outcomes. Pt   
demonstrates improved   
ankle DF, strength, and   
overall balance, as   
well as decreased   
myofascial restriction   
throughout Right lower   
leg. She understands   
all HEP and symptom   
management and is   
appropriate to attempt   
independence at this   
time.  
Response to treatment:   
no change in pain.  
Patient was able to   
complete today's   
treatment with some   
difficulty.  
  
Adult Risk Screening  
Initial Fall Risk   
Screening:  
MARZENA has fallen in   
the last 6 months. She   
has fallen due to   
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   
in flower beds. Her   
fall did not result in   
injury. MARZENA does not   
have a fear of falling.   
She does not need   
assistance with   
sitting, standing or   
walking. Does not need   
assistance walking in   
her home. She does not   
need assistance in an   
unfamiliar setting.  
Pain Scale: On a scale   
of 0 to 10, the patient   
rates the pain at 1.  
Please identify   
location of pain: R   
heel.  
Pain Quality: aching,   
dull and tightness.  
The pain makes it hard   
for the patient to do   
these things: walking.  
  
Insurance  
Insurance reviewed  
Visit number: 14  
  
Evaluating therapist   
Christine Sanchez PT, DPT  
M79.671; latex allergy  
  
Subjective  
Patient reports:.  
Pt notes her foot has   
been feeling much   
better recently and   
notes only minimal   
pain. Pt notes she   
continues with HEP and   
performing frozen water   
bottle to roll out her   
foot.  
Patient identified by   
name and date of birth.  
  
Home program performing   
as directed: Yes.  
Precautions: Fall Risk:   
none  
Pertinent medical HX   
includes: Db; HBP   
(managed);  female    
CA-hysterectomy   
done-resolved Latex   
allergy; B bunion surg   
12 yrs ago.  
  
Objective  
Ortho  
AROM:  
R Ankle DF:   
3-->4-->4+/5  
L Ankle DF:   
3-->4-->4+/5  
R Ankle PF: 5/5 (25   
single limb calf   
raises)  
Ankle ROM:  
DF:  
RLE: 8  
LLE: 12  
SLS:  
RLE: 30 sec single.  
Gait / Mobility  
Gait: reduced R   
push-off.  
Strength  
Knee:  
(Key:   P!   Denotes   
Pain with Movement)  
Knee extension was 5/5   
on right, 5/5 on left.  
Knee flexion was 5/5 on   
right, 5/5 on left.  
Foot and Ankle:  
(Key:   P!   Denotes   
Pain with Movement)  
Foot dorsiflexion was   
5/5 on right, 5/5 on   
left.  
Foot plantar flexion   
was 5/5 on right, 5/5   
on left.  
Ankle eversion was 5/5   
on right, 5/5 on left.  
Ankle inversion was 5/5   
on right, 5/5 on left.  
Additional Findings:  
B toes 1-5 gr5 flex/ext   
with no sx.  
  
Treatment  
Time in clinic started   
at 1135 am  
Time in clinic ended at   
1205 am  
Total time in clinic is   
30 minutes.  
Total timed code time   
is 28 minutes.  
Therapeutic exercise   
(93460): timed minutes   
15, units 1 . Bike 5?   
level 2  
Pt re-assessed for   
updated POC,   
updated/reviewed HEP,   
and discussed continued   
symptom management x   
10'  
**LATEX ALLERGY**  
Slant board 2 x 60?  
SLS 3 x 20?  
Standing heel raises 2   
x 10 (P reps)  
Antonia step overs large   
antonia 2 x 10 B  
Standing hip ABD 2 x 10  
Step ups 6  2 x 10 fwd   
(N lat)  
Baps Board (A)  
Bridge 2 x 10  
BOSU mini lunges 2 x 10   
B (N)  
gastroc strap stretch   
10x10? hold X  
towel crunch 2x20 (D/C   
to HEP)  
Tband PF,DF,IR/ER 2x10   
purple non latex (D/C   
to HEP)  
Great toe stretch with   
strap 10 x 10? (D/C to   
HEP)  
ABC trace 1 cycle (D/C   
to HEP)  
Chitimacha 2x10 CW/CCW ea   
(D/C to HEP).  
Manual Therapy (49664):   
timed minutes 13, units   
1 . Cupping with   
mobilization then   
stationary holds to   
plantar fascia (X)  
STW to plantar fascia   
and posterior tib x 13'  
IASTM (X)  
DTW R prox plantar   
fascia 3'x (X)  
manual plantar fascial   
stretch 30?(X)  
.  
  
'Scores and Scales'  
  
Signatures  
Electronically signed   
by : Christine Sanchez,   
PT; Oct 12 2020 12:35PM   
EST (Author)        Normal                                   [facility]  
   
                                                    PT Progress Noteon 10-  
0   
   
                                        PT Progress Note    Therapy Diagnosis  
Assessed  
Right foot pain (729.5)   
(M79.671)  
Plan  
Goals: Goals set and   
discussed today.  
1. Independent HEP to   
allow for 50% reduction   
in max ADL C/C sx   
(9/10) 2-3wks  
2. Survey score   
improvement from 46/80   
to 60/80 (LEFS) 3-4wks  
3. ROM increase to   
allow for improved ADL   
descending stairs (from   
3 DF to 7) 3-4wks  
  
Planned interventions   
include: cryotherapy,   
dry needling,   
education/instruction,   
gait training, home   
program, hot pack,   
kinesiotaping, manual   
therapy, self care/home   
management and   
therapeutic exercises.  
Frequency and duration:   
2 time(s) a week, for 3   
weeks, for 6 visits .   
total POC of 15 visits.  
Potential to achieve   
rehab goals is good  
Plan to continue with   
strength progression   
and STW for continued   
ease with daily   
function.  
Progress with POC, as   
tolerated.  
  
Assessment  
Improved ability to   
complete antonia step   
overs without   
circumduction this   
date. Improved outcome   
with STW this date with   
improved tissue   
pliability.  
Response to treatment:   
no change in pain.  
Patient was able to   
complete today's   
treatment with some   
difficulty.  
  
Adult Risk Screening  
Initial Fall Risk   
Screening:  
MARZENA has fallen in   
the last 6 months. She   
has fallen due to   
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   
in flower beds. Her   
fall did not result in   
injury. MARZENA does not   
have a fear of falling.   
She does not need   
assistance with   
sitting, standing or   
walking. Does not need   
assistance walking in   
her home. She does not   
need assistance in an   
unfamiliar setting.  
Pain Scale: On a scale   
of 0 to 10, the patient   
rates the pain at 1.  
Please identify   
location of pain: R   
heel.  
Pain Quality: aching,   
dull and tightness.  
The pain makes it hard   
for the patient to do   
these things: walking.  
  
Insurance  
Insurance reviewed  
Visit number: 13  
  
Evaluating therapist   
Christine Sanchez PT, DPT  
M79.671; latex allergy  
  
Subjective  
Patient reports:.  
Patient reports that   
she is having decreased   
pain.  
Patient identified by   
name and date of birth.  
  
Home program performing   
as directed: Yes.  
Precautions: Fall Risk:   
none  
Pertinent medical HX   
includes: Db; HBP   
(managed);  female    
CA-hysterectomy   
done-resolved Latex   
allergy; B bunion surg   
12 yrs ago.  
  
Objective  
Ortho  
AROM:  
R Ankle DF: 3-->4  
L Ankle DF: 3-->4  
Palpation: restriction   
throughout posterior   
tib, plantar fascia,   
and gastroc/soleus.  
Gait / Mobility  
Gait: reduced R   
push-off.  
Strength  
Knee:  
(Key:   P!   Denotes   
Pain with Movement)  
Knee extension was 5/5   
on right, 5/5 on left.  
Knee flexion was 5/5 on   
right, 5/5 on left.  
Foot and Ankle:  
(Key:   P!   Denotes   
Pain with Movement)  
Foot dorsiflexion was   
5/5 on right, 5/5 on   
left.  
Foot plantar flexion   
was 5/5 on right, 5/5   
on left.  
Ankle eversion was 5/5   
on right, 5/5 on left.  
Ankle inversion was 5/5   
on right, 5/5 on left.  
Additional Findings:  
B toes 1-5 gr5 flex/ext   
with no sx.  
  
Treatment  
Time in clinic started   
at 10:45 am  
Time in clinic ended at   
11:30 am  
Total time in clinic is   
45 minutes.  
Total timed code time   
is 44 minutes.  
Therapeutic exercise   
(88493): timed minutes   
30, units 2 . **LATEX   
ALLERGY**  
Bike 5? level 2  
Slant board 2 x 60?  
SLS 3 x 20?  
Standing heel raises 2   
x 10 (P reps)  
Antonia step overs large   
antonia 2 x 10 B  
Standing hip ABD 2 x 10  
Step ups 6  2 x 10 fwd   
(N lat)  
Baps Board (A)  
Bridge 2 x 10  
BOSU mini lunges 2 x 10   
B (N)  
gastroc strap stretch   
10x10? hold X  
towel crunch 2x20 (D/C   
to HEP)  
Tband PF,DF,IR/ER 2x10   
purple non latex (D/C   
to HEP)  
Great toe stretch with   
strap 10 x 10? (D/C to   
HEP)  
ABC trace 1 cycle (D/C   
to HEP)  
Chitimacha 2x10 CW/CCW ea   
(D/C to HEP).  
Manual Therapy (92491):   
timed minutes 14, units   
1 . Cupping with   
mobilization then   
stationary holds to   
plantar fascia x 6'  
STW to plantar fascia   
and posterior tib x 6'  
IASTM (X)  
DTW R prox plantar   
fascia 3'x (X)  
manual plantar fascial   
stretch 30?(X)  
.  
  
'Scores and Scales'  
  
Signatures  
Electronically signed   
by : Constance Machado   
PTA; Oct 7 2020 12:30PM   
EST (Author)  
Electronically signed   
by : Christine Sanchez,   
PT; Oct 13 2020 12:02AM   
EST                 Normal                                  [facility]  
   
                                                    PT Progress Noteon 10-  
0   
   
                                        PT Progress Note    Therapy Diagnosis  
Assessed  
Right foot pain (729.5)   
(M79.671)  
Plan  
Goals: Goals set and   
discussed today.  
1. Independent HEP to   
allow for 50% reduction   
in max ADL C/C sx   
(10) 2-3wks  
2. Survey score   
improvement from 46/80   
to 60/80 (LEFS) 3-4wks  
3. ROM increase to   
allow for improved ADL   
descending stairs (from   
3 DF to 7) 3-4wks  
  
Planned interventions   
include: cryotherapy,   
dry needling,   
education/instruction,   
gait training, home   
program, hot pack,   
kinesiotaping, manual   
therapy, self care/home   
management and   
therapeutic exercises.  
Frequency and duration:   
2 time(s) a week, for 3   
weeks, for 6 visits .   
total POC of 15 visits.  
Potential to achieve   
rehab goals is good  
Plan to continue with   
strength progression   
and STW for continued   
ease with daily   
function.  
Progress with POC, as   
tolerated.  
  
Assessment  
Decreased antalgic gait   
observed this date in   
the clinic. Able to get   
the cups to adhere   
better today along the   
heel. Added lateral   
step ups this date in   
the clinic but fatigues   
quickly.  
Response to treatment:   
no change in pain.  
Patient was able to   
complete today's   
treatment with some   
difficulty.  
  
Adult Risk Screening  
Initial Fall Risk   
Screening:  
MARZENA has fallen in   
the last 6 months. She   
has fallen due to   
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   
in flower beds. Her   
fall did not result in   
injury. MARZENA does not   
have a fear of falling.   
She does not need   
assistance with   
sitting, standing or   
walking. Does not need   
assistance walking in   
her home. She does not   
need assistance in an   
unfamiliar setting.  
Pain Scale: On a scale   
of 0 to 10, the patient   
rates the pain at 2.  
Please identify   
location of pain: R   
heel.  
Pain Quality: aching,   
dull and tightness.  
The pain makes it hard   
for the patient to do   
these things: walking.  
  
Insurance  
Insurance reviewed  
Visit number: 12  
  
Evaluating therapist   
Christine Sanchez PT, DPT  
M79.671; latex allergy  
  
Subjective  
Patient reports:.  
Patient reports that   
she was sore after   
therapy but pain   
decreased as the day   
progressed. Notes that   
she did not have any   
pain the following day.   
Notes that pain is   
primarily along the   
back side of the heel   
now.  
Patient identified by   
name and date of birth.  
  
Home program performing   
as directed: Yes.  
Precautions: Fall Risk:   
none  
Pertinent medical HX   
includes: Db; HBP   
(managed);  female    
CA-hysterectomy   
done-resolved Latex   
allergy; B bunion surg   
12 yrs ago.  
  
Objective  
Ortho  
AROM:  
R Ankle DF: 3-->4  
L Ankle DF: 3-->4  
Palpation: restriction   
throughout posterior   
tib, plantar fascia,   
and gastroc/soleus.  
Gait / Mobility  
Gait: reduced R   
push-off.  
Strength  
Knee:  
(Key:   P!   Denotes   
Pain with Movement)  
Knee extension was 5/5   
on right, 5/5 on left.  
Knee flexion was 5/5 on   
right, 5/5 on left.  
Foot and Ankle:  
(Key:   P!   Denotes   
Pain with Movement)  
Foot dorsiflexion was   
5/5 on right, 5/5 on   
left.  
Foot plantar flexion   
was 5/5 on right, 5/5   
on left.  
Ankle eversion was 5/5   
on right, 5/5 on left.  
Ankle inversion was 5/5   
on right, 5/5 on left.  
Additional Findings:  
B toes 1-5 gr5 flex/ext   
with no sx.  
  
Treatment  
Time in clinic started   
at 10:45 am  
Time in clinic ended at   
11:31 am  
Total time in clinic is   
46 minutes.  
Total timed code time   
is 44 minutes.  
Therapeutic exercise   
(57031): timed minutes   
30, units 2 . **LATEX   
ALLERGY**  
Bike 5? level 2  
Slant board 2 x 60?  
SLS 3 x 20?  
Standing heel raises 2   
x 10  
Antonia step overs large   
hurde 2 x 10 B (N)  
Standing hip ABD x 10  
Step ups 6  2 x 10 fwd   
(N lat)  
Baps Board (A)  
Bridge 2 x 10  
BOSU mini lunges 2 x 10   
B (N)  
gastroc strap stretch   
10x10? hold X  
towel crunch 2x20 (D/C   
to HEP)  
Tband PF,DF,IR/ER 2x10   
purple non latex (D/C   
to HEP)  
Great toe stretch with   
strap 10 x 10? (D/C to   
HEP)  
ABC trace 1 cycle (D/C   
to HEP)  
Chitimacha 2x10 CW/CCW ea   
(D/C to HEP).  
Manual Therapy (47331):   
timed minutes 14, units   
1 . Cupping with   
mobilization then   
stationary holds to   
plantar fascia x 6'  
STW to plantar fascia   
and posterior tib x 6'  
IASTM (X)  
DTW R prox plantar   
fascia 3'x (X)  
manual plantar fascial   
stretch 30?(X)  
.  
  
Contacts for Physician   
Signature  
First attempt date: .  
  
Referring Provider   
Signature: I am in   
agreement with the   
above plan of care.  
Referring Provider   
Signature______________  
_______________________  
__,   
Date/Time____________  
  
'Scores and Scales'  
  
Signatures  
Electronically signed   
by : Constance Machado PTA; Oct 2 2020 11:47AM   
EST (Author)  
Electronically signed   
by : Christine Sanchez PT; Oct 6 2020 7:37AM   
EST (Author)        Normal                                   [facility]  
   
                                                    PT Progress Noteon   
0   
   
                                        PT Progress Note    Therapy Diagnosis  
Assessed  
Right foot pain (729.5)   
(M79.671)  
Plan  
Goals: Goals set and   
discussed today.  
1. Independent HEP to   
allow for 50% reduction   
in max ADL C/C sx   
(9/10) 2-3wks  
2. Survey score   
improvement from 46/80   
to 60/80 (LEFS) 3-4wks  
3. ROM increase to   
allow for improved ADL   
descending stairs (from   
3 DF to 7) 3-4wks  
  
Planned interventions   
include: cryotherapy,   
dry needling,   
education/instruction,   
gait training, home   
program, hot pack,   
kinesiotaping, manual   
therapy, self care/home   
management and   
therapeutic exercises.  
Frequency and duration:   
2 time(s) a week, for 3   
weeks, for 6 visits .   
total POC of 15 visits.  
Potential to achieve   
rehab goals is good  
Plan to continue with   
strength progression   
and STW for continued   
ease with daily   
function.  
Progress with POC, as   
tolerated.  
  
Assessment  
Able to get cups to   
adhere better this   
date. Mild crepitus   
palpated in the medial   
arch of foot during   
STW. Mildly antalgic   
gait observed in the   
clinic. Observed   
patient circumducting   
the L LE over the   
antonia but improved   
technique after cues   
were given.  
Response to treatment:   
no change in pain.  
Patient was able to   
complete today's   
treatment with some   
difficulty.  
  
Adult Risk Screening  
Initial Fall Risk   
Screening:  
MARZENA has fallen in   
the last 6 months. She   
has fallen due to   
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   
in flower beds. Her   
fall did not result in   
injury. MARZENA does not   
have a fear of falling.   
She does not need   
assistance with   
sitting, standing or   
walking. Does not need   
assistance walking in   
her home. She does not   
need assistance in an   
unfamiliar setting.  
Pain Scale: On a scale   
of 0 to 10, the patient   
rates the pain at 3.  
Please identify   
location of pain: R   
heel.  
Pain Quality: aching,   
dull and tightness.  
The pain makes it hard   
for the patient to do   
these things: walking.  
  
Insurance  
Insurance reviewed  
Visit number: 11  
  
Evaluating therapist   
Christine Sanchez PT, DPT  
M30.992; latex allergy  
  
Subjective  
Patient reports:.  
Patient reports that   
she is still having   
pain with walking.  
Patient identified by   
name and date of birth.  
  
Home program performing   
as directed: Yes.  
Precautions: Fall Risk:   
none  
Pertinent medical HX   
includes: Db; HBP   
(managed);  female    
CA-hysterectomy   
done-resolved Latex   
allergy; B bunion surg   
12 yrs ago.  
  
Objective  
Ortho  
AROM:  
R Ankle DF: 3-->4  
L Ankle DF: 3-->4  
Palpation: restriction   
throughout posterior   
tib, plantar fascia,   
and gastroc/soleus.  
Gait / Mobility  
Gait: reduced R   
push-off.  
Strength  
Knee:  
(Key:   P!   Denotes   
Pain with Movement)  
Knee extension was 5/5   
on right, 5/5 on left.  
Knee flexion was 5/5 on   
right, 5/5 on left.  
Foot and Ankle:  
(Key:   P!   Denotes   
Pain with Movement)  
Foot dorsiflexion was   
5/5 on right, 5/5 on   
left.  
Foot plantar flexion   
was 5/5 on right, 5/5   
on left.  
Ankle eversion was 5/5   
on right, 5/5 on left.  
Ankle inversion was 5/5   
on right, 5/5 on left.  
Additional Findings:  
B toes 1-5 gr5 flex/ext   
with no sx.  
  
Treatment  
Time in clinic started   
at 11:00 am  
Time in clinic ended at   
11:44 am  
Total time in clinic is   
44 minutes.  
Total timed code time   
is 42 minutes.  
Therapeutic exercise   
(72877): timed minutes   
30, units 2 . **LATEX   
ALLERGY**  
Bike 5? level 2  
Slant board 2 x 60?  
SLS 3 x 20?  
Standing heel raises 2   
x 10  
Antonia step overs large   
hurde 2 x 10 B (N)  
Standing hip ABD x 10  
Step ups 6  2 x 10  
Baps Board (A)  
Bridge 2 x 10  
gastroc strap stretch   
10x10? hold X  
towel crunch 2x20 (D/C   
to HEP)  
Tband PF,DF,IR/ER 2x10   
purple non latex (D/C   
to HEP)  
Great toe stretch with   
strap 10 x 10? (D/C to   
HEP)  
ABC trace 1 cycle (D/C   
to HEP)  
Chitimacha 2x10 CW/CCW ea   
(D/C to HEP).  
Manual Therapy (09831):   
timed minutes 12, units   
1 . Cupping with   
mobilization then   
stationary holds to   
plantar fascia x 6'  
STW to plantar fascia   
and posterior tib x 6'  
IASTM (X)  
DTW R prox plantar   
fascia 3'x (X)  
manual plantar fascial   
stretch 30?(X)  
.  
  
Contacts for Physician   
Signature  
First attempt date: .  
  
Referring Provider   
Signature: I am in   
agreement with the   
above plan of care.  
Referring Provider   
Signature______________  
_______________________  
__,   
Date/Time____________  
  
'Scores and Scales'  
  
Signatures  
Electronically signed   
by : Constance Machado,   
PTA; Sep 30 2020 1:16PM   
EST (Author)  
Electronically signed   
by : Christine Sanchez,   
PT; Oct 1 2020 5:50PM   
EST                 Normal                                  [facility]  
   
                                                    PT Progress Noteon   
0   
   
                                        PT Progress Note    Therapy Diagnosis  
Assessed  
Right foot pain (729.5)   
(M79.671)  
Plan  
Goals: Goals set and   
discussed today.  
1. Independent HEP to   
allow for 50% reduction   
in max ADL C/C sx   
(10) 2-3wks  
2. Survey score   
improvement from 46/80   
to 60/80 (LEFS) 3-4wks  
3. ROM increase to   
allow for improved ADL   
descending stairs (from   
3 DF to 7) 3-4wks  
  
Planned interventions   
include: cryotherapy,   
dry needling,   
education/instruction,   
gait training, home   
program, hot pack,   
kinesiotaping, manual   
therapy, self care/home   
management and   
therapeutic exercises.  
Frequency and duration:   
2 time(s) a week, for 3   
weeks, for 6 visits .   
total POC of 15 visits.  
Potential to achieve   
rehab goals is good  
Plan to continue with   
strength progression   
and STW for continued   
ease with daily   
function.  
Progress with POC, as   
tolerated.  
  
Assessment  
Patient got dizzy   
during session and had   
to eat some chocolate   
during session. Patient   
felt better after   
eating the chocolate   
and sitting for a   
minute. Mildly antalgic   
gait observed in the   
clinic this date.   
Patient had 2 episodes   
of knocking antonia down   
when stepping over with   
the L. Continued with   
STW and cupping this   
date. Had difficulty   
getting cups to stay in   
place after trying 2   
different sizes.  
Response to treatment:   
no change in pain.  
Patient was able to   
complete today's   
treatment with some   
difficulty.  
  
Adult Risk Screening  
Initial Fall Risk   
Screening:  
MARZENA has fallen in   
the last 6 months. She   
has fallen due to   
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   
in flower beds. Her   
fall did not result in   
injury. MARZENA does not   
have a fear of falling.   
She does not need   
assistance with   
sitting, standing or   
walking. Does not need   
assistance walking in   
her home. She does not   
need assistance in an   
unfamiliar setting.  
Pain Scale: On a scale   
of 0 to 10, the patient   
rates the pain at 3.  
Please identify   
location of pain: R   
heel.  
Pain Quality: aching,   
dull and tightness.  
The pain makes it hard   
for the patient to do   
these things: walking.  
  
Insurance  
Insurance reviewed  
Visit number: 10  
  
Evaluating therapist   
Christine Sanchez PT, DPT  
M44.458; latex allergy  
  
Subjective  
Patient reports:.  
Denies any new falls   
recently. States that   
she has pain when   
standing on the R foot   
in her heel.  
Patient identified by   
name and date of birth.  
  
Home program performing   
as directed: Yes.  
Precautions: Fall Risk:   
none  
Pertinent medical HX   
includes: Db; HBP   
(managed);  female    
CA-hysterectomy   
done-resolved Latex   
allergy; B bunion surg   
12 yrs ago.  
  
Objective  
Ortho  
AROM:  
R Ankle DF: 3-->4  
L Ankle DF: 3-->4  
Palpation: restriction   
throughout posterior   
tib, plantar fascia,   
and gastroc/soleus.  
ROM / Joint Mobility  
Gait / Mobility  
Gait: reduced R   
push-off.  
Strength  
Knee:  
(Key:   P!   Denotes   
Pain with Movement)  
Knee extension was 5/5   
on right, 5/5 on left.  
Knee flexion was 5/5 on   
right, 5/5 on left.  
Foot and Ankle:  
(Key:   P!   Denotes   
Pain with Movement)  
Foot dorsiflexion was   
5/5 on right, 5/5 on   
left.  
Foot plantar flexion   
was 5/5 on right, 5/5   
on left.  
Ankle eversion was 5/5   
on right, 5/5 on left.  
Ankle inversion was 5/5   
on right, 5/5 on left.  
Additional Findings:  
B toes 1-5 gr5 flex/ext   
with no sx.  
  
Treatment  
Time in clinic started   
at 0845 am  
Time in clinic ended at   
0931 am  
Total time in clinic is   
46 minutes.  
Total timed code time   
is 45 minutes.  
Therapeutic exercise   
(30857): timed minutes   
30, units 2 . **LATEX   
ALLERGY**  
Bike 5? level 2  
Slant board 2 x 60?  
SLS 3 x 20?  
Standing heel raises 2   
x 10  
Antonia step overs large   
hurde 2 x 10 B (N)  
Standing hip ABD x 10  
Step ups 6  2 x 10  
Baps Board (A)  
Bridge 2 x 10 (N)  
gastroc strap stretch   
10x10? hold X  
towel crunch 2x20 (D/C   
to HEP)  
Tband PF,DF,IR/ER 2x10   
purple non latex (D/C   
to HEP)  
Great toe stretch with   
strap 10 x 10? (D/C to   
HEP)  
ABC trace 1 cycle (D/C   
to HEP)  
Chitimacha 2x10 CW/CCW ea   
(D/C to HEP).  
Manual Therapy (42281):   
timed minutes 15, units   
1 . Cupping with   
mobilization then   
stationary holds to   
plantar fascia x 5'  
STW to plantar fascia   
and posterior tib x 10'  
IASTM (X)  
DTW R prox plantar   
fascia 3'x (X)  
manual plantar fascial   
stretch 30?(X)  
.  
  
Contacts for Physician   
Signature  
First attempt date: .  
  
Referring Provider   
Signature: I am in   
agreement with the   
above plan of care.  
Referring Provider   
Signature______________  
_______________________  
__,   
Date/Time____________  
  
'Scores and Scales'  
  
Signatures  
Electronically signed   
by : Constance Machado   
PTA; Sep 25 2020   
10:43AM EST (Author)  
Electronically signed   
by : Christine Sanchez,   
PT; Oct 1 2020 5:50PM   
EST                 Normal                                   [facility]  
   
                                                    PT Progress Noteon   
0   
   
                                        PT Progress Note    Therapy Diagnosis  
Assessed  
Right foot pain (729.5)   
(M79.671)  
Plan  
Goals: Goals set and   
discussed today.  
1. Independent HEP to   
allow for 50% reduction   
in max ADL C/C sx   
(9/10) 2-3wks  
2. Survey score   
improvement from 46/80   
to 60/80 (LEFS) 3-4wks  
3. ROM increase to   
allow for improved ADL   
descending stairs (from   
3 DF to 7) 3-4wks  
  
Planned interventions   
include: cryotherapy,   
dry needling,   
education/instruction,   
gait training, home   
program, hot pack,   
kinesiotaping, manual   
therapy, self care/home   
management and   
therapeutic exercises.  
Frequency and duration:   
2 time(s) a week, for 3   
weeks, for 6 visits .   
total POC of 15 visits.  
Potential to achieve   
rehab goals is good  
Plan to continue with   
manual therapy   
techniques and progress   
core stabilization and   
glute retraining   
exercises as well as   
progress ROM exercises   
to improved ankle DF   
for improved gait   
mechanics and   
ambulation.  
Progress with POC, as   
tolerated.  
  
Assessment  
Pt confirmed via    
and Full Name.  
Pt demonstrates   
improved ankle ROM into   
DF by 1 degree and does   
still have myofascial   
and joint restriction   
throughout posterior   
tib, gastroc/soleus,   
and plantar fascia   
which continues to   
respond well to manual   
therapy techniques. Pt   
appears to have   
proximal   
instability/weakness at   
core and pelvis and   
would benefit from   
continued skilled   
Physical Therapy with   
focus on manual therapy   
and progression of   
ankle and hip   
stabilization exercises   
to decrease overuse of   
plantar fascia and   
posterior tib.  
Response to treatment:   
no change in pain.  
Patient was able to   
complete today's   
treatment with some   
difficulty.  
  
Adult Risk Screening  
Initial Fall Risk   
Screening:  
MARZENA has fallen in   
the last 6 months. She   
has fallen due to   
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   
in flower beds. Her   
fall did not result in   
injury. MARZENA does not   
have a fear of falling.   
She does not need   
assistance with   
sitting, standing or   
walking. Does not need   
assistance walking in   
her home. She does not   
need assistance in an   
unfamiliar setting.  
Pain Scale: On a scale   
of 0 to 10, the patient   
rates the pain at 3.  
Please identify   
location of pain: R   
heel.  
Pain Quality: aching,   
dull and tightness.  
The pain makes it hard   
for the patient to do   
these things: walking.  
  
Insurance  
Insurance reviewed  
Visit number: 9  
  
Evaluating therapist   
Christine Sanchez PT, DPT  
M79.671; latex allergy  
  
Subjective  
Patient reports:.  
Pt notes she feels like   
she could use some more   
therapy as she still   
has some soreness   
lingering in her foot.   
Pt notes her pain at   
worse has been about a   
4-5/10. Pt notes she   
feels like her balance   
isn't as good as it   
used to be. Pt notes   
she is trying the   
stretches and to stay   
compliant with HEP but   
notes she does tend to   
go barefoot around the   
house.  
Patient identified by   
name and date of birth.  
  
Home program performing   
as directed: Yes.  
Precautions: Fall Risk:   
none  
Pertinent medical HX   
includes: Db; HBP   
(managed);  female    
CA-hysterectomy   
done-resolved Latex   
allergy; B bunion surg   
12 yrs ago.  
  
Objective  
Ortho  
AROM:  
R Ankle DF: 3-->4  
L Ankle DF: 3-->4  
Palpation: restriction   
throughout posterior   
tib, plantar fascia,   
and gastroc/soleus.  
ROM / Joint Mobility  
(Range of Motion in   
degrees).  
Gait / Mobility  
Gait: reduced R   
push-off.  
Strength  
Knee:  
(Key:   P!   Denotes   
Pain with Movement)  
Knee extension was 5/5   
on right, 5/5 on left.  
Knee flexion was 5/5 on   
right, 5/5 on left.  
Foot and Ankle:  
(Key:   P!   Denotes   
Pain with Movement)  
Foot dorsiflexion was   
5/5 on right, 5/5 on   
left.  
Foot plantar flexion   
was 5/5 on right, 5/5   
on left.  
Ankle eversion was 5/5   
on right, 5/5 on left.  
Ankle inversion was 5/5   
on right, 5/5 on left.  
Additional Findings:  
B toes 1-5 gr5 flex/ext   
with no sx.  
  
Treatment  
Time in clinic started   
at 0858 am  
Time in clinic ended at   
0933 am  
Total time in clinic is   
35 minutes.  
Total timed code time   
is 33 minutes.  
Therapeutic exercise   
(16189): timed minutes   
15, units 1 . **LATEX   
Allergy**  
Bike 5? level 2 (P)  
Pt re-assessed for   
updated POC,   
updated/reviewed HEP,   
and discussed continued   
symptom management x   
10'  
Not 2020  
Slant board 2 x 60?  
SLS 3 x 20?  
Standing heel raises 2   
x 10  
Standing hip ABD x 10  
Step ups 6  2 x 10  
Baps Board (A)  
Bridge (A)  
gastroc strap stretch   
10x10? hold X  
towel crunch 2x20 (D/C   
to HEP)  
Tband PF,DF,IR/ER 2x10   
purple non latex (D/C   
to HEP)  
Great toe stretch with   
strap 10 x 10? (D/C to   
HEP)  
ABC trace 1 cycle (D/C   
to HEP)  
Chitimacha 2x10 CW/CCW ea   
(D/C to HEP).  
Manual Therapy (53287):   
timed minutes 18, units   
1 . Cupping with   
mobilization then   
stationary holds to   
plantar fascia x 8' (N)  
STW to plantar fascia   
and posterior tib x 10'  
IASTM (X)  
DTW R prox plantar   
fascia 3'x (X)  
manual plantar fascial   
stretch 30?(X)  
.  
  
Contacts for Physician   
Signature  
First attempt date: .  
Referring Provider   
Signature: I am in   
agreement with the   
above plan of care.  
Referring Provider   
Signature______________  
_______________________  
__,   
Date/Time____________  
  
'Scores and Scales'  
  
Signatures  
Electronically signed   
by : Christine Sanchez,   
PT; Sep 14 2020 11:20AM   
EST (Author)        Normal                                   [facility]  
   
                                                    Therapy Re-eval Noteon    
   
                                        Therapy Re-eval Note Therapy Diagnosis  
Assessed  
1. Right foot pain   
(729.5) (M79.671)  
Plan  
Goals: Goals set and   
discussed today.  
1. Independent HEP to   
allow for 50% reduction   
in max ADL C/C sx   
(9/10) 2-3wks  
2. Survey score   
improvement from 46/80   
to 60/80 (LEFS) 3-4wks  
3. ROM increase to   
allow for improved ADL   
descending stairs (from   
3 DF to 7) 3-4wks  
  
Planned interventions   
include: cryotherapy,   
dry needling,   
education/instruction,   
gait training, home   
program, hot pack,   
kinesiotaping, manual   
therapy, self care/home   
management and   
therapeutic exercises.  
Frequency and duration:   
2 time(s) a week, for 3   
weeks, for 6 visits .   
total POC of 15 visits.  
Potential to achieve   
rehab goals is good  
Plan to continue with   
manual therapy   
techniques and progress   
core stabilization and   
glute retraining   
exercises as well as   
progress ROM exercises   
to improved ankle DF   
for improved gait   
mechanics and   
ambulation.  
Progress with POC, as   
tolerated.  
  
Assessment  
Pt confirmed via    
and Full Name.  
Pt demonstrates   
improved ankle ROM into   
DF by 1 degree and does   
still have myofascial   
and joint restriction   
throughout posterior   
tib, gastroc/soleus,   
and plantar fascia   
which continues to   
respond well to manual   
therapy techniques. Pt   
appears to have   
proximal   
instability/weakness at   
core and pelvis and   
would benefit from   
continued skilled   
Physical Therapy with   
focus on manual therapy   
and progression of   
ankle and hip   
stabilization exercises   
to decrease overuse of   
plantar fascia and   
posterior tib.  
Response to treatment:   
no change in pain.  
Patient was able to   
complete today's   
treatment with some   
difficulty.  
  
Adult Risk Screening  
Initial Fall Risk   
Screening:  
MARZENA has fallen in   
the last 6 months. She   
has fallen due to   
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   
in flower beds. Her   
fall did not result in   
injury. MARZENA does not   
have a fear of falling.   
She does not need   
assistance with   
sitting, standing or   
walking. Does not need   
assistance walking in   
her home. She does not   
need assistance in an   
unfamiliar setting.  
Pain Scale: On a scale   
of 0 to 10, the patient   
rates the pain at 3.  
Please identify   
location of pain: R   
heel.  
Pain Quality: aching,   
dull and tightness.  
The pain makes it hard   
for the patient to do   
these things: walking.  
  
Insurance  
Insurance reviewed  
Visit number: 9  
  
Evaluating therapist   
Christine Sanchez PT, DPT  
M79.671; latex allergy  
  
Subjective  
Patient reports:.  
Pt notes she feels like   
she could use some more   
therapy as she still   
has some soreness   
lingering in her foot.   
Pt notes her pain at   
worse has been about a   
4-5/10. Pt notes she   
feels like her balance   
isn't as good as it   
used to be. Pt notes   
she is trying the   
stretches and to stay   
compliant with HEP but   
notes she does tend to   
go barefoot around the   
house.  
Patient identified by   
name and date of birth.  
  
Home program performing   
as directed: Yes.  
Precautions: Fall Risk:   
none  
Pertinent medical HX   
includes: Db; HBP   
(managed);  female    
CA-hysterectomy   
done-resolved Latex   
allergy; B bunion surg   
12 yrs ago.  
  
Objective  
Ortho  
AROM:  
R Ankle DF: 3-->4  
L Ankle DF: 3-->4  
Palpation: restriction   
throughout posterior   
tib, plantar fascia,   
and gastroc/soleus.  
ROM / Joint Mobility  
(Range of Motion in   
degrees).  
Gait / Mobility  
Gait: reduced R   
push-off.  
Strength  
Knee:  
(Key:   P!   Denotes   
Pain with Movement)  
Knee extension was 5/5   
on right, 5/5 on left.  
Knee flexion was 5/5 on   
right, 5/5 on left.  
Foot and Ankle:  
(Key:   P!   Denotes   
Pain with Movement)  
Foot dorsiflexion was   
5/5 on right, 5/5 on   
left.  
Foot plantar flexion   
was 5/5 on right, 5/5   
on left.  
Ankle eversion was 5/5   
on right, 5/5 on left.  
Ankle inversion was 5/5   
on right, 5/5 on left.  
Additional Findings:  
B toes 1-5 gr5 flex/ext   
with no sx.  
  
Treatment  
Time in clinic started   
at 0858 am  
Time in clinic ended at   
0933 am  
Total time in clinic is   
35 minutes.  
Total timed code time   
is 33 minutes.  
Therapeutic exercise   
(42026): timed minutes   
15, units 1 . **LATEX   
Allergy**  
Bike 5? level 2 (P)  
Pt re-assessed for   
updated POC,   
updated/reviewed HEP,   
and discussed continued   
symptom management x   
10'  
Not 2020  
Slant board 2 x 60?  
SLS 3 x 20?  
Standing heel raises 2   
x 10  
Standing hip ABD x 10  
Step ups 6  2 x 10  
Baps Board (A)  
Bridge (A)  
gastroc strap stretch   
10x10? hold X  
towel crunch 2x20 (D/C   
to HEP)  
Tband PF,DF,IR/ER 2x10   
purple non latex (D/C   
to HEP)  
Great toe stretch with   
strap 10 x 10? (D/C to   
HEP)  
ABC trace 1 cycle (D/C   
to HEP)  
Chitimacha 2x10 CW/CCW ea   
(D/C to HEP).  
Manual Therapy (01000):   
timed minutes 18, units   
1 . Cupping with   
mobilization then   
stationary holds to   
plantar fascia x 8' (N)  
STW to plantar fascia   
and posterior tib x 10'  
IASTM (X)  
DTW R prox plantar   
fascia 3'x (X)  
manual plantar fascial   
stretch 30?(X)  
.  
  
Contacts for Physician   
Signature  
First attempt date: .  
Referring Provider   
Signature: I am in   
agreement with the   
above plan of care.  
Referring Provider   
Signature______________  
_______________________  
__,   
Date/Time____________  
  
'Scores and Scales'  
  
Signatures  
Electronically signed   
by : Christine Sanchez,   
PT; Sep 14 2020 11:20AM   
EST (Author)        Normal                                   [facility]  
   
                                                    PT Progress Noteon   
0   
   
                                        PT Progress Note    Therapy Diagnosis  
Assessed  
Right foot pain (729.5)   
(M79.671)  
Plan  
Goals: Goals set and   
discussed today.  
1. Independent HEP to   
allow for 50% reduction   
in max ADL C/C sx   
(9/10) 2-3wks  
2. Survey score   
improvement from 46/80   
to 60/80 (LEFS) 3-4wks  
3. ROM increase to   
allow for improved ADL   
descending stairs (from   
3 DF to 7) 3-4wks  
  
Planned interventions   
include: cryotherapy,   
dry needling,   
education/instruction,   
gait training, home   
program, hot pack,   
kinesiotaping, manual   
therapy, self care/home   
management and   
therapeutic exercises.  
Frequency and duration:   
2 time(s) a week, for 4   
weeks, for 8 visits .   
patient plans to finish   
treatments at [facility].  
Potential to achieve   
rehab goals is good  
Continue with STW for   
continued release of   
plantar fascia   
adhesion.  
Progress with POC, as   
tolerated.  
  
Assessment  
Tenderness along center   
of the R heel along   
plantar surface during   
IASTM. Mildly antalgic   
gait observed this date   
after slant board   
stretch.  
Response to treatment:   
no change in pain.  
Patient was able to   
complete today's   
treatment with some   
difficulty.  
  
Adult Risk Screening  
Initial Fall Risk   
Screening:  
MARZENA has fallen in   
the last 6 months. She   
has fallen due to   
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   
in flower beds. Her   
fall did not result in   
injury. MARZENA does not   
have a fear of falling.   
She does not need   
assistance with   
sitting, standing or   
walking. Does not need   
assistance walking in   
her home. She does not   
need assistance in an   
unfamiliar setting.  
Pain Scale: On a scale   
of 0 to 10, the patient   
rates the pain at 3.  
Please identify   
location of pain: R   
heel.  
Pain Quality: aching,   
dull and tightness.  
The pain makes it hard   
for the patient to do   
these things: walking.  
  
Insurance  
Insurance reviewed  
Visit number: 8  
  
Evaluating therapist   
Richard Saavedra PT.  
M79.671; latex allergy  
  
Subjective  
Patient reports:.  
Patient reports that   
she has pain in her   
heel when walking.  
Patient identified by   
name and date of birth.  
  
Home program performing   
as directed: Yes.  
Precautions: Fall Risk:   
none  
Pertinent medical HX   
includes: Db; HBP   
(managed);  female    
CA-hysterectomy   
done-resolved Latex   
allergy; B bunion surg   
12 yrs ago.  
  
Treatment  
Time in clinic started   
at 10:15 am  
Time in clinic ended at   
11:00 am  
Total time in clinic is   
45 minutes.  
Total timed code time   
is 44 minutes.  
Therapeutic exercise   
(32542): timed minutes   
29 , units 2 . **LATEX   
Allergy**  
Bike 5? level 1  
Slant board 2 x 60?  
towel crunch 2x20  
Tband PF,DF,IR/ER 2x10   
purple non latex  
Great toe stretch with   
strap 10 x 10?  
ABC trace 1 cycle  
Chitimacha 2x10 CW/CCW ea  
SLS 3 x 20?  
Standing heel raises 2   
x 10  
Standing hip ABD x 10  
Step ups 6  2 x 10  
Baps Board (A)  
gastroc strap stretch   
10x10? hold X  
.  
Manual Therapy (63054):   
timed minutes 15 ,   
units 1 . IASTM 15'  
DTW R prox plantar   
fascia 3'x (X)  
manual plantar fascial   
stretch 30?(X)  
STW to plantar fascia,   
and calf. (X).  
  
'Scores and Scales'  
  
Signatures  
Electronically signed   
by : Constance Machado,   
PTA; Sep 11 2020   
11:21AM EST (Author)  
Electronically signed   
by : Christine Sanchez,   
PT; Oct 1 2020 5:50PM   
EST                 Normal                                   [facility]  
   
                                                    PT Progress Noteon   
0   
   
                                        PT Progress Note    Therapy Diagnosis  
Assessed  
Right foot pain (729.5)   
(M79.671)  
Plan  
Goals: Goals set and   
discussed today.  
1. Independent HEP to   
allow for 50% reduction   
in max ADL C/C sx   
(9/10) 2-3wks  
2. Survey score   
improvement from 46/80   
to 60/80 (LEFS) 3-4wks  
3. ROM increase to   
allow for improved ADL   
descending stairs (from   
3 DF to 7) 3-4wks  
  
Planned interventions   
include: cryotherapy,   
dry needling,   
education/instruction,   
gait training, home   
program, hot pack,   
kinesiotaping, manual   
therapy, self care/home   
management and   
therapeutic exercises.  
Frequency and duration:   
2 time(s) a week, for 4   
weeks, for 8 visits .   
patient plans to finish   
treatments at [facility].  
Potential to achieve   
rehab goals is good  
Continue with strength   
and ROM progression as   
able.  
Progress with POC, as   
tolerated.  
  
Assessment  
Tenderness along the   
medial calcaneal   
tubercle during STW.   
Mildly antalgic gait   
observed this date in   
the clinic. VC's to   
complete technique   
correctly with ankle 4   
way.  
Response to treatment:   
decreased pain.  
Patient was able to   
complete today's   
treatment with some   
difficulty.  
  
Adult Risk Screening  
Initial Fall Risk   
Screening:  
MARZENA has fallen in   
the last 6 months. She   
has fallen due to   
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   
in flower beds. Her   
fall did not result in   
injury. MARZENA does not   
have a fear of falling.   
She does not need   
assistance with   
sitting, standing or   
walking. Does not need   
assistance walking in   
her home. She does not   
need assistance in an   
unfamiliar setting.  
Pain Scale: On a scale   
of 0 to 10, the patient   
rates the pain at 4.  
Please identify   
location of pain: R   
heel.  
Pain Quality: aching,   
dull and tightness.  
The pain makes it hard   
for the patient to do   
these things: walking.  
  
Insurance  
Insurance reviewed  
Visit number: 7  
  
Evaluating therapist   
Richard Saavedra PT.  
M61.041; latex allergy  
  
Subjective  
Patient reports:.  
Patient report that she   
has been having   
decreased pain since   
last visit.  
Patient identified by   
name and date of birth.  
  
Home program performing   
as directed: Yes.  
Precautions: Fall Risk:   
none  
Pertinent medical HX   
includes: Db; HBP   
(managed);  female    
CA-hysterectomy   
done-resolved Latex   
allergy; B bunion surg   
12 yrs ago.  
  
Treatment  
Time in clinic started   
at 10:15 am  
Time in clinic ended at   
11:00 am  
Total time in clinic is   
45 minutes.  
Total timed code time   
is 41 minutes.  
Therapeutic exercise   
(41814): timed minutes   
26, units 2 . **LATEX   
Allergy**  
Bike 5? level 1  
Slant board 2 x 60?  
towel crunch 2x20  
Tband PF,DF,IR/ER 2x10   
purple non latex  
Great toe stretch with   
strap 10 x 10?  
ABC trace 1 cycle  
Chitimacha 2x10 CW/CCW ea  
SLS 3 x 20?  
Standing heel raises 2   
x 10 (N)  
Standing hip ABD x 10   
(N)  
Step ups 6  2 x 10  
Baps Board (A)  
gastroc strap stretch   
10x10? hold X  
.  
Manual Therapy (18848):   
timed minutes 15 ,   
units 1 . IASTM 15' (N)  
DTW R prox plantar   
fascia 3'x (X)  
manual plantar fascial   
stretch 30?(X)  
STW to plantar fascia,   
and calf. (X).  
  
'Scores and Scales'  
  
Signatures  
Electronically signed   
by : Constance Machado   
PTA; Sep 9 2020 12:51PM   
EST (Author)  
Electronically signed   
by : Christine Sanchez,   
PT; Oct 1 2020 5:50PM   
EST                 Normal                                  [facility]  
   
                                                    PT Progress Noteon   
0   
   
                                        PT Progress Note    Therapy Diagnosis  
Assessed  
Right foot pain (729.5)   
(M79.362)  
Plan  
Goals: Goals set and   
discussed today.  
1. Independent HEP to   
allow for 50% reduction   
in max ADL C/C sx   
(9/10) 2-3wks  
2. Survey score   
improvement from 46/80   
to 60/80 (LEFS) 3-4wks  
3. ROM increase to   
allow for improved ADL   
descending stairs (from   
3 DF to 7) 3-4wks  
  
Planned interventions   
include: cryotherapy,   
dry needling,   
education/instruction,   
gait training, home   
program, hot pack,   
kinesiotaping, manual   
therapy, self care/home   
management and   
therapeutic exercises.  
Frequency and duration:   
2 time(s) a week, for 4   
weeks, for 8 visits .   
patient plans to finish   
treatments at [facility].  
Potential to achieve   
rehab goals is good  
Continue with strength   
and ROM progression as   
able.  
Progress with POC, as   
tolerated.  
  
Assessment  
Tenderness along medial   
arch of the foot and   
tenderness along the   
1st met head. Mildly   
antalgic gait observed   
this date in the clinic   
Added step ups d/t pain   
with stair ambulation.  
Response to treatment:   
increased pain.  
Patient was able to   
complete today's   
treatment with some   
difficulty.  
  
Adult Risk Screening  
Initial Fall Risk   
Screening:  
MARZENA has fallen in   
the last 6 months. She   
has fallen due to   
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   
in flower beds. Her   
fall did not result in   
injury. MARZENA does not   
have a fear of falling.   
She does not need   
assistance with   
sitting, standing or   
walking. Does not need   
assistance walking in   
her home. She does not   
need assistance in an   
unfamiliar setting.  
Pain Scale: On a scale   
of 0 to 10, the patient   
rates the pain at 5.  
Please identify   
location of pain: R   
heel.  
Pain Quality: sharp.  
  
Insurance  
Insurance reviewed  
Visit number: 5  
  
Evaluating therapist   
Richard Saavedra PT.  
M79.671; latex allergy  
  
Subjective  
Patient reports:.  
Patient reports that   
she is having pain with   
walking but is better   
than the other day.   
Pain increase with   
descending stairs.  
Patient identified by   
name and date of birth.  
  
Home program performing   
as directed: Yes.  
Precautions: Fall Risk:   
none  
Pertinent medical HX   
includes: Db; HBP   
(managed);  female    
CA-hysterectomy   
done-resolved Latex   
allergy; B bunion surg   
12 yrs ago.  
  
Treatment  
Time in clinic started   
at 2:30 pm  
Time in clinic ended at   
3:08 am  
Total time in clinic is   
42 minutes.  
Total timed code time   
is 38 minutes.  
Therapeutic exercise   
(64606): timed minutes   
23, units 2 . **LATEX   
Allergy**  
Bike 5? level 1  
Slant board 2 x 60?  
towel crunch 2x20  
Tband PF,DF,IR/ER 2x10   
purple non latex  
Great toe stretch with   
strap 10 x 10?  
ABC trace 1 cycle  
Chitimacha 2x10 CW/CCW ea  
SLS 3 x 20?  
Step ups 6  2 x 10 (N)  
Baps Board (A)  
gastroc strap stretch   
10x10? hold X  
.  
Manual Therapy (61919):   
timed minutes 15 ,   
units 1 . DTW R prox   
plantar fascia 3'x  
manual plantar fascial   
stretch 30?  
STW to plantar fascia,   
and calf.  
  
'Scores and Scales'  
  
Signatures  
Electronically signed   
by : Constance Machado,   
PTA; Sep 2 2020 3:18PM   
EST (Author)  
Electronically signed   
by : Christine Sanchez,   
PT; Sep 2 2020 11:22PM   
EST                 Normal                                   [facility]  
   
                                                    PT Progress Noteon   
0   
   
                                        PT Progress Note    Therapy Diagnosis  
Assessed  
Right foot pain (729.5)   
(M79.671)  
Plan  
Goals: Goals set and   
discussed today.  
1. Independent HEP to   
allow for 50% reduction   
in max ADL C/C sx   
(9/10) 2-3wks  
2. Survey score   
improvement from 46/80   
to 60/80 (LEFS) 3-4wks  
3. ROM increase to   
allow for improved ADL   
descending stairs (from   
3 DF to 7) 3-4wks  
  
Planned interventions   
include: cryotherapy,   
dry needling,   
education/instruction,   
gait training, home   
program, hot pack,   
kinesiotaping, manual   
therapy, self care/home   
management and   
therapeutic exercises.  
Frequency and duration:   
2 time(s) a week, for 4   
weeks, for 8 visits .   
patient plans to finish   
treatments at [facility].  
Potential to achieve   
rehab goals is good  
Continue with strength   
and ROM progression as   
able.  
Progress with POC, as   
tolerated.  
  
Assessment  
Tenderness along medial   
arch of the foot this   
date. Unable to   
complete SLS w/out   
using 1 finger to   
assist with balance.   
Slow guarded gait   
observed this date in   
the clinic with   
decreased stance during   
gait.  
  
Adult Risk Screening  
Initial Fall Risk   
Screening:  
MARZENA has fallen in   
the last 6 months. She   
has fallen due to   
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   
in flower beds. Her   
fall did not result in   
injury. MARZENA does not   
have a fear of falling.   
She does not need   
assistance with   
sitting, standing or   
walking. Does not need   
assistance walking in   
her home. She does not   
need assistance in an   
unfamiliar setting.  
Pain Scale: On a scale   
of 0 to 10, the patient   
rates the pain at 7.  
Please identify   
location of pain: R   
heel.  
Pain Quality: sharp.  
  
Insurance  
Insurance reviewed  
Visit number: 5  
  
Evaluating therapist   
Richard Saavedra PT.  
M79.671; latex allergy  
  
Subjective  
Patient reports:.  
Patient reports that   
she has trouble with   
the standing on one leg   
exercise. States that   
she has been doing her   
HEP.  
Home program performing   
as directed: Yes.  
Precautions: Fall Risk:   
none  
Pertinent medical HX   
includes: Db; HBP   
(managed);  female    
CA-hysterectomy   
done-resolved Latex   
allergy; B bunion surg   
12 yrs ago.  
  
Treatment  
Time in clinic started   
at 10:15 am  
Time in clinic ended at   
11:00 am  
Total time in clinic is   
45 minutes.  
Total timed code time   
is 41 minutes.  
Therapeutic exercise   
(26218): timed minutes   
18, units 1 . **LATEX   
Allergy**  
Bike 5? level 1  
Slant board 2 x 60? (P)  
towel crunch 2x20  
Tband PF,DF,IR/ER 2x10   
purple non latex (P)  
Great toe stretch with   
strap 10 x 10?  
ABC trace 1 cycle  
Chitimacha 2x10 CW/CCW ea   
(P)  
SLS 3 x 20?  
Baps Board (A)  
gastroc strap stretch   
10x10? hold X  
.  
Manual Therapy (09515):   
timed minutes 23, units   
2 . DTW R prox plantar   
fascia 3'x  
manual plantar fascial   
stretch 30?  
STW to plantar fascia,   
and calf.  
  
'Scores and Scales'  
  
Signatures  
Electronically signed   
by : Constance Machado   
PTA; Aug 31 2020   
11:00AM EST (Author)  
Electronically signed   
by : Christine Sanchez,   
PT; Sep 2 2020 11:22PM   
EST                 Normal                                   [facility]  
   
                                                    PT Progress Noteon   
0   
   
                                        PT Progress Note    Therapy Diagnosis  
Assessed  
Right foot pain (729.5)   
(M79.672)  
Plan  
Goals: Goals set and   
discussed today.  
1. Independent HEP to   
allow for 50% reduction   
in max ADL C/C sx   
(9/10) 2-3wks  
2. Survey score   
improvement from 46/80   
to 60/80 (LEFS) 3-4wks  
3. ROM increase to   
allow for improved ADL   
descending stairs (from   
3 DF to 7) 3-4wks  
  
Planned interventions   
include: cryotherapy,   
dry needling,   
education/instruction,   
gait training, home   
program, hot pack,   
kinesiotaping, manual   
therapy, self care/home   
management and   
therapeutic exercises.  
Frequency and duration:   
2 time(s) a week, for 4   
weeks, for 8 visits .   
patient plans to finish   
treatments at [facility].  
Potential to achieve   
rehab goals is good  
Progress with STW, ROM   
and strengthening for   
increased ambulation   
tolerance.  
Progress with POC, as   
tolerated.  
  
Assessment  
Patient identified by   
name and date of birth.   
Patient was able to   
tolerate and   
demonstrate SLS with   
decreased LOB and UE   
support this date. She   
presented with palpable   
tension and adhesions   
in plantar of foot that   
responded well to STW.  
  
Adult Risk Screening  
Initial Fall Risk   
Screening:  
MARZENA has fallen in   
the last 6 months. She   
has fallen due to   
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   
in flower beds. Her   
fall did not result in   
injury. MARZENA does not   
have a fear of falling.   
She does not need   
assistance with   
sitting, standing or   
walking. Does not need   
assistance walking in   
her home. She does not   
need assistance in an   
unfamiliar setting.  
Pain Scale: On a scale   
of 0 to 10, the patient   
rates the pain at 7.  
Please identify   
location of pain: R   
heel.  
Pain Quality: sharp.  
  
Insurance  
Insurance reviewed  
Visit number: 4  
  
Evaluating therapist   
Richard Saavedra PT.  
M79.671; latex allergy  
  
Subjective  
Patient reports:.  
Patient reported 6/10   
pain after treatment   
and stated  it feels   
really good .  
Home program performing   
as directed: Yes.  
Precautions: Fall Risk:   
none  
Pertinent medical HX   
includes: Db; HBP   
(managed);  female    
CA-hysterectomy   
done-resolved Latex   
allergy; B bunion surg   
12 yrs ago.  
  
Treatment  
Time in clinic started   
at 10:00  
Time in clinic ended at   
10:40  
Total time in clinic is   
40 minutes.  
Total timed code time   
is 39 minutes.  
Therapeutic exercise   
(88923): timed minutes   
16, units 1 . Bike 5?   
level 1  
Slant board 2 x 60? (P)  
towel crunch 2x20  
Tband PF,DF,IR/ER 2x10   
purple non latex (P)  
Great toe stretch with   
strap 10 x 10?  
ABC trace 1 cycle  
[place] 2x10 CW/CCW ea   
(P)  
SLS 3 x 20?  
Baps Board (A)  
gastroc strap stretch   
10x10? hold X  
.  
Manual Therapy (96360):   
timed minutes 23, units   
2 . DTW R prox plantar   
fascia 3'x  
manual plantar fascial   
stretch 30?  
STW to plantar fascia,   
and calf.  
  
'Scores and Scales'  
  
Signatures  
Electronically signed   
by : Mervat Connor PTA; Aug 28 2020   
10:57AM EST (Author)  
Electronically signed   
by : Christine Sanchez,   
PT; Sep 2 2020 11:22PM   
EST                 Normal                                   [facility]  
   
                                                    PT Progress Noteon   
0   
   
                                        PT Progress Note    Therapy Diagnosis  
Assessed  
Right foot pain (729.5)   
(H97.342)  
Plan  
Goals: Goals set and   
discussed today.  
1. Independent HEP to   
allow for 50% reduction   
in max ADL C/C sx   
(9/10) 2-3wks  
2. Survey score   
improvement from 46/80   
to 60/80 (LEFS) 3-4wks  
3. ROM increase to   
allow for improved ADL   
descending stairs (from   
3 DF to 7) 3-4wks  
  
Planned interventions   
include: cryotherapy,   
dry needling,   
education/instruction,   
gait training, home   
program, hot pack,   
kinesiotaping, manual   
therapy, self care/home   
management and   
therapeutic exercises.  
Frequency and duration:   
2 time(s) a week, for 4   
weeks, for 8 visits .   
patient plans to finish   
treatments at [facility].  
Potential to achieve   
rehab goals is good  
Progress with STW, ROM   
and strengthening for   
increased ambulation   
tolerance.  
Progress with POC, as   
tolerated.  
  
Assessment  
Patient identified by   
name and date of birth.   
Patient tolerated   
progression of ROM and   
strengthening well with   
no increased Sx. She   
presented with palpable   
adhesions on R and   
muscle tightness in   
calf.  
  
Adult Risk Screening  
Initial Fall Risk   
Screening:  
MARZENA has fallen in   
the last 6 months. She   
has fallen due to   
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   
in flower beds. Her   
fall did not result in   
injury. MARZENA does not   
have a fear of falling.   
She does not need   
assistance with   
sitting, standing or   
walking. Does not need   
assistance walking in   
her home. She does not   
need assistance in an   
unfamiliar setting.  
Pain Scale: On a scale   
of 0 to 10, the patient   
rates the pain at 6.  
Please identify   
location of pain: R   
heel.  
Pain Quality: sharp.  
  
Insurance  
Insurance reviewed  
Visit number: 3  
  
Evaluating therapist   
Richard Saavedra PT.  
Q32.093; latex allergy  
  
Subjective  
Patient reports:.  
Patient reported Sx   
with ambulation. She   
reported 6/10 pain   
after treatment with   
ambulation.  
Home program performing   
as directed: Yes.  
Precautions: Fall Risk:   
none  
Pertinent medical HX   
includes: Db; HBP   
(managed);  female    
CA-hysterectomy   
done-resolved Latex   
allergy; B bunion surg   
12 yrs ago.  
  
Treatment  
Time in clinic started   
at 09:15  
Time in clinic ended at   
09:55  
Total time in clinic is   
40 minutes.  
Total timed code time   
is 38 minutes.  
Therapeutic exercise   
(11042): timed minutes   
15, units 1 . Bike 5?   
level 1  
Slant board 2 x 30? (N)  
towel crunch 2x20  
Tband PF,DF,IR/ER 2x10   
purple non latex (P)  
Great toe stretch with   
strap 10 x 10?  
gastroc strap stretch   
10x10? hold X  
ABC trace 1 cycle  
[place] x10 CW/CCW ea(N)  
SLS 2 x 20?  
.  
Manual Therapy (94495):   
timed minutes 23, units   
2 . DTW R prox plantar   
fascia 3'x  
manual plantar fascial   
stretch 30?  
STW to plantar fascia,   
and calf.  
  
'Scores and Scales'  
  
Signatures  
Electronically signed   
by : Mervat Connor,   
PTA; Aug 26 2020   
10:12AM EST (Author)  
Electronically signed   
by : Christine Sanchez,   
PT; Sep 2 2020 11:22PM   
EST                 Normal                                   [facility]  
   
                                                    PT Progress Noteon   
0   
   
                                        PT Progress Note    Therapy Diagnosis  
Assessed  
Right foot pain (729.5)   
(M79.671)  
Plan  
Goals: Goals set and   
discussed today.  
1. Independent HEP to   
allow for 50% reduction   
in max ADL C/C sx   
(9/10) 2-3wks  
2. Survey score   
improvement from 46/80   
to 60/80 (LEFS) 3-4wks  
3. ROM increase to   
allow for improved ADL   
descending stairs (from   
3 DF to 7) 3-4wks  
  
Planned interventions   
include: cryotherapy,   
dry needling,   
education/instruction,   
gait training, home   
program, hot pack,   
kinesiotaping, manual   
therapy, self care/home   
management and   
therapeutic exercises.  
Frequency and duration:   
2 time(s) a week, for 4   
weeks, for 8 visits .   
patient plans to finish   
treatments at [facility]   
PT.  
Potential to achieve   
rehab goals is good  
Progress with STW, ROM   
and strengthening for   
increased ambulation   
tolerance.  
Progress with POC, as   
tolerated.  
  
Assessment  
Patient identified by   
name and date of birth.   
Patient presented with   
palpable adhesion in   
plantar of foot,   
Reviewed HEP and   
instructed to add   
frozen water bottle on   
plantar.  
  
Adult Risk Screening  
Initial Fall Risk   
Screening:  
MARZENA has fallen in   
the last 6 months. She   
has fallen due to   
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   
in flower beds. Her   
fall did not result in   
injury. MARZENA does not   
have a fear of falling.   
She does not need   
assistance with   
sitting, standing or   
walking. Does not need   
assistance walking in   
her home. She does not   
need assistance in an   
unfamiliar setting.  
Pain Scale: On a scale   
of 0 to 10, the patient   
rates the pain at 8.  
Please identify   
location of pain: R   
heel.  
Pain Quality: sharp.  
  
Insurance  
Insurance reviewed  
Visit number: 2  
  
Evaluating therapist   
Richard Saavedra PT.  
M79.671; latex allergy  
  
Subjective  
Patient reports:.  
Patient reported 8/10   
pain/soreness after   
treatment.  
Home program performing   
as directed: Yes.  
Precautions: Fall Risk:   
none  
Pertinent medical HX   
includes: Db; HBP   
(managed);  female    
CA-hysterectomy   
done-resolved Latex   
allergy; B bunion surg   
12 yrs ago.  
  
Treatment  
Time in clinic started   
at 10:00  
Time in clinic ended at   
10:42  
Total time in clinic is   
42 minutes.  
Total timed code time   
is 39 minutes.  
Therapeutic exercise   
(63780): timed minutes   
15, units 1 . Bike 5?   
level 1 (N)  
towel crunch x20  
Tband PF,DF,IR/ER 2x10   
purple non latex (P)  
gastroc strap stretch   
10x10? hold  
ABC trace 1 cycle (N)  
.  
Manual Therapy (98351):   
timed minutes 24, units   
2 . DTW R prox plantar   
fascia 3'x  
manual plantar fascial   
stretch 30?  
STW to plantar fascia,   
and calf.  
  
'Scores and Scales'  
  
Signatures  
Electronically signed   
by : Mervat Connor   
PTA; Aug 21 2020   
11:39AM EST (Author)  
Electronically signed   
by : Christine Sanchez   
PT; Sep 2 2020 11:22PM   
EST                 Normal                                   [facility]  
   
                                                    PT Initial Evaluationon    
   
                                        PT Initial Evaluation Therapy Diagnosis  
Assessed  
Right foot pain (729.5)   
(M79.341)  
Plan of Care  
Goals: Goals set and   
discussed today.  
1. Independent HEP to   
allow for 50% reduction   
in max ADL C/C sx   
(9/10) 2-3wks  
2. Survey score   
improvement from 46/80   
to 60/80 (LEFS) 3-4wks  
3. ROM increase to   
allow for improved ADL   
descending stairs (from   
3 DF to 7) 3-4wks  
  
Planned interventions   
include: cryotherapy,   
dry needling,   
education/instruction,   
gait training, home   
program, hot pack,   
kinesiotaping, manual   
therapy, self care/home   
management and   
therapeutic exercises.  
Frequency and duration:   
2 time(s) a week, for 4   
weeks, for 8 visits .   
patient plans to finish   
treatments at [facility].  
Potential to achieve   
rehab goals is good  
Plan of care was   
developed with input   
and agreement by the   
patient.  
  
Assessment  
Recent onset of plantar   
heel pain 5mths ago. AM   
>> PM sx. No films   
taken. Proper shoe wear   
has been reviewed with   
the patient and she is   
complying and states   
that it helps. ++   
tender at the distal   
plantar heel R. Will   
continue with local   
STW, flexibility and   
ankle/foot PRE as keaton.  
Clinical Presentation:   
Stable and/or   
uncomplicated   
characteristics.  
Level of Complexity:   
low  
Problem List: activity   
limitations,   
ADLs/IADLs/self care   
skills, decreased   
functional level,   
decreased knowledge of   
HEP, gait/locomotion,   
pain, range of   
motion/joint mobility,   
strength and transfers.  
  
Reason For Visit  
Initial Evaluation .   
plantar fasciitis R.  
Referred by: Narayan  
  
Adult Risk Screening  
There are no   
spiritual/cultural   
practices/values/needs   
that are important to   
know  
Initial Fall Risk   
Screening:  
MARZENA has not fallen   
in the last 6 months.   
Her fall did not result   
in injury. MARZENA does   
not have a fear of   
falling. She does not   
need assistance with   
sitting, standing or   
walking. Does not need   
assistance walking in   
her home. She does not   
need assistance in an   
unfamiliar setting. The   
patient is not using an   
assistive device.  
Pain Scale: On a scale   
of 0 to 10, the patient   
rates the pain at 9.  
Please identify   
location of pain:   
plantar heel.  
  
Insurance  
Insurance reviewed  
Visit number: 1  
  
Evaluating therapist   
Richard Saavedra PT.  
M79.671; latex allergy  
  
Subjective  
Current Episode of   
Functional Impairment   
and/or Pain  
Date of onset: 3/12/20  
Mechanism of Injury:.   
insidious.  
Medical Screening:  
Reviewed medical   
history form with   
patient and medical   
screening assessed.  
Current Medical   
Management:. no films   
taken (the Dr gave   
exercises).  
Precautions: Fall Risk:   
none  
Pertinent medical HX   
includes: Db; HBP   
(managed);  female    
CA-hysterectomy   
done-resolved Latex   
allergy; B bunion surg   
12 yrs ago.  
Functional Assessment  
Prior level of   
function:  
PAINFUL, DIFFICULT, OR   
ALTERED ADL (marked   
with an  xx )  
sleep--  
sitting--  
sit to standing   
transfers--  
car transfers--XX  
standing--XX  
walking--XX  
carrying--  
stairs--XX  
dressing lowers--  
driving--  
dressing uppers--  
reaching----  
handling objects--  
other--  
.  
Patient stated goal(s)   
for treatment include:   
relieving pain ,   
increasing mobility ,   
walking with a normal   
gait , reducing   
symptoms ,   
reducing/preventing   
future occurrences and   
learning preventative   
care measures .  
Work Status: retired.  
Current Status:   
unchanged.  
Patient Awareness:   
Patient is aware of her   
diagnosis and   
prognosis.  
Personal Factors That   
May Impact Care:. ID   
confirmed with B-day;   
speaks english  
No obtrusive barriers   
to learning   
identified/observed.  
  
Objective  
Ortho  
tender at the distal   
plantar heel R.  
ROM / Joint Mobility  
(Range of Motion in   
degrees)  
Foot and Ankle:  
(Key =   P!   Denotes   
Pain with Movement)  
Dorsiflexion: R Active   
3, L Active 3.  
Gait / Mobility  
Gait: reduced R   
push-off.  
Strength  
Knee:  
(Key:   P!   Denotes   
Pain with Movement)  
Knee extension was 5/5   
on right, 5/5 on left.  
Knee flexion was 5/5 on   
right, 5/5 on left.  
Foot and Ankle:  
(Key:   P!   Denotes   
Pain with Movement)  
Foot dorsiflexion was   
5/5 on right, 5/5 on   
left.  
Foot plantar flexion   
was 5/5 on right, 5/5   
on left.  
Ankle eversion was 5/5   
on right, 5/5 on left.  
Ankle inversion was 5/5   
on right, 5/5 on left.  
Additional Findings:  
B toes 1-5 gr5 flex/ext   
with no sx.  
  
Treatment  
Time in clinic started   
at 4:22 pm  
Time in clinic ended at   
5:10 pm  
Total time in clinic is   
48 minutes.  
Total timed code time   
is 12 minutes.  
Treatment Performed   
Today:. DTW R prox   
plantar fascia 3'  
towel crunch x20  
Tband PF 1x10 purple   
non latex  
gastroc strap stretch   
5x10 counts  
manual plantar fascial   
stretch 30 .  
Evaluation Code: 65380   
PT Eval: Low   
Complexity, 35 min(s).  
Timed: 80192   
Therapeutic Exercises,   
12 min(s), 1 unit(s).  
Resources provided   
today:  
HEP handout; Tband.  
  
Contacts for Physician   
Signature  
First attempt date:   
20.  
  
Referring Provider   
Signature: I am in   
agreement with the   
above plan of care.  
Referring Provider   
Signature______________  
_______________________  
__,   
Date/Time____________  
  
Signatures  
Electronically signed   
by : Mark Saavedra,   
PT; Aug 12 2020 5:34PM   
EST (Author)        Normal                                  [facility]  
   
                                                    CNPNon 2019   
   
                                        [place]                Telephone (ASAD GORDON)  
-----------------------  
-----------  
MARZENA MEDEROS   
([id number])   
1945 F  
Date Time Provider   
Department  
19 KULWINDER GARCIA  
During your visit   
today, we recorded the   
following information   
about you:  
Ailyn Chritsian   
2019 11:06 AM   
Signed  
Sharon called the   
office to return my   
call and she stated   
that she did not need  
to come back to see Dr. Garcia because she was   
doing very well. She   
stated she  
had cancelled the   
previous appointment   
earlier this year   
because she is fine.  
She will return if any   
problems come up. She   
wanted to let us know   
Dr. Garcia  
is great and she   
appreciates everything!  
Ailyn Christian  
2019 11:06:36  
DAMON Calle.CNP, CNP   
2019 1:21 PM   
Signed  
Message left for   
patient to discuss   
follow up.  
DAMON Calle.CNP  
Allergies As of Date:   
2019 Noted   
Allergy Reaction  
ADHESIVE 10/04/2010 2 -   
Rash  
Comments: Redness  
AMBIEN (ZOLPIDEM   
TARTRATE) 2011 2   
- Rash  
CODEINE 2006 1 -   
Mental Status Change  
NEOSPORIN   
(NEOMYCIN-BACITRACIN-PO  
*2005 2 - Rash  
PERCOCET   
(OXYCODONE-ACETAMINOPHE  
N)2005  
Comments: Causes   
Vomitting  
Date Reviewed:   
10/31/2019  
Reviewed by: Yenny Charles - Fully   
Assessed  
Reason for Visit:  
returned call [Other]  
Prescriptions as of   
2019 Sig:  
METOPROLOL SUCCINATE ER   
100 M* Take 100 mg by   
mouth once nisreen*  
LOSARTAN 100 MG TABLET   
Take 100 mg by mouth   
once nisreen*  
OMEPRAZOLE 40 MG   
CAPSULE,COOPER* Take 40   
mg by mouth once bettie*  
CITALOPRAM 20 MG TABLET   
Take 20 mg by mouth   
once bettie*  
CHLORTHALIDONE 25 MG   
TABLET Take 1/2 tablet   
by mouth once*  
SERTRALINE 50 MG TABLET   
Take 50 mg by mouth   
once bettie*  
LISINOPRIL 20 MG TABLET   
Take 20 mg by mouth   
once bettie*  
MELOXICAM 15 MG TABLET  
LOSARTAN 50 MG TABLET   
Take 50 mg by mouth   
once bettie*  
HYDROCODONE 5   
MG-ACETAMINOPHE*  
DIPHENHYDRAMINE 25 MG   
TABLET Take 25 mg by   
mouth once bettie*  
ONETOUCH ULTRA TEST   
STRIPS  
ONETOUCH DELICA LANCETS   
33 GA*  
GLIPIZIDE ER 2.5 MG   
TABLET, E* Take 2.5 mg   
by mouth once nisreen*  
METFORMIN  MG   
TABLET,EX* Take two   
tablets by mouth onc*  
* ZOCOR 20 MG TABLET   
Take one(1) tablet   
daily.  
* TOPROL XL 50 MG   
TABLET,EXTEND* Take   
one(1) tablet daily.  
Problem List As Of Date   
2019 Noted   
Resolved  
ATROPHIC VAGINITIS   
[N95.2] 2006  
APPOINTMENT CANCELLED   
[XCCC] 2006  
TRANSIENT CEREBRAL   
ISCHEMIA NOS [G45.9]   
01/15/2008  
Carcinoma in situ of   
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   
10/15/2009  
More...  
Malignant neoplasm of   
female breast (HCC)   
[C50.*2010  
More...  
Personal history of   
malignant neoplasm of   
breas*2012  
Senile nuclear   
sclerosis [H25.10]   
2015  
Hypermetropia [H52.00]   
2015  
Regular astigmatism   
[H52.229] 2015  
Presbyopia [H52.4]   
2015  
Invasive ductal   
carcinoma of breast,   
female (HC*2017  
Ocular migraine   
[G43.109] 2017  
Controlled type 2   
diabetes mellitus   
without com*2017  
Postmenopausal bleeding   
[N95.0] 2017  
S/P hysterectomy with   
oophorectomy [Z90.710,   
Z9*2017  
Encounter Number:   
[id number]  
Encounter Status:Closed   
by AILYN CHRISTIAN on   
19            [facility]  
   
                                                    Glucose Meteron 2017   
   
                      Glucose mass conc 155 mg/dL  High       70-99      [facility]  
   
                                        Comment on above:   Result Comment: RN N  
OTIFIED   
   
                                                            Performed By: #### P  
T ####[address] [address]   
   
                      Glucose mass conc 161 mg/dL  High       70-99      [facility]  
   
                                        Comment on above:   Result Comment: RN N  
OTIFIED   
   
                                                            Performed By: #### P  
T ####[address] [address]   
   
                      Glucose mass conc 148 mg/dL  High       70-99      [facility]  
   
                                        Comment on above:   Result Comment: RN N  
OTIFIED   
   
                                                            Performed By: #### P  
T ####[address] [address]   
   
                                                    Basic Panelon 2017   
   
                      Creatinine mass conc 0.55 mg/dL Normal     0.51-0.95  [facility]  
   
                                        Comment on above:   Performed By: #### P  
8 ####[address] [address]   
   
                                                    Anion gap 3 molar   
conc            7 mmol/L        Low             8-16            [facility]  
   
                                        Comment on above:   Performed By: #### P  
8 ####[address] [address]   
   
                      CO2 molar conc 30 mmol/L  Normal     21-32      [facility]  
   
                                        Comment on above:   Performed By: #### P  
8 ####[address] [address]   
   
                      Glucose mass conc 146 mg/dL  High       70-99      [facility]  
   
                                        Comment on above:   Performed By: #### P  
8 ####[address] [address]   
   
                                                    Urea nitrogen mass   
conc            7 mg/dL         Normal          7-18            [facility]  
   
                                        Comment on above:   Performed By: #### P  
8 ####[address] [address]   
   
                      Calcium mass conc 7.6 mg/dL  Low        8.5-10.1   [facility]  
   
                                        Comment on above:   Performed By: #### P  
8 ####[address] [address]   
   
                      Chloride molar conc 105 mmol/L Normal          [facility]  
   
                                        Comment on above:   Performed By: #### P  
8 ####[facility] [place], [place] [address]   
   
                      Potassium molar conc 3.1 mmol/L Low        3.5-5.1    [facility]  
   
                                        Comment on above:   Performed By: #### P  
8 ####[facility] [place], [place] [address]   
   
                      Sodium molar conc 139 mmol/L Normal     136-145    [facility]  
   
                                        Comment on above:   Performed By: #### P  
8 ####[address] [address]   
   
                                                    Glucose Meteron 2017   
   
                      Glucose mass conc 162 mg/dL  High       70-99      [facility]  
   
                                        Comment on above:   Result Comment: RN N  
OTIFIED   
   
                                                            Performed By: #### P  
T ####[address] [address]   
   
                      Glucose mass conc 202 mg/dL  High       70-99      [facility]  
   
                                        Comment on above:   Performed By: #### P  
T ####[address] [address]   
   
                      Glucose mass conc 156 mg/dL  High       70-99      [facility]  
   
                                        Comment on above:   Performed By: #### P  
T ####[address] [address]   
   
                      Glucose mass conc 168 mg/dL  High       70-99      [facility]  
   
                                        Comment on above:   Result Comment: RN N  
OTIFIED   
   
                                                            Performed By: #### G  
LMET ####[address] [address]   
   
                                                    Hemogramon 2017   
   
                                                    Erythrocyte   
distribution width   
Auto Ratio (RBC) 12.7 %          Normal          11.7-14.4       [facility]  
   
                                        Comment on above:   Performed By: #### C  
BC1 ####[address] [address]   
   
                                                    Hematocrit Auto   
Volume Fraction (Bld) 32.8 %          Low             34.1-44.9       [facility]  
   
                                        Comment on above:   Performed By: #### C  
BC1 ####[address] [address]   
   
                                                    Hemoglobin mass conc   
(Bld)           11.0 g/dL       Low             11.2-15.7       [facility]  
   
                                        Comment on above:   Performed By: #### C  
BC1 ####[place] [address]   
   
                                                    MCH Auto Entitic mass   
(RBC)           31.6 pg         Normal          25.6-32.2       [facility]  
   
                                        Comment on above:   Performed By: #### C  
BC1 ####[place] [address]   
   
                                                    MCHC Auto mass conc   
(RBC)           33.5 %          Normal          31.6-34.8       [facility]  
   
                                        Comment on above:   Performed By: #### C  
BC1 ####[place] [address]   
   
                                                    MCV Auto Entitic   
volume (RBC)    94.3 fL         Normal          79.4-94.8       [facility]  
   
                                        Comment on above:   Performed By: #### C  
BC1 ####[place] [address]   
   
                                                    Platelet mean volume   
Auto Entitic volume   
(Bld)           10.0 fL         Normal          9.4-12.3        [facility]  
   
                                        Comment on above:   Performed By: #### C  
BC1 ####[place] [address]   
   
                                                    Platelets Auto #/vol   
(Bld)           200 thou/cmm    Normal          182-369         [facility]  
   
                                        Comment on above:   Performed By: #### C  
BC1 ####[place] [address]   
   
                      RBC Auto #/vol (Bld) 3.48 mil/cmm Low        3.93-5.22  [facility]  
   
                                        Comment on above:   Performed By: #### C  
BC1 ####[place] [address]   
   
                      RDW SD     43.5 fl    Normal     36.4-46.3  [facility]  
   
                                        Comment on above:   Performed By: #### C  
BC1 ####[place] [address]   
   
                      WBC Auto #/vol (Bld) 11.90 thou/cmm High       3.98-10.04   
[facility]  
   
                                        Comment on above:   Performed By: #### C  
BC1 ####[place] [address]   
   
                                                    MDRD GFRon 2017   
   
                                                    GFR/1.73 sq M   
predicted among   
non-blacks MDRD vol   
rate/area (S/P/Bld) mL/min/{1.73_m2}    Normal              >60mL/min/1.  
73m2                                    [facility]  
   
                                        Comment on above:   Result Comment: If t  
he patient is , multiply   
the   
result by 1.210.   
   
                                                            Performed By: #### P  
T ####[place] [address]   
   
                                                    Magnesium Bloodon 2017  
   
   
                      Magnesium mass conc 1.8 mg/dL  Normal     1.6-2.6    [facility]  
   
                                        Comment on above:   Performed By: #### M  
AG ####[place] [address]   
   
                                                    Phosphorus Bloodon   
7   
   
                      Phosphate mass conc 3.4 mg/dL  Normal     2.5-4.9    [facility]  
   
                                        Comment on above:   Performed By: #### P  
T ####[place] [address]   
   
                                                    Cytology, Medicalon 20  
17   
   
                                        Cytology, Medical   Test performed at [place] [address]NAME: MARZENA MEDEROS ACCESSION:   
[id number]:   
[id number] REQUESTING:   
KULWINDER GARCIA,   
MDDIAGNOSISPELVIC   
WASHING - NEGATIVE FOR   
MALIGNANT   
CELLSNARRATIVERAECTIVE   
MESOTHELIAL CELLS,   
HISTIOCYTES SPECIMEN:   
A) PEL, PELVIC WASHING   
Description: Materials   
Prepared & Examined:   
Volume: ......... 55 #   
of Monolayers:   
.......... 1   
Consistency: ......   
Cloudy # of Smear   
slides: ......... 1   
Other: .............   
light # of Slides:   
................. pink   
2Electronically Signed:   
2018Screened by:   
HÉCTOR EMERY(ASCP)Signed Out by:   
RAUDEL RICKS M.D.,   
PATHOLOGISTPrinted on:   
2018 Page 1   
of 1                Normal                                  [facility]  
   
                                        Comment on above:   Performed By: #### P  
T ####[facility] [place], [place] [address]   
   
                                                    Glucose Meteron 2017   
   
                      Glucose mass conc 154 mg/dL  High       70-99      [facility]  
   
                                        Comment on above:   Performed By: #### G  
LMET ####[facility]   
[place], [place] [address]   
   
                                                    Surgical Tissue Examon    
   
                                        Surgical Tissue Exam Test performed at A  
[place] [address]NAME: MARZENA MEDEROS ACCESSION:   
S-[id number]: [id number]   
REQUESTING: KVNG DAVIS TO:   
TUMOR REGISTRY; DATA   
MANAGERFINAL   
DIAGNOSIS:A) LEFT   
PELVIC SENTINEL LYMPH   
NODE #1 - NEGATIVE FOR   
MALIGNANCY.B) LEFT   
PELVIC SENTINEL LYMPH   
NODE #2 - NEGATIVE FOR   
MALIGNANCY.C) RIGHT   
PELVIC SENTINEL LYMPH   
NODE #1 - NEGATIVE FOR   
MALIGNANCY.D) RIGHT   
PELVIC SENTINEL LYMPH   
NODE #2 - NEGATIVE FOR   
MALIGNANCY.E)   
HYSTERECTOMY WITH   
BILATERAL   
SALPINGO-OOPHORECTOMY -   
FOCUS OFENDOMETRIAL   
ADENOCARCINOMA,   
ENDOMETRIOID TYPE WITH   
SQUAMOUSDIFFERENTIATION  
( FIGO GRADE 1) WITH   
EXTENSIVE ASSOCIATED   
OBSCURINGCRUSH ARTIFACT   
AND DISRUPTION.TUMOR   
INVOLVES THE ANTERIOR   
AND POSTERIOR LOWER   
UTERINE SEGMENT AND   
THEUPPER ENDOCERVICAL   
CANAL. (SEE   
COMMENT).3.4 CM   
ENDOMETRIAL   
POLYP.MULTIPLE   
INTRAMURAL, SUBMUCOSAL   
AND SUBSEROSAL   
LEIOMYOMAS.RIGHT AND   
LEFT OVARIES- ATROPHIC   
CHANGES. NEGATIVE FOR   
MALIGNANCY.RIGHT AND   
LEFT FALLOPIAN TUBES -   
SEROSAL WALTHARD REST   
CYST. NEGATIVEFOR   
MALIGNANCY.COMMENT:   
Adenocarcinoma is not   
identified in   
association with the   
3.4cm endometrial   
polyp. Endometrioid   
adenocarcinoma   
identified only   
onslides E12 and E13   
representing the   
anterior and posterior   
uterinesegment and   
upper endocervical   
canal. These foci of   
adenocarcinoma   
areassociated with   
extensive obscuring   
crush artifact and   
disruption.Adenocarcino  
ma is identified in the   
upper endocervical   
canal. There ispossible   
invasion of the stroma;   
however, the degree of   
artifactualdistortion   
precludes accurate   
evaluation. No intact   
endometrialadenocarcino  
ma is actually   
identified in   
association with   
underlyingmyometrium.   
However, there are   
detached disrupted   
fragments   
ofadenocarcinoma which   
may have been dislodged   
from some other site   
inthe endometrial   
cavity. Slides E12 and   
E13 were also reviewed   
by Dr.Eric Corbin.   
[facility]   
obtained for review.   
The biopsywas   
interpreted as a FIGO   
grade 1 endometrioid   
adenocarcinoma   
andmismatch repair   
proteins were performed   
on this outside case.   
Mismatchrepair proteins   
(MLH1, PMS2, MSH2, and   
MSH6) were found to be   
expressedin the   
carcinoma   
nuclei.Endometrial   
Cancer Case   
SummarySpecimen:   
Uterine corpus, cervix,   
right and left ovaries   
and right and left   
fallopian   
tubes.Procedure: Total   
hysterectomy.Lymph node   
sampling: Right and   
left sentinel lymph   
node   
biopsiesperformed.Speci  
men integrity: Intact   
hysterectomy   
specimen.Tumor size:   
Cannot be accurately   
determined (see   
comment).Histologic   
type: Endometrioid   
adenocarcinoma with   
squamous   
differentiation.Histolo  
gic grade: FIGO grade   
1.Myometrial invasion:   
Cannot be accurately   
determined (see   
comment).Involvement of   
cervix: Cannot be   
accurately determined   
(see comment).Other   
organ involvement: Not   
identified.Lymph-vascul  
ar invasion: Not   
identified.Pelvic lymph   
nodes: Number examined:   
4. Number involved:   
0.Pathologic stage:   
pT1a or pT2(see   
comment)   
pN0(sn).OPERATIVE   
PROCEDURE:Robotic   
laparoscopic total   
hysterectomy w/ BSO   
uterus = <250G,   
roboticlaparoscopy   
surgical w/   
retroperitoneal lymph   
node sampling single   
ormultipleCLINICAL   
INFORMATION:Endometrium   
cancer (HCC)   
[C54.1]GROSS   
DESCRIPTION:A) Left   
sentinel pelvic lymph   
node #1Received in   
formalin labeled left   
sentinel pelvic lymph   
node #1 is atan-yellow   
soft potential lymph   
node measuring 2.5 x   
0.5 x 0.4 cm. Itis   
serially sectioned and   
entirely submitted in   
cassette A.B) Left   
sentinel pelvic lymph   
node #2Received in   
formalin labeled left   
pelvic sentinel lymph   
node #2 is atan soft   
potential lymph node   
measuring 2 x 0.4 x 0.2   
cm. It isserially   
sectioned and entirely   
submitted in cassette   
B.C) Right sentinel   
pelvic lymph node   
#1Received in formalin   
labeled right sentinel   
pelvic lymph node #1 is   
asegment of adipose   
tissue measuring 2.5 x   
2 x 0.3 cm. It is   
dissectedto reveal a   
pink soft potential   
lymph node measuring   
0.7 x 0.4 x 0.2cm. The   
lymph node is submitted   
whole with a   
surrounding soft   
tissuewithin cassette   
C. The specimen is   
totally submitted in   
cassette C.D) Right   
sentinel pelvic lymph   
node #2Received in   
formalin labeled right   
sentinel pelvic lymph   
node #2 is atan-pink,   
soft potential lymph   
node measuring 0.6 x   
0.4 x 0.3 cm. Itis   
bisected and totally   
submitted in cassette   
D.E) Uterus, bilateral   
fallopian tubes and   
ovaries and   
cervixReceived in   
formalin labeled   
uterus, bilateral   
tubes, ovaries   
andcervix is a uterus   
with attached cervix   
and attached   
bilateralfimbriated   
fallopian tubes, and   
ovaries. The uterus and   
cervix [id number] gm and   
measure 10 cm from   
fundus to cervix, 10 cm   
[id number] and   
5.5 cm from anterior to   
posterior. The serosa   
istan, smooth and   
glistening with   
multiple areas of   
bulging consistentwith   
subserosal nodules. The   
cervix is gray-tan,   
smooth andglistening.   
Opening reveals an   
endocervical canal   
measuring 3.5 cm   
inlength x 0.5 cm in   
diameter. The   
endometrial cavity is   
distorted bywhite,   
whorled nodules and   
measures 4 cm in length   
x 2.5 cm indiameter.   
The endometrium   
displays focally   
thickened, red   
andhemorrhagic   
endometrium and a   
tan-pink soft polypoid   
mass on theposterior   
aspect which measures   
3.4 x 3 x 0.4 cm. This   
is locatedwithin the   
endometrial cavity and   
its attachment is 3 cm   
from the loweruterine   
segment and 2 cm from   
the nearest right   
isthmus   
region.Sectioning of   
the polypoid mass   
reveals tan-pink, soft,   
glistening cutsurfaces;   
the mass extends 0.6 cm   
deep. The wall   
thickness within   
thisregion is up to 1.9   
cm in thickness, and   
the wall distal to this   
regionmeasures up to   
2.5 cm in thickness   
(myometrium). The   
remainingendometrium is   
up to 2.0 cm in   
thickness throughout   
the remainder ofthe   
specimen. Also   
identified on cross   
sectioning are   
multiple, white,whorled   
nodules which are   
intramural, submucosal   
and subserosal innature   
which range in size   
from 1 to 6 cm in   
greatest   
dimension.Sectioning of   
the nodules do not   
reveal any discrete   
areas ofhemorrhage,   
necrosis or   
calcification. The   
right ovary weighs 2 gm   
andmeasures 3 x 0.7 x   
0.5 cm. The outer   
surface is tan-white,   
smooth andglistening.   
It is sectioned to   
reveal a tan-white firm   
stroma.   
Thecorresponding   
fimbriated fallopian   
tube measures 3 cm in   
length x up to0.4 cm in   
greatest diameter. It   
is sectioned to reveal   
a stellatelumen. The   
left ovary weighs 2 gm   
and measures 3 x 1.5 x   
0.7 cm. Theouter   
surface is tan, smooth   
and glistening. The   
left ovary isserially   
sectioned to reveal cut   
surfaces similar to   
that of the rightovary.   
A corresponding   
fimbriated fallopian   
tube is 4 cm in length   
x0.3 cm in diameter. It   
has a stellate lumen.   
Representative   
sectionsare submitted   
as follows: E1 -   
anterior cervix; E2 -   
posterior cervix;E3 -   
polypoid mass at   
deepest extent; E4-E5 -   
additional sections   
ofpolypoid mass; E6-E7   
- anterior   
corresponding uterine   
wall; E8-E10   
-additional sections of   
polypoid mass; E11 -   
additional endometrium   
fromposterior aspect;   
E12 - anterior lower   
uterine segment; E13 -   
posteriorlower uterine   
segment; E14   
-representative   
myometrial nodules; E15   
-right ovary; E16 -   
right fallopian tube   
and entire fimbria; E17   
- leftovary; E18 - left   
fallopian tube and   
entire fimbria. BMP:elizabeth BRISENO M.D.(Electronic   
signature on   
file)Signed out:   
2018   
14:25PRINTED:   
2018 Page 1 of 1 Normal                                  [facility]  
   
                                        Comment on above:   Performed By: #### P  
T ####[place] [address]   
   
                                                    Activated PTTon 2017   
   
                      aPTT Coag time (Bld) 22.8 s     Normal     22.0-34.0  [facility]  
   
                                        Comment on above:   Performed By: #### A  
PTT ####[place] [address]   
   
                                                    Glucose Bloodon 2017   
   
                      Glucose mass conc 92 mg/dL   Normal     70-99      [facility]  
   
                                        Comment on above:   Performed By: #### G  
LEIDA ####[place] [address]   
   
                                                    Hemogramon 2017   
   
                                                    Erythrocyte   
distribution width   
Auto Ratio (RBC) 12.4 %          Normal          11.7-14.4       [facility]  
   
                                        Comment on above:   Performed By: #### C  
BC1 ####[place] [address]   
   
                                                    Hematocrit Auto   
Volume Fraction (Bld) 43.9 %          Normal          34.1-44.9       [facility]  
   
                                        Comment on above:   Performed By: #### C  
BC1 ####[place] [address]   
   
                                                    Hemoglobin mass conc   
(Bld)           15.1 g/dL       Normal          11.2-15.7       [facility]  
   
                                        Comment on above:   Performed By: #### C  
BC1 ####[place] [address]   
   
                                                    MCH Auto Entitic mass   
(RBC)           31.7 pg         Normal          25.6-32.2       [facility]  
   
                                        Comment on above:   Performed By: #### C  
BC1 ####[place] [address]   
   
                                                    MCHC Auto mass conc   
(RBC)           34.4 %          Normal          31.6-34.8       [facility]  
   
                                        Comment on above:   Performed By: #### C  
BC1 ####[place] [address]   
   
                                                    MCV Auto Entitic   
volume (RBC)    92.0 fL         Normal          79.4-94.8       [facility]  
   
                                        Comment on above:   Performed By: #### C  
BC1 ####[place] [address]   
   
                                                    Platelet mean volume   
Auto Entitic volume   
(Bld)           10.3 fL         Normal          9.4-12.3        [facility]  
   
                                        Comment on above:   Performed By: #### C  
BC1 ####[place] [address]   
   
                                                    Platelets Auto #/vol   
(Bld)           251 thou/cmm    Normal          182-369         [facility]  
   
                                        Comment on above:   Performed By: #### C  
BC1 ####[place] [address]   
   
                      RBC Auto #/vol (Bld) 4.77 mil/cmm Normal     3.93-5.22  [facility]  
   
                                        Comment on above:   Performed By: #### C  
BC1 ####[place] [address]   
   
                      RDW SD     42.2 fl    Normal     36.4-46.3  [facility]  
   
                                        Comment on above:   Performed By: #### C  
BC1 ####[place] [address]   
   
                      WBC Auto #/vol (Bld) 10.41 thou/cmm High       3.98-10.04   
[facility]  
   
                                        Comment on above:   Performed By: #### C  
BC1 ####[place] [address]   
   
                                                    Protimeon 2017   
   
                                                    INR Coag RelTime   
(PPP)           0.95 {INR}      Normal                          [facility]  
   
                                        Comment on above:   Result Comment: Christian  
dard Therapy 2.0-3.0High Dose 2.5-3.5   
   
                                                            Performed By: #### P  
T ####[place] [address]   
   
                                                    Prothrombin time (PT)   
Coag time (PPP) 10.4 s          Normal          9.3-11.9        [facility]  
   
                                        Comment on above:   Performed By: #### P  
T ####[place] [address]   
   
                                                    Type and Screenon 2017  
   
   
                      ABO group Nom (Bld) O          Normal                [facility]  
   
                                        Comment on above:   Performed By: #### T  
&S ####[place] [address]   
   
                      Comment    PAT specimen Normal                [facility]  
   
                                        Comment on above:   Performed By: #### T  
&S ####[place] [address]   
   
                      RH Type    Positive   Normal                [facility]  
   
                                        Comment on above:   Performed By: #### T  
&S ####[place] [address]   
  
  
  
Vital Signs  
  
  
                          Date Time    Vital Sign   Value        Performing   
Clinician                               Facility  
   
                                                    2024   
11:                              Diastolic blood   
pressure                  67 mm[Hg]                 Ronel Hays MD  
Work Phone:   
[phone]                          [facility]  
   
                                                    2024   
11:          Heart rate          81 /min             Ronel Hays MD  
Work Phone:   
[phone]                          [facility]  
   
                                                    2024   
11:          Respiratory rate    16 /min             Ronel Hays MD  
Work Phone:   
[phone]                          [facility]  
   
                                                    2024   
11:                              SaO2% (BldA) [Mass   
fraction]                 97 %                      Ronel Hays MD  
Work Phone:   
[phone]                          [facility]  
   
                                                    2024   
11:                              Systolic blood   
pressure                  128 mm[Hg]                Ronel Hays MD  
Work Phone:   
[phone]                          [facility]  
   
                                                    2024   
11:          Body temperature    97.59 [degF]        Ronel Hays MD  
Work Phone:   
[phone]                          [facility]  
   
                                                    2024   
10:          Body height         160 cm              Ronel Hays MD  
Work Phone:   
[phone]                          [facility]  
   
                                                    2024   
10:                              Body mass index   
(BMI) [Ratio]             24.26 kg/m2               Ronel Hays MD  
Work Phone:   
[phone]                          [facility]  
   
                                                    2024   
10:          Body weight         62.1 kg             Ronel Hays MD  
Work Phone:   
[phone]                          [facility]  
   
                                                    2024   
09:          Body height         160 cm              Colten Abbott DO  
Work Phone:   
[phone]                          [facility]  
   
                                                    2024   
09:                              Body mass index   
(BMI) [Ratio]             24.89 kg/m2               Colten Abbott DO  
Work Phone:   
[phone]                          [facility]  
   
                                                    2024   
09:          Body temperature    98.1 [degF]         Colten Abbott DO  
Work Phone:   
[phone]                          [facility]  
   
                                                    2024   
09:          Body weight         63.73 kg            Colten Abbott DO  
Work Phone:   
[phone]                          [facility]  
   
                                                    2024   
09:                              Diastolic blood   
pressure                  68 mm[Hg]                 Colten Abbott DO  
Work Phone:   
[phone]                          [facility]  
   
                                                    2024   
09:          Heart rate          82 /min             Colten Abbott DO  
Work Phone:   
[phone]                          [facility]  
   
                                                    2024   
09:                              SaO2% (BldA) [Mass   
fraction]                 97 %                      Colten Abbott DO  
Work Phone:   
[phone]                          [facility]  
   
                                                    2024   
09:                              Systolic blood   
pressure                  111 mm[Hg]                Colten Abbott DO  
Work Phone:   
[phone]                          [facility]  
   
                                                    2024   
15:          Body height         158.1 cm            Jose Delgado MD  
Work Phone:   
[phone]                          [facility]  
   
                                                    2024   
15:                              Body mass index   
(BMI) [Ratio]             24.86 kg/m2               Jose Delgado MD  
Work Phone:   
[phone]                          [facility]  
   
                                                    2024   
15:          Body weight         62.14 kg            Jose Delgado MD  
Work Phone:   
[phone]                          [facility]  
   
                                                    2024   
15:                              Diastolic blood   
pressure                  72 mm[Hg]                 Jose Delgado MD  
Work Phone:   
[phone]                          [facility]  
   
                                                    2024   
15:                              Systolic blood   
pressure                  110 mm[Hg]                Jose Delgado MD  
Work Phone:   
[phone]                          [facility]  
   
                                                    2024   
11:                              Diastolic blood   
pressure                  80 mm[Hg]                 Ronel Hays MD  
Work Phone:   
[phone]                          [facility]  
   
                                                    2024   
11:          Heart rate          88 /min             Ronel Hays MD  
Work Phone:   
[phone]                          [facility]  
   
                                                    2024   
11:                              Systolic blood   
pressure                  125 mm[Hg]                Ronel Hays MD  
Work Phone:   
[phone]                          [facility]  
   
                                                    10-   
10:                              Diastolic blood   
pressure                  88 mm[Hg]                 Ronel Hays MD  
Work Phone:   
[phone]                          [facility]  
   
                                                    10-   
10:          Heart rate          85 /min             Ronel Hays MD  
Work Phone:   
[phone]                          [facility]  
   
                                                    10-   
10:          Respiratory rate    16 /min             Ronel Hays MD  
Work Phone:   
[phone]                          [facility]  
   
                                                    10-   
10:                              SaO2% (BldA) [Mass   
fraction]                 97 %                      Ronel Hays MD  
Work Phone:   
[phone]                          [facility]  
   
                                                    10-   
10:                              Systolic blood   
pressure                  139 mm[Hg]                Ronel Hays MD  
Work Phone:   
[phone]                          [facility]  
   
                                                    10-   
09:          Body temperature    98.1 [degF]         Ronel Hays MD  
Work Phone:   
[phone]                          [facility]  
   
                                                    10-   
08:          Body height         160 cm              Ronel Hays MD  
Work Phone:   
[phone]                          [facility]  
   
                                                    10-   
08:                              Body mass index   
(BMI) [Ratio]             24.57 kg/m2               Ronel Hays MD  
Work Phone:   
[phone]                          [facility]  
   
                                                    10-   
08:          Body weight         62.9 kg             Ronel Hays MD  
Work Phone:   
[phone]                          [facility]  
   
                                                    10-   
10:                              Diastolic blood   
pressure                  80 mm[Hg]                 Ronel Hays MD  
Work Phone:   
[phone]                          [facility]  
   
                                                    10-   
10:          Heart rate          88 /min             Ronel Hays MD  
Work Phone:   
[phone]                          [facility]  
   
                                                    10-   
10:                              Systolic blood   
pressure                  125 mm[Hg]                Ronel Hays MD  
Work Phone:   
[phone]                          [facility]  
   
                                                    2024   
14:          Body height         162.6 cm            Bernice GIBBS DNP  
Work Phone:   
[phone]                          [facility]  
   
                                                    2024   
14:                              Body mass index   
(BMI) [Ratio]             23.16 kg/m2               Bernice GIBBS DNP  
Work Phone:   
[phone]                          [facility]  
   
                                                    2024   
14:          Body weight         61.19 kg            Bernice JOSE-CNP, DNP  
Work Phone:   
[phone]                          [facility]  
   
                                                    2024   
14:                              Diastolic blood   
pressure                  64 mm[Hg]                 Bernice GIBBS, DNP  
Work Phone:   
[phone]                          [facility]  
   
                                                    2024   
14:          Heart rate          75 /min             Bernice GIBBS, DNP  
Work Phone:   
[phone]                          [facility]  
   
                                                    2024   
14:                              SaO2% (BldA) [Mass   
fraction]                 98 %                      Bernice GIBBS, DNP  
Work Phone:   
[phone]                          [facility]  
   
                                                    2024   
14:                              Systolic blood   
pressure                  108 mm[Hg]                Bernice GIBBS, DNP  
Work Phone:   
[phone]                          [facility]  
   
                                                    2024   
09:                              Diastolic blood   
pressure            88 mm[Hg]           [place] [address]               [facility]  
   
                                                    2024   
09:      Heart rate      93 /min         [facility]           [facility]  
   
                                                    2024   
09:                              Systolic blood   
pressure            130 mm[Hg]          [facility]               [facility]  
   
                                                    2024   
10:          Body height         162.6 cm            Sharifa Royal DO  
Work Phone:   
[phone]                          [facility]  
   
                                                    2024   
10:                              Body mass index   
(BMI) [Ratio]             23.34 kg/m2               Sharifa Royal DO  
Work Phone:   
[phone]                          [facility]  
   
                                                    2024   
10:          Body weight         61.69 kg            Sharifa Royal DO  
Work Phone:   
[phone]                          [facility]  
   
                                                    2024   
10:                              Diastolic blood   
pressure                  82 mm[Hg]                 Sharifa Royal DO  
Work Phone:   
[phone]                          [facility]  
   
                                                    2024   
10:          Heart rate          83 /min             Sharifa Royal DO  
Work Phone:   
[phone]                          [facility]  
   
                                                    2024   
10:                              SaO2% (BldA) [Mass   
fraction]                 98 %                      Sharifa Royal DO  
Work Phone:   
[phone]                          [facility]  
   
                                                    2024   
10:                              Systolic blood   
pressure                  128 mm[Hg]                Sharifa Royal DO  
Work Phone:   
[phone]                          [facility]  
   
                                                    2024   
10:          Body height         162.6 cm            Seth Samsonenimayra   
APRN-CNP  
Work Phone:   
[phone]                          [facility]  
   
                                                    2024   
10:                              Body mass index   
(BMI) [Ratio]             23.86 kg/m2               Seth Daviesenimayra   
APRN-CNP  
Work Phone:   
[phone]                          [facility]  
   
                                                    2024   
10:          Body temperature    97.3 [degF]         Seth Ceballos   
APRN-CNP  
Work Phone:   
[phone]                          [facility]  
   
                                                    2024   
10:          Body weight         63.05 kg            Seth Ceballos   
APRN-CNP  
Work Phone:   
[phone]                          [facility]  
   
                                                    2024   
10:                              Diastolic blood   
pressure                  80 mm[Hg]                 Seth Ceballos   
APRN-CNP  
Work Phone:   
[phone]                          [facility]  
   
                                                    2024   
10:          Heart rate          84 /min             Seth Ceballos   
APRN-CNP  
Work Phone:   
[phone]                          [facility]  
   
                                                    2024   
10:          Respiratory rate    16 /min             Seth Ceballos   
APRN-CNP  
Work Phone:   
[phone]                          [facility]  
   
                                                    2024   
10:                              SaO2% (BldA) [Mass   
fraction]                 100 %                     Seth Ceballos   
APRN-CNP  
Work Phone:   
[phone]                          [facility]  
   
                                                    2024   
10:                              Systolic blood   
pressure                  130 mm[Hg]                Seth Ceballos   
APRN-CNP  
Work Phone:   
[phone]                          [facility]  
   
                                                    2024   
13:          Body height         160 cm              Bernice JOSE-CNP, DNP  
Work Phone:   
[phone]                          [facility]  
   
                                                    2024   
13:                              Body mass index   
(BMI) [Ratio]             24.73 kg/m2               Bernice JOSE-CNP, DNP  
Work Phone:   
[phone]                          [facility]  
   
                                                    2024   
13:          Body weight         63.32 kg            Bernice JOSE-CNP, DNP  
Work Phone:   
[phone]                          [facility]  
   
                                                    2024   
13:                              Diastolic blood   
pressure                  70 mm[Hg]                 Bernice Vital APRN-CNP, DNP  
Work Phone:   
[phone]                          [facility]  
   
                                                    2024   
13:          Heart rate          79 /min             Bernice JOSE-CNP, DNP  
Work Phone:   
[phone]                          [facility]  
   
                                                    2024   
13:                              SaO2% (BldA) [Mass   
fraction]                 96 %                      Bernice JOSE-CNP, DNP  
Work Phone:   
[phone]                          [facility]  
   
                                                    2024   
13:                              Systolic blood   
pressure                  104 mm[Hg]                Bernice GIBBS, DNP  
Work Phone:   
[phone]                          [facility]  
   
                                                    2024   
11:          Body height         160 cm              Sharifa Royal DO  
Work Phone:   
[phone]                          [facility]  
   
                                                    2024   
11:                              Body mass index   
(BMI) [Ratio]             24.62 kg/m2               Sharifa Royal DO  
Work Phone:   
[phone]                          [facility]  
   
                                                    2024   
11:          Body weight         63.05 kg            Sharifa Royal DO  
Work Phone:   
[phone]                          [facility]  
   
                                                    2024   
11:                              Diastolic blood   
pressure                  64 mm[Hg]                 Sharifa Royal DO  
Work Phone:   
[phone]                          [facility]  
   
                                                    2024   
11:          Heart rate          88 /min             Sharifa Royal DO  
Work Phone:   
[phone]                          [facility]  
   
                                                    2024   
11:                              SaO2% (BldA) [Mass   
fraction]                 95 %                      Sharifa Royal DO  
Work Phone:   
[phone]                          [facility]  
   
                                                    2024   
11:                              Systolic blood   
pressure                  112 mm[Hg]                Sharifa Royal DO  
Work Phone:   
[phone]                          [facility]  
   
                                                    2024   
14:                              Diastolic blood   
pressure                  80 mm[Hg]                 Ronel Hays MD  
Work Phone:   
[phone]                          [facility]  
   
                                                    2024   
14:          Heart rate          88 /min             Ronel Hays MD  
Work Phone:   
[phone]                          [facility]  
   
                                                    2024   
14:                              Systolic blood   
pressure                  125 mm[Hg]                Ronel Hays MD  
Work Phone:   
[phone]                          [facility]  
   
                                                    2024   
11:          Body height         160 cm              Sharifa Royal DO  
Work Phone:   
[phone]                          [facility]  
   
                                                    2024   
11:                              Body mass index   
(BMI) [Ratio]             26.04 kg/m2               Sharifa Royal DO  
Work Phone:   
[phone]                          [facility]  
   
                                                    2024   
11:          Body weight         66.68 kg            Sharifa Royal DO  
Work Phone:   
[phone]                          [facility]  
   
                                                    2024   
11:                              Diastolic blood   
pressure                  82 mm[Hg]                 Sharifa Royal DO  
Work Phone:   
[phone]                          [facility]  
   
                                                    2024   
11:          Heart rate          78 /min             Sharifa Royal DO  
Work Phone:   
[phone]                          [facility]  
   
                                                    2024   
11:                              SaO2% (BldA) [Mass   
fraction]                 95 %                      Sharifa Royal DO  
Work Phone:   
[phone]                          [facility]  
   
                                                    2024   
11:                              Systolic blood   
pressure                  124 mm[Hg]                Sharifa Royal DO  
Work Phone:   
[phone]                          [facility]  
   
                                                    2024   
15:          Heart rate          77 /min             Fredy Watters DO  
Work Phone:   
[phone]                          [facility]  
   
                                                    2024   
15:          Respiratory rate    18 /min             Fredy Watters DO  
Work Phone:   
[phone]                          [facility]  
   
                                                    2024   
15:                              SaO2% (BldA) [Mass   
fraction]                 94 %                      Fredy Watters DO  
Work Phone:   
[phone]                          [facility]  
   
                                                    2024   
14:                              Diastolic blood   
pressure                  54 mm[Hg]                 Fredy Watters DO  
Work Phone:   
[phone]                          [facility]  
   
                                                    2024   
14:                              Systolic blood   
pressure                  132 mm[Hg]                Fredy Watters DO  
Work Phone:   
[phone]                          [facility]  
   
                                                    2024   
12:          Body height         160 cm              Fredy Watters DO  
Work Phone:   
[phone]                          [facility]  
   
                                                    2024   
12:                              Body mass index   
(BMI) [Ratio]             27.28 kg/m2               Fredy Watters DO  
Work Phone:   
[phone]                          [facility]  
   
                                                    2024   
12:          Body temperature    97 [degF]           Fredy Watters DO  
Work Phone:   
[phone]                          [facility]  
   
                                                    2024   
12:          Body weight         69.85 kg            Fredy Watters DO  
Work Phone:   
[phone]                          [facility]  
   
                                                    2024   
11:          Body height         162.6 cm            Sharifa Royal DO  
Work Phone:   
[phone]                          [facility]  
   
                                                    2024   
11:                              Body mass index   
(BMI) [Ratio]             25.58 kg/m2               Sharifa Royal DO  
Work Phone:   
[phone]                          [facility]  
   
                                                    2024   
11:          Body weight         67.59 kg            Sharifa Royal DO  
Work Phone:   
[phone]                          [facility]  
   
                                                    2024   
11:                              Diastolic blood   
pressure                  62 mm[Hg]                 Sharifa Royal DO  
Work Phone:   
[phone]                          [facility]  
   
                                                    2024   
11:          Heart rate          58 /min             Sharifa Royal DO  
Work Phone:   
[phone]                          [facility]  
   
                                                    2024   
11:                              SaO2% (BldA) [Mass   
fraction]                 96 %                      Sharifa Royal DO  
Work Phone:   
[phone]                          [facility]  
   
                                                    2024   
11:                              Systolic blood   
pressure                  124 mm[Hg]                Sharifa Royal DO  
Work Phone:   
[phone]                          [facility]  
   
                                                    2024   
12:          Heart rate          76 /min             Kelsi Murray MD  
Work Phone:   
[phone]                          [facility]  
   
                                                    2024   
12:          Respiratory rate    18 /min             Kelsi Murray MD  
Work Phone:   
[phone]                          [facility]  
   
                                                    2024   
12:                              SaO2% (BldA) [Mass   
fraction]                 94 %                      Kelsi Murray MD  
Work Phone:   
[phone]                          [facility]  
   
                                                    2024   
11:          Body height         162.6 cm            Kelsi Murray MD  
Work Phone:   
[phone]                          [facility]  
   
                                                    2024   
11:                              Body mass index   
(BMI) [Ratio]             26.43 kg/m2               Kelsi Murray MD  
Work Phone:   
[phone]                          [facility]  
   
                                                    2024   
11:          Body temperature    98.2 [degF]         Kelsi Murray MD  
Work Phone:   
[phone]                          [facility]  
   
                                                    2024   
11:          Body weight         69.85 kg            Kelsi Murray MD  
Work Phone:   
[phone]                          [facility]  
   
                                                    2024   
11:                              Diastolic blood   
pressure                  81 mm[Hg]                 Kelsi Murray MD  
Work Phone:   
[phone]                          [facility]  
   
                                                    2024   
11:                              Systolic blood   
pressure                  178 mm[Hg]                Kelsi Murray MD  
Work Phone:   
[phone]                          [facility] [facility]  
   
                                                    2023   
11:                              Diastolic blood   
pressure                  79 mm[Hg]                 Sharifa Royal DO  
Work Phone:   
[phone]                          [facility]  
   
                                                    2023   
11:                              Systolic blood   
pressure                  136 mm[Hg]                Sharifa Royal DO  
Work Phone:   
[phone]                          [facility]  
   
                                                    2023   
10:          Body height         160 cm              Sharifa Royal DO  
Work Phone:   
[phone]                          [facility]  
   
                                                    2023   
10:                              Body mass index   
(BMI) [Ratio]             27.55 kg/m2               Sharifa Royal DO  
Work Phone:   
[phone]                          [facility]  
   
                                                    2023   
10:          Body weight         70.53 kg            Sharifa Royal DO  
Work Phone:   
[phone]                          [facility]  
   
                                                    2023   
10:          Heart rate          76 /min             Sharifa Royal DO  
Work Phone:   
[phone]                          [facility]  
   
                                                    10-   
15:          Body height         160 cm              Sharifa Royal DO  
Work Phone:   
[phone]                          [facility]  
   
                                                    10-   
15:                              Body mass index   
(BMI) [Ratio]             27.88 kg/m2               Sharifa Royal DO  
Work Phone:   
[phone]                          [facility]  
   
                                                    10-   
15:          Body weight         71.4 kg             Sharifa Royal DO  
Work Phone:   
[phone]                          [facility]  
   
                                                    10-   
15:                              Diastolic blood   
pressure                  74 mm[Hg]                 Sharifa Royal DO  
Work Phone:   
[phone]                          [facility]  
   
                                                    10-   
15:          Heart rate          68 /min             Sharifa Royal DO  
Work Phone:   
[phone]                          [facility]  
   
                                                    10-   
15:                              Systolic blood   
pressure                  140 mm[Hg]                [facility] DO  
Work Phone:   
[phone]                          [facility]  
   
                                                    2022   
10:          Body height         159.5 cm            Amanda [place]   
APRN.CNP  
Work Phone:   
[phone]                          [facility]  
   
                                                    2022   
10:          Body weight         73.94 kg            Amanda [place]   
APRN.CNP  
Work Phone:   
[phone]                          [facility]  
   
                                                    2022   
10:                              Diastolic blood   
pressure                  92 mm[Hg]                 Amanda [place]   
APRN.CNP  
Work Phone:   
[phone]                          [facility]  
   
                                                    2022   
10:                              Systolic blood   
pressure                  148 mm[Hg]                Amanda Sandro   
APRN.CNP  
Work Phone:   
[phone]                          [facility]  
  
  
  
Encounters  
  
  
                          Encounter Date Encounter Type Care Provider Facility  
   
                                                    Start: 01-  
End: 01-     ambulatory          [facility]    Facility:[facility]  
   
                                                    Start: 01-  
End: 01-                         Patient encounter   
procedure                               Amaya Reis OD  
Work Phone:   
[phone]                          Optometry  
   
                                        Comment on above:   Status post cataract  
 extraction and insertion of intraocular   
lens   
of right eye (Primary Dx);  
Status post cataract extraction and insertion of intraocular lens   
of left eye   
   
                                                    Start: 2024  
End: 2024     ambulatory          RON[facility] HAYS    Facility:[facility]  
   
                                                    Start: 2024  
End: 2024                         Patient encounter   
procedure                               Ronel Hays MD  
Work Phone:   
[phone]                          Ophthalmology  
   
                                        Comment on above:   Status post cataract  
 extraction and insertion of intraocular   
lens   
of right eye (Primary Dx);  
Status post cataract extraction and insertion of intraocular lens   
of left eye   
   
                                                    Start: 2024  
End: 2024     ambulatory          [facility]    Facility:[facility]  
   
                                                    Start: 2024  
End: 2024                         Patient encounter   
procedure                               Ronel Hays MD  
Work Phone:   
[phone]                          Ophthalmology  
   
                                        Comment on above:   Status post cataract  
 extraction and insertion of intraocular   
lens   
of right eye (Primary Dx);  
Status post cataract extraction and insertion of intraocular lens   
of left eye   
   
                                                    Start: 2024  
End: 2024                         Subsequent hospital visit   
by physician                            Ronel Hays MD  
Work Phone:   
[phone]                          [facility] [place]  
   
                          Start: 2024 ambulatory   [facility] HAYS [facility]  
   
                                                    Start: 2024  
End: 2024           ambulatory                Colten Abbott DO  
Work Phone:   
[phone]                          Hematology/Oncology  
   
                                        Comment on above:   History of breast ca  
ncer (Primary Dx);  
Encounter for screening mammogram for breast cancer   
   
                                                    Start: 2024  
End: 2024                         Patient encounter   
procedure                               Colten Abbott DO  
Work Phone:   
[phone]                          Hematology/Oncology  
   
                                                    Start: 2024  
End: 2024     [facility]  
   
                                                    Start: 2024  
End: 2024                         Patient encounter   
procedure                               Jose Delgado MD  
Work Phone:   
[phone]                          OB/Gynecology  
   
                                        Comment on above:   Encounter for gyneco  
logical examination (general) (routine)   
without   
abnormal findings (Primary Dx);  
Encounter for screening mammogram for breast cancer   
   
                                                    Start: 2024  
End: 2024           Patient encounter status  Jose Delgado MD  
Work Phone:   
[phone]                          [facility]  
   
                                                    Start: 2024  
End: 11-           Telephone encounter       Amaya Reis OD  
Work Phone:   
[phone]                          Optometry  
   
                                        Comment on above:   Patient Question   
   
                                                    Start: 2024  
End: 2024     ambulatory          [facility]  
   
                                                    Start: 2024  
End: 2024                         Patient encounter   
procedure                               Ronel Hays MD  
Work Phone:   
[phone]                          Ophthalmology  
   
                                        Comment on above:   Combined forms of ag  
e-related cataract of right eye (Primary   
Dx);  
Status post cataract extraction and insertion of intraocular lens   
of left eye;  
Type 2 diabetes mellitus without retinopathy (HCC);  
Essential hypertension;  
Hypercholesteremia;  
Dementia, unspecified dementia severity, unspecified dementia type,   
unspecified whether behavioral, psychotic, or mood disturbance or   
anxiety (HCC)   
   
                                                    Start: 2024  
End: 2024     [facility]  
   
                                                    Start: 2024  
End: 2024                         Subsequent hospital visit   
by physician                            [facility]   
Wstr                                    Mammogram  
   
                                        Comment on above:   Encounter for screen  
ing mammogram for breast cancer [Z12.31]   
   
                                                    Start: 2024  
End: 2024     [facility]  
   
                                                    Start: 2024  
End: 2024                         Patient encounter   
procedure                               Amaya Reis OD  
Work Phone:   
[phone]                          Optometry  
   
                                        Comment on above:   Status post cataract  
 extraction and insertion of intraocular   
lens   
of left eye (Primary Dx)   
   
                                                    Start: 2024  
End: 2024     [facility]  
   
                                                    Start: 2024  
End: 2024                         Patient encounter   
procedure                               Ronel Hays MD  
Work Phone:   
[phone]                          Ophthalmology  
   
                                        Comment on above:   Status post cataract  
 extraction and insertion of intraocular   
lens   
of left eye (Primary Dx);  
Combined forms of age-related cataract of right eye;  
Type 2 diabetes mellitus without retinopathy (HCC);  
Essential hypertension;  
Hypercholesteremia;  
Dementia, unspecified dementia severity, unspecified dementia type,   
unspecified whether behavioral, psychotic, or mood disturbance or   
anxiety (HCC)   
   
                                                    Start: 10-  
End: 10-                         Subsequent hospital visit   
by physician                            Ronel Hays MD  
Work Phone:   
[phone]                          [facility] [place]  
   
                          Start: 10- ambulatory   [facility]  
   
                                                    Start: 10-  
End: 10-           Refill                    oRnel Hays MD  
Work Phone:   
[phone]                          Ophthalmology  
   
                                        Comment on above:   Refill Request   
   
                                                    Start: 10-  
End: 10-     [facility]  
   
                                                    Start: 10-  
End: 10-                         Patient encounter   
procedure                               Ronel Hays MD  
Work Phone:   
[phone]                          Ophthalmology  
   
                                        Comment on above:   Combined forms of ag  
e-related cataract of left eye (Primary   
Dx);  
Combined forms of age-related cataract of right eye;  
Type 2 diabetes mellitus without retinopathy (HCC);  
Essential hypertension;  
Hypercholesteremia;  
Dementia, unspecified dementia severity, unspecified dementia type,   
unspecified whether behavioral, psychotic, or mood disturbance or   
anxiety (HCC)   
   
                                                    Start: 10-  
End: 10-           Refill                    Ronel Hays MD  
Work Phone:   
[phone]                          Ophthalmology  
   
                                        Comment on above:   Refill Request   
   
                                                    Start: 2024  
End: 2024                         Office outpatient visit   
25 minutes                              Bernice JOSE-CNP, DNP  
Work Phone:   
[phone]                          [facility]   
Office Building  
   
                                        Comment on above:   Encounter for pre-op  
erative cardiovascular clearance (Primary   
Dx)   
   
                                                    Start: 2024  
End: 2024           Preoperative state        Bernice Vital   
APRN-CNP, DNP  
Work Phone:   
[phone]                          [facility]  
Work Phone:   
[phone]  
   
                                                    Start: 2024  
End: 2024     ambulatory          BERNICE VITAL       [facility]  
   
Ambulatory  
   
                                                    Start: 2024  
End: 2024     Patient encounter status [address]  
   
                                                    Start: 2024  
End: 2024                         Subsequent hospital visit   
by physician              Brednen Wade Admin Room 1       [facility]  
   
                                        Comment on above:   Arrived   
   
                                                            Abnormal EKG   
   
                                                            Abnormal EKG;  
Encounter for other preprocedural examination   
   
                                                    Start: 2024  
End: 2024     ambulatory          BERNICE VITAL       [facility]  
   
                                                    Start: 2024  
End: 2024                         Encounter for other   
preprocedural examination BERNICE VITAL             [facility]  
   
                                                    Start: 2024  
End: 2024                         Office outpatient visit   
25 minutes                              Sharifa Almaguer DO  
Work Phone:   
[phone]                          [facility]  
   
                                        Comment on above:   Abnormal EKG (Primar  
y Dx);  
Weight loss, unintentional;  
Controlled type 2 diabetes mellitus without complication, without   
long-term current use of insulin (Multi);  
Benign liver cyst;  
Secondary hypertension;  
Mild dementia without behavioral disturbance, psychotic   
disturbance, mood disturbance, or anxiety, unspecified dementia   
type (Multi);  
Gastroesophageal reflux disease without esophagitis;  
Mixed hyperlipidemia;  
Hypokalemia   
   
                                                    Start: 2024  
End: 2024     ambulatory          [facility]  
   
Ambulatory  
   
                                                    Start: 2024  
End: 2024     ambulatory          [facility]  
   
                                                    Start: 2024  
End: 2024                         Patient encounter   
procedure                               Seth GIBBS  
Work Phone:   
[phone]                          [facility]   
Urgent Care  
   
                                        Comment on above:   COVID (Primary Dx);  
Acute upper respiratory infection   
   
                                                    Start: 2024  
End: 2024     ambulatory          [facility]  
   
                                                    Start: 2024  
End: 2024                         Office outpatient new 45   
minutes                                 Bernice GIBBS, KEITH  
Work Phone:   
[phone]                          [facility]   
Office Building  
   
                                        Comment on above:   Encounter for pre-op  
erative cardiovascular clearance (Primary   
Dx);  
Abnormal EKG;  
Mitral valve prolapse determined by imaging   
   
                                                    Start: 2024  
End: 2024           Patient encounter status  Bernice GIBBS, KEITH  
Work Phone:   
[phone]                          [facility]  
Work Phone:   
[phone]  
   
                                                    Start: 2024  
End: 2024     ambulatory          BERNICE VITAL       [facility]  
   
Ambulatory  
   
                                                    Start: 2024  
End: 2024                         Subsequent hospital visit   
by physician Brenden Brooke 1          [facility]  
   
                                        Comment on above:   Liver lesion   
   
                                                    Start: 2024  
End: 2024     ambulatory          [facility]  
   
                                                    Start: 2024  
End: 2024                         Office outpatient visit   
25 minutes                              [facility]  
Work Phone:   
[phone]                          [facility]  
   
                                        Comment on above:   Liver lesion (Primar  
y Dx);  
Abnormal EKG;  
Weight loss, unintentional;  
Hypokalemia;  
Secondary hypertension   
   
                                                    Start: 2024  
End: 2024     ambulatory          [facility]  
   
Ambulatory  
   
                                                    Start: 2024  
End: 2024                         Subsequent hospital visit   
by physician              Brenden Colon 1                  [facility]  
   
                                        Comment on above:   Weight loss, uninten  
tional   
   
                                                            Abnormal EKG   
   
                                                    Start: 2024  
End: 2024     [facility]  
   
                                                    Start: 2024  
End: 2024     ambulatory          [facility]  
   
                                                    Start: 2024  
End: 2024     [facility]  
   
                                                    Start: 2024  
End: 2024                         Patient encounter   
procedure                               Ronel Hays MD  
Work Phone:   
[phone]                          Ophthalmology  
   
                                        Comment on above:   Combined forms of ag  
e-related cataract of left eye (Primary   
Dx);  
Combined forms of age-related cataract of right eye;  
Type 2 diabetes mellitus without retinopathy (HCC);  
Essential hypertension;  
Hypercholesteremia;  
Dementia, unspecified dementia severity, unspecified dementia type,   
unspecified whether behavioral, psychotic, or mood disturbance or   
anxiety (HCC)   
   
                                                    Start: 2024  
End: 2024     [facility]  
   
                                                    Start: 2024  
End: 2024                         Patient encounter   
procedure                               Amaya Reis OD  
Work Phone:   
[phone]                          Optometry  
   
                                        Comment on above:   Combined form of sen  
ile cataract of both eyes (Primary Dx);  
Type 2 diabetes mellitus without retinopathy (HCC);  
Hyperopia of both eyes;  
Regular astigmatism of both eyes;  
Presbyopia   
   
                                                    Start: 2024  
End: 2024                         Office outpatient visit   
25 minutes                              [facility]  
Work Phone:   
[phone]                          [facility]  
   
                                        Comment on above:   Controlled type 2 di  
abetes mellitus without complication,   
without   
long-term current use of insulin (Multi) (Primary Dx);  
Hiccup;  
Mild dementia without behavioral disturbance, psychotic   
disturbance, mood disturbance, or anxiety, unspecified dementia   
type (Multi);  
Gastroesophageal reflux disease without esophagitis   
   
                                                    Start: 2024  
End: 2024     ambulatory          [facility]  
   
Ambulatory  
   
                                                    Start: 2024  
End: 2024     ambulatory          [facility]  
   
                                                    Start: 2024  
End: 2024                         Subsequent hospital visit   
by physician                            Brenden 2e Cr Nonv1 Ecg   
Resource                                [facility]  
   
                                        Comment on above:   Arrived   
   
                                                    Start: 2024  
End: 2024                         Emergency department   
patient visit                           Fredy Watters DO  
Work Phone:   
[phone]                          [facility]   
Medicine  
   
                                        Comment on above:   Nausea and vomiting,  
 unspecified vomiting type (Primary Dx);  
Intermittent lightheadedness   
   
                                                    Start: 2024  
End: 2024     ambulatory          [facility]  
   
                                                    Start: 2024  
End: 2024                         Office outpatient visit   
25 minutes                              Sharifa S Royal DO  
Work Phone:   
[phone]                          [facility]  
   
                                        Comment on above:   Memory loss (Primary  
 Dx);  
Infiltrating ductal carcinoma of right female breast (Multi);  
Controlled type 2 diabetes mellitus without complication, without   
long-term current use of insulin (Multi);  
Secondary hypertension;  
Mixed hyperlipidemia;  
Mild dementia without behavioral disturbance, psychotic   
disturbance, mood disturbance, or anxiety, unspecified dementia   
type (Multi)   
   
                                                    Start: 2024  
End: 2024     ambulatory          [facility]  
   
Ambulatory  
   
                                                    Start: 2024  
End: 2024     ambulatory          [facility]  
   
                                                    Start: 2024  
End: 2024                         Subsequent hospital visit   
by physician                            Brenden Dowell Nonv1 Ecg   
Resource                                [facility]  
   
                                        Comment on above:   Arrived   
   
                                                    Start: 2024  
End: 2024                         Emergency department   
patient visit                           Kelsi Murray MD  
Work Phone:   
[phone]                          [facility] Emergency   
Medicine  
   
                                        Comment on above:   UTI (urinary tract i  
nfection), bacterial (Primary Dx);  
Confusion   
   
                                                    Start: 2023  
End: 2023                         Office outpatient visit   
15 minutes                              Sharifa S Royal DO  
Work Phone:   
[phone]                           [place] [employer]   
[facility]  
   
                                        Comment on above:   Secondary hypertensi  
on (Primary Dx);  
Medicare annual wellness visit, subsequent;  
Controlled type 2 diabetes mellitus without complication, without   
long-term current use of insulin (CMS/HCC);  
Mixed hyperlipidemia;  
Infiltrating ductal carcinoma of right female breast (CMS/HCC)   
   
                                                    Start: 2023  
End: 2024                         Patient encounter   
procedure                               [facility]  
Work Phone:   
[phone]                          [facility]  
Work Phone:   
[phone]  
   
                                                    Start: 2023  
End: 2023     ambulatory          [facility]  
   
Ambulatory  
   
                                                    Start: 2023  
End: 2023                         Encounter for general   
adult medical examination   
without abnormal findings [facility]   
Ambulatory  
   
                                                    Start: 2023  
End: 2023     ambulatory          [facility]  
   
                                Start: 2023 Documentation procedure Mammog  
saran   
Coordinator                             [facility]   
MAIN  
   
                                Start: 2023 Letter encounter Mammography   
Coordinator                             [facility]   
Department  
   
                                                    Start: 2023  
End: 2023                         Subsequent hospital visit   
by physician                            [facility]   
Wstr                                    Mammogram  
   
                                        Comment on above:   Malignant neoplasm o  
f right breast in female, estrogen   
receptor   
positive, unspecified site of breast (HCC) [C50.911, Z17.0]   
   
                                                    Start: 10-  
End: 10-                         Office outpatient new 45   
minutes                                 Sharifa S Royal DO  
Work Phone:   
[phone]                          [facility]  
   
                                        Comment on above:   Infiltrating ductal   
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  
  
(Primary Dx);  
Controlled type 2 diabetes mellitus without complication, without   
long-term current use of insulin (CMS/HCC);  
Mixed hyperlipidemia;  
Secondary hypertension   
   
                                                    Start: 10-  
End: 10-     ambulatory          [facility]  
   
Ambulatory  
   
                                                    Start: 10-  
End: 10-                         Patient encounter   
procedure                               Amaya Reis OD  
Work Phone:   
[phone]                          Optometry  
   
                                        Comment on above:   Hordeolum externum o  
f right upper eyelid (Primary Dx)   
   
                                                    Start: 2023  
End: 2023                         Patient encounter   
procedure                               Amaya Reis OD  
Work Phone:   
[phone]                          Optometry  
   
                                        Comment on above:   Type 2 diabetes jett  
itus without retinopathy (HCC) (Primary   
Dx);  
Combined form of senile cataract of both eyes;  
Hyperopia of both eyes;  
Regular astigmatism of left eye;  
Presbyopia   
   
                                                    Start: 2022  
End: 2022                         Patient encounter   
procedure                               Amanda Jo   
APRN.CNP  
Work Phone:   
[phone]                          OB/Gynecology  
   
                                        Comment on above:   Encounter for gyneco  
logical examination (general) (routine)   
without   
abnormal findings (Primary Dx);  
Encounter for screening for malignant neoplasm of vagina   
   
                                                    Start: 2022  
End: 2022           Patient encounter status  Amanda Jo   
APRN.CNP  
Work Phone:   
[phone]                          OB/Gynecology  
   
                                Start: 2022 Documentation procedure Mammog  
saran   
Coordinator                             [facility]   
MAIN  
   
                                Start: 2022 Letter encounter Mammography   
Coordinator                             [facility]   
Department  
   
                                                    Start: 2022  
End: 2022                         Subsequent hospital visit   
by physician                            [facility]   
Wstr                                    Mammogram  
   
                                        Comment on above:   Encounter for screen  
ing mammogram for malignant neoplasm of   
breast   
[Z12.31]   
   
                                        Start: 2020   Patient encounter   
procedure                 Constance Machado              [facility]   
[place]  
Work Phone:   
[phone]  
   
                                        Start: 2020   Patient encounter   
procedure                 Constance Machado              [facility]   
[place]  
Work Phone:   
[phone]  
   
                                        Start: 2020   Patient encounter   
procedure                 Constance Machado              [facility]   
[place]  
Work Phone:   
[phone]  
   
                                        Start: 2020   Patient encounter   
procedure                 Constance Machado              [facility]   
[place]  
Work Phone:   
[phone]  
   
                                        Start: 2020   Patient encounter   
procedure                 Constance Machado              [facility]   
[place]  
Work Phone:   
[phone]  
   
                                        Start: 2020   Patient encounter   
procedure                 Constance Machado              [facility]   
[place]  
Work Phone:   
[phone]  
   
                                        Start: 2019   Patient encounter   
procedure                 KULWINDER GARCIA              Facility:[facility]  
   
                                        Start: 2018   Patient encounter   
procedure                 KULWINDER GARCIA              Facility:[facility]  
   
                                                    Start: 2018  
End: 2018                         Patient encounter   
procedure                 KULWINDER GARCIA              Facility:[facility]  
   
                                                    Start: 2018  
End: 2018                         Patient encounter   
procedure                 KULWINDER GARCIA              Facility:[facility]  
   
                                                    Start: 2018  
End: 2018                         Patient encounter   
procedure                 KULWINDER GARCIA              Facility:[facility]  
   
                                        Start: 2018   Patient encounter   
procedure                 KULWINDER GARCIA              Facility:[facility]  
   
                                                    Start: 2018  
End: 2018                         Patient encounter   
procedure                 KULWINDER GARCIA              Facility:[facility]  
   
                                                    Start: 2017  
End: 2017                         Evaluation and management   
of inpatient              KULWINDER GARCIA              Facility:[facility]  
   
                                                    Start: 2017  
End: 2017                         Patient encounter   
procedure                 DELMA ROMERO              Facility:[facility]  
  
  
  
Procedures  
  
  
                          Date         Procedure    Procedure Detail Performing   
Clinician  
   
                                        Start: 2024   Glucose quantitative  
   
blood xcpt reagent strip                            Ronel Hays MD  
Work Phone:   
[phone]  
   
                                        Start: 2024   ASCAN ONLY - DIAGNOS  
TIC   
OD (RIGHT EYE)                                      Ronel Hays MD  
Work Phone:   
[phone]  
   
                                        Start: 10-   PULSE OXIMETRY,   
CONTINUOUS                                          Dell Williamson DO  
Work Phone:   
[phone]  
   
                                        Start: 10-   Glucose quantitative  
   
blood xcpt reagent strip                            Ronel Hays MD  
Work Phone:   
[phone]  
   
                                        Start: 10-   IOL BIOMETRY W/ IOL   
CALC   
OU (BOTH EYES)                                      Ronel Hays MD  
Work Phone:   
[phone]  
   
                                        Start: 2024   Cv strs tst xers&/or  
 rx   
cont ecg w/o i&r                                    Bernice GIBBS DNP  
Work Phone:   
[phone]  
   
                                        Start: 2024   Myocardial spect   
multiple studies                                    Bernice GIBBS DNP  
Work Phone:   
[phone]  
   
                                        Start: 2024   POCT SARS-COV-2/FLU/  
RSV   
PCR SYMPTOMATIC                                     Seth GIBBS  
Work Phone:   
[phone]  
   
                                        Start: 2024   Ecg routine ecg w/le  
ast   
12 lds w/i&r                                        Bernice GIBBS DNP  
Work Phone:   
[phone]  
   
                                        Start: 2024   Echo tthrc r-t 2d   
w/wom-mode compl   
spec&colr d                                         Sharifa GALLAGHER Royal DO  
Work Phone:   
[phone]  
   
                                        Start: 2024   Computerized ophthal  
chelita   
imaging retina                                      Ronel Hays MD  
Work Phone:   
[phone]  
   
                                        Start: 2024   IOL BIOMETRY W/ IOL   
CALC   
OU (BOTH EYES)                                      Ronel Hays MD  
Work Phone:   
[phone]  
   
                                        Start: 2024   Computerized ophthal  
chelita   
imaging retina                                      Amaya Reis OD  
Work Phone:   
[phone]  
   
                                        Start: 2024   Ecg routine ecg w/le  
ast   
12 lds trcg only w/o i&r                            Vinod ANN Magno   
APRN-CNP  
Work Phone:   
[phone]  
   
                                        Start: 2024   Ct head/brain w/o   
contrast material                                   Vinod ANN Magno   
APRN-CNP  
Work Phone:   
[phone]  
   
                                        Start: 2024   Radiologic exam ches  
t 2   
views                                               Vinod ANN Magno   
APRN-CNP  
Work Phone:   
[phone]  
   
                                        Start: 2024   Comprehensive metabo  
lic   
panel                                               Vinod ANN Magno   
APRN-CNP  
Work Phone:   
[phone]  
   
                          Start: 2024 Urinalysis                Vinod ANN Yanely bowles   
APRN-CNP  
Work Phone:   
[phone]  
   
                                        Start: 2024   Urnls dip stick/tabl  
et   
reagent auto microscopy                             Vinod ANN Magno   
APRN-CNP  
Work Phone:   
[phone]  
   
                                        Start: 2024   Basic metabolic 2000  
   
panel - Serum or Plasma                             [facility]  
   
                                        Start: 2024   Cyanocobalamin vitam  
in   
b-12                                                [facility]  
   
                                        Start: 2024   Hemoglobin   
A1c/Hemoglobin.total in   
Blood                                               [facility]  
   
                                        Start: 2024   Thyrotropin   
[Units/volume] in Serum   
or Plasma                                           [facility]  
   
                                        Start: 2024   FOLLOW UP IN FAMILY   
MEDICINE                                            [facility]  
   
                          Start: 2024 DISCHARGE PATIENT              MELLISSA MAE   
   
                          Start: 2024 CT HEAD WO IV CONTRAST                
SHARIFA ROYAL  
   
                                        Start: 2024   Bacteria identified   
in   
Urine by Culture                                    SHARIFA ALMAGUER  
   
                          Start: 2024 EXTRA URINE GRAY TUBE              K  
IMSUSANNAH ALMAGUER  
   
                          Start: 2024 MICROSCOPIC ONLY, URINE               
 SHARIFA ROYAL  
   
                                        Start: 2024   URINALYSIS WITH REFL  
EX   
CULTURE AND MICROSCOPIC                             SHARIFA ALMAGUER  
   
                                        Start: 2024   CBC panel - Blood by  
   
Automated count                                     SHARIFA ALMAGUER  
   
                                        Start: 2024   Comprehensive metabo  
lic   
2000 panel - Serum or   
Plasma                                              SHARIFA ALMAGUER  
   
                                        Start: 2024   TROPONIN I, HIGH   
SENSITIVITY                                         SHARIFA ALMAGUER  
   
                          Start: 2024 XR CHEST 1 VIEW              LEAH ALMAGUER  
   
                          Start: 2024 ECG 12-LEAD               SHARIFASUSANNAH STEVENS  
   
                                        Start: 2024   Ct head/brain w/o   
contrast material                                   Grisel León PA-C  
Work Phone:   
[phone]  
   
                                        Start: 2024   Urinalysis microscop  
ic   
panel - Urine   
Qualitative by Automated                            Grisel León PA-C  
Work Phone:   
[phone]  
   
                                        Start: 2024   Urnls dip stick/tabl  
et   
reagent auto microscopy                             Grisel León PA-C  
Work Phone:   
[phone]  
   
                                        Start: 2024   Comprehensive metabo  
lic   
panel                                               Grisel León PA-C  
Work Phone:   
[phone]  
   
                                        Start: 2024   Radiologic exam ches  
t   
single view                                         Grisel León PA-C  
Work Phone:   
[phone]  
   
                                        Start: 2024   Ecg routine ecg w/le  
ast   
12 lds trcg only w/o i&r                            Grisel León PA-C  
Work Phone:   
[phone]  
   
                                        Start: 2023   FOLLOW UP IN FAMILY   
MEDICINE                                            SHARIFA ALMAGUER  
   
                                        Start: 2023   Comprehensive metabo  
lic   
2000 panel - Serum or   
Plasma                                              SHARIFA ALMAGUER  
   
                                        Start: 2023   Hemoglobin   
A1c/Hemoglobin.total in   
Blood                                               SHARIFA ALMAGUER  
   
                          Start: 2023 Lipid panel               SHARIFA R  
OMARGARITA  
   
                                        Start: 2023   Lipid 1996 panel - S  
robbie   
or Plasma                                           Sharifa Almaguer DO  
Work Phone:   
[phone]  
   
                                        Start: 2023   Screening digital br  
east   
tomosynthesis bi                                    Rosa Murdock   
APRN.CNP  
Work Phone:   
[phone]  
   
                          Start: 2022 JONY SCREENING W HUGO Abbott DO  
Work Phone:   
[phone]  
   
                                                    Start: 2017  
End: 10-           H/O: surgery              S/P hysterectomy with   
oophorectomy                            Mammography   
Coordinator  
   
                          Start: 2017 Antibody screen              KULWINDER TO  
   
                                        Comment on above:   Performed By: #### T  
&S ####[place] [address]   
  
  
  
Plan of Treatment  
  
  
                          Date         Care Activity Detail       Author  
   
                                                    Start: 2025  
End: 2025           ambulatory                2025 11:30 AM EST   
Visit (SP) Office   
Hematology/Oncology [address]   
[place]   
[id number] [phone] Colten Abbott  [address] [place] [id number] [phone] (Work)   
[phone] (Fax) 1 YR   
OV/MAMM *                          Hematology/Oncology  
   
                                        Comment on above:   1 YR OV/MAMM *   
   
                                                    Start: 2025  
End: 2025                         Patient encounter   
procedure                               2025 1:20 PM EST   
Office Visit OB/Gynecology   
[address] [place]   
[place] [id number] [phone]   
Jose Delgado  [address]   
[place]   
[address] [phone] (Work)   
[phone] (Fax) Annual               OB/Gynecology  
   
                                        Comment on above:   Annual   
   
                                                    Start: 2025  
End: 2025                         Patient encounter   
procedure                               2025 12:50 PM EST   
Appointment Mammogram [address] [place]   
[id number] [phone]   
Encounter for screening   
mammogram for breast   
cancer [Z12.31]                         Mammogram  
   
                                        Comment on above:   Encounter for screen  
ing mammogram for breast cancer [Z12.31]   
   
                                                    Start: 2025  
End: 2025                         Patient encounter   
procedure                               2025 1:00 PM EDT   
Office Visit [facility]   
Optometry [address] [place] [address]   
[phone] Amaya Reis II,  [place] [address]   
[phone] (Work)   
[phone] (Fax) 6 month   
post op                                 Optometry  
   
                                        Comment on above:   6 month post op   
   
                          Start: 2025 Glaucoma screening Dilated Retinal E  
xam [facility]  
   
                          Start: 2025 Glaucoma screening Dilated Retinal E  
xam [facility]  
   
                                                    Start: 2025  
End: 2025                         Patient encounter   
procedure                               2025 11:00 AM EDT   
Office Visit [place] [address] [address]   
[id number] [phone]   
Sharifa Almaguer DO [address]   
[address] [address] [phone]   
(Work) [phone] (Fax)               [facility]  
   
                                                    Start: 2025  
End: 2025                         Basic metabolic 2000   
panel - Serum or   
Plasma                                  Basic Metabolic Panel Lab   
Routine Controlled type 2   
diabetes mellitus without   
complication, without   
long-term current use of   
insulin (Multi) Expected:   
2025 (Approximate),   
Expires: 2025                     [facility]  
Work Phone:   
[phone]  
   
                                        Comment on above:   Expected: 2025  
 (Approximate), Expires: 2025   
   
                                                    Start: 2025  
End: 2025                         Hemoglobin   
A1c/Hemoglobin.total   
in Blood                                Hemoglobin A1C Lab Routine   
Controlled type 2 diabetes   
mellitus without   
complication, without   
long-term current use of   
insulin (Multi) Expected:   
2025 (Approximate),   
Expires: 2025                     [facility]  
Work Phone:   
[phone]  
   
                                        Comment on above:   Expected: 2025  
 (Approximate), Expires: 2025   
   
                                        Start: 2025   Hemoglobin A1c   
measurement               HbA1C                     [facility]  
   
                                                    Start: 01-  
End: 01-                         Patient encounter   
procedure                               01/15/2025 1:30 PM EST   
Office Visit [facility]   
Optometry [address] [place] [address]   
[phone] Amaya Reis II,  [place] [address]   
[phone] (Work)   
[phone] (Fax) 1 Month   
Post OP                                 Optometry  
   
                                        Comment on above:   1 Month Post OP   
   
                                        Start: 2025   Advance Directive   
Discussion                              Advance Directive   
Discussion                              [facility]  
   
                                        Start: 2024   Hemoglobin A1c   
measurement               Diabetes: Hemoglobin A1C  [facility]  
   
                                                    Start: 2024  
End: 2024                         Patient encounter   
procedure                               2024 4:00 PM EST   
Office Visit [facility]   
Ophthalmology [address] [place] [address]   
[phone] Ronel Hays MD [address] [place] [address]   
[address] [phone] (Work)   
[phone] (Fax) post op              Ophthalmology  
   
                                        Comment on above:   post op   
   
                                                    Start: 2024  
End: 2024                         Patient encounter   
procedure                               2024 2:15 PM EST   
Office Visit [facility]   
Ophthalmology [address] [place] [address]   
[phone] Ronel Hays MD [address] [place] [address]   
[address] [phone] (Work)   
[phone] (Fax) 1 DAY   
PO 2ND RE BASIC                         Ophthalmology  
   
                                        Comment on above:   1 DAY PO 2ND RE BASI  
C   
   
                                                    Start: 2024  
End: 2024                         Xcapsl ctrc rmvl   
insj io lens prosth   
w/o ecp                                 Phacoemulsification   
Cataract with Insertion   
Intraocular Lens Combined   
forms of age-related   
cataract of right eye   
2024 11:15 AM EST                 [facility]  
   
                                                    Start: 2024  
End: 2024                         Admission to same   
day surgery center                      2024 10:10 AM EST -   
2024 10:25 AM EST   
Surgery [address] [address] Ronel Hays MD [address] [place] [address]   
[address] [phone] (Work)   
[phone] (Fax) SURGERY   
AT NON-[facility] FACILITY                    [facility]  
   
                                        Comment on above:   SURGERY AT NON-[facility]   
FACILITY   
   
                                        Start: 2024   Subsequent hospital   
visit by physician                      2024 10:10 AM EST   
Hospital Encounter Ronel Hays MD [address]   
[place]   
[address] [phone] (Work)   
[phone] (Fax)   
Combined forms of   
age-related cataract of   
right eye [H25.811]                     [facility]  
   
                                        Comment on above:   Combined forms of ag  
e-related cataract of right eye [H25.811]   
   
                                                    Start: 2024  
End: 2024                         SURGERY AT NON-[facility]   
FACILITY                                SURGERY AT NON-[facility]   
FACILITY Combined forms of   
age-related cataract of   
right eye 2024 10:10   
AM EST                                  [facility]  
   
                                                    Start: 2024  
End: 2024           ambulatory                2024 10:10 AM EST   
Visit (SP) Office   
Hematology/Oncology [address]   
[place]   
[address] [phone] Colten [address]  [place] [address]   
[phone] (Work)   
[phone] (Fax) 1 YR   
OV/ JONY *                          Hematology/Oncology  
   
                                        Comment on above:   1 YR OV/ JONY *   
   
                                        Start: 11-   Medicare Annual   
Wellness Visit                          Medicare Annual Wellness   
Visit (AWV)                             [facility]  
   
                                                    Start: 2024  
End: 2024                         Patient encounter   
procedure                               2024 3:40 PM EST   
Office Visit OB/Gynecology   
[address] [place]   
[place] [address] [phone]   
Jose Delgado  [address]   
[place]   
[address] [phone] (Work)   
[phone] (Fax) Annual               OB/Gynecology  
   
                                        Comment on above:   Annual   
   
                                        Start: 2024   Hemoglobin A1c   
measurement               HbA1C                     [facility]  
   
                                                    Start: 2024  
End: 2024                         Patient encounter   
procedure                               2024 11:15 AM EST   
Office Visit [facility]   
Ophthalmology [address] [place] [address]   
[phone] Ronel Hays MD [address]   
[place]   
[address] [phone] (Work)   
[phone] (Fax) sign   
consents 2nd re basic                   Ophthalmology  
   
                                        Comment on above:   sign consents 2nd re  
 basic   
   
                                                    Start: 2024  
End: 2024                         Patient encounter   
procedure                               2024 9:30 AM EST   
Appointment Mammogram [address]   
[place]   
[address] [phone]   
Encounter for screening   
mammogram for breast   
cancer [Z12.31]                         Mammogram  
   
                                        Comment on above:   Encounter for screen  
ing mammogram for breast cancer [Z12.31]   
   
                                                    Start: 2024  
End: 2024                         Patient encounter   
procedure                               2024 2:00 PM EST   
Office Visit [facility]   
Optometry [address] [place] [address]   
[phone] Amaya Reis [address],  [place] [address]   
[phone] (Work)   
[phone] (Fax) post op   
1 week (Cat surgery - left   
eye 10/31/24)                           Optometry  
   
                                        Comment on above:   post op 1 week (Cat   
surgery - left eye 10/31/24)   
   
                                        Start: 2024   Hepatitis B surface   
antibody level            LDL Cholesterol           [facility]  
   
                          Start: 2024 Lipid panel  Lipid Panel  [facility]  
   
                                                    Start: 2024  
End: 2024                         Patient encounter   
procedure                               2024 9:45 AM EDT   
Office Visit [facility]   
Ophthalmology [address] [place] [address]   
[phone] Ronel Hays MD [address]   
[place]   
[address] [phone] (Work)   
[phone] (Fax) 1 day   
po 1st le basic                         Ophthalmology  
   
                                        Comment on above:   1 day po 1st le basi  
c   
   
                                                    Start: 10-  
End: 10-                         Xcapsl ctrc rmvl   
insj io lens prosth   
w/o ecp                                 Phacoemulsification   
Cataract with Insertion   
Intraocular Lens Combined   
forms of age-related   
cataract of left eye   
10/31/2024 9:31 AM EDT                  [facility]  
   
                                                    Start: 10-  
End: 10-                         Admission to same   
day surgery center                      10/31/2024 9:25 AM EDT -   
10/31/2024 9:40 AM EDT   
Surgery [address] [place] [address] Ronel Hays MD [address]   
[place]   
[address] [phone] (Work)   
[phone] (Fax) SURGERY   
[facility]                    [facility]  
   
                                        Comment on above:   [facility]   
FAC[facility]   
   
                                        Start: 10-   Subsequent hospital   
visit by physician                      10/31/2024 9:25 AM EDT   
Hospital Encounter Ronel Hays MD [address]   
[place]   
[address] [phone] (Work)   
[phone] (Fax)   
Combined forms of   
age-related cataract of   
left eye [H25.812]                      [facility]  
   
                                        Comment on above:   Combined forms of ag  
e-related cataract of left eye [H25.812]   
   
                                                    Start: 10-  
End: 10-                         SURGERY AT NON-[facility]   
FACILITY                                SURGERY AT NON-[facility]   
FACILITY Combined forms of   
age-related cataract of   
left eye 10/31/2024 9:25   
AM EDT                                  [facility]  
   
                                                    Start: 10-  
End: 10-                         Patient encounter   
procedure                               10/08/2024 10:15 AM EDT   
Office Visit [facility]   
Ophthalmology [address] [place] [address]   
[phone] Ronel Hays MD [address]   
[place]   
[address] [phone] (Work)   
[phone] (Fax)   
Ascan/Sign Consents                     Ophthalmology  
   
                                        Comment on above:   Ascan/Sign Consents   
   
                                                    Start: 2024  
End: 2024                         Patient encounter   
procedure                               2024 2:30 PM EDT   
Office Visit [facility]   
Medical Office Building   
[address]   
[place] [address]   
[phone] Bernice Vital, APRN-CNP,    
[place]  [address],   
[facility] [address] [place] [address]   
[phone] (Work)   
[phone] (Fax)                      [facility] Medical   
Office Building  
   
                                                    Start: 2024  
End: 2024                         Patient encounter   
procedure                                           [facility]  
   
                                                    Start: 2024  
End: 2024                         Patient encounter   
procedure                                           [facility]   
Services  
   
                                                    Start: 2024  
End: 2024                         Patient encounter   
procedure                               2024 10:00 AM EDT   
Office Visit [facility]   
Medical Office Building   
[address]   
[place] [address] [phone] Bernice Vital, APRN-CNP,    
[place]  [address],   
[address] [place] [id number]   
[phone] (Work)   
[phone] (Fax)                      [facility] Medical   
Office [facility]  
   
                                                    Start: 2024  
End: 2024                         Patient encounter   
procedure                                           [facility]  
   
                                                    Start: 2024  
End: 2025                         Basic metabolic 2000   
panel - Serum or   
Plasma                                  Basic Metabolic Panel Lab   
Routine Controlled type 2   
diabetes mellitus without   
complication, without   
long-term current use of   
insulin (Multi) Expected:   
2024 (Approximate),   
Expires: 2025                     [facility]  
Work Phone:   
[phone]  
   
                                        Comment on above:   Expected: 2024  
 (Approximate), Expires: 2025   
   
                                                    Start: 2024  
End: 2025                         Hemoglobin   
A1c/Hemoglobin.total   
in Blood                                Hemoglobin A1C Lab Routine   
Controlled type 2 diabetes   
mellitus without   
complication, without   
long-term current use of   
insulin (Multi) Expected:   
2024 (Approximate),   
Expires: 2025                     [facility]  
Work Phone:   
[phone]  
   
                                        Comment on above:   Expected: 2024  
 (Approximate), Expires: 2025   
   
                                        Start: 2024   COVID-19 Vaccine ( season)                       COVID-19 Vaccine (6 -   
2023-24 season)                         [facility]  
   
                                        Start: 2024   Covid-19 Vaccine ( season)                       Covid-19 Vaccine ( season)                         [facility]  
   
                                        Start: 2024   Influenza   
vaccination               Influenza Vaccine (#1)    [facility]  
   
                                                    Start: 2024  
End: 2025                         NM Heart Perfusion W   
stress and W   
radionuclide IV                         Nuclear Stress Test   
Cardiac Nuclear Medicine   
Routine Abnormal EKG   
Expected: 2024   
(Approximate), Expires:   
2025                              [facility]  
Work Phone:   
[phone]  
   
                                        Comment on above:   Expected: 2024  
 (Approximate), Expires: 2025   
   
                                                    Start: 2024  
End: 2024                         Patient encounter   
procedure                               2024 1:45 PM EDT   
Office Visit [facility]   
Medical Office Building   
[address]   
[place] [address] [phone] Bernice Vital, APRN-CNP,    
[place]  [address],   
[address] [place] [id number]   
[phone] (Work)   
[phone] (Fax)                      [facility] Medical   
Office [facility]  
   
                                                    Start: 2024  
End: 2025           US Liver limited          US abdomen limited liver   
Imaging Routine Liver   
lesion Expected:   
2024 (Approximate),   
Expires: 2025                     [facility]  
Work Phone:   
[phone]  
   
                                        Comment on above:   Expected: 2024  
 (Approximate), Expires: 2025   
   
                                                    Start: 2024  
End: 2024                         Patient encounter   
procedure                               2024 11:00 AM EDT   
Office Visit [address] [place]   
[id number] [phone]   
Sharifa Almaguer, DO [address]   
[address] [facility]   
Medical Office [place] [id number]   
[phone] (Work)   
[phone] (Fax)                      [facility]  
   
                                        Start: 2024   Hemoglobin A1c   
measurement               Diabetes: Hemoglobin A1C  [facility]  
   
                                                    Start: 2024  
End: 2024                         Patient encounter   
procedure                               2024 10:00 AM EDT   
Office Visit [facility]   
Ophthalmology [address] [place] [address]   
[phone] Ronel Hays MD [address]   
[place]   
[address] [phone] (Work)   
[phone] (Fax)   
ascan/consents 1st eye                  Ophthalmology  
   
                                        Comment on above:   ascan/consents 1st e  
ye   
   
                                                    Start: 2024  
End: 2024                         Patient encounter   
procedure                               2024 2:00 PM EDT   
Office Visit [facility]   
Ophthalmology [address] [place] [id number]   
[phone] Ronel Hays MD [address]   
[place]   
[address] [phone] (Work)   
[phone] (Fax) CAT   
EVAL Both eyes/Dr. KIT Reis Ref. (Req PM   
appt)                                   Ophthalmology  
   
                                        Comment on above:   CAT EVAL Both eyes/CHAKA Reis Ref. (Req PM appt)   
   
                                Start: 2024 Glaucoma screening Diabetes: R  
etinopathy   
Screening                               [facility]  
   
                                        Start: 2024   Hepatitis C   
antibody,   
confirmatory test         DILATED RETINAL EXAM      [facility]  
   
                                                    Start: 2024  
End: 2024                         Patient encounter   
procedure                               2024 11:00 AM EDT   
Office Visit [facility] [address]   
[place] [id number] [phone]   
Sharifa Almaguer DO [address]   
[facility]   
Medical Office [place] [id number] [phone] (Work)   
[phone] (Fax)                      [facility]  
   
                                                    Start: 2024  
End: 2025                         Cobalamin (Vitamin   
B12) [Mass/volume]   
in Serum or Plasma                                  [facility]  
Work Phone:   
[phone]  
   
                                        Comment on above:   Expected: 2024  
 (Approximate), Expires: 2025   
   
                                                    Start: 2024  
End: 2024                         Hemoglobin   
A1c/Hemoglobin.total   
in Blood                                Hemoglobin A1C Lab Routine   
Controlled type 2 diabetes   
mellitus without   
complication, without   
long-term current use of   
insulin (CMS/[facility])   
Expected: 2024   
(Approximate), Expires:   
2024                              [facility]  
Work Phone:   
[phone]  
   
                                        Comment on above:   Expected: 2024  
 (Approximate), Expires: 2024   
   
                                                    Start: 2024  
End: 2025                         Thyrotropin   
[Units/volume] in   
Serum or Plasma                                     [facility]  
Work Phone:   
[phone]  
   
                                        Comment on above:   Expected: 2024  
 (Approximate), Expires: 2025   
   
                                                    Start: 2024  
End: 2024                         Patient encounter   
procedure                               2024 11:00 AM EDT   
Office Visit [facility]    
[place]   
[id number] [phone]   
Sharifa Almaguer DO    
[facility]   
[place] [address] [phone] (Work)   
[phone] (Fax)                      [facility]  
   
                                        Start: 2024   Hemoglobin   
A1c/Hemoglobin.total   
in Blood                  HbA1C                     [facility]  
   
                                        Start: 2024   COVID-19 Vaccine (6   
- 2023-24 season)                       COVID-19 Vaccine (6 -   
2023-24 season)                         [facility]  
   
                                        Start: 2024   Covid-19 Vaccine (7   
- 2023-24 season)                       Covid-19 Vaccine (7 -   
2023-24 season)                         [facility]  
   
                                                    Start: 2024  
End: 2024                         Basic metabolic 2000   
panel - Serum or   
Plasma                                  Basic Metabolic Panel Lab   
Routine Secondary   
hypertension Expected:   
2024 (Approximate),   
Expires: 2024                     [facility]  
Work Phone:   
[phone]  
   
                                        Comment on above:   Expected: 2024  
 (Approximate), Expires: 2024   
   
                                        Start: 2024   Hemoglobin A1c   
measurement               Diabetes: Hemoglobin A1C  [facility]  
   
                                        Start: 2024   Advance Directive   
Discussion                              Advance Directive   
Discussion                              [facility]  
   
                                        Start: 2024   Behavioral Health   
Screening                               Behavioral Health   
Screening                               [facility]  
   
                                        Start: 2023   COVID-19 Vaccine (4   
- Booster for   
Lamont series)                         COVID-19 Vaccine (4 -   
Booster for Lamont   
series)                                 [facility]  
   
                                                    Start: 10-  
End: 10-                         Comprehensive   
metabolic 2000 panel   
- Serum or Plasma                       Comprehensive Metabolic   
Panel Lab Routine   
Controlled type 2 diabetes   
mellitus without   
complication, without   
long-term current use of   
insulin (CMS/HCC)   
Expected: 10/31/2023   
(Approximate), Expires:   
10/31/2024                              [facility]  
Work Phone:   
[phone]  
   
                                        Comment on above:   Expected: 10/31/2023  
 (Approximate), Expires: 10/31/2024   
   
                                                    Start: 10-  
End: 10-                         Hemoglobin   
A1c/Hemoglobin.total   
in Blood                                Hemoglobin A1C Lab Routine   
Controlled type 2 diabetes   
mellitus without   
complication, without   
long-term current use of   
insulin (CMS/HCC)   
Expected: 10/31/2023   
(Approximate), Expires:   
10/31/2024                              [facility]  
Work Phone:   
[phone]  
   
                                        Comment on above:   Expected: 10/31/2023  
 (Approximate), Expires: 10/31/2024   
   
                                                    Start: 10-  
End: 10-                         Lipid 1996 panel -   
Serum or Plasma                         Lipid panel Lab Routine   
Mixed hyperlipidemia   
Expected: 10/31/2023   
(Approximate), Expires:   
10/31/2024                              [facility]  
Work Phone:   
[phone]  
   
                                        Comment on above:   Expected: 10/31/2023  
 (Approximate), Expires: 10/31/2024   
   
                                                    Start: 10-  
End: 10-                         Microalbumin/Creatin  
ine [Mass Ratio] in   
Urine                                   Albumin , Urine Random Lab   
Routine Controlled type 2   
diabetes mellitus without   
complication, without   
long-term current use of   
insulin (CMS/HCC)   
Expected: 10/31/2023   
(Approximate), Expires:   
10/31/2024                              [facility]  
Work Phone:   
[phone]  
   
                                        Comment on above:   Expected: 10/31/2023  
 (Approximate), Expires: 10/31/2024   
   
                                        Start: 2023   Covid-19 Vaccine (5   
- 2023-24 season)                       Covid-19 Vaccine (5 -   
2023-24 season)                         [facility]  
   
                                        Start: 2023   Hepatitis C   
antibody,   
confirmatory test         DILATED RETINAL EXAM      [facility]  
   
                                        Start: 2023   ADVANCE DIRECTIVE   
DISCUSSION                              ADVANCE DIRECTIVE   
DISCUSSION                              [facility]  
   
                                        Start: 2023   DEPRESSION   
ASSESSMENT                DEPRESSION ASSESSMENT     [facility]  
   
                                        Start: 2022   ADVANCE DIRECTIVE   
DISCUSSION                              ADVANCE DIRECTIVE   
DISCUSSION                              [facility]  
   
                                        Start: 2022   DEPRESSION   
ASSESSMENT                DEPRESSION ASSESSMENT     [facility]  
   
                                        Start: 10-   PNEUMOCOCCAL: 65+ (2  
   
- PCV)                                  PNEUMOCOCCAL: 65+ (2 -   
PCV)                                    [facility]  
   
                                        Start: 2006   Hepatitis B Vaccine   
(1 of 3 - Risk   
3-dose series)                          Hepatitis B Vaccine (1 of   
3 - Risk 3-dose series)                 [facility]  
   
                                        Start: 2006   Hepatitis B Vaccines  
   
(1 of 3 - Risk   
3-dose series)                          Hepatitis B Vaccines (1 of   
3 - Risk 3-dose series)                 [facility]  
   
                                        Start: 2006   RSV Vaccine (1 -   
1-dose 60+ series)                      RSV Vaccine (1 - 1-dose   
60+ series)                             [facility]  
   
                                        Start: 1996   SHINGRIX VACCINE (1   
of 2)                     SHINGRIX VACCINE (1 of 2) [facility]  
   
                                        Start: 1968   DTaP/Tdap/Td   
Vaccines (1 - Tdap)                     DTaP/Tdap/Td Vaccines (1 -   
Tdap)                                   [facility]  
   
                                        Start: 1965   Hepatitis A Vaccines  
   
(1 of 2 - Risk   
2-dose series)                          Hepatitis A Vaccines (1 of   
2 - Risk 2-dose series)                 [facility]  
   
                                        Start: 1965   Urine microalbumin   
profile                                             [facility]  
   
                                        Start: 1965   Urine screening for   
protein                                 Diabetes: Urine Protein   
Screening                               [facility]  
   
                                        Start: 1964   ANNUAL PCP TEAM   
CHRONIC DISEASE   
VISIT                                   ANNUAL PCP TEAM CHRONIC   
DISEASE VISIT                           [facility]  
   
                          Start: 1964 Anxiety Screening Anxiety Screening   
[facility]  
   
                          Start: 1964 Depression Screening Depression Scre  
ening [facility]  
   
                                        Start: 1964   Hepatitis B surface   
antibody level            LDL CHOLESTEROL           [facility]  
   
                                        Start: 1964   Hepatitis C   
screening                 Hepatitis C Screening     [facility]  
   
                                        Start: 1956   3 comp foot exam   
completed                 DIABETIC FOOT EXAM        [facility]  
   
                                        Start: 1956   Diabetic foot   
examination                                         [facility]  
   
                                Start: 1956 Glaucoma screening Diabetes: R  
etinopathy   
Screening                               [facility]  
   
                                        Start: 1956   Hepatitis B   
screening                               URINE ALBUMIN:CREATININE   
RATIO                                   [facility]  
   
                                        Start: 1951   Hemoglobin   
A1c/Hemoglobin.total   
in Blood                  HBA1C                     [facility]  
   
                                        Start: 1946   Annual wellness   
visit                                   Medicare Initial Physical   
(IPPE)                                  [facility]  
   
                                        Start: 1946   Hemoglobin A1c   
measurement               Diabetes: Hemoglobin A1C  [facility]  
   
                          Start: 1946 Lipid panel  Lipid Panel  [facility]  
   
                                        Start: 1946   Medicare Annual   
Wellness Visit                          Medicare Annual Wellness   
Visit (AWV)                             [facility]  
   
                                        Start: 1946   Screening for   
osteoporosis              Bone Density Scan         [facility]  
   
                                                      
End: 2024                         Bacteria identified   
in Urine by Culture                                 [facility]  
Work Phone:   
[phone]  
   
                                        Comment on above:   Once (Lab) for 1 Occ  
urrences starting 2024 until   
2024   
   
                                                      
End: 2024                         CT Abdomen and   
Pelvis W contrast IV                                [facility]  
Work Phone:   
[phone]  
   
                                        Comment on above:   Once for 1 Occurrenc  
es starting 2024 until 2024   
   
                                                            DBT Breast -   
bilateral screening                     [facility]ON[facility]CREENING [facility]   
Radiology Routine   
Encounter for screening   
mammogram for breast   
cancer 2024 10:03 AM   
EST                                     [facility]  
Work Phone:   
[phone]  
   
                                                      
End: 2025                         DBT Breast -   
bilateral screening                     [facility]ON[facility]CREENING [facility]   
Radiology Routine   
Encounter for   
gynecological examination   
(general) (routine)   
without abnormal findings   
Encounter for screening   
mammogram for breast   
cancer 1 Occurrences   
starting 2024 until   
2025                              [facility]  
Work Phone:   
[phone]  
   
                                        Comment on above:   1 Occurrences starti  
ng 2024 until 2025   
   
                                                      
End: 2025                         DBT Breast -   
bilateral screening                     [facility]   
Radiology Routine   
Encounter for screening   
mammogram for breast   
cancer 1 Occurrences   
starting 2024 until   
2025                              [facility]  
Work Phone:   
[phone]  
   
                                        Comment on above:   1 Occurrences starti  
ng 2024 until 2025   
   
                                                      
End: 2024     ECG 12 lead                             [facility]  
Work Phone:   
[phone]  
   
                                        Comment on above:   Once for 1 Occurrenc  
es starting 2024 until 2024   
   
                                                      
End: 2024     ECG 12 Lead                             [facility]  
Work Phone:   
[phone]  
   
                                        Comment on above:   Once for 1 Occurrenc  
es starting 2024 until 2024   
   
                                                      
End: 2024                         Extra Urine Gray   
Tube                                                [facility]  
Work Phone:   
[phone]  
   
                                        Comment on above:   Once for 1 Occurrenc  
es starting 2024 until 2024   
   
                                                      
End: 10-                         Glucose   
[Mass/volume] in   
Serum or Plasma                         POCT Glucose Point of Care   
Testing - Docked Device   
Routine Once (Lab) for 1   
Occurrences starting   
10/31/2024 until   
10/31/2024                              [facility]  
Work Phone:   
[phone]  
   
                                        Comment on above:   Once (Lab) for 1 Occ  
urrences starting 10/31/2024 until   
10/31/2024   
   
                                                      
End: 2024                         Glucose   
[Mass/volume] in   
Serum or Plasma                         POCT Glucose Point of Care   
Testing - Docked Device   
Routine Once (Lab) for 1   
Occurrences starting   
2024 until   
2024                              [facility]  
Work Phone:   
[phone]  
   
                                        Comment on above:   Once (Lab) for 1 Occ  
urrences starting 2024 until   
2024   
   
                                                            PAP FLUID VAGINAL   
VAULT SCREENING                         PAP FLUID VAGINAL VAULT   
SCREENING Lab Routine   
Encounter for screening   
for malignant neoplasm of   
vagina 2022 11:34 AM   
EST                                     [facility]  
Work Phone:   
[phone]  
   
                                                      
End: 2024                         Urinalysis complete   
W Reflex Culture   
panel - Urine                                       [facility]  
Work Phone:   
[phone]  
   
                                        Comment on above:   Once (Lab) for 1 Occ  
urrences starting 2024 until   
2024   
   
                                                      
End: 2024     US Liver limited                        [facility]  
Work Phone:   
[phone]  
   
                                        Comment on above:   Once for 1 Occurrenc  
es starting 2024 until 2024   
   
                                                                  [facility]   
[facility]  
Work Phone:   
[phone]  
   
                                                                 [facility]  
c  
   
                                                                 [facility]  
c  
   
                                                    NEGATED:   
Highlighted row has   
been ruled out!                                     Planned Goals not   
documented                               [facility]   
[facility]   
[place]  
Work Phone:   
[phone]  
  
  
  
Immunizations  
  
  
                      Immunization Date Immunization Notes      Care Provider Araseli  
[facility]  
   
                                        10-          influenza virus   
vaccine, unspecified   
formulation                                         Ronel Hays MD  
Work Phone:   
[phone]                          [facility]  
   
                                        10-          influenza virus   
vaccine, unspecified   
formulation                                         Mammography   
Coordinator                             [facility]  
Work Phone:   
[phone]  
   
                                        10-          pneumococcal   
polysaccharide vaccine,   
23 valent                                           Mammography   
Coordinator                             [facility]  
Work Phone:   
[phone]  
  
  
  
Payers  
  
  
                          Date         Payer Category Payer        Policy ID  
   
                                2024      Medicare (Managed Care) [employer]   
Member Subscriber Plan /   
Payer (Effective   
2024-Present) Name:   
Marzena Mederos Member   
ID: [id number] Relation   
to Subscriber: Self Name:   
Marzena Mederos MAYRA   
Subscriber ID:   
[id number] Payer ID: [id number]   
(NAIC) Group ID: [id number]   
Type: Not on file Address:   
[address] [place] [address]                                   [id number]  
[id number]  
   
                          2024   Medicare                  [id number]  
   
                          10-   Medicare                  [id number]  
   
                          2015   Unknown                   [id number]  
[id number]  
[id number]  
   
                          2014   Medicare                  [id number]  
   
                          2011   Medicare                  [id number].  
[id number]  
[id number]  
   
                          1946   Unknown                   [id number]   
[id number].  
2.278  
   
                          1946   Unknown                   [id number]   
[id number].  
2.278  
   
                          1946   Unknown                   [id number]   
[id number].  
2.278  
   
                          1946   Unknown                   [id number]   
[id number].  
2.278  
   
                          1946   Unknown                   [id number]   
[id number].  
2.278  
   
                          1946   Unknown                   [id number]   
[id number].  
2.278  
   
                          1946   Unknown                   [id number]   
[id number].  
2.278  
   
                          1946   Unknown                   [id number]   
[id number].  
2.278  
   
                          1946   Unknown                   [id number]   
[id number].  
2.1946   Unknown                   [id number]   
[id number].  
2.278  
   
                          1946   Unknown                   [id number]   
[id number].  
2.1946   Unknown                   [id number]   
[id number].  
2.1946   Unknown                   [id number]   
[id number].  
2.1946   Unknown                   [id number]   
[id number].  
2.1946   Unknown                   [id number]   
2.16.840.1.007467.3.579.  
2.1946   Unknown                   [id number]   
2.16840.1.964353.3.579.  
2.1946   Unknown                   [id number]   
2.16840.1.110771.3.579.  
2.1946   Unknown                   [id number]   
2.16.840.1.226652.3.579.  
2.1946   Unknown                   [id number]   
2.16.840.1.352516.3.579.  
2.1946   Unknown                   [id number]   
2.16.840.1.720436.3.579.  
2.4  
   
                          1946   Unknown                   [id number]   
2.16.840.1.690738.3.579.  
2.1946   Unknown                   [id number]   
[id number].  
2.1946   Unknown                   [id number]   
2.16.840.1.755278.3.579.  
2.1946   Unknown                   [id number]   
2.16.840.1.994556.3.579.  
2.1946   Unknown                   [id number]   
2.16.840.1.371191.3.579.  
2.1946   Unknown                   [id number]   
2.16.840.1.757875.3.579.  
2.1946   Unknown                   [id number]   
2.16.840.1.937382.3.579.  
2.1946   Unknown                   [id number]   
2.16.840.1.740451.3.579.  
2.1946   Unknown                   [id number]   
2.16.840.1.516740.3.579.  
2.1946   Unknown                   [id number]   
2.16.840.1.770700.3.579.  
2.1946   Unknown                   [id number]   
2.16.840.1.432644.3.579.  
2.1946   Unknown                   [id number]   
2.16.840.1.104844.3.579.  
2.1946   Unknown                   [id number]   
2.16.840.1.297449.3.579.  
2.1946   Unknown                   [id number]   
2.16.840.1.019911.3.579.  
2.1946   Unknown                   [id number]   
2.16.840.1.500638.3.579.  
2.1946   Unknown                   [id number]   
2.16.840.1.523613.3.579.  
2.1243  
   
                          1946   Unknown                   [id number]   
[id number].  
2.1243  
   
                          1946   Unknown                   [id number]   
[id number].  
2.1243  
   
                                       Medicare                  [id number]  
  
  
  
Social History  
  
  
                          Date         Type         Detail       Facility  
   
                                                Assertion       Tobacco smoking   
consumption unknown   
(finding)                                [facility]   
[facility]  
Work Phone:   
[phone]  
   
                                                    Start:   
2011  
End: 2022                         Tobacco smoking status   
NHIS                      Never smoked tobacco      [facility]  
Work Phone:   
[phone]  
   
                                                    Start:   
2011  
End: 2022                         Tobacco use and   
exposure                                Smokeless tobacco   
non-user                                [facility]  
Work Phone:   
[phone]  
   
                                                    Start:   
2022  
End: 01-           Alcohol intake            Current drinker of   
alcohol (finding)                       [facility]  
   
                                                    Start:   
2010          Alcohol Comment     1-2 weekly          [facility]  
   
                                                    Start:   
1946          Sex Assigned At Birth Female              [facility]  
   
                                                    Start:   
2022  
End: 2024                         Exposure to SARS-CoV-2   
(event)                   Not sure                  [facility]  
Work Phone:   
[phone]  
   
                                                    Start:   
2023  
End: 10-                         History of Social   
function                                            [facility]  
   
                                                    Start:   
2023  
End: 10-     Tobacco use panel                       [facility]  
   
                                                            Adult Depression   
Screening Assessment      0                         [facility]  
   
                                                    Start:   
2017                Gender identity           Identifies as female   
gender (finding)                        [facility]  
   
                                                    Start:   
2017          Sexual orientation  Heterosexual (finding) [facility]  
   
                                                    Start:   
10-          Alcohol Comment     [facility]  
Work Phone:   
[phone]  
   
                                                    Start:   
1946          Sex Assigned At Birth Not on file         [facility]  
Work Phone:   
[phone]  
   
                                                    Start:   
2024          Alcohol Comment     [facility]  
Work Phone:   
[phone]  
  
  
  
Medical Equipment  
  
  
                                Procedure Code  Equipment Code  Equipment Origin  
al   
Text                                    Equipment   
Identifier                              Dates  
   
                                                                One-Of-A-Kind   
Implant -   
[id number]                 596355_[facility]                Start:   
10-  
   
                                        Comment on above:   Description: MICRO M  
ARK II TISSUE MARKER   
   
                                                                 [id number],   
[id number]                               Start:   
2017  
End:   
2022  
   
                                                    Clareon Iol  ()[phone]  
[phone]  
[phone]  
[phone],   
197948_[facility] FDA                          Start:   
10-  
   
                                            Clareon Iol 22.0d [id number] Start:   
2024  
  
  
  
Functional Status  
  
  
                          Date         Assessment   Result       Facility  
   
                                                    NEGATED: Highlighted   
row                       Functional performance    Functional status   
health issues are   
not documented   
Disease                                  [facility]   
[facility]   
[employer]  
Work Phone:   
[phone]  
  
  
  
Mental Status  
  
  
                          Date         Assessment   Result       Facility  
   
                                                    NEGATED: Highlighted   
row                                     Cognitive function   
[Interpretation]                        Cognitive status   
health issues are not   
documented Disease                      [facility]   
[facility]   
[employer]  
Work Phone:   
[phone]  
  
  
  
Clinical Notes 2006 to 01-  
  Patient InstructionsCoAmaya lai II, OD - 01/15/2025 2:16 PM Ronel Castaneda MD - 2024 4:17 PM ESTPatient InstructionsRonel Hays MD
 - 2024 2:07 PM ESTAttachments  
  
                                Note Date & Type Note            Facility  
   
                                        01- Instructions   
  
  
Amaya Reis II, OD -   
01/15/2025 2:17 PM ESTFormatting   
of this note might be different   
from the original.  
Assessment and Plan  
  
Z98.41, Z96.1 Status post cataract   
extraction and insertion of   
intraocular lens of right eye   
(primary encounter diagnosis)  
Z98.42, Z96.1 Status post cataract   
extraction and insertion of   
intraocular lens of left eye  
Comment: Healing well both eyes.   
Posterior chamber intraocular   
lenses are well positioned and   
clear. Patient happy using OTC   
readers as needed with no   
correction for distance.  
  
I have confirmed and edited as   
necessary the relevant HPI,   
ophthalmic history, ROS, and the   
neuro exam findings as obtained by   
others. I have seen and examined   
Marzena Mederos.  
I have discussed the case and the   
management of this patient's care   
with the Resident/Fellow, if   
applicable. I also have reviewed   
and agree with the assessment and   
plan as stated above and agree   
with all of its relevant   
components.  
  
  
  
  
  
Electronically signed by   
Amaya Reis II, OD at   
01/15/2025 2:17 PM EST  
  
documented in this encounter            [facility]  
   
                                        01- Note     HNO ID: [id number]  
Author: AMAYA REIS II, OD  
Service: ?  
Author Type: OPTOMETRIST  
Type: Progress Notes  
Filed: 01/15/2025 14:17  
Note Text:  
Assessment and Plan  
Z98.41, Z96.1 Status post cataract   
extraction and insertion of   
intraocular lens  
of right eye (primary encounter   
diagnosis)  
Z98.42, Z96.1 Status post cataract   
extraction and insertion of   
intraocular lens  
of left eye  
Comment: Healing well both eyes.   
Posterior chamber intraocular   
lenses are well  
positioned and clear. Patient   
happy using OTC readers as needed   
with no  
correction for distance.  
I have confirmed and edited as   
necessary the relevant HPI,   
ophthalmic history,  
ROS, and the neuro exam findings   
as obtained by others. I have seen   
and  
examined Marzena Mederos.  
I have discussed the case and the   
management of this patient's care   
with the  
Resident/Fellow, if applicable. I   
also have reviewed and agree with   
the  
assessment and plan as stated   
above and agree with all of its   
relevant  
components.                             [facility]  
   
                                                    01- History of   
Present illness Narrative               Formatting of this note might be   
different from the original.  
Assessment and Plan  
  
Z98.41, Z96.1 Status post cataract   
extraction and insertion of   
intraocular lens of right eye   
(primary encounter diagnosis)  
Z98.42, Z96.1 Status post cataract   
extraction and insertion of   
intraocular lens of left eye  
Comment: Healing well both eyes.   
Posterior chamber intraocular   
lenses are well positioned and   
clear. Patient happy using OTC   
readers as needed with no   
correction for distance.  
  
I have confirmed and edited as   
necessary the relevant HPI,   
ophthalmic history, ROS, and the   
neuro exam findings as obtained by   
others. I have seen and examined   
Marzena Mederos.  
I have discussed the case and the   
management of this patient's care   
with the Resident/Fellow, if   
applicable. I also have reviewed   
and agree with the assessment and   
plan as stated above and agree   
with all of its relevant   
components.  
  
  
  
Electronically signed by   
Amaya Reis II, OD at   
01/15/2025 2:17 PM EST  
documented in this encounter            [facility]  
   
                                        2024 Note     HNO ID: [id number]  
Author: RONEL HAYS MD  
Service: ?  
Author Type: Physician  
Type: Progress Notes  
Filed: 2024 16:21  
Note Text:  
ASSESSMENT/PLAN:  
1. Status post cataract extraction   
and insertion of intraocular lens   
of right  
eye - ICD9: V45.61, V43.1, ICD10:   
Z98.41, Z96.1 (primary diagnosis)  
2. Status post cataract extraction   
and insertion of intraocular lens   
of left eye  
- ICD9: V45.61, V43.1, ICD10:   
Z98.42, Z96.1  
S/p cataract surgery right eye   
2024  
S/p cataract surgery left eye   
10/31/2024  
- Use medication as directed  
Current Ophthalmic Meds  
keTORolac (ACULAR) 0.5 %   
ophthalmic solution  
Use 1 Drop in the left eye three   
times a day until 24 then   
stop  
prednisoLONE acetate (PRED FORTE)   
1 % ophthalmic suspension  
Use 1 Drop in the left eye three   
times a day until 24 then   
stop  
keTORolac (ACULAR) 0.5 %   
ophthalmic solution  
Use 1 Drop in the right eye four   
times daily for 7 days, then three   
times daily  
until 2024  
prednisoLONE acetate (PRED FORTE)   
1 % ophthalmic suspension Use 1   
Drop in the  
right eye four times daily for 7   
days, then three times daily until   
2024  
Systane Complete Artificial Tears   
- Use 1 Drop into both eyes three   
times a day.  
Follow up with Dr. Reis in   
5 to 6 weeks  
I have confirmed and edited as   
necessary the relevant HPI,   
ophthalmic history,  
ROS, and the neuro exam findings   
as obtained by others. I have seen   
and  
examined Marzena Mederos.  
I have discussed the case and the   
management of this patient's care   
with the  
Resident/Fellow, if applicable. I   
also have reviewed and agree with   
the  
assessment and plan as stated   
above and agree with all of its   
relevant  
components.                             [facility]  
   
                                                    2024 History of   
Present illness Narrative               Formatting of this note is   
different from the original.  
ASSESSMENT/PLAN:  
1. Status post cataract extraction   
and insertion of intraocular lens   
of right eye - ICD9: V45.61,   
V43.1, ICD10: Z98.41, Z96.1   
(primary diagnosis)  
2. Status post cataract extraction   
and insertion of intraocular lens   
of left eye - ICD9: V45.61, V43.1,   
ICD10: Z98.42, Z96.1  
  
S/p cataract surgery right eye   
2024  
S/p cataract surgery left eye   
10/31/2024  
  
- Use medication as directed  
Current Ophthalmic Meds  
  
  
  
keTORolac (ACULAR) 0.5 %   
ophthalmic solution  
Use 1 Drop in the left eye three   
times a day until 24 then   
stop  
prednisoLONE acetate (PRED FORTE)   
1 % ophthalmic suspension  
Use 1 Drop in the left eye three   
times a day until 24 then   
stop  
keTORolac (ACULAR) 0.5 %   
ophthalmic solution  
Use 1 Drop in the right eye four   
times daily for 7 days, then three   
times daily until 2024  
prednisoLONE acetate (PRED FORTE)   
1 % ophthalmic suspension Use 1   
Drop in the right eye four times   
daily for 7 days, then three times   
daily until 2024  
  
Systane Complete Artificial Tears   
- Use 1 Drop into both eyes three   
times a day.  
  
Follow up with Dr. Reis in   
5 to 6 weeks  
  
I have confirmed and edited as   
necessary the relevant HPI,   
ophthalmic history, ROS, and the   
neuro exam findings as obtained by   
others. I have seen and examined   
Marzena Mederos.  
I have discussed the case and the   
management of this patient's care   
with the Resident/Fellow, if   
applicable. I also have reviewed   
and agree with the assessment and   
plan as stated above and agree   
with all of its relevant   
components.  
  
  
  
Electronically signed by Ronel Hays MD at 2024 4:21   
PM EST  
documented in this encounter            [facility]  
   
                                        2024 Instructions   
  
  
Ronel Hays MD - 2024   
2:09 PM ESTFormatting of this note   
is different from the original.  
- Use medication as directed  
Current Ophthalmic Meds  
  
  
  
keTORolac (ACULAR) 0.5 %   
ophthalmic solution  
Use 1 Drop in the left eye three   
times a day until 24 then   
stop  
prednisoLONE acetate (PRED FORTE)   
1 % ophthalmic suspension  
Use 1 Drop in the left eye three   
times a day until 24 then   
stop  
keTORolac (ACULAR) 0.5 %   
ophthalmic solution  
Use 1 Drop in the right eye four   
times daily.  
prednisoLONE acetate (PRED FORTE)   
1 % ophthalmic suspension Use 1   
Drop in the right eye four times   
daily.  
  
Systane Complete Artificial Tears   
- Use 1 Drop into both eyes three   
times a day.  
  
If you have any questions please   
contact our office at   
[phone].  
After office hours or on the   
weekend, please call Dr. Hays on   
his cell phone at [phone].  
  
Electronically signed by Ronel Hays MD at 2024 2:09   
PM EST  
  
documented in this encounter            [facility]  
   
                                        2024 Note     HNO ID: [id number]  
Author: RONEL HAYS MD  
Service: ?  
Author Type: Physician  
Type: Progress Notes  
Filed: 2024 14:14  
Note Text:  
ASSESSMENT/PLAN:  
1. Status post cataract extraction   
and insertion of intraocular lens   
of right  
eye - ICD9: V45.61, V43.1, ICD10:   
Z98.41, Z96.1 (primary diagnosis)  
2. Status post cataract extraction   
and insertion of intraocular lens   
of left eye  
- ICD9: V45.61, V43.1, ICD10:   
Z98.42, Z96.1  
- Use medication as directed  
Current Ophthalmic Meds  
keTORolac (ACULAR) 0.5 %   
ophthalmic solution  
Use 1 Drop in the left eye three   
times a day until 24 then   
stop  
prednisoLONE acetate (PRED FORTE)   
1 % ophthalmic suspension  
Use 1 Drop in the left eye three   
times a day until 24 then   
stop  
keTORolac (ACULAR) 0.5 %   
ophthalmic solution  
Use 1 Drop in the right eye four   
times daily.  
prednisoLONE acetate (PRED FORTE)   
1 % ophthalmic suspension Use 1   
Drop in the  
right eye four times daily.  
Systane Complete Artificial Tears   
- Use 1 Drop into both eyes three   
times a day.  
I have confirmed and edited as   
necessary the relevant HPI,   
ophthalmic history,  
ROS, and the neuro exam findings   
as obtained by others. I have seen   
and  
examined Marzena Mederos.  
I have discussed the case and the   
management of this patient's care   
with the  
Resident/Fellow, if applicable. I   
also have reviewed and agree with   
the  
assessment and plan as stated   
above and agree with all of its   
relevant  
components.                             [facility]  
   
                                                    2024 History of   
Present illness Narrative               Formatting of this note is   
different from the original.  
ASSESSMENT/PLAN:  
1. Status post cataract extraction   
and insertion of intraocular lens   
of right eye - ICD9: V45.61,   
V43.1, ICD10: Z98.41, Z96.1   
(primary diagnosis)  
2. Status post cataract extraction   
and insertion of intraocular lens   
of left eye - ICD9: V45.61, V43.1,   
ICD10: Z98.42, Z96.1  
- Use medication as directed  
Current Ophthalmic Meds  
  
  
  
keTORolac (ACULAR) 0.5 %   
ophthalmic solution  
Use 1 Drop in the left eye three   
times a day until 24 then   
stop  
prednisoLONE acetate (PRED FORTE)   
1 % ophthalmic suspension  
Use 1 Drop in the left eye three   
times a day until 24 then   
stop  
keTORolac (ACULAR) 0.5 %   
ophthalmic solution  
Use 1 Drop in the right eye four   
times daily.  
prednisoLONE acetate (PRED FORTE)   
1 % ophthalmic suspension Use 1   
Drop in the right eye four times   
daily.  
  
Systane Complete Artificial Tears   
- Use 1 Drop into both eyes three   
times a day.  
  
I have confirmed and edited as   
necessary the relevant HPI,   
ophthalmic history, ROS, and the   
neuro exam findings as obtained by   
others. I have seen and examined   
Marzena Mederos.  
I have discussed the case and the   
management of this patient's care   
with the Resident/Fellow, if   
applicable. I also have reviewed   
and agree with the assessment and   
plan as stated above and agree   
with all of its relevant   
components.  
  
  
  
Electronically signed by Ronel Hays MD at 2024 2:14   
PM EST  
documented in this encounter            [facility]  
   
                                                    2024 Hospital   
Discharge instructions                    
  
  
Ronel Hays MD - 2024   
11:48 AM ESTFormatting of this   
note might be different from the   
original.  
Please see enclosed instructions   
from Dr. Hays regarding eye drop   
schedule, restrictions and use of   
eye shield.  
  
Please take bag with eye drops   
that were given to you today as   
well as ALL eye drops that you are   
using at home with you to your   
appointment tomorrow at Dr. Hays's office.  
  
Electronically signed by Ronel Hays MD at 2024 11:48 AM   
EST  
  
documented in this encounter            [facility]  
Work Phone:   
[phone]  
   
                                                    2024 Attending   
History and physical note               Formatting of this note might be   
different from the original.  
H&P reviewed. The patient was   
examined and there are no changes   
to the H&P.  
Electronically signed by Ronel Hays MD at 2024 10:15 AM   
EST  
  
Source Note - Ronel Hays MD   
- 2024 10:15 AM EST  
Formatting of this note is   
different from the original.  
H&P Notes  
- documented in this encounter  
Ronel Hays MD - 2024   
11:26 AM EST  
Formatting of this note is   
different from the original.  
HISTORY AND PHYSICAL EXAMINATION  
  
SERVICE DATE: 2024  
SERVICE TIME: 11:26 AM  
  
PRIMARY CARE PHYSICIAN: Sharifa Almaguer DO  
  
REASON FOR VISIT: Marzena Mederos   
is a 79 year old female who is   
being seen for Combined   
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  
  
The patient has the following:  
ACTIVE PROBLEM LIST  
Postmenopausal Atrophic Vaginitis  
Unspecified Transient Cerebral   
Ischemia  
Carcinoma in Situ of Vulva  
Malignant Neoplasm of Female   
Breast (Hcc)  
Personal History of Malignant   
Neoplasm of Breast  
Senile Nuclear Sclerosis  
Hypermetropia  
Regular Astigmatism  
Presbyopia  
Invasive Ductal Carcinoma of   
Breast, Female (Hcc)  
Ocular Migraine  
Controlled Type 2 Diabetes   
Mellitus Without Complication,   
Without Long-Term Current Use of   
Insulin (Hcc)  
Postmenopausal Bleeding  
S/P Hysterectomy With Oophorectomy  
  
SUBJECTIVE  
CHIEF COMPLAINT: Blurred vision   
for distance and near Right Eye.  
  
PAST MEDICAL HISTORY  
Diagnosis Date  
Breast cancer (HCC)   
invasive ductal carcinoma,   
completed radiation 3/7/11  
Cancer of endometrium (HCC)  
Carcinoma in situ, vulva   
Cellulitis  
of right eye muscle  
Diabetes mellitus without mention   
of complication  
Diabetes mellitus, type II (HCC)  
Diaphragmatic hernia without   
mention of obstruction or gangrene  
Hiatal hernia  
Dyspareunia  
Elevated cholesterol  
Esophageal reflux  
Insomnia, unspecified  
Orbital cellulitis  
Other corneal disorder  
Rt eye corneal erosion .  
PMH - PAST MEDICAL HISTORY OF  
?VESTIBULITIS  
PMH - PAST MEDICAL HISTORY OF  
CLIMACTERIC  
PMH - PAST MEDICAL HISTORY OF   
2006  
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  
Pure hypercholesterolemia  
Rib fracture  
6th Rib  
Senile dementia (HCC)  
Stomatitis and mucositis   
(ulcerative)  
Chronic ulcerative stomatitis on   
mucosal biopsy  
Unspecified essential hypertension  
  
PAST SURGICAL HISTORY  
Procedure Laterality Date  
Bunions bilateral feet removed   
10/2002  
BX/EXC LYMPH NODE OPEN DEEP   
AXILLARY NODE 2010  
RIGHT BREAST LUMP W/ SLN BX  
CHOLECYSTECTOMY 2004  
Cholecystectomy  
COLONOSCOPY FLX DX W/COLLJ SPEC   
WHEN PFRMD   
Colonoscopy  
COLPOSCOPY CERVIX UPPER/ADJACENT   
VAGINA 2009  
Colposcopy of the vulva  
COLPOSCOPY, VULVA 10/2009  
CORRECT BUNION,SIMPLE  
Bunion  
DILATION & CURETTAGE DX&/THER   
NONOBSTETRIC 1970's  
SAB  
HYSTERECTOMY 2017  
LAPAROSCOPY SURG CHOLECYSTECTOMY   
2004  
Cholecystectomy, lap  
MAMMO STEREOTACTIC CORE BIOPSY RT   
10/10/2013  
MASTECTOMY, PARTIAL 2010  
RIGHT BREAST  
PAST SURGICAL HISTORY OF 2006  
partial vulvectomy/sq. cell ca in   
situ  
PAST SURGICAL HISTORY OF   
right eye cornea  
PAST SURGICAL HISTORY OF   
right eye revision  
PAST SURGICAL HISTORY OF  
squamous cell carcinoma right arm  
REMV CATARACT EXTRACAP,INSERT LENS   
10/31/2024  
CCA0T0 - +19.5D  
SALPINGO-OOPHORECTOMY COMPL/PRTL   
UNI/BI SPX 2017  
Toenail Big Toe Left Foot removed   
10/2002  
TONSILLECTOMY PRIMARY/SECONDARY   
Tonsillectomy  
UNLISTED PROCEDURE HANDS/FINGERS   
2012  
CMC Arthoplasty on right hand  
  
FAMILY HISTORY  
Problem Relation Age of Onset  
Stroke Mother  
Diabetes Mother  
GI Father  
 from complications of gb   
surgery  
Arthritis Sister  
Systemic Lupus  
Cancer Maternal Uncle  
LUNG  
Cancer Other  
cousin with breast cancer/cousin   
with bladder ca.  
Stroke Sister  
  
SOCIAL HISTORY:  
Social History  
  
Tobacco Use  
Smoking status: Never  
Smokeless tobacco: Never  
Vaping Use  
Vaping status: Never Used  
Substance Use Topics  
Alcohol use: Yes  
Comment: 1-2 weekly  
Drug use: No  
  
MEDICATIONS:  
Prior to Admission medications as   
of 24 1120  
Medication Sig Last Dose Taking  
glipiZIDE (GLUCOTROL XL) 2.5 mg 24   
hr tablet Take 2.5 mg by mouth.   
Yes  
fluorometholone (FML LIQUID FILM)   
0.1 % ophthalmic suspension Use 1   
Drop in the right eye three times   
a day. Yes  
donepezil (ARICEPT) 10 mg tablet   
Take 10 mg by mouth. Yes  
cholecalciferol (VITAMIN D3) 50   
mcg (2,000 unit) tablet Take 2,000   
Units by mouth once daily. Yes  
losartan-hydroCHLOROthiazide   
(HYZAAR) 100-12.5 mg per tablet   
Take 1 tablet by mouth once daily.   
Yes  
omeprazole (PRILOSEC) 20 mg   
capsule Take 20 mg by mouth once   
daily. Yes  
potassium chloride (KLOR-CON 10)   
10 mEq tablet Take 10 mEq by mouth   
once daily.  
Yes  
metFORMIN ER (GLUCOPHAGE XR) 500   
mg 24 hr tablet Take two tablets   
by mouth once daily. Yes  
simvastatin(ZOCOR 20 MG TAB) Take   
one(1) tablet daily. Yes  
keTORolac (ACULAR) 0.5 %   
ophthalmic solution Use 1 Drop in   
the left eye three times a day.  
prednisoLONE acetate (PRED FORTE)   
1 % ophthalmic suspension Use 1   
Drop in the left eye three times a   
day.  
amLODIPine (NORVASC) 10 mg tablet   
Take 10 mg by mouth.  
  
No medication comments found.  
  
CURRENT ALLERGIES:  
ALLERGIES  
Allergen Reactions  
Adhesive Rash  
Redness  
Ambien [Zolpidem Ta* Rash  
Codeine Mental Status Change  
Neosporin [Neomycin* Rash  
Percocet [Oxycodone*  
Causes Vomitting  
  
REVIEW OF SYSTEMS:  
PAIN ASSESSMENT:  
General: No weight loss, malaise   
or fevers.  
Neuro: Dementia  
Respiratory: No history of current   
cough or dyspnea, or pneumonia in   
the past 6 weeks. No history of   
respiratory/pulmonary symptoms or   
problems  
Cardiovascular: Positive for:   
Hypertension, PVC  
GI: No history of GI symptoms or   
problems. No history of esophageal   
varices, recent ascites, or ETOH   
greater than 2 drinks per day.  
: No history of UTI in past 6   
weeks. No history of renal   
failure. Not currently on or   
requiring dialysis. No history of   
 symptoms or problems.  
GYN: Negative for abnormal vaginal   
bleeding, abnormal vaginal   
discharge.  
Pregnancy: Denies  
Endocrine: Diabetes Mellitus on   
oral agent  
Hematology: No history of bleeding   
or clotting disorder. Pt is not   
taking anti-coagulation or   
platelet medications. No history   
of hematological symptoms or   
problems.  
Oncology: No history of CA   
metastasis, chemo within 30 days,   
or radiotherapy within 90 days.   
Has not lost 10% of body wt in 6   
months. No history of oncological   
symptoms or problems.  
Psych: No history of psychiatric   
symptoms or problems.  
Musculoskeletal: Negative for   
joint pain or swelling, back pain   
or muscle pain.  
Skin: Negative for lesions, rash   
and itching.  
  
PHYSICAL EXAM:  
VITALS:  
/80   Pulse 88  
  
General: Alert and oriented  
Skin: Normal color, no rash, no   
lesions.  
HEENT: EOM, pupils equal, round   
and reactive.  
Cardiovascular: Normal S1 & S2, no   
rubs, murmurs or gallops. No JVD.   
Pulse regular.  
Lungs: Normal breath sounds, no   
wheezes or crackles.  
Abdomen: Soft, non-tender, no   
rigidity.  
Extremities: No deformity, no   
edema or tenderness, no joint   
swelling or clubbing.  
Neurological: Normal cognition and   
motor skills.  
Pulses: Carotid and radial pulses   
normal +2.  
  
Diagnostic tests reviewed for   
today's visit:  
No new labs or tests  
  
ASSESSMENT  
Medication and Non-Pharmacologic   
VTE Prophylaxis/Anticoagulants  
  
VTE Prophylaxis: VTE prophylaxis   
appropriate  
  
Impression: There is no known   
pertinent medical condition which   
may affect trinidad-operative course  
  
[unfilled]  
Clinical Risk Factors for Possible   
Cardiac Complications:  
None  
  
Patient is scheduled for a   
low-risk procedure.  
  
FUNCTIONAL STATUS: Walk indoors,   
such as around the house (1.75   
METs)  
  
Functional Class (NYHA): N/A  
  
[employer]: Not obtained  
  
PLAN  
CONSULTS:  
Patient does not require consults   
for optimization at this time.  
  
The Following Tests/Procedures   
Have Been Initiated:  
None  
  
Instructions Given to Patient:  
Patient given verbal and written   
preop instructions and voices   
comprehension and compliance.  
  
SIGNATURE: Ronel Hays MD   
PATIENT NAME: Marzena Mederos  
DATE: 2024 MRN:   
[id number]  
TIME: 11:26 AM PAGER/CONTACT #:  
  
Electronically signed by Ronel Hays MD at 2024 11:27   
AM EST  
Source Comments  
- [facility]  
In the event this information is   
protected by the Federal   
Confidentiality of Alcohol and   
Drug Abuse Patient Records   
regulations: This information has   
been disclosed to you from records   
protected by Federal   
confidentiality rules (42 CFR Part   
2). The Federal rules prohibit you   
from making any further disclosure   
of this information unless further   
disclosure is expressly permitted   
by the written consent of the   
person to whom it pertains or as   
otherwise permitted by 42 CFR Part   
2. A general authorization for the   
release of medical or other   
information is NOT sufficient for   
this purpose. The Federal rules   
restrict any use of the   
information to criminally   
investigate or prosecute any   
alcohol or drug abuse patient.  
Reason for Visit  
  
Reason for Visit -  
Reason Comments  
Blurred Vision Right Eye  
Difficulty Reading Right Eye  
Glare Right Eye  
  
Encounter Details  
  
Encounter Details  
Date Type Department Care Team   
(Latest Contact Info) Description  
2024 11:15 AM EST Office   
Visit [facility]  
 [place] [address]  
[phone] Ronel Hays MD  
 [place] [address]  
[phone] (Work)  
[phone] (Fax) Combined forms   
of age-related cataract of right   
eye (Primary Dx);  
Status post cataract extraction   
and insertion of intraocular lens   
of left eye;  
Type 2 diabetes mellitus without   
retinopathy (HCC);  
Essential hypertension;  
Hypercholesteremia;  
Dementia, unspecified dementia   
severity, unspecified dementia   
type, unspecified whether   
behavioral, psychotic, or mood   
disturbance or anxiety (HCC)  
  
Social History  
- documented as of this encounter  
Social History  
Tobacco Use Types Packs/Day Years   
Used Date  
Smoking Tobacco: Never  
Smokeless Tobacco: Never  
  
Social History  
Alcohol Use Standard Drinks/Week   
Comments  
Yes 0 (1 standard drink = 0.6 oz   
pure alcohol) 1-2 weekly  
  
Social History  
PHQ-2 Answer Date Recorded  
PHQ-2 score 0 10/31/2019  
  
Social History  
Area Deprivation Index Answer Date   
Recorded  
National Score (1-100), lower   
number is lower risk 79 2023  
State Score (1-10), lower number   
is lower risk 7 2023  
Data from:   
https://www.neighborhoodatlas.[facility]/. Last address used   
for calculation [address]    
2023  
  
Social History  
Sex and Gender Information Value   
Date Recorded  
Sex Assigned at Birth Female   
2017 6:52 AM EST  
Gender Identity Female 2017   
6:52 AM EST  
Sexual Orientation Straight   
2017 6:52 AM EST  
  
Last Filed Vital Signs  
- documented in this encounter  
Last Filed Vital Signs  
Vital Sign Reading Time Taken   
Comments  
Blood Pressure 125/80 2024   
11:18 AM EST  
Pulse 88 2024 11:18 AM EST  
Temperature - -  
Respiratory Rate - -  
Oxygen Saturation - -  
Inhaled Oxygen Concentration - -  
Weight - -  
Height - -  
Body Mass Index - -  
  
Functional Status  
- documented as of this encounter  
Functional Status  
Functional Status Response Date of   
Assessment  
Are you deaf or do you have   
serious difficulty hearing? No   
2017  
Are you blind or do you have   
serious difficulty seeing, even   
when wearing glasses? No   
2017  
Do you have serious difficulty   
walking or climbing stairs? No   
2017  
Do you have difficulty dressing or   
bathing? No 2017  
Because of a physical, mental, or   
emotional condition, do you have   
difficulty doing errands alone   
such as visiting a doctor's office   
or shopping? No 2017  
  
Functional Status  
Cognitive Status Response Date of   
Assessment  
Because of a physical, mental, or   
emotional condition, do you have   
serious difficulty concentrating,   
remembering, or making decisions?   
No 2017  
  
Patient Instructions  
- documented in this encounter  
Patient Instructions  
Ronel Hays MD - 2024   
11:23 AM EST  
Formatting of this note is   
different from the original.  
Plan Cataract Surgery with   
Monofocal Intraocular Lens Implant   
Right Eye on 2024 at   
[facility].  
  
Current Ophthalmic Meds  
  
fluorometholone (FML LIQUID FILM)   
0.1 % ophthalmic suspension Use 1   
Drop in the right eye three times   
a day.  
  
Current Ophthalmic Meds  
  
prednisoLONE acetate (PRED FORTE)   
1 % ophthalmic suspension Use 1   
Drop in the left eye three times   
daily.  
keTORolac (ACULAR) 0.5 %   
ophthalmic solution Use 1 Drop in   
the left eye three times daily.  
  
Continue:  
Systane Complete solution instill   
1 drop 3 times daily Both Eyes.  
  
If you have any questions please   
contact our office at   
[phone].  
After office hours or on the   
weekend, please call Dr. Hays on   
his cell phone at [phone].  
  
Electronically signed by Ronel Hays MD at 2024 11:23   
AM EST  
  
Ordered Prescriptions  
- documented in this encounter  
Reconcile with Patient's Chart  
Ordered Prescriptions  
Prescription Sig Dispensed Refills   
Start Date End Date  
prednisoLONE acetate (PRED FORTE)   
1 % ophthalmic suspension Use 1   
Drop in the left eye three times a   
day. 10 mL 2 2024  
keTORolac (ACULAR) 0.5 %   
ophthalmic solution Use 1 Drop in   
the left eye three times a day. 5   
mL 2 2024  
  
Progress Notes  
- documented in this encounter  
Ronel Hays MD - 2024   
11:20 AM EST  
Formatting of this note is   
different from the original.  
ASSESSMENT/PLAN:  
1. 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   
cataract of right eye - ICD9:   
366.19, ICD10: H25.811 (primary   
diagnosis)  
  
Cataract Presurgical Documentation  
  
Cataract: Right Eye  
  
Current Visual Acuity  
Right Eye Distance CC 20/40  
Left Eye Distance SC 20/20  
  
Glare Testing:  
Right Eye Medium 20/80  
  
Visual Function: Marzena Mederos   
states that the decline in vision   
from the cataract impedes her   
abilities as listed in the HPI, as   
well as other activities of daily   
living.  
  
Marzena Mederos has confirmed that   
she is no longer able to function   
adequately on a day-to-day basis   
because of her current visual   
condition.  
  
Further, it is my medical opinion   
that the cataract is the primary   
cause, or at least a significantly   
contributory cause of her visual   
dysfunction. With uncomplicated   
cataract surgery and lens   
implantation, it is my expectation   
that her visual function and   
quality of life will improve,   
significantly.  
  
The risks, benefits, alternatives,   
personnel and complications of   
cataract surgery with lens   
implantation were discussed with   
Marzena Mederos in detail. She   
appeared to understand and asked   
that I proceed with plans for   
surgery.  
  
Upon eye examination, patient was   
found to have a visually   
significant cataract Right Eye.   
Discussed cataract surgery with   
patient and different intraocular   
lens implant options with patient:   
basic monofocal intraocular lens   
implant, Toric intraocular lens   
implant, and presbyopia correction   
intraocular lens implant. In my   
medical opinion, based on medical   
history and ocular examination,   
cataract surgery with intraocular   
lens implant will correct   
patient's vision and improve   
quality of patient's daily living   
activities. Patient wishes to have   
traditional cataract surgery with   
basic intraocular lens Right Eye.   
Patient wishes to have cataract   
surgery with the option stated   
above. Patient understands that an   
intraocular lens implant does not   
necessarily replace the need for   
glasses. Patient understands that   
it is impossible for the surgeon   
to inform him/her of every   
possible complication that may   
occur. The surgeon has answered   
all of the patient's questions.   
Patient understands that if he/she   
has a mature or dense cataract,   
pseudoexfoliation cataract, or   
history of use of Flomax, he/she   
may require the use of [place]   
Ring and/or Vision Blue during   
surgery. Patient understands the   
risks, benefits, and alternatives   
to surgery.  
  
Patient would like Right Eye   
corrected for near, target -1.50.   
Patient and patient's    
verbalized understanding that   
patient will need glasses for best   
corrected distance, intermediate,   
and near vision and   
post-operatively Right Eye patient   
will be unable to see at distance.  
  
Plan Cataract Surgery with   
Monofocal Intraocular Lens Implant   
Right Eye on 2024 at   
[facility].  
  
Current Ophthalmic Meds  
  
fluorometholone (FML LIQUID FILM)   
0.1 % ophthalmic suspension Use 1   
Drop in the right eye three times   
a day.  
  
Continue:  
Systane Complete solution instill   
1 drop 3 times daily Both Eyes.  
  
PHYSICAL EXAM:  
  
Vital Signs:  
Blood pressure 125/80, pulse 88.  
  
Respiratory:  
Normal breath sounds, no wheezing.  
  
CARD:  
Normal heart sounds 1 & 2, normal   
sinus rhythm.  
  
2. Status post cataract extraction   
and insertion of intraocular lens   
of left eye - ICD9: V45.61, V43.1,   
ICD10: Z98.42, Z96.1  
  
Current Ophthalmic Meds  
  
prednisoLONE acetate (PRED FORTE)   
1 % ophthalmic suspension Use 1   
Drop in the left eye three times   
daily.  
keTORolac (ACULAR) 0.5 %   
ophthalmic solution Use 1 Drop in   
the left eye three times daily.  
  
Continue:  
Systane Complete solution instill   
1 drop 3 times daily Both Eyes.  
  
3. Type 2 diabetes mellitus   
without retinopathy (HCC) - ICD9:   
250.00, ICD10: E11.9  
  
Please keep your blood sugar under   
good control to minimize risk of   
ocular complications from   
diabetes.  
  
Continue to monitor with primary   
care physician.  
  
4. Essential hypertension - ICD9:   
401.9, ICD10: I10  
  
Continue to monitor with primary   
care physician.  
  
5. Hypercholesteremia - ICD9:   
272.0, ICD10: E78.00  
  
Continue to monitor with primary   
care physician.  
  
6. Dementia, unspecified dementia   
severity, unspecified dementia   
type, unspecified whether   
behavioral, psychotic, or mood   
disturbance or anxiety (HCC) -   
ICD9: 294.20, ICD10: F03.90  
  
Continue to monitor with primary   
care physician.  
  
I have confirmed and edited as   
necessary the relevant HPI,   
ophthalmic history, ROS, and the   
neuro exam findings as obtained by   
others. I have seen and examined   
Marzena Mederos.  
I have discussed the case and the   
management of this patient's care   
with the Resident/Fellow, if   
applicable. I also have reviewed   
and agree with the assessment and   
plan as stated above and agree   
with all of its relevant   
components.  
  
Electronically signed by Ronel Hays MD at 2024 11:27   
AM EST  
Plan of Treatment  
- documented as of this encounter  
Plan of Treatment - Upcoming   
Encounters  
Upcoming Encounters  
Date Type Department Care Team   
(Latest Contact Info) Description  
2024 2:15 PM EST Office   
Visit [facility] Ophthalmology  
 [place] [address]  
[phone] Ronel Hays MD  
 [place] [address]  
[phone] (Work)  
[phone] (Fax) 1 DAY PO 2ND RE   
BASIC  
2025 12:50 PM EST   
Appointment Mammogram  
[address] [place] [address]  
[phone] Encounter for   
screening mammogram for breast   
cancer [Z12.31]  
2025 1:20 PM EST Office   
Visit OB/Gynecology  
[address] [place] [address]  
[phone] Jose Delgado MD  
[address] [place] [address]  
[phone] (Work)  
[phone] (Fax) Annual  
2025 11:30 AM EST Visit (SP)   
Office Hematology/Oncology  
[address] [place] [address]  
[phone] Colten Abbott DO  
[address] [place] [address]  
[phone] (Work)  
[phone] (Fax) 1 YR OV/MAMM   
*  
  
Plan of Treatment - Scheduled   
Procedures  
Scheduled Procedures  
Name Priority Associated Diagnoses   
Date/Time  
SURGERY AT NON[facility] FACILITY   
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   
cataract of right eye 2024   
10:10 AM EST  
  
Visit Diagnoses  
- documented in this encounter  
Visit Diagnoses  
Diagnosis  
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   
cataract of right eye - Primary  
Other and combined forms of senile   
cataract  
Status post cataract extraction   
and insertion of intraocular lens   
of left eye  
Type 2 diabetes mellitus without   
retinopathy (HCC)  
Type II or unspecified type   
diabetes mellitus without mention   
of complication, not stated as   
uncontrolled  
Essential hypertension  
Unspecified essential hypertension  
Hypercholesteremia  
Pure hypercholesterolemia  
Dementia, unspecified dementia   
severity, unspecified dementia   
type, unspecified whether   
behavioral, psychotic, or mood   
disturbance or anxiety (HCC)  
  
Discontinued Medications  
- documented as of this encounter  
Discontinued Medications  
Medication Sig Discontinue Reason   
Start Date End Date  
prednisoLONE acetate (PRED FORTE)   
1 % ophthalmic suspension Use 1   
Drop in the left eye four times   
daily. 2024  
keTORolac (ACULAR) 0.5 %   
ophthalmic solution Use 1 Drop in   
the left eye four times daily.   
2024  
  
Eye Exam  
  
Eye Exam - Visual Acuity (Demarco   
Cards)  
Visual Acuity (Demarco Cards)  
Right eye Left eye  
Dist sc 20/20  
Dist cc 20/40  
Near cc J6  
  
Eye Exam - Tonometry (Applanation,   
11:19 AM)  
Tonometry (Applanation, 11:19 AM)  
Right eye Left eye  
Pressure 14 14  
  
Eye Exam - Pupils  
Pupils  
Pupils Dark Light Shape APD  
Right eye PERRL 4 2 Round None  
Left eye PERRL 4 2 Round None  
  
Eye Exam - Visual Fields  
Visual Fields  
Right eye Left eye  
Full Full  
  
Eye Exam - Extraocular Movement  
Extraocular Movement  
Right eye Left eye  
Full Full  
  
Eye Exam - Neuro/Psych  
Neuro/Psych  
Oriented x3: Yes  
Mood/Affect: Normal  
  
Eye Exam - Glare Testing  
Glare Testing  
Medium  
Right eye 20/80  
Left eye  
  
Eye Exam - External Exam  
External Exam  
Right eye Left eye  
External Normal including orbits   
and preauricular lymph nodes   
Normal including orbits and   
preauricular lymph nodes  
  
Eye Exam - Slit Lamp Exam  
Slit Lamp Exam  
Right eye Left eye  
Lids/Lashes Normal lids, lashes,   
lacrimal glands, and lacrimal   
drainage Normal lids, lashes,   
lacrimal glands, and lacrimal   
drainage  
Conjunctiva/Sclera White and quiet   
White and quiet  
Cornea Normal epithelium, stroma,   
endothelium, and tear film Normal   
epithelium, stroma, endothelium,   
and tear film  
Anterior Chamber Deep and quiet   
Deep and quiet  
Iris Round and reactive Round and   
reactive  
Lens 2+ Nuclear sclerotic   
cataract, 2+ Posterior subcapsular   
cataract Posterior chamber   
intraocular lens  
Anterior Vitreous Normal Normal  
  
Eye Exam - Fundus Exam  
Fundus Exam  
Right eye Left eye  
Disc Normal Normal  
C/D Ratio 0.3 0.3  
Macula Normal Normal  
Vessels Normal Normal  
Periphery Normal Normal  
  
Care Teams  
- documented as of this encounter  
Care Teams  
Team Member Relationship Specialty   
Start Date End Date  
Sharifa Almaguer DO  
NPI: [id number]  
[address] [id number]  
[phone] (Work)  
[phone] (Fax) PCP - General   
Internal Medicine 10/25/23  
Herbie Mills MD  
NPI: [id number]  
[address] [place] [address]  
[phone] (Work)  
[phone] (Fax) Physician   
Radiation Oncology 18  
  
Electronically signed by Ronel Hays MD at 2024 10:15 AM   
[facility]  
Work Phone:   
[phone]  
   
                                                    2024 History and   
physical note                           Formatting of this note is   
different from the original.  
H&P Notes  
- documented in this encounter  
Ronel Hays MD - 2024   
11:26 AM EST  
Formatting of this note is   
different from the original.  
HISTORY AND PHYSICAL EXAMINATION  
  
SERVICE DATE: 2024  
SERVICE TIME: 11:26 AM  
  
PRIMARY CARE PHYSICIAN: Sharifa Almaguer DO  
  
REASON FOR VISIT: Marzena Mederos   
is a 79 year old female who is   
being seen for Combined   
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  
  
The patient has the following:  
ACTIVE PROBLEM LIST  
Postmenopausal Atrophic Vaginitis  
Unspecified Transient Cerebral   
Ischemia  
Carcinoma in Situ of Vulva  
Malignant Neoplasm of Female   
Breast (Hcc)  
Personal History of Malignant   
Neoplasm of Breast  
Senile Nuclear Sclerosis  
Hypermetropia  
Regular Astigmatism  
Presbyopia  
Invasive Ductal Carcinoma of   
Breast, Female (Hcc)  
Ocular Migraine  
Controlled Type 2 Diabetes   
Mellitus Without Complication,   
Without Long-Term Current Use of   
Insulin (Hcc)  
Postmenopausal Bleeding  
S/P Hysterectomy With Oophorectomy  
  
SUBJECTIVE  
CHIEF COMPLAINT: Blurred vision   
for distance and near Right Eye.  
  
PAST MEDICAL HISTORY  
Diagnosis Date  
Breast cancer (HCC)   
invasive ductal carcinoma,   
completed radiation 3/7/11  
Cancer of endometrium (HCC)  
Carcinoma in situ, vulva   
Cellulitis  
of right eye muscle  
Diabetes mellitus without mention   
of complication  
Diabetes mellitus, type II (HCC)  
Diaphragmatic hernia without   
mention of obstruction or gangrene  
Hiatal hernia  
Dyspareunia  
Elevated cholesterol  
Esophageal reflux  
Insomnia, unspecified  
Orbital cellulitis  
Other corneal disorder  
Rt eye corneal erosion .  
PMH - PAST MEDICAL HISTORY OF  
?VESTIBULITIS  
PMH - PAST MEDICAL HISTORY OF  
CLIMACTERIC  
PMH - PAST MEDICAL HISTORY OF   
2006  
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  
Pure hypercholesterolemia  
Rib fracture  
6th Rib  
Senile dementia (HCC)  
Stomatitis and mucositis   
(ulcerative)  
Chronic ulcerative stomatitis on   
mucosal biopsy  
Unspecified essential hypertension  
  
PAST SURGICAL HISTORY  
Procedure Laterality Date  
Bunions bilateral feet removed   
10/2002  
BX/EXC LYMPH NODE OPEN DEEP   
AXILLARY NODE 2010  
RIGHT BREAST LUMP W/ SLN BX  
CHOLECYSTECTOMY 2004  
Cholecystectomy  
COLONOSCOPY FLX DX W/COLLJ SPEC   
WHEN PFRMD   
Colonoscopy  
COLPOSCOPY CERVIX UPPER/ADJACENT   
VAGINA 2009  
Colposcopy of the vulva  
COLPOSCOPY, VULVA 10/2009  
CORRECT BUNION,SIMPLE  
Bunion  
DILATION & CURETTAGE DX&/THER   
NONOBSTETRIC 1970's  
SAB  
HYSTERECTOMY 2017  
LAPAROSCOPY SURG CHOLECYSTECTOMY   
2004  
Cholecystectomy, lap  
MAMMO STEREOTACTIC CORE BIOPSY RT   
10/10/2013  
MASTECTOMY, PARTIAL 2010  
RIGHT BREAST  
PAST SURGICAL HISTORY OF 2006  
partial vulvectomy/sq. cell ca in   
situ  
PAST SURGICAL HISTORY OF   
right eye cornea  
PAST SURGICAL HISTORY OF   
right eye revision  
PAST SURGICAL HISTORY OF  
squamous cell carcinoma right arm  
REMV CATARACT EXTRACAP,INSERT LENS   
10/31/2024  
CCA0T0 - +19.5D  
SALPINGO-OOPHORECTOMY COMPL/PRTL   
UNI/BI SPX 2017  
Toenail Big Toe Left Foot removed   
10/2002  
TONSILLECTOMY PRIMARY/SECONDARY   
Tonsillectomy  
UNLISTED PROCEDURE HANDS/FINGERS   
2012  
CMC Arthoplasty on right hand  
  
FAMILY HISTORY  
Problem Relation Age of Onset  
Stroke Mother  
Diabetes Mother  
GI Father  
 from complications of gb   
surgery  
Arthritis Sister  
Systemic Lupus  
Cancer Maternal Uncle  
LUNG  
Cancer Other  
cousin with breast cancer/cousin   
with bladder ca.  
Stroke Sister  
  
SOCIAL HISTORY:  
Social History  
  
Tobacco Use  
Smoking status: Never  
Smokeless tobacco: Never  
Vaping Use  
Vaping status: Never Used  
Substance Use Topics  
Alcohol use: Yes  
Comment: 1-2 weekly  
Drug use: No  
  
MEDICATIONS:  
Prior to Admission medications as   
of 24 1120  
Medication Sig Last Dose Taking  
glipiZIDE (GLUCOTROL XL) 2.5 mg 24   
hr tablet Take 2.5 mg by mouth.   
Yes  
fluorometholone (FML LIQUID FILM)   
0.1 % ophthalmic suspension Use 1   
Drop in the right eye three times   
a day. Yes  
donepezil (ARICEPT) 10 mg tablet   
Take 10 mg by mouth. Yes  
cholecalciferol (VITAMIN D3) 50   
mcg (2,000 unit) tablet Take 2,000   
Units by mouth once daily. Yes  
losartan-hydroCHLOROthiazide   
(HYZAAR) 100-12.5 mg per tablet   
Take 1 tablet by mouth once daily.   
Yes  
omeprazole (PRILOSEC) 20 mg   
capsule Take 20 mg by mouth once   
daily. Yes  
potassium chloride (KLOR-CON 10)   
10 mEq tablet Take 10 mEq by mouth   
once daily.  
Yes  
metFORMIN ER (GLUCOPHAGE XR) 500   
mg 24 hr tablet Take two tablets   
by mouth once daily. Yes  
simvastatin(ZOCOR 20 MG TAB) Take   
one(1) tablet daily. Yes  
keTORolac (ACULAR) 0.5 %   
ophthalmic solution Use 1 Drop in   
the left eye three times a day.  
prednisoLONE acetate (PRED FORTE)   
1 % ophthalmic suspension Use 1   
Drop in the left eye three times a   
day.  
amLODIPine (NORVASC) 10 mg tablet   
Take 10 mg by mouth.  
  
No medication comments found.  
  
CURRENT ALLERGIES:  
ALLERGIES  
Allergen Reactions  
Adhesive Rash  
Redness  
Ambien [Zolpidem Ta* Rash  
Codeine Mental Status Change  
Neosporin [Neomycin* Rash  
Percocet [Oxycodone*  
Causes Vomitting  
  
REVIEW OF SYSTEMS:  
PAIN ASSESSMENT:  
General: No weight loss, malaise   
or fevers.  
Neuro: Dementia  
Respiratory: No history of current   
cough or dyspnea, or pneumonia in   
the past 6 weeks. No history of   
respiratory/pulmonary symptoms or   
problems  
Cardiovascular: Positive for:   
Hypertension, PVC  
GI: No history of GI symptoms or   
problems. No history of esophageal   
varices, recent ascites, or ETOH   
greater than 2 drinks per day.  
: No history of UTI in past 6   
weeks. No history of renal   
failure. Not currently on or   
requiring dialysis. No history of   
 symptoms or problems.  
GYN: Negative for abnormal vaginal   
bleeding, abnormal vaginal   
discharge.  
Pregnancy: Denies  
Endocrine: Diabetes Mellitus on   
oral agent  
Hematology: No history of bleeding   
or clotting disorder. Pt is not   
taking anti-coagulation or   
platelet medications. No history   
of hematological symptoms or   
problems.  
Oncology: No history of CA   
metastasis, chemo within 30 days,   
or radiotherapy within 90 days.   
Has not lost 10% of body wt in 6   
months. No history of oncological   
symptoms or problems.  
Psych: No history of psychiatric   
symptoms or problems.  
Musculoskeletal: Negative for   
joint pain or swelling, back pain   
or muscle pain.  
Skin: Negative for lesions, rash   
and itching.  
  
PHYSICAL EXAM:  
VITALS:  
/80   Pulse 88  
  
General: Alert and oriented  
Skin: Normal color, no rash, no   
lesions.  
HEENT: EOM, pupils equal, round   
and reactive.  
Cardiovascular: Normal S1 & S2, no   
rubs, murmurs or gallops. No JVD.   
Pulse regular.  
Lungs: Normal breath sounds, no   
wheezes or crackles.  
Abdomen: Soft, non-tender, no   
rigidity.  
Extremities: No deformity, no   
edema or tenderness, no joint   
swelling or clubbing.  
Neurological: Normal cognition and   
motor skills.  
Pulses: Carotid and radial pulses   
normal +2.  
  
Diagnostic tests reviewed for   
today's visit:  
No new labs or tests  
  
ASSESSMENT  
Medication and Non-Pharmacologic   
VTE Prophylaxis/Anticoagulants  
  
VTE Prophylaxis: VTE prophylaxis   
appropriate  
  
Impression: There is no known   
pertinent medical condition which   
may affect trinidad-operative course  
  
[unfilled]  
Clinical Risk Factors for Possible   
Cardiac Complications:  
None  
  
Patient is scheduled for a   
low-risk procedure.  
  
FUNCTIONAL STATUS: Walk indoors,   
such as around the house (1.75   
METs)  
  
Functional Class ([facility]): N/A  
  
[facility]: Not obtained  
  
PLAN  
CONSULTS:  
Patient does not require consults   
for optimization at this time.  
  
The Following Tests/Procedures   
Have Been Initiated:  
None  
  
Instructions Given to Patient:  
Patient given verbal and written   
preop instructions and voices   
comprehension and compliance.  
  
SIGNATURE: Ronel Hays MD   
PATIENT NAME: Marzena Mederos  
DATE: 2024 MRN:   
[id number]  
TIME: 11:26 AM PAGER/CONTACT #:  
  
Electronically signed by Ronel Hays MD at 2024 11:27   
AM EST  
Source Comments  
- [facility]  
In the event this information is   
protected by the Federal   
Confidentiality of Alcohol and   
Drug Abuse Patient Records   
regulations: This information has   
been disclosed to you from records   
protected by Federal   
confidentiality rules (42 CFR Part   
2). The Federal rules prohibit you   
from making any further disclosure   
of this information unless further   
disclosure is expressly permitted   
by the written consent of the   
person to whom it pertains or as   
otherwise permitted by 42 CFR Part   
2. A general authorization for the   
release of medical or other   
information is NOT sufficient for   
this purpose. The Federal rules   
restrict any use of the   
information to criminally   
investigate or prosecute any   
alcohol or drug abuse patient.  
Reason for Visit  
  
Reason for Visit -  
Reason Comments  
Blurred Vision Right Eye  
Difficulty Reading Right Eye  
Glare Right Eye  
  
Encounter Details  
  
Encounter Details  
Date Type Department Care Team   
(Latest Contact Info) Description  
2024 11:15 AM EST Office   
Visit [facility] Ophthalmology  
[address] [place], [place] [id number]  
[phone] Ronel Hays MD  
[address] [place] [address]  
[phone] (Work)  
[phone] (Fax) Combined forms   
of age-related cataract of right   
eye (Primary Dx);  
Status post cataract extraction   
and insertion of intraocular lens   
of left eye;  
Type 2 diabetes mellitus without   
retinopathy (HCC);  
Essential hypertension;  
Hypercholesteremia;  
Dementia, unspecified dementia   
severity, unspecified dementia   
type, unspecified whether   
behavioral, psychotic, or mood   
disturbance or anxiety (HCC)  
  
Social History  
- documented as of this encounter  
Social History  
Tobacco Use Types Packs/Day Years   
Used Date  
Smoking Tobacco: Never  
Smokeless Tobacco: Never  
  
Social History  
Alcohol Use Standard Drinks/Week   
Comments  
Yes 0 (1 standard drink = 0.6 oz   
pure alcohol) 1-2 weekly  
  
Social History  
PHQ-2 Answer Date Recorded  
PHQ-2 score 0 10/31/2019  
  
Social History  
Area Deprivation Index Answer Date   
Recorded  
National Score (1-100), lower   
number is lower risk 79 2023  
State Score (1-10), lower number   
is lower risk 7 2023  
Data from:   
https://www.neighborhoodatlas.[facility]/. Last address used   
for calculation [address]   
2023  
  
Social History  
Sex and Gender Information Value   
Date Recorded  
Sex Assigned at Birth Female   
2016 6:52 AM EST  
Gender Identity Female 2017   
6:52 AM EST  
Sexual Orientation Straight   
2017 6:52 AM EST  
  
Last Filed Vital Signs  
- documented in this encounter  
Last Filed Vital Signs  
Vital Sign Reading Time Taken   
Comments  
Blood Pressure 125/80 2024   
11:18 AM EST  
Pulse 88 2024 11:18 AM EST  
Temperature - -  
Respiratory Rate - -  
Oxygen Saturation - -  
Inhaled Oxygen Concentration - -  
Weight - -  
Height - -  
Body Mass Index - -  
  
Functional Status  
- documented as of this encounter  
Functional Status  
Functional Status Response Date of   
Assessment  
Are you deaf or do you have   
serious difficulty hearing? No   
2017  
Are you blind or do you have   
serious difficulty seeing, even   
when wearing glasses? No   
2017  
Do you have serious difficulty   
walking or climbing stairs? No   
2017  
Do you have difficulty dressing or   
bathing? No 2017  
Because of a physical, mental, or   
emotional condition, do you have   
difficulty doing errands alone   
such as visiting a doctor's office   
or shopping? No 2017  
  
Functional Status  
Cognitive Status Response Date of   
Assessment  
Because of a physical, mental, or   
emotional condition, do you have   
serious difficulty concentrating,   
remembering, or making decisions?   
No 2017  
  
Patient Instructions  
- documented in this encounter  
Patient Instructions  
Ronel Hays MD - 2024   
11:23 AM EST  
Formatting of this note is   
different from the original.  
Plan Cataract Surgery with   
Monofocal Intraocular Lens Implant   
Right Eye on 2024 at   
[facility].  
  
Current Ophthalmic Meds  
  
fluorometholone (FML LIQUID FILM)   
0.1 % ophthalmic suspension Use 1   
Drop in the right eye three times   
a day.  
  
Current Ophthalmic Meds  
  
prednisoLONE acetate (PRED FORTE)   
1 % ophthalmic suspension Use 1   
Drop in the left eye three times   
daily.  
keTORolac (ACULAR) 0.5 %   
ophthalmic solution Use 1 Drop in   
the left eye three times daily.  
  
Continue:  
Systane Complete solution instill   
1 drop 3 times daily Both Eyes.  
  
If you have any questions please   
contact our office at   
[phone].  
After office hours or on the   
weekend, please call Dr. Hays on   
his cell phone at [phone].  
  
Electronically signed by Ronel Hays MD at 2024 11:23   
AM EST  
  
Ordered Prescriptions  
- documented in this encounter  
Reconcile with Patient's Chart  
Ordered Prescriptions  
Prescription Sig Dispensed Refills   
Start Date End Date  
prednisoLONE acetate (PRED FORTE)   
1 % ophthalmic suspension Use 1   
Drop in the left eye three times a   
day. 10 mL 2 2024  
keTORolac (ACULAR) 0.5 %   
ophthalmic solution Use 1 Drop in   
the left eye three times a day. 5   
mL 2 2024  
  
Progress Notes  
- documented in this encounter  
Ronel Hays MD - 2024   
11:20 AM EST  
Formatting of this note is   
different from the original.  
ASSESSMENT/PLAN:  
1. 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   
cataract of right eye - ICD9:   
366.19, ICD10: H25.811 (primary   
diagnosis)  
  
Cataract Presurgical Documentation  
  
Cataract: Right Eye  
  
Current Visual Acuity  
Right Eye Distance CC 20/40  
Left Eye Distance SC 20/20  
  
Glare Testing:  
Right Eye Medium 20/80  
  
Visual Function: Marzena Mederos   
states that the decline in vision   
from the cataract impedes her   
abilities as listed in the HPI, as   
well as other activities of daily   
living.  
  
Marzena Mederos has confirmed that   
she is no longer able to function   
adequately on a day-to-day basis   
because of her current visual   
condition.  
  
Further, it is my medical opinion   
that the cataract is the primary   
cause, or at least a significantly   
contributory cause of her visual   
dysfunction. With uncomplicated   
cataract surgery and lens   
implantation, it is my expectation   
that her visual function and   
quality of life will improve,   
significantly.  
  
The risks, benefits, alternatives,   
personnel and complications of   
cataract surgery with lens   
implantation were discussed with   
Marzena Mederos in detail. She   
appeared to understand and asked   
that I proceed with plans for   
surgery.  
  
Upon eye examination, patient was   
found to have a visually   
significant cataract Right Eye.   
Discussed cataract surgery with   
patient and different intraocular   
lens implant options with patient:   
basic monofocal intraocular lens   
implant, Toric intraocular lens   
implant, and presbyopia correction   
intraocular lens implant. In my   
medical opinion, based on medical   
history and ocular examination,   
cataract surgery with intraocular   
lens implant will correct   
patient's vision and improve   
quality of patient's daily living   
activities. Patient wishes to have   
traditional cataract surgery with   
basic intraocular lens Right Eye.   
Patient wishes to have cataract   
surgery with the option stated   
above. Patient understands that an   
intraocular lens implant does not   
necessarily replace the need for   
glasses. Patient understands that   
it is impossible for the surgeon   
to inform him/her of every   
possible complication that may   
occur. The surgeon has answered   
all of the patient's questions.   
Patient understands that if he/she   
has a mature or dense cataract,   
pseudoexfoliation cataract, or   
history of use of Flomax, he/she   
may require the use of [place]   
Ring and/or Vision Blue during   
surgery. Patient understands the   
risks, benefits, and alternatives   
to surgery.  
  
Patient would like Right Eye   
corrected for near, target -1.50.   
Patient and patient's    
verbalized understanding that   
patient will need glasses for best   
corrected distance, intermediate,   
and near vision and   
post-operatively Right Eye patient   
will be unable to see at distance.  
  
Plan Cataract Surgery with   
Monofocal Intraocular Lens Implant   
Right Eye on 2024 at   
[facility].  
  
Current Ophthalmic Meds  
  
fluorometholone (FML LIQUID FILM)   
0.1 % ophthalmic suspension Use 1   
Drop in the right eye three times   
a day.  
  
Continue:  
Systane Complete solution instill   
1 drop 3 times daily Both Eyes.  
  
PHYSICAL EXAM:  
  
Vital Signs:  
Blood pressure 125/80, pulse 88.  
  
Respiratory:  
Normal breath sounds, no wheezing.  
  
CARD:  
Normal heart sounds 1 & 2, normal   
sinus rhythm.  
  
2. Status post cataract extraction   
and insertion of intraocular lens   
of left eye - ICD9: V45.61, V43.1,   
ICD10: Z98.42, Z96.1  
  
Current Ophthalmic Meds  
  
prednisoLONE acetate (PRED FORTE)   
1 % ophthalmic suspension Use 1   
Drop in the left eye three times   
daily.  
keTORolac (ACULAR) 0.5 %   
ophthalmic solution Use 1 Drop in   
the left eye three times daily.  
  
Continue:  
Systane Complete solution instill   
1 drop 3 times daily Both Eyes.  
  
3. Type 2 diabetes mellitus   
without retinopathy (HCC) - ICD9:   
250.00, ICD10: E11.9  
  
Please keep your blood sugar under   
good control to minimize risk of   
ocular complications from   
diabetes.  
  
Continue to monitor with primary   
care physician.  
  
4. Essential hypertension - ICD9:   
401.9, ICD10: I10  
  
Continue to monitor with primary   
care physician.  
  
5. Hypercholesteremia - ICD9:   
272.0, ICD10: E78.00  
  
Continue to monitor with primary   
care physician.  
  
6. Dementia, unspecified dementia   
severity, unspecified dementia   
type, unspecified whether   
behavioral, psychotic, or mood   
disturbance or anxiety (HCC) -   
ICD9: 294.20, ICD10: F03.90  
  
Continue to monitor with primary   
care physician.  
  
I have confirmed and edited as   
necessary the relevant HPI,   
ophthalmic history, ROS, and the   
neuro exam findings as obtained by   
others. I have seen and examined   
Marzena Mederos.  
I have discussed the case and the   
management of this patient's care   
with the Resident/Fellow, if   
applicable. I also have reviewed   
and agree with the assessment and   
plan as stated above and agree   
with all of its relevant   
components.  
  
Electronically signed by Ronel Hays MD at 2024 11:27   
AM EST  
Plan of Treatment  
- documented as of this encounter  
Plan of Treatment - Upcoming   
Encounters  
Upcoming Encounters  
Date Type Department Care Team   
(Latest Contact Info) Description  
2024 2:15 PM EST Office   
Visit [facility] Ophthalmology  
 [place] [address]  
[phone] Ronel Hays MD  
[address] [place] [address]  
[phone] (Work)  
[phone] (Fax) 1 DAY PO 2ND RE   
BASIC  
2025 12:50 PM EST   
Appointment Mammogram  
[address]  
[place] [address]  
[phone] Encounter for   
screening mammogram for breast   
cancer [Z12.31]  
2025 1:20 PM EST Office   
Visit OB/Gynecology  
[address] [place] [place] [address]  
[phone] Jose Delgado MD  
[address] [place] [address]  
[phone] (Work)  
[phone] (Fax) Annual  
2025 11:30 AM EST Visit (SP)   
Office Hematology/Oncology  
[address] [place] [address] [phone] Colten Abbott DO  
[address] [place] [place] [address]  
[phone] (Work)  
[phone] (Fax) 1 YR OV/MAMM   
*  
  
Plan of Treatment - Scheduled   
Procedures  
Scheduled Procedures  
Name Priority Associated Diagnoses   
Date/Time  
SURGERY AT NON-[facility] FACILITY   
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   
cataract of right eye 2024   
10:10 AM EST  
  
Visit Diagnoses  
- documented in this encounter  
Visit Diagnoses  
Diagnosis  
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   
cataract of right eye - Primary  
Other and combined forms of senile   
cataract  
Status post cataract extraction   
and insertion of intraocular lens   
of left eye  
Type 2 diabetes mellitus without   
retinopathy (HCC)  
Type II or unspecified type   
diabetes mellitus without mention   
of complication, not stated as   
uncontrolled  
Essential hypertension  
Unspecified essential hypertension  
Hypercholesteremia  
Pure hypercholesterolemia  
Dementia, unspecified dementia   
severity, unspecified dementia   
type, unspecified whether   
behavioral, psychotic, or mood   
disturbance or anxiety (HCC)  
  
Discontinued Medications  
- documented as of this encounter  
Discontinued Medications  
Medication Sig Discontinue Reason   
Start Date End Date  
prednisoLONE acetate (PRED FORTE)   
1 % ophthalmic suspension Use 1   
Drop in the left eye four times   
daily. 2024  
keTORolac (ACULAR) 0.5 %   
ophthalmic solution Use 1 Drop in   
the left eye four times daily.   
2024  
  
Eye Exam  
  
Eye Exam - Visual Acuity (Demarco   
Cards)  
Visual Acuity (Demarco Cards)  
Right eye Left eye  
Dist sc 20/20  
Dist cc 20/40  
Near cc J6  
  
Eye Exam - Tonometry (Applanation,   
11:19 AM)  
Tonometry (Applanation, 11:19 AM)  
Right eye Left eye  
Pressure 14 14  
  
Eye Exam - Pupils  
Pupils  
Pupils Dark Light Shape APD  
Right eye PERRL 4 2 Round None  
Left eye PERRL 4 2 Round None  
  
Eye Exam - Visual Fields  
Visual Fields  
Right eye Left eye  
Full Full  
  
Eye Exam - Extraocular Movement  
Extraocular Movement  
Right eye Left eye  
Full Full  
  
Eye Exam - Neuro/Psych  
Neuro/Psych  
Oriented x3: Yes  
Mood/Affect: Normal  
  
Eye Exam - Glare Testing  
Glare Testing  
Medium  
Right eye 20/80  
Left eye  
  
Eye Exam - External Exam  
External Exam  
Right eye Left eye  
External Normal including orbits   
and preauricular lymph nodes   
Normal including orbits and   
preauricular lymph nodes  
  
Eye Exam - Slit Lamp Exam  
Slit Lamp Exam  
Right eye Left eye  
Lids/Lashes Normal lids, lashes,   
lacrimal glands, and lacrimal   
drainage Normal lids, lashes,   
lacrimal glands, and lacrimal   
drainage  
Conjunctiva/Sclera White and quiet   
White and quiet  
Cornea Normal epithelium, stroma,   
endothelium, and tear film Normal   
epithelium, stroma, endothelium,   
and tear film  
Anterior Chamber Deep and quiet   
Deep and quiet  
Iris Round and reactive Round and   
reactive  
Lens 2+ Nuclear sclerotic   
cataract, 2+ Posterior subcapsular   
cataract Posterior chamber   
intraocular lens  
Anterior Vitreous Normal Normal  
  
Eye Exam - Fundus Exam  
Fundus Exam  
Right eye Left eye  
Disc Normal Normal  
C/D Ratio 0.3 0.3  
Macula Normal Normal  
Vessels Normal Normal  
Periphery Normal Normal  
  
Care Teams  
- documented as of this encounter  
Care Teams  
Team Member Relationship Specialty   
Start Date End Date  
Sharifa Almaguer DO  
NPI: [id number]  
[address] [place] [address]  
[phone] (Work)  
[phone] (Fax) PCP - General   
Internal Medicine 10/25/23  
Herbie Mills MD  
NPI: [id number]  
[address], [place] [address] [phone] (Work)  
[phone] (Fax) Physician   
Radiation Oncology 18  
  
Electronically signed by Ronel Hays MD at 2024 10:15 AM   
EST  
                                        [facility]  
Work Phone:   
[phone]  
   
                                                    2024 History and   
physical note                           Formatting of this note might be   
different from the original.  
H&P reviewed. The patient was   
examined and there are no changes   
to the H&P.  
Electronically signed by Ronel Hays MD at 2024 10:15 AM   
EST  
  
Source Note - Ronel Hays MD   
- 2024 10:15 AM EST  
Formatting of this note is   
different from the original.  
H&P Notes  
- documented in this encounter  
Ronel Hays MD - 2024   
11:26 AM EST  
Formatting of this note is   
different from the original.  
HISTORY AND PHYSICAL EXAMINATION  
  
SERVICE DATE: 2024  
SERVICE TIME: 11:26 AM  
  
PRIMARY CARE PHYSICIAN: Sharifa Almaguer DO  
  
REASON FOR VISIT: Marzena Mederos   
is a 79 year old female who is   
being seen for Combined   
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  
  
The patient has the following:  
ACTIVE PROBLEM LIST  
Postmenopausal Atrophic Vaginitis  
Unspecified Transient Cerebral   
Ischemia  
Carcinoma in Situ of Vulva  
Malignant Neoplasm of Female   
Breast (Hcc)  
Personal History of Malignant   
Neoplasm of Breast  
Senile Nuclear Sclerosis  
Hypermetropia  
Regular Astigmatism  
Presbyopia  
Invasive Ductal Carcinoma of   
Breast, Female (Hcc)  
Ocular Migraine  
Controlled Type 2 Diabetes   
Mellitus Without Complication,   
Without Long-Term Current Use of   
Insulin (Hcc)  
Postmenopausal Bleeding  
S/P Hysterectomy With Oophorectomy  
  
SUBJECTIVE  
CHIEF COMPLAINT: Blurred vision   
for distance and near Right Eye.  
  
PAST MEDICAL HISTORY  
Diagnosis Date  
Breast cancer (HCC)   
invasive ductal carcinoma,   
completed radiation 3/7/11  
Cancer of endometrium (HCC)  
Carcinoma in situ, vulva   
Cellulitis  
of right eye muscle  
Diabetes mellitus without mention   
of complication  
Diabetes mellitus, type II (HCC)  
Diaphragmatic hernia without   
mention of obstruction or gangrene  
Hiatal hernia  
Dyspareunia  
Elevated cholesterol  
Esophageal reflux  
Insomnia, unspecified  
Orbital cellulitis  
Other corneal disorder  
Rt eye corneal erosion .  
PMH - PAST MEDICAL HISTORY OF  
?VESTIBULITIS  
PMH - PAST MEDICAL HISTORY OF  
CLIMACTERIC  
PMH - PAST MEDICAL HISTORY OF   
2006  
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  
Pure hypercholesterolemia  
Rib fracture  
6th Rib  
Senile dementia (HCC)  
Stomatitis and mucositis   
(ulcerative)  
Chronic ulcerative stomatitis on   
mucosal biopsy  
Unspecified essential hypertension  
  
PAST SURGICAL HISTORY  
Procedure Laterality Date  
Bunions bilateral feet removed   
10/2002  
BX/EXC LYMPH NODE OPEN DEEP   
AXILLARY NODE 2010  
RIGHT BREAST LUMP W/ SLN BX  
CHOLECYSTECTOMY 2004  
Cholecystectomy  
COLONOSCOPY FLX DX W/COLLJ SPEC   
WHEN PFRMD   
Colonoscopy  
COLPOSCOPY CERVIX UPPER/ADJACENT   
VAGINA 2009  
Colposcopy of the vulva  
COLPOSCOPY, VULVA 10/2009  
CORRECT BUNION,SIMPLE  
Bunion  
DILATION & CURETTAGE DX&/THER   
NONOBSTETRIC 1970's  
SAB  
HYSTERECTOMY 2017  
LAPAROSCOPY SURG CHOLECYSTECTOMY   
2004  
Cholecystectomy, lap  
MAMMO STEREOTACTIC CORE BIOPSY RT   
10/10/2013  
MASTECTOMY, PARTIAL 2010  
RIGHT BREAST  
PAST SURGICAL HISTORY OF 2006  
partial vulvectomy/sq. cell ca in   
situ  
PAST SURGICAL HISTORY OF   
right eye cornea  
PAST SURGICAL HISTORY OF   
right eye revision  
PAST SURGICAL HISTORY OF  
squamous cell carcinoma right arm  
REMV CATARACT EXTRACAP,INSERT LENS   
10/31/2024  
CCA0T0 - +19.5D  
SALPINGO-OOPHORECTOMY COMPL/PRTL   
UNI/BI SPX 2017  
Toenail Big Toe Left Foot removed   
10/2002  
TONSILLECTOMY PRIMARY/SECONDARY   
<AGE 13 1951  
Tonsillectomy  
UNLISTED PROCEDURE HANDS/FINGERS   
2012  
CMC Arthoplasty on right hand  
  
FAMILY HISTORY  
Problem Relation Age of Onset  
Stroke Mother  
Diabetes Mother  
GI Father  
 from complications of gb   
surgery  
Arthritis Sister  
Systemic Lupus  
Cancer Maternal Uncle  
LUNG  
Cancer Other  
cousin with breast cancer/cousin   
with bladder ca.  
Stroke Sister  
  
SOCIAL HISTORY:  
Social History  
  
Tobacco Use  
Smoking status: Never  
Smokeless tobacco: Never  
Vaping Use  
Vaping status: Never Used  
Substance Use Topics  
Alcohol use: Yes  
Comment: 1-2 weekly  
Drug use: No  
  
MEDICATIONS:  
Prior to Admission medications as   
of 24 1120  
Medication Sig Last Dose Taking  
glipiZIDE (GLUCOTROL XL) 2.5 mg 24   
hr tablet Take 2.5 mg by mouth.   
Yes  
fluorometholone (FML LIQUID FILM)   
0.1 % ophthalmic suspension Use 1   
Drop in the right eye three times   
a day. Yes  
donepezil (ARICEPT) 10 mg tablet   
Take 10 mg by mouth. Yes  
cholecalciferol (VITAMIN D3) 50   
mcg (2,000 unit) tablet Take 2,000   
Units by mouth once daily. Yes  
losartan-hydroCHLOROthiazide   
(HYZAAR) 100-12.5 mg per tablet   
Take 1 tablet by mouth once daily.   
Yes  
omeprazole (PRILOSEC) 20 mg   
capsule Take 20 mg by mouth once   
daily. Yes  
potassium chloride (KLOR-CON 10)   
10 mEq tablet Take 10 mEq by mouth   
once daily.  
Yes  
metFORMIN ER (GLUCOPHAGE XR) 500   
mg 24 hr tablet Take two tablets   
by mouth once daily. Yes  
simvastatin(ZOCOR 20 MG TAB) Take   
one(1) tablet daily. Yes  
keTORolac (ACULAR) 0.5 %   
ophthalmic solution Use 1 Drop in   
the left eye three times a day.  
prednisoLONE acetate (PRED FORTE)   
1 % ophthalmic suspension Use 1   
Drop in the left eye three times a   
day.  
amLODIPine (NORVASC) 10 mg tablet   
Take 10 mg by mouth.  
  
No medication comments found.  
  
CURRENT ALLERGIES:  
ALLERGIES  
Allergen Reactions  
Adhesive Rash  
Redness  
Ambien [Zolpidem Ta* Rash  
Codeine Mental Status Change  
Neosporin [Neomycin* Rash  
Percocet [Oxycodone*  
Causes Vomitting  
  
REVIEW OF SYSTEMS:  
PAIN ASSESSMENT:  
General: No weight loss, malaise   
or fevers.  
Neuro: Dementia  
Respiratory: No history of current   
cough or dyspnea, or pneumonia in   
the past 6 weeks. No history of   
respiratory/pulmonary symptoms or   
problems  
Cardiovascular: Positive for:   
Hypertension, PVC  
GI: No history of GI symptoms or   
problems. No history of esophageal   
varices, recent ascites, or ETOH   
greater than 2 drinks per day.  
: No history of UTI in past 6   
weeks. No history of renal   
failure. Not currently on or   
requiring dialysis. No history of   
 symptoms or problems.  
GYN: Negative for abnormal vaginal   
bleeding, abnormal vaginal   
discharge.  
Pregnancy: Denies  
Endocrine: Diabetes Mellitus on   
oral agent  
Hematology: No history of bleeding   
or clotting disorder. Pt is not   
taking anti-coagulation or   
platelet medications. No history   
of hematological symptoms or   
problems.  
Oncology: No history of CA   
metastasis, chemo within 30 days,   
or radiotherapy within 90 days.   
Has not lost 10% of body wt in 6   
months. No history of oncological   
symptoms or problems.  
Psych: No history of psychiatric   
symptoms or problems.  
Musculoskeletal: Negative for   
joint pain or swelling, back pain   
or muscle pain.  
Skin: Negative for lesions, rash   
and itching.  
  
PHYSICAL EXAM:  
VITALS:  
/80   Pulse 88  
  
General: Alert and oriented  
Skin: Normal color, no rash, no   
lesions.  
HEENT: EOM, pupils equal, round   
and reactive.  
Cardiovascular: Normal S1 & S2, no   
rubs, murmurs or gallops. No JVD.   
Pulse regular.  
Lungs: Normal breath sounds, no   
wheezes or crackles.  
Abdomen: Soft, non-tender, no   
rigidity.  
Extremities: No deformity, no   
edema or tenderness, no joint   
swelling or clubbing.  
Neurological: Normal cognition and   
motor skills.  
Pulses: Carotid and radial pulses   
normal +2.  
  
Diagnostic tests reviewed for   
today's visit:  
No new labs or tests  
  
ASSESSMENT  
Medication and Non-Pharmacologic   
VTE Prophylaxis/Anticoagulants  
  
VTE Prophylaxis: VTE prophylaxis   
appropriate  
  
Impression: There is no known   
pertinent medical condition which   
may affect trinidad-operative course  
  
[unfilled]  
Clinical Risk Factors for Possible   
Cardiac Complications:  
None  
  
Patient is scheduled for a   
low-risk procedure.  
  
FUNCTIONAL STATUS: Walk indoors,   
such as around the house (1.75   
METs)  
  
Functional Class (NYHA): N/A  
  
[facility]: Not obtained  
  
PLAN  
CONSULTS:  
Patient does not require consults   
for optimization at this time.  
  
The Following Tests/Procedures   
Have Been Initiated:  
None  
  
Instructions Given to Patient:  
Patient given verbal and written   
preop instructions and voices   
comprehension and compliance.  
  
SIGNATURE: Ronel Hays MD   
PATIENT NAME: Marzena Mederos  
DATE: 2024 MRN:   
[id number]  
TIME: 11:26 AM PAGER/CONTACT #:  
  
Electronically signed by Ronel Hays MD at 2024 11:27   
AM EST  
Source Comments  
- [facility]  
In the event this information is   
protected by the Federal   
Confidentiality of Alcohol and   
Drug Abuse Patient Records   
regulations: This information has   
been disclosed to you from records   
protected by Federal   
confidentiality rules (42 CFR Part   
2). The Federal rules prohibit you   
from making any further disclosure   
of this information unless further   
disclosure is expressly permitted   
by the written consent of the   
person to whom it pertains or as   
otherwise permitted by 42 CFR Part   
2. A general authorization for the   
release of medical or other   
information is NOT sufficient for   
this purpose. The Federal rules   
restrict any use of the   
information to criminally   
investigate or prosecute any   
alcohol or drug abuse patient.  
Reason for Visit  
  
Reason for Visit -  
Reason Comments  
Blurred Vision Right Eye  
Difficulty Reading Right Eye  
Glare Right Eye  
  
Encounter Details  
  
Encounter Details  
Date Type Department Care Team   
(Latest Contact Info) Description  
2024 11:15 AM EST Office   
Visit [facility] Ophthalmology  
[address] [place] [address]  
[phone] Ronel Hays MD  
[address] [place], [place] [address]  
[phone] (Work)  
[phone] (Fax) Combined forms   
of age-related cataract of right   
eye (Primary Dx);  
Status post cataract extraction   
and insertion of intraocular lens   
of left eye;  
Type 2 diabetes mellitus without   
retinopathy (HCC);  
Essential hypertension;  
Hypercholesteremia;  
Dementia, unspecified dementia   
severity, unspecified dementia   
type, unspecified whether   
behavioral, psychotic, or mood   
disturbance or anxiety (HCC)  
  
Social History  
- documented as of this encounter  
Social History  
Tobacco Use Types Packs/Day Years   
Used Date  
Smoking Tobacco: Never  
Smokeless Tobacco: Never  
  
Social History  
Alcohol Use Standard Drinks/Week   
Comments  
Yes 0 (1 standard drink = 0.6 oz   
pure alcohol) 1-2 weekly  
  
Social History  
PHQ-2 Answer Date Recorded  
PHQ-2 score 0 10/31/2019  
  
Social History  
Area Deprivation Index Answer Date   
Recorded  
National Score (1-100), lower   
number is lower risk 79 2023  
State Score (1-10), lower number   
is lower risk 7 2023  
Data from:   
https://www.neighborhoodatlas.[email or web address].edu/. Last address used   
for calculation [address]   
2023  
  
Social History  
Sex and Gender Information Value   
Date Recorded  
Sex Assigned at Birth Female   
2017 6:52 AM EST  
Gender Identity Female 2017   
6:52 AM EST  
Sexual Orientation Straight   
2017 6:52 AM EST  
  
Last Filed Vital Signs  
- documented in this encounter  
Last Filed Vital Signs  
Vital Sign Reading Time Taken   
Comments  
Blood Pressure 125/80 2024   
11:18 AM EST  
Pulse 88 2024 11:18 AM EST  
Temperature - -  
Respiratory Rate - -  
Oxygen Saturation - -  
Inhaled Oxygen Concentration - -  
Weight - -  
Height - -  
Body Mass Index - -  
  
Functional Status  
- documented as of this encounter  
Functional Status  
Functional Status Response Date of   
Assessment  
Are you deaf or do you have   
serious difficulty hearing? No   
2017  
Are you blind or do you have   
serious difficulty seeing, even   
when wearing glasses? No   
2017  
Do you have serious difficulty   
walking or climbing stairs? No   
2017  
Do you have difficulty dressing or   
bathing? No 2017  
Because of a physical, mental, or   
emotional condition, do you have   
difficulty doing errands alone   
such as visiting a doctor's office   
or shopping? No 2017  
  
Functional Status  
Cognitive Status Response Date of   
Assessment  
Because of a physical, mental, or   
emotional condition, do you have   
serious difficulty concentrating,   
remembering, or making decisions?   
No 2017  
  
Patient Instructions  
- documented in this encounter  
Patient Instructions  
Ronel Hays MD - 2024   
11:23 AM EST  
Formatting of this note is   
different from the original.  
Plan Cataract Surgery with   
Monofocal Intraocular Lens Implant   
Right Eye on 2024 at   
[facility].  
  
Current Ophthalmic Meds  
  
fluorometholone (FML LIQUID FILM)   
0.1 % ophthalmic suspension Use 1   
Drop in the right eye three times   
a day.  
  
Current Ophthalmic Meds  
  
prednisoLONE acetate (PRED FORTE)   
1 % ophthalmic suspension Use 1   
Drop in the left eye three times   
daily.  
keTORolac (ACULAR) 0.5 %   
ophthalmic solution Use 1 Drop in   
the left eye three times daily.  
  
Continue:  
Systane Complete solution instill   
1 drop 3 times daily Both Eyes.  
  
If you have any questions please   
contact our office at   
[phone].  
After office hours or on the   
weekend, please call Dr. Hays on   
his cell phone at [phone].  
  
Electronically signed by Ronel Hays MD at 2024 11:23   
AM EST  
  
Ordered Prescriptions  
- documented in this encounter  
Reconcile with Patient's Chart  
Ordered Prescriptions  
Prescription Sig Dispensed Refills   
Start Date End Date  
prednisoLONE acetate (PRED FORTE)   
1 % ophthalmic suspension Use 1   
Drop in the left eye three times a   
day. 10 mL 2 2024  
keTORolac (ACULAR) 0.5 %   
ophthalmic solution Use 1 Drop in   
the left eye three times a day. 5   
mL 2 2024  
  
Progress Notes  
- documented in this encounter  
Ronel Hays MD - 2024   
11:20 AM EST  
Formatting of this note is   
different from the original.  
ASSESSMENT/PLAN:  
1. 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   
cataract of right eye - ICD9:   
366.19, ICD10: H25.811 (primary   
diagnosis)  
  
Cataract Presurgical Documentation  
  
Cataract: Right Eye  
  
Current Visual Acuity  
Right Eye Distance CC 20/40  
Left Eye Distance SC 20/20  
  
Glare Testing:  
Right Eye Medium 20/80  
  
Visual Function: Marzena Mederos   
states that the decline in vision   
from the cataract impedes her   
abilities as listed in the HPI, as   
well as other activities of daily   
living.  
  
Marzena Mederos has confirmed that   
she is no longer able to function   
adequately on a day-to-day basis   
because of her current visual   
condition.  
  
Further, it is my medical opinion   
that the cataract is the primary   
cause, or at least a significantly   
contributory cause of her visual   
dysfunction. With uncomplicated   
cataract surgery and lens   
implantation, it is my expectation   
that her visual function and   
quality of life will improve,   
significantly.  
  
The risks, benefits, alternatives,   
personnel and complications of   
cataract surgery with lens   
implantation were discussed with   
Marzena Mederos in detail. She   
appeared to understand and asked   
that I proceed with plans for   
surgery.  
  
Upon eye examination, patient was   
found to have a visually   
significant cataract Right Eye.   
Discussed cataract surgery with   
patient and different intraocular   
lens implant options with patient:   
basic monofocal intraocular lens   
implant, Toric intraocular lens   
implant, and presbyopia correction   
intraocular lens implant. In my   
medical opinion, based on medical   
history and ocular examination,   
cataract surgery with intraocular   
lens implant will correct   
patient's vision and improve   
quality of patient's daily living   
activities. Patient wishes to have   
traditional cataract surgery with   
basic intraocular lens Right Eye.   
Patient wishes to have cataract   
surgery with the option stated   
above. Patient understands that an   
intraocular lens implant does not   
necessarily replace the need for   
glasses. Patient understands that   
it is impossible for the surgeon   
to inform him/her of every   
possible complication that may   
occur. The surgeon has answered   
all of the patient's questions.   
Patient understands that if he/she   
has a mature or dense cataract,   
pseudoexfoliation cataract, or   
history of use of Flomax, he/she   
may require the use of [place]   
Ring and/or Vision Blue during   
surgery. Patient understands the   
risks, benefits, and alternatives   
to surgery.  
  
Patient would like Right Eye   
corrected for near, target -1.50.   
Patient and patient's    
verbalized understanding that   
patient will need glasses for best   
corrected distance, intermediate,   
and near vision and   
post-operatively Right Eye patient   
will be unable to see at distance.  
  
Plan Cataract Surgery with   
Monofocal Intraocular Lens Implant   
Right Eye on 2024 at   
[facility].  
  
Current Ophthalmic Meds  
  
fluorometholone (FML LIQUID FILM)   
0.1 % ophthalmic suspension Use 1   
Drop in the right eye three times   
a day.  
  
Continue:  
Systane Complete solution instill   
1 drop 3 times daily Both Eyes.  
  
PHYSICAL EXAM:  
  
Vital Signs:  
Blood pressure 125/80, pulse 88.  
  
Respiratory:  
Normal breath sounds, no wheezing.  
  
CARD:  
Normal heart sounds 1 & 2, normal   
sinus rhythm.  
  
2. Status post cataract extraction   
and insertion of intraocular lens   
of left eye - ICD9: V45.61, V43.1,   
ICD10: Z98.42, Z96.1  
  
Current Ophthalmic Meds  
  
prednisoLONE acetate (PRED FORTE)   
1 % ophthalmic suspension Use 1   
Drop in the left eye three times   
daily.  
keTORolac (ACULAR) 0.5 %   
ophthalmic solution Use 1 Drop in   
the left eye three times daily.  
  
Continue:  
Systane Complete solution instill   
1 drop 3 times daily Both Eyes.  
  
3. Type 2 diabetes mellitus   
without retinopathy (HCC) - ICD9:   
250.00, ICD10: E11.9  
  
Please keep your blood sugar under   
good control to minimize risk of   
ocular complications from   
diabetes.  
  
Continue to monitor with primary   
care physician.  
  
4. Essential hypertension - ICD9:   
401.9, ICD10: I10  
  
Continue to monitor with primary   
care physician.  
  
5. Hypercholesteremia - ICD9:   
272.0, ICD10: E78.00  
  
Continue to monitor with primary   
care physician.  
  
6. Dementia, unspecified dementia   
severity, unspecified dementia   
type, unspecified whether   
behavioral, psychotic, or mood   
disturbance or anxiety (HCC) -   
ICD9: 294.20, ICD10: F03.90  
  
Continue to monitor with primary   
care physician.  
  
I have confirmed and edited as   
necessary the relevant HPI,   
ophthalmic history, ROS, and the   
neuro exam findings as obtained by   
others. I have seen and examined   
Marzena Mederos.  
I have discussed the case and the   
management of this patient's care   
with the Resident/Fellow, if   
applicable. I also have reviewed   
and agree with the assessment and   
plan as stated above and agree   
with all of its relevant   
components.  
  
Electronically signed by Ronel aHys MD at 2024 11:27   
AM EST  
Plan of Treatment  
- documented as of this encounter  
Plan of Treatment - Upcoming   
Encounters  
Upcoming Encounters  
Date Type Department Care Team   
(Latest Contact Info) Description  
2024 2:15 PM EST Office   
Visit [facility] Ophthalmology  
[address] [id number]  
[phone] Ronel Hays MD  
[address] [place] [address]  
[phone] (Work)  
[phone] (Fax) 1 DAY PO 2ND RE   
BASIC  
2025 12:50 PM EST   
Appointment Mammogram  
[address]  
[place], [place] [id number] [phone] Encounter for   
screening mammogram for breast   
cancer [Z12.31]  
2025 1:20 PM EST Office   
Visit OB/Gynecology  
[address] [place] [id number] [phone] Jose Delgado MD  
[address] [place] [address]  
[phone] (Work)  
[phone] (Fax) Annual  
2025 11:30 AM EST Visit (SP)   
Office Hematology/Oncology  
[address] [place] [id number] [phone] Colten Abbott DO  
[address] [place] [address]  
[phone] (Work)  
[phone] (Fax) 1 YR OV/MAMM   
*  
  
Plan of Treatment - Scheduled   
Procedures  
Scheduled Procedures  
Name Priority Associated Diagnoses   
Date/Time  
SURGERY AT NON-[facility] FACILITY   
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   
cataract of right eye 2024   
10:10 AM EST  
  
Visit Diagnoses  
- documented in this encounter  
Visit Diagnoses  
Diagnosis  
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   
cataract of right eye - Primary  
Other and combined forms of senile   
cataract  
Status post cataract extraction   
and insertion of intraocular lens   
of left eye  
Type 2 diabetes mellitus without   
retinopathy (HCC)  
Type II or unspecified type   
diabetes mellitus without mention   
of complication, not stated as   
uncontrolled  
Essential hypertension  
Unspecified essential hypertension  
Hypercholesteremia  
Pure hypercholesterolemia  
Dementia, unspecified dementia   
severity, unspecified dementia   
type, unspecified whether   
behavioral, psychotic, or mood   
disturbance or anxiety (HCC)  
  
Discontinued Medications  
- documented as of this encounter  
Discontinued Medications  
Medication Sig Discontinue Reason   
Start Date End Date  
prednisoLONE acetate (PRED FORTE)   
1 % ophthalmic suspension Use 1   
Drop in the left eye four times   
daily. 2024  
keTORolac (ACULAR) 0.5 %   
ophthalmic solution Use 1 Drop in   
the left eye four times daily.   
2024  
  
Eye Exam  
  
Eye Exam - Visual Acuity (Demarco   
Cards)  
Visual Acuity (Demarco Cards)  
Right eye Left eye  
Dist sc 20/20  
Dist cc 20/40  
Near cc J6  
  
Eye Exam - Tonometry (Applanation,   
11:19 AM)  
Tonometry (Applanation, 11:19 AM)  
Right eye Left eye  
Pressure 14 14  
  
Eye Exam - Pupils  
Pupils  
Pupils Dark Light Shape APD  
Right eye PERRL 4 2 Round None  
Left eye PERRL 4 2 Round None  
  
Eye Exam - Visual Fields  
Visual Fields  
Right eye Left eye  
Full Full  
  
Eye Exam - Extraocular Movement  
Extraocular Movement  
Right eye Left eye  
Full Full  
  
Eye Exam - Neuro/Psych  
Neuro/Psych  
Oriented x3: Yes  
Mood/Affect: Normal  
  
Eye Exam - Glare Testing  
Glare Testing  
Medium  
Right eye 20/80  
Left eye  
  
Eye Exam - External Exam  
External Exam  
Right eye Left eye  
External Normal including orbits   
and preauricular lymph nodes   
Normal including orbits and   
preauricular lymph nodes  
  
Eye Exam - Slit Lamp Exam  
Slit Lamp Exam  
Right eye Left eye  
Lids/Lashes Normal lids, lashes,   
lacrimal glands, and lacrimal   
drainage Normal lids, lashes,   
lacrimal glands, and lacrimal   
drainage  
Conjunctiva/Sclera White and quiet   
White and quiet  
Cornea Normal epithelium, stroma,   
endothelium, and tear film Normal   
epithelium, stroma, endothelium,   
and tear film  
Anterior Chamber Deep and quiet   
Deep and quiet  
Iris Round and reactive Round and   
reactive  
Lens 2+ Nuclear sclerotic   
cataract, 2+ Posterior subcapsular   
cataract Posterior chamber   
intraocular lens  
Anterior Vitreous Normal Normal  
  
Eye Exam - Fundus Exam  
Fundus Exam  
Right eye Left eye  
Disc Normal Normal  
C/D Ratio 0.3 0.3  
Macula Normal Normal  
Vessels Normal Normal  
Periphery Normal Normal  
  
Care Teams  
- documented as of this encounter  
Care Teams  
Team Member Relationship Specialty   
Start Date End Date  
Sharifa Almaguer DO  
NPI: [id number]  
[address] [id number]  
[phone] (Work)  
[phone] (Fax) PCP - General   
Internal Medicine 10/25/23  
Herbie Mills MD  
NPI: [id number]  
[address] [place]  
[place] [address]  
[phone] (Work)  
[phone] (Fax) Physician   
Radiation Oncology 18  
  
Electronically signed by Ronel Hays MD at 2024 10:15 AM   
EST  
Formatting of this note is   
different from the original.  
H&P Notes  
- documented in this encounter  
Ronel Hays MD - 2024   
11:26 AM EST  
Formatting of this note is   
different from the original.  
HISTORY AND PHYSICAL EXAMINATION  
  
SERVICE DATE: 2024  
SERVICE TIME: 11:26 AM  
  
PRIMARY CARE PHYSICIAN: Sharifa Almaguer DO  
  
REASON FOR VISIT: Marzena Mederos   
is a 79 year old female who is   
being seen for Combined   
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  
  
The patient has the following:  
ACTIVE PROBLEM LIST  
Postmenopausal Atrophic Vaginitis  
Unspecified Transient Cerebral   
Ischemia  
Carcinoma in Situ of Vulva  
Malignant Neoplasm of Female   
Breast (Hcc)  
Personal History of Malignant   
Neoplasm of Breast  
Senile Nuclear Sclerosis  
Hypermetropia  
Regular Astigmatism  
Presbyopia  
Invasive Ductal Carcinoma of   
Breast, Female (Hcc)  
Ocular Migraine  
Controlled Type 2 Diabetes   
Mellitus Without Complication,   
Without Long-Term Current Use of   
Insulin (Hcc)  
Postmenopausal Bleeding  
S/P Hysterectomy With Oophorectomy  
  
SUBJECTIVE  
CHIEF COMPLAINT: Blurred vision   
for distance and near Right Eye.  
  
PAST MEDICAL HISTORY  
Diagnosis Date  
Breast cancer (HCC)   
invasive ductal carcinoma,   
completed radiation 3/7/11  
Cancer of endometrium (HCC)  
Carcinoma in situ, vulva   
Cellulitis  
of right eye muscle  
Diabetes mellitus without mention   
of complication  
Diabetes mellitus, type II (HCC)  
Diaphragmatic hernia without   
mention of obstruction or gangrene  
Hiatal hernia  
Dyspareunia  
Elevated cholesterol  
Esophageal reflux  
Insomnia, unspecified  
Orbital cellulitis  
Other corneal disorder  
Rt eye corneal erosion .  
PMH - PAST MEDICAL HISTORY OF  
?VESTIBULITIS  
PMH - PAST MEDICAL HISTORY OF  
CLIMACTERIC  
PMH - PAST MEDICAL HISTORY OF   
2006  
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  
Pure hypercholesterolemia  
Rib fracture  
6th Rib  
Senile dementia (HCC)  
Stomatitis and mucositis   
(ulcerative)  
Chronic ulcerative stomatitis on   
mucosal biopsy  
Unspecified essential hypertension  
  
PAST SURGICAL HISTORY  
Procedure Laterality Date  
Bunions bilateral feet removed   
10/2002  
BX/EXC LYMPH NODE OPEN DEEP   
AXILLARY NODE 2010  
RIGHT BREAST LUMP W/ SLN BX  
CHOLECYSTECTOMY 2004  
Cholecystectomy  
COLONOSCOPY FLX DX W/COLLJ SPEC   
WHEN PFRMD   
Colonoscopy  
COLPOSCOPY CERVIX UPPER/ADJACENT   
VAGINA 2009  
Colposcopy of the vulva  
COLPOSCOPY, VULVA 10/2009  
CORRECT BUNION,SIMPLE  
Bunion  
DILATION & CURETTAGE DX&/THER   
NONOBSTETRIC 's  
SAB  
HYSTERECTOMY 2017  
LAPAROSCOPY SURG CHOLECYSTECTOMY   
2004  
Cholecystectomy, lap  
MAMMO STEREOTACTIC CORE BIOPSY RT   
10/10/2013  
MASTECTOMY, PARTIAL 2010  
RIGHT BREAST  
PAST SURGICAL HISTORY OF 2006  
partial vulvectomy/sq. cell ca in   
situ  
PAST SURGICAL HISTORY OF   
right eye cornea  
PAST SURGICAL HISTORY OF   
right eye revision  
PAST SURGICAL HISTORY OF  
squamous cell carcinoma right arm  
REMV CATARACT EXTRACAP,INSERT LENS   
10/31/2024  
CCA0T0 - +19.5D  
SALPINGO-OOPHORECTOMY COMPL/PRTL   
UNI/BI SPX 2017  
Toenail Big Toe Left Foot removed   
10/2002  
TONSILLECTOMY PRIMARY/SECONDARY   
<AGE 13 1951  
Tonsillectomy  
UNLISTED PROCEDURE HANDS/FINGERS   
2012  
CMC Arthoplasty on right hand  
  
FAMILY HISTORY  
Problem Relation Age of Onset  
Stroke Mother  
Diabetes Mother  
GI Father  
 from complications of gb   
surgery  
Arthritis Sister  
Systemic Lupus  
Cancer Maternal Uncle  
LUNG  
Cancer Other  
cousin with breast cancer/cousin   
with bladder ca.  
Stroke Sister  
  
SOCIAL HISTORY:  
Social History  
  
Tobacco Use  
Smoking status: Never  
Smokeless tobacco: Never  
Vaping Use  
Vaping status: Never Used  
Substance Use Topics  
Alcohol use: Yes  
Comment: 1-2 weekly  
Drug use: No  
  
MEDICATIONS:  
Prior to Admission medications as   
of 24 1120  
Medication Sig Last Dose Taking  
glipiZIDE (GLUCOTROL XL) 2.5 mg 24   
hr tablet Take 2.5 mg by mouth.   
Yes  
fluorometholone (FML LIQUID FILM)   
0.1 % ophthalmic suspension Use 1   
Drop in the right eye three times   
a day. Yes  
donepezil (ARICEPT) 10 mg tablet   
Take 10 mg by mouth. Yes  
cholecalciferol (VITAMIN D3) 50   
mcg (2,000 unit) tablet Take 2,000   
Units by mouth once daily. Yes  
losartan-hydroCHLOROthiazide   
(HYZAAR) 100-12.5 mg per tablet   
Take 1 tablet by mouth once daily.   
Yes  
omeprazole (PRILOSEC) 20 mg   
capsule Take 20 mg by mouth once   
daily. Yes  
potassium chloride (KLOR-CON 10)   
10 mEq tablet Take 10 mEq by mouth   
once daily.  
Yes  
metFORMIN ER (GLUCOPHAGE XR) 500   
mg 24 hr tablet Take two tablets   
by mouth once daily. Yes  
simvastatin(ZOCOR 20 MG TAB) Take   
one(1) tablet daily. Yes  
keTORolac (ACULAR) 0.5 %   
ophthalmic solution Use 1 Drop in   
the left eye three times a day.  
prednisoLONE acetate (PRED FORTE)   
1 % ophthalmic suspension Use 1   
Drop in the left eye three times a   
day.  
amLODIPine (NORVASC) 10 mg tablet   
Take 10 mg by mouth.  
  
No medication comments found.  
  
CURRENT ALLERGIES:  
ALLERGIES  
Allergen Reactions  
Adhesive Rash  
Redness  
Ambien [Zolpidem Ta* Rash  
Codeine Mental Status Change  
Neosporin [Neomycin* Rash  
Percocet [Oxycodone*  
Causes Vomitting  
  
REVIEW OF SYSTEMS:  
PAIN ASSESSMENT:  
General: No weight loss, malaise   
or fevers.  
Neuro: Dementia  
Respiratory: No history of current   
cough or dyspnea, or pneumonia in   
the past 6 weeks. No history of   
respiratory/pulmonary symptoms or   
problems  
Cardiovascular: Positive for:   
Hypertension, PVC  
GI: No history of GI symptoms or   
problems. No history of esophageal   
varices, recent ascites, or ETOH   
greater than 2 drinks per day.  
: No history of UTI in past 6   
weeks. No history of renal   
failure. Not currently on or   
requiring dialysis. No history of   
 symptoms or problems.  
GYN: Negative for abnormal vaginal   
bleeding, abnormal vaginal   
discharge.  
Pregnancy: Denies  
Endocrine: Diabetes Mellitus on   
oral agent  
Hematology: No history of bleeding   
or clotting disorder. Pt is not   
taking anti-coagulation or   
platelet medications. No history   
of hematological symptoms or   
problems.  
Oncology: No history of CA   
metastasis, chemo within 30 days,   
or radiotherapy within 90 days.   
Has not lost 10% of body wt in 6   
months. No history of oncological   
symptoms or problems.  
Psych: No history of psychiatric   
symptoms or problems.  
Musculoskeletal: Negative for   
joint pain or swelling, back pain   
or muscle pain.  
Skin: Negative for lesions, rash   
and itching.  
  
PHYSICAL EXAM:  
VITALS:  
/80   Pulse 88  
  
General: Alert and oriented  
Skin: Normal color, no rash, no   
lesions.  
HEENT: EOM, pupils equal, round   
and reactive.  
Cardiovascular: Normal S1 & S2, no   
rubs, murmurs or gallops. No JVD.   
Pulse regular.  
Lungs: Normal breath sounds, no   
wheezes or crackles.  
Abdomen: Soft, non-tender, no   
rigidity.  
Extremities: No deformity, no   
edema or tenderness, no joint   
swelling or clubbing.  
Neurological: Normal cognition and   
motor skills.  
Pulses: Carotid and radial pulses   
normal +2.  
  
Diagnostic tests reviewed for   
today's visit:  
No new labs or tests  
  
ASSESSMENT  
Medication and Non-Pharmacologic   
VTE Prophylaxis/Anticoagulants  
  
VTE Prophylaxis: VTE prophylaxis   
appropriate  
  
Impression: There is no known   
pertinent medical condition which   
may affect trinidad-operative course  
  
[unfilled]  
Clinical Risk Factors for Possible   
Cardiac Complications:  
None  
  
Patient is scheduled for a   
low-risk procedure.  
  
FUNCTIONAL STATUS: Walk indoors,   
such as around the house (1.75   
METs)  
  
Functional Class (NYHA): N/A  
  
[facility]: Not obtained  
  
PLAN  
CONSULTS:  
Patient does not require consults   
for optimization at this time.  
  
The Following Tests/Procedures   
Have Been Initiated:  
None  
  
Instructions Given to Patient:  
Patient given verbal and written   
preop instructions and voices   
comprehension and compliance.  
  
SIGNATURE: Ronel Hays MD   
PATIENT NAME: Marzena Mederos  
DATE: 2024 MRN:   
[id number]  
TIME: 11:26 AM PAGER/CONTACT #:  
  
Electronically signed by Ronel Hays MD at 2024 11:27   
AM EST  
Source Comments  
- [facility]  
In the event this information is   
protected by the Federal   
Confidentiality of Alcohol and   
Drug Abuse Patient Records   
regulations: This information has   
been disclosed to you from records   
protected by Federal   
confidentiality rules (42 CFR Part   
2). The Federal rules prohibit you   
from making any further disclosure   
of this information unless further   
disclosure is expressly permitted   
by the written consent of the   
person to whom it pertains or as   
otherwise permitted by 42 CFR Part   
2. A general authorization for the   
release of medical or other   
information is NOT sufficient for   
this purpose. The Federal rules   
restrict any use of the   
information to criminally   
investigate or prosecute any   
alcohol or drug abuse patient.  
Reason for Visit  
  
Reason for Visit -  
Reason Comments  
Blurred Vision Right Eye  
Difficulty Reading Right Eye  
Glare Right Eye  
  
Encounter Details  
  
Encounter Details  
Date Type Department Care Team   
(Latest Contact Info) Description  
2024 11:15 AM EST Office   
Visit [facility] Ophthalmology  
[address] [place] [address]  
[phone] Ronel Hays MD  
[address] [id number]  
[phone] (Work)  
[phone] (Fax) Combined forms   
of age-related cataract of right   
eye (Primary Dx);  
Status post cataract extraction   
and insertion of intraocular lens   
of left eye;  
Type 2 diabetes mellitus without   
retinopathy (HCC);  
Essential hypertension;  
Hypercholesteremia;  
Dementia, unspecified dementia   
severity, unspecified dementia   
type, unspecified whether   
behavioral, psychotic, or mood   
disturbance or anxiety (HCC)  
  
Social History  
- documented as of this encounter  
Social History  
Tobacco Use Types Packs/Day Years   
Used Date  
Smoking Tobacco: Never  
Smokeless Tobacco: Never  
  
Social History  
Alcohol Use Standard Drinks/Week   
Comments  
Yes 0 (1 standard drink = 0.6 oz   
pure alcohol) 1-2 weekly  
  
Social History  
PHQ-2 Answer Date Recorded  
PHQ-2 score 0 10/31/2019  
  
Social History  
Area Deprivation Index Answer Date   
Recorded  
National Score (1-100), lower   
number is lower risk 79 2023  
State Score (1-10), lower number   
is lower risk 7 2023  
Data from:   
https://www.neighborhoodatlas.[email or web address]/. Last address used   
for calculation [address] [facility]   
2023  
  
Social History  
Sex and Gender Information Value   
Date Recorded  
Sex Assigned at Birth Female   
2017 6:52 AM EST  
Gender Identity Female 2017   
6:52 AM EST  
Sexual Orientation Straight   
2017 6:52 AM EST  
  
Last Filed Vital Signs  
- documented in this encounter  
Last Filed Vital Signs  
Vital Sign Reading Time Taken   
Comments  
Blood Pressure 125/80 2024   
11:18 AM EST  
Pulse 88 2024 11:18 AM EST  
Temperature - -  
Respiratory Rate - -  
Oxygen Saturation - -  
Inhaled Oxygen Concentration - -  
Weight - -  
Height - -  
Body Mass Index - -  
  
Functional Status  
- documented as of this encounter  
Functional Status  
Functional Status Response Date of   
Assessment  
Are you deaf or do you have   
serious difficulty hearing? No   
2017  
Are you blind or do you have   
serious difficulty seeing, even   
when wearing glasses? No   
2017  
Do you have serious difficulty   
walking or climbing stairs? No   
2017  
Do you have difficulty dressing or   
bathing? No 2017  
Because of a physical, mental, or   
emotional condition, do you have   
difficulty doing errands alone   
such as visiting a doctor's office   
or shopping? No 2017  
  
Functional Status  
Cognitive Status Response Date of   
Assessment  
Because of a physical, mental, or   
emotional condition, do you have   
serious difficulty concentrating,   
remembering, or making decisions?   
No 2017  
  
Patient Instructions  
- documented in this encounter  
Patient Instructions  
Ronel Hays MD - 2024   
11:23 AM EST  
Formatting of this note is   
different from the original.  
Plan Cataract Surgery with   
Monofocal Intraocular Lens Implant   
Right Eye on 2024 at   
[facility].  
  
Current Ophthalmic Meds  
  
fluorometholone (FML LIQUID FILM)   
0.1 % ophthalmic suspension Use 1   
Drop in the right eye three times   
a day.  
  
Current Ophthalmic Meds  
  
prednisoLONE acetate (PRED FORTE)   
1 % ophthalmic suspension Use 1   
Drop in the left eye three times   
daily.  
keTORolac (ACULAR) 0.5 %   
ophthalmic solution Use 1 Drop in   
the left eye three times daily.  
  
Continue:  
Systane Complete solution instill   
1 drop 3 times daily Both Eyes.  
  
If you have any questions please   
contact our office at   
[phone].  
After office hours or on the   
weekend, please call Dr. Hays on   
his cell phone at [phone].  
  
Electronically signed by Ronel Hays MD at 2024 11:23   
AM EST  
  
Ordered Prescriptions  
- documented in this encounter  
Reconcile with Patient's Chart  
Ordered Prescriptions  
Prescription Sig Dispensed Refills   
Start Date End Date  
prednisoLONE acetate (PRED FORTE)   
1 % ophthalmic suspension Use 1   
Drop in the left eye three times a   
day. 10 mL 2 2024  
keTORolac (ACULAR) 0.5 %   
ophthalmic solution Use 1 Drop in   
the left eye three times a day. 5   
mL 2 2024  
  
Progress Notes  
- documented in this encounter  
Ronel Hays MD - 2024   
11:20 AM EST  
Formatting of this note is   
different from the original.  
ASSESSMENT/PLAN:  
1. 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   
cataract of right eye - ICD9:   
366.19, ICD10: H25.811 (primary   
diagnosis)  
  
Cataract Presurgical Documentation  
  
Cataract: Right Eye  
  
Current Visual Acuity  
Right Eye Distance CC 20/40  
Left Eye Distance SC 20/20  
  
Glare Testing:  
Right Eye Medium 20/80  
  
Visual Function: Marzena Mederos   
states that the decline in vision   
from the cataract impedes her   
abilities as listed in the HPI, as   
well as other activities of daily   
living.  
  
Marzena Mederos has confirmed that   
she is no longer able to function   
adequately on a day-to-day basis   
because of her current visual   
condition.  
  
Further, it is my medical opinion   
that the cataract is the primary   
cause, or at least a significantly   
contributory cause of her visual   
dysfunction. With uncomplicated   
cataract surgery and lens   
implantation, it is my expectation   
that her visual function and   
quality of life will improve,   
significantly.  
  
The risks, benefits, alternatives,   
personnel and complications of   
cataract surgery with lens   
implantation were discussed with   
Marzena Mederos in detail. She   
appeared to understand and asked   
that I proceed with plans for   
surgery.  
  
Upon eye examination, patient was   
found to have a visually   
significant cataract Right Eye.   
Discussed cataract surgery with   
patient and different intraocular   
lens implant options with patient:   
basic monofocal intraocular lens   
implant, Toric intraocular lens   
implant, and presbyopia correction   
intraocular lens implant. In my   
medical opinion, based on medical   
history and ocular examination,   
cataract surgery with intraocular   
lens implant will correct   
patient's vision and improve   
quality of patient's daily living   
activities. Patient wishes to have   
traditional cataract surgery with   
basic intraocular lens Right Eye.   
Patient wishes to have cataract   
surgery with the option stated   
above. Patient understands that an   
intraocular lens implant does not   
necessarily replace the need for   
glasses. Patient understands that   
it is impossible for the surgeon   
to inform him/her of every   
possible complication that may   
occur. The surgeon has answered   
all of the patient's questions.   
Patient understands that if he/she   
has a mature or dense cataract,   
pseudoexfoliation cataract, or   
history of use of Flomax, he/she   
may require the use of [place]   
Ring and/or Vision Blue during   
surgery. Patient understands the   
risks, benefits, and alternatives   
to surgery.  
  
Patient would like Right Eye   
corrected for near, target -1.50.   
Patient and patient's    
verbalized understanding that   
patient will need glasses for best   
corrected distance, intermediate,   
and near vision and   
post-operatively Right Eye patient   
will be unable to see at distance.  
  
Plan Cataract Surgery with   
Monofocal Intraocular Lens Implant   
Right Eye on 2024 at   
[facility].  
  
Current Ophthalmic Meds  
  
fluorometholone (FML LIQUID FILM)   
0.1 % ophthalmic suspension Use 1   
Drop in the right eye three times   
a day.  
  
Continue:  
Systane Complete solution instill   
1 drop 3 times daily Both Eyes.  
  
PHYSICAL EXAM:  
  
Vital Signs:  
Blood pressure 125/80, pulse 88.  
  
Respiratory:  
Normal breath sounds, no wheezing.  
  
CARD:  
Normal heart sounds 1 & 2, normal   
sinus rhythm.  
  
2. Status post cataract extraction   
and insertion of intraocular lens   
of left eye - ICD9: V45.61, V43.1,   
ICD10: Z98.42, Z96.1  
  
Current Ophthalmic Meds  
  
prednisoLONE acetate (PRED FORTE)   
1 % ophthalmic suspension Use 1   
Drop in the left eye three times   
daily.  
keTORolac (ACULAR) 0.5 %   
ophthalmic solution Use 1 Drop in   
the left eye three times daily.  
  
Continue:  
Systane Complete solution instill   
1 drop 3 times daily Both Eyes.  
  
3. Type 2 diabetes mellitus   
without retinopathy (HCC) - ICD9:   
250.00, ICD10: E11.9  
  
Please keep your blood sugar under   
good control to minimize risk of   
ocular complications from   
diabetes.  
  
Continue to monitor with primary   
care physician.  
  
4. Essential hypertension - ICD9:   
401.9, ICD10: I10  
  
Continue to monitor with primary   
care physician.  
  
5. Hypercholesteremia - ICD9:   
272.0, ICD10: E78.00  
  
Continue to monitor with primary   
care physician.  
  
6. Dementia, unspecified dementia   
severity, unspecified dementia   
type, unspecified whether   
behavioral, psychotic, or mood   
disturbance or anxiety (HCC) -   
ICD9: 294.20, ICD10: F03.90  
  
Continue to monitor with primary   
care physician.  
  
I have confirmed and edited as   
necessary the relevant HPI,   
ophthalmic history, ROS, and the   
neuro exam findings as obtained by   
others. I have seen and examined   
Marzena Mederos.  
I have discussed the case and the   
management of this patient's care   
with the Resident/Fellow, if   
applicable. I also have reviewed   
and agree with the assessment and   
plan as stated above and agree   
with all of its relevant   
components.  
  
Electronically signed by Ronel Hays MD at 2024 11:27   
AM EST  
Plan of Treatment  
- documented as of this encounter  
Plan of Treatment - Upcoming   
Encounters  
Upcoming Encounters  
Date Type Department Care Team   
(Latest Contact Info) Description  
2024 2:15 PM EST Office   
Visit OPHT Ophthalmology  
 [place] [id number]  
[phone] Ronel Hays MD  
[address] [place] [address]  
[phone] (Work)  
[phone] (Fax) 1 DAY PO 2ND RE   
BASIC  
2025 12:50 PM EST   
Appointment Mammogram  
[address] [place] [address]  
[phone] Encounter for   
screening mammogram for breast   
cancer [Z12.31]  
2025 1:20 PM EST Office   
Visit OB/Gynecology  
[address] [place] [address]  
[phone] Jose Delgado MD  
[address] [place] [address]  
[phone] (Work)  
[phone] (Fax) Annual  
2025 11:30 AM EST Visit (SP)   
Office Hematology/Oncology  
[address] [place] [address]  
[phone] Colten Abbott DO  
[address] [place] [address]  
[phone] (Work)  
[phone] (Fax) 1 YR OV/MAMM   
*  
  
Plan of Treatment - Scheduled   
Procedures  
Scheduled Procedures  
Name Priority Associated Diagnoses   
Date/Time  
SURGERY AT NON[facility] FACILITY   
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   
cataract of right eye 2024   
10:10 AM EST  
  
Visit Diagnoses  
- documented in this encounter  
Visit Diagnoses  
Diagnosis  
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   
cataract of right eye - Primary  
Other and combined forms of senile   
cataract  
Status post cataract extraction   
and insertion of intraocular lens   
of left eye  
Type 2 diabetes mellitus without   
retinopathy (HCC)  
Type II or unspecified type   
diabetes mellitus without mention   
of complication, not stated as   
uncontrolled  
Essential hypertension  
Unspecified essential hypertension  
Hypercholesteremia  
Pure hypercholesterolemia  
Dementia, unspecified dementia   
severity, unspecified dementia   
type, unspecified whether   
behavioral, psychotic, or mood   
disturbance or anxiety (HCC)  
  
Discontinued Medications  
- documented as of this encounter  
Discontinued Medications  
Medication Sig Discontinue Reason   
Start Date End Date  
prednisoLONE acetate (PRED FORTE)   
1 % ophthalmic suspension Use 1   
Drop in the left eye four times   
daily. 2024  
keTORolac (ACULAR) 0.5 %   
ophthalmic solution Use 1 Drop in   
the left eye four times daily.   
2024  
  
Eye Exam  
  
Eye Exam - Visual Acuity (Demarco   
Cards)  
Visual Acuity (Demarco Cards)  
Right eye Left eye  
Dist sc 20/20  
Dist cc 20/40  
Near cc J6  
  
Eye Exam - Tonometry (Applanation,   
11:19 AM)  
Tonometry (Applanation, 11:19 AM)  
Right eye Left eye  
Pressure 14 14  
  
Eye Exam - Pupils  
Pupils  
Pupils Dark Light Shape APD  
Right eye PERRL 4 2 Round None  
Left eye PERRL 4 2 Round None  
  
Eye Exam - Visual Fields  
Visual Fields  
Right eye Left eye  
Full Full  
  
Eye Exam - Extraocular Movement  
Extraocular Movement  
Right eye Left eye  
Full Full  
  
Eye Exam - Neuro/Psych  
Neuro/Psych  
Oriented x3: Yes  
Mood/Affect: Normal  
  
Eye Exam - Glare Testing  
Glare Testing  
Medium  
Right eye 20/80  
Left eye  
  
Eye Exam - External Exam  
External Exam  
Right eye Left eye  
External Normal including orbits   
and preauricular lymph nodes   
Normal including orbits and   
preauricular lymph nodes  
  
Eye Exam - Slit Lamp Exam  
Slit Lamp Exam  
Right eye Left eye  
Lids/Lashes Normal lids, lashes,   
lacrimal glands, and lacrimal   
drainage Normal lids, lashes,   
lacrimal glands, and lacrimal   
drainage  
Conjunctiva/Sclera White and quiet   
White and quiet  
Cornea Normal epithelium, stroma,   
endothelium, and tear film Normal   
epithelium, stroma, endothelium,   
and tear film  
Anterior Chamber Deep and quiet   
Deep and quiet  
Iris Round and reactive Round and   
reactive  
Lens 2+ Nuclear sclerotic   
cataract, 2+ Posterior subcapsular   
cataract Posterior chamber   
intraocular lens  
Anterior Vitreous Normal Normal  
  
Eye Exam - Fundus Exam  
Fundus Exam  
Right eye Left eye  
Disc Normal Normal  
C/D Ratio 0.3 0.3  
Macula Normal Normal  
Vessels Normal Normal  
Periphery Normal Normal  
  
Care Teams  
- documented as of this encounter  
Care Teams  
Team Member Relationship Specialty   
Start Date End Date  
Sharifa Almaguer DO  
NPI: [id number]  
[address] [place] [address]  
[phone] (Work)  
[phone] (Fax) PCP - General   
Internal Medicine 10/25/23  
Herbie Mills MD  
NPI: [id number]  
[address] [place] [address]  
[phone] (Work)  
[phone] (Fax) Physician   
Radiation Oncology 18  
  
Electronically signed by Ronel Hays MD at 2024 10:15 AM   
EST  
documented in this encounter            [facility]  
Work Phone:   
[phone]  
   
                                        2024 Note     Formatting of this n  
ote is   
different from the original.  
No outpatient medications have   
been marked as taking for the   
24 encounter (Hospital   
Encounter).  
  
  
  
  
  
NPO Instructions:  
  
Do not eat any food after midnight   
the night before your   
surgery/procedure.  
You may have clear liquids until   
TWO hours before   
surgery/procedure. This includes   
water, black tea/coffee, (no milk   
or cream) apple juice and   
electrolyte drinks (Gatorade).  
  
Additional Instructions:  
  
Will need  home, will   
receive call day before surgery   
with arrival time  
  
  
Electronically signed by Jolie Germain RN at 2024 12:37 PM   
EST  
                                        [facility]  
   
                                                    2024 Miscellaneous   
Notes                                   Formatting of this note is   
different from the original.  
No outpatient medications have   
been marked as taking for the   
24 encounter (Hospital   
Encounter).  
  
  
  
  
  
NPO Instructions:  
  
Do not eat any food after midnight   
the night before your   
surgery/procedure.  
You may have clear liquids until   
TWO hours before   
surgery/procedure. This includes   
water, black tea/coffee, (no milk   
or cream) apple juice and   
electrolyte drinks (Gatorade).  
  
Additional Instructions:  
  
Will need  home, will   
receive call day before surgery   
with arrival time  
  
  
Electronically signed by Jolie Germain RN at 2024 12:37 PM   
EST  
documented in this encounter            [facility]  
Work Phone:   
[phone]  
   
                                        2024 Note     HNO ID: [id number]  
Author: COLTEN ABBOTT, DO  
Service: ?  
Author Type: Physician  
Type: Progress Notes  
Filed: 2024 10:24  
Note Text:  
Diagnoses:  
1) T1c (1.5 cm; grade II; no AL   
invasion) N0 (0/4 SLN) MX ER/ID   
positive, HER2  
non-amplified invasive ductal   
carcinoma of the right breast.  
2) pT2 N0 FIGO 1 endometrial   
adenocarcinoma with squamous   
differentiation s/p  
adjuvant brachytherapy.  
HPI: Patient is a 77 yo female who   
had a right breast abnormality on   
screening  
mammogram. Biopsy invasive ductal   
carcinoma.  
Underwent partial mastectomy   
2010.  
Pathology:  
1.5 cm invasive ductal carcinoma.  
Grade II.  
ER/ID positive.  
HER2 non-amplified by FISH.  
4 of 4 LNs negative.  
Oncotype: Recurrence score 25   
(16%). Intermediate.  
Previous therapy:  
1) TC x4. Completed 12/15/2010.  
2) Adjuvant radiation 2011 -   
3/7/2011.  
3) Anastrozole--changed to   
tamoxifen 2011 due to   
significant symptoms of  
vaginal atrophy. Stopped 2017.  
She underwent surgery in 2017.  
Pathology:  
FINAL DIAGNOSIS:  
A) LEFT PELVIC SENTINEL LYMPH NODE   
#1 - NEGATIVE FOR MALIGNANCY.  
B) LEFT PELVIC SENTINEL LYMPH NODE   
#2 - NEGATIVE FOR MALIGNANCY.  
C) RIGHT PELVIC SENTINEL LYMPH   
NODE #1 - NEGATIVE FOR MALIGNANCY.  
D) RIGHT PELVIC SENTINEL LYMPH   
NODE #2 - NEGATIVE FOR MALIGNANCY.  
E) HYSTERECTOMY WITH BILATERAL   
SALPINGO-OOPHORECTOMY - FOCUS OF  
ENDOMETRIAL ADENOCARCINOMA,   
ENDOMETRIOID TYPE WITH SQUAMOUS   
DIFFERENTIATION(  
FIGO GRADE 1) WITH EXTENSIVE   
ASSOCIATED OBSCURING CRUSH   
ARTIFACT AND DISRUPTION.  
TUMOR INVOLVES THE ANTERIOR AND   
POSTERIOR LOWER UTERINE SEGMENT   
AND THE  
UPPER ENDOCERVICAL CANAL. (SEE   
COMMENT).  
3.4 CM ENDOMETRIAL POLYP.  
MULTIPLE INTRAMURAL, SUBMUCOSAL   
AND SUBSEROSAL LEIOMYOMAS.  
RIGHT AND LEFT OVARIES- ATROPHIC   
CHANGES. NEGATIVE FOR MALIGNANCY.  
RIGHT AND LEFT FALLOPIAN TUBES -   
SEROSAL WALTHARD REST CYST.   
NEGATIVE  
FOR MALIGNANCY.  
COMMENT: Adenocarcinoma is not   
identified in association with the   
3.4 cm  
endometrial polyp. Endometrioid   
adenocarcinoma identified only on  
slides E12 and E13 representing   
the anterior and posterior uterine   
segment and  
upper endocervical canal. These   
foci of adenocarcinoma are  
associated with extensive   
obscuring crush artifact and   
disruption.  
Adenocarcinoma is identified in   
the upper endocervical canal.   
There is  
possible invasion of the stroma;   
however, the degree of artifactual  
distortion precludes accurate   
evaluation. No intact endometrial  
adenocarcinoma is actually   
identified in association with   
underlying  
myometrium. However, there are   
detached disrupted fragments of  
adenocarcinoma which may have been   
dislodged from some other site in  
the endometrial cavity. Slides E12   
and E13 were also reviewed by Dr. Vinod Corbin. The previous   
outside report from [facility]  
representing an endometrial biopsy   
was obtained for review. The   
biopsy  
was interpreted as a FIGO grade 1   
endometrioid adenocarcinoma and  
mismatch repair proteins were   
performed on this outside case.   
Mismatch  
repair proteins (MLH1, PMS2, MSH2,   
and MSH6) were found to be   
expressed  
in the carcinoma nuclei.  
Endometrial Cancer Case Summary  
Specimen: Uterine corpus, cervix,   
right and left ovaries and right   
and  
left fallopian tubes.  
Procedure: Total hysterectomy.  
Lymph node sampling: Right and   
left sentinel lymph node biopsies  
performed.  
Specimen integrity: Intact   
hysterectomy specimen.  
Tumor size: Cannot be accurately   
determined (see comment).  
Histologic type: Endometrioid   
adenocarcinoma with squamous  
differentiation.  
Histologic grade: FIGO grade 1.  
Myometrial invasion: Cannot be   
accurately determined (see   
comment).  
Involvement of cervix: Cannot be   
accurately determined (see   
comment).  
Other organ involvement: Not   
identified.  
Lymph-vascular invasion: Not   
identified.  
Pelvic lymph nodes:  
Number examined: 4.  
Number involved: 0.  
Pathologic stage: pT1a or pT2(see   
comment) pN0(sn).  
Received brachii therapy while she   
was in [place].  
Presents for ongoing oncologic   
management.  
Interim history:  
Had about 20 lb weight loss this   
summer.  
Lost appetite.  
PCP ordered thorough work up.  
Appetite now recovered.  
Covid 2024--Paxlovid.  
PMH, medications and allergies   
personally reviewed by me today.   
Any changes  
documented in appropriate section.  
ROS:  
Constitutional: Denies episodes   
night sweats.  
Neuro: Denies HA, vertigo,   
dizziness and imbalance. Denies   
symptoms of  
neuropathy.  
HEENT: No recent change in voice   
or vision.  
Resp: Denies shortness of breath   
at rest. Denies P[place]ON.  
CVS: Denies exertional chest pain,   
PND, orthopnea and LE edema.  
GI: Denies dysgeusia. Denies   
symptoms of stomatitis. Denies   
dysphagia and  
odynophagia.  
: Denies dysuria or gross   
hematuria. No symptoms of bladder   
outlet  
obstruction.  
Musculoskeletal: No bone, back or   
joint pain.  
Derm: Denies rash. Denies jaundice   
and diffuse pruritis.  
Heme: Denies unusual bleeding and   
unexplained bruising.  
Psych: Normal (more content not   
included)...                            [facility]  
   
                                                    2024 History of   
Present illness Narrative               Formatting of this note might be   
different from the original.  
Diagnoses:  
1) T1c (1.5 cm; grade II; no AL   
invasion) N0 (0/4 SLN) MX ER/ID   
positive, HER2 non-amplified   
invasive ductal carcinoma of the   
right breast.  
2) pT2 N0 FIGO 1 endometrial   
adenocarcinoma with squamous   
differentiation s/p adjuvant   
brachytherapy.  
  
HPI: Patient is a 77 yo female who   
had a right breast abnormality on   
screening mammogram. Biopsy   
invasive ductal carcinoma.  
  
Underwent partial mastectomy   
2010.  
  
Pathology:  
1.5 cm invasive ductal carcinoma.  
Grade II.  
ER/ID positive.  
HER2 non-amplified by FISH.  
4 of 4 LNs negative.  
  
Oncotype: Recurrence score 25   
(16%). Intermediate.  
  
Previous therapy:  
1) TC x4. Completed 12/15/2010.  
2) Adjuvant radiation 2011 -   
3/7/2011.  
3) Anastrozole--changed to   
tamoxifen 2011 due to   
significant symptoms of vaginal   
atrophy. Stopped 2017.  
  
She underwent surgery in 2017.  
  
Pathology:  
FINAL DIAGNOSIS:  
A) LEFT PELVIC SENTINEL LYMPH NODE   
#1 - NEGATIVE FOR MALIGNANCY.  
  
B) LEFT PELVIC SENTINEL LYMPH NODE   
#2 - NEGATIVE FOR MALIGNANCY.  
  
C) RIGHT PELVIC SENTINEL LYMPH   
NODE #1 - NEGATIVE FOR MALIGNANCY.  
  
D) RIGHT PELVIC SENTINEL LYMPH   
NODE #2 - NEGATIVE FOR MALIGNANCY.  
  
E) HYSTERECTOMY WITH BILATERAL   
SALPINGO-OOPHORECTOMY - FOCUS OF  
ENDOMETRIAL ADENOCARCINOMA,   
ENDOMETRIOID TYPE WITH SQUAMOUS   
DIFFERENTIATION( FIGO GRADE 1)   
WITH EXTENSIVE ASSOCIATED   
OBSCURING CRUSH ARTIFACT AND   
DISRUPTION.  
  
TUMOR INVOLVES THE ANTERIOR AND   
POSTERIOR LOWER UTERINE SEGMENT   
AND THE  
UPPER ENDOCERVICAL CANAL. (SEE   
COMMENT).  
  
3.4 CM ENDOMETRIAL POLYP.  
  
MULTIPLE INTRAMURAL, SUBMUCOSAL   
AND SUBSEROSAL LEIOMYOMAS.  
  
RIGHT AND LEFT OVARIES- ATROPHIC   
CHANGES. NEGATIVE FOR MALIGNANCY.  
  
RIGHT AND LEFT FALLOPIAN TUBES -   
SEROSAL WALTHARD REST CYST.   
NEGATIVE  
FOR MALIGNANCY.  
  
COMMENT: Adenocarcinoma is not   
identified in association with the   
3.4 cm endometrial polyp.   
Endometrioid adenocarcinoma   
identified only on  
slides E12 and E13 representing   
the anterior and posterior uterine   
segment and upper endocervical   
canal. These foci of   
adenocarcinoma are  
associated with extensive   
obscuring crush artifact and   
disruption.  
Adenocarcinoma is identified in   
the upper endocervical canal.   
There is  
possible invasion of the stroma;   
however, the degree of artifactual  
distortion precludes accurate   
evaluation. No intact endometrial  
adenocarcinoma is actually   
identified in association with   
underlying  
myometrium. However, there are   
detached disrupted fragments of  
adenocarcinoma which may have been   
dislodged from some other site in  
the endometrial cavity. Slides E12   
and E13 were also reviewed by Dr. Vinod Corbin. The previous   
outside report from [facility]  
representing an endometrial biopsy   
was obtained for review. The   
biopsy  
was interpreted as a FIGO grade 1   
endometrioid adenocarcinoma and  
mismatch repair proteins were   
performed on this outside case.   
Mismatch  
repair proteins (MLH1, PMS2, MSH2,   
and MSH6) were found to be   
expressed  
in the carcinoma nuclei.  
  
Endometrial Cancer Case Summary  
  
Specimen: Uterine corpus, cervix,   
right and left ovaries and right   
and  
left fallopian tubes.  
Procedure: Total hysterectomy.  
Lymph node sampling: Right and   
left sentinel lymph node biopsies  
performed.  
Specimen integrity: Intact   
hysterectomy specimen.  
Tumor size: Cannot be accurately   
determined (see comment).  
Histologic type: Endometrioid   
adenocarcinoma with squamous  
differentiation.  
Histologic grade: FIGO grade 1.  
Myometrial invasion: Cannot be   
accurately determined (see   
comment).  
Involvement of cervix: Cannot be   
accurately determined (see   
comment).  
Other organ involvement: Not   
identified.  
Lymph-vascular invasion: Not   
identified.  
Pelvic lymph nodes:  
Number examined: 4.  
Number involved: 0.  
Pathologic stage: pT1a or pT2(see   
comment) pN0(sn).  
  
Received brachii therapy while she   
was in [place].  
  
Presents for ongoing oncologic   
management.  
  
Interim history:  
Had about 20 lb weight loss this   
summer.  
Lost appetite.  
PCP ordered thorough work up.  
Appetite now recovered.  
Covid 2024--Paxlovid.  
  
PMH, medications and allergies   
personally reviewed by me today.   
Any changes documented in   
appropriate section.  
  
ROS:  
Constitutional: Denies episodes   
night sweats.  
Neuro: Denies HA, vertigo,   
dizziness and imbalance. Denies   
symptoms of neuropathy.  
HEENT: No recent change in voice   
or vision.  
Resp: Denies shortness of breath   
at rest. Denies [place].  
CVS: Denies exertional chest pain,   
PND, orthopnea and LE edema.  
GI: Denies dysgeusia. Denies   
symptoms of stomatitis. Denies   
dysphagia and odynophagia.  
: Denies dysuria or gross   
hematuria. No symptoms of bladder   
outlet obstruction.  
Musculoskeletal: No bone, back or   
joint pain.  
Derm: Denies rash. Denies jaundice   
and diffuse pruritis.  
Heme: Denies unusual bleeding and   
unexplained bruising.  
Psych: Normal mood.  
  
The sensitive examination was   
discussed with the Patient or   
Patient's Authorized   
Representative. As applicable, any   
other physician, advance practice   
provider, medical student, or   
other health professional student   
that will be observing or involved   
in the sensitive examination for   
educational or training purposes   
was discussed with the Patient or   
Authorized Representative. The   
Patient or Authorized   
Representative has agreed to   
proceed with the sensitive   
examination.  
  
(Sensitive examination includes   
inspection and/or palpation of the   
breasts, pelvis, prostate and   
anorectal regions)  
  
KIMI Marks.  
  
PHYSICAL EXAM:  
Vitals: Blood pressure 111/68,   
pulse 82, temperature 36.7 C (98.1   
F), temperature source Temporal,   
height 160 cm (5' 3 ), weight 63.7   
kg (140 lb 8 oz), SpO2 97%.  
Well-appearing and in no acute   
distress.  
EYES: Sclerae are anicteric   
bilaterally.  
LYMPHATIC: There is no palpable   
cervical, supraclavicular or   
axillary adenopathy.  
RESPIRATORY: Inspiratory breath   
sounds are of normal intensity in   
all fields.  
BREAST: Both breasts have   
lumpy/bumpy architecture.   
Tenderness in the right upper   
outer breast. There is no   
concerning mass or nodule.   
Left--non-tender; no mass or   
nodule.  
ABDOMEN: The abdomen is   
nondistended.  
Extremities: No edema.  
  
ASSESSMENT  
1) T1c (1.5 cm; grade II; no AL   
invasion) N0 (0/4 SLN) MX ER/ID   
positive; HER2 non-amplified right   
sided invasive ductal carcinoma of   
the breast.  
Assessment:  
-Patient is a 79-year-old female   
who completed adjuvant   
chemotherapy followed by 7 years   
of hormonal therapy for   
intermediate risk early-stage   
breast cancer. She had significant   
depressive symptoms when taking an   
aromatase inhibitor therapy was   
changed to tamoxifen. That was   
discontinued 2017 when   
diagnosed with endometrial cancer.  
-No concerning symptoms or exam   
findings to suggest recurrent   
disease.  
-Reviewed mammogram.  
Plan:  
-Mammogram then OV in about a   
year.  
  
(C54.1) Endometrial cancer (HCC)   
(primary encounter diagnosis)  
Assessment:  
-pT2 N0 FIGO 1 s/p adjuvant   
brachytherapy.  
Plan:  
-Continue annual follow-up with   
gynecology.  
  
Portions of this documentation   
were copied and pasted from   
previous office visit notes in   
order to provide a cohesive   
continuity of the history. The   
note has been reviewed and edited   
and updated as necessary.  
  
I spent a total of 15 minutes on   
the date of the service which   
included preparing to see the   
patient, face-to-face patient   
care, completing clinical   
documentation, obtaining and/or   
reviewing separately obtained   
history, performing a medically   
appropriate examination,   
counseling and educating the   
patient/family/caregiver, ordering   
medications, tests, or procedures,   
communicating with other HCPs (not   
separately reported), and   
communicating results to the   
patient/family/caregiver.  
  
Colten Abbott DO  
Electronically signed by Colten Abbott DO at 2024 10:24 AM   
EST  
documented in this encounter            [facility]  
   
                                        2024 Note     HNO ID: [id number]  
Author: JOSE DELGADO MD  
Service: ?  
Author Type: Physician  
Type: Progress Notes  
Filed: 2024 15:59  
Note Text:  
Marzena is a 79 year old    
who presents for an annual   
gynecologic exam  
without complaints.  
Postmenopausal: Yes  
HRT use: No.  
Last Pap: 2022 normal  
HPV: 2014 negative  
Last mammogram:  normal  
History of abnormal mammogram: Yes   
- h/o breast cancer  
OB History  
 T0  L1  
SAB1 IAB0 Ectopic0 Multiple0 Live   
Births0  
Comment: Plus two step daughters  
Gyn History  
LMP: Hysterectomy  
Age at Menarche:  
Age at First Pregnancy:  
Age at Menopause:  
Gyn History Comments:  
Sexual Activity: Not Asked; Male;   
not asked  
Contraception: No contraception   
data on record  
PAST MEDICAL HISTORY  
Diagnosis Date  
Breast cancer (HCC)   
invasive ductal carcinoma,   
completed radiation 3/7/11  
Cancer of endometrium (HCC)  
Carcinoma in situ, vulva   
Cellulitis  
of right eye muscle  
Diabetes mellitus without mention   
of complication  
Diabetes mellitus, type II (HCC)  
Diaphragmatic hernia without   
mention of obstruction or gangrene  
Hiatal hernia  
Dyspareunia  
Elevated cholesterol  
Esophageal reflux  
Insomnia, unspecified  
Orbital cellulitis  
Other corneal disorder  
Rt eye corneal erosion .  
PMH - PAST MEDICAL HISTORY OF  
?VESTIBULITIS  
PMH - PAST MEDICAL HISTORY OF  
CLIMACTERIC  
PMH - PAST MEDICAL HISTORY OF   
2006  
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  
Pure hypercholesterolemia  
Rib fracture  
6th Rib  
Senile dementia (HCC)  
Stomatitis and mucositis   
(ulcerative)  
Chronic ulcerative stomatitis on   
mucosal biopsy  
Unspecified essential hypertension  
PAST SURGICAL HISTORY  
Procedure Laterality Date  
Bunions bilateral feet removed   
10/2002  
BX/EXC LYMPH NODE OPEN DEEP   
AXILLARY NODE 2010  
RIGHT BREAST LUMP W/ SLN BX  
CHOLECYSTECTOMY 2004  
Cholecystectomy  
COLONOSCOPY FLX DX W/COLLJ SPEC   
WHEN PFRMD   
Colonoscopy  
COLPOSCOPY CERVIX UPPER/ADJACENT   
VAGINA 2009  
Colposcopy of the vulva  
COLPOSCOPY, VULVA 10/2009  
CORRECT BUNION,SIMPLE  
Bunion  
DILATION AND CURETTAGE DXAND/THER   
NONOBSTETRIC 1970's  
SAB  
HYSTERECTOMY 2017  
LAPAROSCOPY SURG CHOLECYSTECTOMY   
2004  
Cholecystectomy, lap  
MAMMO STEREOTACTIC CORE BIOPSY RT   
10/10/2013  
MASTECTOMY, PARTIAL 2010  
RIGHT BREAST  
PAST SURGICAL HISTORY OF 2006  
partial vulvectomy/sq. cell ca in   
situ  
PAST SURGICAL HISTORY OF   
right eye cornea  
PAST SURGICAL HISTORY OF   
right eye revision  
PAST SURGICAL HISTORY OF  
squamous cell carcinoma right arm  
REMV CATARACT EXTRACAP,INSERT LENS   
10/31/2024  
CCA0T0 - +19.5D  
SALPINGO-OOPHORECTOMY COMPL/PRTL   
UNI/BI SPX 2017  
Toenail Big Toe Left Foot removed   
10/2002  
TONSILLECTOMY PRIMARY/SECONDARY  
Tonsillectomy  
UNLISTED PROCEDURE HANDS/FINGERS   
2012  
[facility] Arthoplasty on right hand  
FAMILY HISTORY  
Problem Relation Age of Onset  
Stroke Mother  
Diabetes Mother  
GI Father  
 from complications of gb   
surgery  
Arthritis Sister  
Systemic Lupus  
Cancer Maternal Uncle  
LUNG  
Cancer Other  
cousin with breast cancer/cousin   
with bladder ca.  
Stroke Sister  
SOCIAL HISTORY  
Social History  
Tobacco Use  
Smoking status: Never  
Smokeless tobacco: Never  
Vaping Use  
Vaping status: Never Used  
Substance Use Topics  
Alcohol use: Yes  
Comment: 1-2 weekly  
Drug use: No  
REVIEW OF SYSTEMS  
Abdomen: No abdominal pain,   
nausea, vomiting, diarrhea, or   
constipation. No  
bloating, early satiety,   
indigestion, or increased   
flatulence.  
Bladder: No dysuria, gross   
hematuria, urinary frequency,   
urinary urgency, or  
incontinence  
Breast: No breast lumps, nipple   
d/c, overlying skin changes,   
redness or skin  
retraction  
Allergies and current medication   
updated:Yes  
SENSITIVE EXAM: The sensitive   
examination was discussed with the   
Patient or  
Patient's Authorized   
Representative. As applicable, any   
other physician,  
advance practice provider, medical   
student, or other health   
professional student  
that will be observing or involved   
in the sensitive examination for   
educational  
or training purposes was discussed   
with the Patient or Authorized  
Representative. The Patient or   
Authorized Representative has   
agreed to proceed  
with the sensitive examination.   
(Sensitive examination includes   
inspection  
and/or palpation of the breasts,   
pelvis, prostate and anorectal   
regions).  
EXAM: /72   Ht 5' 2.25    
(1.58m)   Wt 137 lb (62.1kg)   BMI   
24.86 kg/(m2).  
GENERAL: pleasant,    
female in no apparent distress  
BREAST: soft, non-tender, no   
dominant mass, normal   
nipple-areolar complex, no  
lymphadenopathy, and no nipple   
discharge  
CHEST: Normal inspiratory effort  
ABDOMEN: soft, non-tender, and no   
masses  
PELVIC: external genitalia normal,   
no vulvar lesions, normal   
appearing perineal  
body and perianal region  
BIMANUAL: no adnexal masses,   
non-tender, and uterus surgically   
absent  
RECTOVAGINAL: deferred.  
NEURO: alert and oriented x3,exam   
grossly non (more content not   
included)...                            [facility]  
   
                                                    2024 History of   
Present illness Narrative               Formatting of this note is   
different from the original.  
Marzena is a 79 year old    
who presents for an annual   
gynecologic exam without   
complaints.  
  
Postmenopausal: Yes  
HRT use: No.  
Last Pap: 2022 normal  
HPV: 2014 negative  
Last mammogram:  normal  
History of abnormal mammogram: Yes   
- h/o breast cancer  
  
OB History  
 T0  L1  
SAB1 IAB0 Ectopic0 Multiple0 Live   
Births0  
  
Comment: Plus two step daughters  
  
Gyn History  
  
LMP: Hysterectomy  
Age at Menarche:  
Age at First Pregnancy:  
Age at Menopause:  
Gyn History Comments:  
Sexual Activity: Not Asked; Male;   
not asked  
Contraception: No contraception   
data on record  
  
  
  
PAST MEDICAL HISTORY  
Diagnosis Date  
Breast cancer (HCC)   
invasive ductal carcinoma,   
completed radiation 3/7/11  
Cancer of endometrium (HCC)  
Carcinoma in situ, vulva   
Cellulitis  
of right eye muscle  
Diabetes mellitus without mention   
of complication  
Diabetes mellitus, type II (HCC)  
Diaphragmatic hernia without   
mention of obstruction or gangrene  
Hiatal hernia  
Dyspareunia  
Elevated cholesterol  
Esophageal reflux  
Insomnia, unspecified  
Orbital cellulitis  
Other corneal disorder  
Rt eye corneal erosion .  
PMH - PAST MEDICAL HISTORY OF  
?VESTIBULITIS  
PMH - PAST MEDICAL HISTORY OF  
CLIMACTERIC  
PMH - PAST MEDICAL HISTORY OF   
2006  
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  
Pure hypercholesterolemia  
Rib fracture  
6th Rib  
Senile dementia (HCC)  
Stomatitis and mucositis   
(ulcerative)  
Chronic ulcerative stomatitis on   
mucosal biopsy  
Unspecified essential hypertension  
  
PAST SURGICAL HISTORY  
Procedure Laterality Date  
Bunions bilateral feet removed   
10/2002  
BX/EXC LYMPH NODE OPEN DEEP   
AXILLARY NODE 2010  
RIGHT BREAST LUMP W/ SLN BX  
CHOLECYSTECTOMY 2004  
Cholecystectomy  
COLONOSCOPY FLX DX W/COLLJ SPEC   
WHEN PFRMD   
Colonoscopy  
COLPOSCOPY CERVIX UPPER/ADJACENT   
VAGINA 2009  
Colposcopy of the vulva  
COLPOSCOPY, VULVA 10/2009  
CORRECT BUNION,SIMPLE  
Bunion  
DILATION & CURETTAGE DX&/THER   
NONOBSTETRIC   
SAB  
HYSTERECTOMY 2017  
LAPAROSCOPY SURG CHOLECYSTECTOMY   
2004  
Cholecystectomy, lap  
MAMMO STEREOTACTIC CORE BIOPSY RT   
10/10/2013  
MASTECTOMY, PARTIAL 2010  
RIGHT BREAST  
PAST SURGICAL HISTORY OF 2006  
partial vulvectomy/sq. cell ca in   
situ  
PAST SURGICAL HISTORY OF   
right eye cornea  
PAST SURGICAL HISTORY OF   
right eye revision  
PAST SURGICAL HISTORY OF  
squamous cell carcinoma right arm  
REMV CATARACT EXTRACAP,INSERT LENS   
10/31/2024  
CCA0T0 - +19.5D  
SALPINGO-OOPHORECTOMY COMPL/PRTL   
UNI/BI SPX 2017  
Toenail Big Toe Left Foot removed   
10/2002  
TONSILLECTOMY PRIMARY/SECONDARY   
<AGE 13 195  
Tonsillectomy  
UNLISTED PROCEDURE HANDS/FINGERS   
2012  
[facility] Arthoplasty on right hand  
  
FAMILY HISTORY  
Problem Relation Age of Onset  
Stroke Mother  
Diabetes Mother  
GI Father  
 from complications of gb   
surgery  
Arthritis Sister  
Systemic Lupus  
Cancer Maternal Uncle  
LUNG  
Cancer Other  
cousin with breast cancer/cousin   
with bladder ca.  
Stroke Sister  
SOCIAL HISTORY  
Social History  
  
Tobacco Use  
Smoking status: Never  
Smokeless tobacco: Never  
Vaping Use  
Vaping status: Never Used  
Substance Use Topics  
Alcohol use: Yes  
Comment: 1-2 weekly  
Drug use: No  
  
REVIEW OF SYSTEMS  
Abdomen: No abdominal pain,   
nausea, vomiting, diarrhea, or   
constipation. No bloating, early   
satiety, indigestion, or increased   
flatulence.  
Bladder: No dysuria, gross   
hematuria, urinary frequency,   
urinary urgency, or incontinence  
Breast: No breast lumps, nipple   
d/c, overlying skin changes,   
redness or skin retraction  
Allergies and current medication   
updated:Yes  
  
SENSITIVE EXAM: The sensitive   
examination was discussed with the   
Patient or Patient's Authorized   
Representative. As applicable, any   
other physician, advance practice   
provider, medical student, or   
other health professional student   
that will be observing or involved   
in the sensitive examination for   
educational or training purposes   
was discussed with the Patient or   
Authorized Representative. The   
Patient or Authorized   
Representative has agreed to   
proceed with the sensitive   
examination. (Sensitive   
examination includes inspection   
and/or palpation of the breasts,   
pelvis, prostate and anorectal   
regions).  
  
EXAM: /72   Ht 5' 2.25    
(1.58m)   Wt 137 lb (62.1kg)   BMI   
24.86 kg/(m^2).  
  
  
GENERAL: pleasant,    
female in no apparent distress  
BREAST: soft, non-tender, no   
dominant mass, normal   
nipple-areolar complex, no   
lymphadenopathy, and no nipple   
discharge  
CHEST: Normal inspiratory effort  
ABDOMEN: soft, non-tender, and no   
masses  
PELVIC: external genitalia normal,   
no vulvar lesions, normal   
appearing perineal body and   
perianal region  
BIMANUAL: no adnexal masses,   
non-tender, and uterus surgically   
absent  
RECTOVAGINAL: deferred.  
NEURO: alert and oriented x3,exam   
grossly non-focal  
EXTREMITIES: normal  
  
ASSESSMENT/PLAN:  
1) Health maintenance:  
Pap/HPV no longer needed  
Mammogram up to date  
2) Follow up one year or sooner as   
needed  
  
Jose Delgado MD  
Electronically signed by Jose Delgado MD at 2024 3:59 PM   
EST  
documented in this encounter            [facility]  
   
                                                    2024 Telephone   
encounter Note                          Summary: RE: Cataract surgery  
  
Formatting of this note might be   
different from the original.  
Mr. Mederos phoned asking that you   
call him as he forgot to ask you a   
question about what type of lens   
they should do for his wife for   
her cataract surgery. He said Dr. Hays is awaiting their decision.  
  
11/15/2024  
Called Mr. Mederos. Discussed   
Intraocular lens options. Decided   
to retain best distance vision in   
each eye and will use multifocal   
glasses.  
Electronically signed by   
Amaya Reis II, OD at   
11/15/2024 1:41 PM EST  
                                        [facility]  
   
                                                    2024 Miscellaneous   
Notes                                   Summary: RE: Cataract surgery  
  
Formatting of this note might be   
different from the original.  
Mr. Mederos phoned asking that you   
call him as he forgot to ask you a   
question about what type of lens   
they should do for his wife for   
her cataract surgery. He said Dr. Hays is awaiting their decision.  
  
11/15/2024  
Called Mr. Mederos. Discussed   
Intraocular lens options. Decided   
to retain best distance vision in   
each eye and will use multifocal   
glasses.  
Electronically signed by   
Amaya Reis II, OD at   
11/15/2024 1:41 PM EST  
documented in this encounter            [facility]  
   
                                                    2024 History and   
physical note                           Formatting of this note is   
different from the original.  
HISTORY AND PHYSICAL EXAMINATION  
  
SERVICE DATE: 2024  
SERVICE TIME: 11:26 AM  
  
PRIMARY CARE PHYSICIAN: Sharifa Almaguer DO  
  
REASON FOR VISIT: Marzena Mederos   
is a 79 year old female who is   
being seen for Combined   
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  
  
The patient has the following:  
ACTIVE PROBLEM LIST  
Postmenopausal Atrophic Vaginitis  
Unspecified Transient Cerebral   
Ischemia  
Carcinoma in Situ of Vulva  
Malignant Neoplasm of Female   
Breast (Hcc)  
Personal History of Malignant   
Neoplasm of Breast  
Senile Nuclear Sclerosis  
Hypermetropia  
Regular Astigmatism  
Presbyopia  
Invasive Ductal Carcinoma of   
Breast, Female (Hcc)  
Ocular Migraine  
Controlled Type 2 Diabetes   
Mellitus Without Complication,   
Without Long-Term Current Use of   
Insulin (Hcc)  
Postmenopausal Bleeding  
S/P Hysterectomy With Oophorectomy  
  
SUBJECTIVE  
CHIEF COMPLAINT: Blurred vision   
for distance and near Right Eye.  
  
PAST MEDICAL HISTORY  
Diagnosis Date  
Breast cancer (HCC)   
invasive ductal carcinoma,   
completed radiation 3/7/11  
Cancer of endometrium (HCC)  
Carcinoma in situ, vulva   
Cellulitis  
of right eye muscle  
Diabetes mellitus without mention   
of complication  
Diabetes mellitus, type II (HCC)  
Diaphragmatic hernia without   
mention of obstruction or gangrene  
Hiatal hernia  
Dyspareunia  
Elevated cholesterol  
Esophageal reflux  
Insomnia, unspecified  
Orbital cellulitis  
Other corneal disorder  
Rt eye corneal erosion .  
PMH - PAST MEDICAL HISTORY OF  
?VESTIBULITIS  
PMH - PAST MEDICAL HISTORY OF  
CLIMACTERIC  
PMH - PAST MEDICAL HISTORY OF   
2006  
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  
Pure hypercholesterolemia  
Rib fracture  
6th Rib  
Senile dementia (HCC)  
Stomatitis and mucositis   
(ulcerative)  
Chronic ulcerative stomatitis on   
mucosal biopsy  
Unspecified essential hypertension  
  
PAST SURGICAL HISTORY  
Procedure Laterality Date  
Bunions bilateral feet removed   
10/2002  
BX/EXC LYMPH NODE OPEN DEEP   
AXILLARY NODE 2010  
RIGHT BREAST LUMP W/ SLN BX  
CHOLECYSTECTOMY 2004  
Cholecystectomy  
COLONOSCOPY FLX DX W/COLLJ SPEC   
WHEN PFRMD   
Colonoscopy  
COLPOSCOPY CERVIX UPPER/ADJACENT   
VAGINA 2009  
Colposcopy of the vulva  
COLPOSCOPY, VULVA 10/2009  
CORRECT BUNION,SIMPLE  
Bunion  
DILATION & CURETTAGE DX&/THER   
NONOBSTETRIC 's  
SAB  
HYSTERECTOMY 2017  
LAPAROSCOPY SURG CHOLECYSTECTOMY   
2004  
Cholecystectomy, lap  
MAMMO STEREOTACTIC CORE BIOPSY RT   
10/10/2013  
MASTECTOMY, PARTIAL 2010  
RIGHT BREAST  
PAST SURGICAL HISTORY OF 2006  
partial vulvectomy/sq. cell ca in   
situ  
PAST SURGICAL HISTORY OF   
right eye cornea  
PAST SURGICAL HISTORY OF   
right eye revision  
PAST SURGICAL HISTORY OF  
squamous cell carcinoma right arm  
REMV CATARACT EXTRACAP,INSERT LENS   
10/31/2024  
CCA0T0 - +19.5D  
SALPINGO-OOPHORECTOMY COMPL/PRTL   
UNI/BI SPX 2017  
Toenail Big Toe Left Foot removed   
10/2002  
TONSILLECTOMY PRIMARY/SECONDARY   
Tonsillectomy  
UNLISTED PROCEDURE HANDS/FINGERS   
2012  
[facility] Arthoplasty on right hand  
  
FAMILY HISTORY  
Problem Relation Age of Onset  
Stroke Mother  
Diabetes Mother  
GI Father  
 from complications of gb   
surgery  
Arthritis Sister  
Systemic Lupus  
Cancer Maternal Uncle  
LUNG  
Cancer Other  
cousin with breast cancer/cousin   
with bladder ca.  
Stroke Sister  
  
SOCIAL HISTORY:  
Social History  
  
Tobacco Use  
Smoking status: Never  
Smokeless tobacco: Never  
Vaping Use  
Vaping status: Never Used  
Substance Use Topics  
Alcohol use: Yes  
Comment: 1-2 weekly  
Drug use: No  
  
MEDICATIONS:  
Prior to Admission medications as   
of 24 1120  
Medication Sig Last Dose Taking  
glipiZIDE (GLUCOTROL XL) 2.5 mg 24   
hr tablet Take 2.5 mg by mouth.   
Yes  
fluorometholone (FML LIQUID FILM)   
0.1 % ophthalmic suspension Use 1   
Drop in the right eye three times   
a day. Yes  
donepezil (ARICEPT) 10 mg tablet   
Take 10 mg by mouth. Yes  
cholecalciferol (VITAMIN D3) 50   
mcg (2,000 unit) tablet Take 2,000   
Units by mouth once daily. Yes  
losartan-hydroCHLOROthiazide   
(HYZAAR) 100-12.5 mg per tablet   
Take 1 tablet by mouth once daily.   
Yes  
omeprazole (PRILOSEC) 20 mg   
capsule Take 20 mg by mouth once   
daily. Yes  
potassium chloride (KLOR-CON 10)   
10 mEq tablet Take 10 mEq by mouth   
once daily.  
Yes  
metFORMIN ER (GLUCOPHAGE XR) 500   
mg 24 hr tablet Take two tablets   
by mouth once daily. Yes  
simvastatin(ZOCOR 20 MG TAB) Take   
one(1) tablet daily. Yes  
keTORolac (ACULAR) 0.5 %   
ophthalmic solution Use 1 Drop in   
the left eye three times a day.  
prednisoLONE acetate (PRED FORTE)   
1 % ophthalmic suspension Use 1   
Drop in the left eye three times a   
day.  
amLODIPine (NORVASC) 10 mg tablet   
Take 10 mg by mouth.  
  
No medication comments found.  
  
CURRENT ALLERGIES:  
ALLERGIES  
Allergen Reactions  
Adhesive Rash  
Redness  
Ambien [Zolpidem Ta* Rash  
Codeine Mental Status Change  
Neosporin [Neomycin* Rash  
Percocet [Oxycodone*  
Causes Vomitting  
  
REVIEW OF SYSTEMS:  
PAIN ASSESSMENT:  
General: No weight loss, malaise   
or fevers.  
Neuro: Dementia  
Respiratory: No history of current   
cough or dyspnea, or pneumonia in   
the past 6 weeks. No history of   
respiratory/pulmonary symptoms or   
problems  
Cardiovascular: Positive for:   
Hypertension, PVC  
GI: No history of GI symptoms or   
problems. No history of esophageal   
varices, recent ascites, or ETOH   
greater than 2 drinks per day.  
: No history of UTI in past 6   
weeks. No history of renal   
failure. Not currently on or   
requiring dialysis. No history of   
 symptoms or problems.  
GYN: Negative for abnormal vaginal   
bleeding, abnormal vaginal   
discharge.  
Pregnancy: Denies  
Endocrine: Diabetes Mellitus on   
oral agent  
Hematology: No history of bleeding   
or clotting disorder. Pt is not   
taking anti-coagulation or   
platelet medications. No history   
of hematological symptoms or   
problems.  
Oncology: No history of CA   
metastasis, chemo within 30 days,   
or radiotherapy within 90 days.   
Has not lost 10% of body wt in 6   
months. No history of oncological   
symptoms or problems.  
Psych: No history of psychiatric   
symptoms or problems.  
Musculoskeletal: Negative for   
joint pain or swelling, back pain   
or muscle pain.  
Skin: Negative for lesions, rash   
and itching.  
  
PHYSICAL EXAM:  
VITALS:  
/80   Pulse 88  
  
  
  
General: Alert and oriented  
Skin: Normal color, no rash, no   
lesions.  
HEENT: EOM, pupils equal, round   
and reactive.  
Cardiovascular: Normal S1 & S2, no   
rubs, murmurs or gallops. No JVD.   
Pulse regular.  
Lungs: Normal breath sounds, no   
wheezes or crackles.  
Abdomen: Soft, non-tender, no   
rigidity.  
Extremities: No deformity, no   
edema or tenderness, no joint   
swelling or clubbing.  
Neurological: Normal cognition and   
motor skills.  
Pulses: Carotid and radial pulses   
normal +2.  
  
Diagnostic tests reviewed for   
today's visit:  
No new labs or tests  
  
ASSESSMENT  
Medication and Non-Pharmacologic   
VTE Prophylaxis/Anticoagulants  
  
  
VTE Prophylaxis: VTE prophylaxis   
appropriate  
  
Impression: There is no known   
pertinent medical condition which   
may affect trinidad-operative course  
  
  
[unfilled]  
Clinical Risk Factors for Possible   
Cardiac Complications:  
None  
  
Patient is scheduled for a   
low-risk procedure.  
  
FUNCTIONAL STATUS: Walk indoors,   
such as around the house (1.75   
METs)  
  
Functional Class (NYHA): N/A  
  
[facility]: Not obtained  
  
PLAN  
CONSULTS:  
Patient does not require consults   
for optimization at this time.  
  
The Following Tests/Procedures   
Have Been Initiated:  
None  
  
Instructions Given to Patient:  
Patient given verbal and written   
preop instructions and voices   
comprehension and compliance.  
  
SIGNATURE: Ronel Hays MD   
PATIENT NAME: Marzena Mederos  
DATE: 2024 MRN:   
[id number]  
TIME: 11:26 AM PAGER/CONTACT #:  
  
Electronically signed by Ronel Hays MD at 2024 11:27   
AM [facility]  
   
                                                    2024 History and   
physical note                           Formatting of this note is   
different from the original.  
HISTORY AND PHYSICAL EXAMINATION  
  
SERVICE DATE: 2024  
SERVICE TIME: 11:26 AM  
  
PRIMARY CARE PHYSICIAN: Sharifa Almaguer DO  
  
REASON FOR VISIT: Marzena Mederos   
is a 79 year old female who is   
being seen for Combined   
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  
  
The patient has the following:  
ACTIVE PROBLEM LIST  
Postmenopausal Atrophic Vaginitis  
Unspecified Transient Cerebral   
Ischemia  
Carcinoma in Situ of Vulva  
Malignant Neoplasm of Female   
Breast (Hcc)  
Personal History of Malignant   
Neoplasm of Breast  
Senile Nuclear Sclerosis  
Hypermetropia  
Regular Astigmatism  
Presbyopia  
Invasive Ductal Carcinoma of   
Breast, Female (Hcc)  
Ocular Migraine  
Controlled Type 2 Diabetes   
Mellitus Without Complication,   
Without Long-Term Current Use of   
Insulin (Hcc)  
Postmenopausal Bleeding  
S/P Hysterectomy With Oophorectomy  
  
SUBJECTIVE  
CHIEF COMPLAINT: Blurred vision   
for distance and near Right Eye.  
  
PAST MEDICAL HISTORY  
Diagnosis Date  
Breast cancer (HCC)   
invasive ductal carcinoma,   
completed radiation 3/7/11  
Cancer of endometrium (HCC)  
Carcinoma in situ, vulva   
Cellulitis  
of right eye muscle  
Diabetes mellitus without mention   
of complication  
Diabetes mellitus, type II (HCC)  
Diaphragmatic hernia without   
mention of obstruction or gangrene  
Hiatal hernia  
Dyspareunia  
Elevated cholesterol  
Esophageal reflux  
Insomnia, unspecified  
Orbital cellulitis  
Other corneal disorder  
Rt eye corneal erosion .  
PMH - PAST MEDICAL HISTORY OF  
?VESTIBULITIS  
PMH - PAST MEDICAL HISTORY OF  
CLIMACTERIC  
PMH - PAST MEDICAL HISTORY OF   
2006  
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  
Pure hypercholesterolemia  
Rib fracture  
6th Rib  
Senile dementia (HCC)  
Stomatitis and mucositis   
(ulcerative)  
Chronic ulcerative stomatitis on   
mucosal biopsy  
Unspecified essential hypertension  
  
PAST SURGICAL HISTORY  
Procedure Laterality Date  
Bunions bilateral feet removed   
10/2002  
BX/EXC LYMPH NODE OPEN DEEP   
AXILLARY NODE 2010  
RIGHT BREAST LUMP W/ SLN BX  
CHOLECYSTECTOMY 2004  
Cholecystectomy  
COLONOSCOPY FLX DX W/COLLJ SPEC   
WHEN PFRMD   
Colonoscopy  
COLPOSCOPY CERVIX UPPER/ADJACENT   
VAGINA 2009  
Colposcopy of the vulva  
COLPOSCOPY, VULVA 10/2009  
CORRECT BUNION,SIMPLE  
Bunion  
DILATION & CURETTAGE DX&/THER   
NONOBSTETRIC 's  
SAB  
HYSTERECTOMY 2017  
LAPAROSCOPY SURG CHOLECYSTECTOMY   
2004  
Cholecystectomy, lap  
MAMMO STEREOTACTIC CORE BIOPSY RT   
10/10/2013  
MASTECTOMY, PARTIAL 2010  
RIGHT BREAST  
PAST SURGICAL HISTORY OF 2006  
partial vulvectomy/sq. cell ca in   
situ  
PAST SURGICAL HISTORY OF   
right eye cornea  
PAST SURGICAL HISTORY OF   
right eye revision  
PAST SURGICAL HISTORY OF  
squamous cell carcinoma right arm  
REMV CATARACT EXTRACAP,INSERT LENS   
10/31/2024  
CCA0T0 - +19.5D  
SALPINGO-OOPHORECTOMY COMPL/PRTL   
UNI/BI SPX 2017  
Toenail Big Toe Left Foot removed   
10/2002  
TONSILLECTOMY PRIMARY/SECONDARY   
<AGE 13 1951  
Tonsillectomy  
UNLISTED PROCEDURE HANDS/FINGERS   
2012  
[facility] Arthoplasty on right hand  
  
FAMILY HISTORY  
Problem Relation Age of Onset  
Stroke Mother  
Diabetes Mother  
GI Father  
 from complications of gb   
surgery  
Arthritis Sister  
Systemic Lupus  
Cancer Maternal Uncle  
LUNG  
Cancer Other  
cousin with breast cancer/cousin   
with bladder ca.  
Stroke Sister  
  
SOCIAL HISTORY:  
Social History  
  
Tobacco Use  
Smoking status: Never  
Smokeless tobacco: Never  
Vaping Use  
Vaping status: Never Used  
Substance Use Topics  
Alcohol use: Yes  
Comment: 1-2 weekly  
Drug use: No  
  
MEDICATIONS:  
Prior to Admission medications as   
of 24 1120  
Medication Sig Last Dose Taking  
glipiZIDE (GLUCOTROL XL) 2.5 mg 24   
hr tablet Take 2.5 mg by mouth.   
Yes  
fluorometholone (FML LIQUID FILM)   
0.1 % ophthalmic suspension Use 1   
Drop in the right eye three times   
a day. Yes  
donepezil (ARICEPT) 10 mg tablet   
Take 10 mg by mouth. Yes  
cholecalciferol (VITAMIN D3) 50   
mcg (2,000 unit) tablet Take 2,000   
Units by mouth once daily. Yes  
losartan-hydroCHLOROthiazide   
(HYZAAR) 100-12.5 mg per tablet   
Take 1 tablet by mouth once daily.   
Yes  
omeprazole (PRILOSEC) 20 mg   
capsule Take 20 mg by mouth once   
daily. Yes  
potassium chloride (KLOR-CON 10)   
10 mEq tablet Take 10 mEq by mouth   
once daily.  
Yes  
metFORMIN ER (GLUCOPHAGE XR) 500   
mg 24 hr tablet Take two tablets   
by mouth once daily. Yes  
simvastatin(ZOCOR 20 MG TAB) Take   
one(1) tablet daily. Yes  
keTORolac (ACULAR) 0.5 %   
ophthalmic solution Use 1 Drop in   
the left eye three times a day.  
prednisoLONE acetate (PRED FORTE)   
1 % ophthalmic suspension Use 1   
Drop in the left eye three times a   
day.  
amLODIPine (NORVASC) 10 mg tablet   
Take 10 mg by mouth.  
  
No medication comments found.  
  
CURRENT ALLERGIES:  
ALLERGIES  
Allergen Reactions  
Adhesive Rash  
Redness  
Ambien [Zolpidem Ta* Rash  
Codeine Mental Status Change  
Neosporin [Neomycin* Rash  
Percocet [Oxycodone*  
Causes Vomitting  
  
REVIEW OF SYSTEMS:  
PAIN ASSESSMENT:  
General: No weight loss, malaise   
or fevers.  
Neuro: Dementia  
Respiratory: No history of current   
cough or dyspnea, or pneumonia in   
the past 6 weeks. No history of   
respiratory/pulmonary symptoms or   
problems  
Cardiovascular: Positive for:   
Hypertension, PVC  
GI: No history of GI symptoms or   
problems. No history of esophageal   
varices, recent ascites, or ETOH   
greater than 2 drinks per day.  
: No history of UTI in past 6   
weeks. No history of renal   
failure. Not currently on or   
requiring dialysis. No history of   
 symptoms or problems.  
GYN: Negative for abnormal vaginal   
bleeding, abnormal vaginal   
discharge.  
Pregnancy: Denies  
Endocrine: Diabetes Mellitus on   
oral agent  
Hematology: No history of bleeding   
or clotting disorder. Pt is not   
taking anti-coagulation or   
platelet medications. No history   
of hematological symptoms or   
problems.  
Oncology: No history of CA   
metastasis, chemo within 30 days,   
or radiotherapy within 90 days.   
Has not lost 10% of body wt in 6   
months. No history of oncological   
symptoms or problems.  
Psych: No history of psychiatric   
symptoms or problems.  
Musculoskeletal: Negative for   
joint pain or swelling, back pain   
or muscle pain.  
Skin: Negative for lesions, rash   
and itching.  
  
PHYSICAL EXAM:  
VITALS:  
/80   Pulse 88  
  
  
  
General: Alert and oriented  
Skin: Normal color, no rash, no   
lesions.  
HEENT: EOM, pupils equal, round   
and reactive.  
Cardiovascular: Normal S1 & S2, no   
rubs, murmurs or gallops. No JVD.   
Pulse regular.  
Lungs: Normal breath sounds, no   
wheezes or crackles.  
Abdomen: Soft, non-tender, no   
rigidity.  
Extremities: No deformity, no   
edema or tenderness, no joint   
swelling or clubbing.  
Neurological: Normal cognition and   
motor skills.  
Pulses: Carotid and radial pulses   
normal +2.  
  
Diagnostic tests reviewed for   
today's visit:  
No new labs or tests  
  
ASSESSMENT  
Medication and Non-Pharmacologic   
VTE Prophylaxis/Anticoagulants  
  
  
VTE Prophylaxis: VTE prophylaxis   
appropriate  
  
Impression: There is no known   
pertinent medical condition which   
may affect trin[place]-operative course  
  
  
[unfilled]  
Clinical Risk Factors for Possible   
Cardiac Complications:  
None  
  
Patient is scheduled for a   
low-risk procedure.  
  
FUNCTIONAL STATUS: Walk indoors,   
such as around the house (1.75   
METs)  
  
Functional Class (NYHA): N/A  
  
[facility]: Not obtained  
  
PLAN  
CONSULTS:  
Patient does not require consults   
for optimization at this time.  
  
The Following Tests/Procedures   
Have Been Initiated:  
None  
  
Instructions Given to Patient:  
Patient given verbal and written   
preop instructions and voices   
comprehension and compliance.  
  
SIGNATURE: Ronel Hays MD   
PATIENT NAME: Marzena Mederos  
DATE: 2024 MRN:   
[id number]  
TIME: 11:26 AM PAGER/CONTACT #:  
  
Electronically signed by Ronel Hays MD at 2024 11:27   
AM EST  
documented in this encounter            [facility]  
   
                                        2024 Instructions   
  
  
Ronel Hays MD - 2024   
11:23 AM ESTFormatting of this   
note is different from the   
original.  
Plan Cataract Surgery with   
Monofocal Intraocular Lens Implant   
Right Eye on 2024 at   
[facility].  
  
Current Ophthalmic Meds  
  
  
  
fluorometholone (FML LIQUID FILM)   
0.1 % ophthalmic suspension Use 1   
Drop in the right eye three times   
a day.  
  
Current Ophthalmic Meds  
  
  
  
prednisoLONE acetate (PRED FORTE)   
1 % ophthalmic suspension Use 1   
Drop in the left eye three times   
daily.  
keTORolac (ACULAR) 0.5 %   
ophthalmic solution Use 1 Drop in   
the left eye three times daily.  
  
Continue:  
Systane Complete solution instill   
1 drop 3 times daily Both Eyes.  
  
If you have any questions please   
contact our office at   
[phone].  
After office hours or on the   
weekend, please call Dr. Hays on   
his cell phone at [phone].  
  
Electronically signed by Ronel aHys MD at 2024 11:23   
AM EST  
  
documented in this encounter            [facility]  
   
                                        2024 Note     HNO ID: [id number]  
Author: RONEL HAYS MD  
Service: ?  
Author Type: Physician  
Type: Progress Notes  
Filed: 2024 11:27  
Note Text:  
ASSESSMENT/PLAN:  
1. 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   
cataract of right eye - ICD9:   
366.19, ICD10:  
H25.811 (primary diagnosis)  
Cataract Presurgical Documentation  
Cataract: Right Eye  
Current Visual Acuity  
Right Eye Distance CC 20/40  
Left Eye Distance SC 20/20  
Glare Testing:  
Right Eye Medium 20/80  
Visual Function: Marzena Mederos   
states that the decline in vision   
from the  
cataract impedes her abilities as   
listed in the HPI, as well as   
other activities  
of daily living.  
Marzena Mederos has confirmed that   
she is no longer able to function   
adequately  
on a day-to-day basis because of   
her current visual condition.  
Further, it is my medical opinion   
that the cataract is the primary   
cause, or at  
least a significantly contributory   
cause of her visual dysfunction.   
With  
uncomplicated cataract surgery and   
lens implantation, it is my   
expectation that  
her visual function and quality of   
life will improve, significantly.  
The risks, benefits, alternatives,   
personnel and complications of   
cataract  
surgery with lens implantation   
were discussed with Marzena Mederos in detail.  
She appeared to understand and   
asked that I proceed with plans   
for surgery.  
Upon eye examination, patient was   
found to have a visually   
significant cataract  
Right Eye. Discussed cataract   
surgery with patient and different   
intraocular  
lens implant options with patient:   
basic monofocal intraocular lens   
implant,  
Toric intraocular lens implant,   
and presbyopia correction   
intraocular lens  
implant. In my medical opinion,   
based on medical history and   
ocular  
examination, cataract surgery with   
intraocular lens implant will   
correct  
patient's vision and improve   
quality of patient's daily living   
activities.  
Patient wishes to have traditional   
cataract surgery with basic   
intraocular lens  
Right Eye. Patient wishes to have   
cataract surgery with the option   
stated  
above. Patient understands that an   
intraocular lens implant does not  
necessarily replace the need for   
glasses. Patient understands that   
it is  
impossible for the surgeon to   
inform him/her of every possible   
complication that  
may occur. The surgeon has   
answered all of the patient's   
questions. Patient  
understands that if he/she has a   
mature or dense cataract,   
pseudoexfoliation  
cataract, or history of use of   
Flomax, he/she may require the use   
of [place]  
Ring and/or Vision Blue during   
surgery. Patient understands the   
risks,  
benefits, and alternatives to   
surgery.  
Patient would like Right Eye   
corrected for near, target -1.50.   
Patient and  
patient's  verbalized   
understanding that patient will   
need glasses for  
best corrected distance,   
intermediate, and near vision and   
post-operatively  
Right Eye patient will be unable   
to see at distance.  
Plan Cataract Surgery with   
Monofocal Intraocular Lens Implant   
Right Eye on  
2024 at [facility].  
Current Ophthalmic Meds  
fluorometholone (FML LIQUID FILM)   
0.1 % ophthalmic suspension Use 1   
Drop in the  
right eye three times a day.  
Continue:  
Systane Complete solution instill   
1 drop 3 times daily Both Eyes.  
PHYSICAL EXAM:  
Vital Signs:  
Blood pressure 125/80, pulse 88.  
Respiratory:  
Normal breath sounds, no wheezing.  
CARD:  
Normal heart sounds 1 AND 2,   
normal sinus rhythm.  
2. Status post cataract extraction   
and insertion of intraocular lens   
of left eye  
- ICD9: V45.61, V43.1, ICD10:   
Z98.42, Z96.1  
Current Ophthalmic Meds  
prednisoLONE acetate (PRED FORTE)   
1 % ophthalmic suspension Use 1   
Drop in the  
left eye three times daily.  
keTORolac (ACULAR) 0.5 %   
ophthalmic solution Use 1 Drop in   
the left eye three  
times daily.  
Continue:  
Systane Complete solution instill   
1 drop 3 times daily Both Eyes.  
3. Type 2 diabetes mellitus   
without retinopathy (HCC) - ICD9:   
250.00, ICD10:  
E11.9  
Please keep your blood sugar under   
good control to minimize risk of   
ocular  
complications from diabetes.  
Continue to monitor with primary   
care physician.  
4. Essential hypertension - ICD9:   
401.9, ICD10: I10  
Continue to monitor with primary   
care physician.  
5. Hypercholesteremia - ICD9:   
272.0, ICD10: E78.00  
Continue to monitor with primary   
care physician.  
6. Dementia, unspecified dementia   
severity, unspecified dementia   
type,  
unspecified whether behavioral,   
psychotic, or mood disturbance or   
anxiety (HCC)  
- ICD9: 294.20, ICD10: F03.90  
Continue to monitor with primary   
care physician.  
I have confirmed and edited as   
necessary the relevant HPI,   
ophthalmic history,  
ROS, and the neuro exam findings   
as obtained by others. I have seen   
and  
examined Marzena Mederos.  
I have discussed the case and the   
management of this patient's care   
with the  
Resident/Fellow, if applicable. I   
also have reviewed and agree with   
the  
assessment and plan as stated   
above and agree with all of its   
relevant  
components.                             [facility]  
   
                                                    2024 History of   
Present illness Narrative               Formatting of this note is   
different from the original.  
ASSESSMENT/PLAN:  
1. 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   
cataract of right eye - ICD9:   
366.19, ICD10: H25.811 (primary   
diagnosis)  
  
Cataract Presurgical Documentation  
  
Cataract: Right Eye  
  
Current Visual Acuity  
Right Eye Distance CC 20/40  
Left Eye Distance SC 20/20  
  
  
Glare Testing:  
Right Eye Medium 20/80  
  
Visual Function: Marzena Mederos   
states that the decline in vision   
from the cataract impedes her   
abilities as listed in the HPI, as   
well as other activities of daily   
living.  
  
Marzena Mederos has confirmed that   
she is no longer able to function   
adequately on a day-to-day basis   
because of her current visual   
condition.  
  
Further, it is my medical opinion   
that the cataract is the primary   
cause, or at least a significantly   
contributory cause of her visual   
dysfunction. With uncomplicated   
cataract surgery and lens   
implantation, it is my expectation   
that her visual function and   
quality of life will improve,   
significantly.  
  
The risks, benefits, alternatives,   
personnel and complications of   
cataract surgery with lens   
implantation were discussed with   
Marzena Mederos in detail. She   
appeared to understand and asked   
that I proceed with plans for   
surgery.  
  
Upon eye examination, patient was   
found to have a visually   
significant cataract Right Eye.   
Discussed cataract surgery with   
patient and different intraocular   
lens implant options with patient:   
basic monofocal intraocular lens   
implant, Toric intraocular lens   
implant, and presbyopia correction   
intraocular lens implant. In my   
medical opinion, based on medical   
history and ocular examination,   
cataract surgery with intraocular   
lens implant will correct   
patient's vision and improve   
quality of patient's daily living   
activities. Patient wishes to have   
traditional cataract surgery with   
basic intraocular lens Right Eye.   
Patient wishes to have cataract   
surgery with the option stated   
above. Patient understands that an   
intraocular lens implant does not   
necessarily replace the need for   
glasses. Patient understands that   
it is impossible for the surgeon   
to inform him/her of every   
possible complication that may   
occur. The surgeon has answered   
all of the patient's questions.   
Patient understands that if he/she   
has a mature or dense cataract,   
pseudoexfoliation cataract, or   
history of use of Flomax, he/she   
may require the use of [place]   
Ring and/or Vision Blue during   
surgery. Patient understands the   
risks, benefits, and alternatives   
to surgery.  
  
Patient would like Right Eye   
corrected for near, target -1.50.   
Patient and patient's    
verbalized understanding that   
patient will need glasses for best   
corrected distance, intermediate,   
and near vision and   
post-operatively Right Eye patient   
will be unable to see at distance.  
  
Plan Cataract Surgery with   
Monofocal Intraocular Lens Implant   
Right Eye on 2024 at   
[facility].  
  
Current Ophthalmic Meds  
  
  
  
fluorometholone (FML LIQUID FILM)   
0.1 % ophthalmic suspension Use 1   
Drop in the right eye three times   
a day.  
  
Continue:  
Systane Complete solution instill   
1 drop 3 times daily Both Eyes.  
  
PHYSICAL EXAM:  
  
Vital Signs:  
Blood pressure 125/80, pulse 88.  
  
Respiratory:  
Normal breath sounds, no wheezing.  
  
CARD:  
Normal heart sounds 1 & 2, normal   
sinus rhythm.  
  
2. Status post cataract extraction   
and insertion of intraocular lens   
of left eye - ICD9: V45.61, V43.1,   
ICD10: Z98.42, Z96.1  
  
Current Ophthalmic Meds  
  
  
  
prednisoLONE acetate (PRED FORTE)   
1 % ophthalmic suspension Use 1   
Drop in the left eye three times   
daily.  
keTORolac (ACULAR) 0.5 %   
ophthalmic solution Use 1 Drop in   
the left eye three times daily.  
  
Continue:  
Systane Complete solution instill   
1 drop 3 times daily Both Eyes.  
  
3. Type 2 diabetes mellitus   
without retinopathy (HCC) - ICD9:   
250.00, ICD10: E11.9  
  
Please keep your blood sugar under   
good control to minimize risk of   
ocular complications from   
diabetes.  
  
Continue to monitor with primary   
care physician.  
  
4. Essential hypertension - ICD9:   
401.9, ICD10: I10  
  
Continue to monitor with primary   
care physician.  
  
5. Hypercholesteremia - ICD9:   
272.0, ICD10: E78.00  
  
Continue to monitor with primary   
care physician.  
  
6. Dementia, unspecified dementia   
severity, unspecified dementia   
type, unspecified whether   
behavioral, psychotic, or mood   
disturbance or anxiety (HCC) -   
ICD9: 294.20, ICD10: F03.90  
  
Continue to monitor with primary   
care physician.  
  
I have confirmed and edited as   
necessary the relevant HPI,   
ophthalmic history, ROS, and the   
neuro exam findings as obtained by   
others. I have seen and examined   
Marzena Mederos.  
I have discussed the case and the   
management of this patient's care   
with the Resident/Fellow, if   
applicable. I also have reviewed   
and agree with the assessment and   
plan as stated above and agree   
with all of its relevant   
components.  
  
  
  
Electronically signed by Ronel Hays MD at 2024 11:27   
AM EST  
documented in this encounter            [facility]  
   
                                                    2024 History of   
Present illness Narrative               Formatting of this note might be   
different from the original.  
Radiology Service Progress Note  
  
PATIENT NAME: Marzena Mederos  
MRN: [id number]  
  
DATE OF SERVICE: 2024  
TIME: 9:31 AM  
PATIENT IDENTITY VERIFICATION   
COMPLETED USING TWO (2)   
IDENTIFIERS: Name and Date of   
Birth confirmed by patient   
verbally.  
FALL SCREENING: Has the patient   
had 2 falls in the last year or 1   
fall with injury or currently   
using an Ambulatory Assistive   
Device (Walker, Cane, Wheelchair,   
Crutches, etc.)? No  
PATIENT GENDER DATA: Female.   
Pregnancy status: Pregnant: No   
Breastfeeding status: NO.  
PATIENT RELEVANT IMPLANT DATA   
REVIEWED: Not Applicable  
PATIENT PRESENTS WITH AN   
IMPLANTABLE OR ATTACHED MEDICAL   
DEVICE: No  
  
RADIOLOGY DEPARTMENT: Mammography  
  
PERIPHERAL IV DATA: Not applicable  
  
SIGNED BY: Arlene Velazquez  
2024 9:31 AM  
  
  
Electronically signed by Samuel Yeager Mammo Tech at 2024   
9:32 AM EST  
documented in this encounter            [facility]  
   
                                        2024 Note     HNO ID: [id number]  
Author: SAMUEL YEAGER Mammo Tech  
Service: ?  
Author Type: Technician  
Type: Progress Notes  
Filed: 2024 09:32  
Note Text:  
Radiology Service Progress Note  
PATIENT NAME: Marzena Mederos  
MRN: [id number]  
DATE OF SERVICE: 2024  
TIME: 9:31 AM  
PATIENT IDENTITY VERIFICATION   
COMPLETED USING TWO (2)   
IDENTIFIERS: Name and  
Date of Birth confirmed by patient   
verbally.  
FALL SCREENING: Has the patient   
had 2 falls in the last year or 1   
fall with  
injury or currently using an   
Ambulatory Assistive Device   
(Walker, Cane,  
Wheelchair, Crutches, etc.)? No  
PATIENT GENDER DATA: Female.   
Pregnancy status: Pregnant: No   
Breastfeeding  
status: NO.  
PATIENT RELEVANT IMPLANT DATA   
REVIEWED: Not Applicable  
PATIENT PRESENTS WITH AN   
IMPLANTABLE OR ATTACHED MEDICAL   
DEVICE: No  
RADIOLOGY DEPARTMENT: Mammography  
PERIPHERAL IV DATA: Not applicable  
SIGNED BY: Arlene Velazquez  
2024 9:31 AM               [facility]  
   
                                        2024 Instructions   
  
  
Amaya Reis II, OD -   
2024 2:14 PM ESTFormatting   
of this note might be different   
from the original.  
Assessment and Plan  
  
Z98.42, Z96.1 Status post cataract   
extraction and insertion of   
intraocular lens of left eye   
(primary encounter diagnosis)  
Comment: Healing well. Continue   
schedule of postoperative   
medications as directed. Scheduled   
for right eye cataract removal in   
1-2 weeks. Follow-up as   
instructed.Instruct patient to   
immediately report any change in   
condition outside of expected and   
discussed symptoms.  
  
I have confirmed and edited as   
necessary the relevant HPI,   
ophthalmic history, ROS, and the   
neuro exam findings as obtained by   
others. I have seen and examined   
Marzena Mederos.  
I have discussed the case and the   
management of this patient's care   
with the Resident/Fellow, if   
applicable. I also have reviewed   
and agree with the assessment and   
plan as stated above and agree   
with all of its relevant   
components.  
  
  
  
  
  
Electronically signed by   
Amaya Reis II, OD at   
2024 2:14 PM EST  
  
documented in this encounter            [facility]  
   
                                        2024 Note     HNO ID: [id number]  
Author: AMAYA REIS II, OD  
Service: ?  
Author Type: OPTOMETRIST  
Type: Progress Notes  
Filed: 2024 14:14  
Note Text:  
Assessment and Plan  
Z98.42, Z96.1 Status post cataract   
extraction and insertion of   
intraocular lens  
of left eye (primary encounter   
diagnosis)  
Comment: Healing well. Continue   
schedule of postoperative   
medications as  
directed. Scheduled for right eye   
cataract removal in 1-2 weeks.   
Follow-up as  
instructed.Instruct patient to   
immediately report any change in   
condition  
outside of expected and discussed   
symptoms.  
I have confirmed and edited as   
necessary the relevant HPI,   
ophthalmic history,  
ROS, and the neuro exam findings   
as obtained by others. I have seen   
and  
examined Marzena Mederos.  
I have discussed the case and the   
management of this patient's care   
with the  
Resident/Fellow, if applicable. I   
also have reviewed and agree with   
the  
assessment and plan as stated   
above and agree with all of its   
relevant  
components.                             [facility]  
   
                                                    2024 History of   
Present illness Narrative               Formatting of this note might be   
different from the original.  
Assessment and Plan  
  
Z98.42, Z96.1 Status post cataract   
extraction and insertion of   
intraocular lens of left eye   
(primary encounter diagnosis)  
Comment: Healing well. Continue   
schedule of postoperative   
medications as directed. Scheduled   
for right eye cataract removal in   
1-2 weeks. Follow-up as   
instructed.Instruct patient to   
immediately report any change in   
condition outside of expected and   
discussed symptoms.  
  
I have confirmed and edited as   
necessary the relevant HPI,   
ophthalmic history, ROS, and the   
neuro exam findings as obtained by   
others. I have seen and examined   
Marzena Mederos.  
I have discussed the case and the   
management of this patient's care   
with the Resident/Fellow, if   
applicable. I also have reviewed   
and agree with the assessment and   
plan as stated above and agree   
with all of its relevant   
components.  
  
  
  
Electronically signed by   
Amaya Reis II, OD at   
2024 2:14 PM EST  
documented in this encounter            [facility]  
   
                                        2024 Note     Date of Procedure  
2024.  
  
Notes  
See chart notes for measurements        [facility]  
   
                                        2024 Instructions   
  
  
Ronel Hays MD - 2024   
10:20 AM EDTFormatting of this   
note is different from the   
original.  
Current Ophthalmic Meds  
  
  
  
fluorometholone (FML LIQUID FILM)   
0.1 % ophthalmic suspension Use 1   
Drop in the right eye three times   
a day.  
prednisoLONE acetate (PRED FORTE)   
1 % ophthalmic suspension Use 1   
Drop in the left eye four times   
daily.  
keTORolac (ACULAR) 0.5 %   
ophthalmic solution Use 1 Drop in   
the left eye four times daily.  
  
  
  
Continue:  
Systane Complete solution instill   
1 drop 3 times daily Both Eyes.  
  
If you have any questions please   
contact our office at   
[phone].  
After office hours or on the   
weekend, please call Dr. Hays on   
his cell phone at [phone].  
  
Electronically signed by Ronel Hays MD at 2024 10:20   
AM EDT  
  
documented in this encounter            [facility]  
   
                                        2024 Note     HNO ID: [id number]  
Author: RONEL HAYS MD  
Service: ?  
Author Type: Physician  
Type: Progress Notes  
Filed: 2024 10:21  
Note Text:  
ASSESSMENT/PLAN:  
1. Status post cataract extraction   
and insertion of intraocular lens   
of left eye  
- ICD9: V45.61, V43.1, ICD10:   
Z98.42, Z96.1 (primary diagnosis)  
Current Ophthalmic Meds  
prednisoLONE acetate (PRED FORTE)   
1 % ophthalmic suspension Use 1   
Drop in the  
left eye four times daily.  
keTORolac (ACULAR) 0.5 %   
ophthalmic solution Use 1 Drop in   
the left eye four  
times daily.  
2. 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   
cataract of right eye - ICD9:   
366.19, ICD10:  
H25.811  
Current Ophthalmic Meds  
fluorometholone (FML LIQUID FILM)   
0.1 % ophthalmic suspension Use 1   
Drop in the  
right eye three times a day.  
Continue:  
Systane Complete solution instill   
1 drop 3 times daily Both Eyes.  
The patient wishes to have her   
right eye target for reading   
-1.50.  
Upon eye examination, patient was   
found to have a visually   
significant cataract  
right. Discussed cataract surgery   
with patient and different   
intraocular lens  
implant options with patient:   
basic monofocal intraocular lens   
implant, Toric  
intraocular lens implant, and   
presbyopia correction intraocular   
lens implant.  
In my medical opinion, based on   
medical history and ocular   
examination, cataract  
surgery with intraocular lens   
implant will correct patient's   
vision and improve  
quality of patient's daily living   
activities. Patient wishes to have  
traditional cataract surgery with   
basic intraocular lens right.   
Patient wishes  
to have cataract surgery with the   
option stated above. Patient   
understands that  
an intraocular lens implant does   
not necessarily replace the need   
for glasses.  
Patient understands that it is   
impossible for the surgeon to   
inform him/her of  
every possible complication that   
may occur. The surgeon has   
answered all of the  
patient's questions. Patient   
understands that if he/she has a   
mature or dense  
cataract, pseudoexfoliation   
cataract, or history of use of   
Flomax, he/she may  
require the use of Maluyugin Ring   
and/or Vision Blue during surgery.   
Patient  
understands the risks, benefits,   
and alternatives to surgery.  
Patient is scheduled for Cataract   
Surgery with Intraocular lens   
right eye on  
24 at [facility]  
3. Type 2 diabetes mellitus   
without retinopathy (HCC) - ICD9:   
250.00, ICD10:  
E11.9  
Please keep your blood sugar under   
good control to minimize risk of   
ocular  
complications from diabetes.  
4. Essential hypertension - ICD9:   
401.9, ICD10: I10  
5. Hypercholesteremia - ICD9:   
272.0, ICD10: E78.00  
Continue care with primary care   
physician  
6. Dementia, unspecified dementia   
severity, unspecified dementia   
type,  
unspecified whether behavioral,   
psychotic, or mood disturbance or   
anxiety (HCC)  
- ICD9: 294.20, ICD10: F03.90  
Continue care with managing   
physician  
I have confirmed and edited as   
necessary the relevant HPI,   
ophthalmic history,  
ROS, and the neuro exam findings   
as obtained by others. I have seen   
and  
examined Marzena Mederos.  
I have discussed the case and the   
management of this patient's care   
with the  
Resident/Fellow, if applicable. I   
also have reviewed and agree with   
the  
assessment and plan as stated   
above and agree with all of its   
relevant  
components.                             [facility]  
   
                                                    2024 History of   
Present illness Narrative               Formatting of this note is   
different from the original.  
ASSESSMENT/PLAN:  
1. Status post cataract extraction   
and insertion of intraocular lens   
of left eye - ICD9: V45.61, V43.1,   
ICD10: Z98.42, Z96.1 (primary   
diagnosis)  
  
Current Ophthalmic Meds  
  
prednisoLONE acetate (PRED FORTE)   
1 % ophthalmic suspension Use 1   
Drop in the left eye four times   
daily.  
keTORolac (ACULAR) 0.5 %   
ophthalmic solution Use 1 Drop in   
the left eye four times daily.  
  
2. 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   
cataract of right eye - ICD9:   
366.19, ICD10: H25.811  
  
Current Ophthalmic Meds  
  
fluorometholone (FML LIQUID FILM)   
0.1 % ophthalmic suspension Use 1   
Drop in the right eye three times   
a day.  
  
Continue:  
Systane Complete solution instill   
1 drop 3 times daily Both Eyes.  
  
The patient wishes to have her   
right eye target for reading   
-1.50.  
  
Upon eye examination, patient was   
found to have a visually   
significant cataract right.   
Discussed cataract surgery with   
patient and different intraocular   
lens implant options with patient:   
basic monofocal intraocular lens   
implant, Toric intraocular lens   
implant, and presbyopia correction   
intraocular lens implant. In my   
medical opinion, based on medical   
history and ocular examination,   
cataract surgery with intraocular   
lens implant will correct   
patient's vision and improve   
quality of patient's daily living   
activities. Patient wishes to have   
traditional cataract surgery with   
basic intraocular lens right.   
Patient wishes to have cataract   
surgery with the option stated   
above. Patient understands that an   
intraocular lens implant does not   
necessarily replace the need for   
glasses. Patient understands that   
it is impossible for the surgeon   
to inform him/her of every   
possible complication that may   
occur. The surgeon has answered   
all of the patient's questions.   
Patient understands that if he/she   
has a mature or dense cataract,   
pseudoexfoliation cataract, or   
history of use of Flomax, he/she   
may require the use of Maluyugin   
Ring and/or Vision Blue during   
surgery. Patient understands the   
risks, benefits, and alternatives   
to surgery.  
  
Patient is scheduled for Cataract   
Surgery with Intraocular lens   
right eye on 24 at [facility]  
  
3. Type 2 diabetes mellitus   
without retinopathy (HCC) - ICD9:   
250.00, ICD10: E11.9  
Please keep your blood sugar under   
good control to minimize risk of   
ocular complications from   
diabetes.  
  
4. Essential hypertension - ICD9:   
401.9, ICD10: I10  
5. Hypercholesteremia - ICD9:   
272.0, ICD10: E78.00  
  
Continue care with primary care   
physician  
  
6. Dementia, unspecified dementia   
severity, unspecified dementia   
type, unspecified whether   
behavioral, psychotic, or mood   
disturbance or anxiety (HCC) -   
ICD9: 294.20, ICD10: F03.90  
Continue care with managing   
physician  
  
I have confirmed and edited as   
necessary the relevant HPI,   
ophthalmic history, ROS, and the   
neuro exam findings as obtained by   
others. I have seen and examined   
Marzena Mederos.  
I have discussed the case and the   
management of this patient's care   
with the Resident/Fellow, if   
applicable. I also have reviewed   
and agree with the assessment and   
plan as stated above and agree   
with all of its relevant   
components.  
  
  
  
Electronically signed by Ronel Hays MD at 2024 10:21   
AM EDT  
documented in this encounter            [facility]  
   
                                                    10- Hospital   
Discharge instructions                    
  
  
Ronel Hays MD - 10/31/2024   
9:58 AM EDTFormatting of this note   
might be different from the   
original.  
Please see enclosed instructions   
from Dr. Hays regarding eye drop   
schedule, restrictions and use of   
eye shield.  
  
Please take bag with eye drops   
that were given to you today as   
well as ALL eye drops that you are   
using at home with you to your   
appointment tomorrow at Dr. Hays's office.  
  
Electronically signed by Ronel Hays MD at 10/31/2024 9:58 AM   
EDT  
  
documented in this encounter            [facility]  
Work Phone:   
[phone]  
   
                                                    10- Miscellaneous   
Notes                                   Formatting of this note is   
different from the original.  
Phacoemulsification Cataract with   
Insertion Intraocular Lens (L)   
Operative Note  
  
Date: 10/31/2024 OR Location: [facility]   
OR  
  
Name: Marzena Mederos, :   
1945, Age: 79 y.o., MRN:   
[id number], Sex: female  
  
Diagnosis  
Pre-op Diagnosis  
* Combined forms of age-related   
cataract of left eye [H25.812]   
Post-op Diagnosis  
* Combined forms of age-related   
cataract of left eye [H25.812]  
  
Procedures  
Phacoemulsification Cataract with   
Insertion Intraocular Lens  
90798 - ID XCAPSL CTRC RMVL INSJ   
IO LENS PROSTH W/O ECP  
  
Surgeons  
* Ronel Hays - Primary  
  
Resident/Fellow/Other Assistant:  
Surgeons and Role:  
* No surgeons found with a   
matching role *  
  
Staff:  
Circulator: Sarah Fraga Person: Abdullahi  
  
Anesthesia Staff:   
Anesthesiologist: Dell Williamson,   
  
  
Procedure Summary  
Anesthesia: Monitor Anesthesia   
Care ASA: III  
Estimated Blood Loss: None  
Intra-op Medications:  
Administrations occurring from   
0930 to 1010 on 10/31/24:  
Medication Name Total Dose  
dexAMETHasone (Decadron) PF   
injection 10 mg/mL 4 mg  
fentaNYL (Sublimaze) injection 50   
mcg/mL 50 mcg  
midazolam PF (Versed) injection 1   
mg/mL 1 mg  
  
Anesthesia Record  
  
  
Intraprocedure I/O Totals  
  
None  
  
Specimen: No specimens collected  
  
Drains and/or Catheters: * None in   
log *  
  
I have reviewed the images and   
report from the Ophthalmic   
Biometry 10-31-24 to determine the   
intraocular lens power calculation   
for the IOL lens implant. I have   
interpreted and agree with the   
calculation of the IOL as listed   
below.  
  
Implants:  
Implants  
  
Type Name Action Serial No.  
Lens CLAREON IOL Implanted   
[id number]  
  
  
  
  
Findings: Combined Form Age   
Related Cataract Left Eye  
  
Indications: Marzena Mederos is an   
79 y.o. female who is having   
surgery for Combined forms of   
age-related cataract of left eye   
[H25.812]. Blurred vision left eye   
for near and for distance.   
Difficulty seeing to read and   
watch TV left eye. Vision left eye   
impedes patients ability to drive.  
  
The patient was seen in the   
preoperative area. The risks,   
benefits, complications, treatment   
options, non-operative   
alternatives, expected recovery   
and outcomes were discussed with   
the patient. The possibilities of   
reaction to medication, pulmonary   
aspiration, injury to surrounding   
structures, bleeding, recurrent   
infection, the need for additional   
procedures, failure to diagnose a   
condition, and creating a   
complication requiring transfusion   
or operation were discussed with   
the patient. The patient concurred   
with the proposed plan, giving   
informed consent. The site of   
surgery was properly noted/marked   
if necessary per policy. The   
patient has been actively warmed   
in preoperative area. Preoperative   
antibiotics are not indicated.   
Venous thrombosis prophylaxis are   
not indicated.  
  
Procedure Details: The patient was   
correctly identified in the preop   
area and the operative eye was   
marked with a marking pen. The   
operative eye was dilated in the   
preoperative area. The patient was   
then taken to the operating room   
where timeout was performed before   
starting the procedure. Combined   
anesthesia with intravenous   
sedation and topical tetracaine   
eyedrops were given the left eye.   
The operative eye was prepped and   
draped in the standard sterile   
ophthalmic fashion in preparation   
for ophthalmic surgery. A   
Sarthak wire speculum was then   
inserted between the eyelids of   
the left eye and the operating   
microscope was placed over the   
left eye. A paracentesis incision   
was made approximately 30 away   
from the planned surgical incision   
site with the help of MVR blade.   
1% lidocaine MPF with   
Phenylephrine 1.5% PF was injected   
into the anterior chamber through   
the paracentesis incision. A near   
limbal clear corneal incision was   
fashioned in the temporal quadrant   
just outside the vascular arcade   
and Viscoat was injected into   
anterior chamber to firm the eye.   
A bent needle cystotome was used   
and Utrata forceps were utilized   
to create a continuous curvilinear   
capsulorrhexis. BSS was injected   
beneath the anterior capsule to   
hydrodissect the nucleus from   
adjacent cortex and capsule. The   
residual cortex were then   
aspirated with irrigation   
aspiration handpiece. The   
posterior capsule was then   
polished with the help of soft   
irrigation-aspiration tip. Provisc   
viscoelastic was then injected   
into the eye to reform the   
anterior chamber and to open the   
capsular bag. The intraocular lens   
implant was taken from its sterile   
wrapping, inspected under the   
surgical microscope and found to   
be in good condition. The   
intraocular lens implant +19.5D   
was injected into the capsule bag.   
The Provisc was then aspirated   
from the anterior chamber and from   
behind the intraocular lens   
implant. The anterior chamber was   
inflated with the help of BSS to   
moderate tension. The edges of the   
surgical incision were then   
hydrated with the help of BSS.   
Vigamox was then injected into the   
anterior chamber and into the   
capsule bag through the   
paracentesis incision. The   
surgical wound was then inspected   
and found to be watertight. The   
wire speculum and drapes were then   
removed. Pred Forte eyedrops,   
Acular eyedrops and Betadine 5%   
sterile ophthalmic solution were   
instilled in the conjunctival sac.  
  
The patient tolerated the   
procedure well and was taken to   
recovery room in stable condition.  
  
Complications: None; patient   
tolerated the procedure well.  
Disposition: Home  
Condition: stable  
  
Attending Attestation: I performed   
the procedure.  
  
Ronel Hays  
Phone Number: [phone]  
  
Electronically signed by Ronel Hays MD at 10/31/2024 10:04 AM   
EDT  
Formatting of this note is   
different from the original.  
No outpatient medications have   
been marked as taking for the   
10/31/24 encounter (Hospital   
Encounter).  
  
  
  
  
  
NPO Instructions:  
  
Do not eat any food after midnight   
the night before your   
surgery/procedure.  
You may have clear liquids until   
TWO hours before   
surgery/procedure. This includes   
water, black tea/coffee, (no milk   
or cream) apple juice and   
electrolyte drinks (Gatorade).  
  
Additional Instructions:  
  
Will need  home, will   
receive call day before surgery   
with arrival time  
  
  
Electronically signed by Jolie Germain RN at 10/28/2024 9:42 AM EDT  
documented in this encounter            [facility]  
Work Phone:   
[phone]  
   
                                        10- Note     Formatting of this n  
ote is   
different from the original.  
Phacoemulsification Cataract with   
Insertion Intraocular Lens (L)   
Operative Note  
  
Date: 10/31/2024 OR Location: [facility]   
OR  
  
Name: Marzena Mederos, :   
1945, Age: 79 y.o., MRN:   
[id number], Sex: female  
  
Diagnosis  
Pre-op Diagnosis  
* Combined forms of age-related   
cataract of left eye [H25.812]   
Post-op Diagnosis  
* Combined forms of age-related   
cataract of left eye [H25.812]  
  
Procedures  
Phacoemulsification Cataract with   
Insertion Intraocular Lens  
64095 - ID XCAPSL CTRC RMVL INSJ   
IO LENS PROSTH W/O ECP  
  
Surgeons  
* Ronel Hays - Primary  
  
Resident/Fellow/Other Assistant:  
Surgeons and Role:  
* No surgeons found with a   
matching role *  
  
Staff:  
Circulator: Sarah Fraga Person: Abdullahi  
  
Anesthesia Staff:   
Anesthesiologist: Dell Williamson DO  
  
Procedure Summary  
Anesthesia: Monitor Anesthesia   
Care ASA: III  
Estimated Blood Loss: None  
Intra-op Medications:  
Administrations occurring from   
0930 to 1010 on 10/31/24:  
Medication Name Total Dose  
dexAMETHasone (Decadron) PF   
injection 10 mg/mL 4 mg  
fentaNYL (Sublimaze) injection 50   
mcg/mL 50 mcg  
midazolam PF (Versed) injection 1   
mg/mL 1 mg  
  
Anesthesia Record  
  
  
Intraprocedure I/O Totals  
  
None  
  
Specimen: No specimens collected  
  
Drains and/or Catheters: * None in   
log *  
  
I have reviewed the images and   
report from the Ophthalmic   
Biometry 10-31-24 to determine the   
intraocular lens power calculation   
for the IOL lens implant. I have   
interpreted and agree with the   
calculation of the IOL as listed   
below.  
  
Implants:  
Implants  
  
Type Name Action Serial No.  
Lens WAQAS IOL Implanted   
[id number]  
  
  
  
  
Findings: Combined Form Age   
Related Cataract Left Eye  
  
Indications: Marzena Mederos is an   
79 y.o. female who is having   
surgery for Combined forms of   
age-related cataract of left eye   
[H25.812]. Blurred vision left eye   
for near and for distance.   
Difficulty seeing to read and   
watch TV left eye. Vision left eye   
impedes patients ability to drive.  
  
The patient was seen in the   
preoperative area. The risks,   
benefits, complications, treatment   
options, non-operative   
alternatives, expected recovery   
and outcomes were discussed with   
the patient. The possibilities of   
reaction to medication, pulmonary   
aspiration, injury to surrounding   
structures, bleeding, recurrent   
infection, the need for additional   
procedures, failure to diagnose a   
condition, and creating a   
complication requiring transfusion   
or operation were discussed with   
the patient. The patient concurred   
with the proposed plan, giving   
informed consent. The site of   
surgery was properly noted/marked   
if necessary per policy. The   
patient has been actively warmed   
in preoperative area. Preoperative   
antibiotics are not indicated.   
Venous thrombosis prophylaxis are   
not indicated.  
  
Procedure Details: The patient was   
correctly identified in the preop   
area and the operative eye was   
marked with a marking pen. The   
operative eye was dilated in the   
preoperative area. The patient was   
then taken to the operating room   
where timeout was performed before   
starting the procedure. Combined   
anesthesia with intravenous   
sedation and topical tetracaine   
eyedrops were given the left eye.   
The operative eye was prepped and   
draped in the standard sterile   
ophthalmic fashion in preparation   
for ophthalmic surgery. A   
Sarthak wire speculum was then   
inserted between the eyelids of   
the left eye and the operating   
microscope was placed over the   
left eye. A paracentesis incision   
was made approximately 30 away   
from the planned surgical incision   
site with the help of MVR blade.   
1% lidocaine MPF with   
Phenylephrine 1.5% PF was injected   
into the anterior chamber through   
the paracentesis incision. A near   
limbal clear corneal incision was   
fashioned in the temporal quadrant   
just outside the vascular arcade   
and Viscoat was injected into   
anterior chamber to firm the eye.   
A bent needle cystotome was used   
and Utrata forceps were utilized   
to create a continuous curvilinear   
capsulorrhexis. BSS was injected   
beneath the anterior capsule to   
hydrodissect the nucleus from   
adjacent cortex and capsule. The   
residual cortex were then   
aspirated with irrigation   
aspiration handpiece. The   
posterior capsule was then   
polished with the help of soft   
irrigation-aspiration tip. Provisc   
viscoelastic was then injected   
into the eye to reform the   
anterior chamber and to open the   
capsular bag. The intraocular lens   
implant was taken from its sterile   
wrapping, inspected under the   
surgical microscope and found to   
be in good condition. The   
intraocular lens implant +19.5D   
was injected into the capsule bag.   
The Provisc was then aspirated   
from the anterior chamber and from   
behind the intraocular lens   
implant. The anterior chamber was   
inflated with the help of BSS to   
moderate tension. The edges of the   
surgical incision were then   
hydrated with the help of BSS.   
Vigamox was then injected into the   
anterior chamber and into the   
capsule bag through the   
paracentesis incision. The   
surgical wound was then inspected   
and found to be watertight. The   
wire speculum and drapes were then   
removed. Pred Forte eyedrops,   
Acular eyedrops and Betadine 5%   
sterile ophthalmic solution were   
instilled in the conjunctival sac.  
  
The patient tolerated the   
procedure well and was taken to   
recovery room in stable condition.  
  
Complications: None; patient   
tolerated the procedure well.  
Disposition: Home  
Condition: stable  
  
Attending Attestation: I performed   
the procedure.  
  
Ronel Hays  
Phone Number: [phone]  
  
Electronically signed by Ronel Hays MD at 10/31/2024 10:04 AM   
EDT  
                                        [facility]  
Work Phone:   
[phone]  
   
                                                    10- Attending   
History and physical note               Formatting of this note might be   
different from the original.  
H&P reviewed. The patient was   
examined and there are no changes   
to the H&P.  
Electronically signed by Ronel Hays MD at 10/31/2024 8:14 AM   
EDT  
  
Source Note - Ronel Hays MD   
- 10/31/2024 8:14 AM EDT  
Formatting of this note is   
different from the original.  
Images from the original note were   
not included.  
H&P Notes  
- documented in this encounter  
Ronel Hays MD - 10/08/2024   
10:45 AM EDT  
Formatting of this note is   
different from the original.  
HISTORY AND PHYSICAL EXAMINATION  
  
SERVICE DATE: 10/8/2024  
SERVICE TIME:  
  
PRIMARY CARE PHYSICIAN: Sharifa Almaguer DO  
  
REASON FOR VISIT:  
Marzena Mederos is a 79 year old   
female who is being seen for   
combined age related cataract left   
eye   
  
The patient has the following:  
ACTIVE PROBLEM LIST  
Postmenopausal Atrophic Vaginitis  
Unspecified Transient Cerebral   
Ischemia  
Carcinoma in Situ of Vulva  
Malignant Neoplasm of Female   
Breast (Hcc)  
Personal History of Malignant   
Neoplasm of Breast  
Senile Nuclear Sclerosis  
Hypermetropia  
Regular Astigmatism  
Presbyopia  
Invasive Ductal Carcinoma of   
Breast, Female (Hcc)  
Ocular Migraine  
Controlled Type 2 Diabetes   
Mellitus Without Complication,   
Without Long-Term Current Use of   
Insulin (Hcc)  
Postmenopausal Bleeding  
S/P Hysterectomy With Oophorectomy  
  
SUBJECTIVE  
CHIEF COMPLAINT: combined age   
related cataract left eye  
HPI: blurry vision at all ranges   
of vision, difficulty with glare   
both eyes  
  
PAST MEDICAL HISTORY  
Diagnosis Date  
Breast cancer (HCC)   
invasive ductal carcinoma,   
completed radiation 3/7/11  
Cancer of endometrium (HCC)  
Carcinoma in situ, vulva   
Cellulitis  
of right eye muscle  
Diabetes mellitus without mention   
of complication  
Diabetes mellitus, type II (HCC)  
Diaphragmatic hernia without   
mention of obstruction or gangrene  
Hiatal hernia  
Dyspareunia  
Elevated cholesterol  
Esophageal reflux  
Insomnia, unspecified  
Orbital cellulitis  
Other corneal disorder  
Rt eye corneal erosion .  
PMH - PAST MEDICAL HISTORY OF  
?VESTIBULITIS  
PMH - PAST MEDICAL HISTORY OF  
CLIMACTERIC  
PMH - PAST MEDICAL HISTORY OF   
2006  
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  
Pure hypercholesterolemia  
Rib fracture  
6th Rib  
Senile dementia (HCC)  
Stomatitis and mucositis   
(ulcerative)  
Chronic ulcerative stomatitis on   
mucosal biopsy  
Unspecified essential hypertension  
  
PAST SURGICAL HISTORY  
Procedure Laterality Date  
Bunions bilateral feet removed   
10/2002  
BX/EXC LYMPH NODE OPEN DEEP   
AXILLARY NODE 8-24-10  
RIGHT BREAST LUMP W/ SLN BX  
CHOLECYSTECTOMY 2004  
Cholecystectomy  
COLONOSCOPY FLX DX W/COLLJ SPEC   
WHEN PFRMD   
Colonoscopy  
COLPOSCOPY CERVIX UPPER/ADJACENT   
VAGINA   
Colposcopy of the vulva  
COLPOSCOPY, VULVA 10/09  
CORRECT BUNION,SIMPLE  
Bunion  
DILATION & CURETTAGE DX&/THER   
NONOBSTETRIC   
SAB  
HYSTERECTOMY 2017  
LAPAROSCOPY SURG CHOLECYSTECTOMY   
3/2004  
Cholecystectomy, lap  
MAMMO STEREOTACTIC CORE BIOPSY RT   
10/10/13  
MASTECTOMY, PARTIAL 8-24-10  
RIGHT BREAST  
PAST SURGICAL HISTORY OF 2006  
partial vulvectomy/sq. cell ca in   
situ  
PAST SURGICAL HISTORY OF   
right eye cornea  
PAST SURGICAL HISTORY OF   
right eye revision  
PAST SURGICAL HISTORY OF  
squamous cell carcinoma right arm  
SALPINGO-OOPHORECTOMY COMPL/PRTL   
UNI/BI SPX 2017  
Toenail Big Toe Left Foot removed   
10/2002  
TONSILLECTOMY PRIMARY/SECONDARY   
Tonsillectomy  
UNLISTED PROCEDURE HANDS/FINGERS   
12  
CMC Arthoplasty on right hand  
  
FAMILY HISTORY  
Problem Relation Age of Onset  
Stroke Mother  
Diabetes Mother  
GI Father  
 from complications of gb   
surgery  
Arthritis Sister  
Systemic Lupus  
Cancer Maternal Uncle  
LUNG  
Cancer Other  
cousin with breast cancer/cousin   
with bladder ca.  
Stroke Sister  
  
SOCIAL HISTORY:  
Social History  
  
Tobacco Use  
Smoking status: Never  
Smokeless tobacco: Never  
Vaping Use  
Vaping status: Never Used  
Substance Use Topics  
Alcohol use: Yes  
Comment: 1-2 weekly  
Drug use: No  
  
MEDICATIONS:  
Prior to Admission medications as   
of 10/8/24 1011  
Medication Sig Last Dose Taking  
fluorometholone (FML LIQUID FILM)   
0.1 % ophthalmic suspension Use 1   
Drop in both eyes three times a   
day. Taking Yes  
amLODIPine (NORVASC) 10 mg tablet   
Take 10 mg by mouth. Taking Yes  
donepezil (ARICEPT) 10 mg tablet   
Take 10 mg by mouth. Taking Yes  
cholecalciferol (VITAMIN D3) 50   
mcg (2,000 unit) tablet Take 2,000   
Units by mouth once daily. Taking   
Yes  
losartan-hydroCHLOROthiazide   
(HYZAAR) 100-12.5 mg per tablet   
Take 1 tablet by mouth once daily.   
Taking Yes  
omeprazole (PRILOSEC) 20 mg   
capsule Take 20 mg by mouth once   
daily. Taking Yes  
potassium chloride (KLOR-CON 10)   
10 mEq tablet Take 10 mEq by mouth   
once daily.  
Taking Yes  
metFORMIN ER (GLUCOPHAGE XR) 500   
mg 24 hr tablet Take two tablets   
by mouth once daily. Taking Yes  
simvastatin(ZOCOR 20 MG TAB) Take   
one(1) tablet daily. Taking Yes  
  
No medication comments found.  
  
CURRENT ALLERGIES:  
ALLERGIES  
Allergen Reactions  
Adhesive Rash  
Redness  
Ambien [Zolpidem Ta* Rash  
Codeine Mental Status Change  
Neosporin [Neomycin* Rash  
Percocet [Oxycodone*  
Causes Vomitting  
  
REVIEW OF SYSTEMS:  
PAIN ASSESSMENT:  
General: No weight loss, malaise   
or fevers.  
Neuro: Dementia  
Respiratory: No history of current   
cough or dyspnea, or pneumonia in   
the past 6 weeks. No history of   
respiratory/pulmonary symptoms or   
problems  
Cardiovascular: Positive for:   
Hypertension, PVC  
GI: No history of GI symptoms or   
problems. No history of esophageal   
varices, recent ascites, or ETOH   
greater than 2 drinks per day.  
: No history of UTI in past 6   
weeks. No history of renal   
failure. Not currently on or   
requiring dialysis. No history of   
 symptoms or problems.  
GYN: Negative for abnormal vaginal   
bleeding, abnormal vaginal   
discharge.  
Pregnancy: Denies  
Endocrine: Diabetes Mellitus on   
oral agent  
Hematology: No history of bleeding   
or clotting disorder. Pt is not   
taking anti-coagulation or   
platelet medications. No history   
of hematological symptoms or   
problems.  
Oncology: No history of CA   
metastasis, chemo within 30 days,   
or radiotherapy within 90 days.   
Has not lost 10% of body wt in 6   
months. No history of oncological   
symptoms or problems.  
Psych: No history of psychiatric   
symptoms or problems.  
Musculoskeletal: Negative for   
joint pain or swelling, back pain   
or muscle pain.  
Skin: Negative for lesions, rash   
and itching.  
  
PHYSICAL EXAM:  
VITALS:  
/80   Pulse 88  
  
General: Alert and oriented  
Skin: Normal color, no rash, no   
lesions.  
HEENT: EOM, pupils equal, round   
and reactive.  
Cardiovascular: Normal S1 & S2, no   
rubs, murmurs or gallops. No JVD.   
Pulse regular.  
Lungs: Normal breath sounds, no   
wheezes or crackles.  
Abdomen: Soft, non-tender, no   
rigidity.  
Extremities: No deformity, no   
edema or tenderness, no joint   
swelling or clubbing.  
Neurological: Normal cognition and   
motor skills.  
Pulses: Carotid and radial pulses   
normal +2.  
  
Diagnostic tests reviewed for   
today's visit:  
No new labs or tests  
  
ASSESSMENT  
Medication and Non-Pharmacologic   
VTE Prophylaxis/Anticoagulants  
  
VTE Prophylaxis: VTE prophylaxis   
appropriate  
  
Impression: There is no known   
pertinent medical condition which   
may affect trinidad-operative course  
  
[unfilled]  
Clinical Risk Factors for Possible   
Cardiac Complications:  
None  
  
Patient is scheduled for a   
low-risk procedure.  
  
FUNCTIONAL STATUS: Walk indoors,   
such as around the house (1.75   
METs)  
  
Functional Class (NYHA): N/A  
  
[facility]: Not obtained  
  
PLAN  
CONSULTS:  
Patient does not require consults   
for optimization at this time.  
  
The Following Tests/Procedures   
Have Been Initiated:  
None  
  
Instructions Given to Patient:  
Patient given verbal and written   
preop instructions and voices   
comprehension and compliance.  
  
SIGNATURE: Ronel Hays MD   
PATIENT NAME: Marzena Mederos  
DATE: 2024 MRN:   
[id number]  
TIME: 10:45 AM PAGER/CONTACT #:  
  
Electronically signed by Ronel Hays MD at 10/08/2024 1:24   
PM EDT  
Source Comments  
- [facility]  
In the event this information is   
protected by the Federal   
Confidentiality of Alcohol and   
Drug Abuse Patient Records   
regulations: This information has   
been disclosed to you from records   
protected by Federal   
confidentiality rules (42 CFR Part   
2). The Federal rules prohibit you   
from making any further disclosure   
of this information unless further   
disclosure is expressly permitted   
by the written consent of the   
person to whom it pertains or as   
otherwise permitted by 42 CFR Part   
2. A general authorization for the   
release of medical or other   
information is NOT sufficient for   
this purpose. The Federal rules   
restrict any use of the   
information to criminally   
investigate or prosecute any   
alcohol or drug abuse patient.  
Reason for Visit  
  
Reason for Visit -  
Reason Comments  
Cataract Follow Up  
  
Encounter Details  
  
Encounter Details  
Date Type Department Care Team   
(Latest Contact Info) Description  
10/08/2024 10:15 AM EDT Office   
Visit [facility] Ophthalmology  
[address] [place] [address]  
[phone] Ronel Hays MD  
[address] [place], [place] [id number]  
[phone] (Work)  
[phone] (Fax) Combined forms   
of age-related cataract of left   
eye (Primary Dx);  
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   
cataract of right eye;  
Type 2 diabetes mellitus without   
retinopathy (HCC);  
Essential hypertension;  
Hypercholesteremia;  
Dementia, unspecified dementia   
severity, unspecified dementia   
type, unspecified whether   
behavioral, psychotic, or mood   
disturbance or anxiety (HCC)  
  
Social History  
- documented as of this encounter  
Social History  
Tobacco Use Types Packs/Day Years   
Used Date  
Smoking Tobacco: Never  
Smokeless Tobacco: Never  
  
Social History  
Alcohol Use Standard Drinks/Week   
Comments  
Yes 0 (1 standard drink = 0.6 oz   
pure alcohol) 1-2 weekly  
  
Social History  
PHQ-2 Answer Date Recorded  
PHQ-2 score 0 10/31/2019  
  
Social History  
Area Deprivation Index Answer Date   
Recorded  
National Score (1-100), lower   
number is lower risk 79 2023  
State Score (1-10), lower number   
is lower risk 7 2023  
Data from:   
https://www.neighborhoodatlas.[facility].edu/. Last address used   
for calculation [address]   
2023  
  
Social History  
Sex and Gender Information Value   
Date Recorded  
Sex Assigned at Birth Female   
2017 6:52 AM EST  
Gender Identity Female 2017   
6:52 AM EST  
Sexual Orientation Straight   
2017 6:52 AM EST  
  
Last Filed Vital Signs  
- documented in this encounter  
Last Filed Vital Signs  
Vital Sign Reading Time Taken   
Comments  
Blood Pressure 125/80 10/08/2024   
10:20 AM EDT  
Pulse 88 10/08/2024 10:20 AM EDT  
Temperature - -  
Respiratory Rate - -  
Oxygen Saturation - -  
Inhaled Oxygen Concentration - -  
Weight - -  
Height - -  
Body Mass Index - -  
  
Functional Status  
- documented as of this encounter  
Functional Status  
Functional Status Response Date of   
Assessment  
Are you deaf or do you have   
serious difficulty hearing? No   
2017  
Are you blind or do you have   
serious difficulty seeing, even   
when wearing glasses? No   
2017  
Do you have serious difficulty   
walking or climbing stairs? No   
2017  
Do you have difficulty dressing or   
bathing? No 2017  
Because of a physical, mental, or   
emotional condition, do you have   
difficulty doing errands alone   
such as visiting a doctor's office   
or shopping? No 2017  
  
Functional Status  
Cognitive Status Response Date of   
Assessment  
Because of a physical, mental, or   
emotional condition, do you have   
serious difficulty concentrating,   
remembering, or making decisions?   
No 2017  
  
Patient Instructions  
- documented in this encounter  
Patient Instructions  
Ronel Hays MD - 10/08/2024   
10:49 AM EDT  
Formatting of this note is   
different from the original.  
Current Ophthalmic Meds  
  
fluorometholone (FML LIQUID FILM)   
0.1 % ophthalmic suspension Use 1   
Drop in both eyes three times a   
day.  
  
Continue:  
Systane Complete solution instill   
1 drop 3 times daily Both Eyes.  
  
Plan Cataract Surgery with   
Monofocal Intraocular Lens Implant   
Left Eye on 2024 at   
[facility].  
  
If you have any questions please   
contact our office at   
[phone].  
After office hours or on the   
weekend, please call Dr. Hays on   
his cell phone at [phone].  
Electronically signed by Ronel Hays MD at 10/08/2024 10:49   
AM EDT  
  
Progress Notes  
- documented in this encounter  
Ronel Hays MD - 10/08/2024   
10:20 AM EDT  
Formatting of this note is   
different from the original.  
ASSESSMENT/PLAN:  
1. 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   
cataract of left eye - ICD9:   
366.19, ICD10: H25.812 (primary   
diagnosis)  
  
PHYSICAL EXAM:  
  
Vital Signs:  
Blood pressure 125/80, pulse 88.  
  
Respiratory:  
Normal breath sounds, no wheezing.  
  
CARD:  
Normal heart sounds 1 & 2, normal   
sinus rhythm.  
  
Cataract Presurgical Documentation  
  
Cataract: Left Eye  
  
Current Visual Acuity  
Right Eye Distance CC 20/40  
Left Eye Distance CC 20/70  
  
Glare Testing:  
  
Visual Function: Marzena Mederos   
states that the decline in vision   
from the cataract impedes her   
abilities as listed in the HPI, as   
well as other activities of daily   
living.  
  
Marzena Mederos has confirmed that   
she is no longer able to function   
adequately on a day-to-day basis   
because of her current visual   
condition.  
  
Further, it is my medical opinion   
that the cataract is the primary   
cause, or at least a significantly   
contributory cause of her visual   
dysfunction. With uncomplicated   
cataract surgery and lens   
implantation, it is my expectation   
that her visual function and   
quality of life will improve,   
significantly.  
  
The risks, benefits, alternatives,   
personnel and complications of   
cataract surgery with lens   
implantation were discussed with   
Marzena Mederos in detail. She   
appeared to understand and asked   
that I proceed with plans for   
surgery.  
  
Upon eye examination, patient was   
found to have a visually   
significant cataract Left Eye.   
Discussed cataract surgery with   
patient and different intraocular   
lens implant options with patient:   
basic monofocal intraocular lens   
implant, Toric intraocular lens   
implant, and presbyopia correction   
intraocular lens implant. In my   
medical opinion, based on medical   
history and ocular examination,   
cataract surgery with intraocular   
lens implant will correct   
patient's vision and improve   
quality of patient's daily living   
activities. Patient wishes to have   
traditional cataract surgery with   
basic intraocular lens Left Eye.   
Patient wishes to have cataract   
surgery with the option stated   
above. Patient understands that an   
intraocular lens implant does not   
necessarily replace the need for   
glasses. Patient understands that   
it is impossible for the surgeon   
to inform him/her of every   
possible complication that may   
occur. The surgeon has answered   
all of the patient's questions.   
Patient understands that if he/she   
has a mature or dense cataract,   
pseudoexfoliation cataract, or   
history of use of Flomax, he/she   
may require the use of Mal[place]ugin   
Ring and/or Vision Blue during   
surgery. Patient understands the   
risks, benefits, and alternatives   
to surgery.  
  
Current Ophthalmic Meds  
  
fluorometholone (FML LIQUID FILM)   
0.1 % ophthalmic suspension Use 1   
Drop in both eyes three times a   
day.  
  
Continue:  
Systane Complete solution instill   
1 drop 3 times daily Both Eyes.  
  
Plan Cataract Surgery with   
Monofocal Intraocular Lens Implant   
Left Eye on 2024 at   
[facility].  
  
Patient cleared for surgery by Dr. Almaguer  
  
2. 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   
cataract of right eye - ICD9:   
366.19, ICD10: H25.811  
  
Plan Cataract Surgery with   
Monofocal Intraocular Lens Implant   
Right Eye after Left Eye is   
stable.  
  
3. Type 2 diabetes mellitus   
without retinopathy (HCC) - ICD9:   
250.00, ICD10: E11.9  
  
Please keep your blood sugar under   
good control to minimize risk of   
ocular complications from   
diabetes.  
  
Continue to monitor with primary   
care physician.  
  
4. Essential hypertension - ICD9:   
401.9, ICD10: I10  
  
Continue to monitor with primary   
care physician.  
  
5. Hypercholesteremia - ICD9:   
272.0, ICD10: E78.00  
  
Continue to monitor with primary   
care physician.  
  
6. Dementia, unspecified dementia   
severity, unspecified dementia   
type, unspecified whether   
behavioral, psychotic, or mood   
disturbance or anxiety (HCC) -   
ICD9: 294.20, ICD10: F03.90  
  
Continue to monitor with primary   
care physician.  
  
I have confirmed and edited as   
necessary the relevant HPI,   
ophthalmic history, ROS, and the   
neuro exam findings as obtained by   
others. I have seen and examined   
Marzena Mederos. I have discussed   
the case and the management of   
this patient's care with the   
Resident/Fellow, if applicable. I   
also have reviewed and agree with   
the assessment and plan as stated   
above and agree with all of its   
relevant components.  
  
Electronically signed by Ronel Hays MD at 10/08/2024 1:24   
PM EDT  
Plan of Treatment  
- documented as of this encounter  
Plan of Treatment - Upcoming   
Encounters  
Upcoming Encounters  
Date Type Department Care Team   
(Latest Contact Info) Description  
2024 9:45 AM EDT Office   
Visit [facility] Ophthalmology  
[address] [place] [address]  
[phone] Ronel Hays MD  
[address] [place] [address]  
[phone] (Work)  
[phone] (Fax) 1 day po 1st le   
basic  
2024 9:30 AM EST Appointment   
Mammogram  
[address] [place] [id number] [phone] Encounter for   
screening mammogram for breast   
cancer [Z12.31]  
2024 10:10 AM EST Visit (SP)   
Office Hematology/Oncology  
[address] [place] [id number] [phone] Colten Abbott, DO  
[address] [place] [id number] [phone] (Work)  
[phone] (Fax) 1 YR OV/ JONY   
*  
  
Plan of Treatment - Scheduled   
Procedures  
Scheduled Procedures  
Name Priority Associated Diagnoses   
Date/Time  
SURGERY AT NON-[facility] FACILITY   
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   
cataract of left eye 10/31/2024   
9:25 AM EDT  
  
Procedures  
- documented in this encounter  
Procedures  
Procedure Name Priority Date/Time   
Associated Diagnosis Comments  
IOL BIOMETRY W/ IOL CALC OU (BOTH   
EYES) Routine 10/08/2024 10:43 AM   
EDT Combined forms of age-related   
cataract of left eye  
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   
cataract of right eye Results for   
this procedure are in the results   
section.  
  
Imaging Results  
- documented in this encounter  
IOL BIOMETRY W/ IOL CALC OU (BOTH   
EYES) (10/08/2024 10:43 AM EDT)  
Imaging Results - IOL BIOMETRY W/   
IOL CALC OU (BOTH EYES)   
(10/08/2024 10:43 AM EDT)  
Anatomical Region Laterality   
Modality  
Other  
  
Imaging Results - IOL BIOMETRY W/   
IOL CALC OU (BOTH EYES)   
(10/08/2024 10:43 AM EDT)  
Narrative  
10/08/2024 10:43 AM EDT  
Date of Procedure  
10/8/2024.  
  
Technician Information  
Imaging Technician: AB.  
  
Notes  
Measurements only - see Procedure   
Record under Scanned Documents for  
signed results.  
  
LENSTAR  
Right eye: AL 23.10 ACD 2.85 WTW   
12.13  
Left eye: AL 23.40 ACD 2.92 WTW   
12.32  
  
Imaging Results - IOL BIOMETRY W/   
IOL CALC OU (BOTH EYES)   
(10/08/2024 10:43 AM EDT)  
Authorizing Provider Result Type  
Ronel Hays MD OPHTHALMOLOGY  
  
Associated Images  
  
10/8/2024  
IOL BIOMETRY W/ IOL CALC OU (BOTH   
EYES)  
  
Visit Diagnoses  
- documented in this encounter  
Visit Diagnoses  
Diagnosis  
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   
cataract of left eye - Primary  
Other and combined forms of senile   
cataract  
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   
cataract of right eye  
Other and combined forms of senile   
cataract  
Type 2 diabetes mellitus without   
retinopathy (HCC)  
Type II or unspecified type   
diabetes mellitus without mention   
of complication, not stated as   
uncontrolled  
Essential hypertension  
Unspecified essential hypertension  
Hypercholesteremia  
Pure hypercholesterolemia  
Dementia, unspecified dementia   
severity, unspecified dementia   
type, unspecified whether   
behavioral, psychotic, or mood   
disturbance or anxiety (HCC)  
  
Eye Exam  
  
Eye Exam - Visual Acuity (Snellen   
- Linear)  
Visual Acuity (Snellen - Linear)  
Right eye Left eye  
Dist cc 20/40 20/70  
Near cc J6 J8  
  
Eye Exam - Tonometry (Applanation,   
10:51 AM)  
Tonometry (Applanation, 10:51 AM)  
Right eye Left eye  
Pressure 10 10  
  
Eye Exam - Pupils  
Pupils  
Pupils Dark Shape React APD  
Right eye PERRL 4 Round +2 None  
Left eye PERRL 4 Round +2 None  
  
Eye Exam - Visual Fields  
Visual Fields  
Right eye Left eye  
Full Full  
  
Eye Exam - Extraocular Movement  
Extraocular Movement  
Right eye Left eye  
Full Full  
  
Eye Exam - Neuro/Psych  
Neuro/Psych  
Oriented x3: Yes  
Mood/Affect: Normal  
  
Eye Exam - External Exam  
External Exam  
Right eye Left eye  
External Normal including orbits   
and preauricular lymph nodes   
Normal including orbits and   
preauricular lymph nodes  
  
Eye Exam - Slit Lamp Exam  
Slit Lamp Exam  
Right eye Left eye  
Lids/Lashes Normal lids, lashes,   
lacrimal glands, and lacrimal   
drainage Normal lids, lashes,   
lacrimal glands, and lacrimal   
drainage  
Conjunctiva/Sclera White and quiet   
White and quiet  
Cornea Normal epithelium, stroma,   
endothelium, and tear film Normal   
epithelium, stroma, endothelium,   
and tear film  
Anterior Chamber Deep and quiet   
Deep and quiet  
Iris Round and reactive Round and   
reactive  
Lens 2+ Nuclear sclerotic   
cataract, 2+ Posterior subcapsular   
cataract 3+ Nuclear sclerotic   
cataract, 1+ Posterior subcapsular   
cataract, 1+ Cortical cataract  
Anterior Vitreous Normal Normal  
  
Eye Exam - Fundus Exam  
Fundus Exam  
Right eye Left eye  
Disc Normal Normal  
C/D Ratio 0.3 0.3  
Macula Normal Normal  
Vessels Normal Normal  
Periphery Normal Normal  
  
Eye Exam - Wearing Rx  
Wearing Rx  
Sphere Cylinder Axis Add  
Right eye +2.75 +0.00 000 +2.50  
Left eye +0.00 +1.00 060 +2.50  
  
Eye Exam  
Type: PAL  
  
Care Teams  
- documented as of this encounter  
Care Teams  
Team Member Relationship Specialty   
Start Date End Date  
Sharifa Almaguer DO  
NPI: [id number]  
[address] [place] [address]  
[phone] (Work)  
[phone] (Fax) PCP - General   
Internal Medicine 10/25/23  
Herbie Mills MD  
NPI: [id number]  
[address] [place]  
[place] [address]  
[phone] (Work)  
[phone] (Fax) Physician   
Radiation Oncology 18  
  
Images  
- last updated on 10/31/2024  
After Visit Summary  
  
10/8/2024  
After Visit Summary  
  
Electronically signed by Ronel Hays MD at 10/31/2024 8:14 AM   
EDT  
                                        [facility]  
Work Phone:   
[phone]  
   
                                                    10- History and   
physical note                           Formatting of this note is   
different from the original.  
Images from the original note were   
not included.  
H&P Notes  
- documented in this encounter  
Ronel Hays MD - 10/08/2024   
10:45 AM EDT  
Formatting of this note is   
different from the original.  
HISTORY AND PHYSICAL EXAMINATION  
  
SERVICE DATE: 10/8/2024  
SERVICE TIME:  
  
PRIMARY CARE PHYSICIAN: Sharifa Almaguer DO  
  
REASON FOR VISIT:  
Marzena Mederos is a 79 year old   
female who is being seen for   
combined age related cataract left   
eye  
  
The patient has the following:  
ACTIVE PROBLEM LIST  
Postmenopausal Atrophic Vaginitis  
Unspecified Transient Cerebral   
Ischemia  
Carcinoma in Situ of Vulva  
Malignant Neoplasm of Female   
Breast (Hcc)  
Personal History of Malignant   
Neoplasm of Breast  
Senile Nuclear Sclerosis  
Hypermetropia  
Regular Astigmatism  
Presbyopia  
Invasive Ductal Carcinoma of   
Breast, Female (Hcc)  
Ocular Migraine  
Controlled Type 2 Diabetes   
Mellitus Without Complication,   
Without Long-Term Current Use of   
Insulin (Hcc)  
Postmenopausal Bleeding  
S/P Hysterectomy With Oophorectomy  
  
SUBJECTIVE  
CHIEF COMPLAINT: combined age   
related cataract left eye  
HPI: blurry vision at all ranges   
of vision, difficulty with glare   
both eyes  
  
PAST MEDICAL HISTORY  
Diagnosis Date  
Breast cancer (HCC)   
invasive ductal carcinoma,   
completed radiation 3/7/11  
Cancer of endometrium (HCC)  
Carcinoma in situ, vulva   
Cellulitis  
of right eye muscle  
Diabetes mellitus without mention   
of complication  
Diabetes mellitus, type II (HCC)  
Diaphragmatic hernia without   
mention of obstruction or gangrene  
Hiatal hernia  
Dyspareunia  
Elevated cholesterol  
Esophageal reflux  
Insomnia, unspecified  
Orbital cellulitis  
Other corneal disorder  
Rt eye corneal erosion .  
PMH - PAST MEDICAL HISTORY OF  
?VESTIBULITIS  
PMH - PAST MEDICAL HISTORY OF  
CLIMACTERIC  
PMH - PAST MEDICAL HISTORY OF   
2006  
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  
Pure hypercholesterolemia  
Rib fracture  
6th Rib  
Senile dementia (HCC)  
Stomatitis and mucositis   
(ulcerative)  
Chronic ulcerative stomatitis on   
mucosal biopsy  
Unspecified essential hypertension  
  
PAST SURGICAL HISTORY  
Procedure Laterality Date  
Bunions bilateral feet removed   
10/2002  
BX/EXC LYMPH NODE OPEN DEEP   
AXILLARY NODE 8-24-10  
RIGHT BREAST LUMP W/ SLN BX  
CHOLECYSTECTOMY 2004  
Cholecystectomy  
COLONOSCOPY FLX DX W/COLLJ SPEC   
WHEN PFRMD   
Colonoscopy  
COLPOSCOPY CERVIX UPPER/ADJACENT   
VAGINA   
Colposcopy of the vulva  
COLPOSCOPY, VULVA 10/09  
CORRECT BUNION,SIMPLE  
Bunion  
DILATION & CURETTAGE DX&/THER   
NONOBSTETRIC   
SAB  
HYSTERECTOMY 2017  
LAPAROSCOPY SURG CHOLECYSTECTOMY   
3/2004  
Cholecystectomy, lap  
MAMMO STEREOTACTIC CORE BIOPSY RT   
10/10/13  
MASTECTOMY, PARTIAL 8-24-10  
RIGHT BREAST  
PAST SURGICAL HISTORY OF 2006  
partial vulvectomy/sq. cell ca in   
situ  
PAST SURGICAL HISTORY OF   
right eye cornea  
PAST SURGICAL HISTORY OF   
right eye revision  
PAST SURGICAL HISTORY OF  
squamous cell carcinoma right arm  
SALPINGO-OOPHORECTOMY COMPL/PRTL   
UNI/BI SPX 2017  
Toenail Big Toe Left Foot removed   
10/2002  
TONSILLECTOMY PRIMARY/SECONDARY   
Tonsillectomy  
UNLISTED PROCEDURE HANDS/FINGERS   
12  
CMC Arthoplasty on right hand  
  
FAMILY HISTORY  
Problem Relation Age of Onset  
Stroke Mother  
Diabetes Mother  
GI Father  
 from complications of gb   
surgery  
Arthritis Sister  
Systemic Lupus  
Cancer Maternal Uncle  
LUNG  
Cancer Other  
cousin with breast cancer/cousin   
with bladder ca.  
Stroke Sister  
  
SOCIAL HISTORY:  
Social History  
  
Tobacco Use  
Smoking status: Never  
Smokeless tobacco: Never  
Vaping Use  
Vaping status: Never Used  
Substance Use Topics  
Alcohol use: Yes  
Comment: 1-2 weekly  
Drug use: No  
  
MEDICATIONS:  
Prior to Admission medications as   
of 10/8/24 1011  
Medication Sig Last Dose Taking  
fluorometholone (FML LIQUID FILM)   
0.1 % ophthalmic suspension Use 1   
Drop in both eyes three times a   
day. Taking Yes  
amLODIPine (NORVASC) 10 mg tablet   
Take 10 mg by mouth. Taking Yes  
donepezil (ARICEPT) 10 mg tablet   
Take 10 mg by mouth. Taking Yes  
cholecalciferol (VITAMIN D3) 50   
mcg (2,000 unit) tablet Take 2,000   
Units by mouth once daily. Taking   
Yes  
losartan-hydroCHLOROthiazide   
(HYZAAR) 100-12.5 mg per tablet   
Take 1 tablet by mouth once daily.   
Taking Yes  
omeprazole (PRILOSEC) 20 mg   
capsule Take 20 mg by mouth once   
daily. Taking Yes  
potassium chloride (KLOR-CON 10)   
10 mEq tablet Take 10 mEq by mouth   
once daily.  
Taking Yes  
metFORMIN ER (GLUCOPHAGE XR) 500   
mg 24 hr tablet Take two tablets   
by mouth once daily. Taking Yes  
simvastatin(ZOCOR 20 MG TAB) Take   
one(1) tablet daily. Taking Yes  
  
No medication comments found.  
  
CURRENT ALLERGIES:  
ALLERGIES  
Allergen Reactions  
Adhesive Rash  
Redness  
Ambien [Zolpidem Ta* Rash  
Codeine Mental Status Change  
Neosporin [Neomycin* Rash  
Percocet [Oxycodone*  
Causes Vomitting  
  
REVIEW OF SYSTEMS:  
PAIN ASSESSMENT:  
General: No weight loss, malaise   
or fevers.  
Neuro: Dementia  
Respiratory: No history of current   
cough or dyspnea, or pneumonia in   
the past 6 weeks. No history of   
respiratory/pulmonary symptoms or   
problems  
Cardiovascular: Positive for:   
Hypertension, PVC  
GI: No history of GI symptoms or   
problems. No history of esophageal   
varices, recent ascites, or ETOH   
greater than 2 drinks per day.  
: No history of UTI in past 6   
weeks. No history of renal   
failure. Not currently on or   
requiring dialysis. No history of   
 symptoms or problems.  
GYN: Negative for abnormal vaginal   
bleeding, abnormal vaginal   
discharge.  
Pregnancy: Denies  
Endocrine: Diabetes Mellitus on   
oral agent  
Hematology: No history of bleeding   
or clotting disorder. Pt is not   
taking anti-coagulation or   
platelet medications. No history   
of hematological symptoms or   
problems.  
Oncology: No history of CA   
metastasis, chemo within 30 days,   
or radiotherapy within 90 days.   
Has not lost 10% of body wt in 6   
months. No history of oncological   
symptoms or problems.  
Psych: No history of psychiatric   
symptoms or problems.  
Musculoskeletal: Negative for   
joint pain or swelling, back pain   
or muscle pain.  
Skin: Negative for lesions, rash   
and itching.  
  
PHYSICAL EXAM:  
VITALS:  
/80   Pulse 88  
  
General: Alert and oriented  
Skin: Normal color, no rash, no   
lesions.  
HEENT: EOM, pupils equal, round   
and reactive.  
Cardiovascular: Normal S1 & S2, no   
rubs, murmurs or gallops. No JVD.   
Pulse regular.  
Lungs: Normal breath sounds, no   
wheezes or crackles.  
Abdomen: Soft, non-tender, no   
rigidity.  
Extremities: No deformity, no   
edema or tenderness, no joint   
swelling or clubbing.  
Neurological: Normal cognition and   
motor skills.  
Pulses: Carotid and radial pulses   
normal +2.  
  
Diagnostic tests reviewed for   
today's visit:  
No new labs or tests  
  
ASSESSMENT  
Medication and Non-Pharmacologic   
VTE Prophylaxis/Anticoagulants  
  
VTE Prophylaxis: VTE prophylaxis   
appropriate  
  
Impression: There is no known   
pertinent medical condition which   
may affect trinidad-operative course  
  
[unfilled]  
Clinical Risk Factors for Possible   
Cardiac Complications:  
None  
  
Patient is scheduled for a   
low-risk procedure.  
  
FUNCTIONAL STATUS: Walk indoors,   
such as around the house (1.75   
METs)  
  
Functional Class (NYHA): N/A  
  
[facility]: Not obtained  
  
PLAN  
CONSULTS:  
Patient does not require consults   
for optimization at this time.  
  
The Following Tests/Procedures   
Have Been Initiated:  
None  
  
Instructions Given to Patient:  
Patient given verbal and written   
preop instructions and voices   
comprehension and compliance.  
  
SIGNATURE: Ronel Hays MD   
PATIENT NAME: Marzena Mederos  
DATE: 2024 MRN:   
[id number]  
TIME: 10:45 AM PAGER/CONTACT #:  
  
Electronically signed by Ronel Hays MD at 10/08/2024 1:24   
PM EDT  
Source Comments  
- [facility]  
In the event this information is   
protected by the Federal   
Confidentiality of Alcohol and   
Drug Abuse Patient Records   
regulations: This information has   
been disclosed to you from records   
protected by Federal   
confidentiality rules (42 CFR Part   
2). The Federal rules prohibit you   
from making any further disclosure   
of this information unless further   
disclosure is expressly permitted   
by the written consent of the   
person to whom it pertains or as   
otherwise permitted by 42 CFR Part   
2. A general authorization for the   
release of medical or other   
information is NOT sufficient for   
this purpose. The Federal rules   
restrict any use of the   
information to criminally   
investigate or prosecute any   
alcohol or drug abuse patient.  
Reason for Visit  
  
Reason for Visit -  
Reason Comments  
Cataract Follow Up  
  
Encounter Details  
  
Encounter Details  
Date Type Department Care Team   
(Latest Contact Info) Description  
10/08/2024 10:15 AM EDT Office   
Visit [facility] Ophthalmology  
[address] [place] [id number]  
[phone] Ronel Hays MD  
[address] [place] [address]  
[phone] (Work)  
[phone] (Fax) Combined forms   
of age-related cataract of left   
eye (Primary Dx);  
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   
cataract of right eye;  
Type 2 diabetes mellitus without   
retinopathy (HCC);  
Essential hypertension;  
Hypercholesteremia;  
Dementia, unspecified dementia   
severity, unspecified dementia   
type, unspecified whether   
behavioral, psychotic, or mood   
disturbance or anxiety (HCC)  
  
Social History  
- documented as of this encounter  
Social History  
Tobacco Use Types Packs/Day Years   
Used Date  
Smoking Tobacco: Never  
Smokeless Tobacco: Never  
  
Social History  
Alcohol Use Standard Drinks/Week   
Comments  
Yes 0 (1 standard drink = 0.6 oz   
pure alcohol) 1-2 weekly  
  
Social History  
PHQ-2 Answer Date Recorded  
PHQ-2 score 0 10/31/2019  
  
Social History  
Area Deprivation Index Answer Date   
Recorded  
National Score (1-100), lower   
number is lower risk 79 2023  
State Score (1-10), lower number   
is lower risk 7 2023  
Data from:   
https://www.neighborhoodatlas.[facility]/. Last address used   
for calculation [address]   
2023  
  
Social History  
Sex and Gender Information Value   
Date Recorded  
Sex Assigned at Birth Female   
2017 6:52 AM EST  
Gender Identity Female 2017   
6:52 AM EST  
Sexual Orientation Straight   
2017 6:52 AM EST  
  
Last Filed Vital Signs  
- documented in this encounter  
Last Filed Vital Signs  
Vital Sign Reading Time Taken   
Comments  
Blood Pressure 125/80 10/08/2024   
10:20 AM EDT  
Pulse 88 10/08/2024 10:20 AM EDT  
Temperature - -  
Respiratory Rate - -  
Oxygen Saturation - -  
Inhaled Oxygen Concentration - -  
Weight - -  
Height - -  
Body Mass Index - -  
  
Functional Status  
- documented as of this encounter  
Functional Status  
Functional Status Response Date of   
Assessment  
Are you deaf or do you have   
serious difficulty hearing? No   
2017  
Are you blind or do you have   
serious difficulty seeing, even   
when wearing glasses? No   
2017  
Do you have serious difficulty   
walking or climbing stairs? No   
2017  
Do you have difficulty dressing or   
bathing? No 2017  
Because of a physical, mental, or   
emotional condition, do you have   
difficulty doing errands alone   
such as visiting a doctor's office   
or shopping? No 2017  
  
Functional Status  
Cognitive Status Response Date of   
Assessment  
Because of a physical, mental, or   
emotional condition, do you have   
serious difficulty concentrating,   
remembering, or making decisions?   
No 2017  
  
Patient Instructions  
- documented in this encounter  
Patient Instructions  
Ronel Hays MD - 10/08/2024   
10:49 AM EDT  
Formatting of this note is   
different from the original.  
Current Ophthalmic Meds  
  
fluorometholone (FML LIQUID FILM)   
0.1 % ophthalmic suspension Use 1   
Drop in both eyes three times a   
day.  
  
Continue:  
Systane Complete solution instill   
1 drop 3 times daily Both Eyes.  
  
Plan Cataract Surgery with   
Monofocal Intraocular Lens Implant   
Left Eye on 2024 at   
[facility].  
  
If you have any questions please   
contact our office at   
[phone].  
After office hours or on the   
weekend, please call Dr. Hays on   
his cell phone at [phone].  
Electronically signed by Ronel Hays MD at 10/08/2024 10:49   
AM EDT  
  
Progress Notes  
- documented in this encounter  
oRnel Hays MD - 10/08/2024   
10:20 AM EDT  
Formatting of this note is   
different from the original.  
ASSESSMENT/PLAN:  
1. 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   
cataract of left eye - ICD9:   
366.19, ICD10: H25.812 (primary   
diagnosis)  
  
PHYSICAL EXAM:  
  
Vital Signs:  
Blood pressure 125/80, pulse 88.  
  
Respiratory:  
Normal breath sounds, no wheezing.  
  
CARD:  
Normal heart sounds 1 & 2, normal   
sinus rhythm.  
  
Cataract Presurgical Documentation  
  
Cataract: Left Eye  
  
Current Visual Acuity  
Right Eye Distance CC 20/40  
Left Eye Distance CC 20/70  
  
Glare Testing:  
  
Visual Function: Marzena Mederos   
states that the decline in vision   
from the cataract impedes her   
abilities as listed in the HPI, as   
well as other activities of daily   
living.  
  
Marzena Mederos has confirmed that   
she is no longer able to function   
adequately on a day-to-day basis   
because of her current visual   
condition.  
  
Further, it is my medical opinion   
that the cataract is the primary   
cause, or at least a significantly   
contributory cause of her visual   
dysfunction. With uncomplicated   
cataract surgery and lens   
implantation, it is my expectation   
that her visual function and   
quality of life will improve,   
significantly.  
  
The risks, benefits, alternatives,   
personnel and complications of   
cataract surgery with lens   
implantation were discussed with   
Marzena Mederos in detail. She   
appeared to understand and asked   
that I proceed with plans for   
surgery.  
  
Upon eye examination, patient was   
found to have a visually   
significant cataract Left Eye.   
Discussed cataract surgery with   
patient and different intraocular   
lens implant options with patient:   
basic monofocal intraocular lens   
implant, Toric intraocular lens   
implant, and presbyopia correction   
intraocular lens implant. In my   
medical opinion, based on medical   
history and ocular examination,   
cataract surgery with intraocular   
lens implant will correct   
patient's vision and improve   
quality of patient's daily living   
activities. Patient wishes to have   
traditional cataract surgery with   
basic intraocular lens Left Eye.   
Patient wishes to have cataract   
surgery with the option stated   
above. Patient understands that an   
intraocular lens implant does not   
necessarily replace the need for   
glasses. Patient understands that   
it is impossible for the surgeon   
to inform him/her of every   
possible complication that may   
occur. The surgeon has answered   
all of the patient's questions.   
Patient understands that if he/she   
has a mature or dense cataract,   
pseudoexfoliation cataract, or   
history of use of Flomax, he/she   
may require the use of [place]ugin   
Ring and/or Vision Blue during   
surgery. Patient understands the   
risks, benefits, and alternatives   
to surgery.  
  
Current Ophthalmic Meds  
  
fluorometholone (FML LIQUID FILM)   
0.1 % ophthalmic suspension Use 1   
Drop in both eyes three times a   
day.  
  
Continue:  
Systane Complete solution instill   
1 drop 3 times daily Both Eyes.  
  
Plan Cataract Surgery with   
Monofocal Intraocular Lens Implant   
Left Eye on 2024 at   
[facility].  
  
Patient cleared for surgery by Dr. Almaguer  
  
2. 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   
cataract of right eye - ICD9:   
366.19, ICD10: H25.811  
  
Plan Cataract Surgery with   
Monofocal Intraocular Lens Implant   
Right Eye after Left Eye is   
stable.  
  
3. Type 2 diabetes mellitus   
without retinopathy (HCC) - ICD9:   
250.00, ICD10: E11.9  
  
Please keep your blood sugar under   
good control to minimize risk of   
ocular complications from   
diabetes.  
  
Continue to monitor with primary   
care physician.  
  
4. Essential hypertension - ICD9:   
401.9, ICD10: I10  
  
Continue to monitor with primary   
care physician.  
  
5. Hypercholesteremia - ICD9:   
272.0, ICD10: E78.00  
  
Continue to monitor with primary   
care physician.  
  
6. Dementia, unspecified dementia   
severity, unspecified dementia   
type, unspecified whether   
behavioral, psychotic, or mood   
disturbance or anxiety (HCC) -   
ICD9: 294.20, ICD10: F03.90  
  
Continue to monitor with primary   
care physician.  
  
I have confirmed and edited as   
necessary the relevant HPI,   
ophthalmic history, ROS, and the   
neuro exam findings as obtained by   
others. I have seen and examined   
Marzena Mederos. I have discussed   
the case and the management of   
this patient's care with the   
Resident/Fellow, if applicable. I   
also have reviewed and agree with   
the assessment and plan as stated   
above and agree with all of its   
relevant components.  
  
Electronically signed by Ronel Hays MD at 10/08/2024 1:24   
PM EDT  
Plan of Treatment  
- documented as of this encounter  
Plan of Treatment - Upcoming   
Encounters  
Upcoming Encounters  
Date Type Department Care Team   
(Latest Contact Info) Description  
2024 9:45 AM EDT Office   
Visit [facility] Ophthalmology  
[address] [place] [address]  
[phone] Ronel Hays MD  
[address] [place] [address]  
[phone] (Work)  
[phone] (Fax) 1 day po 1st le   
basic  
2024 9:30 AM EST Appointment   
Mammogram  
[address]  
[place], [place] [id number] [phone] Encounter for   
screening mammogram for breast   
cancer [Z12.31]  
2024 10:10 AM EST Visit (SP)   
Office Hematology/Oncology  
[address]  
[place], [place] [id number] [phone] Colten Abbott DO  
[address] [place] [address]  
[phone] (Work)  
[phone] (Fax) 1 YR OV/ JONY   
*  
  
Plan of Treatment - Scheduled   
Procedures  
Scheduled Procedures  
Name Priority Associated Diagnoses   
Date/Time  
SURGERY AT NON-[facility] FACILITY   
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   
cataract of left eye 10/31/2024   
9:25 AM EDT  
  
Procedures  
- documented in this encounter  
Procedures  
Procedure Name Priority Date/Time   
Associated Diagnosis Comments  
IOL BIOMETRY W/ IOL CALC OU (BOTH   
EYES) Routine 10/08/2024 10:43 AM   
EDT Combined forms of age-related   
cataract of left eye  
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   
cataract of right eye Results for   
this procedure are in the results   
section.  
  
Imaging Results  
- documented in this encounter  
IOL BIOMETRY W/ IOL CALC OU (BOTH   
EYES) (10/08/2024 10:43 AM EDT)  
Imaging Results - IOL BIOMETRY W/   
IOL CALC OU (BOTH EYES)   
(10/08/2024 10:43 AM EDT)  
Anatomical Region Laterality   
Modality  
Other  
  
Imaging Results - IOL BIOMETRY W/   
IOL CALC OU (BOTH EYES)   
(10/08/2024 10:43 AM EDT)  
Narrative  
10/08/2024 10:43 AM EDT  
Date of Procedure  
10/8/2024.  
  
Technician Information  
Imaging Technician: AB.  
  
Notes  
Measurements only - see Procedure   
Record under Scanned Documents for  
signed results.  
  
[facility]  
Right eye: AL 23.10 ACD 2.85 WTW   
12.13  
Left eye: AL 23.40 ACD 2.92 WTW   
12.32  
  
Imaging Results - IOL BIOMETRY W/   
IOL CALC OU (BOTH EYES)   
(10/08/2024 10:43 AM EDT)  
Authorizing Provider Result Type  
Ronel Hays MD OPHTHALMOLOGY  
  
Associated Images  
  
10/8/2024  
IOL BIOMETRY W/ IOL CALC OU (BOTH   
EYES)  
  
Visit Diagnoses  
- documented in this encounter  
Visit Diagnoses  
Diagnosis  
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   
cataract of left eye - Primary  
Other and combined forms of senile   
cataract  
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   
cataract of right eye  
Other and combined forms of senile   
cataract  
Type 2 diabetes mellitus without   
retinopathy (HCC)  
Type II or unspecified type   
diabetes mellitus without mention   
of complication, not stated as   
uncontrolled  
Essential hypertension  
Unspecified essential hypertension  
Hypercholesteremia  
Pure hypercholesterolemia  
Dementia, unspecified dementia   
severity, unspecified dementia   
type, unspecified whether   
behavioral, psychotic, or mood   
disturbance or anxiety (HCC)  
  
Eye Exam  
  
Eye Exam - Visual Acuity (Snellen   
- Linear)  
Visual Acuity (Snellen - Linear)  
Right eye Left eye  
Dist cc 20/40 20/70  
Near cc J6 J8  
  
Eye Exam - Tonometry (Applanation,   
10:51 AM)  
Tonometry (Applanation, 10:51 AM)  
Right eye Left eye  
Pressure 10 10  
  
Eye Exam - Pupils  
Pupils  
Pupils Dark Shape React APD  
Right eye PERRL 4 Round +2 None  
Left eye PERRL 4 Round +2 None  
  
Eye Exam - Visual Fields  
Visual Fields  
Right eye Left eye  
Full Full  
  
Eye Exam - Extraocular Movement  
Extraocular Movement  
Right eye Left eye  
Full Full  
  
Eye Exam - Neuro/Psych  
Neuro/Psych  
Oriented x3: Yes  
Mood/Affect: Normal  
  
Eye Exam - External Exam  
External Exam  
Right eye Left eye  
External Normal including orbits   
and preauricular lymph nodes   
Normal including orbits and   
preauricular lymph nodes  
  
Eye Exam - Slit Lamp Exam  
Slit Lamp Exam  
Right eye Left eye  
Lids/Lashes Normal lids, lashes,   
lacrimal glands, and lacrimal   
drainage Normal lids, lashes,   
lacrimal glands, and lacrimal   
drainage  
Conjunctiva/Sclera White and quiet   
White and quiet  
Cornea Normal epithelium, stroma,   
endothelium, and tear film Normal   
epithelium, stroma, endothelium,   
and tear film  
Anterior Chamber Deep and quiet   
Deep and quiet  
Iris Round and reactive Round and   
reactive  
Lens 2+ Nuclear sclerotic   
cataract, 2+ Posterior subcapsular   
cataract 3+ Nuclear sclerotic   
cataract, 1+ Posterior subcapsular   
cataract, 1+ Cortical cataract  
Anterior Vitreous Normal Normal  
  
Eye Exam - Fundus Exam  
Fundus Exam  
Right eye Left eye  
Disc Normal Normal  
C/D Ratio 0.3 0.3  
Macula Normal Normal  
Vessels Normal Normal  
Periphery Normal Normal  
  
Eye Exam - Wearing Rx  
Wearing Rx  
Sphere Cylinder Axis Add  
Right eye +2.75 +0.00 000 +2.50  
Left eye +0.00 +1.00 060 +2.50  
  
Eye Exam  
Type: PAL  
  
Care Teams  
- documented as of this encounter  
Care Teams  
Team Member Relationship Specialty   
Start Date End Date  
Sharifa Almaguer DO  
NPI: [id number]  
[address] [place] [address]  
[phone] (Work)  
[phone] (Fax) PCP - General   
Internal Medicine 10/25/23  
Herbie Mills MD  
NPI: [id number]  
[address] [place] [address]  
[phone] (Work)  
[phone] (Fax) Physician   
Radiation Oncology 18  
  
Images  
- last updated on 10/31/2024  
After Visit Summary  
  
10/8/2024  
After Visit Summary  
  
Electronically signed by Ronel Hays MD at 10/31/2024 8:14 AM   
[facility]  
Work Phone:   
[phone]  
   
                                                    10- History and   
physical note                           Formatting of this note might be   
different from the original.  
H&P reviewed. The patient was   
examined and there are no changes   
to the H&P.  
Electronically signed by Ronel Hays MD at 10/31/2024 8:14 AM   
EDT  
  
Source Note - Ronel Hays MD   
- 10/31/2024 8:14 AM EDT  
Formatting of this note is   
different from the original.  
Images from the original note were   
not included.  
H&P Notes  
- documented in this encounter  
Ronel Hays MD - 10/08/2024   
10:45 AM EDT  
Formatting of this note is   
different from the original.  
HISTORY AND PHYSICAL EXAMINATION  
  
SERVICE DATE: 10/8/2024  
SERVICE TIME:  
  
PRIMARY CARE PHYSICIAN: Sharifa Almaguer DO  
  
REASON FOR VISIT:  
Marzena Mederos is a 79 year old   
female who is being seen for   
combined age related cataract left   
eye  
  
The patient has the following:  
ACTIVE PROBLEM LIST  
Postmenopausal Atrophic Vaginitis  
Unspecified Transient Cerebral   
Ischemia  
Carcinoma in Situ of Vulva  
Malignant Neoplasm of Female   
Breast (Hcc)  
Personal History of Malignant   
Neoplasm of Breast  
Senile Nuclear Sclerosis  
Hypermetropia  
Regular Astigmatism  
Presbyopia  
Invasive Ductal Carcinoma of   
Breast, Female (Hcc)  
Ocular Migraine  
Controlled Type 2 Diabetes   
Mellitus Without Complication,   
Without Long-Term Current Use of   
Insulin (Hcc)  
Postmenopausal Bleeding  
S/P Hysterectomy With Oophorectomy  
  
SUBJECTIVE  
CHIEF COMPLAINT: combined age   
related cataract left eye  
HPI: blurry vision at all ranges   
of vision, difficulty with glare   
both eyes  
  
PAST MEDICAL HISTORY  
Diagnosis Date  
Breast cancer (HCC) 2010  
invasive ductal carcinoma,   
completed radiation 3/7/11  
Cancer of endometrium (HCC)  
Carcinoma in situ, vulva   
Cellulitis  
of right eye muscle  
Diabetes mellitus without mention   
of complication  
Diabetes mellitus, type II (HCC)  
Diaphragmatic hernia without   
mention of obstruction or gangrene  
Hiatal hernia  
Dyspareunia  
Elevated cholesterol  
Esophageal reflux  
Insomnia, unspecified  
Orbital cellulitis  
Other corneal disorder  
Rt eye corneal erosion .  
PMH - PAST MEDICAL HISTORY OF  
?VESTIBULITIS  
PMH - PAST MEDICAL HISTORY OF  
CLIMACTERIC  
PMH - PAST MEDICAL HISTORY OF   
2006  
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  
Pure hypercholesterolemia  
Rib fracture  
6th Rib  
Senile dementia (HCC)  
Stomatitis and mucositis   
(ulcerative)  
Chronic ulcerative stomatitis on   
mucosal biopsy  
Unspecified essential hypertension  
  
PAST SURGICAL HISTORY  
Procedure Laterality Date  
Bunions bilateral feet removed   
10/2002  
BX/EXC LYMPH NODE OPEN DEEP   
AXILLARY NODE 8-24-10  
RIGHT BREAST LUMP W/ SLN BX  
CHOLECYSTECTOMY 2004  
Cholecystectomy  
COLONOSCOPY FLX DX W/COLLJ SPEC   
WHEN PFRMD   
Colonoscopy  
COLPOSCOPY CERVIX UPPER/ADJACENT   
VAGINA   
Colposcopy of the vulva  
COLPOSCOPY, VULVA 10/09  
CORRECT BUNION,SIMPLE  
Bunion  
DILATION & CURETTAGE DX&/THER   
NONOBSTETRIC 's  
SAB  
HYSTERECTOMY 2017  
LAPAROSCOPY SURG CHOLECYSTECTOMY   
3/2004  
Cholecystectomy, lap  
MAMMO STEREOTACTIC CORE BIOPSY RT   
10/10/13  
MASTECTOMY, PARTIAL 8-24-10  
RIGHT BREAST  
PAST SURGICAL HISTORY OF 2006  
partial vulvectomy/sq. cell ca in   
situ  
PAST SURGICAL HISTORY OF   
right eye cornea  
PAST SURGICAL HISTORY OF   
right eye revision  
PAST SURGICAL HISTORY OF  
squamous cell carcinoma right arm  
SALPINGO-OOPHORECTOMY COMPL/PRTL   
UNI/BI SPX 2017  
Toenail Big Toe Left Foot removed   
10/2002  
TONSILLECTOMY PRIMARY/SECONDARY   
<AGE 13   
Tonsillectomy  
UNLISTED PROCEDURE HANDS/FINGERS   
13  
[facility] Arthoplasty on right hand  
  
FAMILY HISTORY  
Problem Relation Age of Onset  
Stroke Mother  
Diabetes Mother  
GI Father  
 from complications of gb   
surgery  
Arthritis Sister  
Systemic Lupus  
Cancer Maternal Uncle  
LUNG  
Cancer Other  
cousin with breast cancer/cousin   
with bladder ca.  
Stroke Sister  
  
SOCIAL HISTORY:  
Social History  
  
Tobacco Use  
Smoking status: Never  
Smokeless tobacco: Never  
Vaping Use  
Vaping status: Never Used  
Substance Use Topics  
Alcohol use: Yes  
Comment: 1-2 weekly  
Drug use: No  
  
MEDICATIONS:  
Prior to Admission medications as   
of 10/8/24 1011  
Medication Sig Last Dose Taking  
fluorometholone (FML LIQUID FILM)   
0.1 % ophthalmic suspension Use 1   
Drop in both eyes three times a   
day. Taking Yes  
amLODIPine (NORVASC) 10 mg tablet   
Take 10 mg by mouth. Taking Yes  
donepezil (ARICEPT) 10 mg tablet   
Take 10 mg by mouth. Taking Yes  
cholecalciferol (VITAMIN D3) 50   
mcg (2,000 unit) tablet Take 2,000   
Units by mouth once daily. Taking   
Yes  
losartan-hydroCHLOROthiazide   
(HYZAAR) 100-12.5 mg per tablet   
Take 1 tablet by mouth once daily.   
Taking Yes  
omeprazole (PRILOSEC) 20 mg   
capsule Take 20 mg by mouth once   
daily. Taking Yes  
potassium chloride (KLOR-CON 10)   
10 mEq tablet Take 10 mEq by mouth   
once daily.  
Taking Yes  
metFORMIN ER (GLUCOPHAGE XR) 500   
mg 24 hr tablet Take two tablets   
by mouth once daily. Taking Yes  
simvastatin(ZOCOR 20 MG TAB) Take   
one(1) tablet daily. Taking Yes  
  
No medication comments found.  
  
CURRENT ALLERGIES:  
ALLERGIES  
Allergen Reactions  
Adhesive Rash  
Redness  
Ambien [Zolpidem Ta* Rash  
Codeine Mental Status Change  
Neosporin [Neomycin* Rash  
Percocet [Oxycodone*  
Causes Vomitting  
  
REVIEW OF SYSTEMS:  
PAIN ASSESSMENT:  
General: No weight loss, malaise   
or fevers.  
Neuro: Dementia  
Respiratory: No history of current   
cough or dyspnea, or pneumonia in   
the past 6 weeks. No history of   
respiratory/pulmonary symptoms or   
problems  
Cardiovascular: Positive for:   
Hypertension, PVC  
GI: No history of GI symptoms or   
problems. No history of esophageal   
varices, recent ascites, or ETOH   
greater than 2 drinks per day.  
: No history of UTI in past 6   
weeks. No history of renal   
failure. Not currently on or   
requiring dialysis. No history of   
 symptoms or problems.  
GYN: Negative for abnormal vaginal   
bleeding, abnormal vaginal   
discharge.  
Pregnancy: Denies  
Endocrine: Diabetes Mellitus on   
oral agent  
Hematology: No history of bleeding   
or clotting disorder. Pt is not   
taking anti-coagulation or   
platelet medications. No history   
of hematological symptoms or   
problems.  
Oncology: No history of CA   
metastasis, chemo within 30 days,   
or radiotherapy within 90 days.   
Has not lost 10% of body wt in 6   
months. No history of oncological   
symptoms or problems.  
Psych: No history of psychiatric   
symptoms or problems.  
Musculoskeletal: Negative for   
joint pain or swelling, back pain   
or muscle pain.  
Skin: Negative for lesions, rash   
and itching.  
  
PHYSICAL EXAM:  
VITALS:  
/80   Pulse 88  
  
General: Alert and oriented  
Skin: Normal color, no rash, no   
lesions.  
HEENT: EOM, pupils equal, round   
and reactive.  
Cardiovascular: Normal S1 & S2, no   
rubs, murmurs or gallops. No JVD.   
Pulse regular.  
Lungs: Normal breath sounds, no   
wheezes or crackles.  
Abdomen: Soft, non-tender, no   
rigidity.  
Extremities: No deformity, no   
edema or tenderness, no joint   
swelling or clubbing.  
Neurological: Normal cognition and   
motor skills.  
Pulses: Carotid and radial pulses   
normal +2.  
  
Diagnostic tests reviewed for   
today's visit:  
No new labs or tests  
  
ASSESSMENT  
Medication and Non-Pharmacologic   
VTE Prophylaxis/Anticoagulants  
  
VTE Prophylaxis: VTE prophylaxis   
appropriate  
  
Impression: There is no known   
pertinent medical condition which   
may affect trinidad-operative course  
  
[unfilled]  
Clinical Risk Factors for Possible   
Cardiac Complications:  
None  
  
Patient is scheduled for a   
low-risk procedure.  
  
FUNCTIONAL STATUS: Walk indoors,   
such as around the house (1.75   
METs)  
  
Functional Class ([facility]): N/A  
  
[facility]: Not obtained  
  
PLAN  
CONSULTS:  
Patient does not require consults   
for optimization at this time.  
  
The Following Tests/Procedures   
Have Been Initiated:  
None  
  
Instructions Given to Patient:  
Patient given verbal and written   
preop instructions and voices   
comprehension and compliance.  
  
SIGNATURE: Ronel Hays MD   
PATIENT NAME: Marzena Mederos  
DATE: 2024 MRN:   
[id number]  
TIME: 10:45 AM PAGER/CONTACT #:  
  
Electronically signed by Ronel Hays MD at 10/08/2024 1:24   
PM EDT  
Source Comments  
- [facility]  
In the event this information is   
protected by the Federal   
Confidentiality of Alcohol and   
Drug Abuse Patient Records   
regulations: This information has   
been disclosed to you from records   
protected by Federal   
confidentiality rules (42 CFR Part   
2). The Federal rules prohibit you   
from making any further disclosure   
of this information unless further   
disclosure is expressly permitted   
by the written consent of the   
person to whom it pertains or as   
otherwise permitted by 42 CFR Part   
2. A general authorization for the   
release of medical or other   
information is NOT sufficient for   
this purpose. The Federal rules   
restrict any use of the   
information to criminally   
investigate or prosecute any   
alcohol or drug abuse patient.  
Reason for Visit  
  
Reason for Visit -  
Reason Comments  
Cataract Follow Up  
  
Encounter Details  
  
Encounter Details  
Date Type Department Care Team   
(Latest Contact Info) Description  
10/08/2024 10:15 AM EDT Office   
Visit [facility] Ophthalmology  
[address] [place], [place] [id number]  
[phone] Ronel Hays MD  
[address] [place] [address]  
[phone] (Work)  
[phone] (Fax) Combined forms   
of age-related cataract of left   
eye (Primary Dx);  
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   
cataract of right eye;  
Type 2 diabetes mellitus without   
retinopathy (HCC);  
Essential hypertension;  
Hypercholesteremia;  
Dementia, unspecified dementia   
severity, unspecified dementia   
type, unspecified whether   
behavioral, psychotic, or mood   
disturbance or anxiety (HCC)  
  
Social History  
- documented as of this encounter  
Social History  
Tobacco Use Types Packs/Day Years   
Used Date  
Smoking Tobacco: Never  
Smokeless Tobacco: Never  
  
Social History  
Alcohol Use Standard Drinks/Week   
Comments  
Yes 0 (1 standard drink = 0.6 oz   
pure alcohol) 1-2 weekly  
  
Social History  
PHQ-2 Answer Date Recorded  
PHQ-2 score 0 10/31/2019  
  
Social History  
Area Deprivation Index Answer Date   
Recorded  
National Score (1-100), lower   
number is lower risk 79 2023  
State Score (1-10), lower number   
is lower risk 7 2023  
Data from:   
https://www.neighborhoodatlas.[facility]/. Last address used   
for calculation [address]   
2023  
  
Social History  
Sex and Gender Information Value   
Date Recorded  
Sex Assigned at Birth Female   
2017 6:52 AM EST  
Gender Identity Female 2017   
6:52 AM EST  
Sexual Orientation Straight   
2017 6:52 AM EST  
  
Last Filed Vital Signs  
- documented in this encounter  
Last Filed Vital Signs  
Vital Sign Reading Time Taken   
Comments  
Blood Pressure 125/80 10/08/2024   
10:20 AM EDT  
Pulse 88 10/08/2024 10:20 AM EDT  
Temperature - -  
Respiratory Rate - -  
Oxygen Saturation - -  
Inhaled Oxygen Concentration - -  
Weight - -  
Height - -  
Body Mass Index - -  
  
Functional Status  
- documented as of this encounter  
Functional Status  
Functional Status Response Date of   
Assessment  
Are you deaf or do you have   
serious difficulty hearing? No   
2017  
Are you blind or do you have   
serious difficulty seeing, even   
when wearing glasses? No   
2017  
Do you have serious difficulty   
walking or climbing stairs? No   
2017  
Do you have difficulty dressing or   
bathing? No 2017  
Because of a physical, mental, or   
emotional condition, do you have   
difficulty doing errands alone   
such as visiting a doctor's office   
or shopping? No 2017  
  
Functional Status  
Cognitive Status Response Date of   
Assessment  
Because of a physical, mental, or   
emotional condition, do you have   
serious difficulty concentrating,   
remembering, or making decisions?   
No 2017  
  
Patient Instructions  
- documented in this encounter  
Patient Instructions  
Ronel Hays MD - 10/08/2024   
10:49 AM EDT  
Formatting of this note is   
different from the original.  
Current Ophthalmic Meds  
  
fluorometholone (FML LIQUID FILM)   
0.1 % ophthalmic suspension Use 1   
Drop in both eyes three times a   
day.  
  
Continue:  
Systane Complete solution instill   
1 drop 3 times daily Both Eyes.  
  
Plan Cataract Surgery with   
Monofocal Intraocular Lens Implant   
Left Eye on 2024 at   
[facility].  
  
If you have any questions please   
contact our office at   
[phone].  
After office hours or on the   
weekend, please call Dr. Hays on   
his cell phone at [phone].  
Electronically signed by Ronel Hays MD at 10/08/2024 10:49   
AM EDT  
  
Progress Notes  
- documented in this encounter  
Ronel Hays MD - 10/08/2024   
10:20 AM EDT  
Formatting of this note is   
different from the original.  
ASSESSMENT/PLAN:  
1. 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   
cataract of left eye - ICD9:   
366.19, ICD10: H25.812 (primary   
diagnosis)  
  
PHYSICAL EXAM:  
  
Vital Signs:  
Blood pressure 125/80, pulse 88.  
  
Respiratory:  
Normal breath sounds, no wheezing.  
  
CARD:  
Normal heart sounds 1 & 2, normal   
sinus rhythm.  
  
Cataract Presurgical Documentation  
  
Cataract: Left Eye  
  
Current Visual Acuity  
Right Eye Distance CC 20/40  
Left Eye Distance CC 20/70  
  
Glare Testing:  
  
Visual Function: Marzena Mederos   
states that the decline in vision   
from the cataract impedes her   
abilities as listed in the HPI, as   
well as other activities of daily   
living.  
  
Marzena Mederos has confirmed that   
she is no longer able to function   
adequately on a day-to-day basis   
because of her current visual   
condition.  
  
Further, it is my medical opinion   
that the cataract is the primary   
cause, or at least a significantly   
contributory cause of her visual   
dysfunction. With uncomplicated   
cataract surgery and lens   
implantation, it is my expectation   
that her visual function and   
quality of life will improve,   
significantly.  
  
The risks, benefits, alternatives,   
personnel and complications of   
cataract surgery with lens   
implantation were discussed with   
Marzena Mederos in detail. She   
appeared to understand and asked   
that I proceed with plans for   
surgery.  
  
Upon eye examination, patient was   
found to have a visually   
significant cataract Left Eye.   
Discussed cataract surgery with   
patient and different intraocular   
lens implant options with patient:   
basic monofocal intraocular lens   
implant, Toric intraocular lens   
implant, and presbyopia correction   
intraocular lens implant. In my   
medical opinion, based on medical   
history and ocular examination,   
cataract surgery with intraocular   
lens implant will correct   
patient's vision and improve   
quality of patient's daily living   
activities. Patient wishes to have   
traditional cataract surgery with   
basic intraocular lens Left Eye.   
Patient wishes to have cataract   
surgery with the option stated   
above. Patient understands that an   
intraocular lens implant does not   
necessarily replace the need for   
glasses. Patient understands that   
it is impossible for the surgeon   
to inform him/her of every   
possible complication that may   
occur. The surgeon has answered   
all of the patient's questions.   
Patient understands that if he/she   
has a mature or dense cataract,   
pseudoexfoliation cataract, or   
history of use of Flomax, he/she   
may require the use of [place]ugin   
Ring and/or Vision Blue during   
surgery. Patient understands the   
risks, benefits, and alternatives   
to surgery.  
  
Current Ophthalmic Meds  
  
fluorometholone (FML LIQUID FILM)   
0.1 % ophthalmic suspension Use 1   
Drop in both eyes three times a   
day.  
  
Continue:  
Systane Complete solution instill   
1 drop 3 times daily Both Eyes.  
  
Plan Cataract Surgery with   
Monofocal Intraocular Lens Implant   
Left Eye on 2024 at   
[facility].  
  
Patient cleared for surgery by Dr. Almaguer  
  
2. 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   
cataract of right eye - ICD9:   
366.19, ICD10: H25.811  
  
Plan Cataract Surgery with   
Monofocal Intraocular Lens Implant   
Right Eye after Left Eye is   
stable.  
  
3. Type 2 diabetes mellitus   
without retinopathy (HCC) - ICD9:   
250.00, ICD10: E11.9  
  
Please keep your blood sugar under   
good control to minimize risk of   
ocular complications from   
diabetes.  
  
Continue to monitor with primary   
care physician.  
  
4. Essential hypertension - ICD9:   
401.9, ICD10: I10  
  
Continue to monitor with primary   
care physician.  
  
5. Hypercholesteremia - ICD9:   
272.0, ICD10: E78.00  
  
Continue to monitor with primary   
care physician.  
  
6. Dementia, unspecified dementia   
severity, unspecified dementia   
type, unspecified whether   
behavioral, psychotic, or mood   
disturbance or anxiety (HCC) -   
ICD9: 294.20, ICD10: F03.90  
  
Continue to monitor with primary   
care physician.  
  
I have confirmed and edited as   
necessary the relevant HPI,   
ophthalmic history, ROS, and the   
neuro exam findings as obtained by   
others. I have seen and examined   
Marzena Mederos. I have discussed   
the case and the management of   
this patient's care with the   
Resident/Fellow, if applicable. I   
also have reviewed and agree with   
the assessment and plan as stated   
above and agree with all of its   
relevant components.  
  
Electronically signed by Ronel Hays MD at 10/08/2024 1:24   
PM EDT  
Plan of Treatment  
- documented as of this encounter  
Plan of Treatment - Upcoming   
Encounters  
Upcoming Encounters  
Date Type Department Care Team   
(Latest Contact Info) Description  
2024 9:45 AM EDT Office   
Visit [facility] Ophthalmology  
[address] [id number]  
[phone] Ronel Hays MD  
[address] [place] [address]  
[phone] (Work)  
[phone] (Fax) 1 day po 1st le   
basic  
2024 9:30 AM EST Appointment   
Mammogram  
[address], [place] [id number] [phone] Encounter for   
screening mammogram for breast   
cancer [Z12.31]  
2024 10:10 AM EST Visit (SP)   
Office Hematology/Oncology  
[address]  
[place], [place] [id number] [phone] Colten Abbott, DO  
[address] [place] [address]  
[phone] (Work)  
[phone] (Fax) 1 YR OV/ JONY   
*  
  
Plan of Treatment - Scheduled   
Procedures  
Scheduled Procedures  
Name Priority Associated Diagnoses   
Date/Time  
SURGERY AT NON-[facility] FACILITY   
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   
cataract of left eye 10/31/2024   
9:25 AM EDT  
  
Procedures  
- documented in this encounter  
Procedures  
Procedure Name Priority Date/Time   
Associated Diagnosis Comments  
IOL BIOMETRY W/ IOL CALC OU (BOTH   
EYES) Routine 10/08/2024 10:43 AM   
EDT Combined forms of age-related   
cataract of left eye  
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   
cataract of right eye Results for   
this procedure are in the results   
section.  
  
Imaging Results  
- documented in this encounter  
IOL BIOMETRY W/ IOL CALC OU (BOTH   
EYES) (10/08/2024 10:43 AM EDT)  
Imaging Results - IOL BIOMETRY W/   
IOL CALC OU (BOTH EYES)   
(10/08/2024 10:43 AM EDT)  
Anatomical Region Laterality   
Modality  
Other  
  
Imaging Results - IOL BIOMETRY W/   
IOL CALC OU (BOTH EYES)   
(10/08/2024 10:43 AM EDT)  
Narrative  
10/08/2024 10:43 AM EDT  
Date of Procedure  
10/8/2024.  
  
Technician Information  
Imaging Technician: AB.  
  
Notes  
Measurements only - see Procedure   
Record under Scanned Documents for  
signed results.  
  
LENSTAR  
Right eye: AL 23.10 ACD 2.85 WTW   
12.13  
Left eye: AL 23.40 ACD 2.92 WTW   
12.32  
  
Imaging Results - IOL BIOMETRY W/   
IOL CALC OU (BOTH EYES)   
(10/08/2024 10:43 AM EDT)  
Authorizing Provider Result Type  
Ronel Hays MD OPHTHALMOLOGY  
  
Associated Images  
  
10/8/2024  
IOL BIOMETRY W/ IOL CALC OU (BOTH   
EYES)  
  
Visit Diagnoses  
- documented in this encounter  
Visit Diagnoses  
Diagnosis  
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   
cataract of left eye - Primary  
Other and combined forms of senile   
cataract  
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   
cataract of right eye  
Other and combined forms of senile   
cataract  
Type 2 diabetes mellitus without   
retinopathy (HCC)  
Type II or unspecified type   
diabetes mellitus without mention   
of complication, not stated as   
uncontrolled  
Essential hypertension  
Unspecified essential hypertension  
Hypercholesteremia  
Pure hypercholesterolemia  
Dementia, unspecified dementia   
severity, unspecified dementia   
type, unspecified whether   
behavioral, psychotic, or mood   
disturbance or anxiety (HCC)  
  
Eye Exam  
  
Eye Exam - Visual Acuity (Snellen   
- Linear)  
Visual Acuity (Snellen - Linear)  
Right eye Left eye  
Dist cc 20/40 20/70  
Near cc J6 J8  
  
Eye Exam - Tonometry (Applanation,   
10:51 AM)  
Tonometry (Applanation, 10:51 AM)  
Right eye Left eye  
Pressure 10 10  
  
Eye Exam - Pupils  
Pupils  
Pupils Dark Shape React APD  
Right eye PERRL 4 Round +2 None  
Left eye PERRL 4 Round +2 None  
  
Eye Exam - Visual Fields  
Visual Fields  
Right eye Left eye  
Full Full  
  
Eye Exam - Extraocular Movement  
Extraocular Movement  
Right eye Left eye  
Full Full  
  
Eye Exam - Neuro/Psych  
Neuro/Psych  
Oriented x3: Yes  
Mood/Affect: Normal  
  
Eye Exam - External Exam  
External Exam  
Right eye Left eye  
External Normal including orbits   
and preauricular lymph nodes   
Normal including orbits and   
preauricular lymph nodes  
  
Eye Exam - Slit Lamp Exam  
Slit Lamp Exam  
Right eye Left eye  
Lids/Lashes Normal lids, lashes,   
lacrimal glands, and lacrimal   
drainage Normal lids, lashes,   
lacrimal glands, and lacrimal   
drainage  
Conjunctiva/Sclera White and quiet   
White and quiet  
Cornea Normal epithelium, stroma,   
endothelium, and tear film Normal   
epithelium, stroma, endothelium,   
and tear film  
Anterior Chamber Deep and quiet   
Deep and quiet  
Iris Round and reactive Round and   
reactive  
Lens 2+ Nuclear sclerotic   
cataract, 2+ Posterior subcapsular   
cataract 3+ Nuclear sclerotic   
cataract, 1+ Posterior subcapsular   
cataract, 1+ Cortical cataract  
Anterior Vitreous Normal Normal  
  
Eye Exam - Fundus Exam  
Fundus Exam  
Right eye Left eye  
Disc Normal Normal  
C/D Ratio 0.3 0.3  
Macula Normal Normal  
Vessels Normal Normal  
Periphery Normal Normal  
  
Eye Exam - Wearing Rx  
Wearing Rx  
Sphere Cylinder Axis Add  
Right eye +2.75 +0.00 000 +2.50  
Left eye +0.00 +1.00 060 +2.50  
  
Eye Exam  
Type: PAL  
  
Care Teams  
- documented as of this encounter  
Care Teams  
Team Member Relationship Specialty   
Start Date End Date  
Sharifa Almaguer DO  
NPI: [id number]  
[address] [place] [address]  
[phone] (Work)  
[phone] (Fax) PCP - General   
Internal Medicine 10/25/23  
Herbie Mills MD  
NPI: [id number]  
[address] [facility] [place] [address]  
[phone] (Work)  
[phone] (Fax) Physician   
Radiation Oncology 18  
  
Images  
- last updated on 10/31/2024  
After Visit Summary  
  
10/8/2024  
After Visit Summary  
  
Electronically signed by Ronel Hays MD at 10/31/2024 8:14 AM   
EDT  
Formatting of this note is   
different from the original.  
Images from the original note were   
not included.  
H&P Notes  
- documented in this encounter  
Ronel Hays MD - 10/08/2024   
10:45 AM EDT  
Formatting of this note is   
different from the original.  
HISTORY AND PHYSICAL EXAMINATION  
  
SERVICE DATE: 10/8/2024  
SERVICE TIME:  
  
PRIMARY CARE PHYSICIAN: Sharifa Almaguer DO  
  
REASON FOR VISIT:  
Marzena Mederos is a 79 year old   
female who is being seen for   
combined age related cataract left   
eye  
  
The patient has the following:  
ACTIVE PROBLEM LIST  
Postmenopausal Atrophic Vaginitis  
Unspecified Transient Cerebral   
Ischemia  
Carcinoma in Situ of Vulva  
Malignant Neoplasm of Female   
Breast (Hcc)  
Personal History of Malignant   
Neoplasm of Breast  
Senile Nuclear Sclerosis  
Hypermetropia  
Regular Astigmatism  
Presbyopia  
Invasive Ductal Carcinoma of   
Breast, Female (Hcc)  
Ocular Migraine  
Controlled Type 2 Diabetes   
Mellitus Without Complication,   
Without Long-Term Current Use of   
Insulin (Hcc)  
Postmenopausal Bleeding  
S/P Hysterectomy With Oophorectomy  
  
SUBJECTIVE  
CHIEF COMPLAINT: combined age   
related cataract left eye  
HPI: blurry vision at all ranges   
of vision, difficulty with glare   
both eyes  
  
PAST MEDICAL HISTORY  
Diagnosis Date  
Breast cancer (HCC)   
invasive ductal carcinoma,   
completed radiation 3/7/11  
Cancer of endometrium (HCC)  
Carcinoma in situ, vulva   
Cellulitis  
of right eye muscle  
Diabetes mellitus without mention   
of complication  
Diabetes mellitus, type II (HCC)  
Diaphragmatic hernia without   
mention of obstruction or gangrene  
Hiatal hernia  
Dyspareunia  
Elevated cholesterol  
Esophageal reflux  
Insomnia, unspecified  
Orbital cellulitis  
Other corneal disorder  
Rt eye corneal erosion .  
PMH - PAST MEDICAL HISTORY OF  
?VESTIBULITIS  
PMH - PAST MEDICAL HISTORY OF  
CLIMACTERIC  
PMH - PAST MEDICAL HISTORY OF   
2006  
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  
Pure hypercholesterolemia  
Rib fracture  
6th Rib  
Senile dementia (HCC)  
Stomatitis and mucositis   
(ulcerative)  
Chronic ulcerative stomatitis on   
mucosal biopsy  
Unspecified essential hypertension  
  
PAST SURGICAL HISTORY  
Procedure Laterality Date  
Bunions bilateral feet removed   
10/2002  
BX/EXC LYMPH NODE OPEN DEEP   
AXILLARY NODE 8-24-10  
RIGHT BREAST LUMP W/ SLN BX  
CHOLECYSTECTOMY 2004  
Cholecystectomy  
COLONOSCOPY FLX DX W/COLLJ SPEC   
WHEN PFRMD   
Colonoscopy  
COLPOSCOPY CERVIX UPPER/ADJACENT   
VAGINA   
Colposcopy of the vulva  
COLPOSCOPY, VULVA 10/09  
CORRECT BUNION,SIMPLE  
Bunion  
DILATION & CURETTAGE DX&/THER   
NONOBSTETRIC   
SAB  
HYSTERECTOMY 2017  
LAPAROSCOPY SURG CHOLECYSTECTOMY   
3/2004  
Cholecystectomy, lap  
MAMMO STEREOTACTIC CORE BIOPSY RT   
10/10/13  
MASTECTOMY, PARTIAL 8-24-10  
RIGHT BREAST  
PAST SURGICAL HISTORY OF 2006  
partial vulvectomy/sq. cell ca in   
situ  
PAST SURGICAL HISTORY OF   
right eye cornea  
PAST SURGICAL HISTORY OF   
right eye revision  
PAST SURGICAL HISTORY OF  
squamous cell carcinoma right arm  
SALPINGO-OOPHORECTOMY COMPL/PRTL   
UNI/BI SPX 2017  
Toenail Big Toe Left Foot removed   
10/2002  
TONSILLECTOMY PRIMARY/SECONDARY   
<AGE 13 1951  
Tonsillectomy  
UNLISTED PROCEDURE HANDS/FINGERS   
13  
[facility] Arthoplasty on right hand  
  
FAMILY HISTORY  
Problem Relation Age of Onset  
Stroke Mother  
Diabetes Mother  
GI Father  
 from complications of gb   
surgery  
Arthritis Sister  
Systemic Lupus  
Cancer Maternal Uncle  
LUNG  
Cancer Other  
cousin with breast cancer/cousin   
with bladder ca.  
Stroke Sister  
  
SOCIAL HISTORY:  
Social History  
  
Tobacco Use  
Smoking status: Never  
Smokeless tobacco: Never  
Vaping Use  
Vaping status: Never Used  
Substance Use Topics  
Alcohol use: Yes  
Comment: 1-2 weekly  
Drug use: No  
  
MEDICATIONS:  
Prior to Admission medications as   
of 10/8/24 1011  
Medication Sig Last Dose Taking  
fluorometholone (FML LIQUID FILM)   
0.1 % ophthalmic suspension Use 1   
Drop in both eyes three times a   
day. Taking Yes  
amLODIPine (NORVASC) 10 mg tablet   
Take 10 mg by mouth. Taking Yes  
donepezil (ARICEPT) 10 mg tablet   
Take 10 mg by mouth. Taking Yes  
cholecalciferol (VITAMIN D3) 50   
mcg (2,000 unit) tablet Take 2,000   
Units by mouth once daily. Taking   
Yes  
losartan-hydroCHLOROthiazide   
(HYZAAR) 100-12.5 mg per tablet   
Take 1 tablet by mouth once daily.   
Taking Yes  
omeprazole (PRILOSEC) 20 mg   
capsule Take 20 mg by mouth once   
daily. Taking Yes  
potassium chloride (KLOR-CON 10)   
10 mEq tablet Take 10 mEq by mouth   
once daily.  
Taking Yes  
metFORMIN ER (GLUCOPHAGE XR) 500   
mg 24 hr tablet Take two tablets   
by mouth once daily. Taking Yes  
simvastatin(ZOCOR 20 MG TAB) Take   
one(1) tablet daily. Taking Yes  
  
No medication comments found.  
  
CURRENT ALLERGIES:  
ALLERGIES  
Allergen Reactions  
Adhesive Rash  
Redness  
Ambien [Zolpidem Ta* Rash  
Codeine Mental Status Change  
Neosporin [Neomycin* Rash  
Percocet [Oxycodone*  
Causes Vomitting  
  
REVIEW OF SYSTEMS:  
PAIN ASSESSMENT:  
General: No weight loss, malaise   
or fevers.  
Neuro: Dementia  
Respiratory: No history of current   
cough or dyspnea, or pneumonia in   
the past 6 weeks. No history of   
respiratory/pulmonary symptoms or   
problems  
Cardiovascular: Positive for:   
Hypertension, PVC  
GI: No history of GI symptoms or   
problems. No history of esophageal   
varices, recent ascites, or ETOH   
greater than 2 drinks per day.  
: No history of UTI in past 6   
weeks. No history of renal   
failure. Not currently on or   
requiring dialysis. No history of   
 symptoms or problems.  
GYN: Negative for abnormal vaginal   
bleeding, abnormal vaginal   
discharge.  
Pregnancy: Denies  
Endocrine: Diabetes Mellitus on   
oral agent  
Hematology: No history of bleeding   
or clotting disorder. Pt is not   
taking anti-coagulation or   
platelet medications. No history   
of hematological symptoms or   
problems.  
Oncology: No history of CA   
metastasis, chemo within 30 days,   
or radiotherapy within 90 days.   
Has not lost 10% of body wt in 6   
months. No history of oncological   
symptoms or problems.  
Psych: No history of psychiatric   
symptoms or problems.  
Musculoskeletal: Negative for   
joint pain or swelling, back pain   
or muscle pain.  
Skin: Negative for lesions, rash   
and itching.  
  
PHYSICAL EXAM:  
VITALS:  
/80   Pulse 88  
  
General: Alert and oriented  
Skin: Normal color, no rash, no   
lesions.  
HEENT: EOM, pupils equal, round   
and reactive.  
Cardiovascular: Normal S1 & S2, no   
rubs, murmurs or gallops. No JVD.   
Pulse regular.  
Lungs: Normal breath sounds, no   
wheezes or crackles.  
Abdomen: Soft, non-tender, no   
rigidity.  
Extremities: No deformity, no   
edema or tenderness, no joint   
swelling or clubbing.  
Neurological: Normal cognition and   
motor skills.  
Pulses: Carotid and radial pulses   
normal +2.  
  
Diagnostic tests reviewed for   
today's visit:  
No new labs or tests  
  
ASSESSMENT  
Medication and Non-Pharmacologic   
VTE Prophylaxis/Anticoagulants  
  
VTE Prophylaxis: VTE prophylaxis   
appropriate  
  
Impression: There is no known   
pertinent medical condition which   
may affect trinidad-operative course  
  
[unfilled]  
Clinical Risk Factors for Possible   
Cardiac Complications:  
None  
  
Patient is scheduled for a   
low-risk procedure.  
  
FUNCTIONAL STATUS: Walk indoors,   
such as around the house (1.75   
METs)  
  
Functional Class (NYHA): N/A  
  
[facility]: Not obtained  
  
PLAN  
CONSULTS:  
Patient does not require consults   
for optimization at this time.  
  
The Following Tests/Procedures   
Have Been Initiated:  
None  
  
Instructions Given to Patient:  
Patient given verbal and written   
preop instructions and voices   
comprehension and compliance.  
  
SIGNATURE: Ronel Hays MD   
PATIENT NAME: Marzena Mederos  
DATE: 2024 MRN:   
[id number]  
TIME: 10:45 AM PAGER/CONTACT #:  
  
Electronically signed by Ronel Hays MD at 10/08/2024 1:24   
PM EDT  
Source Comments  
- [facility]  
In the event this information is   
protected by the Federal   
Confidentiality of Alcohol and   
Drug Abuse Patient Records   
regulations: This information has   
been disclosed to you from records   
protected by Federal   
confidentiality rules (42 CFR Part   
2). The Federal rules prohibit you   
from making any further disclosure   
of this information unless further   
disclosure is expressly permitted   
by the written consent of the   
person to whom it pertains or as   
otherwise permitted by 42 CFR Part   
2. A general authorization for the   
release of medical or other   
information is NOT sufficient for   
this purpose. The Federal rules   
restrict any use of the   
information to criminally   
investigate or prosecute any   
alcohol or drug abuse patient.  
Reason for Visit  
  
Reason for Visit -  
Reason Comments  
Cataract Follow Up  
  
Encounter Details  
  
Encounter Details  
Date Type Department Care Team   
(Latest Contact Info) Description  
10/08/2024 10:15 AM EDT Office   
Visit [facility] Ophthalmology  
[address] [place] [address]  
[phone] Ronel Hays MD  
[address] [place], [place] [id number]  
[phone] (Work)  
[phone] (Fax) Combined forms   
of age-related cataract of left   
eye (Primary Dx);  
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   
cataract of right eye;  
Type 2 diabetes mellitus without   
retinopathy (HCC);  
Essential hypertension;  
Hypercholesteremia;  
Dementia, unspecified dementia   
severity, unspecified dementia   
type, unspecified whether   
behavioral, psychotic, or mood   
disturbance or anxiety (HCC)  
  
Social History  
- documented as of this encounter  
Social History  
Tobacco Use Types Packs/Day Years   
Used Date  
Smoking Tobacco: Never  
Smokeless Tobacco: Never  
  
Social History  
Alcohol Use Standard Drinks/Week   
Comments  
Yes 0 (1 standard drink = 0.6 oz   
pure alcohol) 1-2 weekly  
  
Social History  
PHQ-2 Answer Date Recorded  
PHQ-2 score 0 10/31/2019  
  
Social History  
Area Deprivation Index Answer Date   
Recorded  
National Score (1-100), lower   
number is lower risk 79 2023  
State Score (1-10), lower number   
is lower risk 7 2023  
Data from:   
https://www.neighborhoodatlas.[facility]/. Last address used   
for calculation [address] [facility]   
2023  
  
Social History  
Sex and Gender Information Value   
Date Recorded  
Sex Assigned at Birth Female   
2017 6:52 AM EST  
Gender Identity Female 2017   
6:52 AM EST  
Sexual Orientation Straight   
2017 6:52 AM EST  
  
Last Filed Vital Signs  
- documented in this encounter  
Last Filed Vital Signs  
Vital Sign Reading Time Taken   
Comments  
Blood Pressure 125/80 10/08/2024   
10:20 AM EDT  
Pulse 88 10/08/2024 10:20 AM EDT  
Temperature - -  
Respiratory Rate - -  
Oxygen Saturation - -  
Inhaled Oxygen Concentration - -  
Weight - -  
Height - -  
Body Mass Index - -  
  
Functional Status  
- documented as of this encounter  
Functional Status  
Functional Status Response Date of   
Assessment  
Are you deaf or do you have   
serious difficulty hearing? No   
2017  
Are you blind or do you have   
serious difficulty seeing, even   
when wearing glasses? No   
2017  
Do you have serious difficulty   
walking or climbing stairs? No   
2017  
Do you have difficulty dressing or   
bathing? No 2017  
Because of a physical, mental, or   
emotional condition, do you have   
difficulty doing errands alone   
such as visiting a doctor's office   
or shopping? No 2017  
  
Functional Status  
Cognitive Status Response Date of   
Assessment  
Because of a physical, mental, or   
emotional condition, do you have   
serious difficulty concentrating,   
remembering, or making decisions?   
No 2017  
  
Patient Instructions  
- documented in this encounter  
Patient Instructions  
Ronel Hays MD - 10/08/2024   
10:49 AM EDT  
Formatting of this note is   
different from the original.  
Current Ophthalmic Meds  
  
fluorometholone (FML LIQUID FILM)   
0.1 % ophthalmic suspension Use 1   
Drop in both eyes three times a   
day.  
  
Continue:  
Systane Complete solution instill   
1 drop 3 times daily Both Eyes.  
  
Plan Cataract Surgery with   
Monofocal Intraocular Lens Implant   
Left Eye on 2024 at   
[facility].  
  
If you have any questions please   
contact our office at   
[phone].  
After office hours or on the   
weekend, please call Dr. Hays on   
his cell phone at [phone].  
Electronically signed by Ronel Hays MD at 10/08/2024 10:49   
AM EDT  
  
Progress Notes  
- documented in this encounter  
Ronel Hays MD - 10/08/2024   
10:20 AM EDT  
Formatting of this note is   
different from the original.  
ASSESSMENT/PLAN:  
1. 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   
cataract of left eye - ICD9:   
366.19, ICD10: H25.812 (primary   
diagnosis)  
  
PHYSICAL EXAM:  
  
Vital Signs:  
Blood pressure 125/80, pulse 88.  
  
Respiratory:  
Normal breath sounds, no wheezing.  
  
CARD:  
Normal heart sounds 1 & 2, normal   
sinus rhythm.  
  
Cataract Presurgical Documentation  
  
Cataract: Left Eye  
  
Current Visual Acuity  
Right Eye Distance CC 20/40  
Left Eye Distance CC 20/70  
  
Glare Testing:  
  
Visual Function: Marzena Mederos   
states that the decline in vision   
from the cataract impedes her   
abilities as listed in the HPI, as   
well as other activities of daily   
living.  
  
Marzena Mederos has confirmed that   
she is no longer able to function   
adequately on a day-to-day basis   
because of her current visual   
condition.  
  
Further, it is my medical opinion   
that the cataract is the primary   
cause, or at least a significantly   
contributory cause of her visual   
dysfunction. With uncomplicated   
cataract surgery and lens   
implantation, it is my expectation   
that her visual function and   
quality of life will improve,   
significantly.  
  
The risks, benefits, alternatives,   
personnel and complications of   
cataract surgery with lens   
implantation were discussed with   
Marzena Mederos in detail. She   
appeared to understand and asked   
that I proceed with plans for   
surgery.  
  
Upon eye examination, patient was   
found to have a visually   
significant cataract Left Eye.   
Discussed cataract surgery with   
patient and different intraocular   
lens implant options with patient:   
basic monofocal intraocular lens   
implant, Toric intraocular lens   
implant, and presbyopia correction   
intraocular lens implant. In my   
medical opinion, based on medical   
history and ocular examination,   
cataract surgery with intraocular   
lens implant will correct   
patient's vision and improve   
quality of patient's daily living   
activities. Patient wishes to have   
traditional cataract surgery with   
basic intraocular lens Left Eye.   
Patient wishes to have cataract   
surgery with the option stated   
above. Patient understands that an   
intraocular lens implant does not   
necessarily replace the need for   
glasses. Patient understands that   
it is impossible for the surgeon   
to inform him/her of every   
possible complication that may   
occur. The surgeon has answered   
all of the patient's questions.   
Patient understands that if he/she   
has a mature or dense cataract,   
pseudoexfoliation cataract, or   
history of use of Flomax, he/she   
may require the use of [place]   
Ring and/or Vision Blue during   
surgery. Patient understands the   
risks, benefits, and alternatives   
to surgery.  
  
Current Ophthalmic Meds  
  
fluorometholone (FML LIQUID FILM)   
0.1 % ophthalmic suspension Use 1   
Drop in both eyes three times a   
day.  
  
Continue:  
Systane Complete solution instill   
1 drop 3 times daily Both Eyes.  
  
Plan Cataract Surgery with   
Monofocal Intraocular Lens Implant   
Left Eye on 2024 at   
[facility].  
  
Patient cleared for surgery by Dr. Almaguer  
  
2. 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   
cataract of right eye - ICD9:   
366.19, ICD10: H25.811  
  
Plan Cataract Surgery with   
Monofocal Intraocular Lens Implant   
Right Eye after Left Eye is   
stable.  
  
3. Type 2 diabetes mellitus   
without retinopathy (HCC) - ICD9:   
250.00, ICD10: E11.9  
  
Please keep your blood sugar under   
good control to minimize risk of   
ocular complications from   
diabetes.  
  
Continue to monitor with primary   
care physician.  
  
4. Essential hypertension - ICD9:   
401.9, ICD10: I10  
  
Continue to monitor with primary   
care physician.  
  
5. Hypercholesteremia - ICD9:   
272.0, ICD10: E78.00  
  
Continue to monitor with primary   
care physician.  
  
6. Dementia, unspecified dementia   
severity, unspecified dementia   
type, unspecified whether   
behavioral, psychotic, or mood   
disturbance or anxiety (HCC) -   
ICD9: 294.20, ICD10: F03.90  
  
Continue to monitor with primary   
care physician.  
  
I have confirmed and edited as   
necessary the relevant HPI,   
ophthalmic history, ROS, and the   
neuro exam findings as obtained by   
others. I have seen and examined   
Marzena Mederos. I have discussed   
the case and the management of   
this patient's care with the   
Resident/Fellow, if applicable. I   
also have reviewed and agree with   
the assessment and plan as stated   
above and agree with all of its   
relevant components.  
  
Electronically signed by Ronel Hays MD at 10/08/2024 1:24   
PM EDT  
Plan of Treatment  
- documented as of this encounter  
Plan of Treatment - Upcoming   
Encounters  
Upcoming Encounters  
Date Type Department Care Team   
(Latest Contact Info) Description  
2024 9:45 AM EDT Office   
Visit [facility] Ophthalmology  
[address] [place] [address]  
[phone] Ronel Hays MD  
[address] [place] [address]  
[phone] (Work)  
[phone] (Fax) 1 day po 1st le   
basic  
2024 9:30 AM EST Appointment   
Mammogram  
[address]  
[place] [id number] [phone] Encounter for   
screening mammogram for breast   
cancer [Z12.31]  
2024 10:10 AM EST Visit (SP)   
Office Hematology/Oncology  
[address] [place] [id number] [phone] Colten Abbott DO  
[address] [place] [id number] [phone] (Work)  
[phone] (Fax) 1 YR OV/ JONY   
*  
  
Plan of Treatment - Scheduled   
Procedures  
Scheduled Procedures  
Name Priority Associated Diagnoses   
Date/Time  
SURGERY AT NON-[facility] FACILITY   
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   
cataract of left eye 10/31/2024   
9:25 AM EDT  
  
Procedures  
- documented in this encounter  
Procedures  
Procedure Name Priority Date/Time   
Associated Diagnosis Comments  
IOL BIOMETRY W/ IOL CALC OU (BOTH   
EYES) Routine 10/08/2024 10:43 AM   
EDT Combined forms of age-related   
cataract of left eye  
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   
cataract of right eye Results for   
this procedure are in the results   
section.  
  
Imaging Results  
- documented in this encounter  
IOL BIOMETRY W/ IOL CALC OU (BOTH   
EYES) (10/08/2024 10:43 AM EDT)  
Imaging Results - IOL BIOMETRY W/   
IOL CALC OU (BOTH EYES)   
(10/08/2024 10:43 AM EDT)  
Anatomical Region Laterality   
Modality  
Other  
  
Imaging Results - IOL BIOMETRY W/   
IOL CALC OU (BOTH EYES)   
(10/08/2024 10:43 AM EDT)  
Narrative  
10/08/2024 10:43 AM EDT  
Date of Procedure  
10/8/2024.  
  
Technician Information  
Imaging Technician: AB.  
  
Notes  
Measurements only - see Procedure   
Record under Scanned Documents for  
signed results.  
  
LENSTAR  
Right eye: AL 23.10 ACD 2.85 WTW   
12.13  
Left eye: AL 23.40 ACD 2.92 WTW   
12.32  
  
Imaging Results - IOL BIOMETRY W/   
IOL CALC OU (BOTH EYES)   
(10/08/2024 10:43 AM EDT)  
Authorizing Provider Result Type  
Ronel Hays MD OPHTHALMOLOGY  
  
Associated Images  
  
10/8/2024  
IOL BIOMETRY W/ IOL CALC OU (BOTH   
EYES)  
  
Visit Diagnoses  
- documented in this encounter  
Visit Diagnoses  
Diagnosis  
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   
cataract of left eye - Primary  
Other and combined forms of senile   
cataract  
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   
cataract of right eye  
Other and combined forms of senile   
cataract  
Type 2 diabetes mellitus without   
retinopathy (HCC)  
Type II or unspecified type   
diabetes mellitus without mention   
of complication, not stated as   
uncontrolled  
Essential hypertension  
Unspecified essential hypertension  
Hypercholesteremia  
Pure hypercholesterolemia  
Dementia, unspecified dementia   
severity, unspecified dementia   
type, unspecified whether   
behavioral, psychotic, or mood   
disturbance or anxiety (HCC)  
  
Eye Exam  
  
Eye Exam - Visual Acuity (Snellen   
- Linear)  
Visual Acuity (Snellen - Linear)  
Right eye Left eye  
Dist cc 20/40 20/70  
Near cc J6 J8  
  
Eye Exam - Tonometry (Applanation,   
10:51 AM)  
Tonometry (Applanation, 10:51 AM)  
Right eye Left eye  
Pressure 10 10  
  
Eye Exam - Pupils  
Pupils  
Pupils Dark Shape React APD  
Right eye PERRL 4 Round +2 None  
Left eye PERRL 4 Round +2 None  
  
Eye Exam - Visual Fields  
Visual Fields  
Right eye Left eye  
Full Full  
  
Eye Exam - Extraocular Movement  
Extraocular Movement  
Right eye Left eye  
Full Full  
  
Eye Exam - Neuro/Psych  
Neuro/Psych  
Oriented x3: Yes  
Mood/Affect: Normal  
  
Eye Exam - External Exam  
External Exam  
Right eye Left eye  
External Normal including orbits   
and preauricular lymph nodes   
Normal including orbits and   
preauricular lymph nodes  
  
Eye Exam - Slit Lamp Exam  
Slit Lamp Exam  
Right eye Left eye  
Lids/Lashes Normal lids, lashes,   
lacrimal glands, and lacrimal   
drainage Normal lids, lashes,   
lacrimal glands, and lacrimal   
drainage  
Conjunctiva/Sclera White and quiet   
White and quiet  
Cornea Normal epithelium, stroma,   
endothelium, and tear film Normal   
epithelium, stroma, endothelium,   
and tear film  
Anterior Chamber Deep and quiet   
Deep and quiet  
Iris Round and reactive Round and   
reactive  
Lens 2+ Nuclear sclerotic   
cataract, 2+ Posterior subcapsular   
cataract 3+ Nuclear sclerotic   
cataract, 1+ Posterior subcapsular   
cataract, 1+ Cortical cataract  
Anterior Vitreous Normal Normal  
  
Eye Exam - Fundus Exam  
Fundus Exam  
Right eye Left eye  
Disc Normal Normal  
C/D Ratio 0.3 0.3  
Macula Normal Normal  
Vessels Normal Normal  
Periphery Normal Normal  
  
Eye Exam - Wearing Rx  
Wearing Rx  
Sphere Cylinder Axis Add  
Right eye +2.75 +0.00 000 +2.50  
Left eye +0.00 +1.00 060 +2.50  
  
Eye Exam  
Type: PAL  
  
Care Teams  
- documented as of this encounter  
Care Teams  
Team Member Relationship Specialty   
Start Date End Date  
Sharifa Almaguer DO  
NPI: [id number]  
[address] [place]  
[place] [address]  
[phone] (Work)  
[phone] (Fax) PCP - General   
Internal Medicine 10/25/23  
Herbie Mills MD  
NPI: [id number]  
[address] [place]  
[place] [address]  
[phone] (Work)  
[phone] (Fax) Physician   
Radiation Oncology 18  
  
Images  
- last updated on 10/31/2024  
After Visit Summary  
  
10/8/2024  
After Visit Summary  
  
Electronically signed by Ronel Hays MD at 10/31/2024 8:14 AM   
EDT  
documented in this encounter            [facility]  
Work Phone:   
[phone]  
   
                                        10- Note     Formatting of this n  
ote is   
different from the original.  
No outpatient medications have   
been marked as taking for the   
10/31/24 encounter (Hospital   
Encounter).  
  
  
  
  
  
NPO Instructions:  
  
Do not eat any food after midnight   
the night before your   
surgery/procedure.  
You may have clear liquids until   
TWO hours before   
surgery/procedure. This includes   
water, black tea/coffee, (no milk   
or cream) apple juice and   
electrolyte drinks (Gatorade).  
  
Additional Instructions:  
  
Will need  home, will   
receive call day before surgery   
with arrival time  
  
  
Electronically signed by Jolie Germain RN at 10/28/2024 9:42 AM EDT  
                                        [facility]  
   
                                        10- Instructions   
  
  
Ronel Hays MD - 10/08/2024   
10:49 AM EDTFormatting of this   
note is different from the   
original.  
Current Ophthalmic Meds  
  
  
  
fluorometholone (FML LIQUID FILM)   
0.1 % ophthalmic suspension Use 1   
Drop in both eyes three times a   
day.  
  
Continue:  
Systane Complete solution instill   
1 drop 3 times daily Both Eyes.  
  
Plan Cataract Surgery with   
Monofocal Intraocular Lens Implant   
Left Eye on 2024 at   
[facility].  
  
If you have any questions please   
contact our office at   
[phone].  
After office hours or on the   
weekend, please call Dr. Hays on   
his cell phone at [phone].  
Electronically signed by Ronel Hays MD at 10/08/2024 10:49   
AM EDT  
  
documented in this encounter            [facility]  
   
                                                    10- History and   
physical note                           Formatting of this note is   
different from the original.  
HISTORY AND PHYSICAL EXAMINATION  
  
SERVICE DATE: 10/8/2024  
SERVICE TIME:  
  
PRIMARY CARE PHYSICIAN: Sharifa Almaguer DO  
  
REASON FOR VISIT:  
Marzena Mederos is a 79 year old   
female who is being seen for   
combined age related cataract left   
eye  
  
The patient has the following:  
ACTIVE PROBLEM LIST  
Postmenopausal Atrophic Vaginitis  
Unspecified Transient Cerebral   
Ischemia  
Carcinoma in Situ of Vulva  
Malignant Neoplasm of Female   
Breast (Hcc)  
Personal History of Malignant   
Neoplasm of Breast  
Senile Nuclear Sclerosis  
Hypermetropia  
Regular Astigmatism  
Presbyopia  
Invasive Ductal Carcinoma of   
Breast, Female (Hcc)  
Ocular Migraine  
Controlled Type 2 Diabetes   
Mellitus Without Complication,   
Without Long-Term Current Use of   
Insulin (Hcc)  
Postmenopausal Bleeding  
S/P Hysterectomy With Oophorectomy  
  
SUBJECTIVE  
CHIEF COMPLAINT: combined age   
related cataract left eye  
HPI: blurry vision at all ranges   
of vision, difficulty with glare   
both eyes  
  
PAST MEDICAL HISTORY  
Diagnosis Date  
Breast cancer (HCC)   
invasive ductal carcinoma,   
completed radiation 3/7/11  
Cancer of endometrium (HCC)  
Carcinoma in situ, vulva   
Cellulitis  
of right eye muscle  
Diabetes mellitus without mention   
of complication  
Diabetes mellitus, type II (HCC)  
Diaphragmatic hernia without   
mention of obstruction or gangrene  
Hiatal hernia  
Dyspareunia  
Elevated cholesterol  
Esophageal reflux  
Insomnia, unspecified  
Orbital cellulitis  
Other corneal disorder  
Rt eye corneal erosion .  
PMH - PAST MEDICAL HISTORY OF  
?VESTIBULITIS  
PMH - PAST MEDICAL HISTORY OF  
CLIMACTERIC  
PMH - PAST MEDICAL HISTORY OF   
2006  
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  
Pure hypercholesterolemia  
Rib fracture  
6th Rib  
Senile dementia (HCC)  
Stomatitis and mucositis   
(ulcerative)  
Chronic ulcerative stomatitis on   
mucosal biopsy  
Unspecified essential hypertension  
  
PAST SURGICAL HISTORY  
Procedure Laterality Date  
Bunions bilateral feet removed   
10/2002  
BX/EXC LYMPH NODE OPEN DEEP   
AXILLARY NODE 8-24-10  
RIGHT BREAST LUMP W/ SLN BX  
CHOLECYSTECTOMY 2004  
Cholecystectomy  
COLONOSCOPY FLX DX W/COLLJ SPEC   
WHEN PFRMD   
Colonoscopy  
COLPOSCOPY CERVIX UPPER/ADJACENT   
VAGINA   
Colposcopy of the vulva  
COLPOSCOPY, VULVA 10/09  
CORRECT BUNION,SIMPLE  
Bunion  
DILATION & CURETTAGE DX&/THER   
NONOBSTETRIC 's  
SAB  
HYSTERECTOMY 2017  
LAPAROSCOPY SURG CHOLECYSTECTOMY   
3/2004  
Cholecystectomy, lap  
MAMMO STEREOTACTIC CORE BIOPSY RT   
10/10/13  
MASTECTOMY, PARTIAL 8-24-10  
RIGHT BREAST  
PAST SURGICAL HISTORY OF 2006  
partial vulvectomy/sq. cell ca in   
situ  
PAST SURGICAL HISTORY OF   
right eye cornea  
PAST SURGICAL HISTORY OF   
right eye revision  
PAST SURGICAL HISTORY OF  
squamous cell carcinoma right arm  
SALPINGO-OOPHORECTOMY COMPL/PRTL   
UNI/BI SPX 2017  
Toenail Big Toe Left Foot removed   
10/2002  
TONSILLECTOMY PRIMARY/SECONDARY   
Tonsillectomy  
UNLISTED PROCEDURE HANDS/FINGERS   
13  
[facility] Arthoplasty on right hand  
  
FAMILY HISTORY  
Problem Relation Age of Onset  
Stroke Mother  
Diabetes Mother  
GI Father  
 from complications of gb   
surgery  
Arthritis Sister  
Systemic Lupus  
Cancer Maternal Uncle  
LUNG  
Cancer Other  
cousin with breast cancer/cousin   
with bladder ca.  
Stroke Sister  
  
SOCIAL HISTORY:  
Social History  
  
Tobacco Use  
Smoking status: Never  
Smokeless tobacco: Never  
Vaping Use  
Vaping status: Never Used  
Substance Use Topics  
Alcohol use: Yes  
Comment: 1-2 weekly  
Drug use: No  
  
MEDICATIONS:  
Prior to Admission medications as   
of 10/8/24 1011  
Medication Sig Last Dose Taking  
fluorometholone (FML LIQUID FILM)   
0.1 % ophthalmic suspension Use 1   
Drop in both eyes three times a   
day. Taking Yes  
amLODIPine (NORVASC) 10 mg tablet   
Take 10 mg by mouth. Taking Yes  
donepezil (ARICEPT) 10 mg tablet   
Take 10 mg by mouth. Taking Yes  
cholecalciferol (VITAMIN D3) 50   
mcg (2,000 unit) tablet Take 2,000   
Units by mouth once daily. Taking   
Yes  
losartan-hydroCHLOROthiazide   
(HYZAAR) 100-12.5 mg per tablet   
Take 1 tablet by mouth once daily.   
Taking Yes  
omeprazole (PRILOSEC) 20 mg   
capsule Take 20 mg by mouth once   
daily. Taking Yes  
potassium chloride (KLOR-CON 10)   
10 mEq tablet Take 10 mEq by mouth   
once daily.  
Taking Yes  
metFORMIN ER (GLUCOPHAGE XR) 500   
mg 24 hr tablet Take two tablets   
by mouth once daily. Taking Yes  
simvastatin(ZOCOR 20 MG TAB) Take   
one(1) tablet daily. Taking Yes  
  
No medication comments found.  
  
CURRENT ALLERGIES:  
ALLERGIES  
Allergen Reactions  
Adhesive Rash  
Redness  
Ambien [Zolpidem Ta* Rash  
Codeine Mental Status Change  
Neosporin [Neomycin* Rash  
Percocet [Oxycodone*  
Causes Vomitting  
  
REVIEW OF SYSTEMS:  
PAIN ASSESSMENT:  
General: No weight loss, malaise   
or fevers.  
Neuro: Dementia  
Respiratory: No history of current   
cough or dyspnea, or pneumonia in   
the past 6 weeks. No history of   
respiratory/pulmonary symptoms or   
problems  
Cardiovascular: Positive for:   
Hypertension, PVC  
GI: No history of GI symptoms or   
problems. No history of esophageal   
varices, recent ascites, or ETOH   
greater than 2 drinks per day.  
: No history of UTI in past 6   
weeks. No history of renal   
failure. Not currently on or   
requiring dialysis. No history of   
 symptoms or problems.  
GYN: Negative for abnormal vaginal   
bleeding, abnormal vaginal   
discharge.  
Pregnancy: Denies  
Endocrine: Diabetes Mellitus on   
oral agent  
Hematology: No history of bleeding   
or clotting disorder. Pt is not   
taking anti-coagulation or   
platelet medications. No history   
of hematological symptoms or   
problems.  
Oncology: No history of CA   
metastasis, chemo within 30 days,   
or radiotherapy within 90 days.   
Has not lost 10% of body wt in 6   
months. No history of oncological   
symptoms or problems.  
Psych: No history of psychiatric   
symptoms or problems.  
Musculoskeletal: Negative for   
joint pain or swelling, back pain   
or muscle pain.  
Skin: Negative for lesions, rash   
and itching.  
  
PHYSICAL EXAM:  
VITALS:  
/80   Pulse 88  
  
  
  
General: Alert and oriented  
Skin: Normal color, no rash, no   
lesions.  
HEENT: EOM, pupils equal, round   
and reactive.  
Cardiovascular: Normal S1 & S2, no   
rubs, murmurs or gallops. No JVD.   
Pulse regular.  
Lungs: Normal breath sounds, no   
wheezes or crackles.  
Abdomen: Soft, non-tender, no   
rigidity.  
Extremities: No deformity, no   
edema or tenderness, no joint   
swelling or clubbing.  
Neurological: Normal cognition and   
motor skills.  
Pulses: Carotid and radial pulses   
normal +2.  
  
Diagnostic tests reviewed for   
today's visit:  
No new labs or tests  
  
ASSESSMENT  
Medication and Non-Pharmacologic   
VTE Prophylaxis/Anticoagulants  
  
  
VTE Prophylaxis: VTE prophylaxis   
appropriate  
  
Impression: There is no known   
pertinent medical condition which   
may affect trinidad-operative course  
  
  
[unfilled]  
Clinical Risk Factors for Possible   
Cardiac Complications:  
None  
  
Patient is scheduled for a   
low-risk procedure.  
  
FUNCTIONAL STATUS: Walk indoors,   
such as around the house (1.75   
METs)  
  
Functional Class (NY[facility]): N/A  
  
[facility]: Not obtained  
  
PLAN  
CONSULTS:  
Patient does not require consults   
for optimization at this time.  
  
The Following Tests/Procedures   
Have Been Initiated:  
None  
  
Instructions Given to Patient:  
Patient given verbal and written   
preop instructions and voices   
comprehension and compliance.  
  
SIGNATURE: Ronel Hays MD   
PATIENT NAME: Marzena Mederos  
DATE: 2024 MRN:   
[id number]  
TIME: 10:45 AM PAGER/CONTACT #:  
  
Electronically signed by Ronel Hays MD at 10/08/2024 1:24   
PM EDT  
                                        [facility]  
   
                                                    10- History and   
physical note                           Formatting of this note is   
different from the original.  
HISTORY AND PHYSICAL EXAMINATION  
  
SERVICE DATE: 10/8/2024  
SERVICE TIME:  
  
PRIMARY CARE PHYSICIAN: Sharifa S   
Royal, DO  
  
REASON FOR VISIT:  
Marzena Mederos is a 79 year old   
female who is being seen for   
combined age related cataract left   
eye   
  
The patient has the following:  
ACTIVE PROBLEM LIST  
Postmenopausal Atrophic Vaginitis  
Unspecified Transient Cerebral   
Ischemia  
Carcinoma in Situ of Vulva  
Malignant Neoplasm of Female   
Breast (Hcc)  
Personal History of Malignant   
Neoplasm of Breast  
Senile Nuclear Sclerosis  
Hypermetropia  
Regular Astigmatism  
Presbyopia  
Invasive Ductal Carcinoma of   
Breast, Female (Hcc)  
Ocular Migraine  
Controlled Type 2 Diabetes   
Mellitus Without Complication,   
Without Long-Term Current Use of   
Insulin (Hcc)  
Postmenopausal Bleeding  
S/P Hysterectomy With Oophorectomy  
  
SUBJECTIVE  
CHIEF COMPLAINT: combined age   
related cataract left eye  
HPI: blurry vision at all ranges   
of vision, difficulty with glare   
both eyes  
  
PAST MEDICAL HISTORY  
Diagnosis Date  
Breast cancer (HCC)   
invasive ductal carcinoma,   
completed radiation 3/7/11  
Cancer of endometrium (HCC)  
Carcinoma in situ, vulva   
Cellulitis  
of right eye muscle  
Diabetes mellitus without mention   
of complication  
Diabetes mellitus, type II (HCC)  
Diaphragmatic hernia without   
mention of obstruction or gangrene  
Hiatal hernia  
Dyspareunia  
Elevated cholesterol  
Esophageal reflux  
Insomnia, unspecified  
Orbital cellulitis  
Other corneal disorder  
Rt eye corneal erosion .  
PMH - PAST MEDICAL HISTORY OF  
?VESTIBULITIS  
PMH - PAST MEDICAL HISTORY OF  
CLIMACTERIC  
PMH - PAST MEDICAL HISTORY OF   
2006  
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  
Pure hypercholesterolemia  
Rib fracture  
6th Rib  
Senile dementia (HCC)  
Stomatitis and mucositis   
(ulcerative)  
Chronic ulcerative stomatitis on   
mucosal biopsy  
Unspecified essential hypertension  
  
PAST SURGICAL HISTORY  
Procedure Laterality Date  
Bunions bilateral feet removed   
10/2002  
BX/EXC LYMPH NODE OPEN DEEP   
AXILLARY NODE 8-24-10  
RIGHT BREAST LUMP W/ SLN BX  
CHOLECYSTECTOMY 2004  
Cholecystectomy  
COLONOSCOPY FLX DX W/COLLJ SPEC   
WHEN PFRMD   
Colonoscopy  
COLPOSCOPY CERVIX UPPER/ADJACENT   
VAGINA   
Colposcopy of the vulva  
COLPOSCOPY, VULVA 10/09  
CORRECT BUNION,SIMPLE  
Bunion  
DILATION & CURETTAGE DX&/THER   
NONOBSTETRIC 's  
SAB  
HYSTERECTOMY 2017  
LAPAROSCOPY SURG CHOLECYSTECTOMY   
3/2004  
Cholecystectomy, lap  
MAMMO STEREOTACTIC CORE BIOPSY RT   
10/10/13  
MASTECTOMY, PARTIAL 8-24-10  
RIGHT BREAST  
PAST SURGICAL HISTORY OF 2006  
partial vulvectomy/sq. cell ca in   
situ  
PAST SURGICAL HISTORY OF   
right eye cornea  
PAST SURGICAL HISTORY OF   
right eye revision  
PAST SURGICAL HISTORY OF  
squamous cell carcinoma right arm  
SALPINGO-OOPHORECTOMY COMPL/PRTL   
UNI/BI SPX 2017  
Toenail Big Toe Left Foot removed   
10/2002  
TONSILLECTOMY PRIMARY/SECONDARY   
<AGE 13   
Tonsillectomy  
UNLISTED PROCEDURE HANDS/FINGERS   
13  
[facility] Arthoplasty on right hand  
  
FAMILY HISTORY  
Problem Relation Age of Onset  
Stroke Mother  
Diabetes Mother  
GI Father  
 from complications of gb   
surgery  
Arthritis Sister  
Systemic Lupus  
Cancer Maternal Uncle  
LUNG  
Cancer Other  
cousin with breast cancer/cousin   
with bladder ca.  
Stroke Sister  
  
SOCIAL HISTORY:  
Social History  
  
Tobacco Use  
Smoking status: Never  
Smokeless tobacco: Never  
Vaping Use  
Vaping status: Never Used  
Substance Use Topics  
Alcohol use: Yes  
Comment: 1-2 weekly  
Drug use: No  
  
MEDICATIONS:  
Prior to Admission medications as   
of 10/8/24 1011  
Medication Sig Last Dose Taking  
fluorometholone (FML LIQUID FILM)   
0.1 % ophthalmic suspension Use 1   
Drop in both eyes three times a   
day. Taking Yes  
amLODIPine (NORVASC) 10 mg tablet   
Take 10 mg by mouth. Taking Yes  
donepezil (ARICEPT) 10 mg tablet   
Take 10 mg by mouth. Taking Yes  
cholecalciferol (VITAMIN D3) 50   
mcg (2,000 unit) tablet Take 2,000   
Units by mouth once daily. Taking   
Yes  
losartan-hydroCHLOROthiazide   
(HYZAAR) 100-12.5 mg per tablet   
Take 1 tablet by mouth once daily.   
Taking Yes  
omeprazole (PRILOSEC) 20 mg   
capsule Take 20 mg by mouth once   
daily. Taking Yes  
potassium chloride (KLOR-CON 10)   
10 mEq tablet Take 10 mEq by mouth   
once daily.  
Taking Yes  
metFORMIN ER (GLUCOPHAGE XR) 500   
mg 24 hr tablet Take two tablets   
by mouth once daily. Taking Yes  
simvastatin(ZOCOR 20 MG TAB) Take   
one(1) tablet daily. Taking Yes  
  
No medication comments found.  
  
CURRENT ALLERGIES:  
ALLERGIES  
Allergen Reactions  
Adhesive Rash  
Redness  
Ambien [Zolpidem Ta* Rash  
Codeine Mental Status Change  
Neosporin [Neomycin* Rash  
Percocet [Oxycodone*  
Causes Vomitting  
  
REVIEW OF SYSTEMS:  
PAIN ASSESSMENT:  
General: No weight loss, malaise   
or fevers.  
Neuro: Dementia  
Respiratory: No history of current   
cough or dyspnea, or pneumonia in   
the past 6 weeks. No history of   
respiratory/pulmonary symptoms or   
problems  
Cardiovascular: Positive for:   
Hypertension, PVC  
GI: No history of GI symptoms or   
problems. No history of esophageal   
varices, recent ascites, or ETOH   
greater than 2 drinks per day.  
: No history of UTI in past 6   
weeks. No history of renal   
failure. Not currently on or   
requiring dialysis. No history of   
 symptoms or problems.  
GYN: Negative for abnormal vaginal   
bleeding, abnormal vaginal   
discharge.  
Pregnancy: Denies  
Endocrine: Diabetes Mellitus on   
oral agent  
Hematology: No history of bleeding   
or clotting disorder. Pt is not   
taking anti-coagulation or   
platelet medications. No history   
of hematological symptoms or   
problems.  
Oncology: No history of CA   
metastasis, chemo within 30 days,   
or radiotherapy within 90 days.   
Has not lost 10% of body wt in 6   
months. No history of oncological   
symptoms or problems.  
Psych: No history of psychiatric   
symptoms or problems.  
Musculoskeletal: Negative for   
joint pain or swelling, back pain   
or muscle pain.  
Skin: Negative for lesions, rash   
and itching.  
  
PHYSICAL EXAM:  
VITALS:  
/80   Pulse 88  
  
  
  
General: Alert and oriented  
Skin: Normal color, no rash, no   
lesions.  
HEENT: EOM, pupils equal, round   
and reactive.  
Cardiovascular: Normal S1 & S2, no   
rubs, murmurs or gallops. No JVD.   
Pulse regular.  
Lungs: Normal breath sounds, no   
wheezes or crackles.  
Abdomen: Soft, non-tender, no   
rigidity.  
Extremities: No deformity, no   
edema or tenderness, no joint   
swelling or clubbing.  
Neurological: Normal cognition and   
motor skills.  
Pulses: Carotid and radial pulses   
normal +2.  
  
Diagnostic tests reviewed for   
today's visit:  
No new labs or tests  
  
ASSESSMENT  
Medication and Non-Pharmacologic   
VTE Prophylaxis/Anticoagulants  
  
  
VTE Prophylaxis: VTE prophylaxis   
appropriate  
  
Impression: There is no known   
pertinent medical condition which   
may affect trinidad-operative course  
  
  
[unfilled]  
Clinical Risk Factors for Possible   
Cardiac Complications:  
None  
  
Patient is scheduled for a   
low-risk procedure.  
  
FUNCTIONAL STATUS: Walk indoors,   
such as around the house (1.75   
METs)  
  
Functional Class (NYHA): N/A  
  
[facility]: Not obtained  
  
PLAN  
CONSULTS:  
Patient does not require consults   
for optimization at this time.  
  
The Following Tests/Procedures   
Have Been Initiated:  
None  
  
Instructions Given to Patient:  
Patient given verbal and written   
preop instructions and voices   
comprehension and compliance.  
  
SIGNATURE: Ronel Hays MD   
PATIENT NAME: Marzena Mederos  
DATE: 2024 MRN:   
[id number]  
TIME: 10:45 AM PAGER/CONTACT #:  
  
Electronically signed by Ronel Hays MD at 10/08/2024 1:24   
PM EDT  
documented in this encounter            [facility]  
   
                                        10- Note     Date of Procedure  
10/8/2024.  
  
Technician Information  
Imaging Technician: AB.  
  
Notes  
Measurements only - see Procedure   
Record under Scanned Documents for  
signed results.  
  
  
[facility]  
Right eye: AL 23.10 ACD 2.85 WTW   
12.13  
Left eye:  AL 23.40 ACD 2.92 WTW   
12.32                                   [facility]  
   
                                        10- Note     HNO ID: [id number]  
Author: RONEL HAYS MD  
Service: ?  
Author Type: Physician  
Type: Progress Notes  
Filed: 10/08/2024 13:24  
Note Text:  
ASSESSMENT/PLAN:  
1. 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   
cataract of left eye - ICD9:   
366.19, ICD10:  
H25.812 (primary diagnosis)  
PHYSICAL EXAM:  
Vital Signs:  
Blood pressure 125/80, pulse 88.  
Respiratory:  
Normal breath sounds, no wheezing.  
CARD:  
Normal heart sounds 1 AND 2,   
normal sinus rhythm.  
Cataract Presurgical Documentation  
Cataract: Left Eye  
Current Visual Acuity  
Right Eye Distance CC 20/40  
Left Eye Distance CC 20/70  
Glare Testing:  
Visual Function: Marzena Mederos   
states that the decline in vision   
from the  
cataract impedes her abilities as   
listed in the HPI, as well as   
other activities  
of daily living.  
Marzena Mederos has confirmed that   
she is no longer able to function   
adequately  
on a day-to-day basis because of   
her current visual condition.  
Further, it is my medical opinion   
that the cataract is the primary   
cause, or at  
least a significantly contributory   
cause of her visual dysfunction.   
With  
uncomplicated cataract surgery and   
lens implantation, it is my   
expectation that  
her visual function and quality of   
life will improve, significantly.  
The risks, benefits, alternatives,   
personnel and complications of   
cataract  
surgery with lens implantation   
were discussed with Marzena Mederos in detail.  
She appeared to understand and   
asked that I proceed with plans   
for surgery.  
Upon eye examination, patient was   
found to have a visually   
significant cataract  
Left Eye. Discussed cataract   
surgery with patient and different   
intraocular  
lens implant options with patient:   
basic monofocal intraocular lens   
implant,  
Toric intraocular lens implant,   
and presbyopia correction   
intraocular lens  
implant. In my medical opinion,   
based on medical history and   
ocular  
examination, cataract surgery with   
intraocular lens implant will   
correct  
patient's vision and improve   
quality of patient's daily living   
activities.  
Patient wishes to have traditional   
cataract surgery with basic   
intraocular lens  
Left Eye. Patient wishes to have   
cataract surgery with the option   
stated above.  
Patient understands that an   
intraocular lens implant does not   
necessarily  
replace the need for glasses.   
Patient understands that it is   
impossible for the  
surgeon to inform him/her of every   
possible complication that may   
occur. The  
surgeon has answered all of the   
patient's questions. Patient   
understands that  
if he/she has a mature or dense   
cataract, pseudoexfoliation   
cataract, or history  
of use of Flomax, he/she may   
require the use of [place] Ring   
and/or Vision  
Blue during surgery. Patient   
understands the risks, benefits,   
and alternatives  
to surgery.  
Current Ophthalmic Meds  
fluorometholone (FML LIQUID FILM)   
0.1 % ophthalmic suspension Use 1   
Drop in  
both eyes three times a day.  
Continue:  
Systane Complete solution instill   
1 drop 3 times daily Both Eyes.  
Plan Cataract Surgery with   
Monofocal Intraocular Lens Implant   
Left Eye on  
2024 at [facility].  
Patient cleared for surgery by Dr. Almaguer  
2. 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   
cataract of right eye - ICD9:   
366.19, ICD10:  
H25.811  
Plan Cataract Surgery with   
Monofocal Intraocular Lens Implant   
Right Eye after  
Left Eye is stable.  
3. Type 2 diabetes mellitus   
without retinopathy (HCC) - ICD9:   
250.00, ICD10:  
E11.9  
Please keep your blood sugar under   
good control to minimize risk of   
ocular  
complications from diabetes.  
Continue to monitor with primary   
care physician.  
4. Essential hypertension - ICD9:   
401.9, ICD10: I10  
Continue to monitor with primary   
care physician.  
5. Hypercholesteremia - ICD9:   
272.0, ICD10: E78.00  
Continue to monitor with primary   
care physician.  
6. Dementia, unspecified dementia   
severity, unspecified dementia   
type,  
unspecified whether behavioral,   
psychotic, or mood disturbance or   
anxiety (HCC)  
- ICD9: 294.20, ICD10: F03.90  
Continue to monitor with primary   
care physician.  
I have confirmed and edited as   
necessary the relevant HPI,   
ophthalmic history,  
ROS, and the neuro exam findings   
as obtained by others. I have seen   
and  
examined Marzena Mederos. I have   
discussed the case and the   
management of this  
patient's care with the   
Resident/Fellow, if applicable. I   
also have reviewed  
and agree with the assessment and   
plan as stated above and agree   
with all of its  
relevant components.                    [facility]  
   
                                                    10- History of   
Present illness Narrative               Formatting of this note is   
different from the original.  
ASSESSMENT/PLAN:  
1. 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   
cataract of left eye - ICD9:   
366.19, ICD10: H25.812 (primary   
diagnosis)  
  
PHYSICAL EXAM:  
  
Vital Signs:  
Blood pressure 125/80, pulse 88.  
  
Respiratory:  
Normal breath sounds, no wheezing.  
  
CARD:  
Normal heart sounds 1 & 2, normal   
sinus rhythm.  
  
  
Cataract Presurgical Documentation  
  
Cataract: Left Eye  
  
Current Visual Acuity  
Right Eye Distance CC 20/40  
Left Eye Distance CC 20/70  
  
Glare Testing:  
  
Visual Function: Marzena Mederos   
states that the decline in vision   
from the cataract impedes her   
abilities as listed in the HPI, as   
well as other activities of daily   
living.  
  
Marzena Mederos has confirmed that   
she is no longer able to function   
adequately on a day-to-day basis   
because of her current visual   
condition.  
  
Further, it is my medical opinion   
that the cataract is the primary   
cause, or at least a significantly   
contributory cause of her visual   
dysfunction. With uncomplicated   
cataract surgery and lens   
implantation, it is my expectation   
that her visual function and   
quality of life will improve,   
significantly.  
  
The risks, benefits, alternatives,   
personnel and complications of   
cataract surgery with lens   
implantation were discussed with   
Marzena Mederos in detail. She   
appeared to understand and asked   
that I proceed with plans for   
surgery.  
  
Upon eye examination, patient was   
found to have a visually   
significant cataract Left Eye.   
Discussed cataract surgery with   
patient and different intraocular   
lens implant options with patient:   
basic monofocal intraocular lens   
implant, Toric intraocular lens   
implant, and presbyopia correction   
intraocular lens implant. In my   
medical opinion, based on medical   
history and ocular examination,   
cataract surgery with intraocular   
lens implant will correct   
patient's vision and improve   
quality of patient's daily living   
activities. Patient wishes to have   
traditional cataract surgery with   
basic intraocular lens Left Eye.   
Patient wishes to have cataract   
surgery with the option stated   
above. Patient understands that an   
intraocular lens implant does not   
necessarily replace the need for   
glasses. Patient understands that   
it is impossible for the surgeon   
to inform him/her of every   
possible complication that may   
occur. The surgeon has answered   
all of the patient's questions.   
Patient understands that if he/she   
has a mature or dense cataract,   
pseudoexfoliation cataract, or   
history of use of Flomax, he/she   
may require the use of [place]ugin   
Ring and/or Vision Blue during   
surgery. Patient understands the   
risks, benefits, and alternatives   
to surgery.  
  
Current Ophthalmic Meds  
  
  
  
fluorometholone (FML LIQUID FILM)   
0.1 % ophthalmic suspension Use 1   
Drop in both eyes three times a   
day.  
  
Continue:  
Systane Complete solution instill   
1 drop 3 times daily Both Eyes.  
  
Plan Cataract Surgery with   
Monofocal Intraocular Lens Implant   
Left Eye on 2024 at   
[facility].  
  
Patient cleared for surgery by Dr. Almaguer  
  
2. 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   
cataract of right eye - ICD9:   
366.19, ICD10: H25.811  
  
Plan Cataract Surgery with   
Monofocal Intraocular Lens Implant   
Right Eye after Left Eye is   
stable.  
  
3. Type 2 diabetes mellitus   
without retinopathy (HCC) - ICD9:   
250.00, ICD10: E11.9  
  
Please keep your blood sugar under   
good control to minimize risk of   
ocular complications from   
diabetes.  
  
Continue to monitor with primary   
care physician.  
  
4. Essential hypertension - ICD9:   
401.9, ICD10: I10  
  
Continue to monitor with primary   
care physician.  
  
5. Hypercholesteremia - ICD9:   
272.0, ICD10: E78.00  
  
Continue to monitor with primary   
care physician.  
  
6. Dementia, unspecified dementia   
severity, unspecified dementia   
type, unspecified whether   
behavioral, psychotic, or mood   
disturbance or anxiety (HCC) -   
ICD9: 294.20, ICD10: F03.90  
  
Continue to monitor with primary   
care physician.  
  
I have confirmed and edited as   
necessary the relevant HPI,   
ophthalmic history, ROS, and the   
neuro exam findings as obtained by   
others. I have seen and examined   
Marzena Mederos. I have discussed   
the case and the management of   
this patient's care with the   
Resident/Fellow, if applicable. I   
also have reviewed and agree with   
the assessment and plan as stated   
above and agree with all of its   
relevant components.  
  
  
  
Electronically signed by Ronel Hays MD at 10/08/2024 1:24   
PM EDT  
documented in this encounter            [facility]  
   
                                                    10- Telephone   
encounter Note                          Formatting of this note is   
different from the original.  
Prescription Refill Information  
  
The patient has been identified by   
name and date of birth: Yes  
  
Caregiver verified no other   
encounters exist for this   
prescription request: Yes  
  
Caregiver confirmed with   
patient/requestor that no other   
refills are due, in the near   
future, with this provider at this   
time: Yes  
  
The last office visit in the   
department: 2024  
  
Does the patient have a future   
office visit with this   
provider/department: Yes  
  
Requested Prescriptions  
  
Pending Prescriptions Disp Refills  
fluorometholone (FML LIQUID FILM)   
0.1 % ophthalmic suspension 5 mL 1  
Sig: Use 1 Drop in both eyes three   
times a day.  
  
Jazmine Garvin  
2024 10:26 AM  
  
Electronically signed by Jazmine Garvin at 10/01/2024 10:27 AM EDT  
                                        [facility]  
   
                                                    10- Miscellaneous   
Notes                                   Formatting of this note is   
different from the original.  
Prescription Refill Information  
  
The patient has been identified by   
name and date of birth: Yes  
  
Caregiver verified no other   
encounters exist for this   
prescription request: Yes  
  
Caregiver confirmed with   
patient/requestor that no other   
refills are due, in the near   
future, with this provider at this   
time: Yes  
  
The last office visit in the   
department: 2024  
  
Does the patient have a future   
office visit with this   
provider/department: Yes  
  
Requested Prescriptions  
  
Pending Prescriptions Disp Refills  
fluorometholone (FML LIQUID FILM)   
0.1 % ophthalmic suspension 5 mL 1  
Sig: Use 1 Drop in both eyes three   
times a day.  
  
Jazmine Garvin  
2024 10:26 AM  
  
Electronically signed by Jazmine Garvin at 10/01/2024 10:27 AM EDT  
documented in this encounter            [facility]  
   
                                                    2024 History of   
Present illness Narrative               Formatting of this note is   
different from the original.  
Images from the original note were   
not included.  
  
  
CHIEF COMPLAINT  
Follow up testing  
  
HISTORY OF PRESENT ILLNESS  
  
Patient previously presented for   
cardiac risk stratification for   
cataract removal by Dr. Hays.   
Patient's previous EKGs showing   
nonspecific T wave changes as well   
as reporting prior infarct   
(patient unaware). A nuclear   
stress test was ordered for   
further ischemic evaluation which   
was negative for any ischemia.  
  
Patient denies any current chest   
pain/pressure/discomfort or   
shortness of breath. Patient is   
planning for cataract removal by   
Dr. Hays but awaits cardiac risk   
stratification.  
  
Cardiovascular testin.Echo ()-LVSF is   
normal with a 55-60% EF; grade I   
diastolic dysfunction; mild   
prolapse of the posterior leaflet   
of mitral valve; mild mitral valve   
regurgitation  
  
Past Medical, Surgical, and Family   
History reviewed and updated in   
chart.  
  
Reviewed all medications by   
prescribing practitioner or   
clinical pharmacist (such as   
prescriptions, OTCs, herbal   
therapies and supplements) and   
documented in the medical record.  
  
Past Medical History  
Past Medical History:  
Diagnosis Date  
Breast cancer in female (Multi)   
  
Right side  
Carcinoma in situ of vulva   
10/15/2009  
Last Assessment & Plan: Formatting   
of this note might be different   
from the original. Assessment:   
Endometrium cancer (HCC) [C54.1]   
PLAN: ROBOTIC LAPAROSCOPIC TOTAL   
HYSTERECTOMY W/ BSO UTERUS=<250G   
[66872] ROBOTIC LAPAROSCOPY   
SURGICAL W/ RETROPERITONEAL LYMPH   
NODE SAMPLING SINGLE OR MULTIPLE   
[37290] TRANSFUSION BLOOD [5719]  
Diabetes mellitus (Multi)  
Endometrial cancer (Multi)   
Hypermetropia 2015  
Hypertension  
Invasive ductal carcinoma of   
breast, female (Multi) 2017  
Malignant neoplasm of female   
breast (Multi) 2010  
Formatting of this note might be   
different from the original.   
Survivorship Care plan in abstract   
dated 9/28/15  
Orbital cellulitis on right   
2015  
Last Assessment & Plan: Formatting   
of this note might be different   
from the original. Improving Ct   
scan unchanged Steroid started by   
ophthalmology Continue vanco and   
iv rocephin and po metronidazole   
Dr Mcmahon refer no need of iv   
antibiotics On the id part the   
patient leukocytosis and symptoms   
improve while antibiotics still   
highly suspicious of orbital   
cellulitis This has been discuss   
with the pat  
Personal history of malignant   
neoplasm of breast 2012  
Presbyopia 2015  
Regular astigmatism 2015  
S/P hysterectomy with oophorectomy   
2017  
Transient cerebral ischemia   
01/15/2008  
Vulvar cancer (Multi)   
  
Social History  
Social History  
  
Tobacco Use  
Smoking status: Never  
Smokeless tobacco: Never  
Vaping Use  
Vaping status: Never Used  
Substance Use Topics  
Alcohol use: Yes  
Comment: rarely  
Drug use: Never  
  
Family History  
No family history on file.  
  
Allergies:  
Allergies  
Allergen Reactions  
Zolpidem Hives  
Onion Nausea/vomiting  
Adhesive Rash  
Redness  
Neomycin-Bacitracin-Polymyxin Rash  
Oxycodone-Acetaminophen Nausea And   
Vomiting  
Sulfamethoxazole-Trimethoprim   
Nausea And Vomiting  
Zolpidem Tartrate Rash  
  
  
Outpatient Medications:  
Current Outpatient Medications  
Medication Instructions  
albuterol 90 mcg/actuation inhaler   
2 puffs, inhalation, Every 6 hours   
PRN  
amLODIPine (NORVASC) 10 mg, oral,   
Daily  
blood sugar diagnostic (Blood   
Glucose Test) strip 100 strips,   
miscellaneous, 2 times daily  
cholecalciferol (VITAMIN D-3)   
1,000 Units, oral, Daily  
donepezil (ARICEPT) 10 mg, oral,   
Nightly  
glipiZIDE XL (GLUCOTROL XL) 2.5   
mg, oral, Daily, Do not crush,   
chew, or split.  
lancets (OneTouch Delica Plus   
Lancet) 30 gauge misc   
miscellaneous  
losartan-hydrochlorothiazide   
(Hyzaar) 100-12.5 mg tablet 1   
tablet, oral, Daily  
metFORMIN  mg 24 hr tablet 2   
tablets (1,000 mg) once daily in   
the evening. Take with meals.  
multivitamin tablet 1 tablet,   
oral, Daily  
omeprazole (PRILOSEC) 40 mg, oral,   
Daily before breakfast, Do not   
crush or chew.  
potassium chloride CR 10 mEq ER   
tablet 10 mEq, oral, 2 times daily  
simvastatin (ZOCOR) 20 mg, oral,   
Daily RT  
  
  
Labs:  
CMP:  
Recent Labs  
24  
1306 24  
1128 24  
1311 24  
1156 24  
1143  
 139 138 138 138  
K 4.2 3.4* 3.9 3.7 3.4*  
CL 99 98 100 99 98  
CO2 28 30 31 31 32  
ANIONGAP 15 14 11 12 11  
BUN 14 14 23 16 10  
CREATININE 0.81 0.81 0.84 0.78   
0.60  
EGFR 75 75 72 78 >90  
MG -- -- 1.74 -- --  
  
Recent Labs  
24  
1128 24  
1311 24  
1143 23  
0828  
ALBUMIN 4.3 4.0 4.2 4.4  
ALKPHOS 66 65 79 82  
ALT 22 14 15 13  
AST 19 23 13 13  
BILITOT 0.6 0.7 1.0 0.6  
LIPASE 19 -- -- --  
  
CBC:  
Recent Labs  
24  
1128 24  
1311 24  
1143  
WBC 9.3 12.3* 7.2  
HGB 14.6 14.3 14.4  
HCT 44.6 42.4 43.0  
 299 310  
MCV 93 93 93  
  
COAG: No results for input(s):   
 PTT ,  INR ,  HAUF ,  DDIMERVTE ,   
 HAPTOGLOBIN ,  FIBRINOGEN  in the   
last 76173 hours.  
ABO: No results for input(s):   
 ABO  in the last 93900 hours.  
HEME/ENDO:  
Recent Labs  
24  
1306 24  
1156 23  
0828  
TSH -- 1.32 --  
HGBA1C 8.2* 8.2* 6.8*  
  
CARDIAC:  
Recent Labs  
24  
1311 24  
1143  
TROPHS 7 8  
  
Recent Labs  
23  
0828  
CHOL 182  
HDL 60.0  
TRIG 126  
  
MICRO: No results for input(s):   
 ESR ,  CRP ,  PROCAL  in the last   
89468 hours.  
No results found for the last 90   
days.  
  
Notable Studies: imaging   
personally reviewed  
EKG:  
Encounter Date: 24  
ECG 12 Lead  
Result Value  
Ventricular Rate 80  
Atrial Rate 80  
ID Interval 198  
QRS Duration 90  
QT Interval 380  
QTC Calculation(Bazett) 438  
P Axis 66  
R Axis 49  
T Axis 8  
QRS Count 13  
Q Onset 229  
P Onset 130  
P Offset 197  
T Offset 419  
QTC Fredericia 418  
Narrative  
Sinus rhythm with frequent   
Premature ventricular complexes  
Cannot rule out Anterior infarct   
(cited on or before 03-MAY-2024)  
Abnormal ECG  
When compared with ECG of   
03-MAY-2024 11:25,  
Criteria for Inferior infarct are   
no longer Present  
Inverted T waves have replaced   
nonspecific T wave abnormality in   
Inferior leads  
See ED provider note for full   
interpretation and clinical   
correlation  
Confirmed by Valery Chino   
([id number]) on 2024 5:27:35 PM  
  
Echocardiogram: No results found   
for this or any previous visit   
from the past 1825 days.  
  
Stress Testing: No results found   
for this or any previous visit   
from the past 1825 days.  
  
Cardiac Catheterization: No   
results found for this or any   
previous visit from the past 1825   
days.  
No results found for this or any   
previous visit from the past 3650   
days.  
  
  
REVIEW OF SYSTEMS  
A 10-point system review was   
completed and was negative except   
as noted in the HPI.  
  
VITALS  
Vitals:  
24 1431  
BP: 108/64  
Pulse: 75  
SpO2: 98%  
  
PHYSICAL EXAM  
General: awake, alert and   
oriented. No acute distress.  
Skin: Skin is warm, dry and intact   
without rashes or lesions.  
HEENT: normocephalic, atraumatic;   
conjunctivae are clear without   
exudates or hemorrhage. Sclera is   
non-icteric. Eyelids are normal in   
appearance without swelling or   
lesions. Hearing intact. Nares are   
patent bilaterally. Moist mucous   
membranes.  
Cardiovascular: heart rate and   
rhythm are normal. No murmurs,   
gallops, or rubs are auscultated.   
S1 and S2 are heard and are of   
normal intensity. No JVD, no   
carotid bruits  
Respiratory: bilateral lung sounds   
clear to auscultations without   
rales, rhonchi, or wheezes.  
Neurological: no focal deficits;   
gait steady  
Psychiatric: appropriate mood and   
affect; good judgment and insight  
  
ASSESSMENT AND PLAN  
Assessment/Plan  
Diagnoses and all orders for this   
visit:  
Encounter for pre-operative   
cardiovascular clearance  
  
Cardiac risk stratification:  
-Patient is at Class I risk of   
perioperative cardiac events with   
upcoming procedure. Okay to go for   
proceed without further cardiac   
testing.  
  
RTC: PRN  
  
Thank you for allowing me to   
participate in the care of this   
patient. Please reach me out if   
you have any questions or if you   
need any clarifications regarding   
the patient's care.  
  
Bernice Vital DNP, DAMON, FNP-C  
Division of Cardiovascular   
Medicine  
[facility]   
[facility]  
Electronically signed by DAMON Neal-CNP, KEITH at 2024   
2:51 PM EDT  
documented in this encounter            [facility]  
Work Phone:   
[phone]  
   
                                                    2024 Evaluation +   
Plan note                               Associated Problem(s): Hypokalemia  
Formatting of this note might be   
different from the original.  
-Potassium is corrected after   
doubling her potassium dose  
Electronically signed by Sharifa Almaguer DO at 2024 10:42 AM   
EDT  
                                        [facility]  
Work Phone:   
[phone]  
   
                                                    2024 Evaluation +   
Plan note                               Associated Problem(s): Benign   
liver cyst  
Formatting of this note might be   
different from the original.  
-Follow-up ultrasound to the   
abnormality identified on CT scan   
shows a simple liver cyst  
Electronically signed by Sharifa Almaguer DO at 2024 10:42 AM   
EDT  
                                        [facility]  
Work Phone:   
[phone]  
   
                                                    2024 Evaluation +   
Plan note                               Associated Problem(s): Controlled   
type 2 diabetes mellitus without   
complication, without long-term   
current use of insulin (Multi)  
Formatting of this note might be   
different from the original.  
-Her blood sugars fluctuate widely   
and therefore we will keep her   
medicine the same and not increase   
the dose  
-Her hemoglobin A1c is 8.2 and we   
are satisfied at that level-we   
will check it again when she comes   
back in 6 months  
  
Electronically signed by Sharifa Almaguer DO at 2024 10:42 AM   
EDT  
                                        [facility]  
Work Phone:   
[phone]  
   
                                                    2024 Miscellaneous   
Notes                                   Associated Problem(s): Hypokalemia  
Formatting of this note might be   
different from the original.  
-Potassium is corrected after   
doubling her potassium dose  
Electronically signed by Sharifa Almaguer DO at 2024 10:42 AM   
EDT  
Associated Problem(s): Benign   
liver cyst  
Formatting of this note might be   
different from the original.  
-Follow-up ultrasound to the   
abnormality identified on CT scan   
shows a simple liver cyst  
Electronically signed by Sharifa Almaguer DO at 2024 10:42 AM   
EDT  
Associated Problem(s): Controlled   
type 2 diabetes mellitus without   
complication, without long-term   
current use of insulin (Multi)  
Formatting of this note might be   
different from the original.  
-Her blood sugars fluctuate widely   
and therefore we will keep her   
medicine the same and not increase   
the dose  
-Her hemoglobin A1c is 8.2 and we   
are satisfied at that level-we   
will check it again when she comes   
back in 6 months  
  
Electronically signed by Sharifa Almaguer DO at 2024 10:42 AM   
EDT  
Associated Problem(s): Weight   
loss, unintentional  
Formatting of this note might be   
different from the original.  
-Her weight was doing very well   
until recently when she contracted   
COVID-19 infection  
-I do believe that the recent   
weight change was because of her   
infection as she was not feeling   
well   
-Her  reports she is eating   
much better now and he will weigh   
her at home. He knows to call if   
her weight is going down  
Electronically signed by Sharifa Almaguer DO at 2024 10:41 AM   
EDT  
Associated Problem(s): Abnormal   
EKG  
Formatting of this note might be   
different from the original.  
-She is currently being assessed   
by cardiology because of an   
abnormal EKG and needing clearance   
for her eye procedure  
-She will have a stress test this   
coming Thursday and we talked   
about holding her diabetic   
medications the morning of  
Electronically signed by Sharifa Almaguer DO at 2024 10:41 AM   
EDT  
Associated Problem(s): HTN   
(hypertension)  
Formatting of this note might be   
different from the original.  
-Blood pressures currently   
well-controlled so we will   
continue with her current   
antihypertensive regimen  
Electronically signed by Sharifa Almaguer DO at 2024 10:40 AM   
EDT  
documented in this encounter            [facility]  
Work Phone:   
[phone]  
   
                                                    2024 Evaluation +   
Plan note                               Associated Problem(s): Weight   
loss, unintentional  
Formatting of this note might be   
different from the original.  
-Her weight was doing very well   
until recently when she contracted   
COVID-19 infection  
-I do believe that the recent   
weight change was because of her   
infection as she was not feeling   
well  
-Her  reports she is eating   
much better now and he will weigh   
her at home. He knows to call if   
her weight is going down  
Electronically signed by Sharifa Almaguer DO at 2024 10:41 AM   
EDT  
                                        [facility]  
Work Phone:   
[phone]  
   
                                                    2024 Evaluation +   
Plan note                               Associated Problem(s): Abnormal   
EKG  
Formatting of this note might be   
different from the original.  
-She is currently being assessed   
by cardiology because of an   
abnormal EKG and needing clearance   
for her eye procedure  
-She will have a stress test this   
coming Thursday and we talked   
about holding her diabetic   
medications the morning of  
Electronically signed by Sharifa Almaguer DO at 2024 10:41 AM   
EDT  
                                        [facility]  
Work Phone:   
[phone]  
   
                                                    2024 Evaluation +   
Plan note                               Associated Problem(s): HTN   
(hypertension)  
Formatting of this note might be   
different from the original.  
-Blood pressures currently   
well-controlled so we will   
continue with her current   
antihypertensive regimen  
Electronically signed by Sharifa Almaguer DO at 2024 10:40 AM   
EDT  
                                        [facility]  
Work Phone:   
[phone]  
   
                                                    2024 History of   
Present illness Narrative               Formatting of this note is   
different from the original.  
Subjective  
Patient ID: Marzena Mederos is a   
78 y.o. female who presents for   
Follow-up.  
HPI  
She is here today for follow-up   
and accompanied by her .   
Unfortunately she contracted   
COVID-19 infection and had a   
pretty severe cough. She was seen   
at urgent care and given   
medications for symptomatic   
treatment. Her  states it   
worked well and she is finally   
getting over the virus. We also   
had sent her for an ultrasound of   
her liver as a follow-up to an   
abnormal finding on CT scan. The   
good news is it did not show any   
significant abnormalities and the   
radiologist reporting that is a   
cyst.  
We also reviewed her most recent   
hemoglobin A1c which came back at   
8.2. Unfortunately her blood   
glucose levels fluctuate quite   
dramatically and therefore we will   
keep her medicine the same for   
now. I explained that as long as   
we can keep her around 8 that is   
acceptable for a woman her age.  
Also her potassium level is   
perfect this time and we will have   
her take 2 of the potassium   
tablets daily  
She also saw the cardiology team   
for her abnormal EKG. She was   
scheduled to have a stress test   
but had to be canceled because of   
her COVID infection and now she is   
scheduled for this coming   
Thursday. Her  had   
questions about the medications   
and I did explain that she should   
hold her diabetic medicines until   
after the test is complete. He   
will call the cardiology team   
regarding her blood pressure   
medicines just to be sure that   
they want her to have them in the   
morning.  
We decided that she can safely   
return in 6 months and sooner if   
any problems.  
Objective  
Physical Exam  
Vitals and nursing note reviewed.  
Constitutional:  
General: She is not in acute   
distress.  
Appearance: Normal appearance.  
HENT:  
Head: Normocephalic and   
atraumatic.  
Eyes:  
Conjunctiva/sclera: Conjunctivae   
normal.  
Cardiovascular:  
Rate and Rhythm: Normal rate and   
regular rhythm.  
Heart sounds: Normal heart sounds.  
Pulmonary:  
Effort: No respiratory distress.  
Breath sounds: No wheezing.  
Abdominal:  
Palpations: Abdomen is soft.  
Tenderness: There is no abdominal   
tenderness. There is no guarding.  
Musculoskeletal:  
General: No swelling. Normal range   
of motion.  
Skin:  
General: Skin is warm and dry.  
Neurological:  
General: No focal deficit present.  
Mental Status: She is alert and   
oriented to person, place, and   
time.  
Psychiatric:  
Behavior: Behavior normal.  
  
Recent Results (from the past 672   
hour(s))  
POCT SARS-COV-2/FLU/RSV PCR   
SYMPTOMATIC manually resulted  
Collection Time: 24 10:43 AM  
Result Value Ref Range  
POC Coronavirus 2019, PCR Detected   
(A) Not Detected  
POC Flu A Result Not Detected Not   
Detected  
POC Flu B Result Not Detected Not   
Detected  
POC RSV PCR Not Detected Not   
Detected  
Hemoglobin A1C  
Collection Time: 24 1:06 PM  
Result Value Ref Range  
Hemoglobin A1C 8.2 (H) See comment   
%  
Estimated Average Glucose 189 Not   
Established mg/dL  
Basic Metabolic Panel  
Collection Time: 24 1:06 PM  
Result Value Ref Range  
Glucose 138 (H) 74 - 99 mg/dL  
Sodium 138 136 - 145 mmol/L  
Potassium 4.2 3.5 - 5.3 mmol/L  
Chloride 99 98 - 107 mmol/L  
Bicarbonate 28 21 - 32 mmol/L  
Anion Gap 15 10 - 20 mmol/L  
Urea Nitrogen 14 6 - 23 mg/dL  
Creatinine 0.81 0.50 - 1.05 mg/dL  
eGFR 75 >60 mL/min/1.73m*2  
Calcium 9.8 8.6 - 10.3 mg/dL  
  
Assessment/Plan  
Problem List Items Addressed This   
Visit  
  
ICD-10-CM  
HTN (hypertension) I10  
-Blood pressures currently   
well-controlled so we will   
continue with her current   
antihypertensive regimen  
  
  
Relevant Medications  
amLODIPine (Norvasc) 10 mg tablet  
losartan-hydrochlorothiazide   
(Hyzaar) 100-12.5 mg tablet  
potassium chloride CR 10 mEq ER   
tablet  
Controlled type 2 diabetes   
mellitus without complication,   
without long-term current use of   
insulin (Multi) E11.9  
-Her blood sugars fluctuate widely   
and therefore we will keep her   
medicine the same and not increase   
the dose  
-Her hemoglobin A1c is 8.2 and we   
are satisfied at that level-we   
will check it again when she comes   
back in 6 months  
  
  
  
Relevant Medications  
glipiZIDE XL (Glucotrol XL) 2.5 mg   
24 hr tablet  
Other Relevant Orders  
Basic Metabolic Panel  
Hemoglobin A1C  
Mixed hyperlipidemia E78.2  
Relevant Medications  
simvastatin (Zocor) 20 mg tablet  
Mild dementia without behavioral   
disturbance, psychotic   
disturbance, mood disturbance, or   
anxiety, unspecified dementia type   
(Multi) F03.A0  
Relevant Medications  
donepezil (Aricept) 10 mg tablet  
Gastroesophageal reflux disease   
without esophagitis K21.9  
Relevant Medications  
omeprazole (PriLOSEC) 40 mg DR   
capsule  
Abnormal EKG - Primary R94.31  
-She is currently being assessed   
by cardiology because of an   
abnormal EKG and needing clearance   
for her eye procedure  
-She will have a stress test this   
coming Thursday and we talked   
about holding her diabetic   
medications the morning of  
  
  
Weight loss, unintentional R63.4  
-Her weight was doing very well   
until recently when she contracted   
COVID-19 infection  
-I do believe that the recent   
weight change was because of her   
infection as she was not feeling   
well  
-Her  reports she is eating   
much better now and he will weigh   
her at home. He knows to call if   
her weight is going down  
  
  
Hypokalemia E87.6  
-Potassium is corrected after   
doubling her potassium dose  
  
  
Benign liver cyst K76.89  
-Follow-up ultrasound to the   
abnormality identified on CT scan   
shows a simple liver cyst  
  
  
PT INSTRUCTIONS  
As we discussed she will not take   
her diabetic medications the   
morning of her stress test since   
she will not be eating and   
exercising. Specifically hold the   
metformin and the glipizide until   
after her test and she is eating   
normally  
Please clarify with the cardiology   
team about her other medications   
prior to her exam  
Please also weigh once a week and   
if the weight is still going down   
please contact me  
We have decided that you can   
safely return in 6 months. Please   
keep checking the sugars   
periodically and if they get high   
here than what they are now please   
call as we would then make changes   
in the diabetic medications  
Please remember to get your blood   
work done just prior to your next   
follow-up visit  
  
  
Sharifa Almaguer DO  
Electronically signed by Sharifa Almaguer DO at 2024 10:45 AM   
EDT  
documented in this encounter            [facility]  
Work Phone:   
[phone]  
   
                                        2024 Instructions   
  
  
Sharifa Almaguer DO - 2024   
10:20 AM EDTFormatting of this   
note might be different from the   
original.  
As we discussed she will not take   
her diabetic medications the   
morning of her stress test since   
she will not be eating and   
exercising. Specifically hold the   
metformin and the glipizide until   
after her test and she is eating   
normally  
Please clarify with the cardiology   
team about her other medications   
prior to her exam  
Please also weigh once a week and   
if the weight is still going down   
please contact me  
We have decided that you can   
safely return in 6 months. Please   
keep checking the sugars   
periodically and if they get high   
here than what they are now please   
call as we would then make changes   
in the diabetic medications  
Please remember to get your blood   
work done just prior to your next   
follow-up visit  
Electronically signed by Sharifa Almaguer DO at 2024 10:43 AM   
EDT  
  
documented in this encounter            [facility]  
Work Phone:   
[phone]  
   
                                                    2024 History of   
Present illness Narrative               Formatting of this note is   
different from the original.  
78 y.o. female presents with   
 for evaluation of URI.   
Symptoms including cough,   
congestion, body aches, malaise,   
and headache have been present for   
2 days and refractory to OTC meds.   
No fever, chills, nausea,   
vomiting, abdominal pain, CP,   
orthopnea or SOB. Recently had   
ECHO which was normal. No   
exacerbating factors. No known   
COVID 19/flu exposure.  
  
Vitals:  
24 1002  
BP: 130/80  
Pulse: 84  
Resp: 16  
Temp: 36.3 C (97.3 F)  
SpO2: 100%  
  
Allergies  
Allergen Reactions  
Zolpidem Hives  
Onion Nausea/vomiting  
Adhesive Rash  
Redness  
Neomycin-Bacitracin-Polymyxin Rash  
Oxycodone-Acetaminophen Nausea And   
Vomiting  
Sulfamethoxazole-Trimethoprim   
Nausea And Vomiting  
Zolpidem Tartrate Rash  
  
Medication Documentation Review   
Audit  
  
Reviewed by Riya Kebede MA   
(Medical Assistant) on 24 at   
1001  
  
Medication Order Taking? Sig   
Documenting Provider Last Dose   
Status  
amLODIPine (Norvasc) 10 mg tablet   
[id number] Yes TAKE 1 TABLET (10   
MG) BY MOUTH ONCE DAILY. Sharifa Almaguer, DO Taking Active  
blood sugar diagnostic (Blood   
Glucose Test) strip [id number] Yes   
100 strips 2 times a day. Sharifa Almaguer, DO Taking Active  
cholecalciferol (Vitamin D-3) 25   
MCG (1000 UT) capsule [id number]   
Yes Take 1 capsule (25 mcg) by   
mouth once daily. Historical   
Provider, MD Taking Active  
donepezil (Aricept) 10 mg tablet   
[id number] Yes Take 1 tablet (10   
mg) by mouth once daily at   
bedtime. Sharifa Almaguer, DO   
Taking Active  
glipiZIDE XL (Glucotrol XL) 2.5 mg   
24 hr tablet [id number] Yes Take 1   
tablet (2.5 mg) by mouth once   
daily. Do not crush, chew, or   
split. Sharifa Almaguer, DO Taking   
Active  
losartan-hydrochlorothiazide   
(Hyzaar) 100-12.5 mg tablet   
[id number] Yes Take 1 tablet by   
mouth once daily. Sharifa Almaguer, DO Taking Active  
metFORMIN  mg 24 hr tablet   
 Yes 2 tablets (1,000 mg)   
once daily in the evening. Take   
with meals. Sharifa Almaguer, DO   
Taking Active  
multivitamin tablet  Yes   
Take 1 tablet by mouth once daily.   
Historical Provider, MD Taking   
Active  
omeprazole (PriLOSEC) 40 mg DR   
capsule [id number] Take 1 capsule   
(40 mg) by mouth once daily in the   
morning. Take before meals. Do not   
crush or chew. Sharifa Almaguer DO  24 2359  
potassium chloride CR 10 mEq ER   
tablet [id number] Yes Take 1 tablet   
(10 mEq) by mouth 2 times a day.   
Sharifa Almaguer DO Taking Active  
simvastatin (Zocor) 20 mg tablet   
 Yes Take 1 tablet (20   
mg) by mouth once daily. Sharifa Almaguer DO Taking Active  
  
  
  
  
  
  
Past Medical History:  
Diagnosis Date  
Breast cancer in female (Multi)   
  
Right side  
Carcinoma in situ of vulva   
10/15/2009  
Last Assessment & Plan: Formatting   
of this note might be different   
from the original. Assessment:   
Endometrium cancer (HCC) [C54.1]   
PLAN: ROBOTIC LAPAROSCOPIC TOTAL   
HYSTERECTOMY W/ BSO UTERUS=<250G   
[92049] ROBOTIC LAPAROSCOPY   
SURGICAL W/ RETROPERITONEAL LYMPH   
NODE SAMPLING SINGLE OR MULTIPLE   
[65897] TRANSFUSION BLOOD [5719]  
Diabetes mellitus (Multi)  
Endometrial cancer (Multi)   
Hypermetropia 2015  
Hypertension  
Invasive ductal carcinoma of   
breast, female (Multi) 2017  
Malignant neoplasm of female   
breast (Multi) 2010  
Formatting of this note might be   
different from the original.   
Survivorship Care plan in abstract   
dated 9/28/15  
Orbital cellulitis on right   
2015  
Last Assessment & Plan: Formatting   
of this note might be different   
from the original. Improving Ct   
scan unchanged Steroid started by   
ophthalmology Continue vanco and   
iv rocephin and po metronidazole   
Dr Mcmahon refer no need of iv   
antibiotics On the id part the   
patient leukocytosis and symptoms   
improve while antibiotics still   
highly suspicious of orbital   
cellulitis This has been discuss   
with the pat  
Personal history of malignant   
neoplasm of breast 2012  
Presbyopia 2015  
Regular astigmatism 2015  
S/P hysterectomy with oophorectomy   
2017  
Transient cerebral ischemia   
01/15/2008  
Vulvar cancer (Multi)   
  
Past Surgical History:  
Procedure Laterality Date  
BREAST LUMPECTOMY Right  
She went to the [facility]   
in [place] in 2013  
HAND SURGERY Right   
By Dr. Dno  
HYSTERECTOMY 2013  
For endometrial cancer  
  
ROS  
See HPI  
  
Physical Exam  
Vitals and nursing note reviewed.  
Constitutional:  
General: She is not in acute   
distress.  
Appearance: Normal appearance. She   
is not ill-appearing or   
toxic-appearing.  
HENT:  
Head: Normocephalic and   
atraumatic.  
Right Ear: Tympanic membrane and   
ear canal normal.  
Left Ear: Tympanic membrane and   
ear canal normal.  
Nose: Congestion present.  
Mouth/Throat:  
Mouth: Mucous membranes are moist.  
Pharynx: Oropharynx is clear.   
Posterior oropharyngeal erythema   
(mild) present.  
Eyes:  
Extraocular Movements: Extraocular   
movements intact.  
Conjunctiva/sclera: Conjunctivae   
normal.  
Pupils: Pupils are equal, round,   
and reactive to light.  
Cardiovascular:  
Rate and Rhythm: Normal rate and   
regular rhythm.  
Pulmonary:  
Effort: Pulmonary effort is   
normal.  
Breath sounds: Normal breath   
sounds.  
Comments: Dry cough  
Lymphadenopathy:  
Cervical: Cervical adenopathy   
present.  
Skin:  
General: Skin is warm and dry.  
Capillary Refill: Capillary refill   
takes less than 2 seconds.  
Neurological:  
General: No focal deficit present.  
Mental Status: She is alert and   
oriented to person, place, and   
time.  
Psychiatric:  
Mood and Affect: Mood normal.  
Behavior: Behavior normal.  
  
Recent Results (from the past 1   
hour(s))  
POCT SARS-COV-2/FLU/RSV PCR   
SYMPTOMATIC manually resulted  
Collection Time: 24 10:43 AM  
Result Value Ref Range  
POC Coronavirus 2019, PCR Detected   
(A) Not Detected  
POC Flu A Result Not Detected Not   
Detected  
POC Flu B Result Not Detected Not   
Detected  
POC RSV PCR Not Detected Not   
Detected  
  
Assessment/Plan/MDM  
Marzena was seen today for cough.  
Diagnoses and all orders for this   
visit:  
COVID (Primary)  
- methylPREDNISolone (Medrol   
Dospak) 4 mg tablets; Take as   
directed on package.  
- albuterol 90 mcg/actuation   
inhaler; Inhale 2 puffs every 6   
hours if needed for wheezing.  
Acute upper respiratory infection  
- POCT SARS-COV-2/FLU/RSV PCR   
SYMPTOMATIC manually resulted  
  
Discussed quarantine restrictions   
with COVID-19. Encouraged pt to   
continue otc cold remedies PRN,   
push PO fluids and rest. Patient's   
clinical presentation is otherwise   
unremarkable at this time. Patient   
is discharged with instructions to   
follow-up with primary care or   
seek emergency medical attention   
for worsening symptoms or any new   
concerns.  
  
I did personally review Marzena's   
past medical history, surgical   
history, social history, as well   
as family history (when relevant).   
In this case, I also oversaw the   
her drug management by reviewing   
her medication list, allergy list,   
as well as the medications that I   
prescribed during the UC course   
and/or recommended as an   
out-patient (including possible   
OTC medications such as   
acetaminophen, NSAIDs , etc).  
  
After reviewing the items above, I   
did look at previous medical   
documentation, such as recent   
hospitalizations, office visits,   
and/or recent consultations with   
PCP/specialist.  
  
SDOH: Another factor that I   
considered in Marzena's care was   
her Social Determinants of Health   
(SDOH). During this UC encounter,   
she did not have social   
determinants of health. Those SDOH   
influencing Marzena's care are:   
none  
  
Seth Ceballos CNP  
[facility]  
[phone]  
Electronically signed by SAI Zarco at 2024   
10:58 AM EDT  
documented in this encounter            [facility]  
Work Phone:   
[phone]  
   
                                                    2024 History of   
Present illness Narrative               Formatting of this note is   
different from the original.  
Images from the original note were   
not included.  
  
  
CHIEF COMPLAINT  
Cardiac risk stratification  
  
HISTORY OF PRESENT ILLNESS  
Procedure: cataract removal  
Provider: Dr. Hays  
Date: TBD  
  
Patient presents for cardiac risk   
stratification prior to cataract   
removal. Patient denies any   
previous or current chest   
pain/pressure/discomfort, SOB,   
palpitations, orthopnea/PND,   
dizziness, or lower leg edema.   
When reviewing her previous EKGs   
she has had nonspecific T wave   
changes as well as reporting prior   
infarct.  
  
Cardiovascular testing:  
Echo ()-LVSF is normal   
with a 55-60% EF; grade I   
diastolic dysfunction; mild   
prolapse of the posterior leaflet   
of mitral valve; mild mitral valve   
regurgitation  
  
Past Medical, Surgical, and Family   
History reviewed and updated in   
chart.  
  
Reviewed all medications by   
prescribing practitioner or   
clinical pharmacist (such as   
prescriptions, OTCs, herbal   
therapies and supplements) and   
documented in the medical record.  
  
Past Medical History  
Past Medical History:  
Diagnosis Date  
Breast cancer in female (Multi)   
  
Right side  
Carcinoma in situ of vulva   
10/15/2009  
Last Assessment & Plan: Formatting   
of this note might be different   
from the original. Assessment:   
Endometrium cancer (HCC) [C54.1]   
PLAN: ROBOTIC LAPAROSCOPIC TOTAL   
HYSTERECTOMY W/ BSO UTERUS=<250G   
[32325] ROBOTIC LAPAROSCOPY   
SURGICAL W/ RETROPERITONEAL LYMPH   
NODE SAMPLING SINGLE OR MULTIPLE   
[08824] TRANSFUSION BLOOD [5719]  
Diabetes mellitus (Multi)  
Endometrial cancer (Multi)   
Hypermetropia 2015  
Hypertension  
Invasive ductal carcinoma of   
breast, female (Multi) 2017  
Malignant neoplasm of female   
breast (Multi) 2010  
Formatting of this note might be   
different from the original.   
Survivorship Care plan in abstract   
dated 9/28/15  
Orbital cellulitis on right   
2015  
Last Assessment & Plan: Formatting   
of this note might be different   
from the original. Improving Ct   
scan unchanged Steroid started by   
ophthalmology Continue vanco and   
iv rocephin and po metronidazole   
Dr Mcmahon refer no need of iv   
antibiotics On the id part the   
patient leukocytosis and symptoms   
improve while antibiotics still   
highly suspicious of orbital   
cellulitis This has been discuss   
with the pat  
Personal history of malignant   
neoplasm of breast 2012  
Presbyopia 2015  
Regular astigmatism 2015  
S/P hysterectomy with oophorectomy   
2017  
Transient cerebral ischemia   
01/15/2008  
Vulvar cancer (Multi)   
  
Social History  
Social History  
  
Tobacco Use  
Smoking status: Never  
Smokeless tobacco: Never  
Vaping Use  
Vaping status: Never Used  
Substance Use Topics  
Alcohol use: Yes  
Comment: rarely  
Drug use: Never  
  
Family History  
No family history on file.  
  
Allergies:  
Allergies  
Allergen Reactions  
Zolpidem Hives  
Onion Nausea/vomiting  
Adhesive Rash  
Redness  
Neomycin-Bacitracin-Polymyxin Rash  
Oxycodone-Acetaminophen Nausea And   
Vomiting  
Sulfamethoxazole-Trimethoprim   
Nausea And Vomiting  
Zolpidem Tartrate Rash  
  
  
Outpatient Medications:  
Current Outpatient Medications  
Medication Instructions  
amLODIPine (NORVASC) 10 mg, oral,   
Daily  
blood sugar diagnostic (Blood   
Glucose Test) strip 100 strips,   
miscellaneous, 2 times daily  
cholecalciferol (VITAMIN D-3)   
1,000 Units, oral, Daily  
donepezil (ARICEPT) 10 mg, oral,   
Nightly  
losartan-hydrochlorothiazide   
(Hyzaar) 100-12.5 mg tablet 1   
tablet, oral, Daily  
metFORMIN  mg 24 hr tablet 2   
tablets (1,000 mg) once daily in   
the evening. Take with meals.  
multivitamin tablet 1 tablet,   
oral, Daily  
omeprazole (PRILOSEC) 40 mg, oral,   
Daily before breakfast, Do not   
crush or chew.  
potassium chloride CR 10 mEq ER   
tablet 10 mEq, oral, 2 times daily  
simvastatin (ZOCOR) 20 mg, oral,   
Daily RT  
  
  
Labs:  
CMP:  
Recent Labs  
24  
1128 24  
1311 24  
1156 24  
1143 23  
0828  
 138 138 138 134*  
K 3.4* 3.9 3.7 3.4* 4.2  
CL 98 100 99 98 96*  
CO2 30 31 31 32 31  
ANIONGAP 14 11 12 11 11  
BUN 14 23 16 10 12  
CREATININE 0.81 0.84 0.78 0.60   
0.64  
EGFR 75 72 78 >90 >90  
MG -- 1.74 -- -- --  
  
Recent Labs  
24  
1128 24  
1311 24  
1143 23  
0828  
ALBUMIN 4.3 4.0 4.2 4.4  
ALKPHOS 66 65 79 82  
ALT 22 14 15 13  
AST 19 23 13 13  
BILITOT 0.6 0.7 1.0 0.6  
LIPASE 19 -- -- --  
  
CBC:  
Recent Labs  
24  
1128 24  
1311 24  
1143  
WBC 9.3 12.3* 7.2  
HGB 14.6 14.3 14.4  
HCT 44.6 42.4 43.0  
 299 310  
MCV 93 93 93  
  
COAG: No results for input(s):   
 PTT ,  INR ,  HAUF ,  DDIMERVTE ,   
 HAPTOGLOBIN ,  FIBRINOGEN  in the   
last 15281 hours.  
ABO: No results for input(s):   
 ABO  in the last 65336 hours.  
HEME/ENDO:  
Recent Labs  
24  
1156 23  
0828  
TSH 1.32 --  
HGBA1C 8.2* 6.8*  
  
CARDIAC:  
Recent Labs  
24  
1311 24  
1143  
TROPHS 7 8  
  
Recent Labs  
23  
0828  
CHOL 182  
HDL 60.0  
TRIG 126  
  
MICRO: No results for input(s):   
 ESR ,  CRP ,  PROCAL  in the last   
84220 hours.  
No results found for the last 90   
days.  
  
Notable Studies: imaging   
personally reviewed  
EKG:  
Encounter Date: 24  
ECG 12 Lead  
Result Value  
Ventricular Rate 80  
Atrial Rate 80  
ID Interval 198  
QRS Duration 90  
QT Interval 380  
QTC Calculation(Bazett) 438  
P Axis 66  
R Axis 49  
T Axis 8  
QRS Count 13  
Q Onset 229  
P Onset 130  
P Offset 197  
T Offset 419  
QTC Fredericia 418  
Narrative  
Sinus rhythm with frequent   
Premature ventricular complexes  
Cannot rule out Anterior infarct   
(cited on or before 03-MAY-2024)  
Abnormal ECG  
When compared with ECG of   
03-MAY-2024 11:25,  
Criteria for Inferior infarct are   
no longer Present  
Inverted T waves have replaced   
nonspecific T wave abnormality in   
Inferior leads  
See ED provider note for full   
interpretation and clinical   
correlation  
Confirmed by Valery Chino   
([id number]) on 2024 5:27:35 PM  
  
Echocardiogram: No results found   
for this or any previous visit   
from the past 1825 days.  
  
Stress Testing: No results found   
for this or any previous visit   
from the past 1825 days.  
  
Cardiac Catheterization: No   
results found for this or any   
previous visit from the past 1825   
days.  
No results found for this or any   
previous visit from the past 3650   
days.  
  
  
REVIEW OF SYSTEMS  
A 10-point system review was   
completed and was negative except   
as noted in the HPI.  
  
VITALS  
Vitals:  
24 1335  
BP: 104/70  
Pulse: 79  
SpO2: 96%  
  
PHYSICAL EXAM  
General: awake, alert and   
oriented. No acute distress.  
Skin: Skin is warm, dry and intact   
without rashes or lesions.  
HEENT: normocephalic, atraumatic;   
conjunctivae are clear without   
exudates or hemorrhage. Sclera is   
non-icteric. Eyelids are normal in   
appearance without swelling or   
lesions. Hearing intact. Nares are   
patent bilaterally. Moist mucous   
membranes.  
Cardiovascular: heart rate and   
rhythm are normal. No murmurs,   
gallops, or rubs are auscultated.   
S1 and S2 are heard and are of   
normal intensity. No JVD, no   
carotid bruits  
Respiratory: bilateral lung sounds   
clear to auscultations without   
rales, rhonchi, or wheezes. No   
accessory muscle use or stridor  
Musculoskeletal: ROM intact, no   
deformities  
Extremities: pulses palpable   
bilaterally; no swelling or   
erythema  
Neurological: no focal deficits;   
gait steady  
Psychiatric: appropriate mood and   
affect; good judgment and insight  
  
ASSESSMENT AND PLAN  
Assessment/Plan  
Diagnoses and all orders for this   
visit:  
Encounter for pre-operative   
cardiovascular clearance  
Abnormal EKG  
Mitral valve prolapse determined   
by imaging  
-Reviewed recent echo results;   
will monitor for now, no prior   
echo for comparison  
-Patient is asymptomatic, however,   
I do not like the changes I have   
seen on the current and previous   
EKG, therefore, I will order a   
nuclear stress test to rule out   
ischemia. Patient and spouse   
agreeable and informed, if   
abnormal a LHC will be recommended  
  
RTC: after testing  
  
Thank you for allowing me to   
participate in the care of this   
patient. Please reach me out if   
you have any questions or if you   
need any clarifications regarding   
the patient's care.  
  
Bernice Vital DNP, APRN, FNP-C  
Division of Cardiovascular   
Medicine  
[facility]   
[place]  
[facility]  
Electronically signed by DAMON Neal-CNP, KEITH at 2024   
2:09 PM EDT  
documented in this encounter            [facility]  
Work Phone:   
[phone]  
   
                                                    2024 Evaluation +   
Plan note                               Associated Problem(s): Hypokalemia  
Formatting of this note might be   
different from the original.  
-She will increase her potassium   
intake from 10 mill equivalents   
once daily to twice daily  
-She will have a repeat potassium   
done just prior to her next   
follow-up visit in September  
Electronically signed by Sharifa Almaguer DO at 2024 11:48 AM   
EDT  
                                        [facility]  
Work Phone:   
[phone]  
   
                                                    2024 Evaluation +   
Plan note                               Associated Problem(s): Weight   
loss, unintentional  
Formatting of this note might be   
different from the original.  
-Weight has stabilized and she   
will focus on keeping her caloric   
intake at a satisfactory level  
-CT scan of the abdomen did not   
reveal any contributing factors   
although we are ordering an   
ultrasound of the liver as a   
follow-up to her liver lesions and   
I will call them with the results  
Electronically signed by Sharifa Almaguer DO at 2024 11:48 AM   
EDT  
                                        [facility]  
Work Phone:   
[phone]  
   
                                                    2024 Miscellaneous   
Notes                                   Associated Problem(s): Hypokalemia  
Formatting of this note might be   
different from the original.  
-She will increase her potassium   
intake from 10 mill equivalents   
once daily to twice daily  
-She will have a repeat potassium   
done just prior to her next   
follow-up visit in September  
Electronically signed by Sharifa Almaguer DO at 2024 11:48 AM   
EDT  
Associated Problem(s): Weight   
loss, unintentional  
Formatting of this note might be   
different from the original.  
-Weight has stabilized and she   
will focus on keeping her caloric   
intake at a satisfactory level  
-CT scan of the abdomen did not   
reveal any contributing factors   
although we are ordering an   
ultrasound of the liver as a   
follow-up to her liver lesions and   
I will call them with the results  
Electronically signed by Sharifa Almaguer DO at 2024 11:48 AM   
EDT  
Associated Problem(s): HTN   
(hypertension)  
Formatting of this note might be   
different from the original.  
-Blood pressures currently   
well-controlled  
Electronically signed by Sharifa Almaguer DO at 2024 11:47 AM   
EDT  
Associated Problem(s): Abnormal   
EKG  
Formatting of this note might be   
different from the original.  
-EKG obtained in May showed   
 anterior infarct    
-Echocardiogram is normal  
-Has been indicated to Dr. Hays   
once clearance from cardiology so   
we will help facilitate a referral  
Electronically signed by Sharifa Almaguer DO at 2024 11:47 AM   
EDT  
documented in this encounter            [facility]  
Work Phone:   
[phone]  
   
                                                    2024 Evaluation +   
Plan note                               Associated Problem(s): HTN   
(hypertension)  
Formatting of this note might be   
different from the original.  
-Blood pressures currently   
well-controlled  
Electronically signed by Sharifa Almaguer DO at 2024 11:47 AM   
EDT  
                                        [facility]  
Work Phone:   
[phone]  
   
                                                    2024 Evaluation +   
Plan note                               Associated Problem(s): Abnormal   
EKG  
Formatting of this note might be   
different from the original.  
-EKG obtained in May showed   
 anterior infarct    
-Echocardiogram is normal  
-Has been indicated to Dr. Hays   
once clearance from cardiology so   
we will help facilitate a referral  
Electronically signed by Sharifa Almaguer DO at 2024 11:47 AM   
EDT  
                                        [facility]  
Work Phone:   
[phone]  
   
                                                    2024 History of   
Present illness Narrative               Formatting of this note is   
different from the original.  
Subjective  
Patient ID: Marzena Mederos is a   
78 y.o. female who presents for   
Follow-up.  
HPI  
She is here today for follow-up   
and accompanied by her .   
When asked how she was feeling she   
indicated she was feeling fine. We   
also see that her weight has   
stabilized and her  remarks   
that she is actually eating better   
in recent times. When we saw her   
last time there was a big concern   
about  unexplained weight loss  .   
So we did some testing including   
CT scan of the abdomen and pelvis.   
I was pleased to inform her that   
the CT scan did not show any   
alarming findings although she did   
have a few lesions in the liver   
which we will follow-up with an   
ultrasound. We discussed   
continuing with her better eating   
patterns and monitoring closely.  
She also is trying to get cleared   
for an eye procedure with Dr. Hays and she had an EKG a couple   
months ago that showed  anterior   
infarct  . We did a follow-up   
echocardiogram which came back   
looking relatively normal. Her   
 indicates that the office   
of Dr. Hays now wants a clearance   
from cardiology so we will help   
facilitate a referral. We did   
conduct a review of systems and   
she appears to be doing okay at   
this time. We will do no   
additional testing but I will be   
seeing her back in September for   
her regularly scheduled   
appointment.  
She also had a low potassium on   
her recent lab test results so I   
am increasing her potassium from   
10 mill equivalents once a daily   
to twice daily and we will recheck   
it again at her next follow-up   
visit.  
Review of Systems  
Constitutional: Negative for   
fatigue and unexpected weight   
change.  
Respiratory: Negative for cough,   
chest tightness, shortness of   
breath and wheezing.  
Cardiovascular: Negative for chest   
pain, palpitations and leg   
swelling.  
Gastrointestinal: Negative for   
abdominal pain, blood in stool,   
diarrhea, nausea and vomiting.  
Musculoskeletal: Negative for   
arthralgias and back pain.  
  
Objective  
Physical Exam  
Vitals and nursing note reviewed.  
Constitutional:  
General: She is not in acute   
distress.  
Appearance: Normal appearance.  
HENT:  
Head: Normocephalic and   
atraumatic.  
Eyes:  
Conjunctiva/sclera: Conjunctivae   
normal.  
Cardiovascular:  
Rate and Rhythm: Normal rate and   
regular rhythm.  
Heart sounds: Normal heart sounds.  
Pulmonary:  
Effort: No respiratory distress.  
Breath sounds: No wheezing.  
Abdominal:  
Palpations: Abdomen is soft.  
Tenderness: There is no abdominal   
tenderness. There is no guarding.  
Musculoskeletal:  
General: No swelling. Normal range   
of motion.  
Skin:  
General: Skin is warm and dry.  
Neurological:  
General: No focal deficit present.  
Mental Status: She is alert and   
oriented to person, place, and   
time.  
Psychiatric:  
Behavior: Behavior normal.  
  
Recent Results (from the past 672   
hour(s))  
CBC and Auto Differential  
Collection Time: 24 11:28 AM  
Result Value Ref Range  
WBC 9.3 4.4 - 11.3 x10*3/uL  
nRBC 0.0 0.0 - 0.0 /100 WBCs  
RBC 4.80 4.00 - 5.20 x10*6/uL  
Hemoglobin 14.6 12.0 - 16.0 g/dL  
Hematocrit 44.6 36.0 - 46.0 %  
MCV 93 80 - 100 fL  
MCH 30.4 26.0 - 34.0 pg  
MCHC 32.7 32.0 - 36.0 g/dL  
RDW 12.5 11.5 - 14.5 %  
Platelets 362 150 - 450 x10*3/uL  
Neutrophils % 53.3 40.0 - 80.0 %  
Immature Granulocytes %, Automated   
0.3 0.0 - 0.9 %  
Lymphocytes % 38.8 13.0 - 44.0 %  
Monocytes % 5.9 2.0 - 10.0 %  
Eosinophils % 1.5 0.0 - 6.0 %  
Basophils % 0.2 0.0 - 2.0 %  
Neutrophils Absolute 4.95 1.60 -   
5.50 x10*3/uL  
Immature Granulocytes Absolute,   
Automated 0.03 0.00 - 0.50   
x10*3/uL  
Lymphocytes Absolute 3.61 (H) 0.80   
- 3.00 x10*3/uL  
Monocytes Absolute 0.55 0.05 -   
0.80 x10*3/uL  
Eosinophils Absolute 0.14 0.00 -   
0.40 x10*3/uL  
Basophils Absolute 0.02 0.00 -   
0.10 x10*3/uL  
Lipase  
Collection Time: 24 11:28 AM  
Result Value Ref Range  
Lipase 19 9 - 82 U/L  
Comprehensive Metabolic Panel  
Collection Time: 24 11:28 AM  
Result Value Ref Range  
Glucose 237 (H) 74 - 99 mg/dL  
Sodium 139 136 - 145 mmol/L  
Potassium 3.4 (L) 3.5 - 5.3 mmol/L  
Chloride 98 98 - 107 mmol/L  
Bicarbonate 30 21 - 32 mmol/L  
Anion Gap 14 10 - 20 mmol/L  
Urea Nitrogen 14 6 - 23 mg/dL  
Creatinine 0.81 0.50 - 1.05 mg/dL  
eGFR 75 >60 mL/min/1.73m*2  
Calcium 9.8 8.6 - 10.3 mg/dL  
Albumin 4.3 3.4 - 5.0 g/dL  
Alkaline Phosphatase 66 33 - 136   
U/L  
Total Protein 6.6 6.4 - 8.2 g/dL  
AST 19 9 - 39 U/L  
Bilirubin, Total 0.6 0.0 - 1.2   
mg/dL  
ALT 22 7 - 45 U/L  
Transthoracic Echo (TTE) Complete  
Collection Time: 24 11:45 AM  
Result Value Ref Range  
LVOT diam 1.80 cm  
MV E/A ratio 0.62  
LV Biplane EF 51 %  
Tricuspid annular plane systolic   
excursion 1.7 cm  
LA vol index A/L 15.6 ml/m2  
LV EF 58 %  
RV free wall pk S' 17.10 cm/s  
LVIDd 4.60 cm  
LV A4C EF 53.2  
  
Assessment/Plan  
Problem List Items Addressed This   
Visit  
  
ICD-10-CM  
HTN (hypertension) I10  
-Blood pressures currently   
well-controlled  
  
  
Relevant Medications  
potassium chloride CR 10 mEq ER   
tablet  
Abnormal EKG R94.31  
-EKG obtained in May showed   
 anterior infarct    
-Echocardiogram is normal  
-Has been indicated to Dr. Hays   
once clearance from cardiology so   
we will help facilitate a referral  
  
  
Relevant Orders  
Referral to Cardiology  
Weight loss, unintentional R63.4  
-Weight has stabilized and she   
will focus on keeping her caloric   
intake at a satisfactory level  
-CT scan of the abdomen did not   
reveal any contributing factors   
although we are ordering an   
ultrasound of the liver as a   
follow-up to her liver lesions and   
I will call them with the results  
  
  
Liver lesion - Primary K76.9  
Relevant Orders  
US abdomen limited liver  
Hypokalemia E87.6  
-She will increase her potassium   
intake from 10 mill equivalents   
once daily to twice daily  
-She will have a repeat potassium   
done just prior to her next   
follow-up visit in September  
  
  
  
  
  
Sharifa Almaguer DO  
Electronically signed by Sharifa Almaguer DO at 2024 11:49 AM   
EDT  
documented in this encounter            [facility]  
Work Phone:   
[phone]  
   
                                        2024 Instructions   
  
  
Sharifa Almaguer DO - 2024   
11:00 AM EDTFormatting of this   
note might be different from the   
original.  
As we discussed we are having her   
increase her potassium intake from   
1 tablet daily up to twice daily   
and when you come back in   
September please remember to get   
fasting lab work done just prior   
to that visit  
The staff will be calling you to   
get an appointment with cardiology   
for your cardiac clearance  
The staff will be helping to   
schedule an ultrasound of the   
liver and when the results come   
back I will call you  
We will see you back in September   
as planned  
Electronically signed by Sharifa Almaguer DO at 2024 11:47 AM   
EDT  
  
documented in this encounter            [facility]  
Work Phone:   
[phone]  
   
                                        2024 Instructions   
  
  
Ronel Hays MD - 2024   
2:36 PM EDTFormatting of this note   
is different from the original.  
Current Ophthalmic Meds  
  
  
  
fluorometholone (FML LIQUID FILM)   
0.1 % ophthalmic suspension Use 1   
Drop in both eyes three times a   
day.  
  
Continue:  
Systane Complete solution instill   
1 drop 3 times daily Both Eyes.  
  
Plan Cataract Surgery with   
Monofocal Intraocular Lens Implant   
Left Eye on 2024 at   
[facility].  
  
If you have any questions please   
contact our office at   
[phone].  
After office hours or on the   
weekend, please call Dr. Hays on   
his cell phone at [phone].  
  
Electronically signed by Ronel Hays MD at 2024 2:57   
PM EDT  
  
documented in this encounter            [facility]  
   
                                        2024 Note     HNO ID: [id number]  
Author: RONEL HAYS MD  
Service: ?  
Author Type: Physician  
Type: Progress Notes  
Filed: 2024 15:28  
Note Text:  
ASSESSMENT/PLAN:  
1. 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   
cataract of left eye - ICD9:   
366.19, ICD10:  
H25.812 (primary diagnosis)  
- IOL BIOMETRY W/ IOL CALC OU   
(BOTH EYES)  
- OCT MACULA CIRRUS OU (BOTH EYES)  
Cataract Presurgical Documentation  
Cataract: Left Eye  
Current Visual Acuity  
Right Eye Distance CC 20/40  
Left Eye Distance CC 20/70  
Glare Testing:  
Right Eye Medium 20/80  
Visual Function: Marzena NUNEZ Rosa M   
states that the decline in vision   
from the  
cataract impedes her abilities as   
listed in the HPI, as well as   
other activities  
of daily living.  
Marzena Mederos has confirmed that   
she is no longer able to function   
adequately  
on a day-to-day basis because of   
her current visual condition.  
Further, it is my medical opinion   
that the cataract is the primary   
cause, or at  
least a significantly contributory   
cause of her visual dysfunction.   
With  
uncomplicated cataract surgery and   
lens implantation, it is my   
expectation that  
her visual function and quality of   
life will improve, significantly.  
The risks, benefits, alternatives,   
personnel and complications of   
cataract  
surgery with lens implantation   
were discussed with Marzena NUNEZ   
Rosa M in detail.  
She appeared to understand and   
asked that I proceed with plans   
for surgery.  
Upon eye examination, patient was   
found to have a visually   
significant cataract  
Left Eye. Discussed cataract   
surgery with patient and different   
intraocular  
lens implant options with patient:   
basic monofocal intraocular lens   
implant,  
Toric intraocular lens implant,   
and presbyopia correction   
intraocular lens  
implant. In my medical opinion,   
based on medical history and   
ocular  
examination, cataract surgery with   
intraocular lens implant will   
correct  
patient's vision and improve   
quality of patient's daily living   
activities.  
Patient wishes to have traditional   
cataract surgery with basic   
intraocular lens  
Left Eye. Patient wishes to have   
cataract surgery with the option   
stated above.  
Patient understands that an   
intraocular lens implant does not   
necessarily  
replace the need for glasses.   
Patient understands that it is   
impossible for the  
surgeon to inform him/her of every   
possible complication that may   
occur. The  
surgeon has answered all of the   
patient's questions. Patient   
understands that  
if he/she has a mature or dense   
cataract, pseudoexfoliation   
cataract, or history  
of use of Flomax, he/she may   
require the use of [place]ugin Ring   
and/or Vision  
Blue during surgery. Patient   
understands the risks, benefits,   
and alternatives  
to surgery.  
Current Ophthalmic Meds  
fluorometholone (FML LIQUID FILM)   
0.1 % ophthalmic suspension Use 1   
Drop in  
both eyes three times a day.  
Continue:  
Systane Complete solution instill   
1 drop 3 times daily Both Eyes.  
Plan Cataract Surgery with   
Monofocal Intraocular Lens Implant   
Left Eye on  
2024 at [facility].  
PHYSICAL EXAM:  
Vital Signs:  
Blood pressure 125/80, pulse 88.  
Respiratory:  
Normal breath sounds, no wheezing.  
CARD:  
Normal heart sounds 1 AND 2,   
normal sinus rhythm.  
Patient will need physical, EKG,   
and BMP before proceeding with   
surgery.  
2. 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   
cataract of right eye - ICD9:   
366.19, ICD10:  
H25.811  
- IOL BIOMETRY W/ IOL CALC OU   
(BOTH EYES)  
- OCT MACULA CIRRUS OU (BOTH EYES)  
Plan Cataract Surgery with   
Monofocal Intraocular Lens Implant   
Right Eye after  
Left Eye is stable.  
Patient will need EKG before   
proceeding with surgery.  
3. Type 2 diabetes mellitus   
without retinopathy (HCC) - ICD9:   
250.00, ICD10:  
E11.9  
Please keep your blood sugar under   
good control to minimize risk of   
ocular  
complications from diabetes.  
Continue to monitor with primary   
care physician.  
4. Essential hypertension - ICD9:   
401.9, ICD10: I10  
Continue to monitor with primary   
care physician.  
5. Hypercholesteremia - ICD9:   
272.0, ICD10: E78.00  
Continue to monitor with primary   
care physician.  
6. Dementia, unspecified dementia   
severity, unspecified dementia   
type,  
unspecified whether behavioral,   
psychotic, or mood disturbance or   
anxiety (HCC)  
- ICD9: 294.20, ICD10: F03.90  
Continue to monitor with primary   
care physician.  
I have confirmed and edited as   
necessary the relevant HPI,   
ophthalmic history,  
ROS, and the neuro exam findings   
as obtained by others. I have seen   
and  
examined Marzena Mederos. I have   
discussed the case and the   
management of this  
patient's care with the   
Resident/Fellow, if applicable. I   
also have reviewed  
and agree with the assessment and   
plan as stated above and agree   
with all of its  
relevant components.                    [facility]  
   
                                                    2024 History of   
Present illness Narrative               Formatting of this note is   
different from the original.  
ASSESSMENT/PLAN:  
1. 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   
cataract of left eye - ICD9:   
366.19, ICD10: H25.812 (primary   
diagnosis)  
  
- IOL BIOMETRY W/ IOL CALC OU   
(BOTH EYES)  
- OCT MACULA CIRRUS OU (BOTH EYES)  
  
Cataract Presurgical Documentation  
  
Cataract: Left Eye  
  
Current Visual Acuity  
Right Eye Distance CC 20/40  
Left Eye Distance CC 20/70  
  
Glare Testing:  
Right Eye Medium 20/80  
  
Visual Function: Marzena Mederos   
states that the decline in vision   
from the cataract impedes her   
abilities as listed in the HPI, as   
well as other activities of daily   
living.  
  
Marzena Mederos has confirmed that   
she is no longer able to function   
adequately on a day-to-day basis   
because of her current visual   
condition.  
  
Further, it is my medical opinion   
that the cataract is the primary   
cause, or at least a significantly   
contributory cause of her visual   
dysfunction. With uncomplicated   
cataract surgery and lens   
implantation, it is my expectation   
that her visual function and   
quality of life will improve,   
significantly.  
  
The risks, benefits, alternatives,   
personnel and complications of   
cataract surgery with lens   
implantation were discussed with   
Marzena Mederos in detail. She   
appeared to understand and asked   
that I proceed with plans for   
surgery.  
  
Upon eye examination, patient was   
found to have a visually   
significant cataract Left Eye.   
Discussed cataract surgery with   
patient and different intraocular   
lens implant options with patient:   
basic monofocal intraocular lens   
implant, Toric intraocular lens   
implant, and presbyopia correction   
intraocular lens implant. In my   
medical opinion, based on medical   
history and ocular examination,   
cataract surgery with intraocular   
lens implant will correct   
patient's vision and improve   
quality of patient's daily living   
activities. Patient wishes to have   
traditional cataract surgery with   
basic intraocular lens Left Eye.   
Patient wishes to have cataract   
surgery with the option stated   
above. Patient understands that an   
intraocular lens implant does not   
necessarily replace the need for   
glasses. Patient understands that   
it is impossible for the surgeon   
to inform him/her of every   
possible complication that may   
occur. The surgeon has answered   
all of the patient's questions.   
Patient understands that if he/she   
has a mature or dense cataract,   
pseudoexfoliation cataract, or   
history of use of Flomax, he/she   
may require the use of Maluyugin   
Ring and/or Vision Blue during   
surgery. Patient understands the   
risks, benefits, and alternatives   
to surgery.  
  
Current Ophthalmic Meds  
  
  
  
fluorometholone (FML LIQUID FILM)   
0.1 % ophthalmic suspension Use 1   
Drop in both eyes three times a   
day.  
  
Continue:  
Systane Complete solution instill   
1 drop 3 times daily Both Eyes.  
  
Plan Cataract Surgery with   
Monofocal Intraocular Lens Implant   
Left Eye on 2024 at   
[facility].  
  
PHYSICAL EXAM:  
  
Vital Signs:  
Blood pressure 125/80, pulse 88.  
  
Respiratory:  
Normal breath sounds, no wheezing.  
  
CARD:  
Normal heart sounds 1 & 2, normal   
sinus rhythm.  
  
Patient will need physical, EKG,   
and BMP before proceeding with   
surgery.  
  
2. 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   
cataract of right eye - ICD9:   
366.19, ICD10: H25.811  
  
- IOL BIOMETRY W/ IOL CALC OU   
(BOTH EYES)  
- OCT MACULA CIRRUS OU (BOTH EYES)  
  
Plan Cataract Surgery with   
Monofocal Intraocular Lens Implant   
Right Eye after Left Eye is   
stable.  
  
Patient will need EKG before   
proceeding with surgery.  
  
3. Type 2 diabetes mellitus   
without retinopathy (HCC) - ICD9:   
250.00, ICD10: E11.9  
  
Please keep your blood sugar under   
good control to minimize risk of   
ocular complications from   
diabetes.  
  
Continue to monitor with primary   
care physician.  
  
4. Essential hypertension - ICD9:   
401.9, ICD10: I10  
  
Continue to monitor with primary   
care physician.  
  
5. Hypercholesteremia - ICD9:   
272.0, ICD10: E78.00  
  
Continue to monitor with primary   
care physician.  
  
6. Dementia, unspecified dementia   
severity, unspecified dementia   
type, unspecified whether   
behavioral, psychotic, or mood   
disturbance or anxiety (HCC) -   
ICD9: 294.20, ICD10: F03.90  
  
Continue to monitor with primary   
care physician.  
  
I have confirmed and edited as   
necessary the relevant HPI,   
ophthalmic history, ROS, and the   
neuro exam findings as obtained by   
others. I have seen and examined   
Marzena Mederos. I have discussed   
the case and the management of   
this patient's care with the   
Resident/Fellow, if applicable. I   
also have reviewed and agree with   
the assessment and plan as stated   
above and agree with all of its   
relevant components.  
  
  
  
Electronically signed by Ronel Hays MD at 2024 3:28   
PM EDT  
documented in this encounter            [facility]  
   
                                        2024 Note     Date of Procedure  
2024.  
  
Technician Information  
Imaging Technician: JAMES.  
  
Interpretation  
Right Eye  
Normal without fluid.  
  
Left Eye  
Normal without fluid.                   [facility]  
   
                                        2024 Note     Date of Procedure  
2024.  
  
Technician Information  
Imaging Technician: TK.  
  
Notes  
Measurements only - see Procedure   
Record under Scanned Documents for  
signed results.                         [facility]  
   
                                        2024 Instructions   
  
  
Amaya Reis II, OD -   
2024 3:45 PM EDTFormatting   
of this note might be different   
from the original.  
Assessment and Plan  
  
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   
cataract of both eyes (primary   
encounter diagnosis)  
Comment: Cataracts interfering   
with activities of daily living.   
Medically necessary surgery   
recommended to improve functional   
vision. Intraocular lens options   
discussed. Risks involved with   
cataract surgery discussed.   
Appointment made for consult.  
  
E11.9 Type 2 diabetes mellitus   
without retinopathy (HCC)  
Comment: Examination shows no   
ocular diabetic complications   
today. Discussed need for optimal   
diabetes control to minimize   
chance of ocular complications.   
Advise patient to immediately   
report worsening in status or   
additional symptoms. Continue   
yearly dilated eye examinations.  
  
H52.03 Hyperopia of both eyes  
H52.223 Regular astigmatism of   
both eyes  
H52.4 Presbyopia  
Comment: Myopic shift in glasses   
power but Visual acuity's remain   
reduced due to cataract.  
  
I have confirmed and edited as   
necessary the relevant HPI,   
ophthalmic history, ROS, and the   
neuro exam findings as obtained by   
others. I have seen and examined   
Marzena Mederos.  
I have discussed the case and the   
management of this patient's care   
with the Resident/Fellow, if   
applicable. I also have reviewed   
and agree with the assessment and   
plan as stated above and agree   
with all of its relevant   
components.  
  
  
  
  
  
Electronically signed by   
Amaya Reis II, OD at   
2024 3:45 PM EDT  
  
documented in this encounter            [facility]  
   
                                        2024 Note     HNO ID: [id number]  
Author: AMAYA REIS II, OD  
Service: ?  
Author Type: OPTOMETRIST  
Type: Progress Notes  
Filed: 2024 15:46  
Note Text:  
Assessment and Plan  
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   
cataract of both eyes (primary   
encounter  
diagnosis)  
Comment: Cataracts interfering   
with activities of daily living.   
Medically  
necessary surgery recommended to   
improve functional vision.   
Intraocular lens  
options discussed. Risks involved   
with cataract surgery discussed.   
Appointment  
made for consult.  
E11.9 Type 2 diabetes mellitus   
without retinopathy (HCC)  
Comment: Examination shows no   
ocular diabetic complications   
today. Discussed  
need for optimal diabetes control   
to minimize chance of ocular   
complications.  
Advise patient to immediately   
report worsening in status or   
additional symptoms.  
Continue yearly dilated eye   
examinations.  
H52.03 Hyperopia of both eyes  
H52.223 Regular astigmatism of   
both eyes  
H52.4 Presbyopia  
Comment: Myopic shift in glasses   
power but Visual acuity's remain   
reduced due to  
cataract.  
I have confirmed and edited as   
necessary the relevant HPI,   
ophthalmic history,  
ROS, and the neuro exam findings   
as obtained by others. I have seen   
and  
examined Marzena Mederos.  
I have discussed the case and the   
management of this patient's care   
with the  
Resident/Fellow, if applicable. I   
also have reviewed and agree with   
the  
assessment and plan as stated   
above and agree with all of its   
relevant  
components.                             [facility]  
   
                                                    2024 History of   
Present illness Narrative               Formatting of this note might be   
different from the original.  
Assessment and Plan  
  
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   
cataract of both eyes (primary   
encounter diagnosis)  
Comment: Cataracts interfering   
with activities of daily living.   
Medically necessary surgery   
recommended to improve functional   
vision. Intraocular lens options   
discussed. Risks involved with   
cataract surgery discussed.   
Appointment made for consult.  
  
E11.9 Type 2 diabetes mellitus   
without retinopathy (HCC)  
Comment: Examination shows no   
ocular diabetic complications   
today. Discussed need for optimal   
diabetes control to minimize   
chance of ocular complications.   
Advise patient to immediately   
report worsening in status or   
additional symptoms. Continue   
yearly dilated eye examinations.  
  
H52.03 Hyperopia of both eyes  
H52.223 Regular astigmatism of   
both eyes  
H52.4 Presbyopia  
Comment: Myopic shift in glasses   
power but Visual acuity's remain   
reduced due to cataract.  
  
I have confirmed and edited as   
necessary the relevant HPI,   
ophthalmic history, ROS, and the   
neuro exam findings as obtained by   
others. I have seen and examined   
Marzena Mederos.  
I have discussed the case and the   
management of this patient's care   
with the Resident/Fellow, if   
applicable. I also have reviewed   
and agree with the assessment and   
plan as stated above and agree   
with all of its relevant   
components.  
  
  
  
Electronically signed by   
Amaya Reis II OD at   
2024 3:46 PM EDT  
documented in this encounter            [facility]  
   
                                        2024 Note     Date of Procedure  
2024.  
  
Technician Information  
Imaging Technician: [email or web address]. Start   
time: 3:28 PM.  
  
Interpretation  
Right Eye  
Normal foveal contour.  
  
Left Eye  
Normal foveal contour.  
  
Interval Change  
Right Eye  
Initial.  
  
Left Eye  
Initial.  
  
Notes  
Blur most likely due to cataract   
progression                             [facility]  
   
                                                    2024 Evaluation +   
Plan note                               Associated Problem(s): Hiccup  
Formatting of this note might be   
different from the original.  
-I am increasing the omeprazole to   
treat what I think is acid reflux   
causing her hiccups  
-Her  will let me know if   
the hiccups do not resolve  
Electronically signed by Sharifa Almaguer DO at 2024 11:37 AM   
EDT  
                                        [facility]  
Work Phone:   
[phone]  
   
                                                    2024 Evaluation +   
Plan note                               Associated Problem(s): Controlled   
type 2 diabetes mellitus without   
complication, without long-term   
current use of insulin (Multi)  
Formatting of this note might be   
different from the original.  
-Her most recent hemoglobin A1c   
was a little generous at 8.2  
-We will continue with her current   
medical regimen and I invited her   
to return in approximately 3   
months for reevaluation with   
hemoglobin A1c  
Electronically signed by Sharifa Almaguer DO at 2024 11:37 AM   
[facility]  
Work Phone:   
[phone]  
   
                                                    2024 Evaluation +   
Plan note                               Associated Problem(s):   
Gastroesophageal reflux disease   
without esophagitis  
Formatting of this note might be   
different from the original.  
-We are increasing her omeprazole   
from 20 mg daily up to 40 mg daily  
-They will let me know if nausea   
and vomiting becomes a recurrent   
issue  
Electronically signed by Sharifa Almaguer DO at 2024 11:37 AM   
[facility]  
Work Phone:   
[phone]  
   
                                                    2024 Evaluation +   
Plan note                               Associated Problem(s): Mild   
dementia without behavioral   
disturbance, psychotic   
disturbance, mood disturbance, or   
anxiety, unspecified dementia type   
(Multi)  
Formatting of this note might be   
different from the original.  
-She continues to have a gradual   
decline and now requires [facility]   
care  
-I have filled out paperwork so   
that she is permitted to go to the   
[facility] for   
 especially when her   
 has to be away  
-He has also made arrangements   
with a young lady who comes to sit   
with her during the summer months  
-We will continue to monitor and   
provide supportive care  
-We did go over the DNR CC arrest   
form which was explained and he   
has chosen that category  
Electronically signed by Sharifa Almaguer DO at 2024 11:37 AM   
[facility]  
Work Phone:   
[phone]  
   
                                                    2024 Miscellaneous   
Notes                                   Associated Problem(s): Hiccup  
Formatting of this note might be   
different from the original.  
-I am increasing the omeprazole to   
treat what I think is acid reflux   
causing her hiccups  
-Her  will let me know if   
the hiccups do not resolve  
Electronically signed by Sharifa Almaguer DO at 2024 11:37 AM   
EDT  
Associated Problem(s): Controlled   
type 2 diabetes mellitus without   
complication, without long-term   
current use of insulin (Multi)  
Formatting of this note might be   
different from the original.  
-Her most recent hemoglobin A1c   
was a little generous at 8.2  
-We will continue with her current   
medical regimen and I invited her   
to return in approximately 3   
months for reevaluation with   
hemoglobin A1c  
Electronically signed by Sharifa Almaguer DO at 2024 11:37 AM   
EDT  
Associated Problem(s):   
Gastroesophageal reflux disease   
without esophagitis  
Formatting of this note might be   
different from the original.  
-We are increasing her omeprazole   
from 20 mg daily up to 40 mg daily  
-They will let me know if nausea   
and vomiting becomes a recurrent   
issue  
Electronically signed by Sharifa Almaguer DO at 2024 11:37 AM   
EDT  
Associated Problem(s): Mild   
dementia without behavioral   
disturbance, psychotic   
disturbance, mood disturbance, or   
anxiety, unspecified dementia type   
(Multi)  
Formatting of this note might be   
different from the original.  
-She continues to have a gradual   
decline and now requires [facility]   
care  
-I have filled out paperwork so   
that she is permitted to go to the   
[facility] for   
 especially when her   
 has to be away  
-He has also made arrangements   
with a young lady who comes to sit   
with her during the summer months  
-We will continue to monitor and   
provide supportive care  
-We did go over the DNR CC arrest   
form which was explained and he   
has chosen that category  
Electronically signed by Sharifa Almaguer DO at 2024 11:37 AM   
EDT  
documented in this encounter            [facility]  
Work Phone:   
[phone]  
   
                                                    2024 History of   
Present illness Narrative               Formatting of this note is   
different from the original.  
Subjective  
Patient ID: Marzena Mederos is a   
78 y.o. female who presents for   
Follow-up (3 week FUV).  
HPI  
She is here today for evaluation   
and accompanied by her .   
She is smiling and in good spirits   
and her  provides much of   
the history. He explains that   
recently while he was away running   
an errand she ended up wandering   
outside and was walking through   
the neighborhood. A neighbor   
spotted her and asked her what she   
was doing. She indicated that she   
was simply taking a walk but she   
did not know where she was going   
and they found the situation a   
little bit dangerous.   
Unfortunately her memory is   
declining and her  has   
taken action to provide somebody   
to watch her while he is away.   
Actually there is a teenager in   
the neighborhood that she used to   
watch when they were an infant.   
The teenager comes and stays with   
her while he is away. She will be   
available until school starts. He   
also found the [facility] in   
assisted care in [facility] that   
provides . We did an   
assessment today and filled out   
all the paperwork.  
Also unfortunately very recently   
while she was at [facility] she   
suddenly became nauseated and   
actually had vomiting and passed   
out. Her  indicates that he   
thinks her sugar dropped because   
she did not eat that morning.   
After eating breakfast she was   
better. She was having some   
repeated cases of nausea and   
vomiting and she was taken to the   
emergency room on May 24. There   
they did an exhaustive workup and   
did not find anything of a serious   
nature. Since that time she has   
had no reported nausea or vomiting   
and she has not passed out. He   
states he makes to the point for   
her to drink an Ensure before she   
goes to [facility]. He has noticed   
some hiccuping. He states has been   
going on for a while. I decided to   
increase the dose of her   
omeprazole and he will let me know   
if the hiccups do not resolve. She   
had a recent chest x-ray which was   
clear. Again she is very pleasant   
and smiling and cooperative with   
today's exam.  
Review of Systems  
Constitutional: Negative for   
fatigue.  
Hiccup  
Respiratory: Negative for cough,   
shortness of breath and wheezing.  
Cardiovascular: Negative for chest   
pain, palpitations and leg   
swelling.  
Gastrointestinal: Negative for   
abdominal pain, blood in stool,   
diarrhea, nausea and vomiting.  
Musculoskeletal: Negative for   
arthralgias and back pain.  
  
Objective  
Physical Exam  
Vitals and nursing note reviewed.  
Constitutional:  
General: She is not in acute   
distress.  
Appearance: Normal appearance.  
HENT:  
Head: Normocephalic and   
atraumatic.  
Eyes:  
Conjunctiva/sclera: Conjunctivae   
normal.  
Cardiovascular:  
Rate and Rhythm: Normal rate and   
regular rhythm.  
Heart sounds: Normal heart sounds.  
Pulmonary:  
Effort: No respiratory distress.  
Breath sounds: No wheezing.  
Abdominal:  
Palpations: Abdomen is soft.  
Tenderness: There is no abdominal   
tenderness. There is no guarding.  
Musculoskeletal:  
General: No swelling. Normal range   
of motion.  
Skin:  
General: Skin is warm and dry.  
Neurological:  
General: No focal deficit present.  
Mental Status: She is alert and   
oriented to person, place, and   
time.  
Psychiatric:  
Behavior: Behavior normal.  
  
Recent Results (from the past 672   
hour(s))  
Basic Metabolic Panel  
Collection Time: 24 11:56 AM  
Result Value Ref Range  
Glucose 226 (H) 74 - 99 mg/dL  
Sodium 138 136 - 145 mmol/L  
Potassium 3.7 3.5 - 5.3 mmol/L  
Chloride 99 98 - 107 mmol/L  
Bicarbonate 31 21 - 32 mmol/L  
Anion Gap 12 10 - 20 mmol/L  
Urea Nitrogen 16 6 - 23 mg/dL  
Creatinine 0.78 0.50 - 1.05 mg/dL  
eGFR 78 >60 mL/min/1.73m*2  
Calcium 9.5 8.6 - 10.3 mg/dL  
Hemoglobin A1C  
Collection Time: 24 11:56 AM  
Result Value Ref Range  
Hemoglobin A1C 8.2 (H) see below %  
Estimated Average Glucose 189 Not   
Established mg/dL  
TSH  
Collection Time: 24 11:56 AM  
Result Value Ref Range  
Thyroid Stimulating Hormone 1.32   
0.44 - 3.98 mIU/L  
Vitamin B12  
Collection Time: 24 11:56 AM  
Result Value Ref Range  
Vitamin B12 392 211 - 911 pg/mL  
Urinalysis with Reflex Microscopic  
Collection Time: 24 12:57 PM  
Result Value Ref Range  
Color, Urine Yellow Straw, Yellow  
Appearance, Urine Hazy (N) Clear  
Specific Gravity, Urine 1.030   
1.005 - 1.035  
pH, Urine 6.0 5.0, 5.5, 6.0, 6.5,   
7.0, 7.5, 8.0  
Protein, Urine 100 (2+) (A)   
NEGATIVE, TRACE mg/dL  
Glucose, Urine 500 (3+) (A)   
NEGATIVE mg/dL  
Blood, Urine NEGATIVE NEGATIVE  
Ketones, Urine 15 (1+) (A)   
NEGATIVE mg/dL  
Bilirubin, Urine NEGATIVE NEGATIVE  
Urobilinogen, Urine 0.2 0.2, 1.0   
mg/dL  
Nitrite, Urine NEGATIVE NEGATIVE  
Leukocyte Esterase, Urine SMALL   
(1+) (A) NEGATIVE  
Microscopic Only, Urine  
Collection Time: 24 12:57 PM  
Result Value Ref Range  
WBC, Urine 1-5 1-5, NONE /HPF  
RBC, Urine 1-2 NONE, 1-2, 3-5 /HPF  
Squamous Epithelial Cells, Urine   
1-9 (SPARSE) Reference range not   
established. /HPF  
Bacteria, Urine 1+ (A) NONE SEEN   
/HPF  
CBC and Auto Differential  
Collection Time: 24 1:11 PM  
Result Value Ref Range  
WBC 12.3 (H) 4.4 - 11.3 x10*3/uL  
nRBC 0.0 0.0 - 0.0 /100 WBCs  
RBC 4.58 4.00 - 5.20 x10*6/uL  
Hemoglobin 14.3 12.0 - 16.0 g/dL  
Hematocrit 42.4 36.0 - 46.0 %  
MCV 93 80 - 100 fL  
MCH 31.2 26.0 - 34.0 pg  
MCHC 33.7 32.0 - 36.0 g/dL  
RDW 12.2 11.5 - 14.5 %  
Platelets 299 150 - 450 x10*3/uL  
Neutrophils % 73.5 40.0 - 80.0 %  
Immature Granulocytes %, Automated   
0.2 0.0 - 0.9 %  
Lymphocytes % 20.2 13.0 - 44.0 %  
Monocytes % 5.2 2.0 - 10.0 %  
Eosinophils % 0.7 0.0 - 6.0 %  
Basophils % 0.2 0.0 - 2.0 %  
Neutrophils Absolute 8.99 (H) 1.60   
- 5.50 x10*3/uL  
Immature Granulocytes Absolute,   
Automated 0.03 0.00 - 0.50   
x10*3/uL  
Lymphocytes Absolute 2.48 0.80 -   
3.00 x10*3/uL  
Monocytes Absolute 0.64 0.05 -   
0.80 x10*3/uL  
Eosinophils Absolute 0.09 0.00 -   
0.40 x10*3/uL  
Basophils Absolute 0.03 0.00 -   
0.10 x10*3/uL  
Troponin I, High Sensitivity  
Collection Time: 24 1:11 PM  
Result Value Ref Range  
Troponin I, High Sensitivity 7 0 -   
13 ng/L  
Magnesium  
Collection Time: 24 1:11 PM  
Result Value Ref Range  
Magnesium 1.74 1.60 - 2.40 mg/dL  
Comprehensive Metabolic Panel  
Collection Time: 24 1:11 PM  
Result Value Ref Range  
Glucose 288 (H) 74 - 99 mg/dL  
Sodium 138 136 - 145 mmol/L  
Potassium 3.9 3.5 - 5.3 mmol/L  
Chloride 100 98 - 107 mmol/L  
Bicarbonate 31 21 - 32 mmol/L  
Anion Gap 11 10 - 20 mmol/L  
Urea Nitrogen 23 6 - 23 mg/dL  
Creatinine 0.84 0.50 - 1.05 mg/dL  
eGFR 72 >60 mL/min/1.73m*2  
Calcium 9.1 8.6 - 10.3 mg/dL  
Albumin 4.0 3.4 - 5.0 g/dL  
Alkaline Phosphatase 65 33 - 136   
U/L  
Total Protein 6.7 6.4 - 8.2 g/dL  
AST 23 9 - 39 U/L  
Bilirubin, Total 0.7 0.0 - 1.2   
mg/dL  
ALT 14 7 - 45 U/L  
ECG 12 Lead  
Collection Time: 24 2:40 PM  
Result Value Ref Range  
Ventricular Rate 80 BPM  
Atrial Rate 80 BPM  
ID Interval 198 ms  
QRS Duration 90 ms  
QT Interval 380 ms  
QTC Calculation(Bazett) 438 ms  
P Axis 66 degrees  
R Axis 49 degrees  
T Axis 8 degrees  
QRS Count 13 beats  
Q Onset 229 ms  
P Onset 130 ms  
P Offset 197 ms  
T Offset 419 ms  
QTC Fredericia 418 ms  
  
Assessment/Plan  
Problem List Items Addressed This   
Visit  
  
ICD-10-CM  
Controlled type 2 diabetes   
mellitus without complication,   
without long-term current use of   
insulin (Multi) - Primary E11.9  
-Her most recent hemoglobin A1c   
was a little generous at 8.2  
-We will continue with her current   
medical regimen and I invited her   
to return in approximately 3   
months for reevaluation with   
hemoglobin A1c  
  
  
Relevant Orders  
Hemoglobin A1C  
Basic Metabolic Panel  
Mild dementia without behavioral   
disturbance, psychotic   
disturbance, mood disturbance, or   
anxiety, unspecified dementia type   
(Multi) F03.A0  
-She continues to have a gradual   
decline and now requires [facility]   
care  
-I have filled out paperwork so   
that she is permitted to go to the   
[facility] for   
 especially when her   
 has to be away  
-He has also made arrangements   
with a young lady who comes to sit   
with her during the summer months  
-We will continue to monitor and   
provide supportive care  
-We did go over the DNR CC arrest   
form which was explained and he   
has chosen that category  
  
  
Hiccup R06.6  
-I am increasing the omeprazole to   
treat what I think is acid reflux   
causing her hiccups  
-Her  will let me know if   
the hiccups do not resolve  
  
  
Gastroesophageal reflux disease   
without esophagitis K21.9  
-We are increasing her omeprazole   
from 20 mg daily up to 40 mg daily  
-They will let me know if nausea   
and vomiting becomes a recurrent   
issue  
  
  
Relevant Medications  
omeprazole (PriLOSEC) 40 mg DR zev Almaguer DO  
Electronically signed by Sharifa Almaguer DO at 2024 11:39 AM   
EDT  
documented in this encounter            [facility]  
Work Phone:   
[phone]  
   
                                        2024 Instructions   
  
  
Sharifa Almaguer DO - 2024   
11:00 AM EDTFormatting of this   
note might be different from the   
original.  
As you are well aware we filled   
out the form for the [facility] but I am also   
sending a copy of today's progress   
note so that they have a complete   
history with complete information   
about your health  
Please let us know if there are   
any issues  
I am also increasing the   
omeprazole from 20 mg daily up to   
40 mg daily to see if this will   
eradicate the hiccups. Please let   
me know in a couple of weeks how   
things are going  
I did not feel we needed to do   
additional evaluation for the   
nausea or vomiting or even when   
she passed out but please be sure   
she eats regularly and if she ever   
passes out again she will clearly   
need further evaluation  
I would like to see you back in   
approximately 3 months and please   
remember to get lab work done   
prior to that visit  
Electronically signed by Sharifa Almaguer DO at 2024 11:39 AM   
EDT  
  
documented in this encounter            [facility]  
Work Phone:   
[phone]  
   
                                                    2024 Evaluation +   
Plan note                               Associated Problem(s): Memory loss  
Formatting of this note might be   
different from the original.  
-We will be checking a vitamin B12   
level and TSH  
-We did perform a Folstein   
Mini-Mental status examination and   
she did score 25 out of 30  
-Her family has noticed some   
issues with memory loss  
Electronically signed by Sharifa Almaguer DO at 2024 11:35 AM   
EDT  
                                        [facility]  
Work Phone:   
[phone]  
   
                                                    2024 Miscellaneous   
Notes                                   Associated Problem(s): Memory loss  
Formatting of this note might be   
different from the original.  
-We will be checking a vitamin B12   
level and TSH  
-We did perform a Folstein   
Mini-Mental status examination and   
she did score 25 out of 30  
-Her family has noticed some   
issues with memory loss  
Electronically signed by Sharifa Almaguer DO at 2024 11:35 AM   
EDT  
Associated Problem(s):   
Infiltrating ductal carcinoma of   
right female breast (Multi)  
Formatting of this note might be   
different from the original.  
-She has been in remission for   
very long time and has no signs of   
recurrence at this time. We will   
continue to monitor  
Electronically signed by Sharifa Almaguer DO at 2024 11:34 AM   
EDT  
Associated Problem(s): Controlled   
type 2 diabetes mellitus without   
complication, without long-term   
current use of insulin (Multi)  
Formatting of this note might be   
different from the original.  
-We will have her get a hemoglobin   
A1c soon and she will be returning   
to go over the results  
-Continue with her current glucose   
lowering medication and we will   
continue to monitor closely  
Electronically signed by Sharifa Almaguer DO at 2024 11:34 AM   
EDT  
Associated Problem(s): HTN   
(hypertension)  
Formatting of this note might be   
different from the original.  
-Currently well told so we will   
continue with her current   
antihypertensive regimen  
Electronically signed by Sharifa Almaguer DO at 2024 11:33 AM   
EDT  
documented in this encounter            [facility]  
Work Phone:   
[phone]  
   
                                                    2024 Evaluation +   
Plan note                               Associated Problem(s):   
Infiltrating ductal carcinoma of   
right female breast (Multi)  
Formatting of this note might be   
different from the original.  
-She has been in remission for   
very long time and has no signs of   
recurrence at this time. We will   
continue to monitor  
Electronically signed by Sharifa Almaguer DO at 2024 11:34 AM   
EDT  
                                        [facility]  
Work Phone:   
[phone]  
   
                                                    2024 Evaluation +   
Plan note                               Associated Problem(s): Controlled   
type 2 diabetes mellitus without   
complication, without long-term   
current use of insulin (Multi)  
Formatting of this note might be   
different from the original.  
-We will have her get a hemoglobin   
A1c soon and she will be returning   
to go over the results  
-Continue with her current glucose   
lowering medication and we will   
continue to monitor closely  
Electronically signed by Sharifa Almaguer DO at 2024 11:34 AM   
EDT  
                                        [facility]  
Work Phone:   
[phone]  
   
                                                    2024 Evaluation +   
Plan note                               Associated Problem(s): HTN   
(hypertension)  
Formatting of this note might be   
different from the original.  
-Currently well told so we will   
continue with her current   
antihypertensive regimen  
Electronically signed by Sharifa Almaguer DO at 2024 11:33 AM   
[facility]  
Work Phone:   
[phone]  
   
                                                    2024 History of   
Present illness Narrative               Formatting of this note is   
different from the original.  
Subjective  
Patient ID: Marzena Mederos is a   
78 y.o. female who presents for   
Follow-up (6 MO FUV).  
HPI  
She is here today for evaluation   
as she was recently seen at   
[facility] emergency department on   
the third. Her  ended up   
taking her there because she   
noticed that she seemed to be   
unusually confused. He accompanies   
her today. He explains that they   
were returning from [place] and   
she does seem to be disoriented.   
When she went to the emergency   
department they did several tests   
including a CAT scan of the brain   
as well as lab work and urine   
test. It looks like she might have   
had a urinary tract infection so   
they prescribed Cipro which she   
did complete recently. Her urine   
culture came back showing no signs   
of a bacterial infection. They   
both state that she seems about   
the same. We talked about the   
memory and her  does   
explain that he has noticed some   
issues with memory but it just   
seemed to get worse suddenly. She   
states that she is not really   
concerned about her memory and she   
seems to be pretty happy and   
taking things in stride. I did   
form a Folstein Mini-Mental status   
examination and she did have some   
problems with short-term recall.   
She scored a 25 out of 30.  
Her  states that sometimes   
she has the same questions over   
and over again. I do suspect that   
there is some memory issue and I   
do want to check a vitamin B12 and   
a thyroid blood test.  
I will decided to start her on   
Aricept or donepezil 5 mg daily   
and we will see her back at about   
the third week of taking the   
medicine. We can then decide how   
she is doing and make adjustments   
from there.  
They understand that sometimes 1   
can have extensive   
neuropsychiatric testing but we   
are limited with what we can do   
here in [place]. I did explain   
that sometimes we do offer to see   
a neurologist for more extensive   
evaluation but at this time it   
appears that this is a clear case   
of some mild dementia.  
We are providing refills on all of   
her medications today and will   
also be checking a hemoglobin A1c.  
Objective  
Physical Exam  
Vitals and nursing note reviewed.  
Constitutional:  
General: She is not in acute   
distress.  
Appearance: Normal appearance.  
HENT:  
Head: Normocephalic and   
atraumatic.  
Eyes:  
Conjunctiva/sclera: Conjunctivae   
normal.  
Cardiovascular:  
Rate and Rhythm: Normal rate and   
regular rhythm.  
Heart sounds: Normal heart sounds.  
Pulmonary:  
Effort: No respiratory distress.  
Breath sounds: No wheezing.  
Abdominal:  
Palpations: Abdomen is soft.  
Tenderness: There is no abdominal   
tenderness. There is no guarding.  
Musculoskeletal:  
General: No swelling. Normal range   
of motion.  
Skin:  
General: Skin is warm and dry.  
Neurological:  
General: No focal deficit present.  
Mental Status: She is alert. She   
is disoriented.  
Psychiatric:  
Behavior: Behavior normal.  
  
Recent Results (from the past 672   
hour(s))  
ECG 12 lead  
Collection Time: 24 11:30 AM  
Result Value Ref Range  
Ventricular Rate 73 BPM  
Atrial Rate 73 BPM  
ID Interval 184 ms  
QRS Duration 92 ms  
QT Interval 392 ms  
QTC Calculation(Bazett) 431 ms  
P Axis 28 degrees  
R Axis 3 degrees  
T Axis 50 degrees  
QRS Count 12 beats  
Q Onset 214 ms  
P Onset 122 ms  
P Offset 181 ms  
T Offset 410 ms  
QTC Fredericia 418 ms  
Comprehensive metabolic panel  
Collection Time: 24 11:43 AM  
Result Value Ref Range  
Glucose 217 (H) 74 - 99 mg/dL  
Sodium 138 136 - 145 mmol/L  
Potassium 3.4 (L) 3.5 - 5.3 mmol/L  
Chloride 98 98 - 107 mmol/L  
Bicarbonate 32 21 - 32 mmol/L  
Anion Gap 11 10 - 20 mmol/L  
Urea Nitrogen 10 6 - 23 mg/dL  
Creatinine 0.60 0.50 - 1.05 mg/dL  
eGFR >90 >60 mL/min/1.73m*2  
Calcium 9.9 8.6 - 10.3 mg/dL  
Albumin 4.2 3.4 - 5.0 g/dL  
Alkaline Phosphatase 79 33 - 136   
U/L  
Total Protein 7.0 6.4 - 8.2 g/dL  
AST 13 9 - 39 U/L  
Bilirubin, Total 1.0 0.0 - 1.2   
mg/dL  
ALT 15 7 - 45 U/L  
Troponin I, High Sensitivity  
Collection Time: 24 11:43 AM  
Result Value Ref Range  
Troponin I, High Sensitivity 8 0 -   
13 ng/L  
CBC  
Collection Time: 24 11:43 AM  
Result Value Ref Range  
WBC 7.2 4.4 - 11.3 x10*3/uL  
nRBC 0.0 0.0 - 0.0 /100 WBCs  
RBC 4.63 4.00 - 5.20 x10*6/uL  
Hemoglobin 14.4 12.0 - 16.0 g/dL  
Hematocrit 43.0 36.0 - 46.0 %  
MCV 93 80 - 100 fL  
MCH 31.1 26.0 - 34.0 pg  
MCHC 33.5 32.0 - 36.0 g/dL  
RDW 12.4 11.5 - 14.5 %  
Platelets 310 150 - 450 x10*3/uL  
Urinalysis with Reflex Culture and   
Microscopic  
Collection Time: 24 11:44 AM  
Result Value Ref Range  
Color, Urine Yellow Straw, Yellow  
Appearance, Urine Hazy (N) Clear  
Specific Gravity, Urine 1.018   
1.005 - 1.035  
pH, Urine 6.0 5.0, 5.5, 6.0, 6.5,   
7.0, 7.5, 8.0  
Protein, Urine 30 (1+) (N)   
NEGATIVE mg/dL  
Glucose, Urine 50 (1+) (A)   
NEGATIVE mg/dL  
Blood, Urine NEGATIVE NEGATIVE  
Ketones, Urine NEGATIVE NEGATIVE   
mg/dL  
Bilirubin, Urine NEGATIVE NEGATIVE  
Urobilinogen, Urine <2.0 <2.0   
mg/dL  
Nitrite, Urine NEGATIVE NEGATIVE  
Leukocyte Esterase, Urine MODERATE   
(2+) (A) NEGATIVE  
Extra Urine Gray Tube  
Collection Time: 24 11:44 AM  
Result Value Ref Range  
Extra Tube Hold for add-ons.  
Microscopic Only, Urine  
Collection Time: 24 11:44 AM  
Result Value Ref Range  
WBC, Urine 6-10 (A) 1-5, NONE /HPF  
RBC, Urine NONE NONE, 1-2, 3-5   
/HPF  
Transitional Epithelial Cells,   
Urine 1-2 (FEW) Reference range   
not established. /HPF  
Mucus, Urine 3+ Reference range   
not established. /LPF  
Urine Culture  
Collection Time: 24 11:44 AM  
Specimen: Clean Catch/Voided;   
Urine  
Result Value Ref Range  
Urine Culture No significant   
growth  
  
Assessment/Plan  
Problem List Items Addressed This   
Visit  
  
ICD-10-CM  
HTN (hypertension) I10  
-Currently well told so we will   
continue with her current   
antihypertensive regimen  
  
  
Relevant Medications  
amLODIPine (Norvasc) 10 mg tablet  
losartan-hydrochlorothiazide   
(Hyzaar) 100-12.5 mg tablet  
potassium chloride CR 10 mEq ER   
tablet  
Infiltrating ductal carcinoma of   
right female breast (Multi)   
C50.911  
-She has been in remission for   
very long time and has no signs of   
recurrence at this time. We will   
continue to monitor  
  
  
Controlled type 2 diabetes   
mellitus without complication,   
without long-term current use of   
insulin (Multi) E11.9  
-We will have her get a hemoglobin   
A1c soon and she will be returning   
to go over the results  
-Continue with her current glucose   
lowering medication and we will   
continue to monitor closely  
  
  
Relevant Medications  
metFORMIN  mg 24 hr tablet  
Other Relevant Orders  
Hemoglobin A1C  
Mixed hyperlipidemia E78.2  
Relevant Medications  
simvastatin (Zocor) 20 mg tablet  
Memory loss - Primary R41.3  
-We will be checking a vitamin B12   
level and TSH  
-We did perform a Folstein   
Mini-Mental status examination and   
she did score 25 out of 30  
-Her family has noticed some   
issues with memory loss  
  
  
Relevant Medications  
donepezil (Aricept) 5 mg tablet  
Other Relevant Orders  
TSH  
Vitamin B12  
Mild dementia without behavioral   
disturbance, psychotic   
disturbance, mood disturbance, or   
anxiety, unspecified dementia type   
(Multi) F03.A0  
  
  
  
Sharifa Almaguer DO  
Electronically signed by Sharifa Almaguer DO at 2024 11:36 AM   
EDT  
documented in this encounter            [facility]  
Work Phone:   
[phone]  
   
                                        2024 Instructions   
  
  
Sharifa Almaguer DO - 2024   
11:00 AM EDTFormatting of this   
note might be different from the   
original.  
As we discussed I would like to   
get some additional lab work which   
would include checking the sugar,   
a thyroid blood test, and a   
vitamin B12 level. These labs do   
not require fasting so you can go   
at any time at your earliest   
convenience to get them done and   
we will go over the results when   
you come back  
We did do a memory test called the   
Folstein Mini-Mental status   
examination and you scored a 25   
out of 30 which means that it does   
look like you are struggling with   
your memory somewhat  
I sent a prescription to your   
pharmacy for donepezil which is   
also known as Aricept. You are   
taking a low-dose of just 5 mg   
daily and sometimes this drug can   
help with memory. I would like for   
you to take this every day unless   
you are having problems and I will   
see you back in about 3 weeks to   
see how you are doing  
Please bring all your pills and   
medications in a bag for   
medication reconciliation and yes   
you do need to go back on your   
potassium because your potassium   
was low in the emergency room. I   
sent a new prescription to your   
pharmacy  
Electronically signed by Sharifa Almaguer DO at 2024 11:36 AM   
EDT  
  
documented in this encounter            [facility]  
Work Phone:   
[phone]  
   
                                                    2024 Hospital   
Discharge instructions                    
  
  
Kelsi Murray MD - 2024   
12:56 PM EDTFormatting of this   
note might be different from the   
original.  
Start CIPRO FOR UTI  
DR ROYAL FOLLOW UP NEXT WEEK  
NEUROLOGY EVALUATION IF SYMPTOMS   
PERSISTS  
Electronically signed by Kelsi Murray MD at 2024 12:56 PM   
EDT  
  
  
  
  
The following attachments cannot   
be sent through Care   
Everywhere.Urinary Tract   
Infection, Adult ED   
(English)Delirium (confusion)   
(English)documented in this   
encounter                               [facility]  
Work Phone:   
[phone]  
   
                                                    2023 Evaluation +   
Plan note                               Associated Problem(s):   
Infiltrating ductal carcinoma of   
right female breast (CMS/HCC)  
Formatting of this note might be   
different from the original.  
-She completed her mammogram   
several weeks ago and the great   
news is there are no signs of   
cancer  
Electronically signed by Sharifa Almaguer DO at 2023 11:22 AM   
[facility]  
Work Phone:   
[phone]  
   
                                                    2023 Evaluation +   
Plan note                               Associated Problem(s): Medicare   
annual wellness visit, subsequent  
Formatting of this note might be   
different from the original.  
-We talked about fall prevention   
and I have specifically   
recommended she put grab bars in   
the bathroom  
-We talked about eating more   
fruits and vegetables and we   
talked about exercise  
Electronically signed by Sharifa Almaguer DO at 2023 11:22 AM   
[facility]  
Work Phone:   
[phone]  
   
                                                    2023 Miscellaneous   
Notes                                   Associated Problem(s):   
Infiltrating ductal carcinoma of   
right female breast (CMS/HCC)  
Formatting of this note might be   
different from the original.  
-She completed her mammogram   
several weeks ago and the great   
news is there are no signs of   
cancer  
Electronically signed by Sharifa Almaguer DO at 2023 11:22 AM   
EST  
Associated Problem(s): Medicare   
annual wellness visit, subsequent  
Formatting of this note might be   
different from the original.  
-We talked about fall prevention   
and I have specifically   
recommended she put grab bars in   
the bathroom  
-We talked about eating more   
fruits and vegetables and we   
talked about exercise  
Electronically signed by Sharifa Almaguer DO at 2023 11:22 AM   
EST  
Associated Problem(s): Mixed   
hyperlipidemia  
Formatting of this note might be   
different from the original.  
-Her cholesterol profile was   
excellent this time and we will   
check it once a year per Medicare   
protocol  
-She will remain on simvastatin 20   
mg at bedtime  
Electronically signed by Sharifa Almaguer DO at 2023 11:21 AM   
EST  
Associated Problem(s): Controlled   
type 2 diabetes mellitus without   
complication, without long-term   
current use of insulin (CMS/[facility])  
Formatting of this note might be   
different from the original.  
-Her hemoglobin A1c is fine at   
6.8-she will continue with   
metformin  mg 2 tablets with   
her evening meal  
Electronically signed by Sharifa Almaguer DO at 2023 11:20 AM   
EST  
Associated Problem(s): HTN   
(hypertension)  
Formatting of this note might be   
different from the original.  
-Blood pressure has improved on   
amlodipine 5 mg daily but because   
her trend is higher than desirable   
we will try a 10 mg dose of   
amlodipine and she will continue   
to monitor and give me an update   
on how she is doing  
-She will continue with   
losartan-hydrochlorothiazide   
100-12.51 tablet daily  
-Potassium chloride CR 10 mEq   
daily  
Electronically signed by Sharifa Almaguer DO at 2023 11:21 AM   
EST  
documented in this encounter            [facility]  
Work Phone:   
[phone]  
   
                                                    2023 Evaluation +   
Plan note                               Associated Problem(s): Mixed   
hyperlipidemia  
Formatting of this note might be   
different from the original.  
-Her cholesterol profile was   
excellent this time and we will   
check it once a year per Medicare   
protocol  
-She will remain on simvastatin 20   
mg at bedtime  
Electronically signed by Sharifa Almaguer DO at 2023 11:21 AM   
EST  
                                        [facility]  
Work Phone:   
[phone]  
   
                                                    2023 Evaluation +   
Plan note                               Associated Problem(s): Controlled   
type 2 diabetes mellitus without   
complication, without long-term   
current use of insulin (CMS/[facility])  
Formatting of this note might be   
different from the original.  
-Her hemoglobin A1c is fine at   
6.8-she will continue with   
metformin  mg 2 tablets with   
her evening meal  
Electronically signed by Sharifa Almaguer DO at 2023 11:20 AM   
EST  
                                        [facility]  
Work Phone:   
[phone]  
   
                                                    2023 Evaluation +   
Plan note                               Associated Problem(s): HTN   
(hypertension)  
Formatting of this note might be   
different from the original.  
-Blood pressure has improved on   
amlodipine 5 mg daily but because   
her trend is higher than desirable   
we will try a 10 mg dose of   
amlodipine and she will continue   
to monitor and give me an update   
on how she is doing  
-She will continue with   
losartan-hydrochlorothiazide   
100-12.51 tablet daily  
-Potassium chloride CR 10 mEq   
daily  
Electronically signed by Sharifa Almaguer DO at 2023 11:21 AM   
[facility]  
Work Phone:   
[phone]  
   
                                                    2023 History of   
Present illness Narrative               Formatting of this note is   
different from the original.  
Subjective  
Reason for Visit: Marzena Mederos   
is an 78 y.o. female here for a   
Medicare Wellness visit.  
  
Past Medical, Surgical, and Family   
History reviewed and updated in   
chart.  
  
Reviewed all medications by   
prescribing practitioner or   
clinical pharmacist (such as   
prescriptions, OTCs, herbal   
therapies and supplements) and   
documented in the medical record.  
  
HPI  
She is here today for follow-up   
from her previous visit and we   
also discovered that she was due   
for her annual Medicare wellness   
visit. We did conduct a full   
review of systems and we went   
through the Medicare   
questionnaire. She brought in her   
personal blood pressure monitor   
which was actually sent to her   
household by [employer]. She has been   
checking her blood pressure   
regularly and although her numbers   
improved there is still a little   
bit higher than desirable. So far   
she has tolerated the amlodipine   
well and have decided to increase   
it to 10 mg. She will give us an   
update again on how she is doing   
and we certainly are happy to   
adjust medicine as necessary. She   
is getting ready to leave [place] for   
the winter and will be returning   
in May. We also reviewed her most   
recent laboratory test results and   
for the most part I am very   
pleased with her numbers. Her   
kidney function looks great and   
her liver enzymes are normal. Her   
hemoglobin A1c was just fine at   
6.8 although her fasting glucose   
that morning was a little bit on   
the generous side. She does   
continue to monitor closely and   
will call if she is trending   
upward. Her cholesterol was   
fantastic and she knows we will   
check that once a year. She denies   
any symptoms of depression and she   
has had no falls in the last year.   
We talked about fall prevention   
and I have specifically   
recommended she put grab bars in   
the bathroom. Her memory appears   
to be good and she does wear   
hearing aids. Her diet she states   
could be a little bit better as   
far as vegetables. We also   
discussed the importance of   
routine exercise and she does   
enjoy playing pickle ball. She   
also has advanced directives and   
has provided a copy for her chart   
here. We decided that we can see   
her back in approximately 6 months   
for follow-up.  
Patient Care Team:  
Sharifa Almaguer DO as PCP -   
General (Internal Medicine)  
  
Review of Systems  
  
Objective  
Vitals:  
/79   Pulse 76   Ht 1.6 m   
(5' 3 )   Wt 70.5 kg (155 lb 8 oz)   
  BMI 27.55 kg/m  
  
Physical Exam  
Vitals and nursing note reviewed.  
Constitutional:  
General: She is not in acute   
distress.  
Appearance: Normal appearance.  
HENT:  
Head: Normocephalic and   
atraumatic.  
Eyes:  
Conjunctiva/sclera: Conjunctivae   
normal.  
Cardiovascular:  
Rate and Rhythm: Normal rate and   
regular rhythm.  
Heart sounds: Normal heart sounds.  
Pulmonary:  
Effort: No respiratory distress.  
Breath sounds: No wheezing.  
Abdominal:  
Palpations: Abdomen is soft.  
Tenderness: There is no abdominal   
tenderness. There is no guarding.  
Musculoskeletal:  
General: No swelling. Normal range   
of motion.  
Skin:  
General: Skin is warm and dry.  
Neurological:  
General: No focal deficit present.  
Mental Status: She is alert and   
oriented to person, place, and   
time.  
Psychiatric:  
Behavior: Behavior normal.  
  
Recent Results (from the past 672   
hour(s))  
Comprehensive Metabolic Panel  
Collection Time: 23 8:28 AM  
Result Value Ref Range  
Glucose 193 (H) 74 - 99 mg/dL  
Sodium 134 (L) 136 - 145 mmol/L  
Potassium 4.2 3.5 - 5.3 mmol/L  
Chloride 96 (L) 98 - 107 mmol/L  
Bicarbonate 31 21 - 32 mmol/L  
Anion Gap 11 10 - 20 mmol/L  
Urea Nitrogen 12 6 - 23 mg/dL  
Creatinine 0.64 0.50 - 1.05 mg/dL  
eGFR >90 >60 mL/min/1.73m*2  
Calcium 9.6 8.6 - 10.3 mg/dL  
Albumin 4.4 3.4 - 5.0 g/dL  
Alkaline Phosphatase 82 33 - 136   
U/L  
Total Protein 6.6 6.4 - 8.2 g/dL  
AST 13 9 - 39 U/L  
Bilirubin, Total 0.6 0.0 - 1.2   
mg/dL  
ALT 13 7 - 45 U/L  
Hemoglobin A1C  
Collection Time: 23 8:28 AM  
Result Value Ref Range  
Hemoglobin A1C 6.8 (H) see below %  
Estimated Average Glucose 148 Not   
Established mg/dL  
Lipid panel  
Collection Time: 23 8:28 AM  
Result Value Ref Range  
Cholesterol 182 0 - 199 mg/dL  
HDL-Cholesterol 60.0 mg/dL  
Cholesterol/HDL Ratio 3.0  
LDL Calculated 97 <=99 mg/dL  
VLDL 25 0 - 40 mg/dL  
Triglycerides 126 0 - 149 mg/dL  
Non HDL Cholesterol 122 0 - 149   
mg/dL  
  
Assessment/Plan  
Problem List Items Addressed This   
Visit  
  
HTN (hypertension) - Primary  
-Blood pressure has improved on   
amlodipine 5 mg daily but because   
her trend is higher than desirable   
we will try a 10 mg dose of   
amlodipine and she will continue   
to monitor and give me an update   
on how she is doing  
-She will continue with   
losartan-hydrochlorothiazide   
100-12.51 tablet daily  
-Potassium chloride CR 10 mEq   
daily  
  
  
Relevant Medications  
amLODIPine (Norvasc) 10 mg tablet  
Other Relevant Orders  
Basic Metabolic Panel  
Infiltrating ductal carcinoma of   
right female breast (CMS/HCC)  
-She completed her mammogram   
several weeks ago and the great   
news is there are no signs of   
cancer  
  
  
Controlled type 2 diabetes   
mellitus without complication,   
without long-term current use of   
insulin (CMS/HCC)  
-Her hemoglobin A1c is fine at   
6.8-she will continue with   
metformin  mg 2 tablets with   
her evening meal  
  
  
Relevant Orders  
Hemoglobin A1C  
Medicare annual wellness visit,   
subsequent  
-We talked about fall prevention   
and I have specifically   
recommended she put grab bars in   
the bathroom  
-We talked about eating more   
fruits and vegetables and we   
talked about exercise  
  
  
Mixed hyperlipidemia  
-Her cholesterol profile was   
excellent this time and we will   
check it once a year per Medicare   
protocol  
-She will remain on simvastatin 20   
mg at bedtime  
  
  
  
  
Electronically signed by Sharifa Almaguer DO at 2023 11:23 AM   
EST  
documented in this encounter            [facility]  
Work Phone:   
[phone]  
   
                                        2023 Instructions   
  
  
Sharifa Almaguer DO - 2023   
11:00 AM ESTFormatting of this   
note might be different from the   
original.  
As we discussed we completed your   
annual Medicare wellness visit   
today  
Please remember to try to eat more   
vegetables and please remember the   
things we discussed about fall   
prevention  
I was very pleased with your   
checkup today and your lab work   
looks wonderful  
We will see you back in   
approximately 6 months for a   
follow-up and please remember to   
go to the lab fasting prior to   
your visit  
Electronically signed by Sharifa Almaguer DO at 2023 11:23 AM   
EST  
  
documented in this encounter            [facility]  
Work Phone:   
[phone]  
   
                                                    2023 Miscellaneous   
Notes                                   Formatting of this note might be   
different from the original.  
2023  
  
PID: [id number] Marzena MORELAND Rosa M  
[address] [place] [address]  
  
Dear Ms. Sarahmitch,  
  
We are pleased to inform you that   
the results of your recent breast   
imaging exam on 2023 are   
normal.  
  
Early detection of cancer is very   
important. We also understand   
recommendations regarding breast   
cancer screening are   
controversial. Please discuss with   
your primary care provider which   
strategy is best for you and   
whether a mammogram is right for   
you.  
  
Your imaging studies and report   
will be kept on file at [facility] as part of your permanent   
medical record and are available   
for your continuing care.  
  
Thank you for allowing us to help   
in meeting your health care needs.  
  
Sincerely,  
  
Dr. Short  
Interpreting Radiologist  
[facility]  
  
(Normal over 40)  
Electronically signed by   
Coordinator, Mammography at   
2023 2:05 PM EST  
documented in this encounter            [facility]  
   
                                                    2023 History of   
Present illness Narrative               Formatting of this note might be   
different from the original.  
Radiology Service Progress Note  
  
PATIENT NAME: Marzena Mederos  
MRN: [id number]  
  
DATE OF SERVICE: 2023  
TIME: 9:29 AM  
PATIENT IDENTITY VERIFICATION   
COMPLETED USING TWO (2)   
IDENTIFIERS: Name and Date of   
Birth confirmed by patient   
verbally.  
FALL SCREENING: Has the patient   
had 2 falls in the last year or 1   
fall with injury or currently   
using an Ambulatory Assistive   
Device (Walker, Cane, Wheelchair,   
Crutches, etc.)? No  
PATIENT GENDER DATA: Female.   
Pregnancy status: Pregnant: No   
Breastfeeding status: NO.  
PATIENT RELEVANT IMPLANT DATA   
REVIEWED: Not Applicable  
  
RADIOLOGY DEPARTMENT: Mammography  
  
PERIPHERAL IV DATA: Not applicable  
  
SIGNED BY: Mary Marvin Kanvas Labso   
Elvie  
2023 9:29 AM  
  
  
Electronically signed by   
Mary Marvin Kanvas Labso Tech at   
2023 9:51 AM EST  
documented in this encounter            [facility]  
   
                                                    10- Evaluation +   
Plan note                               Associated Problem(s):   
Infiltrating ductal carcinoma of   
right female breast (CMS/HCC)  
Formatting of this note might be   
different from the original.  
-Was diagnosed many years ago and   
successfully treated and   
disease-free at this time  
-She goes for her screening   
mammogram this coming Monday  
Electronically signed by Sharifa Almaguer DO at 10/31/2023 4:11 PM   
[facility]  
Work Phone:   
[phone]  
   
                                                    10- Evaluation +   
Plan note                               Associated Problem(s): Controlled   
type 2 diabetes mellitus without   
complication, without long-term   
current use of insulin (CMS/HCC)  
Formatting of this note might be   
different from the original.  
-We will be checking a hemoglobin   
A1c  
-She is on metformin 500 mg 2   
tablets each evening  
Electronically signed by Sharifa Almaguer DO at 10/31/2023 4:10 PM   
[facility]  
Work Phone:   
[phone]  
   
                                                    10- Evaluation +   
Plan note                               Associated Problem(s): HTN   
(hypertension)  
Formatting of this note might be   
different from the original.  
-Her blood pressure was elevated   
today and she states while   
checking it at home she typically   
gets systolic readings in the 140s   
or higher  
-We are adding amlodipine 5 mg   
daily to her regimen  
-She will continue with   
losartan-hydrochlorothiazide   
100-12.51 tablet daily  
-She will continue with potassium   
chloride 10 mEq daily  
-She will check her blood   
pressures at home and bring her   
monitor with her to her follow-up   
visit  
Electronically signed by Sharifa Almaguer DO at 10/31/2023 4:10 PM   
EDT  
                                        [facility]  
Work Phone:   
[phone]  
   
                                                    10- Miscellaneous   
Notes                                   Associated Problem(s):   
Infiltrating ductal carcinoma of   
right female breast (CMS/HCC)  
Formatting of this note might be   
different from the original.  
-Was diagnosed many years ago and   
successfully treated and   
disease-free at this time  
-She goes for her screening   
mammogram this coming Monday  
Electronically signed by Sharifa Almaguer DO at 10/31/2023 4:11 PM   
EDT  
Associated Problem(s): Controlled   
type 2 diabetes mellitus without   
complication, without long-term   
current use of insulin (CMS/HCC)  
Formatting of this note might be   
different from the original.  
-We will be checking a hemoglobin   
A1c  
-She is on metformin 500 mg 2   
tablets each evening  
Electronically signed by Sharifa Almaguer DO at 10/31/2023 4:10 PM   
EDT  
Associated Problem(s): HTN   
(hypertension)  
Formatting of this note might be   
different from the original.  
-Her blood pressure was elevated   
today and she states while   
checking it at home she typically   
gets systolic readings in the 140s   
or higher  
-We are adding amlodipine 5 mg   
daily to her regimen  
-She will continue with   
losartan-hydrochlorothiazide   
100-12.51 tablet daily  
-She will continue with potassium   
chloride 10 mEq daily  
-She will check her blood   
pressures at home and bring her   
monitor with her to her follow-up   
visit  
Electronically signed by Sharifa Almaguer DO at 10/31/2023 4:10 PM   
EDT  
documented in this encounter            [facility]  
Work Phone:   
[phone]  
   
                                                    10- History of   
Present illness Narrative               Formatting of this note might be   
different from the original.  
Here to establish care  
Electronically signed by Sharifa Anderson LPN at 10/31/2023 4:13 PM   
EDT  
Formatting of this note is   
different from the original.  
Subjective  
Patient ID: Marzena Mederos is a   
78 y.o. female who presents for   
Establish Care.  
HPI  
She is here today to get   
established. Her previous   
physician of 25 years is moving   
out of state and she is naturally   
sad about it. Otherwise we are   
glad to meet her today and we did   
review her past medical history.   
She has had a history of both   
breast cancer and endometrial   
cancer. She had surgery and has   
done remarkably well all this   
time. She also had a history of a   
severe eye infection requiring   
hospitalization with IV   
antibiotics about 6 years ago.   
Today she is looking great and   
exudes energy. She states she is   
preparing to leave for [place] for   
the winter months and will leave   
at the end of December. She is   
very physically active and plays   
[employer] ball. She does have hearing   
aids and hears well today. She is   
also a type II diabetic and we did   
review her medications today. We   
determined that she is due for   
laboratory checkup and has agreed   
to go soon. We will see her back   
to go over the results we will   
also complete her annual Medicare   
wellness visit. She also brought   
in a copy of her living will and   
power of  for healthcare.  
Review of Systems  
Constitutional: Negative for   
fatigue.  
Respiratory: Positive for cough.   
Negative for shortness of breath   
and wheezing.  
Cardiovascular: Negative for chest   
pain, palpitations and leg   
swelling.  
Gastrointestinal: Negative for   
abdominal pain, blood in stool,   
diarrhea, nausea and vomiting.  
Musculoskeletal: Negative for   
arthralgias and back pain.  
  
Objective  
Physical Exam  
Vitals and nursing note reviewed.  
Constitutional:  
General: She is not in acute   
distress.  
Appearance: Normal appearance.  
HENT:  
Head: Normocephalic and   
atraumatic.  
Eyes:  
Conjunctiva/sclera: Conjunctivae   
normal.  
Cardiovascular:  
Rate and Rhythm: Normal rate and   
regular rhythm.  
Heart sounds: Normal heart sounds.  
Pulmonary:  
Effort: No respiratory distress.  
Breath sounds: No wheezing.  
Abdominal:  
Palpations: Abdomen is soft.  
Tenderness: There is no abdominal   
tenderness. There is no guarding.  
Musculoskeletal:  
General: No swelling. Normal range   
of motion.  
Skin:  
General: Skin is warm and dry.  
Neurological:  
General: No focal deficit present.  
Mental Status: She is alert and   
oriented to person, place, and   
time.  
Psychiatric:  
Behavior: Behavior normal.  
  
Assessment/Plan  
Problem List Items Addressed This   
Visit  
  
ICD-10-CM  
HTN (hypertension) I10  
-Her blood pressure was elevated   
today and she states while   
checking it at home she typically   
gets systolic readings in the 140s   
or higher  
-We are adding amlodipine 5 mg   
daily to her regimen  
-She will continue with   
losartan-hydrochlorothiazide   
100-12.51 tablet daily  
-She will continue with potassium   
chloride 10 mEq daily  
-She will check her blood   
pressures at home and bring her   
monitor with her to her follow-up   
visit  
  
  
Relevant Medications  
amLODIPine (Norvasc) 5 mg tablet  
Infiltrating ductal carcinoma of   
right female breast (CMS/HCC) -   
Primary C50.911  
-Was diagnosed many years ago and   
successfully treated and   
disease-free at this time  
-She goes for her screening   
mammogram this coming Monday  
  
  
Controlled type 2 diabetes   
mellitus without complication,   
without long-term current use of   
insulin (CMS/[facility]) E11.9  
-We will be checking a hemoglobin   
A1c  
-She is on metformin 500 mg 2   
tablets each evening  
  
  
Relevant Orders  
Comprehensive Metabolic Panel  
Hemoglobin A1C  
Albumin , Urine Random  
  
Other Visit Diagnoses  
  
Codes  
Mixed hyperlipidemia E78.2  
Relevant Orders  
Lipid panel  
  
  
  
  
  
Sharifa Almaguer DO  
  
Electronically signed by Sharifa Almaguer DO at 10/31/2023 4:13 PM   
EDT  
documented in this encounter            [facility]  
Work Phone:   
[phone]  
   
                                        10- Instructions   
  
  
Sharifa Almaguer DO - 10/31/2023   
3:40 PM EDTFormatting of this note   
might be different from the   
original.  
As we discussed I have ordered   
several labs to be done at your   
earliest convenience and we would   
like for you to be fasting. You   
can have plenty of water but just   
nothing with calories for   
approximately 8 to 12 hours  
When you come back we will go over   
the results  
I also added a medication to your   
medical regimen called amlodipine   
5 mg to be taken once daily with   
your other blood pressure   
medication. Please try to check   
her blood pressure at home with   
your personal monitor and please   
sit down and relax for 10 to 20   
minutes prior to checking your   
blood pressure. Please also bring   
your monitor with you to your   
follow-up appointment  
When you return we will be   
completing your annual Medicare   
wellness visit  
Electronically signed by Sharifa Almaguer DO at 10/31/2023 4:12 PM   
EDT  
  
documented in this encounter            [facility]  
Work Phone:   
[phone]  
   
                                        10- Instructions   
  
  
Amaya Reis II, OD -   
10/25/2023 1:02 PM EDTFormatting   
of this note might be different   
from the original.  
Assessment and Plan  
  
H00.011 Hordeolum externum of   
right upper eyelid (primary   
encounter diagnosis)  
Comment: Start use of Keflex 500   
mg po twice a day x 10 days.   
Polytrim 1 gt both eyes four times   
a day x 1 week. Recheck as needed.  
  
I have confirmed and edited as   
necessary the relevant ophthalmic   
history, ROS, and the neuro exam   
findings as obtained by others. I   
have seen and examined Marzena Mederos.  
I have discussed the case and the   
management of this patient's care   
with the Resident/Fellow, if   
applicable. I also have reviewed   
and agree with the assessment and   
plan as stated above and agree   
with all of its relevant   
components.  
  
Amaya Reis II, OD  
  
  
  
  
Electronically signed by   
Amaya Reis II, OD at   
10/25/2023 1:02 PM EDT  
  
documented in this encounter            [facility]  
   
                                                    10- History of   
Present illness Narrative               Formatting of this note might be   
different from the original.  
Assessment and Plan  
  
H00.011 Hordeolum externum of   
right upper eyelid (primary   
encounter diagnosis)  
Comment: Start use of Keflex 500   
mg po twice a day x 10 days.   
Polytrim 1 gt both eyes four times   
a day x 1 week. Recheck as needed.  
  
I have confirmed and edited as   
necessary the relevant ophthalmic   
history, ROS, and the neuro exam   
findings as obtained by others. I   
have seen and examined Marzena Mederos.  
I have discussed the case and the   
management of this patient's care   
with the Resident/Fellow, if   
applicable. I also have reviewed   
and agree with the assessment and   
plan as stated above and agree   
with all of its relevant   
components.  
  
Amaya Reis II, OD  
  
  
Electronically signed by   
Amaya Reis II, OD at   
10/25/2023 1:03 PM EDT  
documented in this encounter            [facility]  
   
                                        2023 Instructions   
  
  
Amaya Reis II, OD -   
2023 10:15 AM EDTFormatting   
of this note might be different   
from the original.  
Assessment and Plan  
  
E11.9 Type 2 diabetes mellitus   
without retinopathy (HCC) (primary   
encounter diagnosis)  
Comment: Examination shows no   
ocular diabetic complications   
today. Discussed need for optimal   
diabetes control to minimize   
chance of ocular complications.   
Advise patient to immediately   
report worsening in status or   
additional symptoms. Continue   
yearly dilated eye examinations.  
  
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   
cataract of both eyes  
Comment: Cataract formation   
progressing. Patient will tolerate   
vision and defer surgical   
correction until a later time.  
  
H52.03 Hyperopia of both eyes  
H52.222 Regular astigmatism of   
left eye  
H52.4 Presbyopia  
Comment: Small shift in glasses   
power. Update as desired.  
  
I have confirmed and edited as   
necessary the relevant ophthalmic   
history, ROS, and the neuro exam   
findings as obtained by others. I   
have seen and examined Marzena Mederos.  
I have discussed the case and the   
management of this patient's care   
with the Resident/Fellow, if   
applicable. I also have reviewed   
and agree with the assessment and   
plan as stated above and agree   
with all of its relevant   
components.  
  
Amaya Reis II, OD  
  
  
  
  
Electronically signed by Amaya Reis II, OD at 2023   
10:15 AM EDT  
  
documented in this encounter            [facility]  
   
                                                    2023 History of   
Present illness Narrative               Formatting of this note might be   
different from the original.  
Assessment and Plan  
  
E11.9 Type 2 diabetes mellitus   
without retinopathy (HCC) (primary   
encounter diagnosis)  
Comment: Examination shows no   
ocular diabetic complications   
today. Discussed need for optimal   
diabetes control to minimize   
chance of ocular complications.   
Advise patient to immediately   
report worsening in status or   
additional symptoms. Continue   
yearly dilated eye examinations.  
  
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   
cataract of both eyes  
Comment: Cataract formation   
progressing. Patient will tolerate   
vision and defer surgical   
correction until a later time.  
  
H52.03 Hyperopia of both eyes  
H52.222 Regular astigmatism of   
left eye  
H52.4 Presbyopia  
Comment: Small shift in glasses   
power. Update as desired.  
  
I have confirmed and edited as   
necessary the relevant ophthalmic   
history, ROS, and the neuro exam   
findings as obtained by others. I   
have seen and examined Marzena Mederos.  
I have discussed the case and the   
management of this patient's care   
with the Resident/Fellow, if   
applicable. I also have reviewed   
and agree with the assessment and   
plan as stated above and agree   
with all of its relevant   
components.  
  
Amaya Reis II, OD  
  
  
Electronically signed by Amaya Reis II, OD at 2023   
10:15 AM EDT  
documented in this encounter            [facility]  
   
                                                    2022 History of   
Present illness Narrative               Formatting of this note is   
different from the original.  
Chaperone offered: Patient   
declinesCharlotte Ivan is a 77 year old    
who presents for an annual   
gynecologic exam without   
complaints.  
  
Postmenopausal: Yes  
HRT use: No.  
Last Pap: 2017 normal  
HPV: 2014 negative  
History of abnormal pap: Yes  
Last mammogram:  normal  
History of abnormal mammogram: Yes  
Sexually active: No  
  
OB History  
 T0  L1  
SAB1 IAB0 Ectopic0 Multiple0 Live   
Births0  
  
Comment: Plus two step daughters  
  
Gyn History  
  
LMP: Hysterectomy  
Age at Menarche:  
Age at First Pregnancy:  
Age at Menopause:  
Gyn History Comments:  
Sexual Activity: Not Asked; Male;   
not asked  
Contraception: No contraception   
data on record  
  
  
  
PAST MEDICAL HISTORY  
Diagnosis Date  
Breast cancer (HCC)   
invasive ductal carcinoma,   
completed radiation 3/7/11  
Cancer of endometrium (HCC)  
Carcinoma in situ, vulva   
Cellulitis  
of right eye muscle  
Diabetes mellitus without mention   
of complication  
Diabetes mellitus, type II (HCC)  
Diaphragmatic hernia without   
mention of obstruction or gangrene  
Hiatal hernia  
Dyspareunia  
Elevated cholesterol  
Esophageal reflux  
Insomnia, unspecified  
Orbital cellulitis  
Other corneal disorder  
Rt eye corneal erosion .  
PMH - PAST MEDICAL HISTORY OF  
?VESTIBULITIS  
PMH - PAST MEDICAL HISTORY OF  
CLIMACTERIC  
PMH - PAST MEDICAL HISTORY OF   
  
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  
Pure hypercholesterolemia  
Rib fracture  
6th Rib  
Stomatitis and mucositis   
(ulcerative)  
Chronic ulcerative stomatitis on   
mucosal biopsy  
Unspecified essential hypertension  
  
PAST SURGICAL HISTORY  
Procedure Laterality Date  
Bunions bilateral feet removed   
10/2002  
BX/EXC LYMPH NODE OPEN DEEP   
AXILLARY NODE 8-24-10  
RIGHT BREAST LUMP W/ SLN BX  
CHOLECYSTECTOMY 2004  
Cholecystectomy  
COLONOSCOPY FLX DX W/COLLJ SPEC   
WHEN PFRMD   
Colonoscopy  
COLPOSCOPY CERVIX UPPER/ADJACENT   
VAGINA   
Colposcopy of the vulva  
COLPOSCOPY, VULVA 10/09  
CORRECT BUNION,SIMPLE  
Bunion  
DILATION & CURETTAGE DX&/THER   
NONOBSTETRIC   
SAB  
HYSTERECTOMY 2017  
LAPAROSCOPY SURG CHOLECYSTECTOMY   
3/2004  
Cholecystectomy, lap  
MAMMO STEREOTACTIC CORE BIOPSY RT   
10/10/13  
MASTECTOMY, PARTIAL 8-24-10  
RIGHT BREAST  
PAST SURGICAL HISTORY OF 2006  
partial vulvectomy/sq. cell ca in   
situ  
PAST SURGICAL HISTORY OF   
right eye cornea  
PAST SURGICAL HISTORY OF   
right eye revision  
PAST SURGICAL HISTORY OF  
squamous cell carcinoma right arm  
SALPINGO-OOPHORECTOMY COMPL/PRTL   
UNI/BI SPX 2017  
Toenail Big Toe Left Foot removed   
10/2002  
TONSILLECTOMY PRIMARY/SECONDARY   
<AGE [age]  
Tonsillectomy  
UNLISTED PROCEDURE HANDS/FINGERS   
13  
CMC Arthoplasty on right hand  
  
FAMILY HISTORY  
Problem Relation Age of Onset  
Stroke Mother  
Diabetes Mother  
GI Father  
 from complications of gb   
surgery  
Arthritis Sister  
Systemic Lupus  
Cancer Maternal Uncle  
LUNG  
Cancer Other  
cousin with breast cancer/cousin   
with bladder ca.  
Stroke Sister  
SOCIAL HISTORY  
Social History  
  
Tobacco Use  
Smoking status: Never  
Smokeless tobacco: Never  
Vaping Use  
Vaping Use: Never used  
Substance Use Topics  
Alcohol use: Yes  
Comment: 1-2 weekly  
Drug use: No  
  
REVIEW OF SYSTEMS  
Abdomen: No abdominal pain,   
nausea, vomiting, diarrhea,   
+constipation. No bloating, early   
satiety, indigestion, or increased   
flatulence.  
Bladder: No dysuria, gross   
hematuria, urinary frequency,   
urinary urgency, or incontinence  
Breast: No breast lumps, nipple   
d/c, overlying skin changes,   
redness or skin retraction  
Allergies and current medication   
updated:Yes  
  
EXAM: There were no vitals taken   
for this visit.  
  
  
GENERAL: pleasant,    
female in no apparent distress  
HEENT: Normocephalic, atraumatic,   
and no lesions  
NECK: Supple, full range of   
motion, no adenopathy, and thyroid   
normal  
DERMATOLOGY: Normal, without   
lesions, non-icteric, and   
non-hirsute  
BREAST: soft, non-tender,   
symmetric, no dominant mass,   
normal nipple-areolar complex, no   
lymphadenopathy, and no nipple   
discharge  
CHEST: Normal inspiratory effort  
ABDOMEN: soft, non-tender, and no   
masses  
PELVIC: external genitalia normal,   
normal Bartholin's glands,   
urethra, [place]'s glands, no vulvar   
lesions, physiologic discharge   
present, normal appearing perineal   
body and perianal region, cervix   
surgically absent  
BIMANUAL: no adnexal masses,   
non-tender, and uterus surgically   
absent  
RECTOVAGINAL: deferred.  
NEURO: alert and oriented x3,exam   
grossly non-focal  
EXTREMITIES: normal  
  
ASSESSMENT/PLAN:  
1) Health maintenance:  
Pap done  
Mammogram up to date  
Nutrition, exercise and routine   
health maintenance exams reviewed.  
Calcium/Vitamin D supplementation   
information provided.  
2) Follow up one year or sooner as   
needed  
  
[email or web address]  
Electronically signed by Amanda Jo APRN.[email or web address] at 2022   
11:32 AM EST  
documented in this encounter            [facility]  
   
                                                    2022 Miscellaneous   
Notes                                   Formatting of this note might be   
different from the original.  
2022  
  
PID: [id number] Marzena Mederos  
[address] [place] [address]  
  
Dear Ms. Mederos,  
  
We are pleased to inform you that   
the results of your recent breast   
imaging exam on 11/3/2022 are   
normal.  
  
Your mammogram demonstrates that   
you have dense breast tissue,   
which could hide abnormalities.   
Dense breast tissue, in and of   
itself, is a relatively common   
condition. Therefore, this   
information is not provided to   
cause undue concern; rather, it is   
to raise your awareness and   
promote discussion with your   
health care provider regarding the   
presence of dense breast tissue in   
addition to other risk factors.  
  
Early detection of cancer is very   
important. We also understand   
recommendations regarding breast   
cancer screening are   
controversial. Please discuss with   
your primary care provider which   
strategy is best for you and   
whether a mammogram is right for   
you.  
  
Your imaging studies and report   
will be kept on file at [facility] as part of your permanent   
medical record and are available   
for your continuing care.  
  
Thank you for allowing us to help   
in meeting your health care needs.  
  
Sincerely,  
  
Dr. Henson  
Interpreting Radiologist  
[facility]  
  
(Normal over 40)  
Electronically signed by   
Mammography Coordinator at   
2022 9:18 AM EDT  
documented in this encounter            [facility]  
   
                                                    2022 History of   
Present illness Narrative               Formatting of this note might be   
different from the original.  
Radiology Service Progress Note  
  
PATIENT NAME: Marzena Mederos  
MRN: [id number]  
  
DATE OF SERVICE: November 3, 2022  
TIME: 9:15 AM  
PATIENT IDENTITY VERIFICATION   
COMPLETED USING TWO (2)   
IDENTIFIERS: Name and Date of   
Birth confirmed by patient   
verbally.  
FALL SCREENING: Has the patient   
had 2 falls in the last year or 1   
fall with injury or currently   
using an Ambulatory Assistive   
Device (Walker, Cane, Wheelchair,   
Crutches, etc.)? No  
PATIENT GENDER DATA: Female.   
Pregnancy status: Pregnant: No   
Breastfeeding status: NO.  
PATIENT RELEVANT IMPLANT DATA   
REVIEWED: Not Applicable  
  
RADIOLOGY DEPARTMENT: Mammography  
  
PERIPHERAL IV DATA: Not applicable  
  
SIGNED BY: RT Misti(R)  
November 3, 2022 9:15 AM  
  
  
Electronically signed by Josie Montanez RT(R) at 2022 9:15   
AM EDT  
documented in this encounter            [facility]  
  
  
  
                                                    2006 History of Past i  
llness Narrative   
  
  
  
                                Problem         Noted Date      Resolved Date  
   
                                APPOINTMENT CANCELLED 2006  
  
documented as of this encounter (statuses as of 2022)  
[facility]- History of Past illness Narrative*   
  
                                Problem         Noted Date      Resolved Date  
   
                                APPOINTMENT CANCELLED 2006  
  
documented as of this encounter (statuses as of 2022)  
[address]26-2006 History of Past illness Narrative*   
  
                                Problem         Noted Date      Resolved Date  
   
                                APPOINTMENT CANCELLED 2006  
  
documented as of this encounter (statuses as of 2023)  
[address]26-2006 History of Past illness Narrative*   
  
                          Problem      Noted Date   Diagnosed Date Resolved Date  
   
                          APPOINTMENT CANCELLED 2006  
  
documented as of this encounter (statuses as of 10/25/2023)  
[address]-2006 History of Past illness Narrative*   
  
                          Problem      Noted Date   Diagnosed Date Resolved Date  
   
                          APPOINTMENT CANCELLED 2006  
  
documented as of this encounter (statuses as of 2023)  
[address]-2006 History of Past illness Narrative*   
  
                          Problem      Noted Date   Diagnosed Date Resolved Date  
   
                          APPOINTMENT CANCELLED 2006  
  
documented as of this encounter (statuses as of 2023)  
[address] History of Past illness Narrative*   
  
                          Problem      Noted Date   Diagnosed Date Resolved Date  
   
                          APPOINTMENT CANCELLED 2006  
  
documented as of this encounter (statuses as of 2023)  
[facility]   
  
                                                    Diagnosis  
   
                                                      
  
  
Encounter for gynecological examination (general) (routine) without abnormal   
findings- Primary  
   
                                                      
  
  
Encounter for screening for malignant neoplasm of vagina  
  
  
Special screening for malignant neoplasms, vagina  
  
documented in this encounter  
[facility]   
  
                                                    Diagnosis  
   
                                                      
  
  
Type 2 diabetes mellitus without retinopathy (HCC)- Primary  
  
  
Type II or unspecified type diabetes mellitus without mention of complication, 
not   
stated as uncontrolled  
   
                                                      
  
  
Combined form of senile cataract of both eyes  
   
                                                      
  
  
Hyperopia of both eyes  
   
                                                      
  
  
Regular astigmatism of left eye  
  
  
Regular astigmatism  
   
                                                      
  
  
Presbyopia  
  
documented in this encounter  
[facility]   
  
                                                    Diagnosis  
   
                                                      
  
  
Hordeolum externum of right upper eyelid- Primary  
  
  
Hordeolum externum  
  
documented in this encounter  
[facility]   
  
                                                    Diagnosis  
   
                                                      
  
  
Infiltrating ductal carcinoma of right female breast (CMS/HCC)- Primary  
   
                                                      
  
  
Controlled type 2 diabetes mellitus without complication, without long-term 
current   
use of insulin (CMS/HCC)  
   
                                                      
  
  
Mixed hyperlipidemia  
   
                                                      
  
  
Secondary hypertension  
  
  
Other secondary hypertension, unspecified  
  
documented in this encounter  
[facility]  
Work Phone: [phone] note*   
  
                                                    Diagnosis  
   
                                                      
  
  
Encounter for screening mammogram for malignant neoplasm of breast  
  
  
Other screening mammogram  
  
documented in this encounter  
[facility] [place]   
  
                                                    Diagnosis  
   
                                                      
  
  
Malignant neoplasm of right breast in female, estrogen receptor positive, 
unspecified   
site of breast (HCC)  
   
                                                      
  
  
Encounter for screening mammogram for high-risk patient  
  
documented in this encounter  
[facility] [place]   
  
                                                    Diagnosis  
   
                                                      
  
  
Secondary hypertension- Primary  
  
  
Other secondary hypertension, unspecified  
   
                                                      
  
  
Medicare annual wellness visit, subsequent  
   
                                                      
  
  
Controlled type 2 diabetes mellitus without complication, without long-term 
current   
use of insulin (CMS/HCC)  
   
                                                      
  
  
Mixed hyperlipidemia  
   
                                                      
  
  
Infiltrating ductal carcinoma of right female breast (CMS/HCC)  
  
documented in this encounter  
[facility]  
Work Phone: [phone] note*   
  
                                                    Diagnosis  
   
                                                      
  
  
UTI (urinary tract infection), bacterial- Primary  
   
                                                      
  
  
Confusion  
  
  
Unspecified psychosis  
  
documented in this encounter  
[facility]  
Work Phone: [phone] note*   
  
                                                    Diagnosis  
   
                                                      
  
  
Memory loss- Primary  
   
                                                      
  
  
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  
   
                                                      
  
  
Controlled type 2 diabetes mellitus without complication, without long-term 
current   
use of insulin (Multi)  
   
                                                      
  
  
Secondary hypertension  
  
  
Other secondary hypertension, unspecified  
   
                                                      
  
  
Mixed hyperlipidemia  
   
                                                      
  
  
Mild dementia without behavioral disturbance, psychotic disturbance, mood   
disturbance, or anxiety, unspecified dementia type (Multi)  
  
documented in this encounter  
[facility]  
Work Phone: [phone] note*   
  
                                                    Diagnosis  
   
                                                      
  
  
Nausea and vomiting, unspecified vomiting type- Primary  
   
                                                      
  
  
Intermittent lightheadedness  
  
documented in this encounter  
[facility]  
Work Phone: [phone] note*   
  
                                                    Diagnosis  
   
                                                      
  
  
Controlled type 2 diabetes mellitus without complication, without long-term 
current   
use of insulin (Multi)- Primary  
   
                                                      
  
  
Hiccup  
  
  
Hiccough  
   
                                                      
  
  
Mild dementia without behavioral disturbance, psychotic disturbance, mood   
disturbance, or anxiety, unspecified dementia type (Multi)  
   
                                                      
  
  
Gastroesophageal reflux disease without esophagitis  
  
  
Esophageal reflux  
  
documented in this encounter  
[facility]  
Work Phone: [phone] note*   
  
                                                    Diagnosis  
   
                                                      
  
  
Combined form of senile cataract of both eyes- Primary  
   
                                                      
  
  
Type 2 diabetes mellitus without retinopathy (HCC)  
  
  
Type II or unspecified type diabetes mellitus without mention of complication, 
not   
stated as uncontrolled  
   
                                                      
  
  
Hyperopia of both eyes  
   
                                                      
  
  
Regular astigmatism of both eyes  
  
  
Regular astigmatism  
   
                                                      
  
  
Presbyopia  
  
documented in this encounter  
[facility] [place] [facility]   
  
                                                    Diagnosis  
   
                                                      
  
  
Combined forms of age-related cataract of left eye- Primary  
  
  
Other and combined forms of senile cataract  
   
                                                      
  
  
Combined forms of age-related cataract of right eye  
  
  
Other and combined forms of senile cataract  
   
                                                      
  
  
Type 2 diabetes mellitus without retinopathy (HCC)  
  
  
Type II or unspecified type diabetes mellitus without mention of complication, 
not   
stated as uncontrolled  
   
                                                      
  
  
Essential hypertension  
  
  
Unspecified essential hypertension  
   
                                                      
  
  
Hypercholesteremia  
  
  
Pure hypercholesterolemia  
   
                                                      
  
  
Dementia, unspecified dementia severity, unspecified dementia type, unspecified   
whether behavioral, psychotic, or mood disturbance or anxiety (HCC)  
  
documented in this encounter  
[facility] note*   
  
                                                    Diagnosis  
   
                                                      
  
  
Controlled type 2 diabetes mellitus without complication, without long-term 
current   
use of insulin (HCC)- Primary  
   
                                                      
  
  
Carcinoma in situ of vulva  
  
  
Carcinoma in situ, vulva  
   
                                                      
  
  
Postmenopausal bleeding  
   
                                                      
  
  
Combined forms of age-related cataract of left eye- Primary  
  
  
Other and combined forms of senile cataract  
   
                                                      
  
  
Combined forms of age-related cataract of right eye  
  
  
Other and combined forms of senile cataract  
   
                                                      
  
  
Type 2 diabetes mellitus without retinopathy (HCC)  
  
  
Type II or unspecified type diabetes mellitus without mention of complication, 
not   
stated as uncontrolled  
   
                                                      
  
  
Essential hypertension  
  
  
Unspecified essential hypertension  
   
                                                      
  
  
Hypercholesteremia  
  
  
Pure hypercholesterolemia  
   
                                                      
  
  
Dementia, unspecified dementia severity, unspecified dementia type, unspecified   
whether behavioral, psychotic, or mood disturbance or anxiety (HCC)  
  
documented in this encounter  
[facility] note*   
  
                                                    Diagnosis  
   
                                                      
  
  
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  
   
                                                      
  
  
Controlled type 2 diabetes mellitus without complication, without long-term 
current   
use of insulin (Multi)  
   
                                                      
  
  
Mixed hyperlipidemia  
   
                                                      
  
  
Secondary hypertension  
  
  
Other secondary hypertension, unspecified  
   
                                                      
  
  
Secondary hypertension- Primary  
  
  
Other secondary hypertension, unspecified  
   
                                                      
  
  
Medicare annual wellness visit, subsequent  
   
                                                      
  
  
Controlled type 2 diabetes mellitus without complication, without long-term 
current   
use of insulin (Multi)  
   
                                                      
  
  
Mixed hyperlipidemia  
   
                                                      
  
  
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  
   
                                                      
  
  
Memory loss- Primary  
   
                                                      
  
  
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  
   
                                                      
  
  
Controlled type 2 diabetes mellitus without complication, without long-term 
current   
use of insulin (Multi)  
   
                                                      
  
  
Secondary hypertension  
  
  
Other secondary hypertension, unspecified  
   
                                                      
  
  
Mixed hyperlipidemia  
   
                                                      
  
  
Mild dementia without behavioral disturbance, psychotic disturbance, mood   
disturbance, or anxiety, unspecified dementia type  
   
                                                      
  
  
Controlled type 2 diabetes mellitus without complication, without long-term 
current   
use of insulin (Multi)- Primary  
   
                                                      
  
  
Hiccup  
  
  
Hiccough  
   
                                                      
  
  
Mild dementia without behavioral disturbance, psychotic disturbance, mood   
disturbance, or anxiety, unspecified dementia type  
   
                                                      
  
  
Gastroesophageal reflux disease without esophagitis  
  
  
Esophageal reflux  
   
                                                      
  
  
Abnormal EKG- Primary  
  
  
Nonspecific abnormal electrocardiogram (ECG) (EKG)  
   
                                                      
  
  
Weight loss, unintentional  
  
  
Loss of weight  
   
                                                      
  
  
Gastroesophageal reflux disease without esophagitis  
  
  
Esophageal reflux  
   
                                                      
  
  
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  
   
                                                      
  
  
Hiccup  
  
  
Hiccough  
   
                                                      
  
  
Liver lesion- Primary  
  
  
Other specified disorders of liver  
   
                                                      
  
  
Abnormal EKG  
  
  
Nonspecific abnormal electrocardiogram (ECG) (EKG)  
   
                                                      
  
  
Weight loss, unintentional  
  
  
Loss of weight  
   
                                                      
  
  
Hypokalemia  
  
  
Hypopotassemia  
   
                                                      
  
  
Secondary hypertension  
  
  
Other secondary hypertension, unspecified  
   
                                                      
  
  
Abnormal EKG- Primary  
  
  
Nonspecific abnormal electrocardiogram (ECG) (EKG)  
   
                                                      
  
  
Weight loss, unintentional  
  
  
Loss of weight  
   
                                                      
  
  
Controlled type 2 diabetes mellitus without complication, without long-term 
current   
use of insulin (Multi)  
   
                                                      
  
  
Benign liver cyst  
  
  
Other specified disorders of liver  
   
                                                      
  
  
Secondary hypertension  
  
  
Other secondary hypertension, unspecified  
   
                                                      
  
  
Mild dementia without behavioral disturbance, psychotic disturbance, mood   
disturbance, or anxiety, unspecified dementia type  
   
                                                      
  
  
Gastroesophageal reflux disease without esophagitis  
  
  
Esophageal reflux  
   
                                                      
  
  
Mixed hyperlipidemia  
   
                                                      
  
  
Hypokalemia  
  
  
Hypopotassemia  
   
                                                      
  
  
Combined forms of age-related cataract of left eye- Primary  
  
documented in this encounter  
[facility]  
Work Phone: [phone] note*   
  
                                                    Diagnosis  
   
                                                      
  
  
Controlled type 2 diabetes mellitus without complication, without long-term 
current   
use of insulin (HCC)- Primary  
   
                                                      
  
  
Carcinoma in situ of vulva  
  
  
Carcinoma in situ, vulva  
   
                                                      
  
  
Postmenopausal bleeding  
   
                                                      
  
  
Status post cataract extraction and insertion of intraocular lens of left eye-   
Primary  
   
                                                      
  
  
Combined forms of age-related cataract of right eye  
  
  
Other and combined forms of senile cataract  
   
                                                      
  
  
Type 2 diabetes mellitus without retinopathy (HCC)  
  
  
Type II or unspecified type diabetes mellitus without mention of complication, 
not   
stated as uncontrolled  
   
                                                      
  
  
Essential hypertension  
  
  
Unspecified essential hypertension  
   
                                                      
  
  
Hypercholesteremia  
  
  
Pure hypercholesterolemia  
   
                                                      
  
  
Dementia, unspecified dementia severity, unspecified dementia type, unspecified   
whether behavioral, psychotic, or mood disturbance or anxiety (HCC)  
  
documented in this encounter  
[facility] [place] [facility]   
  
                                                    Diagnosis  
   
                                                      
  
  
Controlled type 2 diabetes mellitus without complication, without long-term 
current   
use of insulin (HCC)- Primary  
   
                                                      
  
  
Carcinoma in situ of vulva  
  
  
Carcinoma in situ, vulva  
   
                                                      
  
  
Postmenopausal bleeding  
   
                                                      
  
  
Status post cataract extraction and insertion of intraocular lens of left eye-   
Primary  
  
documented in this encounter  
[facility]   
  
                                                    Diagnosis  
   
                                                      
  
  
Controlled type 2 diabetes mellitus without complication, without long-term 
current   
use of insulin (HCC)- Primary  
   
                                                      
  
  
Carcinoma in situ of vulva  
  
  
Carcinoma in situ, vulva  
   
                                                      
  
  
Postmenopausal bleeding  
   
                                                      
  
  
Encounter for screening mammogram for breast cancer  
  
documented in this encounter  
[facility] [place] [facility]   
  
                                                    Diagnosis  
   
                                                      
  
  
Controlled type 2 diabetes mellitus without complication, without long-term 
current   
use of insulin (HCC)- Primary  
   
                                                      
  
  
Carcinoma in situ of vulva  
  
  
Carcinoma in situ, vulva  
   
                                                      
  
  
Postmenopausal bleeding  
   
                                                      
  
  
Combined forms of age-related cataract of right eye- Primary  
  
  
Other and combined forms of senile cataract  
   
                                                      
  
  
Status post cataract extraction and insertion of intraocular lens of left eye  
   
                                                      
  
  
Type 2 diabetes mellitus without retinopathy (HCC)  
  
  
Type II or unspecified type diabetes mellitus without mention of complication, 
not   
stated as uncontrolled  
   
                                                      
  
  
Essential hypertension  
  
  
Unspecified essential hypertension  
   
                                                      
  
  
Hypercholesteremia  
  
  
Pure hypercholesterolemia  
   
                                                      
  
  
Dementia, unspecified dementia severity, unspecified dementia type, unspecified   
whether behavioral, psychotic, or mood disturbance or anxiety (HCC)  
  
documented in this encounter  
[facility] note*   
  
                                                    Diagnosis  
   
                                                      
  
  
Controlled type 2 diabetes mellitus without complication, without long-term 
current   
use of insulin (HCC)- Primary  
   
                                                      
  
  
Carcinoma in situ of vulva  
  
  
Carcinoma in situ, vulva  
   
                                                      
  
  
Postmenopausal bleeding  
   
                                                      
  
  
Encounter for gynecological examination (general) (routine) without abnormal   
findings- Primary  
   
                                                      
  
  
Encounter for screening mammogram for breast cancer  
   
                                                      
  
  
Combined forms of age-related cataract of right eye  
  
  
Other and combined forms of senile cataract  
  
documented in this encounter  
[facility] note*   
  
                                                    Diagnosis  
   
                                                      
  
  
Controlled type 2 diabetes mellitus without complication, without long-term 
current   
use of insulin (HCC)- Primary  
   
                                                      
  
  
Carcinoma in situ of vulva  
  
  
Carcinoma in situ, vulva  
   
                                                      
  
  
Postmenopausal bleeding  
   
                                                      
  
  
History of breast cancer- Primary  
  
  
Personal history of malignant neoplasm of breast  
   
                                                      
  
  
Encounter for screening mammogram for breast cancer  
   
                                                      
  
  
Combined forms of age-related cataract of right eye  
  
  
Other and combined forms of senile cataract  
  
documented in this encounter  
[facility] note*   
  
                                                    Diagnosis  
   
                                                      
  
  
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  
   
                                                      
  
  
Controlled type 2 diabetes mellitus without complication, without long-term 
current   
use of insulin (Multi)  
   
                                                      
  
  
Mixed hyperlipidemia  
   
                                                      
  
  
Secondary hypertension  
  
  
Other secondary hypertension, unspecified  
   
                                                      
  
  
Secondary hypertension- Primary  
  
  
Other secondary hypertension, unspecified  
   
                                                      
  
  
Medicare annual wellness visit, subsequent  
   
                                                      
  
  
Controlled type 2 diabetes mellitus without complication, without long-term 
current   
use of insulin (Multi)  
   
                                                      
  
  
Mixed hyperlipidemia  
   
                                                      
  
  
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  
   
                                                      
  
  
Memory loss- Primary  
   
                                                      
  
  
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  
   
                                                      
  
  
Controlled type 2 diabetes mellitus without complication, without long-term 
current   
use of insulin (Multi)  
   
                                                      
  
  
Secondary hypertension  
  
  
Other secondary hypertension, unspecified  
   
                                                      
  
  
Mixed hyperlipidemia  
   
                                                      
  
  
Mild dementia without behavioral disturbance, psychotic disturbance, mood   
disturbance, or anxiety, unspecified dementia type  
   
                                                      
  
  
Controlled type 2 diabetes mellitus without complication, without long-term 
current   
use of insulin (Multi)- Primary  
   
                                                      
  
  
Hiccup  
  
  
Hiccough  
   
                                                      
  
  
Mild dementia without behavioral disturbance, psychotic disturbance, mood   
disturbance, or anxiety, unspecified dementia type  
   
                                                      
  
  
Gastroesophageal reflux disease without esophagitis  
  
  
Esophageal reflux  
   
                                                      
  
  
Abnormal EKG- Primary  
  
  
Nonspecific abnormal electrocardiogram (ECG) (EKG)  
   
                                                      
  
  
Weight loss, unintentional  
  
  
Loss of weight  
   
                                                      
  
  
Gastroesophageal reflux disease without esophagitis  
  
  
Esophageal reflux  
   
                                                      
  
  
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  
   
                                                      
  
  
Hiccup  
  
  
Hiccough  
   
                                                      
  
  
Liver lesion- Primary  
  
  
Other specified disorders of liver  
   
                                                      
  
  
Abnormal EKG  
  
  
Nonspecific abnormal electrocardiogram (ECG) (EKG)  
   
                                                      
  
  
Weight loss, unintentional  
  
  
Loss of weight  
   
                                                      
  
  
Hypokalemia  
  
  
Hypopotassemia  
   
                                                      
  
  
Secondary hypertension  
  
  
Other secondary hypertension, unspecified  
   
                                                      
  
  
Abnormal EKG- Primary  
  
  
Nonspecific abnormal electrocardiogram (ECG) (EKG)  
   
                                                      
  
  
Weight loss, unintentional  
  
  
Loss of weight  
   
                                                      
  
  
Controlled type 2 diabetes mellitus without complication, without long-term 
current   
use of insulin (Multi)  
   
                                                      
  
  
Benign liver cyst  
  
  
Other specified disorders of liver  
   
                                                      
  
  
Secondary hypertension  
  
  
Other secondary hypertension, unspecified  
   
                                                      
  
  
Mild dementia without behavioral disturbance, psychotic disturbance, mood   
disturbance, or anxiety, unspecified dementia type  
   
                                                      
  
  
Gastroesophageal reflux disease without esophagitis  
  
  
Esophageal reflux  
   
                                                      
  
  
Mixed hyperlipidemia  
   
                                                      
  
  
Hypokalemia  
  
  
Hypopotassemia  
   
                                                      
  
  
Combined forms of age-related cataract of right eye- Primary  
   
                                                      
  
  
Class 2 obesity in adult  
   
                                                      
  
  
Malignant neoplasm of female breast  
  
  
Malignant neoplasm of breast (female), unspecified site  
   
                                                      
  
  
Malignant neoplasm of exocervix (Multi)  
  
  
Malignant neoplasm of exocervix  
  
documented in this encounter  
[facility]  
Work Phone: [phone] note*   
  
                                                    Diagnosis  
   
                                                      
  
  
Controlled type 2 diabetes mellitus without complication, without long-term 
current   
use of insulin (HCC)- Primary  
   
                                                      
  
  
Carcinoma in situ of vulva  
  
  
Carcinoma in situ, vulva  
   
                                                      
  
  
Postmenopausal bleeding  
   
                                                      
  
  
Status post cataract extraction and insertion of intraocular lens of right eye-   
Primary  
   
                                                      
  
  
Status post cataract extraction and insertion of intraocular lens of left eye  
  
documented in this encounter  
[facility] [place] [facility]   
  
                                                    Diagnosis  
   
                                                      
  
  
Controlled type 2 diabetes mellitus without complication, without long-term 
current   
use of insulin (HCC)- Primary  
   
                                                      
  
  
Carcinoma in situ of vulva  
  
  
Carcinoma in situ, vulva  
   
                                                      
  
  
Postmenopausal bleeding  
   
                                                      
  
  
Status post cataract extraction and insertion of intraocular lens of right eye-   
Primary  
   
                                                      
  
  
Status post cataract extraction and insertion of intraocular lens of left eye  
  
documented in this encounter  
[facility] [place] [facility]   
  
                                                    Diagnosis  
   
                                                      
  
  
Infiltrating ductal carcinoma of right female breast (Multi)- Primary  
   
                                                      
  
  
Controlled type 2 diabetes mellitus without complication, without long-term 
current   
use of insulin (Multi)  
   
                                                      
  
  
Mixed hyperlipidemia  
   
                                                      
  
  
Secondary hypertension  
  
  
Other secondary hypertension, unspecified  
   
                                                      
  
  
Secondary hypertension- Primary  
  
  
Other secondary hypertension, unspecified  
   
                                                      
  
  
Medicare annual wellness visit, subsequent  
   
                                                      
  
  
Controlled type 2 diabetes mellitus without complication, without long-term 
current   
use of insulin (Multi)  
   
                                                      
  
  
Mixed hyperlipidemia  
   
                                                      
  
  
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  
   
                                                      
  
  
Memory loss- Primary  
   
                                                      
  
  
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  
   
                                                      
  
  
Controlled type 2 diabetes mellitus without complication, without long-term 
current   
use of insulin (Multi)  
   
                                                      
  
  
Secondary hypertension  
  
  
Other secondary hypertension, unspecified  
   
                                                      
  
  
Mixed hyperlipidemia  
   
                                                      
  
  
Mild dementia without behavioral disturbance, psychotic disturbance, mood   
disturbance, or anxiety, unspecified dementia type (Multi)  
   
                                                      
  
  
Controlled type 2 diabetes mellitus without complication, without long-term 
current   
use of insulin (Multi)- Primary  
   
                                                      
  
  
Hiccup  
  
  
Hiccough  
   
                                                      
  
  
Mild dementia without behavioral disturbance, psychotic disturbance, mood   
disturbance, or anxiety, unspecified dementia type (Multi)  
   
                                                      
  
  
Gastroesophageal reflux disease without esophagitis  
  
  
Esophageal reflux  
   
                                                      
  
  
Abnormal EKG- Primary  
  
  
Nonspecific abnormal electrocardiogram (ECG) (EKG)  
   
                                                      
  
  
Weight loss, unintentional  
  
  
Loss of weight  
   
                                                      
  
  
Gastroesophageal reflux disease without esophagitis  
  
  
Esophageal reflux  
   
                                                      
  
  
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  
   
                                                      
  
  
Hiccup  
  
  
Hiccough  
   
                                                      
  
  
Weight loss, unintentional  
  
  
Loss of weight  
  
documented in this encounter  
[facility]  
Work Phone: [phone] note*   
  
                                                    Diagnosis  
   
                                                      
  
  
Infiltrating ductal carcinoma of right female breast (Multi)- Primary  
   
                                                      
  
  
Controlled type 2 diabetes mellitus without complication, without long-term 
current   
use of insulin (Multi)  
   
                                                      
  
  
Mixed hyperlipidemia  
   
                                                      
  
  
Secondary hypertension  
  
  
Other secondary hypertension, unspecified  
   
                                                      
  
  
Secondary hypertension- Primary  
  
  
Other secondary hypertension, unspecified  
   
                                                      
  
  
Medicare annual wellness visit, subsequent  
   
                                                      
  
  
Controlled type 2 diabetes mellitus without complication, without long-term 
current   
use of insulin (Multi)  
   
                                                      
  
  
Mixed hyperlipidemia  
   
                                                      
  
  
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  
   
                                                      
  
  
Memory loss- Primary  
   
                                                      
  
  
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  
   
                                                      
  
  
Controlled type 2 diabetes mellitus without complication, without long-term 
current   
use of insulin (Multi)  
   
                                                      
  
  
Secondary hypertension  
  
  
Other secondary hypertension, unspecified  
   
                                                      
  
  
Mixed hyperlipidemia  
   
                                                      
  
  
Mild dementia without behavioral disturbance, psychotic disturbance, mood   
disturbance, or anxiety, unspecified dementia type (Multi)  
   
                                                      
  
  
Controlled type 2 diabetes mellitus without complication, without long-term 
current   
use of insulin (Multi)- Primary  
   
                                                      
  
  
Hiccup  
  
  
Hiccough  
   
                                                      
  
  
Mild dementia without behavioral disturbance, psychotic disturbance, mood   
disturbance, or anxiety, unspecified dementia type (Multi)  
   
                                                      
  
  
Gastroesophageal reflux disease without esophagitis  
  
  
Esophageal reflux  
   
                                                      
  
  
Abnormal EKG- Primary  
  
  
Nonspecific abnormal electrocardiogram (ECG) (EKG)  
   
                                                      
  
  
Weight loss, unintentional  
  
  
Loss of weight  
   
                                                      
  
  
Gastroesophageal reflux disease without esophagitis  
  
  
Esophageal reflux  
   
                                                      
  
  
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  
   
                                                      
  
  
Hiccup  
  
  
Hiccough  
   
                                                      
  
  
Abnormal EKG  
  
  
Nonspecific abnormal electrocardiogram (ECG) (EKG)  
  
documented in this encounter  
[facility]  
Work Phone: [phone] note*   
  
                                                    Diagnosis  
   
                                                      
  
  
Infiltrating ductal carcinoma of right female breast (Multi)- Primary  
   
                                                      
  
  
Controlled type 2 diabetes mellitus without complication, without long-term 
current   
use of insulin (Multi)  
   
                                                      
  
  
Mixed hyperlipidemia  
   
                                                      
  
  
Secondary hypertension  
  
  
Other secondary hypertension, unspecified  
   
                                                      
  
  
Secondary hypertension- Primary  
  
  
Other secondary hypertension, unspecified  
   
                                                      
  
  
Medicare annual wellness visit, subsequent  
   
                                                      
  
  
Controlled type 2 diabetes mellitus without complication, without long-term 
current   
use of insulin (Multi)  
   
                                                      
  
  
Mixed hyperlipidemia  
   
                                                      
  
  
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  
   
                                                      
  
  
Memory loss- Primary  
   
                                                      
  
  
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  
   
                                                      
  
  
Controlled type 2 diabetes mellitus without complication, without long-term 
current   
use of insulin (Multi)  
   
                                                      
  
  
Secondary hypertension  
  
  
Other secondary hypertension, unspecified  
   
                                                      
  
  
Mixed hyperlipidemia  
   
                                                      
  
  
Mild dementia without behavioral disturbance, psychotic disturbance, mood   
disturbance, or anxiety, unspecified dementia type (Multi)  
   
                                                      
  
  
Controlled type 2 diabetes mellitus without complication, without long-term 
current   
use of insulin (Multi)- Primary  
   
                                                      
  
  
Hiccup  
  
  
Hiccough  
   
                                                      
  
  
Mild dementia without behavioral disturbance, psychotic disturbance, mood   
disturbance, or anxiety, unspecified dementia type (Multi)  
   
                                                      
  
  
Gastroesophageal reflux disease without esophagitis  
  
  
Esophageal reflux  
   
                                                      
  
  
Abnormal EKG- Primary  
  
  
Nonspecific abnormal electrocardiogram (ECG) (EKG)  
   
                                                      
  
  
Weight loss, unintentional  
  
  
Loss of weight  
   
                                                      
  
  
Gastroesophageal reflux disease without esophagitis  
  
  
Esophageal reflux  
   
                                                      
  
  
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  
   
                                                      
  
  
Hiccup  
  
  
Hiccough  
   
                                                      
  
  
Liver lesion- Primary  
  
  
Other specified disorders of liver  
   
                                                      
  
  
Abnormal EKG  
  
  
Nonspecific abnormal electrocardiogram (ECG) (EKG)  
   
                                                      
  
  
Weight loss, unintentional  
  
  
Loss of weight  
   
                                                      
  
  
Hypokalemia  
  
  
Hypopotassemia  
   
                                                      
  
  
Secondary hypertension  
  
  
Other secondary hypertension, unspecified  
  
documented in this encounter  
[facility]  
Work Phone: [phone] note*   
  
                                                    Diagnosis  
   
                                                      
  
  
Infiltrating ductal carcinoma of right female breast (Multi)- Primary  
   
                                                      
  
  
Controlled type 2 diabetes mellitus without complication, without long-term 
current   
use of insulin (Multi)  
   
                                                      
  
  
Mixed hyperlipidemia  
   
                                                      
  
  
Secondary hypertension  
  
  
Other secondary hypertension, unspecified  
   
                                                      
  
  
Secondary hypertension- Primary  
  
  
Other secondary hypertension, unspecified  
   
                                                      
  
  
Medicare annual wellness visit, subsequent  
   
                                                      
  
  
Controlled type 2 diabetes mellitus without complication, without long-term 
current   
use of insulin (Multi)  
   
                                                      
  
  
Mixed hyperlipidemia  
   
                                                      
  
  
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  
   
                                                      
  
  
Memory loss- Primary  
   
                                                      
  
  
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  
   
                                                      
  
  
Controlled type 2 diabetes mellitus without complication, without long-term 
current   
use of insulin (Multi)  
   
                                                      
  
  
Secondary hypertension  
  
  
Other secondary hypertension, unspecified  
   
                                                      
  
  
Mixed hyperlipidemia  
   
                                                      
  
  
Mild dementia without behavioral disturbance, psychotic disturbance, mood   
disturbance, or anxiety, unspecified dementia type (Multi)  
   
                                                      
  
  
Controlled type 2 diabetes mellitus without complication, without long-term 
current   
use of insulin (Multi)- Primary  
   
                                                      
  
  
Hiccup  
  
  
Hiccough  
   
                                                      
  
  
Mild dementia without behavioral disturbance, psychotic disturbance, mood   
disturbance, or anxiety, unspecified dementia type (Multi)  
   
                                                      
  
  
Gastroesophageal reflux disease without esophagitis  
  
  
Esophageal reflux  
   
                                                      
  
  
Abnormal EKG- Primary  
  
  
Nonspecific abnormal electrocardiogram (ECG) (EKG)  
   
                                                      
  
  
Weight loss, unintentional  
  
  
Loss of weight  
   
                                                      
  
  
Gastroesophageal reflux disease without esophagitis  
  
  
Esophageal reflux  
   
                                                      
  
  
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  
   
                                                      
  
  
Hiccup  
  
  
Hiccough  
   
                                                      
  
  
Liver lesion- Primary  
  
  
Other specified disorders of liver  
   
                                                      
  
  
Abnormal EKG  
  
  
Nonspecific abnormal electrocardiogram (ECG) (EKG)  
   
                                                      
  
  
Weight loss, unintentional  
  
  
Loss of weight  
   
                                                      
  
  
Hypokalemia  
  
  
Hypopotassemia  
   
                                                      
  
  
Secondary hypertension  
  
  
Other secondary hypertension, unspecified  
   
                                                      
  
  
Encounter for pre-operative cardiovascular clearance- Primary  
   
                                                      
  
  
Abnormal EKG  
  
  
Nonspecific abnormal electrocardiogram (ECG) (EKG)  
   
                                                      
  
  
Mitral valve prolapse determined by imaging  
  
documented in this encounter  
[facility]  
Work Phone: [phone] note*   
  
                                                    Diagnosis  
   
                                                      
  
  
Infiltrating ductal carcinoma of right female breast (Multi)- Primary  
   
                                                      
  
  
Controlled type 2 diabetes mellitus without complication, without long-term 
current   
use of insulin (Multi)  
   
                                                      
  
  
Mixed hyperlipidemia  
   
                                                      
  
  
Secondary hypertension  
  
  
Other secondary hypertension, unspecified  
   
                                                      
  
  
Secondary hypertension- Primary  
  
  
Other secondary hypertension, unspecified  
   
                                                      
  
  
Medicare annual wellness visit, subsequent  
   
                                                      
  
  
Controlled type 2 diabetes mellitus without complication, without long-term 
current   
use of insulin (Multi)  
   
                                                      
  
  
Mixed hyperlipidemia  
   
                                                      
  
  
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  
   
                                                      
  
  
Memory loss- Primary  
   
                                                      
  
  
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  
   
                                                      
  
  
Controlled type 2 diabetes mellitus without complication, without long-term 
current   
use of insulin (Multi)  
   
                                                      
  
  
Secondary hypertension  
  
  
Other secondary hypertension, unspecified  
   
                                                      
  
  
Mixed hyperlipidemia  
   
                                                      
  
  
Mild dementia without behavioral disturbance, psychotic disturbance, mood   
disturbance, or anxiety, unspecified dementia type (Multi)  
   
                                                      
  
  
Controlled type 2 diabetes mellitus without complication, without long-term 
current   
use of insulin (Multi)- Primary  
   
                                                      
  
  
Hiccup  
  
  
Hiccough  
   
                                                      
  
  
Mild dementia without behavioral disturbance, psychotic disturbance, mood   
disturbance, or anxiety, unspecified dementia type (Multi)  
   
                                                      
  
  
Gastroesophageal reflux disease without esophagitis  
  
  
Esophageal reflux  
   
                                                      
  
  
Abnormal EKG- Primary  
  
  
Nonspecific abnormal electrocardiogram (ECG) (EKG)  
   
                                                      
  
  
Weight loss, unintentional  
  
  
Loss of weight  
   
                                                      
  
  
Gastroesophageal reflux disease without esophagitis  
  
  
Esophageal reflux  
   
                                                      
  
  
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  
   
                                                      
  
  
Hiccup  
  
  
Hiccough  
   
                                                      
  
  
Liver lesion- Primary  
  
  
Other specified disorders of liver  
   
                                                      
  
  
Abnormal EKG  
  
  
Nonspecific abnormal electrocardiogram (ECG) (EKG)  
   
                                                      
  
  
Weight loss, unintentional  
  
  
Loss of weight  
   
                                                      
  
  
Hypokalemia  
  
  
Hypopotassemia  
   
                                                      
  
  
Secondary hypertension  
  
  
Other secondary hypertension, unspecified  
   
                                                      
  
  
Liver lesion  
  
  
Other specified disorders of liver  
  
documented in this encounter  
[facility]  
Work Phone: [phone] note*   
  
                                                    Diagnosis  
   
                                                      
  
  
Infiltrating ductal carcinoma of right female breast (Multi)- Primary  
   
                                                      
  
  
Controlled type 2 diabetes mellitus without complication, without long-term 
current   
use of insulin (Multi)  
   
                                                      
  
  
Mixed hyperlipidemia  
   
                                                      
  
  
Secondary hypertension  
  
  
Other secondary hypertension, unspecified  
   
                                                      
  
  
Secondary hypertension- Primary  
  
  
Other secondary hypertension, unspecified  
   
                                                      
  
  
Medicare annual wellness visit, subsequent  
   
                                                      
  
  
Controlled type 2 diabetes mellitus without complication, without long-term 
current   
use of insulin (Multi)  
   
                                                      
  
  
Mixed hyperlipidemia  
   
                                                      
  
  
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  
   
                                                      
  
  
Memory loss- Primary  
   
                                                      
  
  
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  
   
                                                      
  
  
Controlled type 2 diabetes mellitus without complication, without long-term 
current   
use of insulin (Multi)  
   
                                                      
  
  
Secondary hypertension  
  
  
Other secondary hypertension, unspecified  
   
                                                      
  
  
Mixed hyperlipidemia  
   
                                                      
  
  
Mild dementia without behavioral disturbance, psychotic disturbance, mood   
disturbance, or anxiety, unspecified dementia type (Multi)  
   
                                                      
  
  
Controlled type 2 diabetes mellitus without complication, without long-term 
current   
use of insulin (Multi)- Primary  
   
                                                      
  
  
Hiccup  
  
  
Hiccough  
   
                                                      
  
  
Mild dementia without behavioral disturbance, psychotic disturbance, mood   
disturbance, or anxiety, unspecified dementia type (Multi)  
   
                                                      
  
  
Gastroesophageal reflux disease without esophagitis  
  
  
Esophageal reflux  
   
                                                      
  
  
Abnormal EKG- Primary  
  
  
Nonspecific abnormal electrocardiogram (ECG) (EKG)  
   
                                                      
  
  
Weight loss, unintentional  
  
  
Loss of weight  
   
                                                      
  
  
Gastroesophageal reflux disease without esophagitis  
  
  
Esophageal reflux  
   
                                                      
  
  
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  
   
                                                      
  
  
Hiccup  
  
  
Hiccough  
   
                                                      
  
  
Liver lesion- Primary  
  
  
Other specified disorders of liver  
   
                                                      
  
  
Abnormal EKG  
  
  
Nonspecific abnormal electrocardiogram (ECG) (EKG)  
   
                                                      
  
  
Weight loss, unintentional  
  
  
Loss of weight  
   
                                                      
  
  
Hypokalemia  
  
  
Hypopotassemia  
   
                                                      
  
  
Secondary hypertension  
  
  
Other secondary hypertension, unspecified  
   
                                                      
  
  
COVID- Primary  
   
                                                      
  
  
Acute upper respiratory infection  
  
  
Acute upper respiratory infections of unspecified site  
  
documented in this encounter  
[facility]  
Work Phone: [phone] note*   
  
                                                    Diagnosis  
   
                                                      
  
  
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  
   
                                                      
  
  
Controlled type 2 diabetes mellitus without complication, without long-term 
current   
use of insulin (Multi)  
   
                                                      
  
  
Mixed hyperlipidemia  
   
                                                      
  
  
Secondary hypertension  
  
  
Other secondary hypertension, unspecified  
   
                                                      
  
  
Secondary hypertension- Primary  
  
  
Other secondary hypertension, unspecified  
   
                                                      
  
  
Medicare annual wellness visit, subsequent  
   
                                                      
  
  
Controlled type 2 diabetes mellitus without complication, without long-term 
current   
use of insulin (Multi)  
   
                                                      
  
  
Mixed hyperlipidemia  
   
                                                      
  
  
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  
   
                                                      
  
  
Memory loss- Primary  
   
                                                      
  
  
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  
   
                                                      
  
  
Controlled type 2 diabetes mellitus without complication, without long-term 
current   
use of insulin (Multi)  
   
                                                      
  
  
Secondary hypertension  
  
  
Other secondary hypertension, unspecified  
   
                                                      
  
  
Mixed hyperlipidemia  
   
                                                      
  
  
Mild dementia without behavioral disturbance, psychotic disturbance, mood   
disturbance, or anxiety, unspecified dementia type  
   
                                                      
  
  
Controlled type 2 diabetes mellitus without complication, without long-term 
current   
use of insulin (Multi)- Primary  
   
                                                      
  
  
Hiccup  
  
  
Hiccough  
   
                                                      
  
  
Mild dementia without behavioral disturbance, psychotic disturbance, mood   
disturbance, or anxiety, unspecified dementia type  
   
                                                      
  
  
Gastroesophageal reflux disease without esophagitis  
  
  
Esophageal reflux  
   
                                                      
  
  
Abnormal EKG- Primary  
  
  
Nonspecific abnormal electrocardiogram (ECG) (EKG)  
   
                                                      
  
  
Weight loss, unintentional  
  
  
Loss of weight  
   
                                                      
  
  
Gastroesophageal reflux disease without esophagitis  
  
  
Esophageal reflux  
   
                                                      
  
  
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  
   
                                                      
  
  
Hiccup  
  
  
Hiccough  
   
                                                      
  
  
Liver lesion- Primary  
  
  
Other specified disorders of liver  
   
                                                      
  
  
Abnormal EKG  
  
  
Nonspecific abnormal electrocardiogram (ECG) (EKG)  
   
                                                      
  
  
Weight loss, unintentional  
  
  
Loss of weight  
   
                                                      
  
  
Hypokalemia  
  
  
Hypopotassemia  
   
                                                      
  
  
Secondary hypertension  
  
  
Other secondary hypertension, unspecified  
   
                                                      
  
  
Abnormal EKG- Primary  
  
  
Nonspecific abnormal electrocardiogram (ECG) (EKG)  
   
                                                      
  
  
Weight loss, unintentional  
  
  
Loss of weight  
   
                                                      
  
  
Controlled type 2 diabetes mellitus without complication, without long-term 
current   
use of insulin (Multi)  
   
                                                      
  
  
Benign liver cyst  
  
  
Other specified disorders of liver  
   
                                                      
  
  
Secondary hypertension  
  
  
Other secondary hypertension, unspecified  
   
                                                      
  
  
Mild dementia without behavioral disturbance, psychotic disturbance, mood   
disturbance, or anxiety, unspecified dementia type  
   
                                                      
  
  
Gastroesophageal reflux disease without esophagitis  
  
  
Esophageal reflux  
   
                                                      
  
  
Mixed hyperlipidemia  
   
                                                      
  
  
Hypokalemia  
  
  
Hypopotassemia  
  
documented in this encounter  
[facility]  
Work Phone: [phone] note*   
  
                                                    Diagnosis  
   
                                                      
  
  
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  
   
                                                      
  
  
Controlled type 2 diabetes mellitus without complication, without long-term 
current   
use of insulin (Multi)  
   
                                                      
  
  
Mixed hyperlipidemia  
   
                                                      
  
  
Secondary hypertension  
  
  
Other secondary hypertension, unspecified  
   
                                                      
  
  
Secondary hypertension- Primary  
  
  
Other secondary hypertension, unspecified  
   
                                                      
  
  
Medicare annual wellness visit, subsequent  
   
                                                      
  
  
Controlled type 2 diabetes mellitus without complication, without long-term 
current   
use of insulin (Multi)  
   
                                                      
  
  
Mixed hyperlipidemia  
   
                                                      
  
  
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  
   
                                                      
  
  
Memory loss- Primary  
   
                                                      
  
  
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  
   
                                                      
  
  
Controlled type 2 diabetes mellitus without complication, without long-term 
current   
use of insulin (Multi)  
   
                                                      
  
  
Secondary hypertension  
  
  
Other secondary hypertension, unspecified  
   
                                                      
  
  
Mixed hyperlipidemia  
   
                                                      
  
  
Mild dementia without behavioral disturbance, psychotic disturbance, mood   
disturbance, or anxiety, unspecified dementia type  
   
                                                      
  
  
Controlled type 2 diabetes mellitus without complication, without long-term 
current   
use of insulin (Multi)- Primary  
   
                                                      
  
  
Hiccup  
  
  
Hiccough  
   
                                                      
  
  
Mild dementia without behavioral disturbance, psychotic disturbance, mood   
disturbance, or anxiety, unspecified dementia type  
   
                                                      
  
  
Gastroesophageal reflux disease without esophagitis  
  
  
Esophageal reflux  
   
                                                      
  
  
Abnormal EKG- Primary  
  
  
Nonspecific abnormal electrocardiogram (ECG) (EKG)  
   
                                                      
  
  
Weight loss, unintentional  
  
  
Loss of weight  
   
                                                      
  
  
Gastroesophageal reflux disease without esophagitis  
  
  
Esophageal reflux  
   
                                                      
  
  
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  
   
                                                      
  
  
Hiccup  
  
  
Hiccough  
   
                                                      
  
  
Liver lesion- Primary  
  
  
Other specified disorders of liver  
   
                                                      
  
  
Abnormal EKG  
  
  
Nonspecific abnormal electrocardiogram (ECG) (EKG)  
   
                                                      
  
  
Weight loss, unintentional  
  
  
Loss of weight  
   
                                                      
  
  
Hypokalemia  
  
  
Hypopotassemia  
   
                                                      
  
  
Secondary hypertension  
  
  
Other secondary hypertension, unspecified  
   
                                                      
  
  
Abnormal EKG- Primary  
  
  
Nonspecific abnormal electrocardiogram (ECG) (EKG)  
   
                                                      
  
  
Weight loss, unintentional  
  
  
Loss of weight  
   
                                                      
  
  
Controlled type 2 diabetes mellitus without complication, without long-term 
current   
use of insulin (Multi)  
   
                                                      
  
  
Benign liver cyst  
  
  
Other specified disorders of liver  
   
                                                      
  
  
Secondary hypertension  
  
  
Other secondary hypertension, unspecified  
   
                                                      
  
  
Mild dementia without behavioral disturbance, psychotic disturbance, mood   
disturbance, or anxiety, unspecified dementia type  
   
                                                      
  
  
Gastroesophageal reflux disease without esophagitis  
  
  
Esophageal reflux  
   
                                                      
  
  
Mixed hyperlipidemia  
   
                                                      
  
  
Hypokalemia  
  
  
Hypopotassemia  
   
                                                      
  
  
Abnormal EKG  
  
  
Nonspecific abnormal electrocardiogram (ECG) (EKG)  
   
                                                      
  
  
Abnormal EKG  
  
  
Nonspecific abnormal electrocardiogram (ECG) (EKG)  
   
                                                      
  
  
Encounter for other preprocedural examination  
  
documented in this encounter  
[facility]  
Work Phone: [phone] note*   
  
                                                    Diagnosis  
   
                                                      
  
  
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  
   
                                                      
  
  
Controlled type 2 diabetes mellitus without complication, without long-term 
current   
use of insulin (Multi)  
   
                                                      
  
  
Mixed hyperlipidemia  
   
                                                      
  
  
Secondary hypertension  
  
  
Other secondary hypertension, unspecified  
   
                                                      
  
  
Secondary hypertension- Primary  
  
  
Other secondary hypertension, unspecified  
   
                                                      
  
  
Medicare annual wellness visit, subsequent  
   
                                                      
  
  
Controlled type 2 diabetes mellitus without complication, without long-term 
current   
use of insulin (Multi)  
   
                                                      
  
  
Mixed hyperlipidemia  
   
                                                      
  
  
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  
   
                                                      
  
  
Memory loss- Primary  
   
                                                      
  
  
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  
   
                                                      
  
  
Controlled type 2 diabetes mellitus without complication, without long-term 
current   
use of insulin (Multi)  
   
                                                      
  
  
Secondary hypertension  
  
  
Other secondary hypertension, unspecified  
   
                                                      
  
  
Mixed hyperlipidemia  
   
                                                      
  
  
Mild dementia without behavioral disturbance, psychotic disturbance, mood   
disturbance, or anxiety, unspecified dementia type  
   
                                                      
  
  
Controlled type 2 diabetes mellitus without complication, without long-term 
current   
use of insulin (Multi)- Primary  
   
                                                      
  
  
Hiccup  
  
  
Hiccough  
   
                                                      
  
  
Mild dementia without behavioral disturbance, psychotic disturbance, mood   
disturbance, or anxiety, unspecified dementia type  
   
                                                      
  
  
Gastroesophageal reflux disease without esophagitis  
  
  
Esophageal reflux  
   
                                                      
  
  
Abnormal EKG- Primary  
  
  
Nonspecific abnormal electrocardiogram (ECG) (EKG)  
   
                                                      
  
  
Weight loss, unintentional  
  
  
Loss of weight  
   
                                                      
  
  
Gastroesophageal reflux disease without esophagitis  
  
  
Esophageal reflux  
   
                                                      
  
  
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  
   
                                                      
  
  
Hiccup  
  
  
Hiccough  
   
                                                      
  
  
Liver lesion- Primary  
  
  
Other specified disorders of liver  
   
                                                      
  
  
Abnormal EKG  
  
  
Nonspecific abnormal electrocardiogram (ECG) (EKG)  
   
                                                      
  
  
Weight loss, unintentional  
  
  
Loss of weight  
   
                                                      
  
  
Hypokalemia  
  
  
Hypopotassemia  
   
                                                      
  
  
Secondary hypertension  
  
  
Other secondary hypertension, unspecified  
   
                                                      
  
  
Abnormal EKG- Primary  
  
  
Nonspecific abnormal electrocardiogram (ECG) (EKG)  
   
                                                      
  
  
Weight loss, unintentional  
  
  
Loss of weight  
   
                                                      
  
  
Controlled type 2 diabetes mellitus without complication, without long-term 
current   
use of insulin (Multi)  
   
                                                      
  
  
Benign liver cyst  
  
  
Other specified disorders of liver  
   
                                                      
  
  
Secondary hypertension  
  
  
Other secondary hypertension, unspecified  
   
                                                      
  
  
Mild dementia without behavioral disturbance, psychotic disturbance, mood   
disturbance, or anxiety, unspecified dementia type  
   
                                                      
  
  
Gastroesophageal reflux disease without esophagitis  
  
  
Esophageal reflux  
   
                                                      
  
  
Mixed hyperlipidemia  
   
                                                      
  
  
Hypokalemia  
  
  
Hypopotassemia  
   
                                                      
  
  
Abnormal EKG  
  
  
Nonspecific abnormal electrocardiogram (ECG) (EKG)  
   
                                                      
  
  
Abnormal EKG  
  
  
Nonspecific abnormal electrocardiogram (ECG) (EKG)  
   
                                                      
  
  
Encounter for other preprocedural examination  
  
documented in this encounter  
[facility]  
Work Phone: [phone] note*   
  
                                                    Diagnosis  
   
                                                      
  
  
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  
   
                                                      
  
  
Controlled type 2 diabetes mellitus without complication, without long-term 
current   
use of insulin (Multi)  
   
                                                      
  
  
Mixed hyperlipidemia  
   
                                                      
  
  
Secondary hypertension  
  
  
Other secondary hypertension, unspecified  
   
                                                      
  
  
Secondary hypertension- Primary  
  
  
Other secondary hypertension, unspecified  
   
                                                      
  
  
Medicare annual wellness visit, subsequent  
   
                                                      
  
  
Controlled type 2 diabetes mellitus without complication, without long-term 
current   
use of insulin (Multi)  
   
                                                      
  
  
Mixed hyperlipidemia  
   
                                                      
  
  
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  
   
                                                      
  
  
Memory loss- Primary  
   
                                                      
  
  
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  
   
                                                      
  
  
Controlled type 2 diabetes mellitus without complication, without long-term 
current   
use of insulin (Multi)  
   
                                                      
  
  
Secondary hypertension  
  
  
Other secondary hypertension, unspecified  
   
                                                      
  
  
Mixed hyperlipidemia  
   
                                                      
  
  
Mild dementia without behavioral disturbance, psychotic disturbance, mood   
disturbance, or anxiety, unspecified dementia type  
   
                                                      
  
  
Controlled type 2 diabetes mellitus without complication, without long-term 
current   
use of insulin (Multi)- Primary  
   
                                                      
  
  
Hiccup  
  
  
Hiccough  
   
                                                      
  
  
Mild dementia without behavioral disturbance, psychotic disturbance, mood   
disturbance, or anxiety, unspecified dementia type  
   
                                                      
  
  
Gastroesophageal reflux disease without esophagitis  
  
  
Esophageal reflux  
   
                                                      
  
  
Abnormal EKG- Primary  
  
  
Nonspecific abnormal electrocardiogram (ECG) (EKG)  
   
                                                      
  
  
Weight loss, unintentional  
  
  
Loss of weight  
   
                                                      
  
  
Gastroesophageal reflux disease without esophagitis  
  
  
Esophageal reflux  
   
                                                      
  
  
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  
   
                                                      
  
  
Hiccup  
  
  
Hiccough  
   
                                                      
  
  
Liver lesion- Primary  
  
  
Other specified disorders of liver  
   
                                                      
  
  
Abnormal EKG  
  
  
Nonspecific abnormal electrocardiogram (ECG) (EKG)  
   
                                                      
  
  
Weight loss, unintentional  
  
  
Loss of weight  
   
                                                      
  
  
Hypokalemia  
  
  
Hypopotassemia  
   
                                                      
  
  
Secondary hypertension  
  
  
Other secondary hypertension, unspecified  
   
                                                      
  
  
Abnormal EKG- Primary  
  
  
Nonspecific abnormal electrocardiogram (ECG) (EKG)  
   
                                                      
  
  
Weight loss, unintentional  
  
  
Loss of weight  
   
                                                      
  
  
Controlled type 2 diabetes mellitus without complication, without long-term 
current   
use of insulin (Multi)  
   
                                                      
  
  
Benign liver cyst  
  
  
Other specified disorders of liver  
   
                                                      
  
  
Secondary hypertension  
  
  
Other secondary hypertension, unspecified  
   
                                                      
  
  
Mild dementia without behavioral disturbance, psychotic disturbance, mood   
disturbance, or anxiety, unspecified dementia type  
   
                                                      
  
  
Gastroesophageal reflux disease without esophagitis  
  
  
Esophageal reflux  
   
                                                      
  
  
Mixed hyperlipidemia  
   
                                                      
  
  
Hypokalemia  
  
  
Hypopotassemia  
   
                                                      
  
  
Encounter for pre-operative cardiovascular clearance- Primary  
  
documented in this encounter  
[facility]  
Work Phone: [phone] note*   
  
                                                    Diagnosis  
   
                                                      
  
  
Controlled type 2 diabetes mellitus without complication, without long-term 
current   
use of insulin (HCC)- Primary  
   
                                                      
  
  
Carcinoma in situ of vulva  
  
  
Carcinoma in situ, vulva  
   
                                                      
  
  
Postmenopausal bleeding  
   
                                                      
  
  
Status post cataract extraction and insertion of intraocular lens of right eye-   
Primary  
   
                                                      
  
  
Status post cataract extraction and insertion of intraocular lens of left eye  
  
documented in this encounter  
[facility] Discharge instructions* Attachments  
  
The following attachments cannot be sent through Care Everywhere.  
  
* Dizziness, Adult ED (English)  
* Nausea and Vomiting, Adult ED (English)  
  
[facility]  
Work Phone: [phone] for referral (narrative)* Consultation 
  (Routine) - Authorized  
  
                          Specialty    Diagnoses / Procedures Referred By Contac  
t Referred To Contact  
   
                                        Primary Care          
  
  
Procedures  
  
  
Follow Up In Primary Care                 
  
  
Sharifa Almaguer DO  
  
  
 [facility]   
[place] [address]  
  
  
Phone: [phone]  
  
  
Fax: [phone]                         
  
  
  
  
  
                    Referral ID Status    Reason    Start Date Expiration Date V  
isits   
Requested                               Visits   
Authorized  
   
                [id number] Authorized         10/31/2023 10/30/2024 1       1  
  
  
  
  
Electronically signed by Sharifa Almaguer DO at 10/31/2023 4:04 PM EDT  
  
  
[facility]  
Work Phone: [phone] for referral (narrative)* Diagnostic Procedure 
  Only (Routine) - Closed  
  
                          Specialty    Diagnoses / Procedures Referred By Contac  
t Referred To Contact  
   
                                        [facility]            
  
  
Diagnoses  
  
  
Encounter for screening   
mammogram for malignant   
neoplasm of breast  
  
  
  
Procedures  
  
  
JONY SCREENING W HUGO  
  
  
SCREENING BREAST DGTL HUGO   
UNI/BILAT ADD ON  
  
  
SCREENING MAMMOGRAPHY BI   
2-VIEW BREAST INC CAD                     
  
  
Colten Abbott DO  
  
  
[address] [place] [address]  
  
  
Phone: [phone]  
  
  
Fax: [phone]                         
  
  
[facility]  
  
  
[address] [place]   
[address]  
  
  
  
                    Referral ID Status    Reason    Start Date Expiration Date V  
isits   
Requested                               Visits   
Authorized  
   
                                [id number]        Closed            
  
  
Auto-Generate  
d Referral      12/15/2021      2023       1               1  
  
  
  
  
Electronically signed by Colten Abbott DO at 2022 9:14 AM EDT  
  
  
[facility] for referral (narrative)* Diagnostic Procedure Only 
  (Routine) - Closed  
  
                          Specialty    Diagnoses / Procedures Referred By Contac  
t Referred To Contact  
   
                                        [facility]            
  
  
Diagnoses  
  
  
Malignant neoplasm of right   
breast in female, estrogen   
receptor positive,   
unspecified site of breast   
(HCC)  
  
  
Encounter for screening   
mammogram for high-risk   
patient  
  
  
  
Procedures  
  
  
JONY SCREENING W HUGO  
  
  
SCREENING DIGITAL BREAST   
TOMOSYNTHESIS BI  
  
  
SCREENING MAMMOGRAPHY BI   
2-VIEW BREAST INC Rosa Diallo APRN.CNP  
  
  
[address] [place] [address]  
  
  
Phone: [phone]  
  
  
Fax: [phone]                         
  
  
[facility]  
  
  
[address] [place]   
[address]  
  
  
  
                    Referral ID Status    Reason    Start Date Expiration Date V  
isits   
Requested                               Visits   
Authorized  
   
                                [id number]        Closed            
  
  
Auto-Generate  
d Referral      2022       1               1  
  
  
  
  
Electronically signed by Rosa Murdock APRN.CNP at 2023 9:22 AM [facility] for referral (narrative)* Consultation (Routine) - 
  Authorized  
  
                          Specialty    Diagnoses / Procedures Referred By Contac  
t Referred To Contact  
   
                                        Primary Care          
  
  
Procedures  
  
  
Follow Up In Primary Care                 
  
  
Sharifa Almaguer DO  
  
  
 [facility]   
[place] [address]  
  
  
Phone: [phone]  
  
  
Fax: [phone]                         
  
  
  
  
  
                    Referral ID Status    Reason    Start Date Expiration Date V  
isits   
Requested                               Visits   
Authorized  
   
                [id number] Authorized         2023 1       1  
  
  
  
  
Electronically signed by Sharifa Almaguer DO at 2023 11:17 AM [facility]  
Work Phone: [phone] for referral (narrative)* Consultation 
  (Routine) - Authorized  
  
                          Specialty    Diagnoses / Procedures Referred By Contac  
t Referred To Contact  
   
                                        Primary Care          
  
  
Procedures  
  
  
Follow Up In Primary Care                 
  
  
Sharifa Almaguer DO  
  
  
 [place] [address]  
  
  
Phone: [phone]  
  
  
Fax: [phone]                         
  
  
  
  
  
                    Referral ID Status    Reason    Start Date Expiration Date V  
isits   
Requested                               Visits   
Authorized  
   
                [id number] Authorized         2024 1       1  
  
  
  
  
Electronically signed by Sharifa Almaguer DO at 2024 11:23 AM [facility]  
Work Phone: [phone] for referral (narrative)* Consultation 
  (Routine) - Authorized  
  
                          Specialty    Diagnoses / Procedures Referred By Contac  
t Referred To Contact  
   
                                        Primary Care          
  
  
Procedures  
  
  
Follow Up In Primary Care                 
  
  
Sharifa Almaguer DO  
  
  
 [address]  
  
  
[facility]   
[place] [address]  
  
  
Phone: [phone]  
  
  
Fax: [phone]                         
  
  
  
  
  
                    Referral ID Status    Reason    Start Date Expiration Date V  
isits   
Requested                               Visits   
Authorized  
   
                [id number] Authorized         2024 1       1  
  
  
  
  
Electronically signed by Sharifa Almaguer DO at 2024 11:29 AM EDT  
  
  
[facility]  
Work Phone: [phone] for referral (narrative)* Diagnostic Procedure 
  Only (Routine) - New Request  
  
                          Specialty    Diagnoses / Procedures Referred By Contac  
t Referred To Contact  
   
                                        [facility]            
  
  
Diagnoses  
  
  
Encounter for gynecological   
examination (general)   
(routine) without abnormal   
findings  
  
  
Encounter for screening   
mammogram for breast cancer  
  
  
  
Procedures  
  
  
JONY SCREENING W HUGO  
  
  
SCREENING DIGITAL BREAST   
TOMOSYNTHESIS BI  
  
  
SCREENING MAMMOGRAPHY BI   
2-VIEW BREAST INC CAD                     
  
  
Jose Delgado MD  
  
  
[address] [place] [address]  
  
  
Phone: [phone]  
  
  
Fax: [phone]                         
  
  
[facility]  
  
  
[address] [place]   
[address]  
  
  
  
                          Referral ID  Status       Reason       Start   
Date                                    Expiration   
Date                                    Visits   
Requested                               Visits   
Authorized  
   
                                [id number]        New Request       
  
  
Auto-Generat  
ed Referral                               
4                   2025          1                   1  
  
  
  
  
Electronically signed by Jose Delgado MD at 2024 3:40 PM [facility] for referral (narrative)* Diagnostic Procedure Only 
  (Routine) - Authorized  
  
                          Specialty    Diagnoses / Procedures Referred By Contac  
t Referred To Contact  
   
                                        [facility]            
  
  
Diagnoses  
  
  
Encounter for screening   
mammogram for breast cancer  
  
  
  
Procedures  
  
  
JONY SCREENING W HUGO  
  
  
SCREENING DIGITAL BREAST   
TOMOSYNTHESIS BI  
  
  
SCREENING MAMMOGRAPHY BI   
2-VIEW BREAST INC CAD                     
  
  
Colten Abbott DO  
  
  
[address]  
  
  
[place] [address]  
  
  
Phone: [phone]  
  
  
Fax: [phone]                         
  
  
[facility]  
  
  
[address] [place]   
[address]  
  
  
  
                          Referral ID  Status       Reason       Start   
Date                                    Expiration   
Date                                    Visits   
Requested                               Visits   
Authorized  
   
                                [id number]        Authorized        
  
  
Auto-Generat  
ed Referral                               
4                   2025          1                   1  
  
  
  
  
Electronically signed by Colten Abbott DO at 2024 10:21 AM [facility] for referral (narrative)* Consultation (Routine) - 
  Authorized  
  
                          Specialty    Diagnoses / Procedures Referred By Contac  
t Referred To Contact  
   
                                        Cardiology            
  
  
Diagnoses  
  
  
Abnormal EKG  
                                          
  
  
Sharifa Almaguer DO  
  
  
 [place] [address]  
  
  
Phone: [phone]  
  
  
Fax: [phone]                         
  
  
  
  
  
                          Referral ID  Status       Reason       Start   
Date                                    Expiration   
Date                                    Visits   
Requested                               Visits   
Authorized  
   
                                [id number]         Authorized        
  
  
Specialty   
Services   
Required        2024       1               1  
  
  
  
  
Electronically signed by Sharifa Almaguer DO at 2024 11:41 AM EDT  
  
  
* Imaging (Routine) - Authorized  
  
                          Specialty    Diagnoses / Procedures Referred By Contac  
t Referred To Contact  
   
                                        Radiology             
  
  
Diagnoses  
  
  
Liver lesion  
  
  
  
Procedures  
  
  
US abdomen limited liver                  
  
  
Sharifa Almaguer DO  
  
  
 [place] [address]  
  
  
Phone: [phone]  
  
  
Fax: [phone]                         
  
  
  
  
  
                          Referral ID  Status       Reason       Start   
Date                                    Expiration   
Date                                    Visits   
Requested                               Visits   
Authorized  
   
                                [id number]         Authorized        
  
  
Perform   
Procedure       2024       1               1  
  
  
  
  
Electronically signed by Sharifa Almaguer DO at 2024 11:41 AM [facility]  
Work Phone: [phone] for referral (narrative)* Consultation 
  (Routine) - Authorized  
  
                          Specialty    Diagnoses / Procedures Referred By Contac  
t Referred To Contact  
   
                                        Primary Care          
  
  
Procedures  
  
  
Follow Up In Primary Care                 
  
  
Sharifa Almaguer DO  
  
  
[address] [place] [address]  
  
  
Phone: [phone]  
  
  
Fax: [phone]                         
  
  
  
  
  
                    Referral ID Status    Reason    Start Date Expiration Date V  
isits   
Requested                               Visits   
Authorized  
   
                [id number] Authorized         2024 1       1  
  
  
  
  
Electronically signed by Sharifa Almaguer DO at 2024 10:35 AM [facility]  
Work Phone: [phone] for visit Narrative* Diagnostic Procedure Only 
  (Routine) - Closed  
  
                          Specialty    Diagnoses / Procedures Referred By Contac  
t Referred To Contact  
   
                                        [facility]            
  
  
Diagnoses  
  
  
Encounter for screening   
mammogram for malignant   
neoplasm of breast  
  
  
  
Procedures  
  
  
JONY SCREENING W HUGO  
  
  
SCREENING BREAST DGTL HUGO   
UNI/BILAT ADD ON  
  
  
SCREENING MAMMOGRAPHY BI   
2-VIEW BREAST INC CAD                     
  
  
Colten Abbott A, DO  
  
  
[address] [place] [address]  
  
  
Phone: [phone]  
  
  
Fax: [phone]                         
  
  
[facility]  
  
  
[address]   
[address]  
  
  
  
                    Referral ID Status    Reason    Start Date Expiration Date V  
isits   
Requested                               Visits   
Authorized  
   
                                [id number]        Closed            
  
  
Auto-Generate  
d Referral      12/15/2021      2023       1               1  
  
  
  
[facility] for visit Narrative* Diagnostic Procedure Only (Routine) 
  - Closed  
  
                          Specialty    Diagnoses / Procedures Referred By Contac  
t Referred To Contact  
   
                                        [facility]            
  
  
Diagnoses  
  
  
Malignant neoplasm of right   
breast in female, estrogen   
receptor positive,   
unspecified site of breast   
(HCC)  
  
  
Encounter for screening   
mammogram for high-risk   
patient  
  
  
  
Procedures  
  
  
JONY SCREENING W HUGO  
  
  
SCREENING DIGITAL BREAST   
TOMOSYNTHESIS BI  
  
  
SCREENING MAMMOGRAPHY BI   
2-VIEW BREAST INC CAD                     
  
  
Rosa Murdock,   
APRN.CNP  
  
  
[address] [place] [address]  
  
  
Phone: [phone]  
  
  
Fax: [phone]                         
  
  
[facility]  
  
  
[address] [place]   
[address]  
  
  
  
                    Referral ID Status    Reason    Start Date Expiration Date V  
isits   
Requested                               Visits   
Authorized  
   
                                [id number]        Closed            
  
  
Auto-Generate  
d Referral      2022       1               1  
  
  
  
[facility] for visit Narrative* Consultation (Routine) - Authorized  
  
                          Specialty    Diagnoses / Procedures Referred By Contac  
t Referred To Contact  
   
                                        Primary Care          
  
  
Procedures  
  
  
Follow Up In Primary Care                 
  
  
Sharifa Almaguer S, DO  
  
  
[address] [facility]  
  
  
[facility]   
[place] [address]  
  
  
Phone: [phone]  
  
  
Fax: [phone]                         
  
  
  
  
  
                    Referral ID Status    Reason    Start Date Expiration Date V  
isits   
Requested                               Visits   
Authorized  
   
                [id number] Authorized         10/31/2023 10/30/2024 1       1  
  
  
  
[facility]  
Work Phone: [phone] for visit Narrative* Auth/Cert  
  
                          Specialty    Diagnoses / Procedures Referred By Contac  
t Referred To Contact  
   
                                                              
  
  
Diagnoses  
  
  
Combined forms of   
age-related cataract of   
left eye  
  
  
Combined forms of   
age-related cataract of   
left eye [H25.812]  
  
  
  
Procedures  
  
  
ID XCAPSL CTRC RMVL INSJ IO   
LENS PROSTH W/O ECP  
  
  
Phacoemulsification   
Cataract with Insertion   
Intraocular Lens                          
  
  
Ronel Hays MD  
  
  
[address] [facility]   
[place] [address]  
  
  
Phone:   
tel:[phone]  
  
  
fax:[phone]                       
  
  
[facility] [place]  
  
  
[address] [place] [address]  
  
  
fax:[phone]  
  
  
  
                Referral ID Status  Reason  Start Date Expiration Date Visits Re  
quested Visits   
Authorized  
   
                [id number]                                 1       1  
  
  
  
[facility]  
Work Phone: [phone] for visit Narrative* Diagnostic Procedure Only 
  (Routine) - Closed  
  
                          Specialty    Diagnoses / Procedures Referred By Contac  
t Referred To Contact  
   
                                        [facility]            
  
  
Diagnoses  
  
  
Encounter for screening   
mammogram for breast cancer  
  
  
  
Procedures  
  
  
JONY SCREENING W HUGO  
  
  
SCREENING DIGITAL BREAST   
TOMOSYNTHESIS BI  
  
  
SCREENING MAMMOGRAPHY BI   
2-VIEW BREAST INC CAD                     
  
  
Colten Abbott, DO  
  
  
[address] [place] [address]  
  
  
Phone: [phone]  
  
  
Fax: [phone]                         
  
  
[facility]  
  
  
[address] [place]   
[address]  
  
  
  
                    Referral ID Status    Reason    Start Date Expiration Date V  
isits   
Requested                               Visits   
Authorized  
   
                                [id number]        Closed            
  
  
Auto-Generate  
d Referral      2023       1               1  
  
  
  
[facility] for visit Narrative* Auth/Cert  
  
                          Specialty    Diagnoses / Procedures Referred By Contac  
t Referred To Contact  
   
                                                              
  
  
Diagnoses  
  
  
Combined forms of   
age-related cataract of   
right eye  
  
  
Combined forms of   
age-related cataract of   
right eye [H25.811]  
  
  
  
Procedures  
  
  
ID XCAPSL CTRC RMVL INSJ IO   
LENS PROSTH W/O ECP  
  
  
Phacoemulsification   
Cataract with Insertion   
Intraocular Lens                          
  
  
Ronel Hays MD  
  
  
[address] [place] [address] [facility]   
[place] [address]  
  
  
Phone:   
tel:[phone]  
  
  
fax:[phone]                       
  
  
[facility] [place]  
  
  
[address] [place] [address]  
  
  
fax:[phone]  
  
  
  
                Referral ID Status  Reason  Start Date Expiration Date Visits Re  
quested Visits   
Authorized  
   
                [id number]                                 1       1  
  
  
  
[facility]  
Work Phone: [phone]  
  
Summary Purpose  
  
  
                                                      
  
  
  
Family History  
No Family History Records FoundNo Family History Records FoundNo Family History 
Records FoundNo Family History Records FoundNo Family History Records FoundNo 
Family History Records FoundNo Family History Records FoundNo Family History 
Records FoundNo Family History Records Found  
  
Advance Directives  
No Advanced Directives Records FoundDocuments on File  
  
                          Type         Date Recorded Patient Representative Expl  
[facility]  
   
                          Healthcare Power of Atty 2023                 STA  
TE OF [facility] POWER OF   
   
                          Living Will  2023                 [facility]   
DECLARATION  
  
                                Documents on File  
  
                          Type         Date Recorded Patient Representative Expl  
[facility]  
   
                          Healthcare Power of Atty 2023                 STA  
TE OF [facility] POWER OF   
   
                          Living Will  2023                 [facility]   
DECLARATION  
  
  
  
                                Date Activated  Date Inactivated Comments  
   
                                10/31/2024 8:11 AM                   
  
  
  
                                Question        Answer          Comments  
   
                                Plan of Care:   Code Status Discussion Not Compl  
eted   
   
                                Decision Maker: Provider          
   
                                Rationale:      Patient condition does not warra  
nt discussion   
  
  
  
Medications Administered Section  
      Active Administered Medications - up to 3 most recent administrations  
  
                    Medication Order MAR Action Action Date Dose      Rate        
Site  
   
                                                      
  
  
PHENYLephrine 2.5 % 1 Drop   
(AK-DILATE, CALVIN-SYNEPHRINE)  
  
  
1 Drop, BOTH EYES, AS DIRECTED,   
Starting on 23 at 1030,   
Until 23 at 2229,   
Administer for dilation PROTECT   
FROM LIGHT   Given        2023 10:30 AM EDT 1 Drop                      
   
                                                               
   
                                                      
  
  
tropicamide 1 % 1 Drop   
(MYDRIACYL)  
  
  
1 Drop, BOTH EYES, AS DIRECTED,   
Starting on 23 at 1030,   
Until 23 at 2229,   
Administer for dilation Given        2023 10:30 AM EDT 1 Drop               
         
  
  
  
Reason for Referral  
  
  
                          Specialty    Diagnoses / Procedures Referred By Contac  
t Referred To Contact  
   
                                        Radiology             
  
  
Diagnoses  
  
  
Weight loss, unintentional  
  
  
  
Procedures  
  
  
CT abdomen pelvis w IV   
contrast                                  
  
  
Sharifa Almaguer S, DO  
  
  
 [address]  
  
  
[facility]   
[place] [address]  
  
  
Phone: [phone]  
  
  
Fax: [phone]                         
  
  
  
  
  
                          Referral ID  Status       Reason       Start   
Date                                    Expiration   
Date                                    Visits   
Requested                               Visits   
Authorized  
   
                                [id number]         Authorized        
  
  
Perform   
Procedure       2024       1               1  
  
  
  
                          Specialty    Diagnoses / Procedures Referred By Contac  
t Referred To Contact  
   
                                        Cardiology            
  
  
Diagnoses  
  
  
Abnormal EKG  
  
  
  
Procedures  
  
  
Transthoracic Echo (TTE)   
Complete  
  
  
ID ECHO TTHRC R-T 2D   
W/WOM-MODE COMPL SPEC&COLR D              
  
  
Sharifa Almaguer S, DO  
  
  
 [facility]   
[place] [address]  
  
  
Phone: [phone]  
  
  
Fax: [phone]                         
  
  
  
  
  
                          Referral ID  Status       Reason       Start   
Date                                    Expiration   
Date                                    Visits   
Requested                               Visits   
Authorized  
   
                                [id number]         Authorized        
  
  
Perform   
Procedure       2024       1               1  
  
  
  
                          Specialty    Diagnoses / Procedures Referred By Contac  
t Referred To Contact  
   
                                        Cardiology            
  
  
Diagnoses  
  
  
Abnormal EKG  
  
  
  
Procedures  
  
  
Nuclear Stress Test  
  
  
CHG MYOCARDIAL SPECT   
MULTIPLE STUDIES                          
  
  
Bernice Vital APRN-CNP,   
DNP  
  
  
[address]  
  
  
[facility], [place] [address]  
  
  
Phone: [phone]  
  
  
Fax: [phone]                         
  
  
  
  
  
                          Referral ID  Status       Reason       Start   
Date                                    Expiration   
Date                                    Visits   
Requested                               Visits   
Authorized  
   
                                        [id number]             Pending   
Review                                    
  
  
Perform   
Procedure       2024       3               3  
  
  
  
                          Specialty    Diagnoses / Procedures Referred By Contac  
t Referred To Contact  
   
                                                              
  
  
Diagnoses  
  
  
Abnormal EKG  
  
  
  
Procedures  
  
  
ECG 12 lead (Clinic   
Performed)                                
  
  
Vital, Bernice R, APRN-CNP,   
DNP  
  
  
[address]  
  
  
[facility], [place] [address]  
  
  
Phone: [phone]  
  
  
Fax: [phone]                         
  
  
  
  
  
                    Referral ID Status    Reason    Start Date Expiration Date V  
isits   
Requested                               Visits   
Authorized  
   
                [id number] Authorized         2024 1       1  
  
  
  
                          Specialty    Diagnoses / Procedures Referred By Contac  
t Referred To Contact  
   
                                        Radiology             
  
  
Diagnoses  
  
  
Liver lesion  
  
  
  
Procedures  
  
  
US abdomen limited liver                  
  
  
Hampton, Sharifa GALLAGHER, DO  
  
  
 [facility]   
[place] [address]  
  
  
Phone: [phone]  
  
  
Fax: [phone]                         
  
  
  
  
  
                          Referral ID  Status       Reason       Start   
Date                                    Expiration   
Date                                    Visits   
Requested                               Visits   
Authorized  
   
                                [id number]         Authorized        
  
  
Perform   
Procedure       2024       1               1  
  
  
  
                          Referral ID  Status       Reason       Start   
Date                                    Expiration   
Date                                    Visits   
Requested                               Visits   
Authorized  
   
                                [id number]         Authorized        
  
  
Perform   
Procedure       2024       5               5  
  
  
  
                          Specialty    Diagnoses / Procedures Referred By Contac  
t Referred To Contact  
   
                                                              
  
  
Diagnoses  
  
  
Abnormal EKG  
  
  
  
Procedures  
  
  
Cardiology Interpretation Of   
Nuclear Stress - See Other   
Report For Nuclear Portion                
  
  
Bernice Vital APRN-CNP, DNP  
  
  
[address]  
  
  
[place], [place] [address]  
  
  
Phone: [phone]  
  
  
Fax: [phone]                         
  
  
[place] [address]  
  
  
Phone: [phone]  
  
  
  
                    Referral ID Status    Reason    Start Date Expiration Date V  
isits   
Requested                               Visits   
Authorized  
   
                [id number] Authorized         2024 1       1  
  
  
  
Additional Source Comments  
  
  
  
                                                    INFORMATION SOURCE (unrecogn  
ized section and content)  
   
                                          
  
  
  
                                        DATE CREATED        AUTHOR  
   
                                2018                      [facility]  
[facility] System  
  
  
  
                                DATE CREATED    AUTHOR          AUTHOR'S ORGANIZ  
ATION  
   
                                2019                      [facility]  
dical Center  
  
  
  
                                DATE CREATED    AUTHOR          AUTHOR'S ORGANIZ  
ATION  
   
                                10/13/2020                       [employer]  
  
  
  
                                DATE CREATED    AUTHOR          AUTHOR'S ORGANIZ  
ATION  
   
                                2021                      [facility]  
  
  
  
                                DATE CREATED    AUTHOR          AUTHOR'S ORGANIZ  
ATION  
   
                                2024                      [facility]  
  
  
  
                                DATE CREATED    AUTHOR          AUTHOR'S ORGANIZ  
ATION  
   
                                2024                      [facility] [facility]  
  
  
  
                                DATE CREATED    AUTHOR          AUTHOR'S ORGANIZ  
ATION  
   
                                10/02/2024                      [facility]  
  
  
  
                                DATE CREATED    AUTHOR          AUTHOR'S ORGANIZ  
ATION  
   
                                2024                      [facility]  
  
  
  
                                DATE CREATED    AUTHOR          AUTHOR'S ORGANIZ  
ATION  
   
                                2025                      [facility]  
  
  
  
  
  
                                                    Source Comments (unrecognize  
d section and content)  
   
                                                    In the event this informatio  
n is protected by the Federal Confidentiality of   
Alcohol   
and Drug Abuse Patient Records regulations: This information has been disclosed 
to   
you from records protected by Federal confidentiality rules (42 CFR Part 2). The
   
Federal rules prohibit you from making any further disclosure of this 
information   
unless further disclosure is expressly permitted by the written consent of the 
person   
to whom it pertains or as otherwise permitted by 42 CFR Part 2. A general   
authorization for the release of medical or other information is NOT sufficient 
for   
this purpose. The Federal rules restrict any use of the information to 
criminally   
investigate or prosecute any alcohol or drug abuse patient.[facility], Cancer CenterIn 
the   
event this information is protected by the Federal Confidentiality of Alcohol 
and   
Drug Abuse Patient Records regulations: This information has been disclosed to 
you   
from records protected by Federal confidentiality rules (42 CFR Part 2). The 
Federal   
rules prohibit you from making any further disclosure of this information unless
   
further disclosure is expressly permitted by the written consent of the person 
to   
whom it pertains or as otherwise permitted by 42 CFR Part 2. A general 
authorization   
for the release of medical or other information is NOT sufficient for this 
purpose.   
The Federal rules restrict any use of the information to criminally investigate 
or   
prosecute any alcohol or drug abuse patient.[facility] Cancer CenterIn the event this   
information is protected by the Federal Confidentiality of Alcohol and Drug 
Abuse   
Patient Records regulations: This information has been disclosed to you from 
records   
protected by Federal confidentiality rules (42 CFR Part 2). The Federal rules   
prohibit you from making any further disclosure of this information unless 
further   
disclosure is expressly permitted by the written consent of the person to whom 
it   
pertains or as otherwise permitted by 42 CFR Part 2. A general authorization for
 the   
release of medical or other information is NOT sufficient for this purpose. The   
Federal rules restrict any use of the information to criminally investigate or   
prosecute any alcohol or drug abuse patient.[facility] Cancer CenterIn the event this   
information is protected by the Federal Confidentiality of Alcohol and Drug 
Abuse   
Patient Records regulations: This information has been disclosed to you from 
records   
protected by Federal confidentiality rules (42 CFR Part 2). The Federal rules   
prohibit you from making any further disclosure of this information unless 
further   
disclosure is expressly permitted by the written consent of the person to whom 
it   
pertains or as otherwise permitted by 42 CFR Part 2. A general authorization for
 the   
release of medical or other information is NOT sufficient for this purpose. The   
Federal rules restrict any use of the information to criminally investigate or   
prosecute any alcohol or drug abuse patient.[facility] Cancer CenterIn the event this   
information is protected by the Federal Confidentiality of Alcohol and Drug 
Abuse   
Patient Records regulations: This information has been disclosed to you from 
records   
protected by Federal confidentiality rules (42 CFR Part 2). The Federal rules   
prohibit you from making any further disclosure of this information unless 
further   
disclosure is expressly permitted by the written consent of the person to whom 
it   
pertains or as otherwise permitted by 42 CFR Part 2. A general authorization for
 the   
release of medical or other information is NOT sufficient for this purpose. The   
Federal rules restrict any use of the information to criminally investigate or   
prosecute any alcohol or drug abuse patient.[facility] CenterIn the event this   
information is protected by the Federal Confidentiality of Alcohol and Drug 
Abuse   
Patient Records regulations: This information has been disclosed to you from 
records   
protected by Federal confidentiality rules (42 CFR Part 2). The Federal rules   
prohibit you from making any further disclosure of this information unless 
further   
disclosure is expressly permitted by the written consent of the person to whom 
it   
pertains or as otherwise permitted by 42 CFR Part 2. A general authorization for
 the   
release of medical or other information is NOT sufficient for this purpose. The   
Federal rules restrict any use of the information to criminally investigate or   
prosecute any alcohol or drug abuse patient.[facility] Cancer CenterIn the event this   
information is protected by the Federal Confidentiality of Alcohol and Drug 
Abuse   
Patient Records regulations: This information has been disclosed to you from 
records   
protected by Federal confidentiality rules (42 CFR Part 2). The Federal rules   
prohibit you from making any further disclosure of this information unless 
further   
disclosure is expressly permitted by the written consent of the person to whom 
it   
pertains or as otherwise permitted by 42 CFR Part 2. A general authorization for
 the   
release of medical or other information is NOT sufficient for this purpose. The   
Federal rules restrict any use of the information to criminally investigate or   
prosecute any alcohol or drug abuse patient.[facility] CenterIn the event this   
information is protected by the Federal Confidentiality of Alcohol and Drug 
Abuse   
Patient Records regulations: This information has been disclosed to you from 
records   
protected by Federal confidentiality rules (42 CFR Part 2). The Federal rules   
prohibit you from making any further disclosure of this information unless 
further   
disclosure is expressly permitted by the written consent of the person to whom 
it   
pertains or as otherwise permitted by 42 CFR Part 2. A general authorization for
 the   
release of medical or other information is NOT sufficient for this purpose. The   
Federal rules restrict any use of the information to criminally investigate or   
prosecute any alcohol or drug abuse patient.[facility] CenterIn the event this   
information is protected by the Federal Confidentiality of Alcohol and Drug 
Abuse   
Patient Records regulations: This information has been disclosed to you from 
records   
protected by Federal confidentiality rules (42 CFR Part 2). The Federal rules   
prohibit you from making any further disclosure of this information unless 
further   
disclosure is expressly permitted by the written consent of the person to whom 
it   
pertains or as otherwise permitted by 42 CFR Part 2. A general authorization for
 the   
release of medical or other information is NOT sufficient for this purpose. The   
Federal rules restrict any use of the information to criminally investigate or   
prosecute any alcohol or drug abuse patient.[facility] CenterIn the event this   
information is protected by the Federal Confidentiality of Alcohol and Drug 
Abuse   
Patient Records regulations: This information has been disclosed to you from 
records   
protected by Federal confidentiality rules (42 CFR Part 2). The Federal rules   
prohibit you from making any further disclosure of this information unless 
further   
disclosure is expressly permitted by the written consent of the person to whom 
it   
pertains or as otherwise permitted by 42 CFR Part 2. A general authorization for
 the   
release of medical or other information is NOT sufficient for this purpose. The   
Federal rules restrict any use of the information to criminally investigate or   
prosecute any alcohol or drug abuse patient.[facility] CenterIn the event this   
information is protected by the Federal Confidentiality of Alcohol and Drug 
Abuse   
Patient Records regulations: This information has been disclosed to you from 
records   
protected by Federal confidentiality rules (42 CFR Part 2). The Federal rules   
prohibit you from making any further disclosure of this information unless 
further   
disclosure is expressly permitted by the written consent of the person to whom 
it   
pertains or as otherwise permitted by 42 CFR Part 2. A general authorization for
 the   
release of medical or other information is NOT sufficient for this purpose. The   
Federal rules restrict any use of the information to criminally investigate or   
prosecute any alcohol or drug abuse patient.[facility] CenterIn the event this   
information is protected by the Federal Confidentiality of Alcohol and Drug 
Abuse   
Patient Records regulations: This information has been disclosed to you from 
records   
protected by Federal confidentiality rules (42 CFR Part 2). The Federal rules   
prohibit you from making any further disclosure of this information unless 
further   
disclosure is expressly permitted by the written consent of the person to whom 
it   
pertains or as otherwise permitted by 42 CFR Part 2. A general authorization for
 the   
release of medical or other information is NOT sufficient for this purpose. The   
Federal rules restrict any use of the information to criminally investigate or   
prosecute any alcohol or drug abuse patient.[facility] CenterIn the event this   
information is protected by the Federal Confidentiality of Alcohol and Drug 
Abuse   
Patient Records regulations: This information has been disclosed to you from 
records   
protected by Federal confidentiality rules (42 CFR Part 2). The Federal rules   
prohibit you from making any further disclosure of this information unless 
further   
disclosure is expressly permitted by the written consent of the person to whom 
it   
pertains or as otherwise permitted by 42 CFR Part 2. A general authorization for
 the   
release of medical or other information is NOT sufficient for this purpose. The   
Federal rules restrict any use of the information to criminally investigate or   
prosecute any alcohol or drug abuse patient.[facility] CenterIn the event this   
information is protected by the Federal Confidentiality of Alcohol and Drug 
Abuse   
Patient Records regulations: This information has been disclosed to you from 
records   
protected by Federal confidentiality rules (42 CFR Part 2). The Federal rules   
prohibit you from making any further disclosure of this information unless 
further   
disclosure is expressly permitted by the written consent of the person to whom 
it   
pertains or as otherwise permitted by 42 CFR Part 2. A general authorization for
 the   
release of medical or other information is NOT sufficient for this purpose. The   
Federal rules restrict any use of the information to criminally investigate or   
prosecute any alcohol or drug abuse patient.[facility] CenterIn the event this   
information is protected by the Federal Confidentiality of Alcohol and Drug 
Abuse   
Patient Records regulations: This information has been disclosed to you from 
records   
protected by Federal confidentiality rules (42 CFR Part 2). The Federal rules   
prohibit you from making any further disclosure of this information unless 
further   
disclosure is expressly permitted by the written consent of the person to whom 
it   
pertains or as otherwise permitted by 42 CFR Part 2. A general authorization for
 the   
release of medical or other information is NOT sufficient for this purpose. The   
Federal rules restrict any use of the information to criminally investigate or   
prosecute any alcohol or drug abuse patient.[facility] CenterIn the event this   
information is protected by the Federal Confidentiality of Alcohol and Drug 
Abuse   
Patient Records regulations: This information has been disclosed to you from 
records   
protected by Federal confidentiality rules (42 CFR Part 2). The Federal rules   
prohibit you from making any further disclosure of this information unless 
further   
disclosure is expressly permitted by the written consent of the person to whom 
it   
pertains or as otherwise permitted by 42 CFR Part 2. A general authorization for
 the   
release of medical or other information is NOT sufficient for this purpose. The   
Federal rules restrict any use of the information to criminally investigate or   
prosecute any alcohol or drug abuse patient.[facility] CenterIn the event this   
information is protected by the Federal Confidentiality of Alcohol and Drug 
Abuse   
Patient Records regulations: This information has been disclosed to you from 
records   
protected by Federal confidentiality rules (42 CFR Part 2). The Federal rules   
prohibit you from making any further disclosure of this information unless 
further   
disclosure is expressly permitted by the written consent of the person to whom 
it   
pertains or as otherwise permitted by 42 CFR Part 2. A general authorization for
 the   
release of medical or other information is NOT sufficient for this purpose. The   
Federal rules restrict any use of the information to criminally investigate or   
prosecute any alcohol or drug abuse patient.[facility] CenterIn the event this   
information is protected by the Federal Confidentiality of Alcohol and Drug 
Abuse   
Patient Records regulations: This information has been disclosed to you from 
records   
protected by Federal confidentiality rules (42 CFR Part 2). The Federal rules   
prohibit you from making any further disclosure of this information unless 
further   
disclosure is expressly permitted by the written consent of the person to whom 
it   
pertains or as otherwise permitted by 42 CFR Part 2. A general authorization for
 the   
release of medical or other information is NOT sufficient for this purpose. The   
Federal rules restrict any use of the information to criminally investigate or   
prosecute any alcohol or drug abuse patient.[facility] CenterIn the event this   
information is protected by the Federal Confidentiality of Alcohol and Drug 
Abuse   
Patient Records regulations: This information has been disclosed to you from 
records   
protected by Federal confidentiality rules (42 CFR Part 2). The Federal rules   
prohibit you from making any further disclosure of this information unless 
further   
disclosure is expressly permitted by the written consent of the person to whom 
it   
pertains or as otherwise permitted by 42 CFR Part 2. A general authorization for
 the   
release of medical or other information is NOT sufficient for this purpose. The   
Federal rules restrict any use of the information to criminally investigate or   
prosecute any alcohol or drug abuse patient.[facility] CenterIn the event this   
information is protected by the Federal Confidentiality of Alcohol and Drug 
Abuse   
Patient Records regulations: This information has been disclosed to you from 
records   
protected by Federal confidentiality rules (42 CFR Part 2). The Federal rules   
prohibit you from making any further disclosure of this information unless 
further   
disclosure is expressly permitted by the written consent of the person to whom 
it   
pertains or as otherwise permitted by 42 CFR Part 2. A general authorization for
 the   
release of medical or other information is NOT sufficient for this purpose. The   
Federal rules restrict any use of the information to criminally investigate or   
prosecute any alcohol or drug abuse patient.[facility] CenterIn the event this   
information is protected by the Federal Confidentiality of Alcohol and Drug 
Abuse   
Patient Records regulations: This information has been disclosed to you from 
records   
protected by Federal confidentiality rules (42 CFR Part 2). The Federal rules   
prohibit you from making any further disclosure of this information unless 
further   
disclosure is expressly permitted by the written consent of the person to whom 
it   
pertains or as otherwise permitted by 42 CFR Part 2. A general authorization for
 the   
release of medical or other information is NOT sufficient for this purpose. The   
Federal rules restrict any use of the information to criminally investigate or   
prosecute any alcohol or drug abuse patient.[facility] CenterIn the event this   
information is protected by the Federal Confidentiality of Alcohol and Drug 
Abuse   
Patient Records regulations: This information has been disclosed to you from 
records   
protected by Federal confidentiality rules (42 CFR Part 2). The Federal rules   
prohibit you from making any further disclosure of this information unless 
further   
disclosure is expressly permitted by the written consent of the person to whom 
it   
pertains or as otherwise permitted by 42 CFR Part 2. A general authorization for
 the   
release of medical or other information is NOT sufficient for this purpose. The   
Federal rules restrict any use of the information to criminally investigate or   
prosecute any alcohol or drug abuse patient.[facility] Teams (unrecognized sec  
tion and content)  
   
                                          
  
  
  
                      Team Member Relationship Specialty  Start Date End Date  
   
                                                      
  
  
NarayanDell Kelsi Rivas E [place]  
  
  
[place] [address]  
  
  
[phone] (Work)  
  
  
[phone] (Fax) PCP - General                   01           
   
                                                      
  
  
Herbie Mills MD, MD  
  
  
[address] [facility] [place], [place] [id number] [phone] (Work)  
  
  
[phone] (Fax) Physician       Radiation Oncology 18           
  
  
  
                      Team Member Relationship Specialty  Start Date End Date  
   
                                                      
  
  
ToddyanelymayraDell Kelsi Rivas E [place]  
  
  
[place] [address]  
  
  
[phone] (Work)  
  
  
[phone] (Fax) PCP - General                   01           
   
                                                      
  
  
Herbie Mills MD, MD  
  
  
[address] [facility] [place] [address]  
  
  
[phone] (Work)  
  
  
[phone] (Fax) Physician       Radiation Oncology 18           
  
  
  
                      Team Member Relationship Specialty  Start Date End Date  
   
                                                      
  
  
Dell Busch Kelsi [address]  
  
  
[place] [address]  
  
  
[phone] (Work)  
  
  
[phone] (Fax) PCP - General                   01           
   
                                                      
  
  
Herbie Mills MD, MD  
  
  
[address] [place] [address]  
  
  
[phone] (Work)  
  
  
[phone] (Fax) Physician       Radiation Oncology 18           
  
  
  
                      Team Member Relationship Specialty  Start Date End Date  
   
                                                      
  
  
Sharifa Almaguer DO  
  
  
NPI: [id number]  
  
  
[address]  
  
  
[place] [address]  
  
  
[phone] (Work)  
  
  
[phone] (Fax) PCP - General   Internal Medicine 10/25/23          
   
                                                      
  
  
Herbie Mills MD, MD  
  
  
NPI: [id number]  
  
  
[address]  
  
  
[place], [place] [address] [phone] (Work)  
  
  
[phone] (Fax) Physician       Radiation Oncology 18           
  
  
  
                      Team Member Relationship Specialty  Start Date End Date  
   
                                                      
  
  
Sharifa Almaguer DO  
  
  
NPI: [id number]  
  
  
[address] [place] [address] Medical [place], [place] [address] [phone] (Work)  
  
  
[phone] (Fax) PCP - General   Internal Medicine 10/31/23          
  
  
  
                      Team Member Relationship Specialty  Start Date End Date  
   
                                                      
  
  
Dell Busch MD  
  
  
NPI: [id number]  
  
  
[address] [place] [address]  
  
  
[phone] (Work)  
  
  
[phone] (Fax) PCP - General                   11/2/01         10/24/23  
   
                                                      
  
  
Herbie Mills MD, MD  
  
  
NPI: [id number]  
  
  
[address]  
  
  
[place] [address]  
  
  
[phone] (Work)  
  
  
[phone] (Fax) Physician       Radiation Oncology 18           
  
  
  
                      Team Member Relationship Specialty  Start Date End Date  
   
                                                      
  
  
Sharifa Almaguer DO  
  
  
NPI: [id number]  
  
  
 [place] [address]  
  
  
[phone] (Work)  
  
  
[phone] (Fax) PCP - General   Internal Medicine 10/25/23          
   
                                                      
  
  
Herbie Mills MD, MD  
  
  
NPI: [id number]  
  
  
[address]  
  
  
[place] [address]  
  
  
[phone] (Work)  
  
  
[phone] (Fax) Physician       Radiation Oncology 18           
  
  
  
                      Team Member Relationship Specialty  Start Date End Date  
   
                                                      
  
  
Sharifa Almaguer DO  
  
  
NPI: [id number]  
  
  
[address] [place] [address]  
  
  
[phone] (Work)  
  
  
[phone] (Fax) PCP - General   Internal Medicine 10/25/23          
   
                                                      
  
  
Herbie Mills MD, MD  
  
  
NPI: [id number]  
  
  
[address]  
  
  
[place] [address]  
  
  
[phone] (Work)  
  
  
[phone] (Fax) Physician       Radiation Oncology 18           
  
  
  
                      Team Member Relationship Specialty  Start Date End Date  
   
                                                      
  
  
Sharifa Almaguer DO  
  
  
NPI: [id number]  
  
  
[address] [place] [address] Medical Office   
[place] [address]  
  
  
[phone] (Work)  
  
  
[phone] (Fax) PCP - General   Internal Medicine 10/31/23          
  
  
  
                      Team Member Relationship Specialty  Start Date End Date  
   
                                                      
  
  
Sharifa Almaguer DO  
  
  
NPI: [id number]  
  
  
[address] [place] [address] Medical   
Office [place] [address]  
  
  
[phone] (Work)  
  
  
[phone] (Fax) PCP - General   Internal Medicine 10/31/23          
   
                                                      
  
  
Sharifa Almaguer DO  
  
  
NPI: [id number]  
  
  
[address] [place] [address] Medical   
Office [place] [address]  
  
  
[phone] (Work)  
  
  
[phone] (Fax)                      PCP - [employer] Medicare Advantage PCP                           24                
  
  
  
                      Team Member Relationship Specialty  Start Date End Date  
   
                                                      
  
  
Sharifa Almaguer DO  
  
  
NPI: [id number]  
  
  
[address] [place] [address] Medical   
Office [place] [address]  
  
  
[phone] (Work)  
  
  
[phone] (Fax) PCP - General   Internal Medicine 10/31/23          
   
                                                      
  
  
Sharifa Almaguer DO  
  
  
NPI: [id number]  
  
  
[address] [place] [address]   
Office [place] [address]  
  
  
[phone] (Work)  
  
  
[phone] (Fax)                      PCP - [employer] Medicare Advantage PCP                           24                
  
  
  
                      Team Member Relationship Specialty  Start Date End Date  
   
                                                      
  
  
Sharifa Almaguer DO  
  
  
NPI: [id number]  
  
  
[address] [place] [address] Medical   
[place] [address]  
  
  
[phone] (Work)  
  
  
[phone] (Fax) PCP - General   Internal Medicine 10/31/23          
   
                                                      
  
  
Sharifa Almaguer DO  
  
  
NPI: [id number]  
  
  
[address] [place] [address] Medical   
[place] [address]  
  
  
[phone] (Work)  
  
  
[phone] (Fax)                      PCP - [employer] Medicare Advantage PCP                           24                
  
  
  
                      Team Member Relationship Specialty  Start Date End Date  
   
                                                      
  
  
Sharifa Almaguer DO  
  
  
NPI: [id number]  
  
  
 [address]  
  
  
[facility]   
[place] [address]  
  
  
[phone] (Work)  
  
  
[phone] (Fax) PCP - General   Internal Medicine 10/31/23          
   
                                                      
  
  
Sharifa Almaguer DO  
  
  
NPI: [id number]  
  
  
 [place] [facility]   
Office [place] [address]  
  
  
[phone] (Work)  
  
  
[phone] (Fax)                      PCP - [employer] Medicare Advantage PCP                           24                
  
  
  
                      Team Member Relationship Specialty  Start Date End Date  
   
                                                      
  
  
Sharifa Almaguer DO  
  
  
NPI: [id number]  
  
  
[address] [place] [address]   
[place] [address]  
  
  
[phone] (Work)  
  
  
[phone] (Fax) PCP - General   Internal Medicine 10/31/23          
   
                                                      
  
  
Sharifa Almaguer DO  
  
  
NPI: [id number]  
  
  
 [facility] Medical   
Office [place] [address]  
  
  
[phone] (Work)  
  
  
[phone] (Fax)                      PCP - [employer] Medicare Advantage PCP                           24                
  
  
  
                      Team Member Relationship Specialty  Start Date End Date  
   
                                                      
  
  
Sharifa Almaguer DO  
  
  
NPI: [id number]  
  
  
 [facility] Medical   
Office [place] [address]  
  
  
[phone] (Work)  
  
  
[phone] (Fax) PCP - General   Internal Medicine 10/31/23          
   
                                                      
  
  
Sharifa Almaguer DO  
  
  
NPI: [id number]  
  
  
 [facility] Medical   
Office [place] [address]  
  
  
[phone] (Work)  
  
  
[phone] (Fax)                      PCP - [employer] Medicare Advantage PCP                           24                
  
  
  
                      Team Member Relationship Specialty  Start Date End Date  
   
                                                      
  
  
Sharifa Almaguer DO  
  
  
NPI: [id number]  
  
  
[address] [place], [place] [address]  
  
  
[phone] (Work)  
  
  
[phone] (Fax) PCP - General   Internal Medicine 10/25/23          
   
                                                      
  
  
Herbie Mills MD  
  
  
NPI: [id number]  
  
  
[address]  
  
  
[place], [place] [address] [phone] (Work)  
  
  
[phone] (Fax) Physician       Radiation Oncology 18           
  
  
  
                      Team Member Relationship Specialty  Start Date End Date  
   
                                                      
  
  
Sharifa AlmaguerDO  
  
  
NPI: [id number]  
  
  
[address] [place] [address] [phone] (Work)  
  
  
[phone] (Fax) PCP - General   Internal Medicine 10/25/23          
   
                                                      
  
  
Herbie Mills MD  
  
  
NPI: [id number]  
  
  
[address]  
  
  
[place], [place] [address] [phone] (Work)  
  
  
[phone] (Fax) Physician       Radiation Oncology 18           
  
  
  
                      Team Member Relationship Specialty  Start Date End Date  
   
                                                      
  
  
Lianna Almaguerly SDO  
  
  
NPI: [id number]  
  
  
[address], [place] [address] [phone] (Work)  
  
  
[phone] (Fax) PCP - General   Internal Medicine 10/25/23          
   
                                                      
  
  
Herbie Mills MD  
  
  
NPI: [id number]  
  
  
[address]  
  
  
[place], [place] [address] [phone] (Work)  
  
  
[phone] (Fax) Physician       Radiation Oncology 18           
  
  
  
                      Team Member Relationship Specialty  Start Date End Date  
   
                                                      
  
  
Lianna Almaguerly SDO  
  
  
NPI: [id number]  
  
  
[address] [place] [address]  
  
  
[phone] (Work)  
  
  
[phone] (Fax) PCP - General   Internal Medicine 10/25/23          
   
                                                      
  
  
Herbie Mills MD  
  
  
NPI: [id number]  
  
  
[address] [place] [address]  
  
  
[phone] (Work)  
  
  
[phone] (Fax) Physician       Radiation Oncology 18           
  
  
  
                      Team Member Relationship Specialty  Start Date End Date  
   
                                                      
  
  
Sharifa Almaguer DO  
  
  
NPI: [id number]  
  
  
[address] [place] [address]  
  
  
[phone] (Work)  
  
  
[phone] (Fax) PCP - General   Internal Medicine 10/25/23          
   
                                                      
  
  
Herbie Mills MD  
  
  
NPI: [id number]  
  
  
[address]  
  
  
[place], [place] [address] [phone] (Work)  
  
  
[phone] (Fax) Physician       Radiation Oncology 18           
  
  
  
                      Team Member Relationship Specialty  Start Date End Date  
   
                                                      
  
  
Sharifa Almaguer DO  
  
  
NPI: [id number]  
  
  
[address] [address]  
  
  
[phone] (Work)  
  
  
[phone] (Fax)                      PCP - [employer] Medicare   
Advantage PCP                           24                
   
                                                      
  
  
Sharifa Almaguer DO  
  
  
NPI: [id number]  
  
  
[address] [address]  
  
  
[phone] (Work)  
  
  
[phone] (Fax) PCP - General   Internal Medicine 24            
  
  
  
                      Team Member Relationship Specialty  Start Date End Date  
   
                                                      
  
  
Sharifa Almaguer DO  
  
  
NPI: [id number]  
  
  
[address]  
  
  
[place], [place] [address] [phone] (Work)  
  
  
[phone] (Fax) PCP - General   Internal Medicine 10/25/23          
   
                                                      
  
  
Herbie Mills MD  
  
  
NPI: [id number]  
  
  
[address] [place], [place] [address] [phone] (Work)  
  
  
[phone] (Fax) Physician       Radiation Oncology 18           
  
  
  
                      Team Member Relationship Specialty  Start Date End Date  
   
                                                      
  
  
Sharifa Almaguer DO  
  
  
NPI: [id number]  
  
  
[address] [place], [place] [address] [phone] (Work)  
  
  
[phone] (Fax) PCP - General   Internal Medicine 10/25/23          
   
                                                      
  
  
Hrebie Mills MD  
  
  
NPI: [id number]  
  
  
[address]  
  
  
[place] [address]  
  
  
[phone] (Work)  
  
  
[phone] (Fax) Physician       Radiation Oncology 18           
  
  
  
                      Team Member Relationship Specialty  Start Date End Date  
   
                                                      
  
  
Sharifa Almaguer DO  
  
  
NPI: [id number]  
  
  
[address] [facility] [facility] [address]  
  
  
[place] [address]  
  
  
[phone] (Work)  
  
  
[phone] (Fax) PCP - General   Internal Medicine 10/25/23          
   
                                                      
  
  
Herbie Mills MD  
  
  
NPI: [id number]  
  
  
[address]  
  
  
[place] [address]  
  
  
[phone] (Work)  
  
  
[phone] (Fax) Physician       Radiation Oncology 18           
  
  
  
                      Team Member Relationship Specialty  Start Date End Date  
   
                                                      
  
  
Sharifa Almaguer DO  
  
  
NPI: [id number]  
  
  
[address] [place] [address]  
  
  
[phone] (Work)  
  
  
[phone] (Fax) PCP - General   Internal Medicine 10/25/23          
   
                                                      
  
  
Herbie Mills MD  
  
  
NPI: [id number]  
  
  
[address]  
  
  
[place], [place] [address] [phone] (Work)  
  
  
[phone] (Fax) Physician       Radiation Oncology 18           
  
  
  
                      Team Member Relationship Specialty  Start Date End Date  
   
                                                      
  
  
Sharifa Almaguer DO  
  
  
NPI: [id number]  
  
  
[address] [place] [address]  
  
  
[phone] (Work)  
  
  
[phone] (Fax) PCP - General   Internal Medicine 10/25/23          
   
                                                      
  
  
Herbie Mills MD  
  
  
NPI: [id number]  
  
  
[address]  
  
  
[place], [place] [address] [phone] (Work)  
  
  
[phone] (Fax) Physician       Radiation Oncology 18           
  
  
  
                      Team Member Relationship Specialty  Start Date End Date  
   
                                                      
  
  
Sharifa Almaguer DO  
  
  
NPI: [id number]  
  
  
[address] [address]  
  
  
[phone] (Work)  
  
  
[phone] (Fax)                      PCP - [employer] Medicare   
Advantage PCP                           24                
   
                                                      
  
  
Sharifa Almaguer DO  
  
  
NPI: [id number]  
  
  
[address] [address]  
  
  
[phone] (Work)  
  
  
[phone] (Fax) PCP - General   Internal Medicine 24            
  
  
  
                      Team Member Relationship Specialty  Start Date End Date  
   
                                                      
  
  
Sharifa Almaguer DO  
  
  
NPI: [id number]  
  
  
[address] [place] [address]  
  
  
[phone] (Work)  
  
  
[phone] (Fax) PCP - General   Internal Medicine 10/25/23          
   
                                                      
  
  
Herbie Mills MD  
  
  
NPI: [id number]  
  
  
[address]  
  
  
[place] [address]  
  
  
[phone] (Work)  
  
  
[phone] (Fax) Physician       Radiation Oncology 18           
  
  
  
                      Team Member Relationship Specialty  Start Date End Date  
   
                                                      
  
  
Sharifa Almaguer DO  
  
  
NPI: [id number]  
  
  
 [place] [facility]   
[place] [address]  
  
  
[phone] (Work)  
  
  
[phone] (Fax) PCP - General   Internal Medicine 10/31/23          
   
                                                      
  
  
Sharifa Almaguer DO  
  
  
NPI: [id number]  
  
  
 [place] [facility]   
Office [place] [address]  
  
  
[phone] (Work)  
  
  
[phone] (Fax)                      PCP - [employer] Medicare Advantage PCP                           24                
  
  
  
                      Team Member Relationship Specialty  Start Date End Date  
   
                                                      
  
  
Sharifa Almaguer DO  
  
  
NPI: [id number]  
  
  
[address] [place] [address] Medical   
Office [place] [address]  
  
  
[phone] (Work)  
  
  
[phone] (Fax) PCP - General   Internal Medicine 10/31/23          
   
                                                      
  
  
Sharifa Almaguer DO  
  
  
NPI: [id number]  
  
  
 [facility] Medical   
Office [place] [address]  
  
  
[phone] (Work)  
  
  
[phone] (Fax)                      PCP - [employer] Medicare Advantage PCP                           24                
  
  
  
                      Team Member Relationship Specialty  Start Date End Date  
   
                                                      
  
  
Sharifa Almaguer DO  
  
  
NPI: [id number]  
  
  
[address] [place] [address] Medical   
Office [place] [address]  
  
  
[phone] (Work)  
  
  
[phone] (Fax) PCP - General   Internal Medicine 10/31/23          
   
                                                      
  
  
Sharifa Almaguer DO  
  
  
NPI: [id number]  
  
  
 [facility] Medical   
[place] [address]  
  
  
[phone] (Work)  
  
  
[phone] (Fax)                      PCP - [employer] Medicare Advantage PCP                           24                
  
  
  
                      Team Member Relationship Specialty  Start Date End Date  
   
                                                      
  
  
Sharifa Almaguer DO  
  
  
NPI: [id number]  
  
  
[address] [place] [address] Ave  
  
  
[facility]   
[place] [address]  
  
  
[phone] (Work)  
  
  
[phone] (Fax) PCP - General   Internal Medicine 10/31/23          
   
                                                      
  
  
Sharifa Almaguer DO  
  
  
NPI: [id number]  
  
  
 [address]  
  
  
[facility]   
[place] [address]  
  
  
[phone] (Work)  
  
  
[phone] (Fax)                      PCP - [employer] Medicare   
Advantage PCP                           24                
  
  
  
                      Team Member Relationship Specialty  Start Date End Date  
   
                                                      
  
  
Sharifa Almaguer DO  
  
  
NPI: [id number]  
  
  
[address] [place] [address] [facility] [facility]   
[place] [address]  
  
  
[phone] (Work)  
  
  
[phone] (Fax) PCP - General   Internal Medicine 10/31/23          
   
                                                      
  
  
Sharifa Almaguer DO  
  
  
NPI: [id number]  
  
  
 [place] [facility]   
[place] [address]  
  
  
[phone] (Work)  
  
  
[phone] (Fax)                      PCP - [employer] Medicare Advantage PCP                           24                
  
  
  
                      Team Member Relationship Specialty  Start Date End Date  
   
                                                      
  
  
Sharifa Almaguer DO  
  
  
NPI: [id number]  
  
  
[address] [place] [address] Medical   
[place] [address]  
  
  
[phone] (Work)  
  
  
[phone] (Fax) PCP - General   Internal Medicine 10/31/23          
   
                                                      
  
  
Sharifa Almaguer DO  
  
  
NPI: [id number]  
  
  
[address] [place] [address] Medical   
Office [place] [address]  
  
  
[phone] (Work)  
  
  
[phone] (Fax)                      PCP - [employer] Medicare Advantage PCP                           24                
  
  
  
                      Team Member Relationship Specialty  Start Date End Date  
   
                                                      
  
  
Sharifa Almaguer DO  
  
  
NPI: [id number]  
  
  
[address] [place] [address]  
  
  
[phone] (Work)  
  
  
[phone] (Fax)                      PCP - [employer] Medicare Advantage PCP                           24                
   
                                                      
  
  
Sharifa Almaguer DO  
  
  
NPI: [id number]  
  
  
[address] [address]  
  
  
[phone] (Work)  
  
  
[phone] (Fax) PCP - General   Internal Medicine 24            
  
  
  
                      Team Member Relationship Specialty  Start Date End Date  
   
                                                      
  
  
Sharifa Almaguer DO  
  
  
NPI: [id number]  
  
  
[address] [address]  
  
  
[phone] (Work)  
  
  
[phone] (Fax)                      PCP - [place] Medicare   
Advantage PCP                           24                
   
                                                      
  
  
Sharifa Almaguer DO  
  
  
NPI: [id number]  
  
  
[address] [address]  
  
  
[phone] (Work)  
  
  
[phone] (Fax) PCP - General   Internal Medicine 24            
  
  
  
                      Team Member Relationship Specialty  Start Date End Date  
   
                                                      
  
  
Sharifa Almaguer DO  
  
  
NPI: [id number]  
  
  
[address] [address]  
  
  
[phone] (Work)  
  
  
[phone] (Fax)                      PCP - [place] Medicare   
Advantage PCP                           24                
   
                                                      
  
  
Sharifa Almaguer DO  
  
  
NPI: [id number]  
  
  
[address] [address]  
  
  
[phone] (Work)  
  
  
[phone] (Fax) PCP - General   Internal Medicine 24            
  
  
  
                      Team Member Relationship Specialty  Start Date End Date  
   
                                                      
  
  
Sharifa Almaguer DO  
  
  
NPI: [id number]  
  
  
[address] [place] [address]  
  
  
[phone] (Work)  
  
  
[phone] (Fax)                      PCP - [place] Medicare   
Advantage PCP                           24                
   
                                                      
  
  
Sharifa Almaguer DO  
  
  
NPI: [id number]  
  
  
[address] [place] [address]  
  
  
[phone] (Work)  
  
  
[phone] (Fax) PCP - General   Internal Medicine 24            
  
  
  
                      Team Member Relationship Specialty  Start Date End Date  
   
                                                      
  
  
Sharifa Almaguer DO  
  
  
NPI: [id number]  
  
  
[address] [address]  
  
  
[phone] (Work)  
  
  
[phone] (Fax)                      PCP - [place] Medicare   
Advantage PCP                           24                
   
                                                      
  
  
Sharifa Almaguer DO  
  
  
NPI: [id number]  
  
  
[address] [address]  
  
  
[phone] (Work)  
  
  
[phone] (Fax) PCP - General   Internal Medicine 24            
  
  
  
                      Team Member Relationship Specialty  Start Date End Date  
   
                                                      
  
  
Sharifa Almaguer DO  
  
  
NPI: [id number]  
  
  
[address] [address]  
  
  
[phone] (Work)  
  
  
[phone] (Fax)                      PCP - [place] [facility]   
Advantage PCP                           24                
   
                                                      
  
  
Sharifa Almaguer DO  
  
  
NPI: [id number]  
  
  
[address] [facility] [address]  
  
  
[place] [address]  
  
  
[phone] (Work)  
  
  
[phone] (Fax) PCP - General   Internal Medicine 24            
  
  
  
                      Team Member Relationship Specialty  Start Date End Date  
   
                                                      
  
  
Sharifa Almaguer DO  
  
  
NPI: [id number]  
  
  
[address] [place], [place] [address]  
  
  
[phone] (Work)  
  
  
[phone] (Fax) PCP - General   Internal Medicine 10/25/23          
   
                                                      
  
  
Herbie Mills MD  
  
  
NPI: [id number]  
  
  
[address]  
  
  
[place] [address]  
  
  
[phone] (Work)  
  
  
[phone] (Fax) Physician       Radiation Oncology 18           
  
  
  
  
  
                                                    Reason for Visit (unrecogniz  
ed section and content)  
   
                                          
  
  
  
                                        Reason              Comments  
   
                                        Follow-up             
  
  
  
                          Specialty    Diagnoses / Procedures Referred By Contac  
t Referred To Contact  
   
                                        Primary Care          
  
  
Procedures  
  
  
Follow Up In Primary Care                 
  
  
Sharifa Almaguer DO  
  
  
 [facility]   
[place] [address]  
  
  
Phone: [phone]  
  
  
Fax: [phone]                         
  
  
  
  
  
                    Referral ID Status    Reason    Start Date Expiration Date V  
isits   
Requested                               Visits   
Authorized  
   
                [id number] Authorized         2024 1       1  
  
  
  
                                        Reason              Comments  
   
                                        Follow-up           3 week [facility]UV  
  
  
  
                    Referral ID Status    Reason    Start Date Expiration Date V  
isits   
Requested                               Visits   
Authorized  
   
                [id number] Authorized         2024 1       1  
  
  
  
                                        Reason              Comments  
   
                                        Yearly GYN exam       
  
  
  
                                        Reason              Comments  
   
                                        Diabetes              
   
                                        Cataract Evaluation   
  
  
  
                                        Reason              Comments  
   
                                        Sore upper eyelid of right eye   
  
  
  
                                        Reason              Comments  
   
                                        Establish Care        
  
  
  
                                        Reason              Comments  
   
                                        Altered Mental Status Pt  reports  
 patient confusion since driving home   
from   
[place] on Tuesday. Pt denies complaints.  
  
  
  
                                        Reason              Comments  
   
                                        Follow-up           6 MO FUV  
  
  
  
                    Referral ID Status    Reason    Start Date Expiration Date V  
isits   
Requested                               Visits   
Authorized  
   
                [id number] Authorized         2023 1       1  
  
  
  
                                        Reason              Comments  
   
                                        Vomiting            Nausea and vomiting   
since . Pt denies abdominal pain, pt was   
syncopal   
 at [facility], but was not evaluated.  states she was seen   
recently for confusion and started on Aricept about a week ago. Confusion   
has continued.  
  
  
  
                                        Reason              Comments  
   
                                        Blurred Vision Both Eyes   
   
                                        Difficulty Reading Both Eyes   
   
                                        Glare               Both Eyes  
   
                                        Non-insulin Dependent Diabetes Mellitus   
Pateint does not check blood sugar   
levels.   
HbA1c: 8.2  
  
  
  
                                Reason          Onset Date      Comments  
   
                                Refill Request  10/01/2024        
  
  
  
                                        Reason              Comments  
   
                                        Cataract Follow Up    
  
  
  
                                        Reason              Comments  
   
                                        Refill Request        
  
  
  
                                        Reason              Comments  
   
                                        Status post cataract surgery with IOL le  
ft eye   
   
                                        Blurred Vision Right Eye   
  
  
  
                                        Reason              Comments  
   
                                        Post-op Cataract OS 10/31/24  
  
  
  
                                        Reason              Comments  
   
                                        Blurred Vision Right Eye   
   
                                        Difficulty Reading Right Eye   
   
                                        Glare               Right Eye  
  
  
  
                                        Reason              Comments  
   
                                        Yearly Exam           
  
  
  
                                        Reason              Comments  
   
                                        Patient Question      
  
  
  
                                        Reason              Comments  
   
                                        Established Patient   
  
  
  
                                        Reason              Comments  
   
                                        Post-op Cataract OD S/P Cataract surgery  
 with IOL right eye done on 24  
  
  
  
                                        Reason              Comments  
   
                                        Post-op Cataract OD 2024  
   
                                        Post-op Cataract OS 10/31/2024  
  
  
  
                          Specialty    Diagnoses / Procedures Referred By Contac  
t Referred To Contact  
   
                                        Radiology             
  
  
Diagnoses  
  
  
Weight loss, unintentional  
  
  
  
Procedures  
  
  
CT abdomen pelvis w IV   
contrast                                  
  
  
Sharifa Almaguer, DO  
  
  
 [place] [address]  
  
  
Phone: [phone]  
  
  
Fax: [phone]                         
  
  
  
  
  
                          Referral ID  Status       Reason       Start   
Date                                    Expiration   
Date                                    Visits   
Requested                               Visits   
Authorized  
   
                                [id number]         Authorized        
  
  
Perform   
Procedure       2024       1               1  
  
  
  
                          Specialty    Diagnoses / Procedures Referred By Contac  
t Referred To Contact  
   
                                        Cardiology            
  
  
Diagnoses  
  
  
Abnormal EKG  
  
  
  
Procedures  
  
  
Transthoracic Echo (TTE)   
Complete  
  
  
ID ECHO TTHRC R-T 2D   
W/WOM-MODE COMPL SPEC&COLR D              
  
  
Sharifa Almaguer S, DO  
  
  
 [facility]   
[place] [address]  
  
  
Phone: [phone]  
  
  
Fax: [phone]                         
  
  
  
  
  
                          Referral ID  Status       Reason       Start   
Date                                    Expiration   
Date                                    Visits   
Requested                               Visits   
Authorized  
   
                                [id number]         Authorized        
  
  
Perform   
Procedure       2024       1               1  
  
  
  
                                        Reason              Comments  
   
                                        Abnormal ECG        NPV  
  
  
  
                          Specialty    Diagnoses / Procedures Referred By Contac  
t Referred To Contact  
   
                                        Cardiology            
  
  
Diagnoses  
  
  
Abnormal EKG  
                                          
  
  
Sharifa Almaguer, DO  
  
  
 [place] [address]  
  
  
Phone: [phone]  
  
  
Fax: [phone]                         
  
  
  
  
  
                          Referral ID  Status       Reason       Start   
Date                                    Expiration   
Date                                    Visits   
Requested                               Visits   
Authorized  
   
                                [id number]         Authorized        
  
  
Specialty   
Services   
Required        2024       2025       1               1  
  
  
  
                          Specialty    Diagnoses / Procedures Referred By Contac  
t Referred To Contact  
   
                                        Radiology             
  
  
Diagnoses  
  
  
Liver lesion  
  
  
  
Procedures  
  
  
US abdomen limited liver                  
  
  
Sharifa Almaguer, DO  
  
  
[address] [facility]   
[place] [address]  
  
  
Phone: [phone]  
  
  
Fax: [phone]                         
  
  
  
  
  
                          Referral ID  Status       Reason       Start   
Date                                    Expiration   
Date                                    Visits   
Requested                               Visits   
Authorized  
   
                                [id number]         Authorized        
  
  
Perform   
Procedure       2024       1               1  
  
  
  
                                        Reason              Comments  
   
                                        Cough               Cough and wheezing,   
nausea, vomiting , sore throat X 2 days  
  
  
  
                                        Reason              Comments  
   
                                        Follow-up             
  
  
  
                          Specialty    Diagnoses / Procedures Referred By Contac  
t Referred To Contact  
   
                                        Primary Care          
  
  
Procedures  
  
  
Follow Up In Primary Care                 
  
  
Sharifa Almaguer, DO  
  
  
[address]   
  
  
[address]  
  
  
[place] [id number]  
  
  
Phone: [phone]  
  
  
Fax: [phone]                         
  
  
  
  
  
                    Referral ID Status    Reason    Start Date Expiration Date V  
isits   
Requested                               Visits   
Authorized  
   
                [id number] Authorized         2024 1       1  
  
  
  
                          Specialty    Diagnoses / Procedures Referred By Contac  
t Referred To Contact  
   
                                        Cardiology            
  
  
Diagnoses  
  
  
Abnormal EKG  
  
  
  
Procedures  
  
  
Nuclear Stress Test  
  
  
CHG MYOCARDIAL SPECT   
MULTIPLE STUDIES                          
  
  
Bernice Vital, APRN-CNP,   
DNP  
  
  
[address]   
  
  
[facility] [address]  
  
  
[place] [address]  
  
  
Phone: [phone]  
  
  
Fax: [phone]                         
  
  
  
  
  
                          Referral ID  Status       Reason       Start   
Date                                    Expiration   
Date                                    Visits   
Requested                               Visits   
Authorized  
   
                                [id number]         Authorized        
  
  
Perform   
Procedure       2024       5               5  
  
  
  
                                        Reason              Comments  
   
                                        1 month f/u           
  
  
  
                                        Reason              Comments  
   
                                        Post-op Cataract    Right eye-   
4Left eye- 10/31/2024  
  
  
  
                                   Continuous  
  
                                                    Active and Recently Administ  
ered Medications (unrecognized section and content)  
   
                                          
  
  
  
                          Medication Order 2024  
   
                                                      
  
  
sodium chloride 0.9% infusion  
125 mL/hr, intravenous,   
Continuous, Starting on Fri   
5/3/24 at 1125  
                                                            1146 (New Bag - Prov  
ider:   
Nadege Haskins RN)1303 (Stopped -   
Provider: Nadege Haskins RN)  
  
  
                                    Scheduled  
  
                          Medication Order 2024  
   
                                                      
  
  
ondansetron (Zofran) injection 4   
mg (COMPLETED)  
4 mg, intravenous, Once, On 24 at 1255, For 1 dose, When   
administering via IV Push,   
administer over 3-5 minutes.  
                                                            1313 (Given - Provid  
er: Kaylee Thibodeaux RN)  
  
   
                                                      
  
  
sodium chloride 0.9 % bolus 1,000   
mL (COMPLETED)  
1,000 mL, intravenous, at 2,000   
mL/hr, Administer over 30   
Minutes, Once, On 24 at   
1255, For 1 dose  
                                                            1313 (New Bag - Prov  
ider: Kaylee Thibodeaux RN)1343 (Stopped -   
Provider: Kaylee Thibodeaux RN)  
  
  
                                    Scheduled  
  
                          Medication Order 10/29/2024   10/30/2024   10/31/2024  
   
                                                      
  
  
ketorolac (Acular) 0.5 %   
ophthalmic solution 1 drop   
(COMPLETED)  
1 drop, Left Eye, Every 5 min,   
First dose on Thu 10/31/24 at   
0830, For 4 doses, Preprocedure  
                                                            0835 (Given - Provid  
er:   
Chioma Yang RN)0838   
(Given - Provider: Chioma Yang RN)0853 (Given -   
Provider: Chioma Yang RN)0908 (Given - Provider:   
Chioma Yang RN)  
  
   
                                                      
  
  
lidocaine 1%-phenylephrine 1.5%   
intravitreal injection 2 mL  
2 mL, intravitreal, Once, On Thu   
10/31/24 at 0945, For 1 dose,   
Intraprocedure, To be given   
intracameral  
                                                            0945 (Due)  
  
   
                                                      
  
  
lubricating eye drops ophthalmic   
solution 1 drop (COMPLETED)  
1 drop, Left Eye, Every 5 min,   
First dose on Thu 10/31/24 at   
0830, For 4 doses, Preprocedure  
                                                            0835 (Given - Provid  
er:   
Chioma Yang RN)0838   
(Given - Provider: Chioma Yang RN)0904 (Given -   
Provider: Chioma Yang RN)0908 (Given - Provider:   
Chioma Yang RN)  
  
   
                                                      
  
  
midazolam (Versed) injection 1 mg   
(COMPLETED)  
1 mg, intravenous, Once, On Thu   
10/31/24 at 0830, For 1 dose,   
Preprocedure  
                                                            0905 (Given - Provid  
er:   
Chioma Yang RN -   
Comment: routine order)  
  
   
                                                      
  
  
moxifloxacin (Vigamox) 1.5 mg/1 mL   
(0.15%) injection 1.5 mg  
1.5 mg, Left Eye, Once, On Thu   
10/31/24 at 0945, For 1 dose,   
Intraprocedure  
                                                            0945 (Due)  
  
   
                                                      
  
  
ondansetron (Zofran) injection 4   
mg (COMPLETED)  
4 mg, intravenous, Once, On Thu   
10/31/24 at 0830, For 1 dose,   
Preprocedure, When administering   
via IV Push, administer over 3-5   
minutes.  
                                                            0901 (Given - Provid  
er:   
Chioma Yang RN)  
  
   
                                                      
  
  
phenylephrine-tropicamide 10 %-1 %   
ophthalmic solution 1 drop   
(COMPLETED)  
1 drop, Left Eye, Every 5 min,   
First dose on Thu 10/31/24 at   
0830, For 4 doses, Preprocedure  
                                                            0835 (Given - Provid  
er:   
Chioma Yang RN)0838   
(Given - Provider: Chioma Yang RN)0904 (Given -   
Provider: Chioma Yang RN)0908 (Given - Provider:   
Chioma Yang RN)  
  
   
                                                      
  
  
prednisoLONE acetate (Pred-Forte)   
1 % ophthalmic suspension 1 drop  
1 drop, Left Eye, Once, On Thu   
10/31/24 at 0945, For 1 dose,   
Intraprocedure  
                                                            0945 (Due)  
  
   
                                                      
  
  
tetracaine (PF) 0.5 % ophthalmic   
solution 1 drop (COMPLETED)  
1 drop, Left Eye, Once, On Thu   
10/31/24 at 0830, For 1 dose,   
Preprocedure  
                                                            0838 (Given - Provid  
er:   
Chioma Yang RN)  
  
  
                                       PRN  
  
                          Medication Order 10/29/2024   10/30/2024   10/31/2024  
   
                                                      
  
  
ondansetron (Zofran) injection 4 mg  
4 mg, intravenous, Once as needed, nausea/vomiting,   
second line, Starting on Thu 10/31/24 at 1000, For 1   
dose, Recovery (only), When administering via IV   
Push, administer over 3-5 minutes.  
                                                              
   
                                                      
  
  
oxygen (O2) therapy  
inhalation, Continuous PRN - O2/gases, other,   
Starting on Thu 10/31/24 at 1000, Recovery (only),   
Device: Nasal Cannula, Rate in liters per minute: 2   
LPM, Keep O2 Sat Above: 92%  
                                                              
   
                                                      
  
  
promethazine (Phenergan) 6.25 mg in sodium chloride   
0.9% 50 mL IV  
6.25 mg, intravenous, Administer over 15 Minutes,   
Once as needed, Nausea/vomiting first line, Starting   
on Thu 10/31/24 at 1000, For 1 dose, Recovery (only)  
                                                              
  
                                    Scheduled  
  
                          Medication Order 2024  
   
                                                      
  
  
ketorolac (Acular) 0.5 %   
ophthalmic solution 1 drop   
(COMPLETED)  
1 drop, Right Eye, Every 5 min,   
First dose on Thu 24 at   
1030, For 4 doses, Preprocedure  
                                                            1015 (Given - Provid  
er:   
Chioma Yang RN)1025   
(Given - Provider: Chioma Yang RN)1030 (Given -   
Provider: Chioma Yang RN)1039 (Given - Provider:   
Chioma Yang RN)  
  
   
                                                      
  
  
lidocaine 1%-phenylephrine 1.5%   
intravitreal injection 2 mL   
(COMPLETED)  
2 mL, intravitreal, Once, On Thu   
24 at 1030, For 1 dose,   
Intraprocedure  
                                                            1030 (Due)1129 (Give  
n -   
Provider: Ronel Hays MD)  
  
   
                                                      
  
  
lubricating eye drops ophthalmic   
solution 1 drop (COMPLETED)  
1 drop, Right Eye, Every 5 min,   
First dose on Thu 24 at   
1030, For 4 doses, Preprocedure  
                                                            1015 (Given - Provid  
er:   
Chioma Yang RN)1025   
(Given - Provider: Chioma Yang RN)1031 (Given -   
Provider: Chioma Yang RN)1039 (Given - Provider:   
Chioma Yang RN)  
  
   
                                                      
  
  
moxifloxacin (Vigamox) 1.5 mg/1 mL   
(0.15%) injection 1.5 mg   
(COMPLETED)  
1.5 mg, Right Eye, Once, On Thu   
24 at 1030, For 1 dose,   
Intraprocedure  
                                                            1030 (Due)1129 (Give  
n -   
Provider: Ronel Hays MD)  
  
   
                                                      
  
  
oxyCODONE (Roxicodone) immediate   
release tablet 5 mg  
5 mg, oral, Once, On Thu 24   
at 0930, For 1 dose, If ordered   
PRN for pain, nurse is permitted   
to administer this medication for   
higher pain scores based on   
patient preference? Yes  
                                                            0930 (Due)  
  
   
                                                      
  
  
oxygen (O2) therapy  
inhalation, Continuous -   
Inhalation, First dose on Thu   
24 at 1215, Recovery (only),   
Device: Nasal Cannula, Rate in   
liters per minute: 4 LPM  
                                                            1215 (Due)2000 (Due)  
  
   
                                                      
  
  
phenylephrine-tropicamide 10 %-1 %   
ophthalmic solution 1 drop   
(COMPLETED)  
1 drop, Right Eye, Every 5 min,   
First dose on Thu 24 at   
1030, For 4 doses, Preprocedure  
                                                            1015 (Given - Provid  
er:   
Chioma Yang RN)1025   
(Given - Provider: Chioma Yang RN)1031 (Given -   
Provider: Chioma Yang RN)1039 (Given - Provider:   
Chioma Yang RN)  
  
   
                                                      
  
  
povidone-iodine 5 % ophthalmic   
solution (COMPLETED)  
Right Eye, Once, On Thu 24   
at 1030, For 1 dose, Preprocedure  
                                                            1015 (Given - Provid  
er:   
Chioma Yang RN)  
  
   
                                                      
  
  
prednisoLONE acetate (Pred-Forte)   
1 % ophthalmic suspension 1 drop   
(COMPLETED)  
1 drop, Right Eye, Once, On Thu   
24 at 1030, For 1 dose,   
Intraprocedure  
                                                            1030 (Due)1129 (Give  
n -   
Provider: Ronel Hays MD)  
  
   
                                                      
  
  
tetracaine (PF) 0.5 % ophthalmic   
solution 1 drop (COMPLETED)  
1 drop, Right Eye, Once, On Thu   
24 at 1030, For 1 dose,   
Preprocedure  
                                                            1015 (Given - Provid  
er:   
Chioma Yang RN)  
  
  
                                       PRN  
  
                          Medication Order 2024  
   
                                                      
  
  
HYDROmorphone (Dilaudid) injection   
0.5 mg  
0.5 mg, intravenous, Every 5 min   
PRN, pain severe (7-10), first   
line, Starting on Thu 24 at   
1159, Recovery (only)  
                                                              
   
                                                      
  
  
lidocaine PF (Xylocaine) 10 mg/mL   
(1 %) injection (CANCELED)  
As needed, Starting on Thu   
24 at 1130, Intraprocedure  
                                                            1130 (Given - Provid  
er:   
Ronel Hays MD -   
Comment: TOPICAL EYE RIGHT   
SIDE)  
  
   
                                                      
  
  
ondansetron (Zofran) injection 4   
mg  
4 mg, intravenous, Every 15 min   
PRN, nausea/vomiting, first line,   
Starting on Thu 24 at 1159,   
For 2 doses, Recovery (only), When   
administering via IV Push,   
administer over 3-5 minutes.  
                                                              
  
  
FOR RECORDS PERTAINING TO PATIENTS WHO ARE OR HAVE BEEN ENROLLED IN A CHEMICAL 
DEPENDENCY/SUBSTANCEABUSE PROGRAM, SOME INFORMATION MAY BE OMITTED. This 
clinical summary was aggregated from multiple sources. Caution should be 
exercised in using it in the provision of clinical care. This summary normalizes
 information from multiple sources, and as a consequence, information in this 
document may materially change the coding, format and clinical context of 
patient data. In addition, data may be omitted in some cases. CLINICAL DECISIONS
 SHOULD BE BASED ON THE PRIMARY CLINICAL RECORDS. [facility]. provides
 no warranty or guarantee of the accuracy or completeness of information in this
 document.

## 2025-01-24 NOTE — CT_ITS
REPORT-ID:[id number] 
 
INDICATION: head injury 
 
EXAMINATION: CT BRAIN - CT Head or Brain W/O Contrast Injection 
 
TECHNIQUE: Multiple axial images were obtained of the head with sagittal 
and coronal reconstructed images. 
 
Individualized dose optimization techniques were used for this CT. 
 
IV contrast dosage and agent: None. 
 
COMPARISON: None. 
____________________________________________ 
 
FINDINGS: 
 
BRAIN PARENCHYMA: No evidence of an acute infarct or intracranial 
hemorrhage. No evidence of a mass. Chronic bilateral basal ganglia lacunar 
infarcts. White matter changes consistent with mild-to-moderate chronic 
microvascular disease. 
 
CSF SPACES: Mild to moderate cerebral atrophy. 
 
CALVARIUM, SKULL BASE, PARANASAL SINUSES AND MASTOID AIR CELLS: No 
fracture. Mastoid air cells are clear. Visualized paranasal sinuses are 
unremarkable. 
 
ORBITS: The globes, extraocular muscles, optic nerves and retrobulbar fat 
are unremarkable. 
____________________________________________ 
 
 
ORDER #: [id number] CT/Brain/Head without Contrast  
IMPRESSION:  No fracture or acute intracranial abnormality.  
 
  
Electronically Signed:  
Randy William DO  
2025/01/25 at 0:58 EST  
Reading Location ID and State: [id number] / [place]  
Tel [phone], Service support  [phone], Fax [phone]

## 2025-01-24 NOTE — EKG12_ITS
Test Reason : DIZZY/FALL 
Blood Pressure :   */*   mmHG 
Vent. Rate :  90 BPM     Atrial Rate :  90 BPM 
   P-R Int : 188 ms          QRS Dur :  90 ms 
    QT Int : 366 ms       P-R-T Axes :  56  45  54 degrees 
  QTcB Int : 447 ms 
  
Sinus rhythm with frequent Premature ventricular complexes 
Possible Inferior infarct (cited on or before 17-Aug-2010) 
Abnormal ECG 
Confirmed by EARL BEGUM, SARAH ([id number]),  MELIZA BERGER ([id number]) on 1/28/2025 6:09:48 AM 
  
Referred By:            Confirmed By: SARAH ELLOITT MD

## 2025-01-25 VITALS
RESPIRATION RATE: 15 BRPM | DIASTOLIC BLOOD PRESSURE: 80 MMHG | HEART RATE: 100 BPM | SYSTOLIC BLOOD PRESSURE: 163 MMHG | OXYGEN SATURATION: 96 %

## 2025-01-25 VITALS
HEART RATE: 98 BPM | RESPIRATION RATE: 12 BRPM | SYSTOLIC BLOOD PRESSURE: 164 MMHG | OXYGEN SATURATION: 95 % | DIASTOLIC BLOOD PRESSURE: 82 MMHG

## 2025-01-25 VITALS
TEMPERATURE: 98.5 F | SYSTOLIC BLOOD PRESSURE: 164 MMHG | OXYGEN SATURATION: 96 % | HEART RATE: 90 BPM | RESPIRATION RATE: 16 BRPM | DIASTOLIC BLOOD PRESSURE: 82 MMHG

## 2025-01-25 LAB
ANION GAP: 17 (ref 5–15)
BUN SERPL-MCNC: 12 MG/DL (ref 7–18)
BUN/CREAT RATIO: 16.2 RATIO (ref 10–20)
CALCIUM SERPL-MCNC: 9 MG/DL (ref 8.5–10.1)
CARBON DIOXIDE: 20 MMOL/L (ref 21–32)
CHLORIDE: 102 MMOL/L (ref 98–107)
DEPRECATED RDW RBC: 40.2 FL (ref 35.1–43.9)
ERYTHROCYTE [DISTWIDTH] IN BLOOD: 12 % (ref 11.6–14.6)
EST GLOM FILT RATE - AFR AMER: 98 ML/MIN (ref 60–?)
ESTIMATED CREATININE CLEARANCE: 52.52 ML/MIN
GLUCOSE: 252 MG/DL (ref 74–106)
HCT VFR BLD AUTO: 38.6 % (ref 37–47)
HEMOGLOBIN: 13.7 G/DL (ref 12–15)
HGB BLD-MCNC: 13.7 G/DL (ref 12–15)
IMMATURE GRANULOCYTES COUNT: 0.03 X10^3/UL (ref 0–0)
MAGNESIUM: 1.7 MG/DL (ref 1.6–2.6)
MCV RBC: 91.9 FL (ref 81–99)
MEAN CORP HGB CONC: 35.5 G/DL (ref 32–36)
MEAN PLATELET VOL.: 9.9 FL (ref 6.2–12)
NRBC FLAGGED BY ANALYZER: 0 % (ref 0–5)
PLATELET # BLD: 298 K/MM3 (ref 150–450)
PLATELET COUNT: 298 K/MM3 (ref 150–450)
POTASSIUM: 3.2 MMOL/L (ref 3.5–5.1)
RBC # BLD AUTO: 4.2 M/MM3 (ref 4.2–5.4)
RBC DISTRIBUTION WIDTH CV: 12 % (ref 11.6–14.6)
RBC DISTRIBUTION WIDTH SD: 40.2 FL (ref 35.1–43.9)
WBC # BLD AUTO: 7.4 K/MM3 (ref 4.4–11)
WHITE BLOOD COUNT: 7.4 K/MM3 (ref 4.4–11)

## 2025-01-25 NOTE — EDS_ITS
HPI    
History of Present Illness    
Chief Complaint: Fall    
Informant: patient and spouse/S.O.    
Narrative    
Narrative:     
Patient is a 79-year-old female with past medical history of hypertension   
hyperlipidemia diabetes and dementia.  According to patient and spouse she was   
sleeping on the couch while he was watching the [employer] game.  He states that he   
woke her up and she got up to use the bathroom before getting into bed.  After   
using the restroom and walking towards the bed she began to feel unsteady and   
lightheaded and then had a brief bout of syncope where she fell backwards   
striking her head on the bed frame.   states the LOC was transient and   
improved essentially when she hit the ground.  He states this occurred roughly 1  
to 2 hours ago and since that time she has been acting normally.  Patient   
reports feeling dizzy still with position change which she describes as a sense   
of motion and therefore the head trauma and syncopal event she was brought in   
for evaluation.  Patient and spouse deny any history of bleeding disorder or   
blood thinner use    
    
[facility]    
Medical History (Reviewed 01/25/25 @ 00:10 by Dr. Sánchez Call, )    
    
Dementia    
History of breast cancer    
Diabetes    
Hyperlipemia    
Hypertension    
    
    
                                Home Medications    
    
    
    
?Medication ?Instructions ?Recorded ?Last Taken ?Type    
     
albuterol sulfate 90 mcg/actuation 2 puff inhalation Q6H PRN PRN 01/24/25   
Unknown History    
    
aerosol inhaler wheezing       
     
blood sugar diagnostic (OneTouch  01/24/25 Unknown History    
    
Ultra Test strips)        
     
cholecalciferol (vitamin D3) 50 50 mcg PO DAILY 01/24/25 Unknown History    
    
mcg (2,000 unit) capsule (Vitamin        
    
D3)        
     
donepezil 10 mg tablet 10 mg PO DAILY 01/24/25 Unknown History    
     
lancets 30 gauge  01/24/25 Unknown History    
     
losartan 100 1 tab PO DAILY 01/24/25 Unknown History    
    
mg-hydrochlorothiazide 12.5 mg        
    
tablet        
     
metformin 500 mg tablet,extended 1,000 mg PO DAILY 01/24/25 Unknown History    
    
release 24 hr        
     
omeprazole 40 mg capsule,delayed 40 mg PO DAILY 01/24/25 Unknown History    
    
release        
     
potassium chloride 10 mEq 20 meq PO DAILY 01/24/25 Unknown History    
    
tablet,extended release        
     
simvastatin 20 mg tablet 20 mg PO DAILY 01/24/25 Unknown History    
     
diazepam 2 mg tablet (Valium) 2 mg PO TID PRN Dizziness/vertigo 01/25/25 Unknown  
Rx    
    
 5 days #15 tabs       
    
    
    
                                            
    
    
    
Allergy/AdvReac Type Severity Reaction Status Date / Time    
     
No Known Allergies Allergy   Verified 01/24/25 23:21    
    
    
    
Surgical History (Updated 01/24/25 @ 23:22 by Radha Win)    
    
H/O lumpectomy    
    
    
Social History    
Smoking Status:  Never smoker     
    
    
    
ROS    
ROS ED    
Constitutional    
Constitutional ED: Denies chills or fever(s)    
Eyes    
Eyes: Denies diplopia    
ENT    
ENT ED: Denies sore throat    
Cardiovascular    
Cardiovascular: Reports other Details: Positive syncope ;     
Denies chest pain, palpitations or racing heartbeat    
Respiratory/Chest    
Respiratory/Chest: Denies cough or dyspnea    
Gastrointestinal    
Gastrointestinal: Denies abdominal pain, diarrhea, nausea or vomiting    
Genitourinary    
Genitourinary ED: Denies dysuria    
Musculoskeletal    
Musculoskeletal: Denies back pain or neck pain    
Integumentary    
Reports Abrasions and other Details: Positive scalp abrasion/hematoma    
Neurologic    
Neurologic: Reports other Details: Positive dizziness ;     
Denies headache(s) or weakness    
Hematologic/Lymphatic    
Hematologic/Lymphatic: Denies easy bleeding or easy bruising    
    
EXAM    
Physical Exam    
Const    
Vital Signs:     
    
                                            
    
    
    
 01/24/25    
23:20 01/24/25    
23:20 01/24/25    
23:48    
     
Temperature 97.5 F L      
     
Temperature Source Oral      
     
Pulse Rate 105 H      
     
Pulse Rate [Lying]   89    
     
Pulse Rate [Sitting (for 1 minute prior to obtaining)]   96    
     
Pulse Rate [Standing (for 1 minute prior to obtaining)]   113 H    
     
Respiratory Rate 18      
     
Respiratory Effort  Normal    
Non-Labored     
     
Respiratory Depth  Normal     
     
Respiratory Pattern  Normal     
     
Blood Pressure 164/89 H      
     
Blood Pressure [Lying]   149/57 H    
     
Blood Pressure [Sitting (for 1 minute prior to obtaining)]   158/81 H    
     
Blood Pressure [Standing (for 1 minute prior to obtaining)]   124/79 H    
     
Blood Pressure Mean 114      
     
Blood Pressure Mean [Lying]   87    
     
Blood Pressure Mean [Sitting (for 1 minute prior to obtaining)]   106    
     
Blood Pressure Mean [Standing (for 1 minute prior to obtaining)]   94    
     
Pulse Ox 97      
     
Oxygen Delivery Method Room Air Room Air     
    
    
    
    
 01/25/25    
00:34 01/25/25    
01:00    
     
Temperature      
     
Temperature Source      
     
Pulse Rate 100 98    
     
Pulse Rate [Lying]      
     
Pulse Rate [Sitting (for 1 minute prior to obtaining)]      
     
Pulse Rate [Standing (for 1 minute prior to obtaining)]      
     
Respiratory Rate 15 12    
     
Respiratory Effort      
     
Respiratory Depth      
     
Respiratory Pattern      
     
Blood Pressure 163/80 H 164/82 H    
     
Blood Pressure [Lying]      
     
Blood Pressure [Sitting (for 1 minute prior to obtaining)]      
     
Blood Pressure [Standing (for 1 minute prior to obtaining)]      
     
Blood Pressure Mean 107 109    
     
Blood Pressure Mean [Lying]      
     
Blood Pressure Mean [Sitting (for 1 minute prior to obtaining)]      
     
Blood Pressure Mean [Standing (for 1 minute prior to obtaining)]      
     
Pulse Ox 96 95    
     
Oxygen Delivery Method Room Air Room Air    
    
    
    
    
Positive well nourished and well developed    
General Appearance ED: well developed; Negative for pallor    
HEENT    
HEENT Narrative:     
Patient has a 2 x 2 cm along the right upper occipital portion of the scalp   
consistent with head trauma    
    
Otherwise no signs of depressed or basilar skull fracture    
    
Mucous membranes are mildly dry and tacky    
Eyes    
PERRL and EOMs intact bilaterally    
General Eye ED: Negative for scleral icterus    
Neck    
supple    
Neck Narrative:     
No bony deformity or step-off of the cervical spine no midline tenderness to   
palpation    
Chest Wall    
palpation of chest normal    
Resp    
normal respiratory effort and clear to auscultation bilaterally    
Cardio    
regular rhythm    
Rate: tachycardic and other Other Details: Tachycardic rate with regular rhythm   
and occasional ectopic beat noted     
     
Radial and carotid pulses are equal and symmetric    
GI    
normal to inspection, nondistended, normoactive bowel sounds, non-tender, non-  
distended and no masses    
Auscultation: normoactive bowel sounds    
Palpation: soft    
Back/Spine    
Back/Spine Narrative:     
No bony deformity or step-off of the thoracic or lumbar spine no midline   
tenderness to palpation    
Extremity    
normal to inspection    
Extremity Narrative:     
Pelvis is stable there is no shortening or external rotation of either lower   
extremity    
    
Patient can move all extremities without pain or difficulty    
Neuro    
CN's II-XII intact bilaterally and no sensory deficits noted    
Neuro Narrative:     
Patient is at her baseline mental status without focal neurologic deficit    
    
Cranial nerves II through XII are grossly intact    
    
No pronator drift no dysmetria no truncal ataxia    
    
Faint horizontal nystagmus noted    
    
Positive Hallpike Henderson exam on left    
Sensorium / Orientation: alert    
Motor Exam: strength 5/5 throughout    
Psych    
mental status grossly normal    
Skin    
no rashes or lesions noted and No skin turgor normal    
Skin Narrative:     
Skin turgor is increased    
General Skin Exam: Negative for jaundice or pallor    
    
MDM    
MDM    
MDM Narrative    
Medical decision making narrative:     
Patient arrived to the ER hypertensive but otherwise with stable vitals.  She   
reported a dizzy/syncopal event that occurred after standing and walking.    
Differential diagnosis is for orthostatic syncope versus peripheral vertigo   
versus posterior circulation stroke.  As she struck her head there is also   
concern for underlying skull fracture or brain bleed such as traumatic   
subarachnoid or subdural hemorrhage or patient could have a cardiac dysrhythmia   
such as A-fib or a flutter.  Secondary to his a head CT was obtained to rule out  
trauma.  EKG obtained to check for cardiac dysrhythmia.  Blood work was ordered   
to look for acute loss anemia acute kidney injury or clinically significant   
electrolyte abnormality.  Orthostatic vitals were obtained and were positive   
which does correlate with the patient's report of the syncopal event.  As she   
also had positive Hallpike Henderson exam this indicates peripheral vertigo plays a   
factor in the dizziness as well.  Therefore she was given Valium as well as 2 L   
of fluid.  On reevaluation she reports that her dizziness has resolved she is   
able to walk and ambulate to the bathroom with steady gait without recurrent   
symptoms.  Therefore at this time as workup has revealed no true cardiac dy  
srhythmia no signs of trauma and no clinically significant lab abnormalities   
there is no need for further evaluation and she is otherwise safe for discharge    
History & Record Review    
Discussion w/independent historian: Patient and Significant other    
Lab Data    
Attestation: I reviewed the patient's lab results.    
Labs:     
    
                         Laboratory Results - last 24 hr    
    
    
    
  01/24/25    
    
  23:45    
     
WBC  7.4    
     
RBC  4.20    
     
Hgb  13.7    
     
Hct  38.6    
     
MCV  91.9    
     
MCH  32.6 H    
     
MCHC  35.5    
     
RDW Std Deviation  40.2    
     
RDW Coeff of Blaze  12.0    
     
Plt Count  298    
     
MPV  9.9    
     
Immature Gran % (Auto)  0.400    
     
Neut % (Auto)  56.7    
     
Lymph % (Auto)  34.4    
     
Mono % (Auto)  7.0    
     
Eos % (Auto)  1.1    
     
Baso % (Auto)  0.4    
     
Absolute Neuts (auto)  4.2    
     
Absolute Lymphs (auto)  2.55    
     
Nucleated RBC %  0    
     
Sodium  138    
     
Potassium  3.2 L    
     
Chloride  102    
     
Carbon Dioxide  20.0 L    
     
Anion Gap  17 H    
     
BUN  12    
     
Creatinine  0.74    
     
Estim Creat Clear Calc  52.52    
     
Est GFR (MDRD) Af Amer  98    
     
Est GFR (MDRD) Non-Af  81    
     
BUN/Creatinine Ratio  16.2    
     
Glucose  252 H    
     
Calcium  9.0    
     
Magnesium  1.7    
    
    
    
    
Radiography    
Diagnostic Testing:     
    
Clinical Impression(s) from Imaging Studies    
    
Brain CT  01/24/25 23:40    
IMPRESSION:  No fracture or acute intracranial abnormality.    
     
Electronically Signed:    
Randy William DO    
2025/01/25 at 0:58 EST    
Reading Location ID and State: [id number] / [place]    
Tel [phone], Service support  [phone], Fax [phone]    
     
    
    
    
    
    
Discharge Plan    
Triage    
Chief Complaint: Fall    
    
ED Provider: Sánchez Call    
    
Dx/Rx/DC Orders    
Clinical Impression:    
 Orthostatic syncope, Vertigo, Mild dehydration, Hypertension, Hyperlipidemia,   
Hematoma of occipital region of scalp    
    
    
Instructions:  Dizziness Fainting Causes, ED Vertigo, Unspecified    
    
Prescriptions:    
New    
  diazepam [Valium] 2 mg tablet     
   2 mg PO TID PRN (Reason: Dizziness/vertigo) 5 Days Qty: 15 0RF    
    
No Action    
  omeprazole 40 mg capsule,delayed release(DR/EC)     
   40 mg PO DAILY     
  cholecalciferol (vitamin D3) [Vitamin D3] 50 mcg (2,000 unit) capsule     
   50 mcg PO DAILY     
  metformin 500 mg tablet extended release 24 hr     
   1,000 mg PO DAILY     
  potassium chloride 10 mEq tablet extended release     
   20 meq PO DAILY     
  losartan-hydrochlorothiazide 100-12.5 mg tablet     
   1 tab PO DAILY     
  simvastatin 20 mg tablet     
   20 mg PO DAILY     
  donepezil 10 mg tablet     
   10 mg PO DAILY     
  (DME) OneTouch Ultra Test  Strip     
     MISCELLANEOUS BID     
  albuterol sulfate 90 mcg/actuation HFA aerosol inhaler     
   2 puff INHALATION Q6H PRN PRN (Reason: wheezing)     
  (DME) lancets 30 gauge misc     
     MISCELLANEOUS      
   Patient Comments:    
   USE AS DIRECTED AS NEEDED    
    
Primary Care Provider: Woody Boo Chi    
    
Referrals:    
Woody Boo Chi, MD [Primary Care Provider] -     
    
Activity Restrictions/Additional Instructions:    
Your history exam and workup indicate that your dizziness and brief passing out   
event are related to a drop in your blood pressure known as orthostasis and   
worsened by mild peripheral vertigo.  Keep yourself well-hydrated and use the   
Valium as needed for dizziness/vertigo control.  You may continue all of your   
home medications as directed by your doctor and return to the ER should you have  
any further concerns    
    
Print Language: English    
    
Disposition    
***Disposition***: Home, Self Care

## 2025-01-25 NOTE — [id number]
HPI
History of Present Illness
Chief Complaint: Fall
Informant: patient and spouse/S.O.
Narrative
Narrative: 
Patient is a 79-year-old female with past medical history of hypertension hyperlipidemia diabetes and dementia.  According to patient and spouse she was sleeping on the couch while he was watching the [employer] game.  He states that he woke her up and 
she got up to use the bathroom before getting into bed.  After using the restroom and walking towards the bed she began to feel unsteady and lightheaded and then had a brief bout of syncope where she fell backwards striking her head on the bed 
frame.   states the LOC was transient and improved essentially when she hit the ground.  He states this occurred roughly 1 to 2 hours ago and since that time she has been acting normally.  Patient reports feeling dizzy still with position 
change which she describes as a sense of motion and therefore the head trauma and syncopal event she was brought in for evaluation.  Patient and spouse deny any history of bleeding disorder or blood thinner use

[facility]
Medical History (Reviewed 01/25/25 @ 00:10 by Dr. Sánchez Call, )

Dementia
History of breast cancer
Diabetes
Hyperlipemia
Hypertension


Home Medications

?Medication ?Instructions ?Recorded ?Last Taken ?Type
albuterol sulfate 90 mcg/actuation 2 puff inhalation Q6H PRN PRN 01/24/25 Unknown History
aerosol inhaler wheezing   
blood sugar diagnostic (OneTouch  01/24/25 Unknown History
Ultra Test strips)    
cholecalciferol (vitamin D3) 50 50 mcg PO DAILY 01/24/25 Unknown History
mcg (2,000 unit) capsule (Vitamin    
D3)    
donepezil 10 mg tablet 10 mg PO DAILY 01/24/25 Unknown History
lancets 30 gauge  01/24/25 Unknown History
losartan 100 1 tab PO DAILY 01/24/25 Unknown History
mg-hydrochlorothiazide 12.5 mg    
tablet    
metformin 500 mg tablet,extended 1,000 mg PO DAILY 01/24/25 Unknown History
release 24 hr    
omeprazole 40 mg capsule,delayed 40 mg PO DAILY 01/24/25 Unknown History
release    
potassium chloride 10 mEq 20 meq PO DAILY 01/24/25 Unknown History
tablet,extended release    
simvastatin 20 mg tablet 20 mg PO DAILY 01/24/25 Unknown History
diazepam 2 mg tablet (Valium) 2 mg PO TID PRN Dizziness/vertigo 01/25/25 Unknown Rx
 5 days #15 tabs   




Allergy/AdvReac Type Severity Reaction Status Date / Time
No Known Allergies Allergy   Verified 01/24/25 23:21


Surgical History (Updated 01/24/25 @ 23:22 by Radha Win)

H/O lumpectomy


Social History
Smoking Status:  Never smoker 



ROS
ROS ED
Constitutional
Constitutional ED: Denies chills or fever(s)
Eyes
Eyes: Denies diplopia
ENT
ENT ED: Denies sore throat
Cardiovascular
Cardiovascular: Reports other Details: Positive syncope ; 
Denies chest pain, palpitations or racing heartbeat
Respiratory/Chest
Respiratory/Chest: Denies cough or dyspnea
Gastrointestinal
Gastrointestinal: Denies abdominal pain, diarrhea, nausea or vomiting
Genitourinary
Genitourinary ED: Denies dysuria
Musculoskeletal
Musculoskeletal: Denies back pain or neck pain
Integumentary
Reports Abrasions and other Details: Positive scalp abrasion/hematoma
Neurologic
Neurologic: Reports other Details: Positive dizziness ; 
Denies headache(s) or weakness
Hematologic/Lymphatic
Hematologic/Lymphatic: Denies easy bleeding or easy bruising

EXAM
Physical Exam
Const
Vital Signs: 



 01/24/25
23:20 01/24/25
23:20 01/24/25
23:48
Temperature 97.5 F L  
Temperature Source Oral  
Pulse Rate 105 H  
Pulse Rate [Lying]   89
Pulse Rate [Sitting (for 1 minute prior to obtaining)]   96
Pulse Rate [Standing (for 1 minute prior to obtaining)]   113 H
Respiratory Rate 18  
Respiratory Effort  Normal
Non-Labored 
Respiratory Depth  Normal 
Respiratory Pattern  Normal 
Blood Pressure 164/89 H  
Blood Pressure [Lying]   149/57 H
Blood Pressure [Sitting (for 1 minute prior to obtaining)]   158/81 H
Blood Pressure [Standing (for 1 minute prior to obtaining)]   124/79 H
Blood Pressure Mean 114  
Blood Pressure Mean [Lying]   87
Blood Pressure Mean [Sitting (for 1 minute prior to obtaining)]   106
Blood Pressure Mean [Standing (for 1 minute prior to obtaining)]   94
Pulse Ox 97  
Oxygen Delivery Method Room Air Room Air 

 01/25/25
00:34 01/25/25
01:00
Temperature  
Temperature Source  
Pulse Rate 100 98
Pulse Rate [Lying]  
Pulse Rate [Sitting (for 1 minute prior to obtaining)]  
Pulse Rate [Standing (for 1 minute prior to obtaining)]  
Respiratory Rate 15 12
Respiratory Effort  
Respiratory Depth  
Respiratory Pattern  
Blood Pressure 163/80 H 164/82 H
Blood Pressure [Lying]  
Blood Pressure [Sitting (for 1 minute prior to obtaining)]  
Blood Pressure [Standing (for 1 minute prior to obtaining)]  
Blood Pressure Mean 107 109
Blood Pressure Mean [Lying]  
Blood Pressure Mean [Sitting (for 1 minute prior to obtaining)]  
Blood Pressure Mean [Standing (for 1 minute prior to obtaining)]  
Pulse Ox 96 95
Oxygen Delivery Method Room Air Room Air



Positive well nourished and well developed
General Appearance ED: well developed; Negative for pallor
HEENT
HEENT Narrative: 
Patient has a 2 x 2 cm along the right upper occipital portion of the scalp consistent with head trauma

Otherwise no signs of depressed or basilar skull fracture

Mucous membranes are mildly dry and tacky
Eyes
PERRL and EOMs intact bilaterally
General Eye ED: Negative for scleral icterus
Neck
supple
Neck Narrative: 
No bony deformity or step-off of the cervical spine no midline tenderness to palpation
Chest Wall
palpation of chest normal
Resp
normal respiratory effort and clear to auscultation bilaterally
Cardio
regular rhythm
Rate: tachycardic and other Other Details: Tachycardic rate with regular rhythm and occasional ectopic beat noted 
 
Radial and carotid pulses are equal and symmetric
GI
normal to inspection, nondistended, normoactive bowel sounds, non-tender, non-distended and no masses
Auscultation: normoactive bowel sounds
Palpation: soft
Back/Spine
Back/Spine Narrative: 
No bony deformity or step-off of the thoracic or lumbar spine no midline tenderness to palpation
Extremity
normal to inspection
Extremity Narrative: 
Pelvis is stable there is no shortening or external rotation of either lower extremity

Patient can move all extremities without pain or difficulty
Neuro
CN's II-XII intact bilaterally and no sensory deficits noted
Neuro Narrative: 
Patient is at her baseline mental status without focal neurologic deficit

Cranial nerves II through XII are grossly intact

No pronator drift no dysmetria no truncal ataxia

Faint horizontal nystagmus noted

Positive Hallpike [place] exam on left
Sensorium / Orientation: alert
Motor Exam: strength 5/5 throughout
Psych
mental status grossly normal
Skin
no rashes or lesions noted and No skin turgor normal
Skin Narrative: 
Skin turgor is increased
General Skin Exam: Negative for jaundice or pallor

MDM
MDM
MDM Narrative
Medical decision making narrative: 
Patient arrived to the ER hypertensive but otherwise with stable vitals.  She reported a dizzy/syncopal event that occurred after standing and walking.  Differential diagnosis is for orthostatic syncope versus peripheral vertigo versus posterior 
circulation stroke.  As she struck her head there is also concern for underlying skull fracture or brain bleed such as traumatic subarachnoid or subdural hemorrhage or patient could have a cardiac dysrhythmia such as A-fib or a flutter.  Secondary 
to his a head CT was obtained to rule out trauma.  EKG obtained to check for cardiac dysrhythmia.  Blood work was ordered to look for acute loss anemia acute kidney injury or clinically significant electrolyte abnormality.  Orthostatic vitals were 
obtained and were positive which does correlate with the patient's report of the syncopal event.  As she also had positive Hallpike [place] exam this indicates peripheral vertigo plays a factor in the dizziness as well.  Therefore she was given Valium 
as well as 2 L of fluid.  On reevaluation she reports that her dizziness has resolved she is able to walk and ambulate to the bathroom with steady gait without recurrent symptoms.  Therefore at this time as workup has revealed no true cardiac 
dysrhythmia no signs of trauma and no clinically significant lab abnormalities there is no need for further evaluation and she is otherwise safe for discharge
History & Record Review
Discussion w/independent historian: Patient and Significant other
Lab Data
Attestation: I reviewed the patient's lab results.
Labs: 

Laboratory Results - last 24 hr

  01/24/25
  23:45
WBC  7.4
RBC  4.20
Hgb  13.7
Hct  38.6
MCV  91.9
MCH  32.6 H
MCHC  35.5
RDW Std Deviation  40.2
RDW Coeff of Blaze  12.0
Plt Count  298
MPV  9.9
Immature Gran % (Auto)  0.400
Neut % (Auto)  56.7
Lymph % (Auto)  34.4
Mono % (Auto)  7.0
Eos % (Auto)  1.1
Baso % (Auto)  0.4
Absolute Neuts (auto)  4.2
Absolute Lymphs (auto)  2.55
Nucleated RBC %  0
Sodium  138
Potassium  3.2 L
Chloride  102
Carbon Dioxide  20.0 L
Anion Gap  17 H
BUN  12
Creatinine  0.74
Estim Creat Clear Calc  52.52
Est GFR (MDRD) Af Amer  98
Est GFR (MDRD) Non-Af  81
BUN/Creatinine Ratio  16.2
Glucose  252 H
Calcium  9.0
Magnesium  1.7



Radiography
Diagnostic Testing: 

Clinical Impression(s) from Imaging Studies

Brain CT  01/24/25 23:40
IMPRESSION:  No fracture or acute intracranial abnormality.
 
Electronically Signed:
Randy William DO
2025/01/25 at 0:58 EST
Reading Location ID and State: [id number] / [place]
Tel [phone], Service support  [phone], Fax [phone]
 





Discharge Plan
Triage
Chief Complaint: Fall

ED Provider: Sánchez Call

Dx/Rx/DC Orders
Clinical Impression:
 Orthostatic syncope, Vertigo, Mild dehydration, Hypertension, Hyperlipidemia, Hematoma of occipital region of scalp


Instructions:  Dizziness Fainting Causes, ED Vertigo, Unspecified

Prescriptions:
New
  diazepam [Valium] 2 mg tablet 
   2 mg PO TID PRN (Reason: Dizziness/vertigo) 5 Days Qty: 15 0RF

No Action
  omeprazole 40 mg capsule,delayed release(DR/EC) 
   40 mg PO DAILY 
  cholecalciferol (vitamin D3) [Vitamin D3] 50 mcg (2,000 unit) capsule 
   50 mcg PO DAILY 
  metformin 500 mg tablet extended release 24 hr 
   1,000 mg PO DAILY 
  potassium chloride 10 mEq tablet extended release 
   20 meq PO DAILY 
  losartan-hydrochlorothiazide 100-12.5 mg tablet 
   1 tab PO DAILY 
  simvastatin 20 mg tablet 
   20 mg PO DAILY 
  donepezil 10 mg tablet 
   10 mg PO DAILY 
  (DME) OneTouch Ultra Test  Strip 
     MISCELLANEOUS BID 
  albuterol sulfate 90 mcg/actuation HFA aerosol inhaler 
   2 puff INHALATION Q6H PRN PRN (Reason: wheezing) 
  (DME) lancets 30 gauge misc 
     MISCELLANEOUS  
   Patient Comments:
   USE AS DIRECTED AS NEEDED

Primary Care Provider: Woody Boo Chi

Referrals:
Woody Boo Chi, MD [Primary Care Provider] - 

Activity Restrictions/Additional Instructions:
Your history exam and workup indicate that your dizziness and brief passing out event are related to a drop in your blood pressure known as orthostasis and worsened by mild peripheral vertigo.  Keep yourself well-hydrated and use the Valium as 
needed for dizziness/vertigo control.  You may continue all of your home medications as directed by your doctor and return to the ER should you have any further concerns

Print Language: English

Disposition
***Disposition***: Home, Self Care

## 2025-02-03 ENCOUNTER — HOSPITAL ENCOUNTER (OUTPATIENT)
Age: 80
Discharge: HOME | End: 2025-02-03
Payer: MEDICARE

## 2025-02-03 DIAGNOSIS — G30.9: Primary | ICD-10-CM

## 2025-02-03 PROCEDURE — 70551 MRI BRAIN STEM W/O DYE: CPT

## 2025-02-03 NOTE — MRI_ITS
PROCEDURE:  
BRAIN WITHOUT CONTRAST  
   
REASON FOR EXAM:  
   
Dementia, Alzheimer's.  
   
TECHNIQUE:  
Multiplanar, multisequence MRI of the brain without intravenous gadolinium-based
contrast.  
   
COMPARISON:  
None.  
   
FINDINGS:  
There is prominence of the ventricles and sulci indicative of atrophy.  There 
are numerous foci of hyperintense T2/FLAIR signal  
in the periventricular and deep white matter relating to chronic small vessel 
ischemic disease.  No midline shift, mass effect,  
or extra-axial fluid collections are identified.  
   
No diffusion restriction is identified on diffusion-weighted imaging to suggest 
acute/subacute ischemic changes. No acute  
intracranial hemorrhage or acute territorial infarction is seen.  
   
Corpus callosum, optic chiasm, suprasellar cistern, and cerebellar tonsils are 
within normal range. Major vascular flow voids are  
present.  Bilateral orbits are intact.  There is mild mucosal thickening of the 
left sphenoid sinus.  
   
ORDER #: [id number] MRI/Brain without Contrast  
IMPRESSION:  
1. No acute intracranial process.  
2. Chronic small vessel ischemic disease.  
3. Atrophy.  
   
Electronically Signed By: Roni Lee on 02/03/2025 23:28  
Reading Location: [facility]

## 2025-02-12 ENCOUNTER — TELEPHONE (OUTPATIENT)
Age: 80
End: 2025-02-12
Payer: MEDICARE

## 2025-02-12 NOTE — TELEPHONE ENCOUNTER
This is an FYI:     called and canceled patient's March appointment. He states patient has transferred to a different doctor.

## 2025-02-24 ENCOUNTER — HOSPITAL ENCOUNTER (OUTPATIENT)
Age: 80
Discharge: HOME | End: 2025-02-24
Payer: MEDICARE

## 2025-02-24 DIAGNOSIS — G30.9: Primary | ICD-10-CM

## 2025-02-24 DIAGNOSIS — G31.84: ICD-10-CM

## 2025-02-24 PROCEDURE — 36415 COLL VENOUS BLD VENIPUNCTURE: CPT

## 2025-03-25 ENCOUNTER — APPOINTMENT (OUTPATIENT)
Age: 80
End: 2025-03-25
Payer: MEDICARE

## 2025-07-17 ENCOUNTER — HOSPITAL ENCOUNTER (OUTPATIENT)
Dept: HOSPITAL 100 - POLAB3 | Age: 80
Discharge: HOME | End: 2025-07-17
Payer: MEDICARE

## 2025-07-17 DIAGNOSIS — E55.9: ICD-10-CM

## 2025-07-17 DIAGNOSIS — E11.65: Primary | ICD-10-CM

## 2025-07-17 DIAGNOSIS — I10: ICD-10-CM

## 2025-07-17 LAB
ABSOLUTE NEUTROPHIL COUNT: 5.4 X10^3/UL (ref 2–7.7)
ALBUMIN SERPL-MCNC: 4 G/DL (ref 3.4–4.8)
ALBUMIN/GLOB SERPL: 1.5 RATIO (ref 0.9–2.4)
ALP SERPL-CCNC: 80 U/L (ref 35–104)
ALT SERPL-CCNC: < 5 U/L (ref ?–34)
ANION GAP SERPL CALC-SCNC: 13 MMOL/L (ref 5–15)
ANISOCYTOSIS BLD QL SMEAR: (no result)
AST(SGOT): 17 U/L (ref ?–31)
BASO STIPL BLD QL SMEAR: (no result)
BASOPHIL#: 0.02 X10^3/UL
BASOPHIL%: 0.2 % (ref 0–1)
BASOPHILS NFR SPEC MANUAL: (no result) % (ref 0–1)
BITE CELLS BLD QL SMEAR: (no result)
BUN SERPL-MCNC: 16 MG/DL (ref 4–19)
BUN/CREAT SERPL: 20.4 RATIO (ref 10–20)
BURR CELLS BLD QL SMEAR: (no result)
CALCIUM SERPL-MCNC: 9.7 MG/DL (ref 7.6–11)
CHLORIDE: 99 MMOL/L (ref 98–108)
CO2 BLDCV-SCNC: 27 MMOL/L (ref 21–32)
CREAT SERPL-MCNC: 0.79 MG/DL (ref 0.7–1.2)
DEPRECATED RDW RBC: 44.5 FL (ref 35.1–43.9)
DOHLE BOD BLD QL SMEAR: (no result)
EOSINOPHIL # BLD: 0.12 X10^3/UL
ERYTHROCYTE [DISTWIDTH] IN BLOOD: 13 % (ref 11.6–14.6)
GLOBULIN: 2.6 G/DL (ref 2.2–4.2)
GLUCOSE SERPL-MCNC: 225 MG/DL (ref 70–99)
HBA1C MFR BLD: 7.7 % (ref ?–5.6)
HCT VFR BLD AUTO: 41.3 % (ref 37–47)
HGB BLD-MCNC: 14 G/DL (ref 12–15)
HOWELL-JOLLY BOD BLD QL SMEAR: (no result)
HYPOCHROMIA BLD QL: (no result)
IMM GRANULOCYTES NFR BLD AUTO: 0.5 % (ref 0–0.9)
IMMATURE GRANULOCYTES COUNT: 0.04 X10^3/UL (ref 0–0)
LYMPHOCYTES # SPEC AUTO: 2.75 X10^3/UL (ref 0.83–4.51)
LYMPHOCYTES # SPEC AUTO: 2.75 X10^3/UL (ref 0.83–4.51)
LYMPHOCYTES NFR SPEC AUTO: 31.1 % (ref 19–41)
Lab: 1.4 % (ref 0–5)
MACROCYTES BLD QL: (no result)
MANUAL DIF COMMENT BLD-IMP: (no result)
MCH RBC QN AUTO: 31.9 PG (ref 27–32)
MCV RBC: 94.1 FL (ref 81–99)
MEAN CORP HGB CONC: 33.9 G/DL (ref 32–36)
MEAN PLATELET VOL.: 9.8 FL (ref 6.2–12)
MONOCYTE#: 0.53 X10^3/UL
MONOCYTE%: 6 % (ref 0–10)
NEUTROPHIL #: 5.38 X10^3/UL (ref 2.7–7.7)
NEUTROPHILS NFR BLD AUTO: 60.8 % (ref 47–70)
NEUTS HYPERSEG # BLD: (no result) 10*3/UL
NRBC FLAGGED BY ANALYZER: 0 % (ref 0–5)
PLATELET # BLD: 279 K/MM3 (ref 150–450)
POLYCHROMASIA BLD QL SMEAR: (no result)
POTASSIUM SPEC-MCNC: 3.9 MMOL/L (ref 3.3–5.1)
PROTEIN, TOTAL: 6.7 G/DL (ref 5.9–8.4)
RBC # BLD AUTO: 4.39 M/MM3 (ref 4.2–5.4)
SODIUM LEVEL: 139 MMOL/L (ref 133–145)
TOTAL BILIRUBIN: 0.44 MG/DL (ref 0–1.3)
TSH SERPL DL<=0.005 MIU/L-ACNC: 1.28 UIU/ML (ref 0.3–4.2)
VITAMIN D,25 HYDROXY: 45.4 NG/ML (ref 30–100)
WBC # BLD AUTO: 8.8 K/MM3 (ref 4.4–11)
WBC NRBC COR # BLD: (no result) K/MM3 (ref 4.4–11)

## 2025-07-17 PROCEDURE — 80053 COMPREHEN METABOLIC PANEL: CPT

## 2025-07-17 PROCEDURE — 36415 COLL VENOUS BLD VENIPUNCTURE: CPT

## 2025-07-17 PROCEDURE — 85025 COMPLETE CBC W/AUTO DIFF WBC: CPT

## 2025-07-17 PROCEDURE — 84443 ASSAY THYROID STIM HORMONE: CPT

## 2025-07-17 PROCEDURE — 82306 VITAMIN D 25 HYDROXY: CPT

## 2025-07-17 PROCEDURE — 83036 HEMOGLOBIN GLYCOSYLATED A1C: CPT

## (undated) DEVICE — Device